# Patient Record
Sex: MALE | Race: WHITE | NOT HISPANIC OR LATINO | Employment: OTHER | URBAN - METROPOLITAN AREA
[De-identification: names, ages, dates, MRNs, and addresses within clinical notes are randomized per-mention and may not be internally consistent; named-entity substitution may affect disease eponyms.]

---

## 2018-02-25 ENCOUNTER — HOSPITAL ENCOUNTER (EMERGENCY)
Facility: HOSPITAL | Age: 43
Discharge: HOME | End: 2018-02-26
Attending: EMERGENCY MEDICINE | Admitting: EMERGENCY MEDICINE
Payer: MEDICARE

## 2018-02-25 DIAGNOSIS — F30.9 MANIA (HCC): Primary | ICD-10-CM

## 2018-02-25 RX ORDER — ZIPRASIDONE MESYLATE 20 MG/ML
10 INJECTION, POWDER, LYOPHILIZED, FOR SOLUTION INTRAMUSCULAR ONCE
Status: COMPLETED | OUTPATIENT
Start: 2018-02-25 | End: 2018-02-26

## 2018-02-26 VITALS
SYSTOLIC BLOOD PRESSURE: 199 MMHG | DIASTOLIC BLOOD PRESSURE: 96 MMHG | HEART RATE: 94 BPM | RESPIRATION RATE: 20 BRPM | BODY MASS INDEX: 34.62 KG/M2 | TEMPERATURE: 98.3 F | WEIGHT: 278.44 LBS | OXYGEN SATURATION: 98 % | HEIGHT: 75 IN

## 2018-02-26 LAB
ALBUMIN SERPL BCP-MCNC: 3.4 G/DL (ref 3.5–5)
ALP SERPL-CCNC: 110 U/L (ref 46–116)
ALT SERPL W P-5'-P-CCNC: 62 U/L (ref 12–78)
ANION GAP SERPL CALCULATED.3IONS-SCNC: 18 MMOL/L (ref 4–13)
AST SERPL W P-5'-P-CCNC: 72 U/L (ref 5–45)
ATRIAL RATE: 96 BPM
ATRIAL RATE: 98 BPM
BASOPHILS # BLD AUTO: 0.02 THOUSANDS/ΜL (ref 0–0.1)
BASOPHILS NFR BLD AUTO: 0 % (ref 0–1)
BILIRUB SERPL-MCNC: 1 MG/DL (ref 0.2–1)
BUN SERPL-MCNC: 21 MG/DL (ref 5–25)
CALCIUM SERPL-MCNC: 8.9 MG/DL (ref 8.3–10.1)
CHLORIDE SERPL-SCNC: 100 MMOL/L (ref 100–108)
CO2 SERPL-SCNC: 17 MMOL/L (ref 21–32)
CREAT SERPL-MCNC: 1.11 MG/DL (ref 0.6–1.3)
EOSINOPHIL # BLD AUTO: 0.01 THOUSAND/ΜL (ref 0–0.61)
EOSINOPHIL NFR BLD AUTO: 0 % (ref 0–6)
ERYTHROCYTE [DISTWIDTH] IN BLOOD BY AUTOMATED COUNT: 13.5 % (ref 11.6–15.1)
GFR SERPL CREATININE-BSD FRML MDRD: 82 ML/MIN/1.73SQ M
GLUCOSE SERPL-MCNC: 235 MG/DL (ref 65–140)
HCT VFR BLD AUTO: 43.2 % (ref 36.5–49.3)
HGB BLD-MCNC: 14.8 G/DL (ref 12–17)
LITHIUM SERPL-SCNC: <0.2 MMOL/L (ref 0.5–1)
LYMPHOCYTES # BLD AUTO: 1.77 THOUSANDS/ΜL (ref 0.6–4.47)
LYMPHOCYTES NFR BLD AUTO: 18 % (ref 14–44)
MCH RBC QN AUTO: 26.4 PG (ref 26.8–34.3)
MCHC RBC AUTO-ENTMCNC: 34.3 G/DL (ref 31.4–37.4)
MCV RBC AUTO: 77 FL (ref 82–98)
MONOCYTES # BLD AUTO: 0.77 THOUSAND/ΜL (ref 0.17–1.22)
MONOCYTES NFR BLD AUTO: 8 % (ref 4–12)
NEUTROPHILS # BLD AUTO: 7.24 THOUSANDS/ΜL (ref 1.85–7.62)
NEUTS SEG NFR BLD AUTO: 74 % (ref 43–75)
NRBC BLD AUTO-RTO: 0 /100 WBCS
P AXIS: 46 DEGREES
P AXIS: 54 DEGREES
PLATELET # BLD AUTO: 265 THOUSANDS/UL (ref 149–390)
PMV BLD AUTO: 9.6 FL (ref 8.9–12.7)
POTASSIUM SERPL-SCNC: 3.8 MMOL/L (ref 3.5–5.3)
PR INTERVAL: 144 MS
PR INTERVAL: 146 MS
PROT SERPL-MCNC: 7.3 G/DL (ref 6.4–8.2)
QRS AXIS: 46 DEGREES
QRS AXIS: 53 DEGREES
QRSD INTERVAL: 94 MS
QRSD INTERVAL: 96 MS
QT INTERVAL: 366 MS
QT INTERVAL: 376 MS
QTC INTERVAL: 467 MS
QTC INTERVAL: 475 MS
RBC # BLD AUTO: 5.61 MILLION/UL (ref 3.88–5.62)
SODIUM SERPL-SCNC: 135 MMOL/L (ref 136–145)
T WAVE AXIS: 50 DEGREES
T WAVE AXIS: 53 DEGREES
VALPROATE SERPL-MCNC: <3 UG/ML (ref 50–100)
VENTRICULAR RATE: 96 BPM
VENTRICULAR RATE: 98 BPM
WBC # BLD AUTO: 9.85 THOUSAND/UL (ref 4.31–10.16)

## 2018-02-26 PROCEDURE — 85025 COMPLETE CBC W/AUTO DIFF WBC: CPT | Performed by: EMERGENCY MEDICINE

## 2018-02-26 PROCEDURE — 93010 ELECTROCARDIOGRAM REPORT: CPT | Performed by: INTERNAL MEDICINE

## 2018-02-26 PROCEDURE — 96361 HYDRATE IV INFUSION ADD-ON: CPT

## 2018-02-26 PROCEDURE — 99284 EMERGENCY DEPT VISIT MOD MDM: CPT

## 2018-02-26 PROCEDURE — 80178 ASSAY OF LITHIUM: CPT | Performed by: EMERGENCY MEDICINE

## 2018-02-26 PROCEDURE — 96360 HYDRATION IV INFUSION INIT: CPT

## 2018-02-26 PROCEDURE — 96372 THER/PROPH/DIAG INJ SC/IM: CPT

## 2018-02-26 PROCEDURE — 80053 COMPREHEN METABOLIC PANEL: CPT | Performed by: EMERGENCY MEDICINE

## 2018-02-26 PROCEDURE — 36415 COLL VENOUS BLD VENIPUNCTURE: CPT | Performed by: EMERGENCY MEDICINE

## 2018-02-26 PROCEDURE — 80164 ASSAY DIPROPYLACETIC ACD TOT: CPT | Performed by: EMERGENCY MEDICINE

## 2018-02-26 PROCEDURE — 93005 ELECTROCARDIOGRAM TRACING: CPT

## 2018-02-26 RX ORDER — ZIPRASIDONE MESYLATE 20 MG/ML
10 INJECTION, POWDER, LYOPHILIZED, FOR SOLUTION INTRAMUSCULAR ONCE
Status: DISCONTINUED | OUTPATIENT
Start: 2018-02-26 | End: 2018-02-26 | Stop reason: HOSPADM

## 2018-02-26 RX ADMIN — ZIPRASIDONE MESYLATE 10 MG: 20 INJECTION, POWDER, LYOPHILIZED, FOR SOLUTION INTRAMUSCULAR at 01:11

## 2018-02-26 RX ADMIN — WATER: 1 INJECTION INTRAMUSCULAR; INTRAVENOUS; SUBCUTANEOUS at 05:28

## 2018-02-26 RX ADMIN — SODIUM CHLORIDE 1000 ML: 0.9 INJECTION, SOLUTION INTRAVENOUS at 01:10

## 2018-02-26 NOTE — ED NOTES
Pt appears to be resting  No signs of distress  Will continue to monitor       April C migue Springer  02/26/18 5081
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
## 2018-02-26 NOTE — ED NOTES
Pt requesting to talk to Mrs  Sultana Philadelphia  Called [phone]; unable to get ahold of Mrs  Sultana [place]  Will try and contact her at a later time        Lo Nino RN  02/26/18 1964

## 2018-02-26 NOTE — ED NOTES
Call placed to pt's g/f,  Munira Collier, and a message was left requesting a call elie  Spoke with the pt who stated that he does not have any money and would need a cab voucher home      Juwan Minor LMSW  02/26/18  6984
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
## 2018-02-26 NOTE — ED NOTES
302, CRF and Rights emailed to Marv Hernandez at [facility]      Douglasstanislav Stevie, [facility]  02/26/18  2213

## 2018-02-26 NOTE — ED NOTES
Pt belongings:  Ryannejuan Ros x2  Metal bracelet  Underwear (pt is wearing)  Silver-toned ring (pt is wearing)  Shoes  Lighter  2 quarters  Watch on cord on pants  Pair of pants  [employer]  Sweater  2 shirts  skoal container with 2 tobacco pouches  Jacket  Empty water bottle  2 knit knee braces (pt is wearing)  Black bag-contents below  -hat  -cell phone  -planner  -wallet   --ID  --social security card  --debit card  --reduced transit fare card  --folded one dollar bill  --laundromat card  -Anali Schwana  -cigarettes  -  -empty prescription bottles-in pt's name  ---metformin  ---glimepiride  ---lisinopril  ---sulfamethoxazole  -Yellow bag with health coverage paperwork and [place] dept of human services paperwork (all papers are wet)  -Detachable lira  -3 shirts  -Pair of pants  -190 application       April C migue Springer  02/26/18 0005         April C migue Galloway  02/26/18 7274

## 2018-02-26 NOTE — ED NOTES
Call received from pt's landlord, Lana Carrillo, who was informed the pt was being d/c and was returning to her home  She indicated that he was unable to return and this writer stated that she would need to go through the appropriate channels to have him evicted  Lana Carrillo indicated that she understood       Florencio Chang LMSW  02/26/18  5113

## 2018-02-26 NOTE — ED PROVIDER NOTES
Care patient assumed from Dr Alton Dalal  For full details of the H&P, please see the note written by Dr Jose Elias Palacios  Briefly, this is a 28-year-old male who presents here today on a 36 by his landlord for evaluation of zoran  He is reportedly not suicidal or homicidal     He does state he has been taking lithium and Depakote, however the last time these were filled were when he was discharged from the hospital in September or October, and he has not followed up with a psychiatrist since then  We will check levels of these, to help with long-term, and outpatient medication management  The patient is psychotic and occasionally has very tangential speech, however at times is answering questions appropriately, and is able to follow a logical train of thought  He denies any homicidal or suicidal ideations, and does not want to be admitted to the hospital   We are able to speak with his long-time girlfriend, who states that she feels that he needs help, but denies any of the events stated in the 302 by the patient's landlord  She does not feel that he meets 302 criteria for any reason at this time  We will therefore overturned this, and allow him to be discharged home, with resources for outpatient follow-up  Diagnoses that have been ruled out:   None   Diagnoses that are still under consideration:   None   Final diagnoses:   Zoran [facility])     Time reflects when diagnosis was documented in both MDM as applicable and the Disposition within this note     Time User Action Codes Description Comment    2/26/2018 12:11 PM Candida Kline Add [F30 9] [facility])       ED Disposition     ED Disposition Condition Comment    Discharge  Josh Juarez discharge to home/self care      Condition at discharge: Good        Follow-up Information     Follow up With Specialties Details Why Contact Info    Crisis resources  Schedule an appointment as soon as possible for a visit to follow up with a psychiatrist Amandeep Mckeon MD  02/26/18 9812

## 2018-02-26 NOTE — ED NOTES
Per hospital supervisor no 1:1 is needed after Linnette Ochoa stated pt denies all SI/HI        Farzana Vazquez RN  02/26/18 8371

## 2018-02-26 NOTE — ED NOTES
Pt given paper scrubs upon arrival and changed into them  Pt is wearing silver-toned ring and underwear that he came in wearing  The rest of pt belongings were logged  Pt asked for a urine sample but states that he is unable to use the restroom at this time      BAT  000     April AROLDO Wilder  02/25/18 8932

## 2018-02-26 NOTE — DISCHARGE INSTRUCTIONS
Take your medications as prescribed  You need to follow up with a psychiatrist for long-term medication management  Return if you develop thoughts of wanting to hurt yourself or anybody else, or feel like you need to be admitted to the hospital     Bipolar Disorder   WHAT YOU NEED TO KNOW:   Bipolar disorder is a long-term chemical imbalance that causes rapid changes in mood and behavior  High moods are called zoran  Low moods are called depression  Sometimes you will feel manic and sometimes you will feel depressed  You can have alternating episodes of zoran and depression  This is called a mixed bipolar state  DISCHARGE INSTRUCTIONS:   Contact your healthcare provider or psychiatrist if:   · You are having trouble managing your bipolar disorder  · You cannot sleep, or are sleeping all the time  · You cannot eat, or are eating more than usual     · You feel dizzy or your stomach is upset  · You cannot make it to your next meeting with your healthcare provider  · You have questions or concerns about your condition or care  Medicines:   · Medicines  may be given to help keep your mood stable, or to help you sleep  Changes in medicine are often needed as your bipolar disorder changes  · Take your medicine as directed  Contact your healthcare provider if you think your medicine is not helping or if you have side effects  Tell him or her if you are allergic to any medicine  Keep a list of the medicines, vitamins, and herbs you take  Include the amounts, and when and why you take them  Bring the list or the pill bottles to follow-up visits  Carry your medicine list with you in case of an emergency  Follow up with your healthcare provider or psychiatrist as directed: You may need to return for blood tests to monitor the levels of bipolar medicine in your blood  Manage bipolar disorder:  Watch for triggers of bipolar disorder symptoms, such as stress   Learn new ways to relax, such as deep breathing, to manage your stress  Tell someone if you feel a manic or depressive period might be coming on  Ask a friend or family member to help watch you for bipolar symptoms  Work to develop skills that will help you manage bipolar disorder  You may need to make lifestyle changes  Ask your healthcare provider or psychiatrist for resources  For support and more information:   · [address] [facility], [address] [facility], [address] [facility], [address]  [place]isha [place] [address]   Phone: [phone]  Phone: [phone]  Web Address: [facility]  · Depression and [address] ([employer]BS[employer])  [address] [place] [address], [facility] , [address]  Phone: [phone]  Web Address: [employer] no  org   © 2017 [address] [facility] Information is for End User's use only and may not be sold, redistributed or otherwise used for commercial purposes  All illustrations and images included in CareNotes® are the copyrighted property of A D A M , Inc  or Jared Henderson  The above information is an  only  It is not intended as medical advice for individual conditions or treatments  Talk to your doctor, nurse or pharmacist before following any medical regimen to see if it is safe and effective for you

## 2018-02-26 NOTE — ED NOTES
Attempted to call pt's wife 3 times for transport, but only getting voicemail        April Collins RN  02/26/18 7170

## 2018-02-26 NOTE — ED NOTES
Pt has been talking about Debra Cary since he arrived  Pt is also talking about family but his stories are not making sense  He seems particularly interested in numbers (dates, dollar amounts, road signs etc)  The focus of his conversations are on Isabel Maxwell not understanding him/appreciating him  Pt is sitting on bed  Will continue to monitor       April C de Kip Krabbe  02/26/18 7290

## 2018-02-26 NOTE — ED NOTES
Call placed to pt's Naty stevenson @ [phone], who petitioned the 36  A message was left requesting a call back   An additional call was placed to [phone] and informed them via v/m that the pt is going to be d/c     Aicha Cervantes LMSW  02/26/18  5943

## 2018-02-26 NOTE — ED NOTES
Pt is talking fast and focused on talking about "Evetta Dense" who he claims is his wife  Pt is currently singing while laying flat on the bed  No signs of distress  Will continue to monitor       April C migue Springer  02/26/18 0003

## 2018-02-26 NOTE — ED NOTES
Pt is still talking about Karyn Marcano as he lies on the bed  Pt appears to be calmer and is talking at a less hurried pace  Will continue to monitor         April C de Presley Denver  02/26/18 5491

## 2018-02-26 NOTE — ED NOTES
Charge nurse informed me that one-to-one observation is no longer needed  Pt appears to be resting  No signs of distress         April C migue Verde  02/26/18 2529

## 2018-02-26 NOTE — ED NOTES
Pt asked me if there is such a thing as "assisted suicide"  He asked if he could come in to the hospital and say that he "was done" if the hospital would help him with that  I told him that is not something we do at the hospital, and we would take care of him during his stay       April AROLDO Bolanos  02/26/18 0678

## 2018-06-09 LAB
ALBUMIN SERPL BCP-MCNC: 4.4 G/DL (ref 3.5–5.7)
ALP SERPL-CCNC: 62 IU/L (ref 40–150)
ALT SERPL W P-5'-P-CCNC: 11 IU/L (ref 0–50)
ANION GAP SERPL CALCULATED.3IONS-SCNC: 16.1 MM/L
AST SERPL W P-5'-P-CCNC: 10 U/L (ref 8–27)
BASOPHILS # BLD AUTO: 0 X3/UL (ref 0–0.3)
BASOPHILS # BLD AUTO: 0.5 % (ref 0–2)
BILIRUB SERPL-MCNC: 0.7 MG/DL (ref 0.3–1)
BUN SERPL-MCNC: 20 MG/DL (ref 7–25)
CALCIUM SERPL-MCNC: 9.4 MG/DL (ref 8.6–10.5)
CHLORIDE SERPL-SCNC: 99 MM/L (ref 98–107)
CO2 SERPL-SCNC: 24 MM/L (ref 21–31)
CREAT SERPL-MCNC: 1.22 MG/DL (ref 0.7–1.3)
DEPRECATED RDW RBC AUTO: 15 % (ref 11.5–14.5)
EGFR (HISTORICAL): > 60 GFR
EGFR AFRICAN AMERICAN (HISTORICAL): > 60 GFR
EOSINOPHIL # BLD AUTO: 0 X3/UL (ref 0–0.5)
EOSINOPHIL NFR BLD AUTO: 0.6 % (ref 0–5)
ETHANOL (HISTORICAL): < 10 MG/DL
GLUCOSE (HISTORICAL): 151 MG/DL (ref 65–99)
HCT VFR BLD AUTO: 45.6 % (ref 42–52)
HGB BLD-MCNC: 15.4 G/DL (ref 14–18)
LYMPHOCYTES # BLD AUTO: 2.5 X3/UL (ref 1.2–4.2)
LYMPHOCYTES NFR BLD AUTO: 29.1 % (ref 20.5–51.1)
MCH RBC QN AUTO: 26.2 PG (ref 26–34)
MCHC RBC AUTO-ENTMCNC: 33.7 G/DL (ref 31–36)
MCV RBC AUTO: 77.7 FL (ref 81–99)
MEDICAL ALCOHOL (HISTORICAL): 0 %
MONOCYTES # BLD AUTO: 0.5 X3/UL (ref 0–1)
MONOCYTES NFR BLD AUTO: 5.6 % (ref 1.7–12)
NEUTROPHILS # BLD AUTO: 5.6 X3/UL (ref 1.4–6.5)
NEUTS SEG NFR BLD AUTO: 64.2 % (ref 42.2–75.2)
OSMOLALITY, SERUM (HISTORICAL): 276 MOSM (ref 262–291)
PLATELET # BLD AUTO: 240 X3/UL (ref 130–400)
PMV BLD AUTO: 8.1 FL (ref 8.6–11.7)
POTASSIUM SERPL-SCNC: 4.1 MM/L (ref 3.5–5.5)
RBC # BLD AUTO: 5.86 X6/UL (ref 4.3–5.9)
SODIUM SERPL-SCNC: 135 MM/L (ref 134–143)
TOTAL PROTEIN (HISTORICAL): 7.7 G/DL (ref 6.4–8.9)
TSH SERPL DL<=0.05 MIU/L-ACNC: 0.47 UIU/M (ref 0.45–5.33)
WBC # BLD AUTO: 8.7 X3/UL (ref 4.8–10.8)

## 2018-06-09 PROCEDURE — 36415 COLL VENOUS BLD VENIPUNCTURE: CPT

## 2018-06-09 PROCEDURE — 9990 URINALYSIS AUTO W/O MICRO (71400121)

## 2018-06-09 PROCEDURE — 9990 CBC AUTO & AUTO DIFF (71200000)

## 2018-06-09 PROCEDURE — 81003 URINALYSIS AUTO W/O SCOPE: CPT

## 2018-06-09 PROCEDURE — 80307 DRUG TEST PRSMV CHEM ANLYZR: CPT

## 2018-06-09 PROCEDURE — 9990 VENIPUNCTURE (14170013)

## 2018-06-09 PROCEDURE — 9990 ASSAY OF ETHANOL (71120794)

## 2018-06-09 PROCEDURE — 84443 ASSAY THYROID STIM HORMONE: CPT

## 2018-06-09 PROCEDURE — 9990 EM LEVEL V (9020181)

## 2018-06-09 PROCEDURE — 9990 COMPREHEN METABOLIC PANEL (71120349)

## 2018-06-09 PROCEDURE — 9990 TSH (71100234)

## 2018-06-09 PROCEDURE — 80053 COMPREHEN METABOLIC PANEL: CPT

## 2018-06-09 PROCEDURE — 9990 VENIPUNCTURE-ER (9010166)

## 2018-06-09 PROCEDURE — 9990 DRUG SCREEN MULTIPLE CLASS (71121123)

## 2018-06-09 PROCEDURE — 85025 COMPLETE CBC W/AUTO DIFF WBC: CPT

## 2018-06-10 ENCOUNTER — HOSPITAL ENCOUNTER (INPATIENT)
Dept: OTHER | Facility: HOSPITAL | Age: 43
LOS: 22 days | Discharge: HOME | DRG: 885 | End: 2018-07-02
Attending: PSYCHIATRY & NEUROLOGY | Admitting: PSYCHIATRY & NEUROLOGY
Payer: MEDICARE

## 2018-06-10 LAB
AMPHETAMINES (HISTORICAL): NEGATIVE
BARBITURATES (HISTORICAL): NEGATIVE
BENZODIAZEPINES (HISTORICAL): NEGATIVE
BILIRUB UR QL STRIP: NEGATIVE
CANNABINOIDS (HISTORICAL): POSITIVE
CLARITY UR: CLEAR
COCAINE (HISTORICAL): NEGATIVE
COLOR UR: YELLOW
GLUCOSE UR STRIP-MCNC: NEGATIVE MG/DL
HGB UR QL STRIP.AUTO: NEGATIVE
KETONES UR STRIP-MCNC: ABNORMAL MG/DL
LEUKOCYTE ESTERASE UR QL STRIP: NEGATIVE
METHADONE (HISTORICAL): NEGATIVE
NITRITE UR QL STRIP: NEGATIVE
OPIATES (HISTORICAL): NEGATIVE
OXYCODONE (HISTORICAL): NEGATIVE
PH UR STRIP.AUTO: 6 [PH] (ref 4.5–8)
PHENCYCLIDINE URINE (HISTORICAL): NEGATIVE
PLEASE NOTE (HISTORICAL): YES
PROPOXYPHENE (HISTORICAL): NEGATIVE
PROT UR STRIP-MCNC: NEGATIVE MG/DL
SP GR UR STRIP.AUTO: 1.02 (ref 1–1.03)
UROBILINOGEN UR QL STRIP.AUTO: 1 EU/DL (ref 0.2–8)

## 2018-06-10 PROCEDURE — 9990 OLANZAPINE 5 MG (27112317)

## 2018-06-10 PROCEDURE — 9990 GABAPENTIN 100 MG (27113125)

## 2018-06-10 PROCEDURE — 87070 CULTURE OTHR SPECIMN AEROBIC: CPT

## 2018-06-10 PROCEDURE — 9990 TRAZODONE 50 MG (27101443)

## 2018-06-10 PROCEDURE — 9990 ADULT BEHAVOR UNIT (100305)

## 2018-06-10 PROCEDURE — 9990 LORAZEPAM TAB 1 MG (27102144)

## 2018-06-10 PROCEDURE — 9990 NICOTINE 21 MG/24H (27022359)

## 2018-06-10 PROCEDURE — 9990 HALOPERIDOL TAB 5 MG (27102102)

## 2018-06-10 PROCEDURE — 9990 ADULT BEHAVIOR UNIT GEN SUPPLY (7900053)

## 2018-06-10 PROCEDURE — 9990 BACTERIAL CULTURE OTHER SOURCE (71700058)

## 2018-06-10 PROCEDURE — 9990

## 2018-06-11 PROCEDURE — 9990 ADULT BEHAVIOR UNIT GEN SUPPLY (7900053)

## 2018-06-11 PROCEDURE — 9990 GABAPENTIN 100 MG (27113125)

## 2018-06-11 PROCEDURE — 9990 METFORMIN 500 MG (27112234)

## 2018-06-11 PROCEDURE — 9990 NICOTINE 21 MG/24H (27022359)

## 2018-06-11 PROCEDURE — 9990 ADULT BEHAVOR UNIT (100305)

## 2018-06-11 PROCEDURE — 9990

## 2018-06-11 PROCEDURE — 9990 OLANZAPINE 5 MG (27112317)

## 2018-06-11 PROCEDURE — 9990 LISINOPRIL 5 MG (27104173)

## 2018-06-12 PROCEDURE — 9990

## 2018-06-12 PROCEDURE — 9990 ADULT BEHAVIOR UNIT GEN SUPPLY (7900053)

## 2018-06-12 PROCEDURE — 9990 NICOTINE 21 MG/24H (27022359)

## 2018-06-12 PROCEDURE — 9990 LISINOPRIL 5 MG (27104173)

## 2018-06-12 PROCEDURE — 9990 METFORMIN 500 MG (27112234)

## 2018-06-12 PROCEDURE — 9990 ADULT BEHAVOR UNIT (100305)

## 2018-06-12 PROCEDURE — 9990 GABAPENTIN 100 MG (27113125)

## 2018-06-12 PROCEDURE — 9990 LORAZEPAM TAB 1 MG (27102144)

## 2018-06-12 PROCEDURE — 9990 DIVALPROEX 500 MG (27112812)

## 2018-06-12 PROCEDURE — 73650 X-RAY EXAM OF HEEL: CPT

## 2018-06-12 PROCEDURE — 9990 TRAZODONE 50 MG (27101443)

## 2018-06-13 DIAGNOSIS — F31.9 BIPOLAR 1 DISORDER (HCC): Primary | ICD-10-CM

## 2018-06-13 DIAGNOSIS — I10 ESSENTIAL HYPERTENSION: ICD-10-CM

## 2018-06-13 DIAGNOSIS — N64.52 DISCHARGE FROM BOTH NIPPLES: ICD-10-CM

## 2018-06-13 DIAGNOSIS — F31.4 SEVERE BIPOLAR I DISORDER, MOST RECENT EPISODE DEPRESSED WITHOUT PSYCHOTIC FEATURES (HCC): ICD-10-CM

## 2018-06-13 DIAGNOSIS — M17.0 OSTEOARTHRITIS OF BOTH KNEES, UNSPECIFIED OSTEOARTHRITIS TYPE: ICD-10-CM

## 2018-06-13 DIAGNOSIS — E10.8 TYPE 1 DIABETES MELLITUS WITH COMPLICATION (HCC): ICD-10-CM

## 2018-06-13 DIAGNOSIS — S90.819A FOOT ABRASION: ICD-10-CM

## 2018-06-13 PROCEDURE — 9990 BENZTROPINE 1 MG (27101039)

## 2018-06-13 PROCEDURE — 9990 DIVALPROEX 500 MG (27112812)

## 2018-06-13 PROCEDURE — 9990

## 2018-06-13 PROCEDURE — 9990 TRAZODONE 50 MG (27101443)

## 2018-06-13 PROCEDURE — 9990 METFORMIN 500 MG (27112234)

## 2018-06-13 PROCEDURE — 9990 CHLORPROMAZINE HCL 5MG ORAL (27238377)

## 2018-06-13 PROCEDURE — 9990 LORAZEPAM TAB 1 MG (27102144)

## 2018-06-13 PROCEDURE — 9990 ADULT BEHAVOR UNIT (100305)

## 2018-06-13 PROCEDURE — 9990 ADULT BEHAVIOR UNIT GEN SUPPLY (7900053)

## 2018-06-13 PROCEDURE — 9990 LISINOPRIL 5 MG (27104173)

## 2018-06-13 PROCEDURE — 9990 NICOTINE 21 MG/24H (27022359)

## 2018-06-14 PROCEDURE — 9990

## 2018-06-14 PROCEDURE — 9990 METFORMIN 500 MG (27112234)

## 2018-06-14 PROCEDURE — 9990 ADULT BEHAVOR UNIT (100305)

## 2018-06-14 PROCEDURE — 9990 LORAZEPAM TAB 1 MG (27102144)

## 2018-06-14 PROCEDURE — 9990 LISINOPRIL 5 MG (27104173)

## 2018-06-14 PROCEDURE — 93005 ELECTROCARDIOGRAM TRACING: CPT

## 2018-06-14 PROCEDURE — 9990 BENZTROPINE 1 MG (27101039)

## 2018-06-14 PROCEDURE — 9990 TRAZODONE 50 MG (27101443)

## 2018-06-14 PROCEDURE — 9990 CHLORPROMAZINE HCL 5MG ORAL (27238377)

## 2018-06-14 PROCEDURE — 9990 DIVALPROEX 500 MG (27112812)

## 2018-06-14 PROCEDURE — 9990 ADULT BEHAVIOR UNIT GEN SUPPLY (7900053)

## 2018-06-14 PROCEDURE — 9990 NICOTINE 21 MG/24H (27022359)

## 2018-06-15 LAB
ALBUMIN SERPL BCP-MCNC: 3.6 G/DL (ref 3.5–5.7)
ALP SERPL-CCNC: 62 IU/L (ref 40–150)
ALT SERPL W P-5'-P-CCNC: 9 IU/L (ref 0–50)
ANION GAP SERPL CALCULATED.3IONS-SCNC: 11.8 MM/L
AST SERPL W P-5'-P-CCNC: 9 U/L (ref 8–27)
BILIRUB SERPL-MCNC: 0.4 MG/DL (ref 0.3–1)
BILIRUBIN DIRECT (HISTORICAL): 0.1 MG/DL (ref 0–0.2)
BUN SERPL-MCNC: 20 MG/DL (ref 7–25)
CALCIUM SERPL-MCNC: 8.9 MG/DL (ref 8.6–10.5)
CHLORIDE SERPL-SCNC: 103 MM/L (ref 98–107)
CO2 SERPL-SCNC: 25 MM/L (ref 21–31)
CREAT SERPL-MCNC: 0.96 MG/DL (ref 0.7–1.3)
EGFR (HISTORICAL): > 60 GFR
EGFR AFRICAN AMERICAN (HISTORICAL): > 60 GFR
GLUCOSE (HISTORICAL): 217 MG/DL (ref 65–99)
OSMOLALITY, SERUM (HISTORICAL): 279 MOSM (ref 262–291)
POTASSIUM SERPL-SCNC: 4.8 MM/L (ref 3.5–5.5)
SODIUM SERPL-SCNC: 135 MM/L (ref 134–143)
TOTAL PROTEIN (HISTORICAL): 6.3 G/DL (ref 6.4–8.9)
VALPROIC ACID TOTAL (HISTORICAL): 50.3 MCQ/M (ref 50–125)

## 2018-06-15 PROCEDURE — 9990

## 2018-06-15 PROCEDURE — 9990 VENIPUNCTURE (14170013)

## 2018-06-15 PROCEDURE — 9990 LORAZEPAM TAB 1 MG (27102144)

## 2018-06-15 PROCEDURE — 9990 LISINOPRIL 5 MG (27104173)

## 2018-06-15 PROCEDURE — 9990 METFORMIN 500 MG (27112234)

## 2018-06-15 PROCEDURE — 9990 CHLORPROMAZINE HCL 5MG ORAL (27238377)

## 2018-06-15 PROCEDURE — 80164 ASSAY DIPROPYLACETIC ACD TOT: CPT

## 2018-06-15 PROCEDURE — 9990 NICOTINE 21 MG/24H (27022359)

## 2018-06-15 PROCEDURE — 93005 ELECTROCARDIOGRAM TRACING: CPT

## 2018-06-15 PROCEDURE — 9990 BENZTROPINE 1 MG (27101039)

## 2018-06-15 PROCEDURE — 82248 BILIRUBIN DIRECT: CPT

## 2018-06-15 PROCEDURE — 80053 COMPREHEN METABOLIC PANEL: CPT

## 2018-06-15 PROCEDURE — 36415 COLL VENOUS BLD VENIPUNCTURE: CPT

## 2018-06-15 PROCEDURE — 9990 TRAZODONE 50 MG (27101443)

## 2018-06-15 PROCEDURE — 9990 DIVALPROEX 500 MG (27112812)

## 2018-06-15 PROCEDURE — 9990 ADULT BEHAVOR UNIT (100305)

## 2018-06-15 PROCEDURE — 9990 COMPREHEN METABOLIC PANEL (71120349)

## 2018-06-15 PROCEDURE — 9990 ADULT BEHAVIOR UNIT GEN SUPPLY (7900053)

## 2018-06-16 PROCEDURE — 9990 LISINOPRIL 5 MG (27104173)

## 2018-06-16 PROCEDURE — 9990 BENZTROPINE 1 MG (27101039)

## 2018-06-16 PROCEDURE — 9990 ACETAMINOPHEN 325 MG (27108570)

## 2018-06-16 PROCEDURE — 9990 NICOTINE 21 MG/24H (27022359)

## 2018-06-16 PROCEDURE — 9990 LORAZEPAM TAB 1 MG (27102144)

## 2018-06-16 PROCEDURE — 9990 ADULT BEHAVOR UNIT (100305)

## 2018-06-16 PROCEDURE — 9990 CHLORPROMAZINE HCL 5MG ORAL (27238377)

## 2018-06-16 PROCEDURE — 9990 METFORMIN 500 MG (27112234)

## 2018-06-16 PROCEDURE — 9990

## 2018-06-16 PROCEDURE — 9990 TRAZODONE 50 MG (27101443)

## 2018-06-16 PROCEDURE — 9990 ADULT BEHAVIOR UNIT GEN SUPPLY (7900053)

## 2018-06-16 PROCEDURE — 9990 DIVALPROEX 500 MG (27112812)

## 2018-06-17 PROCEDURE — 9990 HALOPERIDOL TAB 5 MG (27102102)

## 2018-06-17 PROCEDURE — 9990 METFORMIN 500 MG (27112234)

## 2018-06-17 PROCEDURE — 9990

## 2018-06-17 PROCEDURE — 9990 CHLORPROMAZINE HCL 5MG ORAL (27238377)

## 2018-06-17 PROCEDURE — 9990 TRAZODONE 50 MG (27101443)

## 2018-06-17 PROCEDURE — 9990 ADULT BEHAVOR UNIT (100305)

## 2018-06-17 PROCEDURE — 9990 ADULT BEHAVIOR UNIT GEN SUPPLY (7900053)

## 2018-06-17 PROCEDURE — 9990 BENZTROPINE 1 MG (27101039)

## 2018-06-17 PROCEDURE — 9990 LORAZEPAM TAB 1 MG (27102144)

## 2018-06-17 PROCEDURE — 9990 LISINOPRIL 5 MG (27104173)

## 2018-06-17 PROCEDURE — 9990 DIVALPROEX 500 MG (27112812)

## 2018-06-17 PROCEDURE — 9990 NICOTINE 21 MG/24H (27022359)

## 2018-06-18 PROCEDURE — 9990

## 2018-06-18 PROCEDURE — 9990 ADULT BEHAVIOR UNIT GEN SUPPLY (7900053)

## 2018-06-18 PROCEDURE — 9990 LISINOPRIL 5 MG (27104173)

## 2018-06-18 PROCEDURE — 9990 DIVALPROEX 500 MG (27112812)

## 2018-06-18 PROCEDURE — 9990 METOPROLOL 25 MG (27114149)

## 2018-06-18 PROCEDURE — 9990 ADULT BEHAVOR UNIT (100305)

## 2018-06-18 PROCEDURE — 9990 GABAPENTIN 300 MG (27113133)

## 2018-06-18 PROCEDURE — 9990 BENZTROPINE 1 MG (27101039)

## 2018-06-18 PROCEDURE — 9990 TRAZODONE 50 MG (27101443)

## 2018-06-18 PROCEDURE — 9990 METFORMIN 500 MG (27112234)

## 2018-06-18 PROCEDURE — 9990 NICOTINE 21 MG/24H (27022359)

## 2018-06-18 PROCEDURE — 9990 CHLORPROMAZINE HCL 5MG ORAL (27238377)

## 2018-06-18 PROCEDURE — 9990 LORAZEPAM TAB 1 MG (27102144)

## 2018-06-18 PROCEDURE — 9990 GABAPENTIN 100 MG (27113125)

## 2018-06-19 PROCEDURE — 9990

## 2018-06-19 PROCEDURE — 9990 BENZTROPINE 1 MG (27101039)

## 2018-06-19 PROCEDURE — 9990 CHLORPROMAZINE HCL 5MG ORAL (27238377)

## 2018-06-19 PROCEDURE — 9990 METFORMIN 500 MG (27112234)

## 2018-06-19 PROCEDURE — 9990 DIVALPROEX 500 MG (27112812)

## 2018-06-19 PROCEDURE — 9990 LISINOPRIL 5 MG (27104173)

## 2018-06-19 PROCEDURE — 9990 TRAZODONE 50 MG (27101443)

## 2018-06-19 PROCEDURE — 9990 NICOTINE 21 MG/24H (27022359)

## 2018-06-19 PROCEDURE — 9990 ADULT BEHAVIOR UNIT GEN SUPPLY (7900053)

## 2018-06-19 PROCEDURE — 9990 LORAZEPAM TAB 1 MG (27102144)

## 2018-06-19 PROCEDURE — 9990 METOPROLOL 25 MG (27114149)

## 2018-06-19 PROCEDURE — 9990 ADULT BEHAVOR UNIT (100305)

## 2018-06-20 LAB
ALBUMIN SERPL BCP-MCNC: 3.7 G/DL (ref 3.5–5.7)
ALP SERPL-CCNC: 72 IU/L (ref 40–150)
ALT SERPL W P-5'-P-CCNC: 8 IU/L (ref 0–50)
ANION GAP SERPL CALCULATED.3IONS-SCNC: 15.5 MM/L
AST SERPL W P-5'-P-CCNC: 9 U/L (ref 8–27)
BILIRUB SERPL-MCNC: 0.4 MG/DL (ref 0.3–1)
BILIRUBIN DIRECT (HISTORICAL): 0 MG/DL (ref 0–0.2)
BUN SERPL-MCNC: 22 MG/DL (ref 7–25)
CALCIUM SERPL-MCNC: 8.9 MG/DL (ref 8.6–10.5)
CHLORIDE SERPL-SCNC: 102 MM/L (ref 98–107)
CO2 SERPL-SCNC: 24 MM/L (ref 21–31)
CREAT SERPL-MCNC: 1.15 MG/DL (ref 0.7–1.3)
EGFR (HISTORICAL): > 60 GFR
EGFR AFRICAN AMERICAN (HISTORICAL): > 60 GFR
GLUCOSE (HISTORICAL): 216 MG/DL (ref 65–99)
OSMOLALITY, SERUM (HISTORICAL): 284 MOSM (ref 262–291)
POTASSIUM SERPL-SCNC: 4.5 MM/L (ref 3.5–5.5)
SODIUM SERPL-SCNC: 137 MM/L (ref 134–143)
TOTAL PROTEIN (HISTORICAL): 6 G/DL (ref 6.4–8.9)
VALPROIC ACID TOTAL (HISTORICAL): 54.5 MCQ/M (ref 50–125)

## 2018-06-20 PROCEDURE — 9990 VENIPUNCTURE (14170013)

## 2018-06-20 PROCEDURE — 9990 LISINOPRIL 5 MG (27104173)

## 2018-06-20 PROCEDURE — 9990

## 2018-06-20 PROCEDURE — 9990 BENZTROPINE 1 MG (27101039)

## 2018-06-20 PROCEDURE — 9990 METFORMIN 500 MG (27112234)

## 2018-06-20 PROCEDURE — 9990 METOPROLOL 25 MG (27114149)

## 2018-06-20 PROCEDURE — 80164 ASSAY DIPROPYLACETIC ACD TOT: CPT

## 2018-06-20 PROCEDURE — 9990 TRAZODONE 50 MG (27101443)

## 2018-06-20 PROCEDURE — 9990 HALOPERIDOL TAB 5 MG (27102102)

## 2018-06-20 PROCEDURE — 9990 ADULT BEHAVOR UNIT (100305)

## 2018-06-20 PROCEDURE — 9990 CHLORPROMAZINE HCL 5MG ORAL (27238377)

## 2018-06-20 PROCEDURE — 80053 COMPREHEN METABOLIC PANEL: CPT

## 2018-06-20 PROCEDURE — 36415 COLL VENOUS BLD VENIPUNCTURE: CPT

## 2018-06-20 PROCEDURE — 9990 ADULT BEHAVIOR UNIT GEN SUPPLY (7900053)

## 2018-06-20 PROCEDURE — 82248 BILIRUBIN DIRECT: CPT

## 2018-06-20 PROCEDURE — 9990 NICOTINE 21 MG/24H (27022359)

## 2018-06-20 PROCEDURE — 9990 LORAZEPAM TAB 1 MG (27102144)

## 2018-06-20 PROCEDURE — 9990 ACETAMINOPHEN 325 MG (27108570)

## 2018-06-20 PROCEDURE — 9990 DIVALPROEX 500 MG (27112812)

## 2018-06-20 PROCEDURE — 9990 COMPREHEN METABOLIC PANEL (71120349)

## 2018-06-21 PROCEDURE — 9990 LORAZEPAM TAB 1 MG (27102144)

## 2018-06-21 PROCEDURE — 9990 NICOTINE 21 MG/24H (27022359)

## 2018-06-21 PROCEDURE — 9990 ACETAMINOPHEN 325 MG (27108570)

## 2018-06-21 PROCEDURE — 9990 CHLORPROMAZINE HCL 5MG ORAL (27238377)

## 2018-06-21 PROCEDURE — 9990 LISINOPRIL 5 MG (27104173)

## 2018-06-21 PROCEDURE — 9990

## 2018-06-21 PROCEDURE — 9990 ADULT BEHAVOR UNIT (100305)

## 2018-06-21 PROCEDURE — 9990 ADULT BEHAVIOR UNIT GEN SUPPLY (7900053)

## 2018-06-21 PROCEDURE — 9990 DIVALPROEX 500 MG (27112812)

## 2018-06-21 PROCEDURE — 9990 TRAZODONE 50 MG (27101443)

## 2018-06-21 PROCEDURE — 9990 METOPROLOL 25 MG (27114149)

## 2018-06-21 PROCEDURE — 9990 BENZTROPINE 1 MG (27101039)

## 2018-06-21 PROCEDURE — 9990 METFORMIN 500 MG (27112234)

## 2018-06-22 PROCEDURE — 9990

## 2018-06-22 PROCEDURE — 9990 CHLORPROMAZINE HCL 5MG ORAL (27238377)

## 2018-06-22 PROCEDURE — 9990 ADULT BEHAVIOR UNIT GEN SUPPLY (7900053)

## 2018-06-22 PROCEDURE — 9990 METFORMIN 500 MG (27112234)

## 2018-06-22 PROCEDURE — 9990 HALOPERIDOL TAB 5 MG (27102102)

## 2018-06-22 PROCEDURE — 9990 METOPROLOL 25 MG (27114149)

## 2018-06-22 PROCEDURE — 9990 HUMALOG 3ML MDV (27240001)

## 2018-06-22 PROCEDURE — 9990 DIVALPROEX 500 MG (27112812)

## 2018-06-22 PROCEDURE — 9990 LISINOPRIL 5 MG (27104173)

## 2018-06-22 PROCEDURE — 9990 NICOTINE 21 MG/24H (27022359)

## 2018-06-22 PROCEDURE — 80164 ASSAY DIPROPYLACETIC ACD TOT: CPT

## 2018-06-22 PROCEDURE — 9990 TRAZODONE 50 MG (27101443)

## 2018-06-22 PROCEDURE — 9990 ADULT BEHAVOR UNIT (100305)

## 2018-06-22 PROCEDURE — 9990 BENZTROPINE 1 MG (27101039)

## 2018-06-22 PROCEDURE — 9990 LORAZEPAM TAB 1 MG (27102144)

## 2018-06-22 PROCEDURE — 9990 ACETAMINOPHEN 325 MG (27108570)

## 2018-06-22 RX ORDER — LISINOPRIL 5 MG/1
5 TABLET ORAL DAILY
Status: DISCONTINUED | OUTPATIENT
Start: 2018-06-23 | End: 2018-07-02 | Stop reason: HOSPADM

## 2018-06-22 RX ORDER — BENZTROPINE MESYLATE 1 MG/1
1 TABLET ORAL DAILY
Status: DISCONTINUED | OUTPATIENT
Start: 2018-06-23 | End: 2018-07-02 | Stop reason: HOSPADM

## 2018-06-22 RX ORDER — DEXTROSE MONOHYDRATE 25 G/50ML
25 INJECTION, SOLUTION INTRAVENOUS DAILY PRN
Status: DISCONTINUED | OUTPATIENT
Start: 2018-06-23 | End: 2018-07-02 | Stop reason: HOSPADM

## 2018-06-22 RX ORDER — NICOTINE 21 MG/24HR
21 PATCH, TRANSDERMAL 24 HOURS TRANSDERMAL DAILY
Status: DISCONTINUED | OUTPATIENT
Start: 2018-06-23 | End: 2018-06-30

## 2018-06-22 RX ORDER — BENZTROPINE MESYLATE 1 MG/1
1 TABLET ORAL EVERY 4 HOURS PRN
Status: DISCONTINUED | OUTPATIENT
Start: 2018-06-23 | End: 2018-07-02 | Stop reason: HOSPADM

## 2018-06-22 RX ORDER — HALOPERIDOL 5 MG/ML
5 INJECTION INTRAMUSCULAR EVERY 4 HOURS PRN
Status: DISCONTINUED | OUTPATIENT
Start: 2018-06-23 | End: 2018-07-02 | Stop reason: HOSPADM

## 2018-06-22 RX ORDER — BENZTROPINE MESYLATE 1 MG/ML
1 INJECTION INTRAMUSCULAR; INTRAVENOUS EVERY 4 HOURS PRN
Status: DISCONTINUED | OUTPATIENT
Start: 2018-06-23 | End: 2018-07-02 | Stop reason: HOSPADM

## 2018-06-22 RX ORDER — TRAZODONE HYDROCHLORIDE 50 MG/1
50 TABLET ORAL
Status: DISCONTINUED | OUTPATIENT
Start: 2018-06-23 | End: 2018-07-02 | Stop reason: HOSPADM

## 2018-06-22 RX ORDER — DIVALPROEX SODIUM 250 MG/1
250 TABLET, DELAYED RELEASE ORAL DAILY
Status: DISCONTINUED | OUTPATIENT
Start: 2018-06-23 | End: 2018-06-27

## 2018-06-22 RX ORDER — HYDROXYZINE PAMOATE 25 MG/1
25 CAPSULE ORAL EVERY 6 HOURS PRN
Status: DISCONTINUED | OUTPATIENT
Start: 2018-06-23 | End: 2018-06-23

## 2018-06-22 RX ORDER — DIVALPROEX SODIUM 500 MG/1
500 TABLET, DELAYED RELEASE ORAL 2 TIMES DAILY
Status: DISCONTINUED | OUTPATIENT
Start: 2018-06-23 | End: 2018-06-27

## 2018-06-22 RX ORDER — LORAZEPAM 1 MG/1
1 TABLET ORAL EVERY 4 HOURS PRN
Status: DISCONTINUED | OUTPATIENT
Start: 2018-06-23 | End: 2018-07-02 | Stop reason: HOSPADM

## 2018-06-22 RX ORDER — HALOPERIDOL 5 MG
5 TABLET ORAL EVERY 4 HOURS PRN
Status: DISCONTINUED | OUTPATIENT
Start: 2018-06-23 | End: 2018-07-02 | Stop reason: HOSPADM

## 2018-06-22 RX ORDER — LISINOPRIL 5 MG/1
5 TABLET ORAL DAILY
Status: DISCONTINUED | OUTPATIENT
Start: 2018-06-23 | End: 2018-06-22

## 2018-06-22 RX ORDER — ACETAMINOPHEN 325 MG/1
650 TABLET ORAL EVERY 4 HOURS PRN
Status: DISCONTINUED | OUTPATIENT
Start: 2018-06-23 | End: 2018-07-02 | Stop reason: HOSPADM

## 2018-06-22 RX ORDER — TRAMADOL HYDROCHLORIDE 50 MG/1
50 TABLET ORAL EVERY 8 HOURS PRN
Status: DISCONTINUED | OUTPATIENT
Start: 2018-06-23 | End: 2018-07-02 | Stop reason: HOSPADM

## 2018-06-22 RX ORDER — GABAPENTIN 400 MG/1
400 CAPSULE ORAL 4 TIMES DAILY
Status: DISCONTINUED | OUTPATIENT
Start: 2018-06-23 | End: 2018-07-02 | Stop reason: HOSPADM

## 2018-06-22 RX ORDER — MAGNESIUM HYDROXIDE/ALUMINUM HYDROXICE/SIMETHICONE 120; 1200; 1200 MG/30ML; MG/30ML; MG/30ML
30 SUSPENSION ORAL EVERY 4 HOURS PRN
Status: DISCONTINUED | OUTPATIENT
Start: 2018-06-23 | End: 2018-07-02 | Stop reason: HOSPADM

## 2018-06-22 RX ORDER — LORAZEPAM 2 MG/ML
1 INJECTION INTRAMUSCULAR EVERY 4 HOURS PRN
Status: DISCONTINUED | OUTPATIENT
Start: 2018-06-23 | End: 2018-07-02 | Stop reason: HOSPADM

## 2018-06-22 RX ORDER — CHLORPROMAZINE HYDROCHLORIDE 25 MG/1
50 TABLET, FILM COATED ORAL 3 TIMES DAILY
Status: DISCONTINUED | OUTPATIENT
Start: 2018-06-23 | End: 2018-06-27

## 2018-06-22 RX ORDER — DEXTROSE MONOHYDRATE 50 MG/ML
20 INJECTION, SOLUTION INTRAVENOUS AS NEEDED
Status: DISCONTINUED | OUTPATIENT
Start: 2018-06-23 | End: 2018-07-02 | Stop reason: HOSPADM

## 2018-06-23 LAB
GLUCOSE SERPL-MCNC: 185 MG/DL (ref 65–140)
GLUCOSE SERPL-MCNC: 258 MG/DL (ref 65–140)
GLUCOSE SERPL-MCNC: 291 MG/DL (ref 65–140)

## 2018-06-23 PROCEDURE — 82948 REAGENT STRIP/BLOOD GLUCOSE: CPT

## 2018-06-23 RX ORDER — GABAPENTIN 400 MG/1
800 CAPSULE ORAL 3 TIMES DAILY
Status: ON HOLD | COMMUNITY
Start: 2018-05-17 | End: 2018-07-02

## 2018-06-23 RX ORDER — DIVALPROEX SODIUM 500 MG/1
500 TABLET, DELAYED RELEASE ORAL 2 TIMES DAILY
Status: ON HOLD | COMMUNITY
End: 2018-07-02

## 2018-06-23 RX ORDER — HYDROXYZINE HYDROCHLORIDE 25 MG/1
25 TABLET, FILM COATED ORAL EVERY 6 HOURS PRN
Status: DISCONTINUED | OUTPATIENT
Start: 2018-06-23 | End: 2018-07-02 | Stop reason: HOSPADM

## 2018-06-23 RX ORDER — LANOLIN ALCOHOL/MO/W.PET/CERES
3 CREAM (GRAM) TOPICAL DAILY PRN
COMMUNITY
Start: 2018-05-17 | End: 2018-07-11 | Stop reason: ALTCHOICE

## 2018-06-23 RX ADMIN — LORAZEPAM 1 MG: 1 TABLET ORAL at 12:53

## 2018-06-23 RX ADMIN — NICOTINE 21 MG: 21 PATCH, EXTENDED RELEASE TRANSDERMAL at 09:46

## 2018-06-23 RX ADMIN — INSULIN LISPRO 10 UNITS: 100 INJECTION, SOLUTION INTRAVENOUS; SUBCUTANEOUS at 21:25

## 2018-06-23 RX ADMIN — GABAPENTIN 400 MG: 400 CAPSULE ORAL at 17:29

## 2018-06-23 RX ADMIN — ACETAMINOPHEN 650 MG: 325 TABLET ORAL at 21:22

## 2018-06-23 RX ADMIN — LORAZEPAM 1 MG: 1 TABLET ORAL at 21:22

## 2018-06-23 RX ADMIN — TRAMADOL HYDROCHLORIDE 50 MG: 50 TABLET, COATED ORAL at 17:31

## 2018-06-23 RX ADMIN — DIVALPROEX SODIUM 500 MG: 500 TABLET, DELAYED RELEASE ORAL at 09:43

## 2018-06-23 RX ADMIN — CHLORPROMAZINE HYDROCHLORIDE 50 MG: 25 TABLET, SUGAR COATED ORAL at 09:43

## 2018-06-23 RX ADMIN — GABAPENTIN 400 MG: 400 CAPSULE ORAL at 21:23

## 2018-06-23 RX ADMIN — INSULIN LISPRO 6 UNITS: 100 INJECTION, SOLUTION INTRAVENOUS; SUBCUTANEOUS at 09:44

## 2018-06-23 RX ADMIN — TRAZODONE HYDROCHLORIDE 50 MG: 50 TABLET ORAL at 21:24

## 2018-06-23 RX ADMIN — CHLORPROMAZINE HYDROCHLORIDE 50 MG: 25 TABLET, SUGAR COATED ORAL at 17:28

## 2018-06-23 RX ADMIN — HALOPERIDOL 5 MG: 5 TABLET ORAL at 21:23

## 2018-06-23 RX ADMIN — METFORMIN HYDROCHLORIDE 500 MG: 500 TABLET ORAL at 17:29

## 2018-06-23 RX ADMIN — DIVALPROEX SODIUM 500 MG: 500 TABLET, DELAYED RELEASE ORAL at 17:29

## 2018-06-23 RX ADMIN — INSULIN LISPRO 10 UNITS: 100 INJECTION, SOLUTION INTRAVENOUS; SUBCUTANEOUS at 17:29

## 2018-06-23 RX ADMIN — CHLORPROMAZINE HYDROCHLORIDE 50 MG: 25 TABLET, SUGAR COATED ORAL at 21:23

## 2018-06-23 RX ADMIN — GABAPENTIN 400 MG: 400 CAPSULE ORAL at 09:44

## 2018-06-23 RX ADMIN — LISINOPRIL 5 MG: 5 TABLET ORAL at 09:46

## 2018-06-23 RX ADMIN — GABAPENTIN 400 MG: 400 CAPSULE ORAL at 12:52

## 2018-06-23 RX ADMIN — BENZTROPINE MESYLATE 1 MG: 1 TABLET ORAL at 09:43

## 2018-06-23 RX ADMIN — METOPROLOL TARTRATE 25 MG: 25 TABLET ORAL at 09:46

## 2018-06-23 RX ADMIN — METFORMIN HYDROCHLORIDE 500 MG: 500 TABLET ORAL at 09:46

## 2018-06-23 RX ADMIN — DIVALPROEX SODIUM 250 MG: 250 TABLET, DELAYED RELEASE ORAL at 12:53

## 2018-06-23 NOTE — NURSING NOTE
Pt  has been visible for groups with minimal participation  Selective interactions with peers  Has been pleasant and cooperative  Compliant with treatment  Left lateral foot wound redressed with betadine and non adherent dressing as ordered   Denies SI, HI, A/T/V

## 2018-06-24 PROBLEM — F17.200 TOBACCO USE DISORDER: Status: ACTIVE | Noted: 2018-06-24

## 2018-06-24 PROBLEM — F12.10 CANNABIS ABUSE: Status: ACTIVE | Noted: 2018-06-24

## 2018-06-24 PROBLEM — F31.4 SEVERE BIPOLAR I DISORDER, MOST RECENT EPISODE DEPRESSED WITHOUT PSYCHOTIC FEATURES (HCC): Status: ACTIVE | Noted: 2018-06-24

## 2018-06-24 LAB
GLUCOSE SERPL-MCNC: 178 MG/DL (ref 65–140)
GLUCOSE SERPL-MCNC: 219 MG/DL (ref 65–140)
GLUCOSE SERPL-MCNC: 256 MG/DL (ref 65–140)
GLUCOSE SERPL-MCNC: 278 MG/DL (ref 65–140)

## 2018-06-24 PROCEDURE — 82948 REAGENT STRIP/BLOOD GLUCOSE: CPT

## 2018-06-24 RX ADMIN — TRAMADOL HYDROCHLORIDE 50 MG: 50 TABLET, COATED ORAL at 15:17

## 2018-06-24 RX ADMIN — GABAPENTIN 400 MG: 400 CAPSULE ORAL at 17:39

## 2018-06-24 RX ADMIN — LORAZEPAM 1 MG: 1 TABLET ORAL at 14:44

## 2018-06-24 RX ADMIN — GABAPENTIN 400 MG: 400 CAPSULE ORAL at 09:28

## 2018-06-24 RX ADMIN — DIVALPROEX SODIUM 500 MG: 500 TABLET, DELAYED RELEASE ORAL at 09:27

## 2018-06-24 RX ADMIN — GABAPENTIN 400 MG: 400 CAPSULE ORAL at 21:25

## 2018-06-24 RX ADMIN — LISINOPRIL 5 MG: 5 TABLET ORAL at 09:28

## 2018-06-24 RX ADMIN — CHLORPROMAZINE HYDROCHLORIDE 50 MG: 25 TABLET, SUGAR COATED ORAL at 09:27

## 2018-06-24 RX ADMIN — METFORMIN HYDROCHLORIDE 500 MG: 500 TABLET ORAL at 09:28

## 2018-06-24 RX ADMIN — INSULIN LISPRO 8 UNITS: 100 INJECTION, SOLUTION INTRAVENOUS; SUBCUTANEOUS at 21:31

## 2018-06-24 RX ADMIN — BENZTROPINE MESYLATE 1 MG: 1 TABLET ORAL at 09:28

## 2018-06-24 RX ADMIN — METOPROLOL TARTRATE 25 MG: 25 TABLET ORAL at 09:27

## 2018-06-24 RX ADMIN — CHLORPROMAZINE HYDROCHLORIDE 50 MG: 25 TABLET, SUGAR COATED ORAL at 21:25

## 2018-06-24 RX ADMIN — ACETAMINOPHEN 650 MG: 325 TABLET ORAL at 21:25

## 2018-06-24 RX ADMIN — INSULIN LISPRO 6 UNITS: 100 INJECTION, SOLUTION INTRAVENOUS; SUBCUTANEOUS at 09:25

## 2018-06-24 RX ADMIN — GABAPENTIN 400 MG: 400 CAPSULE ORAL at 14:33

## 2018-06-24 RX ADMIN — TRAZODONE HYDROCHLORIDE 50 MG: 50 TABLET ORAL at 21:24

## 2018-06-24 RX ADMIN — HALOPERIDOL 5 MG: 5 TABLET ORAL at 17:48

## 2018-06-24 RX ADMIN — LORAZEPAM 1 MG: 1 TABLET ORAL at 10:49

## 2018-06-24 RX ADMIN — LORAZEPAM 1 MG: 1 TABLET ORAL at 21:25

## 2018-06-24 RX ADMIN — DIVALPROEX SODIUM 250 MG: 250 TABLET, DELAYED RELEASE ORAL at 14:33

## 2018-06-24 RX ADMIN — METFORMIN HYDROCHLORIDE 500 MG: 500 TABLET ORAL at 17:40

## 2018-06-24 RX ADMIN — CHLORPROMAZINE HYDROCHLORIDE 50 MG: 25 TABLET, SUGAR COATED ORAL at 17:39

## 2018-06-24 RX ADMIN — DIVALPROEX SODIUM 500 MG: 500 TABLET, DELAYED RELEASE ORAL at 17:39

## 2018-06-24 RX ADMIN — INSULIN LISPRO 10 UNITS: 100 INJECTION, SOLUTION INTRAVENOUS; SUBCUTANEOUS at 17:40

## 2018-06-24 NOTE — PROGRESS NOTES
Rosa Dutton 40 y o  Sex:male; WAL#[id number]        History of Present Illness  This is a 15 minute progress note on Rosa Dutton, of which five minutes was spent in coordination of care, which consisted of meeting with the nursing staff to discuss the patient's progress, response to treatment, documentation, medication orders,and behaviors over the past 24 hours  Rosa Dutton was seen on the unit today  Chart reviewed  Nursing reports that the patient eats and sleeps well  The patient told me he feels much better today  He has been observed socializing appropriately with staff and peers  He denies any adverse side effects from using his medication           Mental Status Evaluation  Adult male  Ambulatory with a cane  Casually dressed  Fair hygiene  Guarded  Improved eye contact  No psychomotor agitation or retardation  Speech: normal rate, rhythm, tone and volume  Mood has improved  He is less anxious  Thought process: linear  Thought content: he denies suicidal or homicidal thoughts or plans  He denied hallucinations  He did not appear to be responding to internal stimuli  He has not been aggressive to self or others in the past 24 hours  Poor attention and  concentration  Poor short term and long term memory  He is aware that he gets depressed and anxious due to his circumstances  Compliant with his treatment plan      Diagnosis  Bipolar I disorder, most recent episode depressed, severe, without psychosis  Cannabis abuse  Tobacco use disorder      Assessment/ Plan    Rosa Dutton has responded to treatment with medication and the therapeutic reveles milieu  Continue current medication and treatment  Discharge planning and disposition is ongoing

## 2018-06-24 NOTE — NURSING NOTE
Luanne Hernandez had an irritable edge during assessment  Staff splitting behaviors noted during issue with wanting his jacket with a lira (which is currently against unit policy)  Isolated after episode to room refusing to talk to staff  No overt violence noted  Has been resting in room and on Q 15 minute safety checks  Pain in left foot stomp is controlled by prescribed medications  No further acting out, suicidal ideations, and/or homicidal behaviors  No change in medical condition or current complaints  Remains a fall risk  Fluids maintained at bedside to promote hydration

## 2018-06-24 NOTE — PROGRESS NOTES
Scot Butler 40 y o  Sex:male; RDW#[id number]           History of Present Illness  This is a 15 minute progress note on Scot Butler, of which five minutes was spent in coordination of care, which consisted of meeting with the nursing staff to discuss the patient's progress, response to treatment, documentation, medication orders,and behaviors over the past 24 hours  Scot Butler was seen on the unit today  Chart reviewed  Nursing reports that the patient eats and sleeps well  The patient told me he feels much better today  He denies any adverse side effects from using his medication           Mental Status Evaluation  Adult male  Ambulatory with a cane  Casually dressed  Fair hygiene  Guarded  Fair eye contact  No psychomotor agitation or retardation  Speech: he spoke few words  Mood has improved  He is less anxious  Thought process: goal directed  Thought content: he denies suicidal or homicidal thoughts or plans  He denied hallucinations  He did not appear to be responding to internal stimuli  He has not been aggressive to self or others in the past 24 hours  Poor attention and  concentration  Poor short term and long term memory  He is aware that he gets depressed and anxious due to his circumstances  Compliant with his treatment plan      Diagnosis  Bipolar I disorder, most recent episode depressed, severe, without psychosis  Cannabis abuse  Tobacco use disorder      Assessment/ Plan    Jose Anderson responded to treatment with medication and the therapeutic reveles milieu  Continue current medication and treatment  Discharge planning and disposition is ongoing

## 2018-06-24 NOTE — PLAN OF CARE
Anxiety     Anxiety is at manageable level Progressing        Depression     Treatment Goal: Demonstrate behavioral control of depressive symptoms, verbalize feelings of improved mood/affect, and adopt new coping skills prior to discharge Progressing     Verbalize thoughts and feelings Progressing     Refrain from harming self Progressing     Refrain from [address] from self-neglect Progressing     Attend and participate in unit activities, including therapeutic, recreational, and educational groups Progressing     Complete daily ADLs, including personal hygiene independently, as able Progressing        Ineffective Coping     Cooperates with admission process Progressing     Identifies ineffective coping skills Progressing     Identifies healthy coping skills Progressing     Demonstrates healthy coping skills Progressing     Participates in unit activities Progressing     Patient/Family participate in treatment and DC plans Progressing     Patient/Family verbalizes awareness of resources Progressing     Understands least restrictive measures Progressing     Free from restraint events Progressing        Risk for Self Injury/Neglect     Treatment Goal: Remain safe during length of stay, learn and adopt new coping skills, and be free of self-injurious ideation, impulses and acts at the time of discharge Progressing     Verbalize thoughts and feelings Progressing     Refrain from harming self Progressing     Attend and participate in unit activities, including therapeutic, recreational, and educational groups Progressing     Recognize maladaptive responses and adopt new coping mechanisms Progressing     Complete daily ADLs, including personal hygiene independently, as able Progressing        Risk for Violence/Aggression Toward Others     Treatment Goal: Refrain from acts of violence/aggression during length of stay, and demonstrate improved impulse control at the time of discharge Progressing     Verbalize thoughts and feelings Progressing     Refrain from harming others Progressing     Refrain from destructive acts on the environment or property Progressing     Control angry outbursts Progressing     Attend and participate in unit activities, including therapeutic, recreational, and educational groups Progressing     Identify appropriate positive anger management techniques Progressing

## 2018-06-25 LAB
ALBUMIN SERPL BCP-MCNC: 3.4 G/DL (ref 3.5–5.7)
ALP SERPL-CCNC: 68 U/L (ref 40–150)
ALT SERPL W P-5'-P-CCNC: 10 U/L (ref 7–52)
ANION GAP SERPL CALCULATED.3IONS-SCNC: 7 MMOL/L (ref 4–13)
AST SERPL W P-5'-P-CCNC: 10 U/L (ref 13–39)
BILIRUB DIRECT SERPL-MCNC: 0 MG/DL (ref 0–0.2)
BILIRUB SERPL-MCNC: 0.2 MG/DL (ref 0.2–1)
BUN SERPL-MCNC: 20 MG/DL (ref 7–25)
CALCIUM SERPL-MCNC: 8.9 MG/DL (ref 8.6–10.5)
CHLORIDE SERPL-SCNC: 101 MMOL/L (ref 98–107)
CO2 SERPL-SCNC: 29 MMOL/L (ref 21–31)
CREAT SERPL-MCNC: 1.12 MG/DL (ref 0.7–1.3)
GFR SERPL CREATININE-BSD FRML MDRD: 81 ML/MIN/1.73SQ M
GLUCOSE SERPL-MCNC: 217 MG/DL (ref 65–140)
GLUCOSE SERPL-MCNC: 238 MG/DL (ref 65–140)
GLUCOSE SERPL-MCNC: 265 MG/DL (ref 65–140)
GLUCOSE SERPL-MCNC: 328 MG/DL (ref 65–99)
GLUCOSE SERPL-MCNC: 331 MG/DL (ref 65–140)
POTASSIUM SERPL-SCNC: 4.3 MMOL/L (ref 3.5–5.5)
PROT SERPL-MCNC: 6.4 G/DL (ref 6.4–8.9)
SODIUM SERPL-SCNC: 137 MMOL/L (ref 134–143)
VALPROATE SERPL-MCNC: 55.1 UG/ML (ref 50–125)

## 2018-06-25 PROCEDURE — 80164 ASSAY DIPROPYLACETIC ACD TOT: CPT | Performed by: PSYCHIATRY & NEUROLOGY

## 2018-06-25 PROCEDURE — 82948 REAGENT STRIP/BLOOD GLUCOSE: CPT

## 2018-06-25 PROCEDURE — 82248 BILIRUBIN DIRECT: CPT | Performed by: PSYCHIATRY & NEUROLOGY

## 2018-06-25 PROCEDURE — 80053 COMPREHEN METABOLIC PANEL: CPT | Performed by: PSYCHIATRY & NEUROLOGY

## 2018-06-25 RX ADMIN — DIVALPROEX SODIUM 500 MG: 500 TABLET, DELAYED RELEASE ORAL at 17:27

## 2018-06-25 RX ADMIN — TRAZODONE HYDROCHLORIDE 50 MG: 50 TABLET ORAL at 21:26

## 2018-06-25 RX ADMIN — CHLORPROMAZINE HYDROCHLORIDE 50 MG: 25 TABLET, SUGAR COATED ORAL at 17:27

## 2018-06-25 RX ADMIN — METFORMIN HYDROCHLORIDE 500 MG: 500 TABLET ORAL at 09:45

## 2018-06-25 RX ADMIN — GABAPENTIN 400 MG: 400 CAPSULE ORAL at 17:27

## 2018-06-25 RX ADMIN — GABAPENTIN 400 MG: 400 CAPSULE ORAL at 09:46

## 2018-06-25 RX ADMIN — BENZTROPINE MESYLATE 1 MG: 1 TABLET ORAL at 09:45

## 2018-06-25 RX ADMIN — HALOPERIDOL 5 MG: 5 TABLET ORAL at 12:33

## 2018-06-25 RX ADMIN — GABAPENTIN 400 MG: 400 CAPSULE ORAL at 12:08

## 2018-06-25 RX ADMIN — ACETAMINOPHEN 650 MG: 325 TABLET ORAL at 17:39

## 2018-06-25 RX ADMIN — METFORMIN HYDROCHLORIDE 500 MG: 500 TABLET ORAL at 17:27

## 2018-06-25 RX ADMIN — INSULIN LISPRO 8 UNITS: 100 INJECTION, SOLUTION INTRAVENOUS; SUBCUTANEOUS at 08:21

## 2018-06-25 RX ADMIN — CHLORPROMAZINE HYDROCHLORIDE 50 MG: 25 TABLET, SUGAR COATED ORAL at 21:25

## 2018-06-25 RX ADMIN — TRAMADOL HYDROCHLORIDE 50 MG: 50 TABLET, COATED ORAL at 12:08

## 2018-06-25 RX ADMIN — METOPROLOL TARTRATE 25 MG: 25 TABLET ORAL at 09:46

## 2018-06-25 RX ADMIN — DIVALPROEX SODIUM 250 MG: 250 TABLET, DELAYED RELEASE ORAL at 12:07

## 2018-06-25 RX ADMIN — INSULIN LISPRO 8 UNITS: 100 INJECTION, SOLUTION INTRAVENOUS; SUBCUTANEOUS at 21:26

## 2018-06-25 RX ADMIN — INSULIN LISPRO 10 UNITS: 100 INJECTION, SOLUTION INTRAVENOUS; SUBCUTANEOUS at 17:37

## 2018-06-25 RX ADMIN — LISINOPRIL 5 MG: 5 TABLET ORAL at 09:45

## 2018-06-25 RX ADMIN — LORAZEPAM 1 MG: 1 TABLET ORAL at 09:46

## 2018-06-25 RX ADMIN — DIVALPROEX SODIUM 500 MG: 500 TABLET, DELAYED RELEASE ORAL at 09:45

## 2018-06-25 RX ADMIN — INSULIN LISPRO 12 UNITS: 100 INJECTION, SOLUTION INTRAVENOUS; SUBCUTANEOUS at 12:09

## 2018-06-25 RX ADMIN — LORAZEPAM 1 MG: 1 TABLET ORAL at 13:55

## 2018-06-25 RX ADMIN — CHLORPROMAZINE HYDROCHLORIDE 50 MG: 25 TABLET, SUGAR COATED ORAL at 09:45

## 2018-06-25 RX ADMIN — GABAPENTIN 400 MG: 400 CAPSULE ORAL at 21:25

## 2018-06-25 RX ADMIN — LORAZEPAM 1 MG: 1 TABLET ORAL at 21:26

## 2018-06-25 NOTE — NURSING NOTE
Pt is flat and presents as depressed and anxious  Pt stated that he was angry because of "his situation "  Pt stated that he needs to get a hold of housing, but he is embarrassed because he does not want them knowing that he is in a behavioral health unit  Pt is visible in the community  Pt using cane to ambulate  No issues with falling, pt's gait is steady  Pt has diabetic ulcer to L foot  Scant amount of purulent drainage noted on old dressing  Ulcer appears to be healing  Area cleaned and new dressing applied as per Dr's orders  Pt tolerated well  Pt denies any SI/HI at this time  No c/o hallucinations  Pt receiving PRN Tramadol for L foot pain  Pt compliant with meds and meals  Pt cooperative with staff  No outbursts displayed at this time  Will continue Q 15 min checks to maintain pt safety

## 2018-06-25 NOTE — PLAN OF CARE
Anxiety     Anxiety is at manageable level Progressing        Depression     Treatment Goal: Demonstrate behavioral control of depressive symptoms, verbalize feelings of improved mood/affect, and adopt new coping skills prior to discharge Progressing     Verbalize thoughts and feelings Progressing     Refrain from harming self Progressing     Refrain from [address] from self-neglect Progressing     Attend and participate in unit activities, including therapeutic, recreational, and educational groups Progressing     Complete daily ADLs, including personal hygiene independently, as able Progressing        Ineffective Coping     Cooperates with admission process Progressing     Identifies ineffective coping skills Progressing     Identifies healthy coping skills Progressing     Demonstrates healthy coping skills Progressing     Participates in unit activities Progressing     Patient/Family participate in treatment and DC plans Progressing     Patient/Family verbalizes awareness of resources Progressing     Understands least restrictive measures Progressing     Free from restraint events Progressing        Ineffective Coping     Participates in unit activities Progressing        Risk for Self Injury/Neglect     Treatment Goal: Remain safe during length of stay, learn and adopt new coping skills, and be free of self-injurious ideation, impulses and acts at the time of discharge Progressing     Verbalize thoughts and feelings Progressing     Refrain from harming self Progressing     Attend and participate in unit activities, including therapeutic, recreational, and educational groups Progressing     Recognize maladaptive responses and adopt new coping mechanisms Progressing     Complete daily ADLs, including personal hygiene independently, as able Progressing        Risk for Violence/Aggression Toward Others     Treatment Goal: Refrain from acts of violence/aggression during length of stay, and demonstrate improved impulse control at the time of discharge Progressing     Verbalize thoughts and feelings Progressing     Refrain from harming others Progressing     Refrain from destructive acts on the environment or property Progressing     Control angry outbursts Progressing     Attend and participate in unit activities, including therapeutic, recreational, and educational groups Progressing     Identify appropriate positive anger management techniques Progressing

## 2018-06-25 NOTE — SOCIAL WORK
SW attempted to meet with patient for clinical update  Patient was observed in the hallway; mood slightly indifferent/ affect flattened  Patient was aloof, detached and disinterested in interacting or providing any information  Dismissive and did not provide any clinical information  Patient has not been observed responding to internal stimuli  SI/intent/plan not evidenced  Will attempt to meet with patient at a later time

## 2018-06-25 NOTE — PROGRESS NOTES
Blayne Bradley y o  Sex:male; MELIDA#[id number]           History of Present Illness  This is a 25 minute progress note on Bhavya Powers, of which fifteen minutes was spent in coordination of care, which consisted of meeting with the multidisciplinary treatment team to discuss the patient's progress, response to treatment, documentation, medication orders,and behaviors over the past 24 hours  Perla Orozco seen on the unit today  Chart reviewed  Nursing reports that the patient eats and sleeps well  The patient has been taking medication as directed  He denies any adverse side effects from using his medication           Mental Status Evaluation  Adult male  Ambulatory with a cane  Casually dressed  Fair hygiene  Improved eye contact  No psychomotor agitation or retardation  Speech: he spoke few words  Mood has improved  He is less anxious  Thought process: goal directed  Thought content: he denies suicidal or homicidal thoughts or plans  He denied hallucinations  He did not appear to be responding to internal stimuli  Poor attention and  concentration  Poor short term and long term memory  Improved understanding of what makes him feel better or worse  Compliant with his treatment plan      Diagnosis  Bipolar I disorder, most recent episode depressed, severe, without psychosis  Cannabis abuse  Tobacco use disorder      Assessment/ Plan    Perla Orozco been more calm and cooperative on the unit Continue current medication and treatment   Discharge planning and disposition is ongoing

## 2018-06-25 NOTE — NURSING NOTE
Improved mood noted  No overt agitation noted  No overt symptoms of hypo/hyperglycemia  Maintained on Q 15 minute safety checks  No acting out, suicidal ideations, and/or homicidal behaviors  No changes in medical condition or current complaints noted  Remains a fall risk  Fluids maintained at bedside to promote hydration

## 2018-06-26 LAB
GLUCOSE SERPL-MCNC: 186 MG/DL (ref 65–140)
GLUCOSE SERPL-MCNC: 206 MG/DL (ref 65–140)
GLUCOSE SERPL-MCNC: 206 MG/DL (ref 65–140)
GLUCOSE SERPL-MCNC: 274 MG/DL (ref 65–140)

## 2018-06-26 PROCEDURE — 82948 REAGENT STRIP/BLOOD GLUCOSE: CPT

## 2018-06-26 RX ADMIN — METFORMIN HYDROCHLORIDE 500 MG: 500 TABLET ORAL at 09:41

## 2018-06-26 RX ADMIN — INSULIN LISPRO 8 UNITS: 100 INJECTION, SOLUTION INTRAVENOUS; SUBCUTANEOUS at 17:16

## 2018-06-26 RX ADMIN — TRAZODONE HYDROCHLORIDE 50 MG: 50 TABLET ORAL at 22:04

## 2018-06-26 RX ADMIN — INSULIN LISPRO 10 UNITS: 100 INJECTION, SOLUTION INTRAVENOUS; SUBCUTANEOUS at 21:17

## 2018-06-26 RX ADMIN — CHLORPROMAZINE HYDROCHLORIDE 50 MG: 25 TABLET, SUGAR COATED ORAL at 17:15

## 2018-06-26 RX ADMIN — GABAPENTIN 400 MG: 400 CAPSULE ORAL at 12:00

## 2018-06-26 RX ADMIN — CHLORPROMAZINE HYDROCHLORIDE 50 MG: 25 TABLET, SUGAR COATED ORAL at 21:15

## 2018-06-26 RX ADMIN — GABAPENTIN 400 MG: 400 CAPSULE ORAL at 09:40

## 2018-06-26 RX ADMIN — DIVALPROEX SODIUM 500 MG: 500 TABLET, DELAYED RELEASE ORAL at 09:44

## 2018-06-26 RX ADMIN — LISINOPRIL 5 MG: 5 TABLET ORAL at 09:40

## 2018-06-26 RX ADMIN — METFORMIN HYDROCHLORIDE 500 MG: 500 TABLET ORAL at 18:00

## 2018-06-26 RX ADMIN — TRAMADOL HYDROCHLORIDE 50 MG: 50 TABLET, COATED ORAL at 21:15

## 2018-06-26 RX ADMIN — GABAPENTIN 400 MG: 400 CAPSULE ORAL at 21:16

## 2018-06-26 RX ADMIN — CHLORPROMAZINE HYDROCHLORIDE 50 MG: 25 TABLET, SUGAR COATED ORAL at 09:42

## 2018-06-26 RX ADMIN — BENZTROPINE MESYLATE 1 MG: 1 TABLET ORAL at 09:42

## 2018-06-26 RX ADMIN — METOPROLOL TARTRATE 25 MG: 25 TABLET ORAL at 09:41

## 2018-06-26 RX ADMIN — DIVALPROEX SODIUM 250 MG: 250 TABLET, DELAYED RELEASE ORAL at 13:00

## 2018-06-26 RX ADMIN — DIVALPROEX SODIUM 500 MG: 500 TABLET, DELAYED RELEASE ORAL at 18:00

## 2018-06-26 RX ADMIN — LORAZEPAM 1 MG: 1 TABLET ORAL at 21:15

## 2018-06-26 RX ADMIN — LORAZEPAM 1 MG: 1 TABLET ORAL at 12:44

## 2018-06-26 RX ADMIN — INSULIN LISPRO 6 UNITS: 100 INJECTION, SOLUTION INTRAVENOUS; SUBCUTANEOUS at 09:39

## 2018-06-26 RX ADMIN — GABAPENTIN 400 MG: 400 CAPSULE ORAL at 18:00

## 2018-06-26 RX ADMIN — INSULIN LISPRO 8 UNITS: 100 INJECTION, SOLUTION INTRAVENOUS; SUBCUTANEOUS at 11:30

## 2018-06-26 NOTE — NURSING NOTE
Withdrawn to room and self  Little participation in the community  Did attend and consume HS snack  Presented for assessment flat, depressed and poor eye contact  Evasive with some assessment questions  Denies concerns overall  Reports finding housing is his only problem  Compliant with HS medication  Requested/received PRN trazodone for sleep and ativan for anxiety  Denies SI  Agreeable to making needs known   SP1 and 15 minute checks will be maintained for safety

## 2018-06-26 NOTE — PLAN OF CARE
Anxiety     Anxiety is at manageable level Progressing        Depression     Treatment Goal: Demonstrate behavioral control of depressive symptoms, verbalize feelings of improved mood/affect, and adopt new coping skills prior to discharge Progressing     Refrain from isolation Progressing     Refrain from self-neglect Progressing        Ineffective Coping     Cooperates with admission process Progressing     Identifies ineffective coping skills Progressing     Identifies healthy coping skills Progressing     Demonstrates healthy coping skills Progressing     Participates in unit activities Progressing     Patient/Family participate in treatment and DC plans Progressing     Patient/Family verbalizes awareness of resources Progressing     Understands least restrictive measures Progressing     Free from restraint events Progressing        METABOLIC, FLUID AND ELECTROLYTES - ADULT     Glucose maintained within target range Progressing        Nutrition/Hydration-ADULT     Nutrient/Hydration intake appropriate for improving, restoring or maintaining nutritional needs Progressing        Risk for Self Injury/Neglect     Treatment Goal: Remain safe during length of stay, learn and adopt new coping skills, and be free of self-injurious ideation, impulses and acts at the time of discharge Progressing     Verbalize thoughts and feelings Progressing     Refrain from harming self Progressing     Recognize maladaptive responses and adopt new coping mechanisms Progressing     Complete daily ADLs, including personal hygiene independently, as able Progressing        Risk for Violence/Aggression Toward Others     Treatment Goal: Refrain from acts of violence/aggression during length of stay, and demonstrate improved impulse control at the time of discharge Progressing     Refrain from harming others Progressing     Refrain from destructive acts on the environment or property Progressing     Control angry outbursts Progressing     Identify appropriate positive anger management techniques Progressing

## 2018-06-26 NOTE — NURSING NOTE
Pt remains flat and presents as depressed and anxious  Pt has irritable edge  Pt spoke with unit  PK about using his cell phone to help find housing  This nurse informed PK that the pt does not put forth any effort to make calls  Pt was informed yesterday that he needs to make staff aware when he wants to make these calls and we will try to arrange that  Pt has not approached staff about this  PK informed pt that staff was made aware of this and will sit with him when they are done performing their scheduled duties on the unit  Pt verbally understands  Dressing changed completed per Dr's orders  Pt ambulating on unit using cane  Pt's gait remains steady and no falls so far this shift  Pt compliant with meds and meals  Pt tends to hygiene and ADL's  Pt able to make needs known  Denies SI/HI at this time  Denies hallucinations  Will continue Q 15 min checks to maintain pt safety

## 2018-06-26 NOTE — CASE MANAGEMENT
A provider approached the  and asked about if the patient had signed consent for us to contact his girlfriend/wife to help pt establish residence following discharge  I reported I would speak to him and have him sign consent now  When patient was approached he stated that he already contacted her and will be having landlords call her  He stated he was just wondering when he would have the opportunity to make calls  After speaking with nursing staff it was decided that the patient is to notify the technical partners when they are obtaining his vitals that he wishes to make some calls  At that point the technical partners will then let him know when they will return to him to make calls

## 2018-06-26 NOTE — PLAN OF CARE
Problem: Nutrition/Hydration-ADULT  Goal: Nutrient/Hydration intake appropriate for improving, restoring or maintaining nutritional needs  Monitor and assess patient's nutrition/hydration status for malnutrition (ex- brittle hair, bruises, dry skin, pale skin and conjunctiva, muscle wasting, smooth red tongue, and disorientation)  Collaborate with interdisciplinary team and initiate plan and interventions as ordered  Monitor patient's weight and dietary intake as ordered or per policy  Utilize nutrition screening tool and intervene per policy  Determine patient's food preferences and provide high-protein, high-caloric foods as appropriate  INTERVENTIONS:  - Monitor oral intake, urinary output, labs, and treatment plans  - Assess nutrition and hydration status and recommend course of action  - Evaluate amount of meals eaten  - Assist patient with eating if necessary   - Allow adequate time for meals  - Recommend/ encourage appropriate diets, oral nutritional supplements, and vitamin/mineral supplements  - Order, calculate, and assess calorie counts as needed  - Recommend, monitor, and adjust tube feedings and TPN/PPN based on assessed needs  - Assess need for intravenous fluids  - Provide specific nutrition/hydration education as appropriate  - Include patient/family/caregiver in decisions related to nutrition  Outcome: Progressing  He is on a regular double portions with nicotine patch and metformin ordered  He has a diabetic ulcer on his left foot  RDN to check his po intake with his team  His weight was 278 on 6-16 then up to 284 on 6-23 with a gain of 6 # noted  His BG was 186 on 6-26 which is elevated RDN will evaluate his labs when available  RDN visited on rounds this am and he was eating his breakfast in the dining room   RDN to reassess his nutrition needs at mild risk and follow his intake on rounds PRN

## 2018-06-26 NOTE — PLAN OF CARE
PT continues to decline all Art Therapy groups and 1:1 AT services  PT continues to be isolative to pt room  AT attempts to encourage participation  PT displays labile mood and affect   Will continue to follow

## 2018-06-26 NOTE — PROGRESS NOTES
Progress Note - Behavioral Health   Rizwanlluvia Robertson 40 y o  male MRN: [id number]  Unit/Bed#: [facility] Encounter: [id number]    Assessment/Plan   Principal Problem:    Severe bipolar I disorder, most recent episode depressed without psychotic features ([address] )  Active Problems:    Cannabis abuse    Tobacco use disorder      Behavior over the last 24 hours:  Patient was argumentative and uncooperative during interview  Nursing staff report he has been dismissive and indifferent over the past 24 hours  He has been eating well and sleeping without difficulty  He is angry that he has not been discharged yet and states that no one ever tells him what is happening   will speak with him this afternoon in order to obtain permission to speak with girlfriend so that his return calls can go to her instead of to the psych unit  Once this occurs, the provider will contact girlfriend  Sleep: normal  Appetite: normal  Medication side effects: No  ROS: no complaints    Mental Status Evaluation:  Appearance:  casually dressed   Behavior:  restless and fidgety   Speech:  loud   Mood:  angry, anxious, irritable and labile   Affect:  constricted   Thought Process:  normal   Thought Content:  normal   Perceptual Disturbances: None   Risk Potential: denies CAMILA   Sensorium:  person, place, time/date and situation   Cognition:  grossly intact   Consciousness:  alert and awake    Attention: Intact   Insight:  limited   Judgment: limited   Gait/Station: normal gait/station   Motor Activity: no abnormal movements     Progress Toward Goals: Continue to improve    Recommended Treatment: Continue with group therapy, milieu therapy and occupational therapy  Risks, benefits and possible side effects of Medications:   Risks, benefits, and possible side effects of medications explained to patient and patient verbalizes understanding  Medications: all current active meds have been reviewed  Labs:  I have personally reviewed all pertinent laboratory/tests results  Counseling / Coordination of Care  Medications and treatment plan reviewed with physician and care team, and per physician, treatment plan is as follows:  Continue medications and treatment plan as prescribed  Continue to monitor patient and adjust medications as needed  Continue to work with / regarding assistance with phone calls so pt  Can be placed

## 2018-06-27 LAB
GLUCOSE SERPL-MCNC: 161 MG/DL (ref 65–140)
GLUCOSE SERPL-MCNC: 208 MG/DL (ref 65–140)
GLUCOSE SERPL-MCNC: 208 MG/DL (ref 65–140)
GLUCOSE SERPL-MCNC: 306 MG/DL (ref 65–140)

## 2018-06-27 PROCEDURE — 82948 REAGENT STRIP/BLOOD GLUCOSE: CPT

## 2018-06-27 PROCEDURE — 99232 SBSQ HOSP IP/OBS MODERATE 35: CPT | Performed by: PHYSICIAN ASSISTANT

## 2018-06-27 RX ORDER — DIVALPROEX SODIUM 500 MG/1
500 TABLET, EXTENDED RELEASE ORAL
Status: DISCONTINUED | OUTPATIENT
Start: 2018-06-27 | End: 2018-07-02 | Stop reason: HOSPADM

## 2018-06-27 RX ORDER — DIVALPROEX SODIUM 500 MG/1
500 TABLET, EXTENDED RELEASE ORAL
Status: DISCONTINUED | OUTPATIENT
Start: 2018-06-27 | End: 2018-06-30

## 2018-06-27 RX ORDER — CHLORPROMAZINE HYDROCHLORIDE 25 MG/1
75 TABLET, FILM COATED ORAL 3 TIMES DAILY
Status: DISCONTINUED | OUTPATIENT
Start: 2018-06-27 | End: 2018-07-01

## 2018-06-27 RX ADMIN — METFORMIN HYDROCHLORIDE 500 MG: 500 TABLET ORAL at 17:59

## 2018-06-27 RX ADMIN — LORAZEPAM 1 MG: 1 TABLET ORAL at 09:03

## 2018-06-27 RX ADMIN — GABAPENTIN 400 MG: 400 CAPSULE ORAL at 17:58

## 2018-06-27 RX ADMIN — METOPROLOL TARTRATE 25 MG: 25 TABLET ORAL at 09:03

## 2018-06-27 RX ADMIN — CHLORPROMAZINE HYDROCHLORIDE 75 MG: 25 TABLET, SUGAR COATED ORAL at 21:32

## 2018-06-27 RX ADMIN — TRAMADOL HYDROCHLORIDE 50 MG: 50 TABLET, COATED ORAL at 21:34

## 2018-06-27 RX ADMIN — DIVALPROEX SODIUM 500 MG: 500 TABLET, EXTENDED RELEASE ORAL at 13:17

## 2018-06-27 RX ADMIN — BENZTROPINE MESYLATE 1 MG: 1 TABLET ORAL at 09:06

## 2018-06-27 RX ADMIN — HYDROXYZINE HYDROCHLORIDE 25 MG: 25 TABLET ORAL at 01:00

## 2018-06-27 RX ADMIN — TRAMADOL HYDROCHLORIDE 50 MG: 50 TABLET, COATED ORAL at 09:02

## 2018-06-27 RX ADMIN — GABAPENTIN 400 MG: 400 CAPSULE ORAL at 09:02

## 2018-06-27 RX ADMIN — CHLORPROMAZINE HYDROCHLORIDE 75 MG: 25 TABLET, SUGAR COATED ORAL at 17:58

## 2018-06-27 RX ADMIN — GABAPENTIN 400 MG: 400 CAPSULE ORAL at 21:33

## 2018-06-27 RX ADMIN — TRAZODONE HYDROCHLORIDE 50 MG: 50 TABLET ORAL at 21:36

## 2018-06-27 RX ADMIN — DIVALPROEX SODIUM 500 MG: 500 TABLET, EXTENDED RELEASE ORAL at 21:33

## 2018-06-27 RX ADMIN — INSULIN LISPRO 12 UNITS: 100 INJECTION, SOLUTION INTRAVENOUS; SUBCUTANEOUS at 11:42

## 2018-06-27 RX ADMIN — INSULIN LISPRO 8 UNITS: 100 INJECTION, SOLUTION INTRAVENOUS; SUBCUTANEOUS at 09:06

## 2018-06-27 RX ADMIN — HALOPERIDOL 5 MG: 5 TABLET ORAL at 09:02

## 2018-06-27 RX ADMIN — LISINOPRIL 5 MG: 5 TABLET ORAL at 09:02

## 2018-06-27 RX ADMIN — HYDROXYZINE HYDROCHLORIDE 25 MG: 25 TABLET ORAL at 13:20

## 2018-06-27 RX ADMIN — HYDROXYZINE HYDROCHLORIDE 25 MG: 25 TABLET ORAL at 21:35

## 2018-06-27 RX ADMIN — GABAPENTIN 400 MG: 400 CAPSULE ORAL at 11:45

## 2018-06-27 RX ADMIN — INSULIN LISPRO 4 UNITS: 100 INJECTION, SOLUTION INTRAVENOUS; SUBCUTANEOUS at 17:59

## 2018-06-27 RX ADMIN — DIVALPROEX SODIUM 500 MG: 500 TABLET, DELAYED RELEASE ORAL at 09:03

## 2018-06-27 RX ADMIN — INSULIN LISPRO 8 UNITS: 100 INJECTION, SOLUTION INTRAVENOUS; SUBCUTANEOUS at 21:38

## 2018-06-27 RX ADMIN — CHLORPROMAZINE HYDROCHLORIDE 50 MG: 25 TABLET, SUGAR COATED ORAL at 09:03

## 2018-06-27 RX ADMIN — METFORMIN HYDROCHLORIDE 500 MG: 500 TABLET ORAL at 09:02

## 2018-06-27 NOTE — NURSING NOTE
Pt flat alert, ate breakfast with peers, visible on unit with cane  Pt continues to complain of increased pain in left ankle related to ulcer , tolerated dressing change well as documented  Pt denies si hi and hallucinations but contunes to maintain to be " on the verge of aggitation" although behaviors are controlled  Will maintain on safety  Precautions and 15 minute checks  No needs identified

## 2018-06-27 NOTE — PROGRESS NOTES
Progress Note - Behavioral Health   Anselmo Hernandez 40 y o  male MRN: [id number]  Unit/Bed#: [facility] [place] [facility] Encounter: [id number]    Assessment/Plan   Principal Problem:    Severe bipolar I disorder, most recent episode depressed without psychotic features ([address] )  Active Problems:    Cannabis abuse    Tobacco use disorder      Behavior over the last 24 hours:  Unchanged  He was appropriately conversational this morning with no agitation displayed during the interview  He reports still feeling extremely sad, hopeless, feels like a failure  Struggles to cope with stressors,of homelessness and limited finances  Staff reports he displays little initiative in participating in therapeutic groups on the unit  He is tolerating current medications, but feels that they are not as effective as they had been when he initially started taking them several months ago  When medications were restarted upon admission, he initially felt his mood was starting to lift, but no longer feels that effect  Sleep: normal  Appetite: normal  Medication side effects: No  ROS: no complaints    Mental Status Evaluation:  Appearance:  disheveled and overweight   Behavior:  psychomotor retardation   Speech:  soft   Mood:  sad   Affect:  flat and labile   Thought Process:  goal directed   Thought Content:  normal   Perceptual Disturbances: None   Risk Potential: Potential for Aggression No   Sensorium:  person, place, time/date and situation   Cognition:  grossly intact   Consciousness:  alert and awake    Attention: attention span and concentration were age appropriate   Insight:  limited   Judgment: limited   Gait/Station: slow   Motor Activity: no abnormal movements     Progress Toward Goals: slow    Recommended Treatment: Continue with group therapy, milieu therapy and occupational therapy        Risks, benefits and possible side effects of Medications:   Risks, benefits, and possible side effects of medications explained to patient and patient verbalizes understanding  Medications: planned medication changes: Increase Depakote to 500mg TID  Adjust chlorpromazine to 75mg TID       Labs:     Counseling / Coordination of Care  Total floor / unit time spent today 25 minutes  Greater than 50% of total time was spent with the patient and / or family counseling and / or coordination of care   A description of the counseling / coordination of care: 15

## 2018-06-27 NOTE — PLAN OF CARE
PT continues to attend 50% of art therapy groups with some encouragement  PT mostly observes peers during art making and does engage in group discussion where he does require redirection at times for appropriate social behaviors

## 2018-06-27 NOTE — SOCIAL WORK
SW met with patient for clinical update  The patient was observed in the dining room engaged in an OT activity  Mood remains indifferent, somewhat aloof with congruent affect  Denied psychotic sx or lethality  Stated he had rec'd Haldol and Vistaril and was feeling better, though "close to aggression"  However, remains calm and behaviorally controlled   " I was Schizophrenic once and now am over it " "Do you get it " Remains on periphery of community/group much of time  No changes in plans/goals  Cooperative with meds  Did state he has made a few phone calls re: living arrangements post d/c  Encouraged to continue focus on appropriate behavior and pursuing appropriate goal planning

## 2018-06-27 NOTE — NURSING NOTE
Pt began shift awake and resting in room, waiting for wife to drop off additional belongings  Following such, pt made request for PRN atarax for sleep  Pt retired to room immediately following  No periods of waking have been observed  No acute concerns have been noted  SP1 and 15 minute checks have been maintained incident free   Will continue to monitor

## 2018-06-27 NOTE — DISCHARGE INSTR - OTHER ORDERS
KIRT RAMÍREZ  [place] Crisis Hotline: 846.833.3459  *National Suicide Prevention Lifeline:  5-251.987.1364  *Peer Support Talk Line (Seven days a week, 1:00 PM  9:00 PM) Call: 206.674.3320 or Text: 956.529.1108  *Alcohol Anonymous: 855.875.3237  *[place] [employer] Commission: 799.841.5389  [address] [facility]  on [address] [place] [address] ([facility]) [employer]: 704.258.2151/Website: www jessica org  *Substance Abuse and [address]) [employer], which is a confidential, free, 24-hour-a-day, 365-day-a-year, information service for individuals and family members facing mental health and/or substance use disorders  This service provides referrals to local treatment facilities, support groups, and community-based organizations  Callers can also order free publications and other information  Call 9-663.125.4843/Website: www [facility] gov  *[facility] 2-1-1: This is a toll free, confidential, 24-hour-a-day service which connects you to a community  in your area who can help you find services and resources that are available to you locally and provide critical services that can improve and save lives  Call: 211  /Website: https://[employer]/      A referral has been made to [address], [address] [place] [address] Phone: [phone] Fax: [phone]) for medication management and individual therapy  You have a scheduled appointment on Thursday, July 12, 2018 at 3 PM with Dr Albino Santamaria  It is very important you attend this appointment  If you cannot make this appointment, please reschedule  You do owe [facility] $50 at this time

## 2018-06-27 NOTE — NURSING NOTE
Pt has been visible in the community and among peers but withdrawn to self  Seeks out interaction with staff only in request of medication or food  Is depressed  Flat  Reports feeling internally agitated for no apparent reason  Feels a medication adjustment may be necessary  Pt was encouraged to notify the doctor of such but pt takes little responsibility for own treatment  Continues to deny SI  Utilizes PRN ativan and trazodone  Ultram for pain  Pt is able to make needs known and agrees to report changes in condition/needs to staff  Nursing will continue to monitor and assess for changes and safety with SP1 and 15 minute checks

## 2018-06-28 PROBLEM — M17.0 OSTEOARTHRITIS OF BOTH KNEES: Status: ACTIVE | Noted: 2018-06-28

## 2018-06-28 LAB
GLUCOSE SERPL-MCNC: 181 MG/DL (ref 65–140)
GLUCOSE SERPL-MCNC: 185 MG/DL (ref 65–140)
GLUCOSE SERPL-MCNC: 195 MG/DL (ref 65–140)
GLUCOSE SERPL-MCNC: 210 MG/DL (ref 65–140)

## 2018-06-28 PROCEDURE — 82948 REAGENT STRIP/BLOOD GLUCOSE: CPT

## 2018-06-28 PROCEDURE — 99223 1ST HOSP IP/OBS HIGH 75: CPT | Performed by: INTERNAL MEDICINE

## 2018-06-28 RX ADMIN — LORAZEPAM 1 MG: 1 TABLET ORAL at 14:16

## 2018-06-28 RX ADMIN — DIVALPROEX SODIUM 500 MG: 500 TABLET, EXTENDED RELEASE ORAL at 13:11

## 2018-06-28 RX ADMIN — DICLOFENAC 2 G: 10 GEL TOPICAL at 18:13

## 2018-06-28 RX ADMIN — TRAZODONE HYDROCHLORIDE 50 MG: 50 TABLET ORAL at 21:14

## 2018-06-28 RX ADMIN — LISINOPRIL 5 MG: 5 TABLET ORAL at 09:12

## 2018-06-28 RX ADMIN — METFORMIN HYDROCHLORIDE 500 MG: 500 TABLET ORAL at 09:12

## 2018-06-28 RX ADMIN — GABAPENTIN 400 MG: 400 CAPSULE ORAL at 11:35

## 2018-06-28 RX ADMIN — HALOPERIDOL 5 MG: 5 TABLET ORAL at 11:34

## 2018-06-28 RX ADMIN — CHLORPROMAZINE HYDROCHLORIDE 75 MG: 25 TABLET, SUGAR COATED ORAL at 21:13

## 2018-06-28 RX ADMIN — HYDROXYZINE HYDROCHLORIDE 25 MG: 25 TABLET ORAL at 12:30

## 2018-06-28 RX ADMIN — GABAPENTIN 400 MG: 400 CAPSULE ORAL at 18:13

## 2018-06-28 RX ADMIN — INSULIN LISPRO 6 UNITS: 100 INJECTION, SOLUTION INTRAVENOUS; SUBCUTANEOUS at 18:10

## 2018-06-28 RX ADMIN — TRAMADOL HYDROCHLORIDE 50 MG: 50 TABLET, COATED ORAL at 21:14

## 2018-06-28 RX ADMIN — DIVALPROEX SODIUM 500 MG: 500 TABLET, EXTENDED RELEASE ORAL at 21:13

## 2018-06-28 RX ADMIN — INSULIN LISPRO 6 UNITS: 100 INJECTION, SOLUTION INTRAVENOUS; SUBCUTANEOUS at 21:14

## 2018-06-28 RX ADMIN — METFORMIN HYDROCHLORIDE 500 MG: 500 TABLET ORAL at 18:13

## 2018-06-28 RX ADMIN — INSULIN LISPRO 8 UNITS: 100 INJECTION, SOLUTION INTRAVENOUS; SUBCUTANEOUS at 12:30

## 2018-06-28 RX ADMIN — BENZTROPINE MESYLATE 1 MG: 1 TABLET ORAL at 09:11

## 2018-06-28 RX ADMIN — INSULIN LISPRO 6 UNITS: 100 INJECTION, SOLUTION INTRAVENOUS; SUBCUTANEOUS at 09:11

## 2018-06-28 RX ADMIN — METOPROLOL TARTRATE 25 MG: 25 TABLET ORAL at 09:12

## 2018-06-28 RX ADMIN — CHLORPROMAZINE HYDROCHLORIDE 75 MG: 25 TABLET, SUGAR COATED ORAL at 15:03

## 2018-06-28 RX ADMIN — LORAZEPAM 1 MG: 1 TABLET ORAL at 21:13

## 2018-06-28 RX ADMIN — DIVALPROEX SODIUM 500 MG: 500 TABLET, EXTENDED RELEASE ORAL at 09:12

## 2018-06-28 RX ADMIN — ACETAMINOPHEN 650 MG: 325 TABLET ORAL at 12:29

## 2018-06-28 RX ADMIN — GABAPENTIN 400 MG: 400 CAPSULE ORAL at 09:12

## 2018-06-28 RX ADMIN — HYDROXYZINE HYDROCHLORIDE 25 MG: 25 TABLET ORAL at 03:08

## 2018-06-28 RX ADMIN — CHLORPROMAZINE HYDROCHLORIDE 75 MG: 25 TABLET, SUGAR COATED ORAL at 09:11

## 2018-06-28 RX ADMIN — GABAPENTIN 400 MG: 400 CAPSULE ORAL at 21:13

## 2018-06-28 NOTE — PROGRESS NOTES
PT has attended art therapy groups  Pt was pleasant and social to both staff and peers  Pt engages in both art and group discussion when prompted  Will continue to follow

## 2018-06-28 NOTE — CONSULTS
Consult- Andry Montanez 1975, 40 y o  male MRN: [id number]    Unit/Bed#: [facility]01 Encounter: [id number]    Primary Care Provider: No primary care provider on file  Date and time admitted to hospital: 6/10/2018  5:03 AM      Inpatient consult to Internal Medicine  Consult performed by: Tiffanie Hernandez ordered by: Belle Byrd        Osteoarthritis of both knees   Assessment & Plan    -pt has chronic pain of bilateral knees and uses a cane for balance  -cont pain control as needed  -patient will need outpatient follow up with PCP and Orthopedics  -no recent trauma          Diabetic Foot wounds- patient has been followed by Podiatry, continue management per Podiatry    Diabetes Mellitus type 2 - continue insulin sliding scale and metformin      Recommendations for Discharge:  · Per primary team    Counseling / Coordination of Care Time: 30 minutes  Greater than 50% of total time spent on patient counseling and coordination of care  Collaboration of Care: Were Recommendations Directly Discussed with Primary Treatment Team? - Yes     History of Present Illness:    Andry Montanez is a 40 y o  male who is originally admitted to the Psych service due to depression  We are consulted for knee pain  Pt states he has chronic pain of bilateral knees from an accident years ago  No recent trauma  Uses cane for balance  Pain is worse in the morning and mid day  Review of Systems:    Review of Systems   Musculoskeletal: Positive for arthralgias and gait problem         -bilateral knee pain   Psychiatric/Behavioral: Positive for sleep disturbance  All other systems reviewed and are negative        Past Medical and Surgical History:     Past Medical History:   Diagnosis Date    B[facility] disorder ([facility] [address] )     Diabetes mellitus ([facility] )     Hypertension        Past Surgical History:   Procedure Laterality Date    APPENDECTOMY      COLON SURGERY      TONSILLECTOMY         Meds/Allergies:    all medications and allergies reviewed    Allergies: Allergies   Allergen Reactions    Penicillins        Social History:     Marital Status: Single    Substance Use History:   History   Alcohol Use No     History   Smoking Status    Current Every Day Smoker    Types: Cigarettes   Smokeless Tobacco    Current User    Types: Chew     History   Drug use: Unknown       Family History:    No family history on file  Physical Exam:     Vitals:   Blood Pressure: 105/60 (06/28/18 1518)  Pulse: 80 (06/28/18 1518)  Temperature: (!) 96 1 °F (35 6 °C) (06/28/18 1518)  Temp Source: Tympanic (06/28/18 1518)  Respirations: 16 (06/28/18 1518)  Height: 6' 3" (190 5 cm) (06/23/18 0700)  Weight - Scale: 129 kg (284 lb) (06/23/18 0700)  SpO2: 97 % (06/28/18 1518)    Physical Exam   Constitutional: He is oriented to person, place, and time  He appears well-developed and well-nourished  HENT:   Head: Normocephalic and atraumatic  Eyes: Conjunctivae and EOM are normal    Neck: Normal range of motion  Neck supple  Cardiovascular: Normal rate and regular rhythm  Pulmonary/Chest: Effort normal and breath sounds normal    Abdominal: Soft  Bowel sounds are normal    Musculoskeletal: Normal range of motion  He exhibits no edema or tenderness  Neurological: He is alert and oriented to person, place, and time  Skin: Skin is warm and dry  Psychiatric: He has a normal mood and affect  Additional Data:     Lab Results: I have personally reviewed pertinent reports              Results from last 7 days  Lab Units 06/25/18  0946   SODIUM mmol/L 137   POTASSIUM mmol/L 4 3   CHLORIDE mmol/L 101   CO2 mmol/L 29   BUN mg/dL 20   CREATININE mg/dL 1 12   CALCIUM mg/dL 8 9   TOTAL PROTEIN g/dL 6 4   BILIRUBIN TOTAL mg/dL 0 20   ALK PHOS U/L 68   ALT U/L 10   AST U/L 10*   GLUCOSE RANDOM mg/dL 328*             No results found for: HGBA1C    Results from last 7 days  Lab Units 06/28/18  1140 06/28/18  0706 06/27/18 2020 06/27/18  1614 06/27/18  1126 06/27/18  0731 06/26/18 2024 06/26/18  1629 06/26/18  1129 06/26/18  0751 06/25/18 1957 06/25/18  1609   POC GLUCOSE mg/dl 210* 181* 208* 161* 306* 208* 274* 206* 206* 186* 238* 265*       Imaging: I have personally reviewed pertinent reports  No orders to display       EKG, Pathology, and Other Studies Reviewed on Admission:   · EKG: none    ** Please Note: This note has been constructed using a voice recognition system   **

## 2018-06-28 NOTE — NURSING NOTE
Pt has been visible and active in the community  Attentive in groups and present at snack  Pt is minimally social but has been appropriate with staff and peers  Calm  Pt continues to ambulate with a cane-no concerns  Seeks out nursing for assessment and medication  Is clean and kempt  This writer reviewed medication changes with pt and pt was attentive and agreeable to such for his reports of internal agitation  Pt denies SI/HI/chopra  Does report anxiety related to d/c planning and uncertainty related to such  Is compliant with scheduled medication  Received PRN ultram for a reported 8/10 L foot pain, PRN atarax for anxiety and PRN trazodone for sleep, all by request  Pt denies acute concerns  Pt is able to make needs known and agrees to report changes in condition/needs to staff  Nursing will continue to monitor and assess for changes and safety with SP1 and 15 minute checks

## 2018-06-28 NOTE — NURSING NOTE
Pt slept for a majority of the night  Pt did wake to request PRN atarax at approx 0315  Returned promptly to sleep after  No acute concerns have been noted  SP1 and 15 minute checks have been maintained incident free   Will continue to monitor

## 2018-06-28 NOTE — ASSESSMENT & PLAN NOTE
-pt has chronic pain of bilateral knees and uses a cane for balance  -cont pain control as needed  -patient will need outpatient follow up with PCP and Orthopedics  -no recent trauma

## 2018-06-29 LAB
GLUCOSE SERPL-MCNC: 180 MG/DL (ref 65–140)
GLUCOSE SERPL-MCNC: 204 MG/DL (ref 65–140)
GLUCOSE SERPL-MCNC: 206 MG/DL (ref 65–140)
GLUCOSE SERPL-MCNC: 253 MG/DL (ref 65–140)

## 2018-06-29 PROCEDURE — 82948 REAGENT STRIP/BLOOD GLUCOSE: CPT

## 2018-06-29 PROCEDURE — 99231 SBSQ HOSP IP/OBS SF/LOW 25: CPT | Performed by: PHYSICIAN ASSISTANT

## 2018-06-29 PROCEDURE — 99232 SBSQ HOSP IP/OBS MODERATE 35: CPT | Performed by: PHYSICIAN ASSISTANT

## 2018-06-29 RX ADMIN — DIVALPROEX SODIUM 500 MG: 500 TABLET, EXTENDED RELEASE ORAL at 09:43

## 2018-06-29 RX ADMIN — INSULIN LISPRO 10 UNITS: 100 INJECTION, SOLUTION INTRAVENOUS; SUBCUTANEOUS at 20:57

## 2018-06-29 RX ADMIN — INSULIN LISPRO 8 UNITS: 100 INJECTION, SOLUTION INTRAVENOUS; SUBCUTANEOUS at 17:30

## 2018-06-29 RX ADMIN — INSULIN LISPRO 8 UNITS: 100 INJECTION, SOLUTION INTRAVENOUS; SUBCUTANEOUS at 12:09

## 2018-06-29 RX ADMIN — INSULIN LISPRO 6 UNITS: 100 INJECTION, SOLUTION INTRAVENOUS; SUBCUTANEOUS at 09:42

## 2018-06-29 RX ADMIN — DICLOFENAC 2 G: 10 GEL TOPICAL at 12:59

## 2018-06-29 RX ADMIN — CHLORPROMAZINE HYDROCHLORIDE 75 MG: 25 TABLET, SUGAR COATED ORAL at 17:29

## 2018-06-29 RX ADMIN — TRAMADOL HYDROCHLORIDE 50 MG: 50 TABLET, COATED ORAL at 21:15

## 2018-06-29 RX ADMIN — TRAZODONE HYDROCHLORIDE 50 MG: 50 TABLET ORAL at 20:56

## 2018-06-29 RX ADMIN — HALOPERIDOL 5 MG: 5 TABLET ORAL at 17:35

## 2018-06-29 RX ADMIN — GABAPENTIN 400 MG: 400 CAPSULE ORAL at 17:30

## 2018-06-29 RX ADMIN — LORAZEPAM 1 MG: 1 TABLET ORAL at 12:08

## 2018-06-29 RX ADMIN — METOPROLOL TARTRATE 25 MG: 25 TABLET ORAL at 09:44

## 2018-06-29 RX ADMIN — DIVALPROEX SODIUM 500 MG: 500 TABLET, EXTENDED RELEASE ORAL at 13:01

## 2018-06-29 RX ADMIN — DICLOFENAC 2 G: 10 GEL TOPICAL at 09:45

## 2018-06-29 RX ADMIN — METFORMIN HYDROCHLORIDE 500 MG: 500 TABLET ORAL at 17:30

## 2018-06-29 RX ADMIN — CHLORPROMAZINE HYDROCHLORIDE 75 MG: 25 TABLET, SUGAR COATED ORAL at 09:43

## 2018-06-29 RX ADMIN — GABAPENTIN 400 MG: 400 CAPSULE ORAL at 20:55

## 2018-06-29 RX ADMIN — CHLORPROMAZINE HYDROCHLORIDE 75 MG: 25 TABLET, SUGAR COATED ORAL at 20:54

## 2018-06-29 RX ADMIN — BENZTROPINE MESYLATE 1 MG: 1 TABLET ORAL at 09:44

## 2018-06-29 RX ADMIN — LISINOPRIL 5 MG: 5 TABLET ORAL at 09:44

## 2018-06-29 RX ADMIN — METFORMIN HYDROCHLORIDE 500 MG: 500 TABLET ORAL at 09:44

## 2018-06-29 RX ADMIN — DIVALPROEX SODIUM 500 MG: 500 TABLET, EXTENDED RELEASE ORAL at 20:56

## 2018-06-29 RX ADMIN — LORAZEPAM 1 MG: 1 TABLET ORAL at 20:56

## 2018-06-29 RX ADMIN — GABAPENTIN 400 MG: 400 CAPSULE ORAL at 12:09

## 2018-06-29 RX ADMIN — GABAPENTIN 400 MG: 400 CAPSULE ORAL at 09:44

## 2018-06-29 NOTE — PROGRESS NOTES
Sheng [age] y o  Sex:male; ZXI#[id number]           History of Present Illness  This is a 25 minute progress note on Bryan Mcclendon, of which fifteen minutes was spent in coordination of care, which consisted of meeting with the multidisciplinary treatment team to discuss the patient's progress, response to treatment, documentation, medication orders,and behaviors over the past 24 hours  Lucila Hutson seen on the unit today  Chart reviewed  Nursing reports that the patient eats and sleeps well  The patient told me he feels 'okay' today  He has been taking medication as directed and he denies any adverse side effects from using his medication           Mental Status Evaluation  Adult male  Ambulatory with a cane  Casually dressed  Fair hygiene  Guarded  Fair eye contact  No psychomotor agitation or retardation  Speech: he spoke few words  Mood: euthymic  He is less irritable  Thought process:  Linear  Thought content: he denies suicidal or homicidal thoughts or plans  He denied hallucinations  He did not appear to be responding to internal stimuli  He has not been aggressive to self or others in the past 24 hours  Poor attention and  concentration  Poor short term and long term memory  He is aware that he gets depressed and anxious due to his problems  Compliant with his treatment plan      Diagnosis  Bipolar I disorder, most recent episode depressed, severe, without psychosis  Cannabis abuse  Tobacco use disorder      Assessment/ Plan    Lucila Hutson been visibly less irritable on the unit  His mood has improved  He has set appropriate goals for his future including planning for his housing  Continue current medication and treatment  Discharge planning and disposition is ongoing

## 2018-06-29 NOTE — PLAN OF CARE
Problem: DISCHARGE PLANNING - CARE MANAGEMENT  Goal: Discharge to post-acute care or home with appropriate resources  INTERVENTIONS:  - Conduct assessment to determine patient/family and health care team treatment goals, and need for post-acute services based on payer coverage, community resources, and patient preferences, and barriers to discharge  - Address psychosocial, clinical, and financial barriers to discharge as identified in assessment in conjunction with the patient/family and health care team  - Arrange appropriate level of post-acute services according to patient's   needs and preference and payer coverage in collaboration with the physician and health care team  - Communicate with and update the patient/family, physician, and health care team regarding progress on the discharge plan  - Arrange appropriate transportation to post-acute venues   Outcome: Progressing  Pt interested in attending [facility] after discharge  Pt aware of [address] aftercare needs  Pt working with supports to identify other discharge options

## 2018-06-29 NOTE — PROGRESS NOTES
Patient bright alert, awake and visible on unit, but selectively social on unit  Pt has good appetite  poor hygiene, and states he plans to shower tomorrow  Pt is medication seeking with staff splitting for prns and has been educated about receiving prn Halidol on a daily basis  Pt appears calm and remains cooperative and compliant with medications  Will maintain on safety precautions and 15 minute checks  No needs identified

## 2018-06-29 NOTE — NURSING NOTE
No signs and symptoms of hypo/hyperglycemia  Pain is currently managed  Patient observed sleeping during most Q15 minute safety checks (second portion of shift)  No suicidal ideations, acting out or homicidal behaviors  No change in medical condition or complaints voiced  Remains a fall risk  Fluids maintained at bedside to promote hydration

## 2018-06-29 NOTE — PROGRESS NOTES
Progress Note - Behavioral Health   Hilario Ching 40 y o  male MRN: [id number]  Unit/Bed#: [facility] Encounter: [id number]    Assessment/Plan   Principal Problem:    Severe bipolar I disorder, most recent episode depressed without psychotic features ([address] )  Active Problems:    Cannabis abuse    Tobacco use disorder    Osteoarthritis of both knees      Behavior over the last 24 hours:  Improved  Staff reports that he has been cooperative and medication compliant  He is still irritable at times, but much less so than earlier in his admission  He notes improvement in severity of mood swings  He [facility] they can decide on more permanent [facility] solution  He is less hopeless  He feels that recent medication changes have had a positive effect  Sleep: normal  Appetite: normal  Medication side effects: No  ROS: no complaints    Mental Status Evaluation:  Appearance:  age appropriate and disheveled   Behavior:  cooperative   Speech:  clear, soft   Mood:  less labile   Affect:  blunted   Thought Process:  goal directed   Thought Content:  normal   Perceptual Disturbances: None   Risk Potential: No current SI/HI   Sensorium:  person, place, time/date and situation   Cognition:  grossly intact   Consciousness:  alert and awake    Attention: attention span and concentration were age appropriate   Insight:  limited   Judgment: limited   Gait/Station: normal gait/station   Motor Activity: no abnormal movements     Progress Toward Goals: Ongoing    Recommended Treatment: Continue with group therapy, milieu therapy and occupational therapy  Risks, benefits and possible side effects of Medications:   Risks, benefits, and possible side effects of medications explained to patient and patient verbalizes understanding  Medications: all current active meds have been reviewed and continue current psychiatric medications      Labs: Valproic acid level ordered for tomorrow morning  Counseling / Coordination of Care  Total floor / unit time spent today 15 minutes  Greater than 50% of total time was spent with the patient and / or family counseling and / or coordination of care   A description of the counseling / coordination of care: 15

## 2018-06-29 NOTE — NURSING NOTE
Patient mood improved with no irritable episodes  Positive for group, med's and meals  Able to make needs known  Trazodone given PO at HS for sleep  Ativan 1 mg given at HS PO for anxiety  Maintained on Q 15 minute safety checks  No acting out, suicidal ideations, and/or homicidal behaviors  No changes in medical condition or current complaints noted  Remains a fall risk  Fluids maintained at bedside to promote hydration

## 2018-06-29 NOTE — PLAN OF CARE
Problem: Ineffective Coping  Goal: Cooperates with admission process  Interventions:   - Complete admission process   Outcome: Completed Date Met: 06/29/18    Goal: Identifies ineffective coping skills  Outcome: Progressing    Goal: Identifies healthy coping skills  Outcome: Progressing    Goal: Demonstrates healthy coping skills  Outcome: Not Progressing    Goal: Participates in unit activities  Interventions:  - Provide therapeutic environment   - Provide required programming   - Redirect inappropriate behaviors    Outcome: Progressing    Goal: Patient/Family participate in treatment and DC plans  Interventions:  - Provide therapeutic environment   Outcome: Not Progressing    Goal: Patient/Family verbalizes awareness of resources  Outcome: Not Progressing    Goal: Understands least restrictive measures  Interventions:  - Utilize least restrictive behavior   Outcome: Progressing    Goal: Free from restraint events  - Utilize least restrictive measures   - Provide behavioral interventions   - Redirect inappropriate behaviors    Outcome: Progressing      Problem: Risk for Self Injury/Neglect  Goal: Treatment Goal: Remain safe during length of stay, learn and adopt new coping skills, and be free of self-injurious ideation, impulses and acts at the time of discharge  Outcome: Progressing    Goal: Verbalize thoughts and feelings  Interventions:  - Assess and re-assess patient's lethality and potential for self-injury  - Engage patient in 1:1 interactions, daily, for a minimum of 15 minutes  - Encourage patient to express feelings, fears, frustrations, hopes  - Establish rapport/trust with patient    Outcome: Not Progressing    Goal: Refrain from harming self  Interventions:  - Monitor patient closely, per order  - Develop a trusting relationship  - Supervise medication ingestion, monitor effects and side effects    Outcome: Progressing    Goal: Recognize maladaptive responses and adopt new coping mechanisms  Outcome: Progressing    Goal: Complete daily ADLs, including personal hygiene independently, as able  Interventions:  - Observe, teach, and assist patient with ADLS  - Monitor and promote a balance of rest/activity, with adequate nutrition and elimination   Outcome: Progressing      Problem: Depression  Goal: Treatment Goal: Demonstrate behavioral control of depressive symptoms, verbalize feelings of improved mood/affect, and adopt new coping skills prior to discharge  Outcome: Progressing    Goal: Refrain from isolation  Interventions:  - Develop a trusting relationship   - Encourage socialization    Outcome: Progressing    Goal: Refrain from self-neglect  Outcome: Not Progressing      Problem: Anxiety  Goal: Anxiety is at manageable level  Interventions:  - Assess and monitor patient's anxiety level  - Monitor for signs and symptoms of anxiety both physical and emotional (heart palpitations, chest pain, shortness of breath, headaches, nausea, feeling jumpy, restlessness, irritable, apprehensive)  - Collaborate with interdisciplinary team and initiate plan and interventions as ordered    - [place] patient to unit/surroundings  - Explain treatment plan  - Encourage participation in care  - Encourage verbalization of concerns/fears  - Identify coping mechanisms  - Assist in developing anxiety-reducing skills  - Administer/offer alternative therapies  - Limit or eliminate stimulants   Outcome: Not Progressing      Problem: Risk for Violence/Aggression Toward Others  Goal: Treatment Goal: Refrain from acts of violence/aggression during length of stay, and demonstrate improved impulse control at the time of discharge  Outcome: Progressing    Goal: Refrain from harming others  Outcome: Progressing    Goal: Refrain from destructive acts on the environment or property  Outcome: Progressing    Goal: Control angry outbursts  Interventions:  - Monitor patient closely, per order  - Ensure early verbal de-escalation  - Monitor prn medication needs  - Set reasonable/therapeutic limits, outline behavioral expectations, and consequences   - Provide a non-threatening milieu, utilizing the least restrictive interventions    Outcome: Progressing    Goal: Identify appropriate positive anger management techniques  Interventions:  - Offer anger management and coping skills groups   - Staff will provide positive feedback for appropriate anger control   Outcome: Not Progressing      Problem: Nutrition/Hydration-ADULT  Goal: Nutrient/Hydration intake appropriate for improving, restoring or maintaining nutritional needs  Monitor and assess patient's nutrition/hydration status for malnutrition (ex- brittle hair, bruises, dry skin, pale skin and conjunctiva, muscle wasting, smooth red tongue, and disorientation)  Collaborate with interdisciplinary team and initiate plan and interventions as ordered  Monitor patient's weight and dietary intake as ordered or per policy  Utilize nutrition screening tool and intervene per policy  Determine patient's food preferences and provide high-protein, high-caloric foods as appropriate       INTERVENTIONS:  - Monitor oral intake, urinary output, labs, and treatment plans  - Assess nutrition and hydration status and recommend course of action  - Evaluate amount of meals eaten  - Assist patient with eating if necessary   - Allow adequate time for meals  - Recommend/ encourage appropriate diets, oral nutritional supplements, and vitamin/mineral supplements  - Order, calculate, and assess calorie counts as needed  - Recommend, monitor, and adjust tube feedings and TPN/PPN based on assessed needs  - Assess need for intravenous fluids  - Provide specific nutrition/hydration education as appropriate  - Include patient/family/caregiver in decisions related to nutrition   Outcome: Progressing      Problem: METABOLIC, FLUID AND ELECTROLYTES - ADULT  Goal: Glucose maintained within target range  INTERVENTIONS:  - Monitor Blood Glucose as ordered  - Assess for signs and symptoms of hyperglycemia and hypoglycemia  - Administer ordered medications to maintain glucose within target range  - Assess nutritional intake and initiate nutrition service referral as needed   Outcome: Not Progressing      Problem: DISCHARGE PLANNING - CARE MANAGEMENT  Goal: Discharge to post-acute care or home with appropriate resources  INTERVENTIONS:  - Conduct assessment to determine patient/family and health care team treatment goals, and need for post-acute services based on payer coverage, community resources, and patient preferences, and barriers to discharge  - Address psychosocial, clinical, and financial barriers to discharge as identified in assessment in conjunction with the patient/family and health care team  - Arrange appropriate level of post-acute services according to patient's   needs and preference and payer coverage in collaboration with the physician and health care team  - Communicate with and update the patient/family, physician, and health care team regarding progress on the discharge plan  - Arrange appropriate transportation to post-acute venues   Outcome: Progressing      Problem: Potential for Falls  Goal: Patient will remain free of falls  INTERVENTIONS:  - Assess patient frequently for physical needs  -  Identify cognitive and physical deficits and behaviors that affect risk of falls    -  [place] fall precautions as indicated by assessment   - Educate patient/family on patient safety including physical limitations  - Instruct patient to call for assistance with activity based on assessment  - Modify environment to reduce risk of injury  - Consider OT/PT consult to assist with strengthening/mobility   Outcome: Progressing

## 2018-06-30 LAB
GLUCOSE SERPL-MCNC: 157 MG/DL (ref 65–140)
GLUCOSE SERPL-MCNC: 190 MG/DL (ref 65–140)
GLUCOSE SERPL-MCNC: 239 MG/DL (ref 65–140)
GLUCOSE SERPL-MCNC: 267 MG/DL (ref 65–140)
VALPROATE SERPL-MCNC: 59.6 UG/ML (ref 50–125)

## 2018-06-30 PROCEDURE — 82948 REAGENT STRIP/BLOOD GLUCOSE: CPT

## 2018-06-30 PROCEDURE — 80164 ASSAY DIPROPYLACETIC ACD TOT: CPT | Performed by: NURSE PRACTITIONER

## 2018-06-30 PROCEDURE — 99231 SBSQ HOSP IP/OBS SF/LOW 25: CPT | Performed by: NURSE PRACTITIONER

## 2018-06-30 RX ORDER — DIVALPROEX SODIUM 500 MG/1
500 TABLET, EXTENDED RELEASE ORAL
Status: DISCONTINUED | OUTPATIENT
Start: 2018-06-30 | End: 2018-07-02 | Stop reason: HOSPADM

## 2018-06-30 RX ADMIN — INSULIN LISPRO 10 UNITS: 100 INJECTION, SOLUTION INTRAVENOUS; SUBCUTANEOUS at 12:09

## 2018-06-30 RX ADMIN — INSULIN LISPRO 4 UNITS: 100 INJECTION, SOLUTION INTRAVENOUS; SUBCUTANEOUS at 09:09

## 2018-06-30 RX ADMIN — HYDROXYZINE HYDROCHLORIDE 25 MG: 25 TABLET ORAL at 21:21

## 2018-06-30 RX ADMIN — CHLORPROMAZINE HYDROCHLORIDE 75 MG: 25 TABLET, SUGAR COATED ORAL at 21:20

## 2018-06-30 RX ADMIN — METOPROLOL TARTRATE 25 MG: 25 TABLET ORAL at 09:08

## 2018-06-30 RX ADMIN — GABAPENTIN 400 MG: 400 CAPSULE ORAL at 17:35

## 2018-06-30 RX ADMIN — METFORMIN HYDROCHLORIDE 500 MG: 500 TABLET ORAL at 09:08

## 2018-06-30 RX ADMIN — METFORMIN HYDROCHLORIDE 500 MG: 500 TABLET ORAL at 17:37

## 2018-06-30 RX ADMIN — DIVALPROEX SODIUM 500 MG: 500 TABLET, EXTENDED RELEASE ORAL at 17:35

## 2018-06-30 RX ADMIN — INSULIN LISPRO 6 UNITS: 100 INJECTION, SOLUTION INTRAVENOUS; SUBCUTANEOUS at 17:36

## 2018-06-30 RX ADMIN — HALOPERIDOL 5 MG: 5 TABLET ORAL at 13:39

## 2018-06-30 RX ADMIN — CHLORPROMAZINE HYDROCHLORIDE 75 MG: 25 TABLET, SUGAR COATED ORAL at 09:08

## 2018-06-30 RX ADMIN — GABAPENTIN 400 MG: 400 CAPSULE ORAL at 09:08

## 2018-06-30 RX ADMIN — LISINOPRIL 5 MG: 5 TABLET ORAL at 09:08

## 2018-06-30 RX ADMIN — DICLOFENAC 2 G: 10 GEL TOPICAL at 17:35

## 2018-06-30 RX ADMIN — DICLOFENAC 2 G: 10 GEL TOPICAL at 09:09

## 2018-06-30 RX ADMIN — GABAPENTIN 400 MG: 400 CAPSULE ORAL at 21:20

## 2018-06-30 RX ADMIN — TRAZODONE HYDROCHLORIDE 50 MG: 50 TABLET ORAL at 21:21

## 2018-06-30 RX ADMIN — CHLORPROMAZINE HYDROCHLORIDE 75 MG: 25 TABLET, SUGAR COATED ORAL at 17:35

## 2018-06-30 RX ADMIN — INSULIN LISPRO 8 UNITS: 100 INJECTION, SOLUTION INTRAVENOUS; SUBCUTANEOUS at 21:23

## 2018-06-30 RX ADMIN — DIVALPROEX SODIUM 500 MG: 500 TABLET, EXTENDED RELEASE ORAL at 09:08

## 2018-06-30 RX ADMIN — GABAPENTIN 400 MG: 400 CAPSULE ORAL at 12:09

## 2018-06-30 RX ADMIN — DIVALPROEX SODIUM 500 MG: 500 TABLET, EXTENDED RELEASE ORAL at 21:21

## 2018-06-30 RX ADMIN — TRAMADOL HYDROCHLORIDE 50 MG: 50 TABLET, COATED ORAL at 21:21

## 2018-06-30 RX ADMIN — BENZTROPINE MESYLATE 1 MG: 1 TABLET ORAL at 09:08

## 2018-06-30 NOTE — PROGRESS NOTES
Patient bright alert, ate breakfast with peers  Patient back to bad after eating  Patient flat and irritable but calm and  cooperative for blood draw and diabetic ulcer dressing change as ordered  After this nurse rejected pt request for crackers and PB,  Pt was laying in bed and yelled out to nursing station down the hallway "Earl Bibles I need my haldol" very loudly  When asked why aggitated pt responded calmly and softly "because Im breathing and Im pretty much always like this and Im going to ask the doctor to give me the haldol scheduled rather than as needed  Pt receptive to this nurse request to refrain from yelling down the hallway  Will continue to monitor

## 2018-06-30 NOTE — PLAN OF CARE
Anxiety     Anxiety is at manageable level Not Progressing        Depression     Refrain from isolation Not Progressing        DISCHARGE PLANNING - CARE MANAGEMENT     Discharge to post-acute care or home with appropriate resources Not Progressing        Ineffective Coping     Identifies healthy coping skills Not Progressing     Demonstrates healthy coping skills Not Progressing     Patient/Family participate in treatment and DC plans Not Progressing     Patient/Family verbalizes awareness of resources Not Progressing     Understands least restrictive measures Not Progressing        METABOLIC, FLUID AND ELECTROLYTES - ADULT     Glucose maintained within target range Not Progressing        Nutrition/Hydration-ADULT     Nutrient/Hydration intake appropriate for improving, restoring or maintaining nutritional needs Not Progressing        Risk for Self Injury/Neglect     Treatment Goal: Remain safe during length of stay, learn and adopt new coping skills, and be free of self-injurious ideation, impulses and acts at the time of discharge Not Progressing     Recognize maladaptive responses and adopt new coping mechanisms Not Progressing        Risk for Violence/Aggression Toward Others     Treatment Goal: Refrain from acts of violence/aggression during length of stay, and demonstrate improved impulse control at the time of discharge Not Progressing     Control angry outbursts Not Progressing     Identify appropriate positive anger management techniques Not Progressing          Depression     Treatment Goal: Demonstrate behavioral control of depressive symptoms, verbalize feelings of improved mood/affect, and adopt new coping skills prior to discharge Progressing     Refrain from self-neglect Progressing        Ineffective Coping     Identifies ineffective coping skills Progressing     Participates in unit activities Progressing     Free from restraint events Progressing        Potential for Falls     Patient will remain free of falls Progressing        Risk for Self Injury/Neglect     Verbalize thoughts and feelings Progressing     Refrain from harming self Progressing     Complete daily ADLs, including personal hygiene independently, as able Progressing        Risk for Violence/Aggression Toward Others     Refrain from harming others Progressing     Refrain from destructive acts on the environment or property Progressing          Depression     Treatment Goal: Demonstrate behavioral control of depressive symptoms, verbalize feelings of improved mood/affect, and adopt new coping skills prior to discharge Progressing     Refrain from self-neglect Progressing        Ineffective Coping     Identifies ineffective coping skills Progressing     Participates in unit activities Progressing     Free from restraint events Progressing        Potential for [address] [place] Patient will remain free of falls Progressing        Risk for Self Injury/Neglect     Verbalize thoughts and feelings Progressing     Refrain from harming self Progressing     Complete daily ADLs, including personal hygiene independently, as able Progressing        Risk for Violence/Aggression Toward Others     Refrain from harming others Progressing     Refrain from destructive acts on the environment or property Progressing          Anxiety     Anxiety is at manageable level Not Progressing        Depression     Refrain from isolation Not [address] Discharge to post-acute care or home with appropriate resources Not Progressing        Ineffective Coping     Identifies healthy coping skills Not Progressing     Demonstrates healthy coping skills Not Progressing     Patient/Family participate in treatment and DC plans Not Progressing     Patient/Family verbalizes awareness of resources Not Progressing     Understands least restrictive measures Not Progressing        METABOLIC, FLUID AND ELECTROLYTES - ADULT     Glucose maintained within target range Not Progressing        Nutrition/Hydration-ADULT     Nutrient/Hydration intake appropriate for improving, restoring or maintaining nutritional needs Not Progressing        Risk for Self Injury/Neglect     Treatment Goal: Remain safe during length of stay, learn and adopt new coping skills, and be free of self-injurious ideation, impulses and acts at the time of discharge Not Progressing     Recognize maladaptive responses and adopt new coping mechanisms Not Progressing        Risk for Violence/Aggression Toward Others     Treatment Goal: Refrain from acts of violence/aggression during length of stay, and demonstrate improved impulse control at the time of discharge Not Progressing     Control angry outbursts Not Progressing     Identify appropriate positive anger management techniques Not Progressing          Anxiety     Anxiety is at manageable level Not Progressing        Depression     Refrain from isolation Not Progressing        DISCHARGE PLANNING - CARE MANAGEMENT     Discharge to post-acute care or home with appropriate resources Not Progressing        Ineffective Coping     Identifies healthy coping skills Not Progressing     Demonstrates healthy coping skills Not Progressing     Patient/Family participate in treatment and DC plans Not Progressing     Patient/Family verbalizes awareness of resources Not Progressing     Understands least restrictive measures Not Progressing        METABOLIC, FLUID AND ELECTROLYTES - ADULT     Glucose maintained within target range Not Progressing        Nutrition/Hydration-ADULT     Nutrient/Hydration intake appropriate for improving, restoring or maintaining nutritional needs Not Progressing        Risk for Self Injury/Neglect     Treatment Goal: Remain safe during length of stay, learn and adopt new coping skills, and be free of self-injurious ideation, impulses and acts at the time of discharge Not Progressing     Recognize maladaptive responses and adopt new coping mechanisms Not Progressing        Risk for Violence/Aggression Toward Others     Treatment Goal: Refrain from acts of violence/aggression during length of stay, and demonstrate improved impulse control at the time of discharge Not Progressing     Control angry outbursts Not Progressing     Identify appropriate positive anger management techniques Not Progressing          Depression     Treatment Goal: Demonstrate behavioral control of depressive symptoms, verbalize feelings of improved mood/affect, and adopt new coping skills prior to discharge Progressing     Refrain from self-neglect Progressing        Ineffective Coping     Identifies ineffective coping skills Progressing     Participates in unit activities Progressing     Free from restraint events Progressing        Potential for Falls     Patient will remain free of falls Progressing        Risk for Self Injury/Neglect     Verbalize thoughts and feelings Progressing     Refrain from harming self Progressing     Complete daily ADLs, including personal hygiene independently, as able Progressing        Risk for Violence/Aggression Toward Others     Refrain from harming others Progressing     Refrain from destructive acts on the environment or property Progressing          Anxiety     Anxiety is at manageable level Not Progressing        Depression     Refrain from isolation Not [address]     Discharge to post-acute care or home with appropriate resources Not Progressing        Ineffective Coping     Identifies healthy coping skills Not Progressing     Demonstrates healthy coping skills Not Progressing     Patient/Family participate in treatment and DC plans Not Progressing     Patient/Family verbalizes awareness of resources Not Progressing     Understands least restrictive measures Not Progressing METABOLIC, FLUID AND ELECTROLYTES - ADULT     Glucose maintained within target range Not Progressing        Nutrition/Hydration-ADULT     Nutrient/Hydration intake appropriate for improving, restoring or maintaining nutritional needs Not Progressing        Risk for Self Injury/Neglect     Treatment Goal: Remain safe during length of stay, learn and adopt new coping skills, and be free of self-injurious ideation, impulses and acts at the time of discharge Not Progressing     Recognize maladaptive responses and adopt new coping mechanisms Not Progressing        Risk for Violence/Aggression Toward Others     Treatment Goal: Refrain from acts of violence/aggression during length of stay, and demonstrate improved impulse control at the time of discharge Not Progressing     Control angry outbursts Not Progressing     Identify appropriate positive anger management techniques Not Progressing          Depression     Treatment Goal: Demonstrate behavioral control of depressive symptoms, verbalize feelings of improved mood/affect, and adopt new coping skills prior to discharge Progressing     Refrain from self-neglect Progressing        Ineffective Coping     Identifies ineffective coping skills Progressing     Participates in unit activities Progressing     Free from restraint events Progressing        Potential for Falls     Patient will remain free of falls Progressing        Risk for Self Injury/Neglect     Verbalize thoughts and feelings Progressing     Refrain from harming self Progressing     Complete daily ADLs, including personal hygiene independently, as able Progressing        Risk for Violence/Aggression Toward Others     Refrain from harming others Progressing     Refrain from destructive acts on the environment or property Progressing

## 2018-06-30 NOTE — PROGRESS NOTES
Pt has been visible on the unit and attending all programming  Is among peers but keeps mainly to himself  Pt seeks out nursing with needs  Concerns are mainly pain related  Denies SI/HI/chopra  Shares no new information on d/c plans  Even given recent thorazine increase, pt continues to report "severe agitation" about nothing specific  Pt states he plans to request scheduled ativan and scheduled haldol from the providers tomorrow  Pt remains medication compliant  Did request and receive PRN ultram for L foot pain, ativan for anxiety and trazodone for sleep  Pt is able to make needs known and agrees to report changes in condition/needs to staff  Nursing will continue to monitor and assess for changes and safety with SP1 and 15 minute checks

## 2018-06-30 NOTE — CONSULTS
Consultation - General Surgery   Valente Ramirez 40 y o  male MRN: [id number]  Unit/Bed#: [facility]01 Encounter: [id number]    Assessment/Plan     Assessment:  1 ) Diabetic Ulceration, left heel  2 ) S/P TMA Bilaterally  3 ) Phantom pain both feet/ Diabetic Neuropathy  Plan:  1 ) Continue current order, Betadine wet to dry daily  May need debridement at some point  2 ) Good candidate for diabetic shoes on an outpatient basis  History of Present Illness     HPI:  Valente Ramirez is a 40 y o  male who is well known to us over the years with a multitude of diabetic foot issues  He was residing in [place]  He is now residing in [facility]  He has had this ulceration since being in [place]  A podiatrist there was treating it  No osteomyelitis was evident  He is having no fever chills or sweats  Inpatient consult to Podiatry  Consult performed by: Anrdea Tapia ordered by: Ish Hampton          Review of Systems   Skin: Positive for wound  Psychiatric/Behavioral: Positive for behavioral problems  Historical Information   Past Medical History:   Diagnosis Date    Bipolar 1 disorder ([facility] )     Diabetes mellitus ([facility] )     Hypertension      Past Surgical History:   Procedure Laterality Date    APPENDECTOMY      COLON SURGERY      TONSILLECTOMY       Social History   History   Alcohol Use No     History   Drug use: Unknown     History   Smoking Status    Current Every Day Smoker    Types: Cigarettes   Smokeless Tobacco    Current User    Types: Chew     Family History: No family history on file      Meds/Allergies   current meds:   Current Facility-Administered Medications   Medication Dose Route Frequency    acetaminophen (TYLENOL) tablet 650 mg  650 mg Oral Q4H PRN    aluminum-magnesium hydroxide-simethicone (MYLANTA) 200-200-20 mg/5 mL oral suspension 30 mL  30 mL Oral Q4H PRN    benztropine (COGENTIN) injection 1 mg  1 mg Intramuscular Q4H PRN    benztropine (COGENTIN) tablet 1 mg  1 mg Oral Daily    benztropine (COGENTIN) tablet 1 mg  1 mg Oral Q4H PRN    chlorproMAZINE (THORAZINE) tablet 75 mg  75 mg Oral TID    dextrose 5 % infusion  20 mL/hr Intravenous PRN    dextrose 50 % IV solution 25 g  25 g Intravenous Daily PRN    diclofenac sodium (VOLTAREN) 1 % topical gel 2 g  2 g Topical BID    divalproex sodium (DEPAKOTE ER) 24 hr tablet 500 mg  500 mg Oral HS    divalproex sodium (DEPAKOTE ER) 24 hr tablet 500 mg  500 mg Oral BID (AM & Afternoon)    gabapentin (NEURONTIN) capsule 400 mg  400 mg Oral 4x Daily    glucagon (GLUCAGEN) injection 1 mg  1 mg Subcutaneous PRN    haloperidol (HALDOL) tablet 5 mg  5 mg Oral Q4H PRN    haloperidol lactate (HALDOL) injection 5 mg  5 mg Intramuscular Q4H PRN    hydrOXYzine HCL (ATARAX) tablet 25 mg  25 mg Oral Q6H PRN    insulin lispro (HumaLOG) 100 units/mL subcutaneous injection 4-12 Units  4-12 Units Subcutaneous 4x Daily (AC & HS)    lisinopril (ZESTRIL) tablet 5 mg  5 mg Oral Daily    LORazepam (ATIVAN) 2 mg/mL injection 1 mg  1 mg Intramuscular Q4H PRN    LORazepam (ATIVAN) tablet 1 mg  1 mg Oral Q4H PRN    magnesium hydroxide (MILK OF MAGNESIA) 400 mg/5 mL oral suspension 30 mL  30 mL Oral Daily PRN    metFORMIN (GLUCOPHAGE) tablet 500 mg  500 mg Oral BID    metoprolol tartrate (LOPRESSOR) tablet 25 mg  25 mg Oral Daily    traMADol (ULTRAM) tablet 50 mg  50 mg Oral Q8H PRN    traZODone (DESYREL) tablet 50 mg  50 mg Oral HS PRN     Allergies   Allergen Reactions    Penicillins        Objective   First Vitals:   Blood Pressure: 130/94 (06/23/18 0653)  Pulse: 95 (06/23/18 0653)  Temperature: 97 8 °F (36 6 °C) (06/23/18 0653)  Temp Source: Tympanic (06/23/18 0653)  Respirations: 16 (06/23/18 0653)  Height: 6' 3" (190 5 cm) (06/23/18 0700)  Weight - Scale: 129 kg (284 lb) (06/23/18 0700)  SpO2: 97 % (06/23/18 0653)    Current Vitals:   Blood Pressure: 144/90 (06/30/18 1521)  Pulse: 84 (06/30/18 1521)  Temperature: 97 9 °F (36 6 °C) (06/30/18 1521)  Temp Source: Tympanic (06/30/18 1521)  Respirations: 16 (06/30/18 1521)  Height: 6' 3" (190 5 cm) (06/23/18 0700)  Weight - Scale: 133 kg (293 lb) (06/30/18 0718)  SpO2: 98 % (06/30/18 1521)    No intake or output data in the 24 hours ending 06/30/18 1607    Invasive Devices          No matching active lines, drains, or airways          Physical Exam   Skin:        TMA sites look good with both incision sites fully healed  Lab Results: CBC: No results found for: WBC, HGB, HCT, MCV, PLT, ADJUSTEDWBC, MCH, MCHC, RDW, MPV, NRBC, CMP: No results found for: NA, K, CL, CO2, ANIONGAP, BUN, CREATININE, GLUCOSE, CALCIUM, AST, ALT, ALKPHOS, PROT, ALBUMIN, BILITOT, EGFR, Coagulation: No results found for: PT, INR, APTT, Urinalysis: No results found for: COLORU, CLARITYU, SPECGRAV, PHUR, LEUKOCYTESUR, NITRITE, PROTEINUA, GLUCOSEU, KETONESU, BILIRUBINUR, BLOODU, Amylase: No results found for: AMYLASE, Lipase: No results found for: LIPASE  Imaging: I have personally reviewed pertinent reports  EKG, Pathology, and Other Studies: I have personally reviewed pertinent reports  Counseling / Coordination of Care  Total floor / unit time spent today 30 minutes  Greater than 50% of total time was spent with the patient and / or family counseling and / or coordination of care

## 2018-06-30 NOTE — PROGRESS NOTES
Progress Note - Behavioral Health     Jeison Alas 40 y o  male MRN: [id number]   Unit/Bed#: [facility] Encounter: [id number]    Behavior over the last 24 hours: Augustine Abbasi became inappropriate and verbally abusive toward staff this morning when RN was attempting to draw blood  RN was eventually able to obtain a blood sample for a serum Depakote level which was 59 6  He received PRN haldol  which he states he requested because he was having "racing thoughts"  During the interview, he was calm, pleasant, and cooperative with no sign of agitation or irritability  He feels that his Depakote is working to stabilize his mood but his mind still races all the time  He rates his mood as "depressed" because he wishes his wife could visit him today  He is eating 100% of his meals and is slept all night without difficulty  ROS: no complaints    Mental Status Evaluation:    Appearance:  age appropriate, casually dressed   Behavior:  Calm, pleasant, cooperative   Speech:  normal rate, normal volume, normal pitch   Mood:  labile   Affect:  mood-congruent   Thought Process:  organized, logical, coherent   Associations: intact associations   Thought Content:  normal   Perceptual Disturbances: none   Risk Potential: Suicidal ideation - None  Homicidal ideation - None  Potential for aggression - No   Sensorium:  oriented to person, place, time/date and situation   Memory:  recent and remote memory grossly intact   Consciousness:  alert and awake   Attention: attention span and concentration are age appropriate   Insight:  poor   Judgment: poor   Gait/Station: normal gait/station   Motor Activity: no abnormal movements     Vital signs in last 24 hours:    Temp:  [97 3 °F (36 3 °C)] 97 3 °F (36 3 °C)  HR:  [90-95] 95  Resp:  [16] 16  BP: (131-132)/(66-83) 132/83    Laboratory results:  I have personally reviewed all pertinent laboratory/tests results      Progress Toward Goals: progressing gradually    Assessment/Plan   Principal Problem: Severe bipolar I disorder, most recent episode depressed without psychotic features ([facility] )  Active Problems:    Cannabis abuse    Tobacco use disorder    Osteoarthritis of both knees    Recommended Treatment:   1  Continue current medications and treatment plan as prescribes  2  Continue to monitor patient for mood swings and agitation and increase Depakote if needed  3  Continue to encourage patient to participate in groups and remain interactive with staff and peers as tolerated in order to receive maximum benefit from the therapeutic milieu          Current Facility-Administered Medications:  acetaminophen 650 mg Oral Q4H PRN Provider Cutover   aluminum-magnesium hydroxide-simethicone 30 mL Oral Q4H PRN Provider Cutover   benztropine 1 mg Intramuscular Q4H PRN Provider Cutover   benztropine 1 mg Oral Daily Provider Cutover   benztropine 1 mg Oral Q4H PRN Provider Cutover   chlorproMAZINE 75 mg Oral TID Ailyn Bill PA-C   dextrose 20 mL/hr Intravenous PRN Provider Cutover   dextrose 25 g Intravenous Daily PRN Provider Cutover   diclofenac sodium 2 g Topical BID Joanna Garner MD   divalproex sodium 500 mg Oral HS Brianna Mandujano PA-C   divalproex sodium 500 mg Oral BID (AM & Afternoon) TAYLOR Lux   gabapentin 400 mg Oral 4x Daily Provider Cutover   glucagon 1 mg Subcutaneous PRN Provider Cutover   haloperidol 5 mg Oral Q4H PRN Provider Cutover   haloperidol lactate 5 mg Intramuscular Q4H PRN Provider Cutover   hydrOXYzine HCL 25 mg Oral Q6H PRN Lurdes Bhardwaj MD   insulin lispro 4-12 Units Subcutaneous 4x Daily (AC & HS) Provider Cutover   lisinopril 5 mg Oral Daily Provider Cutover   LORazepam 1 mg Intramuscular Q4H PRN Provider Cutover   LORazepam 1 mg Oral Q4H PRN Provider Cutover   magnesium hydroxide 30 mL Oral Daily PRN Provider Cutover   metFORMIN 500 mg Oral BID Provider Cutover   metoprolol tartrate 25 mg Oral Daily Provider Cutover   traMADol 50 mg Oral Q8H PRN Provider [facility] traZODone 50 mg Oral HS PRN Provider Cutover       Risks / Benefits of Treatment:    Risks, benefits, and possible side effects of medications explained to patient and patient verbalizes understanding and agreement for treatment  Counseling / Coordination of Care:      Patient's progress reviewed with nursing staff  Medications, treatment progress and treatment plan reviewed with patient

## 2018-06-30 NOTE — PROGRESS NOTES
On approach by RN for blood draw this am, pt was inappropriate and verbally abusive towards staff  Pt began yelling with insertion of the needle, telling the staff to get out  Will endorse to day shift nursing for re-attempt

## 2018-06-30 NOTE — PROGRESS NOTES
Patient seen by Dr Raghav Guevara for c/o pain in rt foot TMA by patient   assessed patient and will continue with current plan for left ankle ulcer betadine wet to dry, no new orders for c/o pain  Provider discussed possible phantom pain and may  Need debridement in future for laft ankle  Pt agreeable, tolerated well  Redressed ulcer as ordered  Will continue to monitor

## 2018-06-30 NOTE — PROGRESS NOTES
Pt is and has been sleeping soundly through the night  No significant periods of waking have been observed  No acute concerns have been noted  SP1 and 15 minute checks have been maintained incident free   Will continue to monitor

## 2018-07-01 LAB
GLUCOSE SERPL-MCNC: 160 MG/DL (ref 65–140)
GLUCOSE SERPL-MCNC: 181 MG/DL (ref 65–140)
GLUCOSE SERPL-MCNC: 263 MG/DL (ref 65–140)
GLUCOSE SERPL-MCNC: 270 MG/DL (ref 65–140)

## 2018-07-01 PROCEDURE — 82948 REAGENT STRIP/BLOOD GLUCOSE: CPT

## 2018-07-01 RX ORDER — CHLORPROMAZINE HYDROCHLORIDE 100 MG/1
100 TABLET, FILM COATED ORAL 3 TIMES DAILY
Status: DISCONTINUED | OUTPATIENT
Start: 2018-07-01 | End: 2018-07-02 | Stop reason: HOSPADM

## 2018-07-01 RX ADMIN — GABAPENTIN 400 MG: 400 CAPSULE ORAL at 20:55

## 2018-07-01 RX ADMIN — CHLORPROMAZINE HYDROCHLORIDE 75 MG: 25 TABLET, SUGAR COATED ORAL at 08:59

## 2018-07-01 RX ADMIN — DICLOFENAC 2 G: 10 GEL TOPICAL at 17:30

## 2018-07-01 RX ADMIN — BENZTROPINE MESYLATE 1 MG: 1 TABLET ORAL at 09:01

## 2018-07-01 RX ADMIN — DIVALPROEX SODIUM 500 MG: 500 TABLET, EXTENDED RELEASE ORAL at 17:29

## 2018-07-01 RX ADMIN — METFORMIN HYDROCHLORIDE 500 MG: 500 TABLET ORAL at 09:01

## 2018-07-01 RX ADMIN — LORAZEPAM 1 MG: 1 TABLET ORAL at 16:26

## 2018-07-01 RX ADMIN — INSULIN LISPRO 10 UNITS: 100 INJECTION, SOLUTION INTRAVENOUS; SUBCUTANEOUS at 20:57

## 2018-07-01 RX ADMIN — LISINOPRIL 5 MG: 5 TABLET ORAL at 09:04

## 2018-07-01 RX ADMIN — GABAPENTIN 400 MG: 400 CAPSULE ORAL at 08:59

## 2018-07-01 RX ADMIN — METOPROLOL TARTRATE 25 MG: 25 TABLET ORAL at 09:04

## 2018-07-01 RX ADMIN — INSULIN LISPRO 4 UNITS: 100 INJECTION, SOLUTION INTRAVENOUS; SUBCUTANEOUS at 17:30

## 2018-07-01 RX ADMIN — CHLORPROMAZINE HYDROCHLORIDE 100 MG: 100 TABLET, SUGAR COATED ORAL at 17:28

## 2018-07-01 RX ADMIN — GABAPENTIN 400 MG: 400 CAPSULE ORAL at 12:25

## 2018-07-01 RX ADMIN — DICLOFENAC 2 G: 10 GEL TOPICAL at 09:08

## 2018-07-01 RX ADMIN — METFORMIN HYDROCHLORIDE 500 MG: 500 TABLET ORAL at 17:29

## 2018-07-01 RX ADMIN — INSULIN LISPRO 6 UNITS: 100 INJECTION, SOLUTION INTRAVENOUS; SUBCUTANEOUS at 09:05

## 2018-07-01 RX ADMIN — DIVALPROEX SODIUM 500 MG: 500 TABLET, EXTENDED RELEASE ORAL at 20:54

## 2018-07-01 RX ADMIN — DIVALPROEX SODIUM 500 MG: 500 TABLET, EXTENDED RELEASE ORAL at 08:59

## 2018-07-01 RX ADMIN — TRAZODONE HYDROCHLORIDE 50 MG: 50 TABLET ORAL at 20:54

## 2018-07-01 RX ADMIN — INSULIN LISPRO 10 UNITS: 100 INJECTION, SOLUTION INTRAVENOUS; SUBCUTANEOUS at 12:23

## 2018-07-01 RX ADMIN — TRAMADOL HYDROCHLORIDE 50 MG: 50 TABLET, COATED ORAL at 17:29

## 2018-07-01 RX ADMIN — ACETAMINOPHEN 650 MG: 325 TABLET ORAL at 11:41

## 2018-07-01 RX ADMIN — GABAPENTIN 400 MG: 400 CAPSULE ORAL at 17:30

## 2018-07-01 RX ADMIN — CHLORPROMAZINE HYDROCHLORIDE 100 MG: 100 TABLET, SUGAR COATED ORAL at 20:54

## 2018-07-01 NOTE — PROGRESS NOTES
Pt is and has been sleeping soundly through the night  No periods of waking have been observed  No acute concerns have been noted  15 minute checks have been maintained incident free   Will continue to monitor

## 2018-07-01 NOTE — PROGRESS NOTES
Patient bright alert, selectively social with peers  Pt polite and pleasant to this nurse, compliant with medications  Pt flat, depressed and does states he has negative outlook on discharge related to poor support and not wanting to be alone  Denies si hi and hallucinations at this time> did request tylenol for severe headache and stated marked effectiveness to this nurse  Will continue to monitor

## 2018-07-01 NOTE — PROGRESS NOTES
Pt has been visible in the community and attending programming  Among peers but withdrawn to self  Ambulating with use of cane-no concern  Pt has been behaviorally controlled  Seeks out nursing with needs  Presents flat and depressed and offering little interaction  Does respond to prompting but minimally so  Pt Denies SI/HI/chopra  Has remained med compliant  Requested/received PRN ultram for 7/10 L ankle pain, PRN atarax for anxiety and PRN trazodone for sleep  HS accu check 239-8 units administered following receipt of snack  Pt is able to make needs known and agrees to report changes in condition/needs to staff  Nursing will continue to monitor and assess for changes and safety with 15 minute checks  On reassessment of pain following receipt of PRN ultram for L ankle pain, pt was noted to be sleeping comfortably in bed  No visible signs of pain or discomfort noted   Will continue to monitor

## 2018-07-01 NOTE — PROGRESS NOTES
Progress Note - Behavioral Health     Deyvi Gibson 40 y o  male MRN: [id number]   Unit/Bed#: U 222-01 Encounter: [id number]    Behavior over the last 24 hours:  Ralph Campa was cooperative and calm during the interview  He describes his current mood as "cocky and sassy"  He states he is depressed because his life is "so boring"  He requests his Haldol be changed to a standing order instead of a PRN in order to improve mood control  He still experiences intermittent anger  Per nursing staff, he has been eating all of his meals and slept through the night  He is anxious about what will happen when he is discharged and wishes to speak with  tomorrow  He has been attending programming but maintains minimal interaction with staff and peers  ROS: Pt  Reports phantom foot pain bilaterally  He has PRN Tylenol ordered  Mental Status Evaluation:    Appearance:  casually dressed   Behavior:  cooperative, calm   Speech:  normal rate, normal volume, normal pitch   Mood:  labile, irritable   Affect:  flat   Thought Process:  organized, logical, coherent   Associations: intact associations   Thought Content:  normal   Perceptual Disturbances: no auditory hallucinations, no visual hallucinations   Risk Potential: Suicidal ideation - None  Homicidal ideation - None  Potential for aggression - Yes, due to agitation   Sensorium:  oriented to person, place, time/date and situation   Memory:  recent and remote memory grossly intact   Consciousness:  alert and awake   Attention: attention span and concentration are age appropriate   Insight:  limited   Judgment: limited   Gait/Station: normal gait/station; walks with cane   Motor Activity: no abnormal movements     Vital signs in last 24 hours:    Temp:  [97 4 °F (36 3 °C)-97 9 °F (36 6 °C)] 97 4 °F (36 3 °C)  HR:  [84-95] 92  Resp:  [16] 16  BP: (107-146)/(58-90) 146/82    Laboratory results:  I have personally reviewed all pertinent laboratory/tests results      Progress Toward Goals: progressing gradually    Assessment/Plan   Principal Problem:    Severe bipolar I disorder, most recent episode depressed without psychotic features ([facility] [address] )  Active Problems:    Cannabis abuse    Tobacco use disorder    Osteoarthritis of both knees    Recommended Treatment:   1  Will Increase Thorazine from 75mg BID to 100mg to achieve better mood control augmentation  2  Will continue all other medications and treatment plan as prescribed  3  Will continue to monitor patient and adjust medications and treatment plan as needed  4  Will encourage patient to attend groups and remain interactive with staff and peers as tolerated in order to achieve the maximum benefit of the therapeutic milieu  5  Discharge disposition and planning ongoing      Current Facility-Administered Medications:  acetaminophen 650 mg Oral Q4H PRN Provider Cutover   aluminum-magnesium hydroxide-simethicone 30 mL Oral Q4H PRN Provider Cutover   benztropine 1 mg Intramuscular Q4H PRN Provider Cutover   benztropine 1 mg Oral Daily Provider Cutover   benztropine 1 mg Oral Q4H PRN Provider Cutover   chlorproMAZINE 100 mg Oral TID ElverTAYLOR Bustos   dextrose 20 mL/hr Intravenous PRN Provider Cutover   dextrose 25 g Intravenous Daily PRN Provider Cutover   diclofenac sodium 2 g Topical BID Melissa Pichardo MD   divalproex sodium 500 mg Oral HS Brianna Mandujano PA-C   divalproex sodium 500 mg Oral BID (AM & Afternoon) TAYLOR Lux   gabapentin 400 mg Oral 4x Daily Provider Cutover   glucagon 1 mg Subcutaneous PRN Provider Cutover   haloperidol 5 mg Oral Q4H PRN Provider Cutover   haloperidol lactate 5 mg Intramuscular Q4H PRN Provider Cutover   hydrOXYzine HCL 25 mg Oral Q6H PRN Winnie Laguna MD   insulin lispro 4-12 Units Subcutaneous 4x Daily (AC & HS) Provider Cutover   lisinopril 5 mg Oral Daily Provider Cutover   LORazepam 1 mg Intramuscular Q4H PRN Provider Cutover   LORazepam 1 mg Oral Q4H PRN Provider Health [facility] magnesium hydroxide 30 mL Oral Daily PRN Provider Cutover   metFORMIN 500 mg Oral BID Provider Cutover   metoprolol tartrate 25 mg Oral Daily Provider Cutover   traMADol 50 mg Oral Q8H PRN Provider Cutover   traZODone 50 mg Oral HS PRN Provider Cutover       Risks / Benefits of Treatment:    Risks, benefits, and possible side effects of medications explained to patient and patient verbalizes understanding and agreement for treatment  Counseling / Coordination of Care:      Patient's progress reviewed with nursing staff  Medications, treatment progress and treatment plan reviewed with patient

## 2018-07-01 NOTE — PROGRESS NOTES
Patient requested ultram 50mg prn Po at 17:29 for left ankle pain of 5/10  Related to ulcer  Will continue to monitor and assess

## 2018-07-02 VITALS
DIASTOLIC BLOOD PRESSURE: 76 MMHG | OXYGEN SATURATION: 96 % | RESPIRATION RATE: 18 BRPM | HEIGHT: 75 IN | SYSTOLIC BLOOD PRESSURE: 137 MMHG | HEART RATE: 103 BPM | TEMPERATURE: 96.7 F | WEIGHT: 293 LBS | BODY MASS INDEX: 36.43 KG/M2

## 2018-07-02 LAB
GLUCOSE SERPL-MCNC: 170 MG/DL (ref 65–140)
GLUCOSE SERPL-MCNC: 175 MG/DL (ref 65–140)
GLUCOSE SERPL-MCNC: 365 MG/DL (ref 65–140)

## 2018-07-02 PROCEDURE — 99239 HOSP IP/OBS DSCHRG MGMT >30: CPT | Performed by: NURSE PRACTITIONER

## 2018-07-02 PROCEDURE — 82948 REAGENT STRIP/BLOOD GLUCOSE: CPT

## 2018-07-02 RX ORDER — DIVALPROEX SODIUM 500 MG/1
500 TABLET, DELAYED RELEASE ORAL 3 TIMES DAILY
Qty: 90 TABLET | Refills: 0 | Status: SHIPPED | OUTPATIENT
Start: 2018-07-02 | End: 2018-08-03 | Stop reason: HOSPADM

## 2018-07-02 RX ORDER — GABAPENTIN 400 MG/1
400 CAPSULE ORAL 4 TIMES DAILY
Qty: 120 CAPSULE | Refills: 0 | Status: SHIPPED | OUTPATIENT
Start: 2018-07-02 | End: 2018-08-03 | Stop reason: HOSPADM

## 2018-07-02 RX ORDER — BENZTROPINE MESYLATE 1 MG/1
1 TABLET ORAL DAILY
Qty: 30 TABLET | Refills: 0 | Status: SHIPPED | OUTPATIENT
Start: 2018-07-03 | End: 2018-08-03 | Stop reason: HOSPADM

## 2018-07-02 RX ORDER — LISINOPRIL 5 MG/1
5 TABLET ORAL DAILY
Qty: 30 TABLET | Refills: 0 | Status: SHIPPED | OUTPATIENT
Start: 2018-07-03 | End: 2018-12-27 | Stop reason: HOSPADM

## 2018-07-02 RX ORDER — CHLORPROMAZINE HYDROCHLORIDE 100 MG/1
100 TABLET, FILM COATED ORAL 3 TIMES DAILY
Qty: 90 TABLET | Refills: 0 | Status: SHIPPED | OUTPATIENT
Start: 2018-07-02 | End: 2018-08-03 | Stop reason: HOSPADM

## 2018-07-02 RX ADMIN — INSULIN LISPRO 4 UNITS: 100 INJECTION, SOLUTION INTRAVENOUS; SUBCUTANEOUS at 17:15

## 2018-07-02 RX ADMIN — GABAPENTIN 400 MG: 400 CAPSULE ORAL at 12:11

## 2018-07-02 RX ADMIN — INSULIN LISPRO 6 UNITS: 100 INJECTION, SOLUTION INTRAVENOUS; SUBCUTANEOUS at 08:36

## 2018-07-02 RX ADMIN — CHLORPROMAZINE HYDROCHLORIDE 100 MG: 100 TABLET, SUGAR COATED ORAL at 08:36

## 2018-07-02 RX ADMIN — GABAPENTIN 400 MG: 400 CAPSULE ORAL at 17:16

## 2018-07-02 RX ADMIN — CHLORPROMAZINE HYDROCHLORIDE 100 MG: 100 TABLET, SUGAR COATED ORAL at 17:17

## 2018-07-02 RX ADMIN — DIVALPROEX SODIUM 500 MG: 500 TABLET, EXTENDED RELEASE ORAL at 08:36

## 2018-07-02 RX ADMIN — METFORMIN HYDROCHLORIDE 500 MG: 500 TABLET ORAL at 08:36

## 2018-07-02 RX ADMIN — DICLOFENAC 2 G: 10 GEL TOPICAL at 08:39

## 2018-07-02 RX ADMIN — BENZTROPINE MESYLATE 1 MG: 1 TABLET ORAL at 08:35

## 2018-07-02 RX ADMIN — LISINOPRIL 5 MG: 5 TABLET ORAL at 08:36

## 2018-07-02 RX ADMIN — METOPROLOL TARTRATE 25 MG: 25 TABLET ORAL at 08:36

## 2018-07-02 RX ADMIN — LORAZEPAM 1 MG: 1 TABLET ORAL at 12:31

## 2018-07-02 RX ADMIN — INSULIN LISPRO 12 UNITS: 100 INJECTION, SOLUTION INTRAVENOUS; SUBCUTANEOUS at 12:11

## 2018-07-02 RX ADMIN — METFORMIN HYDROCHLORIDE 500 MG: 500 TABLET ORAL at 17:17

## 2018-07-02 RX ADMIN — DIVALPROEX SODIUM 500 MG: 500 TABLET, EXTENDED RELEASE ORAL at 17:16

## 2018-07-02 RX ADMIN — GABAPENTIN 400 MG: 400 CAPSULE ORAL at 08:35

## 2018-07-02 NOTE — PROGRESS NOTES
The patient was seen prior to discharge and reviewed with treatment team  I agree with findings and recommendations of advanced practitioner  Denies psychotic symptoms or suicidal/The patient homicidal ideation  Anxiety has significantly improved   is stable for discharge

## 2018-07-02 NOTE — PROGRESS NOTES
Pt c/o 5/10 anxiety  PRN Ativan given @ 1231  Pt stated that he is nervous about D/C tomorrow  Pt states "I just wanna get out of here "  Pt compliant with meds and meals  Pt ambulating unit using his sifuentes  Gait remains steady  Dressing change done on pt's L foot  Scant serosanguinous drainage noted on old dressing  No odor noted coming from wound  Cleaned and dressed as per orders  Cooperative with staff  No outbursts noted at this time  Denies SI/HI  Will continue Q 15 min checks to maintain pt safety

## 2018-07-02 NOTE — PLAN OF CARE
Anxiety is at manageable level Not Progressing    Pt continues to report anxiety/agitation d/t unknown cause  Has been utilizing PRN medication for such  Nursing encourages communication and group participation   Discharge to post-acute care or home with appropriate resources Not Progressing    Pt is able to identify goal for d/c environment but is not actively participating in attempt to secure such  Nursing encourages independence and active participation in setting up d/c plans  Identifies healthy coping skills Not Progressing      Demonstrates healthy coping skills Not Progressing    Pt displays poor insight into means of effective coping  Is dismissive with nursing attempts to direct focus on identifying new positive coping mechanisms  Glucose maintained within target range Not Progressing      Nutrient/Hydration intake appropriate for improving, restoring or maintaining nutritional needs Not Progressing    Pt glucose POCT have been elevated d/t consumption of regular, double portion diet  Pt is dismissive of nursing attempts to educate on diabetic diet/snacking  Treatment Goal: Refrain from acts of violence/aggression during length of stay, and demonstrate improved impulse control at the time of discharge Not Progressing       Control angry outbursts Not Progressing      Identify appropriate positive anger management techniques Not Progressing    Pt continues to display poor impulse control and periods of anger/agitation towards staff  Requires redirection  Pt is later apologetic   Nursing encourages communication to avoid angry outbursts

## 2018-07-02 NOTE — PLAN OF CARE
Anxiety     Anxiety is at manageable level Completed        Depression     Treatment Goal: Demonstrate behavioral control of depressive symptoms, verbalize feelings of improved mood/affect, and adopt new coping skills prior to discharge Completed     Refrain from isolation Completed     Refrain from self-neglect Completed        [address] Discharge to post-acute care or home with appropriate resources Completed        Ineffective Coping     Identifies ineffective coping skills Completed     Identifies healthy coping skills Completed     Demonstrates healthy coping skills Completed     Participates in unit activities Completed     Patient/Family participate in treatment and DC plans Completed     Patient/Family verbalizes awareness of resources Completed     Understands least restrictive measures Completed     Free from restraint events Completed        METABOLIC, FLUID AND ELECTROLYTES - ADULT     Glucose maintained within target range Completed        Nutrition/Hydration-ADULT     Nutrient/Hydration intake appropriate for improving, restoring or maintaining nutritional needs Completed        Risk for Self Injury/Neglect     Treatment Goal: Remain safe during length of stay, learn and adopt new coping skills, and be free of self-injurious ideation, impulses and acts at the time of discharge Completed     Verbalize thoughts and feelings Completed     Refrain from harming self Completed     Recognize maladaptive responses and adopt new coping mechanisms Completed     Complete daily ADLs, including personal hygiene independently, as able Completed        Risk for Violence/Aggression Toward Others     Treatment Goal: Refrain from acts of violence/aggression during length of stay, and demonstrate improved impulse control at the time of discharge Completed     Refrain from harming others Completed     Refrain from destructive acts on the environment or property Completed     Control angry outbursts Completed     Identify appropriate positive anger management techniques Completed

## 2018-07-02 NOTE — NURSING NOTE
Went over discharge instructions with the pt  Pt verbally understands instructions  Pt's belongings gone through with an [facility]  room air

## 2018-07-02 NOTE — PROGRESS NOTES
With the use of PRN trazodone, Pt is and has been sleeping soundly through the night  No periods of waking have been observed  No acute concerns have been noted  15 minute checks have been maintained incident free   Will continue to monitor

## 2018-07-02 NOTE — PROGRESS NOTES
SW met with patient  SW discussed with patient the importance of medication management, utilizing coping skills, and attending group therapy  Pt denies current SI/HI/AH/VH  Pt reports his discharge disposition is  [facility]

## 2018-07-02 NOTE — DISCHARGE SUMMARY
Discharge Summary - [address] [facility] 40 y o  male MRN: [id number]  Unit/Bed#: Edwyna Raheel 222-01 Encounter: [id number]     Admission Date:   Admission Orders     Ordered        06/22/18 1140  Admit Patient to [facility], Rehab, Skilled Nursing)  Once                   Discharge Date:  7-2-2018    Attending Psychiatrist: Joel Damon    Reason for Admission/HPI:   Patient is a 78-year-old male has had multiple psychiatric admissions here at done this particular [place]  The patient came in because he was feeling depressed, hopeless helpless and worthless, with no energy no interest and no motivation  He was also experiencing significant mood fluctuations, paranoia and suspiciousness  Was necessary for him to come in the hospital for treatment because he was fearing for his safety due to how he was feeling so depressed  Patient is a 40 y o  male presents with Signs of suicidal potential   Patient was admitted to psychiatric unit on a voluntarily 201 commitment basis  Primary complaints include: anxiety, avoidance of crowds, depression worse, difficulty sleeping, feeling depressed and feeling suicidal   Onset of symptoms was gradual starting a few days ago with gradually worsening course since that time  Psychosocial Stressors: family, financial, occupational and social     Hospital Course:   Hospital course has been uneventful  Patient was started on a variety of medications to help reduce his mood instability and lability  The best combination medication that he responded to was at increase of the gabapentin and the addition of Thorazine  The combination has been successful in improving his mood,  eliminating his psychosis and his suicidal thoughts  On day of discharge, the patient is reporting that he is no longer feeling depressed, hopeless helpless or feeling worthless  He is less irritable and is able to manage his the irritability much better    He will be discharged today to home in his camper  All of his medications were sent to his pharmacy for 30 day supply  Our medical team will follow up with his medical prescriptions  Mental Status at time of Discharge:     Appearance:  casually dressed   Behavior:  normal   Speech:  soft   Mood:  euthymic   Affect:  constricted   Thought Process:  flight of ideas   Thought Content:  obsessions   Perceptual Disturbances: None   Risk Potential: None   Sensorium:  person, place, time/date and situation   Cognition:  grossly intact   Consciousness:  alert and awake    Attention: attention span appeared shorter than expected for age   Insight:  limited   Judgment: limited   Gait/Station: slow   Motor Activity: no abnormal movements       Discharge Diagnosis:   Bipolar 1 Depressed, moderate with Psychosis    Resolved Problems  Date Reviewed: 7/2/2018    None              Discharge Medications:  See after visit summary for reconciled discharge medications provided to patient and family  Discharge instructions/Information to patient and family:   See after visit summary for information provided to patient and family  Provisions for Follow-Up Care:  See after visit summary for information related to follow-up care and any pertinent home health orders  Discharge Statement   I spent 30 minutes discharging the patient  This time was spent on the day of discharge  I had direct contact with the patient on the day of discharge  Additional documentation is required if more than 30 minutes were spent on discharge

## 2018-07-02 NOTE — PROGRESS NOTES
Progress Note - Behavioral Health   Storm Garcia 40 y o  male MRN: [id number]  Unit/Bed#: [facility] Pat 222-01 Encounter: [id number]    Assessment/Plan   Principal Problem:    Severe bipolar I disorder, most recent episode depressed without psychotic features ([address] )  Active Problems:    Cannabis abuse    Tobacco use disorder    Osteoarthritis of both knees   Patient was seen  15 min were spent in coordination of care and treatment for this patient  Coordination of care consisted of reviewing risk and benefits of treatment,  reviewing of documentation and medications  And behaviors over the last 24 hours  Per the nursing staff, the patient is eating 100% of his meals any slept well through the night  On examination today, the patient reports that he is feeling fairly well  He will be getting paid tomorrow from his some disability and he plans on going [place] a camp [place] with his trailer, and reside there  He is agreeing to follow up with outpatient care and treatment and all appointments will be scheduled for him  Medications are all working well for him and he is reporting no side effects  He is denying any suicidal homicidal ideation  Is not psychotic        Behavior over the last 24 hours:  improved  Sleep: normal  Appetite: normal  Medication side effects: No  ROS: no complaints    Mental Status Evaluation:  Appearance:  casually dressed   Behavior:  normal   Speech:  soft   Mood:  euthymic   Affect:  normal   Thought Process:  normal   Thought Content:  normal   Perceptual Disturbances: None   Risk Potential: None   Sensorium:  person, place, time/date, situation and day of week   Cognition:  grossly intact   Consciousness:  alert and awake    Attention: attention span and concentration were age appropriate   Insight:  fair   Judgment: fair   Gait/Station: normal gait/station   Motor Activity: no abnormal movements     Progress Toward Goals: Improved mood    Recommended Treatment: Continue with group therapy, milieu therapy and occupational therapy  Risks, benefits and possible side effects of Medications:   Risks, benefits, and possible side effects of medications explained to patient and patient verbalizes understanding  Medications: all current active meds have been reviewed  Labs: Reviewed    Counseling / Coordination of Care  Total floor / unit time spent today 25 minutes  Greater than 50% of total time was spent with the patient and / or family counseling and / or coordination of care   A description of the counseling / coordination of care: 25

## 2018-07-02 NOTE — PLAN OF CARE
Problem: Ineffective Coping  Goal: Identifies ineffective coping skills  Outcome: Not Progressing  PT continues to attend 30% of the art therapy groups offered, and at other times is mostly isolative to pt room  When PT does elect to engage in AT group, he usually seems to work on projects of choice   PT does require much prompting and redirection for appropriate social behaviors, will continue to follow

## 2018-07-02 NOTE — DISCHARGE INSTRUCTIONS
Psychotic Disorder   WHAT YOU NEED TO KNOW:   A psychotic disorder is a medical condition that causes hallucinations and delusions  Hallucinations are seeing, hearing, tasting, or feeling things that are not real  Delusions are beliefs that something is real, true, or right when it is not  These false beliefs do not go away even if there is proof that they are not true  You may believe someone is spying on you, after you, or controlling your mind  You may also believe there is something wrong with how your body works  Schizophrenia and schizoaffective disorder are examples of psychotic disorders  DISCHARGE INSTRUCTIONS:   Call 911 if:   · You feel like you could harm yourself or someone else  Contact your healthcare provider if:   · Your symptoms do not improve  · You cannot make it to your next appointment  · You have new symptoms  · You have questions or concerns about your condition or care  Medicines  may be given to decrease your symptoms  You may need 1 or more medicines  You may need to take your medicine for several weeks before you begin to feel better  Tell your healthcare provider about any side effects or problems you have with your medicines  The type or amount of medicine may need to be changed  Get support: It may be difficult to cope with your illness  You may feel lonely, anxious, or depressed  It may help to join a support group  A support group lets you talk with others who have a mental illness  For information and more support visit:  · [place] [employer] [facility]  [address] [facility]  , [address] [place] [address]  Phone: [phone]  Phone: [phone]  Web Address: http://Vortal/  [employer]  Self-care:   · Do not drink alcohol or use illegal drugs  Alcohol and illegal drugs can make your symptoms worse  Ask your healthcare provider for information if you currently drink alcohol or use illegal drugs and need help to quit  · Do not smoke    Nicotine and other chemicals in cigarettes and cigars can cause lung damage  They can also decrease how well your medicine works  Ask your healthcare provider for information if you currently smoke and need help to quit  E-cigarettes or smokeless tobacco still contain nicotine  Talk to your healthcare provider before you use these products  · Exercise regularly  Exercise can help improve your mood and decrease symptoms  Ask about the best exercise plan for you  · Manage your stress  Stress can make your condition worse  Ask your healthcare provider for more information about practicing mindfulness and deep breathing exercises to help decrease your stress  You may learn other ways to manage stress during therapy  Follow up with your healthcare provider as directed:  Write down your questions so you remember to ask them during your visits  © 2017 [address] Information is for End User's use only and may not be sold, redistributed or otherwise used for commercial purposes  All illustrations and images included in CareNotes® are the copyrighted property of [employer]  or [employer]  The above information is an  only  It is not intended as medical advice for individual conditions or treatments  Talk to your doctor, nurse or pharmacist before following any medical regimen to see if it is safe and effective for you  Diabetic Foot Ulcers   WHAT YOU NEED TO KNOW:   A diabetic foot ulcer is an open sore that can develop anywhere on your foot or toes  The ulcers usually develop on the bottom of the foot  You may first notice drainage on your sock  Drainage is fluid that may be yellow, brown, or red  The fluid may also contain pus or blood  DISCHARGE INSTRUCTIONS:   Call 911 if:   · You have a fever with chills  · You begin vomiting  · You feel faint or become confused  Contact your healthcare provider if:   · You get another diabetic foot ulcer      · Your foot becomes red, warm, and swollen  · Your foot ulcer has a bad smell or is draining pus  · You feel pain in a foot that used to have little or no feeling  · You see black or dead tissue in or around your ulcer  · Your ulcer becomes bigger, deeper, or does not heal      · You have questions or concerns about your condition or care  Medicines: You may  need any of the following:  · Antibiotics  help treat or prevent a bacterial infection  · Take your medicine as directed  Contact your healthcare provider if you think your medicine is not helping or if you have side effects  Tell him or her if you are allergic to any medicine  Keep a list of the medicines, vitamins, and herbs you take  Include the amounts, and when and why you take them  Bring the list or the pill bottles to follow-up visits  Carry your medicine list with you in case of an emergency  Care for your wound as directed:  A bandages will be put on your ulcer  Your healthcare provider will give you instructions on changing your bandage  You may need to clean the wound and change the bandage daily  The bandage may contain medicines to help your ulcer heal  You may be asked to put medicine on your foot ulcer before putting on the bandage  The medicine may also prevent growth of tissue that is not healthy  You may need to cover your wound with a plastic bag while you bathe  Ask your healthcare provider for instructions on bathing until your foot heals  Prevent diabetic foot ulcers:  Good foot care may help prevent ulcers, or keep them from getting worse  Ask someone to help you if you are not able to check your feet by yourself  You or another person may need to do any of the following:  · Keep your blood sugar levels under control  Continue the plan for your diabetes that you and your healthcare provider have discussed  Healthy food choices and taking your medicines as directed may help control blood sugars   Contact your healthcare provider if your blood sugar levels are higher than directed  · Wash your feet each day with soap and warm water  Do not use hot water, because this can injure your foot  Dry your feet gently with a towel after you wash them  Dry between and under your toes  Put lotion or moisturizer on your feet  Do not  put lotion or moisturizer between your toes  · Check your feet each day  Look at your whole foot, including the bottom, and between and under your toes  Check for wounds, corns, and calluses  Feel your feet by running your hands along the tops, bottoms, sides, and between your toes  Use a nonbreakable mirror to check your feet if you have trouble seeing the bottoms  Do not try to remove corns or calluses yourself  File or cut your toenails straight across  · Protect your feet  Do not walk barefoot or wear your shoes without socks  Check your shoes for rocks or other objects that can hurt your feet  Wear cotton socks to help keep your feet dry  Wear socks without toe seams, or wear them with the seams inside out  Change your socks each day  Do not wear socks that are dirty or damp  · Wear shoes that fit well  Wear shoes that do not rub against any area of your feet  Your shoes should be ½ to ¾ inch (1 to 2 centimeters) longer than your feet  Your shoes should also have extra space around the widest part of your feet  Walking or athletic shoes with laces or straps that adjust are best  Ask your healthcare provider for help to choose shoes that fit you best  Ask him if you need to wear an insert, orthotic, or bandage on your feet  · Do not smoke  Nicotine can cause damage to your blood vessels and increases your risk for foot ulcers  Do not use e-cigarettes or smokeless tobacco in place of cigarettes or to help you quit  They still contain nicotine  Ask your healthcare provider for information if you currently smoke and need help quitting  · Do not drink alcohol    Alcohol increases your blood sugar levels and make your diabetes more difficult to manage  Ask your healthcare provider for information if you need help to quit drinking alcohol  · Maintain a healthy weight  Ask your healthcare provider how much you should weigh  A healthy weight can help you control your diabetes  Ask him to help you create a weight loss plan if you are overweight  Even a 10 to 15 pound weight loss can help you manage your blood sugar level  Follow up with your healthcare provider or foot specialist as directed: You may need to return often to have your wound checked  Your wound may be measured to see if it is getting smaller  Bring any offloading devices or footwear to your follow-up visits so your healthcare provider or specialist can check them  Write down your questions so you remember to ask them during your visits  © 2017 [facility] Information is for End User's use only and may not be sold, redistributed or otherwise used for commercial purposes  All illustrations and images included in CareNotes® are the copyrighted property of [employer]  or [employer] Henderson  The above information is an  only  It is not intended as medical advice for individual conditions or treatments  Talk to your doctor, nurse or pharmacist before following any medical regimen to see if it is safe and effective for you  Type 1 Diabetes in Adults   WHAT YOU NEED TO KNOW:   Type 1 diabetes is a disease that affects how your body makes insulin and uses glucose (sugar)  Normally, when the blood sugar level increases, the pancreas makes more insulin  Insulin helps move sugar out of the blood so it can be used for energy  Type 1 diabetes develops because the immune system destroys cells in the pancreas that make insulin  The pancreas cannot make enough insulin, so the blood sugar level continues to rise  A family history of type 1 diabetes may increase your risk for diabetes    DISCHARGE INSTRUCTIONS:   Call 911 if:   · You have chest pain or shortness of breath  Seek care immediately if:   · You have a low blood sugar level and it does not improve with treatment  · Your blood sugar level is above 240 mg/dL and does not come down after you take a dose of insulin  · You have ketones  · You have a fever  · You have nausea or are vomiting and cannot keep any food or liquid down  · You have blurred or double vision  · Your breath has a fruity, sweet smell, or your breathing is shallow  · You have symptoms of a low blood sugar level, such as trouble thinking, sweating, or a pounding heartbeat  Contact your healthcare provider if:   · Your blood sugar levels are higher than your target goals  · You often have low blood sugar levels  · Your skin is red, dry, warm, or swollen  · You have a wound that does not heal      · You have trouble coping with your illness, or you feel anxious or depressed  · You have questions or concerns about your condition or care  Medicines:   · You will need insulin each day  Insulin can be injected or given through an insulin pump  Ask your healthcare provider which method is best for you  You or a family member will be taught how to give insulin injections if this is the best method for you  Your family member can give you the injections if you are not able  Take your insulin as directed  Too much insulin may cause your blood sugar level to go too low  You will be taught how to adjust each insulin dose you take with meals  Always check your blood sugar level before the meal  The dose will be based on your blood sugar level, carbohydrates in the meal, and activity after the meal      · Glucose tablets  may be given to increase your blood sugar if it has dropped too low from treatment  · Take your medicine as directed  Contact your healthcare provider if you think your medicine is not helping or if you have side effects   Tell him or her if you are allergic to any medicine  Keep a list of the medicines, vitamins, and herbs you take  Include the amounts, and when and why you take them  Bring the list or the pill bottles to follow-up visits  Carry your medicine list with you in case of an emergency  Check your blood sugar level as directed: You will be taught how to use a glucose monitor  You will need to check your blood sugar level at least 3 times each day  Ask your healthcare provider when and how often to check during the day  If you check your blood sugar level before a meal , it should be between 80 and 130 mg/dL  If you check your blood sugar level 1 to 2 hours after a meal , it should be less than 180 mg/dL  Ask your healthcare provider if these are good goals for you  You may need to check for ketones in your urine or blood if your level is higher than directed  Write down your results, and show them to your healthcare provider  Your provider may make changes to your medicine, food, or exercise schedules  If your blood sugar level is too low: Your blood sugar level is too low if it goes below 70 mg/dL  If the level is too low, eat or drink 15 grams of fast-acting carbohydrate  These are found naturally in fruits  Fast-acting carbohydrates will raise your blood sugar level quickly  Examples of 15 grams of fast-acting carbohydrate are 4 ounces (½ cup) of fruit juice or 4 ounces of regular soda  Other examples are 2 tablespoons of raisins or 3 to 4 glucose tablets  Check your blood sugar level 15 minutes later  If the level is still low (less than 100 mg/dL), eat another 15 grams of carbohydrate  When the level returns to 100 mg/dL, eat a snack or meal that contains carbohydrates  This will help prevent another drop in blood sugar  Always carefully follow your healthcare provider's instructions on how to treat low blood sugar levels          Wear medical alert identification:  Wear medical alert jewelry or carry a card that says you have diabetes  Ask your healthcare provider where to get these items  Check your feet each day for sores:  Wear shoes and socks that fit correctly  Ask your healthcare provider for more information about foot care  Maintain a healthy weight:  Ask your healthcare provider how much you should weigh  A healthy weight can help you control your diabetes  Ask your provider to help you create a weight loss plan if you are overweight  Together you can set manageable weight loss goals  Follow your meal plan:  A dietitian will help you make a meal plan to keep your blood sugar level steady  Do not skip meals  Your blood sugar level may drop too low if you have taken diabetes medicine and do not eat  · Keep track of carbohydrates (sugar and starchy foods)  Your blood sugar level can get too high if you eat too many carbohydrates  Your dietitian will help you plan meals and snacks that have the right amount of carbohydrates  · Eat low-fat foods , such as skinless chicken and low-fat milk  · Eat less sodium (salt)  Limit high-sodium foods, such as soy sauce, potato chips, and soup  Do not add salt to food you cook  Limit your use of table salt  · Eat high-fiber foods , such as vegetables, whole-grain breads, and beans  · Limit alcohol  Alcohol affects your blood sugar level and can make it harder to manage your diabetes  Limit alcohol to 1 drink a day if you are a woman  Limit alcohol to 2 drinks a day if you are a man  A drink of alcohol is 12 ounces of beer, 5 ounces of wine, or 1½ ounces of liquor  Exercise as directed:  Exercise can help keep your blood sugar level steady, decrease your risk of heart disease, and help you lose weight  Stretch before and after you exercise  Exercise for at least 150 minutes every week  Spread this amount of exercise over at least 3 days a week  Do not skip exercise more than 2 days in a row  Include muscle strengthening activities 2 to 3 days each week  Older adults should include balance training 2 to 3 times each week  Activities that help increase balance include yoga and farhad chi  Work with your healthcare provider to create an exercise plan  · Check your blood sugar level before and after exercise  Healthcare providers may tell you to change the amount of insulin you take or food you eat  If your blood sugar level is high, check your blood or urine for ketones before you exercise  Do not exercise if your blood sugar level is high and you have ketones  · If your blood sugar level is less than 100 mg/dL, have a carbohydrate snack before you exercise  Examples are 4 to 6 crackers, ½ banana, 8 ounces (1 cup) of milk, or 4 ounces (½ cup) of juice  Drink water or liquids that do not contain sugar before, during, and after exercise  Ask your dietitian or healthcare provider which liquids you should drink when you exercise  · Do not sit for longer than 30 minutes  If you cannot walk around, at least stand up  This will help you stay active and keep your blood circulating  Do not smoke:  Nicotine and other chemicals in cigarettes and cigars can cause lung damage and make it more difficult to manage your diabetes  Ask your healthcare provider for information if you currently smoke and need help to quit  Do not use e-cigarettes or smokeless tobacco in place of cigarettes or to help you quit  They still contain nicotine  Check your blood pressure as directed:  Ask your healthcare provider what your blood pressure should be  Most adults with diabetes and high blood pressure should have a systolic blood pressure (first number) less than 140  Your diastolic blood pressure (second number) should be less than 90  Ask about vaccines: You have a higher risk for serious illness if you get the flu, pneumonia, or hepatitis  Ask your healthcare provider if you should get a flu, pneumonia, or hepatitis B vaccine, and when to get the vaccine     Follow up with your healthcare provider as directed: You may need to return to have your A1c checked every 3 months  Your healthcare provider will tell you the A1c level that is right for you  Most people should have an A1c less than 7%  You will need to return at least 1 time each year to have your feet checked  You will need an eye exam 1 time each year to check for retinopathy  You will also need urine tests every year to check for kidney problems  Write down your questions so you remember to ask them during your visits  © 2017 [facility] Information is for End User's use only and may not be sold, redistributed or otherwise used for commercial purposes  All illustrations and images included in CareNotes® are the copyrighted property of [employer] M [employer]  or [employer] Henderson  The above information is an  only  It is not intended as medical advice for individual conditions or treatments  Talk to your doctor, nurse or pharmacist before following any medical regimen to see if it is safe and effective for you

## 2018-07-02 NOTE — PLAN OF CARE
Problem: Nutrition/Hydration-ADULT  Goal: Nutrient/Hydration intake appropriate for improving, restoring or maintaining nutritional needs  Monitor and assess patient's nutrition/hydration status for malnutrition (ex- brittle hair, bruises, dry skin, pale skin and conjunctiva, muscle wasting, smooth red tongue, and disorientation)  Collaborate with interdisciplinary team and initiate plan and interventions as ordered  Monitor patient's weight and dietary intake as ordered or per policy  Utilize nutrition screening tool and intervene per policy  Determine patient's food preferences and provide high-protein, high-caloric foods as appropriate  INTERVENTIONS:  - Monitor oral intake, urinary output, labs, and treatment plans  - Assess nutrition and hydration status and recommend course of action  - Evaluate amount of meals eaten  - Assist patient with eating if necessary   - Allow adequate time for meals  - Recommend/ encourage appropriate diets, oral nutritional supplements, and vitamin/mineral supplements  - Order, calculate, and assess calorie counts as needed  - Recommend, monitor, and adjust tube feedings and TPN/PPN based on assessed needs  - Assess need for intravenous fluids  - Provide specific nutrition/hydration education as appropriate  - Include patient/family/caregiver in decisions related to nutrition   Outcome: Progressing  He is on a regular meal plan with his intake listed at 100 %  He is on metformin and lantus  His BG was 175 on 7-2 which was elevated  RDN will evaluate his labs when available  He was eating his breakfast in the dining room and he offered no c/o  His weight was 284 on 6-23 then up to 293 on 6-30 with a 9 # gain noted, BMI 36  He has a diabetic ulcer on his left foot   RDN to reassess his nutrition needs with his team PRN and follow at low risk

## 2018-07-02 NOTE — PROGRESS NOTES
Visible in the community for programming  Quickly seeks out nursing for HS medication  Pt remains flat and depressed  Poor eye contact  Superficial  Evasive/Dismissive with many assessment prompts  Irritable at times  Denies SI/HI/chopra  Received increased dose of thorazine 100mg  Also received PRN trazodone as requested for sleep  Pt is able to make needs known and agrees to report changes in condition/needs to staff  Nursing will continue to monitor and assess for changes and safety with 15 minute checks

## 2018-07-10 ENCOUNTER — HOSPITAL ENCOUNTER (INPATIENT)
Facility: HOSPITAL | Age: 43
LOS: 23 days | Discharge: HOME | DRG: 885 | End: 2018-08-03
Attending: INTERNAL MEDICINE | Admitting: PSYCHIATRY & NEUROLOGY
Payer: MEDICARE

## 2018-07-10 DIAGNOSIS — L97.501 DIABETIC ULCER OF FOOT, LIMITED TO BREAKDOWN OF SKIN (HCC): ICD-10-CM

## 2018-07-10 DIAGNOSIS — L97.301: ICD-10-CM

## 2018-07-10 DIAGNOSIS — I10 ESSENTIAL HYPERTENSION: ICD-10-CM

## 2018-07-10 DIAGNOSIS — R42 EPISODE OF DIZZINESS: ICD-10-CM

## 2018-07-10 DIAGNOSIS — F41.9 ANXIETY: ICD-10-CM

## 2018-07-10 DIAGNOSIS — L03.90 CELLULITIS: Primary | ICD-10-CM

## 2018-07-10 DIAGNOSIS — F31.4 SEVERE BIPOLAR I DISORDER, MOST RECENT EPISODE DEPRESSED WITHOUT PSYCHOTIC FEATURES (HCC): ICD-10-CM

## 2018-07-10 DIAGNOSIS — Z51.89 VISIT FOR WOUND CARE: ICD-10-CM

## 2018-07-10 DIAGNOSIS — F31.10 BIPOLAR AFFECTIVE DISORDER, CURRENT EPISODE MANIC (HCC): ICD-10-CM

## 2018-07-10 DIAGNOSIS — E11.621 DIABETIC ULCER OF FOOT, LIMITED TO BREAKDOWN OF SKIN (HCC): ICD-10-CM

## 2018-07-10 DIAGNOSIS — G47.00 INSOMNIA: ICD-10-CM

## 2018-07-10 DIAGNOSIS — E10.622: ICD-10-CM

## 2018-07-10 DIAGNOSIS — Z00.00 PHYSICAL EXAM: ICD-10-CM

## 2018-07-10 DIAGNOSIS — E11.65 TYPE 2 DIABETES MELLITUS WITH HYPERGLYCEMIA, WITHOUT LONG-TERM CURRENT USE OF INSULIN (HCC): ICD-10-CM

## 2018-07-10 DIAGNOSIS — Z00.8 MEDICAL CLEARANCE FOR INCARCERATION: ICD-10-CM

## 2018-07-10 DIAGNOSIS — Z00.00 ROUTINE PHYSICAL EXAMINATION: ICD-10-CM

## 2018-07-10 DIAGNOSIS — E11.9 DIABETES (HCC): ICD-10-CM

## 2018-07-10 LAB
ALBUMIN SERPL BCP-MCNC: 4.1 G/DL (ref 3.5–5.7)
ALP SERPL-CCNC: 59 U/L (ref 40–150)
ALT SERPL W P-5'-P-CCNC: 10 U/L (ref 7–52)
ANION GAP SERPL CALCULATED.3IONS-SCNC: 10 MMOL/L (ref 4–13)
APTT PPP: 32 SECONDS (ref 24–36)
AST SERPL W P-5'-P-CCNC: 10 U/L (ref 13–39)
BASOPHILS # BLD AUTO: 0 THOUSANDS/ΜL (ref 0–0.1)
BASOPHILS NFR BLD AUTO: 1 % (ref 0–2)
BILIRUB SERPL-MCNC: 0.6 MG/DL (ref 0.2–1)
BILIRUB UR QL STRIP: NEGATIVE
BUN SERPL-MCNC: 19 MG/DL (ref 7–25)
CALCIUM SERPL-MCNC: 9.5 MG/DL (ref 8.6–10.5)
CHLORIDE SERPL-SCNC: 99 MMOL/L (ref 98–107)
CLARITY UR: CLEAR
CO2 SERPL-SCNC: 25 MMOL/L (ref 21–31)
COLOR UR: YELLOW
CREAT SERPL-MCNC: 1.01 MG/DL (ref 0.7–1.3)
EOSINOPHIL # BLD AUTO: 0 THOUSAND/ΜL (ref 0–0.61)
EOSINOPHIL NFR BLD AUTO: 1 % (ref 0–5)
ERYTHROCYTE [DISTWIDTH] IN BLOOD BY AUTOMATED COUNT: 15.1 % (ref 11.5–14.5)
GFR SERPL CREATININE-BSD FRML MDRD: 92 ML/MIN/1.73SQ M
GLUCOSE SERPL-MCNC: 195 MG/DL (ref 65–99)
GLUCOSE UR STRIP-MCNC: ABNORMAL MG/DL
HCT VFR BLD AUTO: 40.7 % (ref 36.5–49.3)
HGB BLD-MCNC: 14 G/DL (ref 14–18)
HGB UR QL STRIP.AUTO: NEGATIVE
INR PPP: 1.08 (ref 0.9–1.5)
KETONES UR STRIP-MCNC: ABNORMAL MG/DL
LACTATE SERPL-SCNC: 1 MMOL/L (ref 0.5–2)
LEUKOCYTE ESTERASE UR QL STRIP: NEGATIVE
LYMPHOCYTES # BLD AUTO: 2.3 THOUSANDS/ΜL (ref 0.6–4.47)
LYMPHOCYTES NFR BLD AUTO: 33 % (ref 21–51)
MCH RBC QN AUTO: 26.4 PG (ref 26–34)
MCHC RBC AUTO-ENTMCNC: 34.3 G/DL (ref 31–37)
MCV RBC AUTO: 77 FL (ref 81–99)
MONOCYTES # BLD AUTO: 0.6 THOUSAND/ΜL (ref 0.17–1.22)
MONOCYTES NFR BLD AUTO: 9 % (ref 2–12)
NEUTROPHILS # BLD AUTO: 3.9 THOUSANDS/ΜL (ref 1.4–6.5)
NEUTS SEG NFR BLD AUTO: 57 % (ref 42–75)
NITRITE UR QL STRIP: NEGATIVE
NRBC BLD AUTO-RTO: 0 /100 WBCS
PH UR STRIP.AUTO: 6 [PH] (ref 5–8)
PLATELET # BLD AUTO: 262 THOUSANDS/UL (ref 149–390)
PMV BLD AUTO: 7.9 FL (ref 8.6–11.7)
POTASSIUM SERPL-SCNC: 4.1 MMOL/L (ref 3.5–5.5)
PROT SERPL-MCNC: 7.5 G/DL (ref 6.4–8.9)
PROT UR STRIP-MCNC: NEGATIVE MG/DL
PROTHROMBIN TIME: 12.5 SECONDS (ref 10.1–12.9)
RBC # BLD AUTO: 5.3 MILLION/UL (ref 4.3–5.9)
SODIUM SERPL-SCNC: 134 MMOL/L (ref 134–143)
SP GR UR STRIP.AUTO: 1.01 (ref 1–1.03)
UROBILINOGEN UR QL STRIP.AUTO: 0.2 E.U./DL
WBC # BLD AUTO: 6.9 THOUSAND/UL (ref 4.8–10.8)

## 2018-07-10 PROCEDURE — 96375 TX/PRO/DX INJ NEW DRUG ADDON: CPT

## 2018-07-10 PROCEDURE — 85025 COMPLETE CBC W/AUTO DIFF WBC: CPT | Performed by: INTERNAL MEDICINE

## 2018-07-10 PROCEDURE — 96361 HYDRATE IV INFUSION ADD-ON: CPT

## 2018-07-10 PROCEDURE — 85730 THROMBOPLASTIN TIME PARTIAL: CPT | Performed by: INTERNAL MEDICINE

## 2018-07-10 PROCEDURE — 36415 COLL VENOUS BLD VENIPUNCTURE: CPT | Performed by: INTERNAL MEDICINE

## 2018-07-10 PROCEDURE — 83605 ASSAY OF LACTIC ACID: CPT | Performed by: INTERNAL MEDICINE

## 2018-07-10 PROCEDURE — 80053 COMPREHEN METABOLIC PANEL: CPT | Performed by: INTERNAL MEDICINE

## 2018-07-10 PROCEDURE — 87040 BLOOD CULTURE FOR BACTERIA: CPT | Performed by: INTERNAL MEDICINE

## 2018-07-10 PROCEDURE — 85610 PROTHROMBIN TIME: CPT | Performed by: INTERNAL MEDICINE

## 2018-07-10 PROCEDURE — 81003 URINALYSIS AUTO W/O SCOPE: CPT | Performed by: INTERNAL MEDICINE

## 2018-07-10 RX ORDER — MORPHINE SULFATE 2 MG/ML
2 INJECTION, SOLUTION INTRAMUSCULAR; INTRAVENOUS ONCE
Status: COMPLETED | OUTPATIENT
Start: 2018-07-10 | End: 2018-07-10

## 2018-07-10 RX ADMIN — Medication 2 MG: at 22:57

## 2018-07-10 RX ADMIN — SODIUM CHLORIDE 1000 ML: 0.9 INJECTION, SOLUTION INTRAVENOUS at 21:55

## 2018-07-10 NOTE — LETTER
July 12, 2018     Hector Mcculloughho 57    Patient: Andres Alvarez   YOB: 1975   Date of Visit: 7/10/2018       Dear Dr Lagos Else:    Thank you for referring Andres Alvarez to me for evaluation  Below are the relevant portions of my H&P  If you have questions, please do not hesitate to call me  I look forward to following Phuong Roberto along with you  Sincerely,        No name on file          CC: Danni Osborne MD

## 2018-07-11 PROBLEM — L97.419 DIABETIC ULCER OF RIGHT MIDFOOT (HCC): Status: ACTIVE | Noted: 2018-07-11

## 2018-07-11 PROBLEM — I10 ESSENTIAL HYPERTENSION: Status: ACTIVE | Noted: 2018-07-11

## 2018-07-11 PROBLEM — E11.621 DIABETIC ULCER OF RIGHT MIDFOOT (HCC): Status: ACTIVE | Noted: 2018-07-11

## 2018-07-11 LAB
AMPHETAMINES SERPL QL SCN: NEGATIVE
BARBITURATES UR QL: NEGATIVE
BENZODIAZ UR QL: NEGATIVE
COCAINE UR QL: NEGATIVE
ETHANOL SERPL-MCNC: <10 MG/DL
GLUCOSE SERPL-MCNC: 176 MG/DL (ref 65–140)
GLUCOSE SERPL-MCNC: 198 MG/DL (ref 65–140)
GLUCOSE SERPL-MCNC: 244 MG/DL (ref 65–140)
GLUCOSE SERPL-MCNC: 267 MG/DL (ref 65–140)
GLUCOSE SERPL-MCNC: 284 MG/DL (ref 65–140)
METHADONE UR QL: NEGATIVE
OPIATES UR QL SCN: POSITIVE
PCP UR QL: NEGATIVE
THC UR QL: NEGATIVE
TSH SERPL DL<=0.05 MIU/L-ACNC: 0.37 UIU/ML (ref 0.45–5.33)

## 2018-07-11 PROCEDURE — 82948 REAGENT STRIP/BLOOD GLUCOSE: CPT

## 2018-07-11 PROCEDURE — 96376 TX/PRO/DX INJ SAME DRUG ADON: CPT

## 2018-07-11 PROCEDURE — 96365 THER/PROPH/DIAG IV INF INIT: CPT

## 2018-07-11 PROCEDURE — 80320 DRUG SCREEN QUANTALCOHOLS: CPT

## 2018-07-11 PROCEDURE — 84443 ASSAY THYROID STIM HORMONE: CPT

## 2018-07-11 PROCEDURE — 99285 EMERGENCY DEPT VISIT HI MDM: CPT

## 2018-07-11 PROCEDURE — 87040 BLOOD CULTURE FOR BACTERIA: CPT | Performed by: INTERNAL MEDICINE

## 2018-07-11 PROCEDURE — 80307 DRUG TEST PRSMV CHEM ANLYZR: CPT

## 2018-07-11 PROCEDURE — 99253 IP/OBS CNSLTJ NEW/EST LOW 45: CPT | Performed by: INTERNAL MEDICINE

## 2018-07-11 PROCEDURE — 96375 TX/PRO/DX INJ NEW DRUG ADDON: CPT

## 2018-07-11 PROCEDURE — 99222 1ST HOSP IP/OBS MODERATE 55: CPT | Performed by: PSYCHIATRY & NEUROLOGY

## 2018-07-11 PROCEDURE — 36415 COLL VENOUS BLD VENIPUNCTURE: CPT | Performed by: INTERNAL MEDICINE

## 2018-07-11 RX ORDER — CHLORPROMAZINE HYDROCHLORIDE 25 MG/1
50 TABLET, FILM COATED ORAL 3 TIMES DAILY
Status: DISCONTINUED | OUTPATIENT
Start: 2018-07-11 | End: 2018-07-13

## 2018-07-11 RX ORDER — TRAMADOL HYDROCHLORIDE 50 MG/1
50 TABLET ORAL EVERY 6 HOURS PRN
Qty: 15 TABLET | Refills: 0 | Status: SHIPPED | OUTPATIENT
Start: 2018-07-11 | End: 2018-07-26

## 2018-07-11 RX ORDER — ACETAMINOPHEN 325 MG/1
975 TABLET ORAL ONCE
Status: COMPLETED | OUTPATIENT
Start: 2018-07-11 | End: 2018-07-11

## 2018-07-11 RX ORDER — CLINDAMYCIN HYDROCHLORIDE 150 MG/1
300 CAPSULE ORAL EVERY 8 HOURS SCHEDULED
Qty: 28 CAPSULE | Refills: 0 | Status: SHIPPED | OUTPATIENT
Start: 2018-07-11 | End: 2018-07-18

## 2018-07-11 RX ORDER — LISINOPRIL 10 MG/1
5 TABLET ORAL ONCE
Status: COMPLETED | OUTPATIENT
Start: 2018-07-11 | End: 2018-07-11

## 2018-07-11 RX ORDER — GABAPENTIN 300 MG/1
300 CAPSULE ORAL 3 TIMES DAILY
Status: DISCONTINUED | OUTPATIENT
Start: 2018-07-11 | End: 2018-07-13

## 2018-07-11 RX ORDER — LEVOFLOXACIN 5 MG/ML
500 INJECTION, SOLUTION INTRAVENOUS ONCE
Status: COMPLETED | OUTPATIENT
Start: 2018-07-11 | End: 2018-07-11

## 2018-07-11 RX ORDER — MORPHINE SULFATE 10 MG/ML
4 INJECTION, SOLUTION INTRAMUSCULAR; INTRAVENOUS ONCE
Status: COMPLETED | OUTPATIENT
Start: 2018-07-11 | End: 2018-07-11

## 2018-07-11 RX ORDER — DIVALPROEX SODIUM 500 MG/1
1000 TABLET, EXTENDED RELEASE ORAL
Status: DISCONTINUED | OUTPATIENT
Start: 2018-07-11 | End: 2018-07-14

## 2018-07-11 RX ORDER — LISINOPRIL 5 MG/1
5 TABLET ORAL DAILY
Status: DISCONTINUED | OUTPATIENT
Start: 2018-07-12 | End: 2018-07-27

## 2018-07-11 RX ADMIN — GABAPENTIN 300 MG: 300 CAPSULE ORAL at 21:09

## 2018-07-11 RX ADMIN — LEVOFLOXACIN 500 MG: 5 INJECTION, SOLUTION INTRAVENOUS at 00:39

## 2018-07-11 RX ADMIN — INSULIN LISPRO 2 UNITS: 100 INJECTION, SOLUTION INTRAVENOUS; SUBCUTANEOUS at 17:32

## 2018-07-11 RX ADMIN — ACETAMINOPHEN 975 MG: 325 TABLET ORAL at 12:33

## 2018-07-11 RX ADMIN — LISINOPRIL 5 MG: 10 TABLET ORAL at 07:05

## 2018-07-11 RX ADMIN — DIVALPROEX SODIUM 1000 MG: 500 TABLET, EXTENDED RELEASE ORAL at 21:09

## 2018-07-11 RX ADMIN — INSULIN HUMAN 6 UNITS: 100 INJECTION, SOLUTION PARENTERAL at 09:23

## 2018-07-11 RX ADMIN — GABAPENTIN 300 MG: 300 CAPSULE ORAL at 17:32

## 2018-07-11 RX ADMIN — METOPROLOL TARTRATE 25 MG: 25 TABLET ORAL at 07:06

## 2018-07-11 RX ADMIN — METFORMIN HYDROCHLORIDE 1000 MG: 500 TABLET, FILM COATED ORAL at 07:05

## 2018-07-11 RX ADMIN — CHLORPROMAZINE HYDROCHLORIDE 50 MG: 25 TABLET, SUGAR COATED ORAL at 21:09

## 2018-07-11 RX ADMIN — CHLORPROMAZINE HYDROCHLORIDE 50 MG: 25 TABLET, SUGAR COATED ORAL at 17:31

## 2018-07-11 RX ADMIN — MORPHINE SULFATE 4 MG: 10 INJECTION, SOLUTION INTRAMUSCULAR; INTRAVENOUS at 00:37

## 2018-07-11 NOTE — CMS CERTIFICATION NOTE
Based upon physical, mental and social evaluations, I certify that inpatient psychiatric services continue to be medically necessary for this patient for a duration of 7 midnights for the treatment of  Severe bipolar I disorder, most recent episode depressed without psychotic features [facility])   Available alternative community resources still do not meet the patient's mental health care needs  I further attest that an established written individualized plan of care has been updated and is outlined in the patient's medical records

## 2018-07-11 NOTE — ED PROVIDER NOTES
History  Chief Complaint   Patient presents with    Pain     lower extremity pain and ulcers  needs medical clearance     This is a 69-year-old white male noncompliant diabetic with some serous psychological problems she is trying to get into an outpatient psychiatric facility  Due to his underlying psychiatric disturbance he has not been taking his medications  He has a bilateral trans met amputation which has been healing, he now has an ulcer on the bottom of his right foot as well as a blister on the top of his right foot at the site of the amputation  Is here to be evaluated medically cleared so he can get [facility], a psychiatric outpatient facility is had chills but no documented fever here he feels that he has a cellulitis as well            Prior to Admission Medications   Prescriptions Last Dose Informant Patient Reported? Taking?    Insulin Pen Needle 31G X 8 MM MISC   Yes No   Sig: As directed   ONE TOUCH LANCETS MISC   Yes No   Sig: As directed   benztropine (COGENTIN) 1 mg tablet   No No   Sig: Take 1 tablet (1 mg total) by mouth daily for 30 days   chlorproMAZINE (THORAZINE) 100 mg tablet   No No   Sig: Take 1 tablet (100 mg total) by mouth 3 (three) times a day for 30 days   diclofenac sodium (VOLTAREN) 1 %   No No   Sig: Apply 2 g topically 2 (two) times a day   divalproex sodium (DEPAKOTE) 500 mg EC tablet   No No   Sig: Take 1 tablet (500 mg total) by mouth 3 (three) times a day for 30 days   gabapentin (NEURONTIN) 400 mg capsule   No No   Sig: Take 1 capsule (400 mg total) by mouth 4 (four) times a day for 30 days   glucose blood test strip   Yes No   Sig: As directed   insulin aspart (NovoLOG) 100 Units/mL injection pen   Yes No   Sig: As directed on discharge instructions   insulin glargine (LANTUS SOLOSTAR) 100 units/mL injection pen   Yes No   Sig: Inject 17 Units under the skin   lisinopril (ZESTRIL) 5 mg tablet   No No   Sig: Take 1 tablet (5 mg total) by mouth daily   melatonin 3 mg   Yes No   Sig: Take 3 mg by mouth daily as needed   metFORMIN (GLUCOPHAGE) 1000 MG tablet   Yes No   Sig: Take 1,000 mg by mouth daily   metoprolol tartrate (LOPRESSOR) 25 mg tablet   No No   Sig: Take 1 tablet (25 mg total) by mouth daily      Facility-Administered Medications: None       Past Medical History:   Diagnosis Date    Bipolar 1 disorder ([facility] )     Diabetes mellitus ([place] [address] )     Hypertension        Past Surgical History:   Procedure Laterality Date    APPENDECTOMY      COLON SURGERY      TONSILLECTOMY         History reviewed  No pertinent family history  I have reviewed and agree with the history as documented  Social History   Substance Use Topics    Smoking status: Current Every Day Smoker     Types: Cigarettes    Smokeless tobacco: Current User     Types: Chew    Alcohol use No        Review of Systems   Constitutional: Negative for fever  HENT: Negative for rhinorrhea and sore throat  Eyes: Negative for visual disturbance  Respiratory: Negative for cough and shortness of breath  Cardiovascular: Negative for chest pain and leg swelling  Gastrointestinal: Negative for abdominal pain, diarrhea, nausea and vomiting  Genitourinary: Negative for dysuria  Musculoskeletal: Positive for arthralgias and myalgias  Negative for back pain  Skin: Negative for rash  Neurological: Positive for numbness  Negative for dizziness and headaches  Psychiatric/Behavioral: Negative for confusion  The patient is nervous/anxious  All other systems reviewed and are negative  Physical Exam  Physical Exam   Constitutional: He is oriented to person, place, and time  He appears well-developed and well-nourished  HENT:   Nose: Nose normal    Mouth/Throat: Oropharynx is clear and moist  No oropharyngeal exudate  Eyes: Conjunctivae and EOM are normal  Pupils are equal, round, and reactive to light  No scleral icterus  Neck: Normal range of motion  Neck supple  No JVD present   No tracheal deviation present  Cardiovascular: Normal rate, regular rhythm and normal heart sounds  No murmur heard  Pulmonary/Chest: Effort normal and breath sounds normal  No respiratory distress  He has no wheezes  He has no rales  Abdominal: Soft  Bowel sounds are normal  There is no tenderness  There is no guarding  Is obese but no organomegaly bowel sounds are present   Musculoskeletal: Normal range of motion  He exhibits no edema or tenderness  Neurological: He is alert and oriented to person, place, and time  No cranial nerve deficit or sensory deficit  He exhibits normal muscle tone  5/5 motor, nl sens   Skin: Skin is warm and dry  His right foot is erythematous around the suture line  He has an open ulcer measuring roughly 1 5 cm on the plantar aspect  Blister on the dorsal aspect measuring about 1 cm  The foot is warm to touch   Psychiatric: He has a normal mood and affect  His behavior is normal    Nursing note and vitals reviewed        Vital Signs  ED Triage Vitals [07/10/18 1931]   Temperature Pulse Respirations Blood Pressure SpO2   98 6 °F (37 °C) (!) 119 16 168/82 97 %      Temp Source Heart Rate Source Patient Position - Orthostatic VS BP Location FiO2 (%)   Temporal Monitor Lying Left arm --      Pain Score       6           Vitals:    07/10/18 2100 07/10/18 2115 07/10/18 2328 07/11/18 0541   BP:   141/88 166/96   Pulse: (!) 107 102 99 (!) 113   Patient Position - Orthostatic VS:           Visual Acuity      ED Medications  Medications   metoprolol tartrate (LOPRESSOR) tablet 25 mg (not administered)   lisinopril (ZESTRIL) tablet 5 mg (not administered)   metFORMIN (GLUCOPHAGE) tablet 1,000 mg (not administered)   sodium chloride 0 9 % bolus 1,000 mL (0 mL Intravenous Stopped 7/10/18 2330)   morphine injection 2 mg (2 mg Intravenous Given 7/10/18 2257)   morphine (PF) 10 mg/mL injection 4 mg (4 mg Intravenous Given 7/11/18 0037)   levofloxacin (LEVAQUIN) IVPB (premix) 500 mg (0 mg Intravenous Stopped 7/11/18 0139)       Diagnostic Studies  Results Reviewed     Procedure Component Value Units Date/Time    UA w Reflex to Microscopic w Reflex to Culture [[id number]]  (Abnormal) Collected:  07/10/18 2239    Lab Status:  Final result Specimen:  Urine from Urine, Clean Catch Updated:  07/10/18 2252     Color, UA Yellow     Clarity, UA Clear     Specific Gravity, UA 1 015     pH, UA 6 0     Leukocytes, UA Negative     Nitrite, UA Negative     Protein, UA Negative mg/dl      Glucose, UA 3+ (A) mg/dl      Ketones, UA Trace (A) mg/dl      Urobilinogen, UA 0 2 E U /dl      Bilirubin, UA Negative     Blood, UA Negative    Lactic acid x2 [[id number]]  (Normal) Collected:  07/10/18 2209    Lab Status:  Final result Specimen:  Blood from Hand, Left Updated:  07/10/18 2242     LACTIC ACID 1 0 mmol/L     Narrative:         Result may be elevated if tourniquet was used during collection  Comprehensive metabolic panel [[id number]]  (Abnormal) Collected:  07/10/18 2210    Lab Status:  Final result Specimen:  Blood from Hand, Left Updated:  07/10/18 2242     Sodium 134 mmol/L      Potassium 4 1 mmol/L      Chloride 99 mmol/L      CO2 25 mmol/L      Anion Gap 10 mmol/L      BUN 19 mg/dL      Creatinine 1 01 mg/dL      Glucose 195 (H) mg/dL      Calcium 9 5 mg/dL      AST 10 (L) U/L      ALT 10 U/L      Alkaline Phosphatase 59 U/L      Total Protein 7 5 g/dL      Albumin 4 1 g/dL      Total Bilirubin 0 60 mg/dL      eGFR 92 ml/min/1 73sq m     Narrative:         National Kidney Disease Education Program recommendations are as follows:  GFR calculation is accurate only with a steady state creatinine  Chronic Kidney disease less than 60 ml/min/1 73 sq  meters  Kidney failure less than 15 ml/min/1 73 sq  meters      Aquilino Began [[id number]]  (Normal) Collected:  07/10/18 2210    Lab Status:  Final result Specimen:  Blood from Hand, Left Updated:  07/10/18 2236     Protime 12 5 seconds      INR 1 08    APTT [[id number]] (Normal) Collected:  07/10/18 2210    Lab Status:  Final result Specimen:  Blood from Hand, Left Updated:  07/10/18 2236     PTT 32 seconds     CBC and differential [[id number]]  (Abnormal) Collected:  07/10/18 2209    Lab Status:  Final result Specimen:  Blood from Arm, Left Updated:  07/10/18 2224     WBC 6 90 Thousand/uL      RBC 5 30 Million/uL      Hemoglobin 14 0 g/dL      Hematocrit 40 7 %      MCV 77 (L) fL      MCH 26 4 pg      MCHC 34 3 g/dL      RDW 15 1 (H) %      MPV 7 9 (L) fL      Platelets 083 Thousands/uL      nRBC 0 /100 WBCs      Neutrophils Relative 57 %      Lymphocytes Relative 33 %      Monocytes Relative 9 %      Eosinophils Relative 1 %      Basophils Relative 1 %      Neutrophils Absolute 3 90 Thousands/µL      Lymphocytes Absolute 2 30 Thousands/µL      Monocytes Absolute 0 60 Thousand/µL      Eosinophils Absolute 0 00 Thousand/µL      Basophils Absolute 0 00 Thousands/µL     Blood culture #2 [[id number]] Collected:  07/10/18 2210    Lab Status: In process Specimen:  Blood from Hand, Left Updated:  07/10/18 2215    Blood culture #1 [[id number]]     Lab Status:  No result Specimen:  Blood                  No orders to display              Procedures  Procedures       Phone Contacts  ED Phone Contact    ED Course  ED Course as of Jul 11 0654 Wed Jul 11, 2018   0035 Calcium: 9 5   0213 Will need to get crisis mobile to get him to [facility] and will need out patient wound management and oral antibiotics  0221 Patient rested all night    Will d/c after point of care sugar  Await crisis assistance                                MDM  CritCare Time    Disposition  Final diagnoses:   Cellulitis   Bipolar affective disorder, current episode manic ([facility] )   Diabetes ([facility] )     Time reflects when diagnosis was documented in both MDM as applicable and the Disposition within this note     Time User Action Codes Description Comment    7/11/2018  6:32 AM Onalee Single   Add [L03 90] Cellulitis 7/11/2018  6:32 AM Anjum Kohler Add [F31 9] Bipolar affective disorder, current episode manic ([address] )     7/11/2018  6:32 AM Anjum Kohler  Add [E11 9] Diabetes [facility])       ED Disposition     ED Disposition Condition Comment    Discharge  Scot Butler discharge to home/self care  Condition at discharge: Stable        Follow-up Information     Follow up With Specialties Details Why Contact Info    Jacinto Lucas DPM Podiatry Schedule an appointment as soon as possible for a visit local wound care doctor [address] [place] [address]  [phone]            Patient's Medications   Discharge Prescriptions    CLINDAMYCIN (CLEOCIN) 150 MG CAPSULE    Take 2 capsules (300 mg total) by mouth every 8 (eight) hours for 7 days       Start Date: 7/11/2018 End Date: 7/18/2018       Order Dose: 300 mg       Quantity: 28 capsule    Refills: 0    TRAMADOL (ULTRAM) 50 MG TABLET    Take 1 tablet (50 mg total) by mouth every 6 (six) hours as needed for moderate pain for up to 15 days       Start Date: 7/11/2018 End Date: 7/26/2018       Order Dose: 50 mg       Quantity: 15 tablet    Refills: 0     No discharge procedures on file      ED Provider  Electronically Signed by           Coty Doyle DO  07/10/18 1735       Coty Doyle DO  07/11/18 5180

## 2018-07-11 NOTE — PROGRESS NOTES
Patient seen by Medical provider and consult for podiatry placed  Will place orders for dressings for B/L diabetic ulcers  Will continue to monitor

## 2018-07-11 NOTE — ED CARE HANDOFF
Emergency Department Sign Out Note        Sign out and transfer of care from Dr Pooja Nunez  See Separate Emergency Department note  Patient was seen and examined by me  I took over the ED care of Elizabeth Amos  Case was discussed with me by Dr Pooja Nunez  After discussing with the Nubia Barr representative, it has been agreed that the patient will be admitted as inpatient at the [facility] unit  Workup Completed:  Blood work    ED Course / Workup Pending (followup): Disposition                             Procedures  MDM  CritCare Time      Disposition  Final diagnoses:   Cellulitis   Bipolar affective disorder, current episode manic ([address] )   Diabetes ([facility] )     Time reflects when diagnosis was documented in both MDM as applicable and the Disposition within this note     Time User Action Codes Description Comment    7/11/2018  6:32 AM Onalee Single  Add [L03 90] Cellulitis     7/11/2018  6:32 AM Onalee Single  Add [F31 9] Bipolar affective disorder, current episode manic ([address] )     7/11/2018  6:32 AM Onalee Single  Add [E11 9] Diabetes [facility])       ED Disposition     ED Disposition Condition Comment    Discharge  Elizabeth Amos discharge to home/self care      Condition at discharge: Stable        Follow-up Information     Follow up With Specialties Details Why Contact Info    Erick River DPM Podiatry Schedule an appointment as soon as possible for a visit local wound care doctor [address] [place] [address]  [phone]          Patient's Medications   Discharge Prescriptions    CLINDAMYCIN (CLEOCIN) 150 MG CAPSULE    Take 2 capsules (300 mg total) by mouth every 8 (eight) hours for 7 days       Start Date: 7/11/2018 End Date: 7/18/2018       Order Dose: 300 mg       Quantity: 28 capsule    Refills: 0    TRAMADOL (ULTRAM) 50 MG TABLET    Take 1 tablet (50 mg total) by mouth every 6 (six) hours as needed for moderate pain for up to 15 days       Start Date: 7/11/2018 End Date: 7/26/2018       Order Dose: 50 mg Quantity: 15 tablet    Refills: 0     No discharge procedures on file         ED Provider  Electronically Signed by     Kolby Coats MD  07/11/18 0157

## 2018-07-11 NOTE — ED NOTES
39 y/o male presented to ED on 7/10/18 with intent for medical clearance for admission to [address] [place], [place] [address]   Crisis was informed that there was no need for involvement except to notify [facility] when he was medically cleared  CW contacted [facility] this a m  when Dr Jeffery Both indicated that he was medically appropriate and CW had a lengthy conversation with staff there Juni Mcdaniel) about the need for a full formal referral   [facility] explained same to patient, who was irritated by this, believing he was all set to go to [facility]  He insisted on speaking with staff there and was provided with the telephone  After nearly 30 minutes, he did consent to a [facility] referral, and as this packet was being completed, he revealed active SI and a recent attempt  CW discussed how this might be an obstacle to Crisis Residential Admission and patient ultimiately expressed his desire for inpatient psychiatric treatment, voicing overt SI   Crisis evaluation revealed SI, current and active with recent behaviors  Patient reported that on Saturday (7/7/18) he attempted to throw himself off a steel pier  Also reported that he is trying to kill himself slowly by not taking his medical medications  States, "I hate my life  I just want to die"  Patient has been engaging in impulsive behaviors  He was discharged from [facility] and took a bus to [facility] where he spent all of his money and did not follow through with medical issues or medications  Reports he is taking psychotropic medications, but not his medical medications  Denies HI or violence / aggression  Was irritable  Denies depression when questioned, but does present with hopelessness and helplessness and negative outlook  Reports anxiety and mood intability  Denies AH/VH and delusions  Reports poor sleep and increased appetite  Requests inpatient admission to [facility]  Agreeable to sign a 201

## 2018-07-11 NOTE — PLAN OF CARE
ANXIETY     Will report anxiety at manageable levels Not Progressing     By discharge: Patient will verbalize 2 strategies to deal with anxiety Not Progressing        DEPRESSION     Will be euthymic at discharge Not Progressing        SELF CARE DEFICIT     Return ADL status to a safe level of function Not Progressing          SELF HARM/SUICIDALITY     Will have no self-injury during hospital stay Progressing

## 2018-07-11 NOTE — ED NOTES
CW contacted Darling Vallejo at [facility] [employer] [phone] to discuss the Pt's situation  Darling Vallejo stated Pt had called earlier requesting admission to their facility  Pt explained he has not managed his diabetes over the last year  Darling Vallejo told Pt he needed to f/u with a medical professional (either a PCP or ED) to make sure he was medically cleared for admission  Darling Vallejo stated after Pt is cleared he can be discharged and they will f/u with him in the morning  Darling Vallejo requested Pt keep any meds and discharge paperwork he may receive while in the ED so they can review the information when they come to assess Pt  CW relayed this information to ED staff

## 2018-07-11 NOTE — ED NOTES
Patient is accepted at [facility]  Patient is accepted by Dr Sheryle Minor  Patient may go to the floor at 130pm        Nurse report is to be called to [phone] prior to patient transfer

## 2018-07-11 NOTE — ED NOTES
CW placed called to [phone] [facility] to completed COB  Representative took initial information and asked that info be faxed at the end of the patients stay then the call was transferred to a survey  CW was unable to obtain fax number or the rep first name before the survey started

## 2018-07-11 NOTE — H&P
H&P Behavioral Health   Scot Butler 40 y o  male MRN: [id number]  Unit/Bed#: [facility]IO[facility] Encounter: [id number]      Chief Complaint:  I am just hopeless"    History of Present Illness       Scot Butler is a 40 y o  male recently admitted to this unit and discharged with diagnosis of bipolar disorder depressed presented to the emergency room with requesting medical clearance to get to the [facility] residence  At the time of his evaluation he reported depression with suicidal ideation as well as stating that his medical noncompliance was his suicide attempt and also documented that he tried to the throw himself off a steel peer on 7/7/2018  Of note at his last discharge he admitted taking a bus to [facility] where he spent his money and did not take his medical medications at that time he was discharged on Depakote, Thorazine, gabapentin  He is admitted for inpatient management stabilization and on interview upon arrival to the unit he states   On interview he states that he was taking all of his medications but reports all depressive symptoms states he does very little when not in the hospital and answered "I do not know" to many questions  Psychiatric Review Of Systems:  sleep: yes  appetite changes: yes  weight changes: "i dont know"  energy/anergy: yes  interest/pleasure/anhedonia: yes  somatic symptoms: yes  anxiety/panic: yes  zoran: no  guilty/hopeless: yes  self injurious behavior/risky behavior: yes    Historical Information   Past Psychiatric History: In Patient multiple times, recently at this hospital in June of 2018  Also inpatient at [facility], [address] [place] [address], and has history of poor outpatient follow-up and compliance due to poor social supports  Prior history of being treated at [facility]  History of suicide attempt  Currently in treatment with no one    Past Psychiatric medication trial:  Lithium, Depakote, Ativan, gabapentin, Zoloft, Zyprexa  Substance Abuse History:  Cannabis use  UDS positive for opiates  History of opiate and alcohol use none currently per records   I have assessed this patient for substance use within the past 12 months  Smoking history:  Cigarette smoker  Family Psychiatric History:   denies    Social History  Living arrangement, social support: was trying to get into [facility], unstable housing  Occupational History: unemployed  Functioning Relationships: poor support system    Other Pertinent History: Trauma    Traumatic History:   Abuse: denies  Other Traumatic Events: Would not disclose    Past Medical History:   Diagnosis Date    Bipolar 1 disorder ([facility] )     Diabetes mellitus ([facility] )     Hypertension     Sleep difficulties     Substance abuse     Suicide attempt [facility])        Medical Review Of Systems:  Review of Systems - Negative except for HPI    Meds/Allergies   all current active meds have been reviewed  Allergies   Allergen Reactions    Penicillins        Objective   Vital signs in last 24 hours:  Temp:  [98 6 °F (37 °C)-99 9 °F (37 7 °C)] 99 9 °F (37 7 °C)  HR:  [] 113  Resp:  [16] 16  BP: (141-168)/(82-96) 149/84    No intake or output data in the 24 hours ending 07/11/18 1343    Mental Status Evaluation:  Appearance:  disheveled and With bandaged feet   Behavior:  Guarded, poor eye contact   Speech:  soft and Scant   Mood:  depressed   Affect:  mood-congruent   Language: Fluent   Thought Process:  concrete   Associations: concrete associations   Thought Content:  normal   Perceptual Disturbances: None   Risk Potential: Suicidal Ideations without plan   Sensorium:  person, place and situation   Memory:  recent and remote memory grossly intact   Cognition:  grossly intact   Consciousness:  alert and awake    Attention: attention span appeared shorter than expected for age   Intellect: not examined   Fund of Knowledge: awareness of current events: Intact   Insight:  limited   Judgment: limited   Muscle Strength and Tone: Normal bulk and tone   Gait/Station: Limited due to metatarsal amputation bilaterally   Motor Activity: no abnormal movements     Lab Results:    Lab Results   Component Value Date    WBC 6 90 07/10/2018    HGB 14 0 07/10/2018    HCT 40 7 07/10/2018    MCV 77 (L) 07/10/2018     07/10/2018         Code Status: )No Order    Assessment/Plan     Assessment:  Argenis Beavers is a 40 y o  male   Diagnosis:  Bipolar disorder type 1 depressed  Plan:   1  Medical consult for management of diabetes, will check EKG since restarting medications that could prolong QTC  2  Resume Thorazine 50 mg t i d  and Depakote 1000 mg at night, unclear compliance and will restart at lower doses than when he was discharged on  3  Group and milieu therapy  Risks, benefits and possible side effects of Medications:   Risks, benefits, and possible side effects of medications explained to patient and patient verbalizes understanding             Chaim Cook MD

## 2018-07-11 NOTE — CONSULTS
Consult- Chyna Gao 1975, 40 y o  male MRN: [id number]    Unit/Bed#: [facility] [address] Encounter: [id number]    Primary Care Provider: No primary care provider on file  Date and time admitted to hospital: 7/10/2018  7:22 PM      Inpatient consult to Internal Medicine  Consult performed by: Sacha Ramires ordered by: Quincy Major          Diabetic ulcer of right midfoot ([facility] [address] )   Assessment & Plan    -continue local wound care  -podiatry consult        Essential hypertension   Assessment & Plan    -BP stable  -continue lisinopril/metoprolol        DM (diabetes mellitus), type 2 ([address] )   Assessment & Plan    -FS ac/hs  -diabetic diet  -cont insulin sliding scale with home regimen            [id number] for Discharge:  · Follow up with PCP on discharge    Counseling / Coordination of Care Time: 45 minutes  Greater than 50% of total time spent on patient counseling and coordination of care  Collaboration of Care: Were Recommendations Directly Discussed with Primary Treatment Team? - Yes     History of Present Illness:    Chyna Gao is a 40 y o  male who is originally admitted to the Psychiatry service due to Depression  We are consulted for diabetic foot ulcer  Pt with hx of bilateral TMA  Now with new ulcer on right foot near TMA site  Pt has chronic ulcer on left foot lateral aspect  Pt with hx of non compliance  No fever/chills  No n/v  Pt was recently in the Pysch unit and seen by podiatry for foot ulcer care on left foot  Review of Systems:    Review of Systems   Constitutional: Positive for fatigue  Negative for chills, diaphoresis and fever  Respiratory: Negative for cough, chest tightness and shortness of breath  Cardiovascular: Negative for chest pain and leg swelling  Gastrointestinal: Negative for abdominal pain, diarrhea and nausea  Musculoskeletal:        -bilateral foot pain/parasthesias from neuropathy   All other systems reviewed and are negative        Past Medical and Surgical History:     Past Medical History:   Diagnosis Date    Anxiety     Bipolar 1 disorder ([place] [address] )     Chronic pain disorder     Depression     Diabetes mellitus ([place] [address] )     Hypertension     Psychiatric illness     PTSD (post-traumatic stress disorder)     Sleep difficulties     Substance abuse     Suicide attempt ([place] [address] )        Past Surgical History:   Procedure Laterality Date    APPENDECTOMY      COLON SURGERY      TONSILLECTOMY         Meds/Allergies:    all medications and allergies reviewed    Allergies:    Allergies   Allergen Reactions    Penicillins        Social History:     Marital Status: Single    Substance Use History:   History   Alcohol Use No     History   Smoking Status    Current Every Day Smoker    Packs/day: 1 50    Years: 22 00    Types: Cigarettes   Smokeless Tobacco    Current User    Types: Chew     History   Drug Use    Types: Marijuana     Comment: monthly       Family History:    Family History   Problem Relation Age of Onset    No Known Problems Mother     No Known Problems Father     No Known Problems Sister     No Known Problems Brother     No Known Problems Maternal Aunt     No Known Problems Paternal Aunt     No Known Problems Maternal Uncle     No Known Problems Paternal Uncle     No Known Problems Maternal Grandfather     No Known Problems Maternal Grandmother     No Known Problems Paternal Grandfather     No Known Problems Paternal Grandmother     No Known Problems Cousin     ADD / ADHD Neg Hx     Alcohol abuse Neg Hx     Anxiety disorder Neg Hx     Bipolar disorder Neg Hx     Completed Suicide  Neg Hx     Dementia Neg Hx     Depression Neg Hx     Drug abuse Neg Hx     OCD Neg Hx     Psychiatric Illness Neg Hx     Psychosis Neg Hx     Schizoaffective Disorder  Neg Hx     Schizophrenia Neg Hx     Self-Injury Neg Hx     Suicide Attempts Neg Hx        Physical Exam:     Vitals:   Blood Pressure: 142/81 (07/11/18 1500)  Pulse: (!) 110 (07/11/18 1500)  Temperature: 98 3 °F (36 8 °C) (07/11/18 1500)  Temp Source: Tympanic (07/11/18 1500)  Respirations: 16 (07/11/18 1500)  Height: 6' 3" (190 5 cm) (07/11/18 1500)  Weight - Scale: 113 kg (250 lb) (07/11/18 1500)  SpO2: 97 % (07/11/18 0800)    Physical Exam   Constitutional: He is oriented to person, place, and time  He appears well-developed and well-nourished  HENT:   Head: Normocephalic and atraumatic  Eyes: Conjunctivae and EOM are normal    Neck: Normal range of motion  Neck supple  Cardiovascular: Normal rate and regular rhythm  Pulmonary/Chest: Effort normal and breath sounds normal    Abdominal: Soft  He exhibits no distension  There is no tenderness  Musculoskeletal: He exhibits no edema or tenderness  Neurological: He is oriented to person, place, and time  Skin: Skin is warm and dry    -ulcer on right foot base near TMA site, also with ulcer on lateral aspect of left foot   Psychiatric:   -flat affect       Additional Data:     Lab Results: I have personally reviewed pertinent reports  Results from last 7 days  Lab Units 07/10/18  2209   WBC Thousand/uL 6 90   HEMOGLOBIN g/dL 14 0   HEMATOCRIT % 40 7   PLATELETS Thousands/uL 262   NEUTROS PCT % 57   LYMPHS PCT % 33   MONOS PCT % 9   EOS PCT % 1       Results from last 7 days  Lab Units 07/10/18  2210   SODIUM mmol/L 134   POTASSIUM mmol/L 4 1   CHLORIDE mmol/L 99   CO2 mmol/L 25   BUN mg/dL 19   CREATININE mg/dL 1 01   CALCIUM mg/dL 9 5   TOTAL PROTEIN g/dL 7 5   BILIRUBIN TOTAL mg/dL 0 60   ALK PHOS U/L 59   ALT U/L 10   AST U/L 10*   GLUCOSE RANDOM mg/dL 195*       Results from last 7 days  Lab Units 07/10/18  2210   INR  1 08         No results found for: HGBA1C    Results from last 7 days  Lab Units 07/11/18  1029 07/11/18  0932 07/11/18  0704   POC GLUCOSE mg/dl 198* 284* 267*       Imaging: I have personally reviewed pertinent reports        No orders to display       EKG, Pathology, and Other Studies Reviewed on Admission:   · EKG: not reviewed     ** Please Note: This note has been constructed using a voice recognition system   **

## 2018-07-11 NOTE — PROGRESS NOTES
Patient arrived to unit via wheelchair from ED, awake alert oriented X 4  Patient presents flat, irritable, poor eye contact, malodorous  Patient requests to shower later but was cooperative with skin assessment and requests to have bandages from B/L feet diabetic ulcers removed and assessed "later"  Patient states he took a bus to [facility] after recent discharge here, got a hotel room for 1 night and was in a Shelter for the rest of the time and remains homeless  Pt states  "I'm killing myself slowly on purpose by not taking my diabetes medications  Pt does not have a plan to harm self now and will come to staff if feeling unsafe  Will continue to monitor on safety precautions and 15 minute checks  No needs identified

## 2018-07-12 LAB
GLUCOSE SERPL-MCNC: 150 MG/DL (ref 65–140)
GLUCOSE SERPL-MCNC: 175 MG/DL (ref 65–140)

## 2018-07-12 PROCEDURE — 82948 REAGENT STRIP/BLOOD GLUCOSE: CPT

## 2018-07-12 PROCEDURE — 99232 SBSQ HOSP IP/OBS MODERATE 35: CPT | Performed by: NURSE PRACTITIONER

## 2018-07-12 RX ADMIN — METOPROLOL TARTRATE 25 MG: 25 TABLET ORAL at 08:58

## 2018-07-12 RX ADMIN — GABAPENTIN 300 MG: 300 CAPSULE ORAL at 21:13

## 2018-07-12 RX ADMIN — CHLORPROMAZINE HYDROCHLORIDE 50 MG: 25 TABLET, SUGAR COATED ORAL at 21:13

## 2018-07-12 RX ADMIN — GABAPENTIN 300 MG: 300 CAPSULE ORAL at 17:20

## 2018-07-12 RX ADMIN — CHLORPROMAZINE HYDROCHLORIDE 50 MG: 25 TABLET, SUGAR COATED ORAL at 17:20

## 2018-07-12 RX ADMIN — LISINOPRIL 5 MG: 5 TABLET ORAL at 08:58

## 2018-07-12 RX ADMIN — DIVALPROEX SODIUM 1000 MG: 500 TABLET, EXTENDED RELEASE ORAL at 21:13

## 2018-07-12 RX ADMIN — CHLORPROMAZINE HYDROCHLORIDE 50 MG: 25 TABLET, SUGAR COATED ORAL at 08:58

## 2018-07-12 RX ADMIN — GABAPENTIN 300 MG: 300 CAPSULE ORAL at 08:58

## 2018-07-12 RX ADMIN — DICLOFENAC 2 G: 10 GEL TOPICAL at 09:02

## 2018-07-12 NOTE — PROGRESS NOTES
Spoke with [facility] from Dr Ospina Tres office on 7/12/18 @ [phone]  Informed [facility] that consult was put in for the pt in regards to his wound care  Phone number for Dr Bhavesh Joshua office :[phone]          Dr Gwendel Pelton called unit @ 1199  This nurse spoke with Dr April La and he stated that Dr Edelmira Emerson is more appropriate to see the pt  Will inform ordering provider about this

## 2018-07-12 NOTE — PLAN OF CARE
Ineffective Coping     Participates in unit activities Not Progressing        Risk for Self Injury/Neglect     Verbalize thoughts and feelings Not Progressing     Attend and participate in unit activities, including therapeutic, recreational, and educational groups Not Progressing     Recognize maladaptive responses and adopt new coping mechanisms Not Progressing     Complete daily ADLs, including personal hygiene independently, as able Not Progressing        SELF CARE DEFICIT     Return ADL status to a safe level of function Not Progressing          ANXIETY     Will report anxiety at manageable levels Progressing     By discharge: Patient will verbalize 2 strategies to deal with anxiety Progressing        DEPRESSION     Will be euthymic at discharge [address] Discharge to home or other facility with appropriate resources Progressing        METABOLIC, FLUID AND ELECTROLYTES - ADULT     Glucose maintained within target range Progressing        Prexisting or High Potential for Compromised Skin Integrity     Skin integrity is maintained or improved Progressing        Risk for Self Injury/Neglect     Treatment Goal: Remain safe during length of stay, learn and adopt new coping skills, and be free of self-injurious ideation, impulses and acts at the time of discharge Progressing     Refrain from harming self Progressing        SELF HARM/SUICIDALITY     Will have no self-injury during hospital stay Progressing

## 2018-07-12 NOTE — PLAN OF CARE
Problem: SELF HARM/SUICIDALITY  Goal: Will have no self-injury during hospital stay  INTERVENTIONS:  - Q 15 MINUTES: Routine safety checks  - Q WAKING SHIFT & PRN: Assess risk to determine if routine checks are adequate to maintain patient safety  - Encourage patient to participate actively in care by formulating a plan to combat response to suicidal ideation, identify supports and resources   Outcome: Progressing      Problem: DEPRESSION  Goal: Will be euthymic at discharge  INTERVENTIONS:  - Administer medication as ordered  - Provide emotional support via 1:1 interaction with staff  - Encourage involvement in milieu/groups/activities  - Monitor for social isolation   Outcome: Progressing      Problem: ANXIETY  Goal: Will report anxiety at manageable levels  INTERVENTIONS:  - Administer medication as ordered  - Teach and encourage coping skills  - Provide emotional support  - Assess patient/family for anxiety and ability to cope   Outcome: Progressing    Goal: By discharge: Patient will verbalize 2 strategies to deal with anxiety  Interventions:  - Identify any obvious source/trigger to anxiety  - Staff will assist patient in applying identified coping technique/skills  - Encourage attendance of scheduled groups and activities   Outcome: Progressing      Problem: SELF CARE DEFICIT  Goal: Return ADL status to a safe level of function  INTERVENTIONS:  - Administer medication as ordered  - Assess ADL deficits and provide assistive devices as needed  - Obtain PT/OT consults as needed  - Assist and instruct patient to increase activity and self care as tolerated   Outcome: Progressing      Problem: Prexisting or High Potential for Compromised Skin Integrity  Goal: Skin integrity is maintained or improved  INTERVENTIONS:  - Identify patients at risk for skin breakdown  - Assess and monitor skin integrity  - Assess and monitor nutrition and hydration status  - Monitor labs (i e  albumin)  - Assess for incontinence   - Turn and reposition patient  - Assist with mobility/ambulation  - Relieve pressure over bony prominences  - Avoid friction and shearing  - Provide appropriate hygiene as needed including keeping skin clean and dry  - Evaluate need for skin moisturizer/barrier cream  - Collaborate with interdisciplinary team (i e  Nutrition, Rehabilitation, etc )   - Patient/family teaching   Outcome: Progressing

## 2018-07-12 NOTE — PROGRESS NOTES
No signs of hypo/hyperglycemia  Patient observed sleeping during most Q15 minute safety checks (second portion of shift)  No suicidal ideations, acting out or homicidal behaviors  No change in medical condition or complaints voiced  Fluids maintained at bedside to promote hydration

## 2018-07-12 NOTE — CONSULTS
Consultation - Scot Butler 40 y o  male MRN: [id number]    Unit/Bed#: -01 Encounter: [id number]      Assessment/Plan     Assessment:  Insulin dependant diabetes type 2 uncontrolled  B/l foot wounds chronic  Hypertension  Hyperlipidemia  Depression      Plan:  Hospitalist was consulted for diabetes and chronic medical management  Continue medications, wound care and recommendations as prescribed  Follow up with PCP and wound care upon discharge  All further care per psychiatry  History of Present Illness     HPI: Scot Butler is a 40y o  year old male who presents to the ED due to depression  We are asked to evaluate the patient for medical clearance  Per ER report: This is a 56-year-old white male noncompliant diabetic with some serous psychological problems she is trying to get into an outpatient psychiatric facility  Due to his underlying psychiatric disturbance he has not been taking his medications  He has a bilateral trans met amputation which has been healing, he now has an ulcer on the bottom of his right foot as well as a blister on the top of his right foot at the site of the amputation  Is here to be evaluated medically cleared so he can get new perspectives, a psychiatric outpatient facility is had chills but no documented fever here he feels that he has a cellulitis as well  He offers no acute complaints at this time  Inpatient consult for Medical Clearance for [facility] patient  Consult performed by: Meryl Poon ordered by: Delmis Chairez          Review of Systems   Constitutional: Negative for chills, diaphoresis, fatigue and fever  HENT: Negative for congestion, ear pain, rhinorrhea, sneezing and sore throat  Respiratory: Negative for cough, shortness of breath, wheezing and stridor  Cardiovascular: Negative for chest pain, palpitations and leg swelling     Gastrointestinal: Negative for abdominal distention, abdominal pain, blood in stool, constipation, diarrhea, nausea and vomiting  Genitourinary: Negative for difficulty urinating, dysuria, frequency, hematuria and urgency  Musculoskeletal: Negative for gait problem, myalgias and neck pain  Skin: Positive for wound (b/l feet)  Negative for rash  Neurological: Negative for dizziness, syncope, weakness, light-headedness and headaches  All other systems reviewed and are negative  Historical Information   Past Medical History:   Diagnosis Date    Anxiety     Bipolar 1 disorder ([place] [address] )     Chronic pain disorder     Depression     Diabetes mellitus ([place] [address] )     Hypertension     Psychiatric illness     PTSD (post-traumatic stress disorder)     Sleep difficulties     Substance abuse     Suicide attempt ([place] [address] )      Past Surgical History:   Procedure Laterality Date    APPENDECTOMY      COLON SURGERY      TONSILLECTOMY       Social History   History   Alcohol Use No     History   Drug Use    Types: Marijuana     Comment: monthly     History   Smoking Status    Current Every Day Smoker    Packs/day: 1 50    Years: 22 00    Types: Cigarettes   Smokeless Tobacco    Current User    Types: Chew     Family History: non-contributory    Meds/Allergies   all current active meds have been reviewed  Allergies   Allergen Reactions    Penicillins        Objective   Vitals: Blood pressure 142/76, pulse (!) 112, temperature 98 1 °F (36 7 °C), temperature source Tympanic, resp  rate 16, height 6' 3" (1 905 m), weight 113 kg (250 lb), SpO2 99 %  No intake or output data in the 24 hours ending 07/12/18 1117  Invasive Devices          No matching active lines, drains, or airways          Physical Exam   Constitutional: He is oriented to person, place, and time  He appears well-developed and well-nourished  No distress  HENT:   Head: Normocephalic and atraumatic  Mouth/Throat: Oropharynx is clear and moist    Eyes: Conjunctivae and EOM are normal  Pupils are equal, round, and reactive to light   Left eye exhibits no discharge  Neck: Normal range of motion  Neck supple  No JVD present  Cardiovascular: Normal rate, regular rhythm, normal heart sounds and intact distal pulses  No murmur heard  Pulmonary/Chest: Effort normal and breath sounds normal  No respiratory distress  He has no wheezes  He has no rales  Abdominal: Soft  Bowel sounds are normal  He exhibits no distension and no mass  There is no tenderness  Obese   Musculoskeletal: Normal range of motion  He exhibits no edema, tenderness or deformity  Neurological: He is alert and oriented to person, place, and time  Skin: Skin is warm and dry  No rash noted  He is not diaphoretic  No erythema  B/l chronic foot wounds with dressings in place, clean dry and intact, no bleeding or drainage through  B/l metatarsal amputations c/d/i  Psychiatric: He has a normal mood and affect  His behavior is normal  Judgment and thought content normal    Nursing note and vitals reviewed  Lab Results: I have personally reviewed pertinent reports  Code Status: No Order  Advance Directive and Living Will:      Power of :    POLST:      Counseling / Coordination of Care  Total floor / unit time spent today 35 minutes  Greater than 50% of total time was spent with the patient and / or family counseling and / or coordination of care   A description of the counseling / coordination of care:

## 2018-07-12 NOTE — DISCHARGE INSTR - APPOINTMENTS
The treatment team recommends ongoing medication management  A referral has been made on your behalf to [facility], [address] [place] [address] Avenue Phone: [phone] Fax: [phone]) for ongoing medication management  You will receive a call within 48 hours to schedule your intake appointment; however, if you do not receive a call in 48 hours please follow up with them to schedule your appointment  It is essential that you get an appointment as quickly as possible in order to stay compliant with your medications  Your aftercare summary will be faxed to this provider  You indicated that you do not have a PCP and declined to have your  set you up with one

## 2018-07-12 NOTE — SOCIAL WORK
SW met with patient to review and sign treatment plan and complete psychosocial assessment  Pt came to [facility] voluntary admission with diagnosis; Severe Bipolar I disorder  Patient having suicidal ideations; pt reports;"I am killing myself slowly by not taking care of my diabetes"  Pt presents with depressed mood, irritable and withdrawn to his room  Patient denies HI/AH/VH/Delusions or paranoia  Pt denies current alcohol or drug abuse; smokes marijuana occasionally  Pt smokes 1 5PPD cigarettes  Denies current trauma or domestic abuse  Pt reports poor coping skills, hopelessness, homeless, no transportation and disabled  Pt with bilateral feet with all ten toes amputated  Psychiatric services at [facility]  Pt reports being homeless  No current support system  Pt reports no family support  Pt unable to provide his coping skills

## 2018-07-12 NOTE — PROGRESS NOTES
Pt is flat and has remained in his room for most of the AM shift  Pt refusing labs, meals, and blood sugar check for the AM shift  Discussed with pt the importance of allowing staff to check his sugars and his nutrition, pt ignores and shows no interest in his care  Pt states he is killing himself by being non-compliant with his diabetic regimen  Pt did take scheduled AM meds  Insulin was not administered since pt refused his blood sugar check and to eat breakfast   Pt is malodorous, disheveled, and being non-compliant with ADL's  Pt has poor attitude and shows little interest in his treatment plan  When pt was asked what he is feeling he states "I don't know what I'm feeling "  Will continue Q 15 min checks to maintain pt safety

## 2018-07-12 NOTE — PROGRESS NOTES
This nurse spoke to Dr Suzi Noyola on 7/12/18 @ 21 [phone] in regards to pt's current sliding scale order  Per Dr Suzi Noyola, no changes are being made to pt's sliding scale d/t pt's recent non-compliance  Dr Suzi Noyola stated that once pt becomes compliant and blood sugars remain unstable, then he will do any adjusting if necessary  Will pass along to other staff

## 2018-07-12 NOTE — SOCIAL WORK
manuela met with patient in pt room to obtain JENNIFER for [facility] (psych provider); pt signed JENNIFER     sw placed phone call to [facility];  Pt scheduled for follow up today with Dr Edgar Leach - sw cancelled due to inpt status;   Follow up scheduled for 7/31 at 1300pm;  Pt does have $50 balance for 2 missed appointments - manuela will put information on discharge instructions

## 2018-07-12 NOTE — PROGRESS NOTES
Patient withdrawn to room resting  Agreed to take medication after encouragement  Did not admit to felling suicidal, however Dong Flaherty refused to answer assessment question  Reposition self in bed  Poorly verbal  Pain controlled  Maintained on Q 15 minute safety checks  No acting out or homicidal behaviors  No changes in medical condition or current complaints noted  Remains a fall risk  Fluids maintained at bedside to promote hydration

## 2018-07-12 NOTE — PROGRESS NOTES
Progress Note - Behavioral Health   Becky Mariano 40 y o  male MRN: [id number]  [facility] Encounter: [id number]    Assessment/Plan   Principal Problem:    Severe bipolar I disorder, most recent episode depressed without psychotic features ([facility] [address] )  Active Problems:    Diabetic ulcer of right midfoot ([facility] [address] )    DM (diabetes mellitus), type 2 ([facility] [address] )    Essential hypertension    This is a 25 min psychiatric progress note on clots  15 min were spent in coordination care of treatment of this patient  Coordination care consisted me with the multi-disciplinary treatment team discussed patient's progress, documentation, medication orders and behaviors over last 24 hr   Per the nursing staff and the patient is eating 100% of his meals he did sleep fairly well through the night  Patient was seen today with to with the nurses from the staff present  I was informed that this patient is refusing blood work which he needs for his insulin-dependent diabetes  He also has been refusing some treatments and not involving himself in his care  In speaking with patient, we did encourage him to take his medications as ordered as well as the treatments that are ordered and I will be ordering a wound care evaluation for the patient today  We will continue to monitor his progress with his refusal are non refusal of medications and treatment    Behavior over the last 24 hours:  unchanged  Sleep: normal  Appetite: normal  Medication side effects: No  ROS: no complaints    Mental Status Evaluation:  Appearance:  casually dressed   Behavior:  guarded   Speech:  soft   Mood:  anxious and depressed   Affect:  constricted   Thought Process:  flight of ideas   Thought Content:  obsessions   Perceptual Disturbances: None   Risk Potential: none   Sensorium:  person, place, time/date and situation   Cognition:  grossly intact   Consciousness:  alert and awake    Attention: attention span and concentration were age appropriate   Insight:  poor Judgment: poor   Gait/Station: unsteady gait   Motor Activity: no abnormal movements     Progress Toward Goals: Unchanged    Recommended Treatment: Continue with group therapy, milieu therapy and occupational therapy  Risks, benefits and possible side effects of Medications:   Risks, benefits, and possible side effects of medications explained to patient and patient verbalizes understanding  Medications: continue current psychiatric medications  Labs: Patient refuses    Counseling / Coordination of Care  Total floor / unit time spent today 25 minutes  Greater than 50% of total time was spent with the patient and / or family counseling and / or coordination of care   A description of the counseling / coordination of care: 25

## 2018-07-12 NOTE — PLAN OF CARE
Problem: DISCHARGE PLANNING  Goal: Discharge to home or other facility with appropriate resources  INTERVENTIONS:  - Identify barriers to discharge w/patient and caregiver  - Arrange for needed discharge resources and transportation as appropriate  - Coordinate discharge planning if the patient needs post-hospital services based on physician/advanced practitioner order or complex needs related to functional status, cognitive ability, or social support system  Outcome: Progressing

## 2018-07-12 NOTE — DISCHARGE INSTR - OTHER ORDERS
Triggers you have identified during your hospital that led to suicidal ideation include homelessness and a lack of social supports  You were unable to identify any cooping skills during your hospital stay  Some common coping skills include: reading, walking, listening to music, exercising, and talking to supports  If you are unable to deal with your distressed mood alone please contact your psychiatric provider at [facility]  If that is not effective and you continue to have suicidal ideation, please call [facility] at [phone], dial 911, or go to the nearest emergency center

## 2018-07-13 LAB
ATRIAL RATE: 107 BPM
GLUCOSE SERPL-MCNC: 178 MG/DL (ref 65–140)
GLUCOSE SERPL-MCNC: 205 MG/DL (ref 65–140)
GLUCOSE SERPL-MCNC: 309 MG/DL (ref 65–140)
GLUCOSE SERPL-MCNC: 316 MG/DL (ref 65–140)
P AXIS: 51 DEGREES
PR INTERVAL: 154 MS
QRS AXIS: 27 DEGREES
QRSD INTERVAL: 80 MS
QT INTERVAL: 322 MS
QTC INTERVAL: 429 MS
T WAVE AXIS: 44 DEGREES
VENTRICULAR RATE: 107 BPM

## 2018-07-13 PROCEDURE — 99232 SBSQ HOSP IP/OBS MODERATE 35: CPT | Performed by: NURSE PRACTITIONER

## 2018-07-13 PROCEDURE — 82948 REAGENT STRIP/BLOOD GLUCOSE: CPT

## 2018-07-13 RX ORDER — GABAPENTIN 400 MG/1
400 CAPSULE ORAL 3 TIMES DAILY
Status: DISCONTINUED | OUTPATIENT
Start: 2018-07-13 | End: 2018-08-03 | Stop reason: HOSPADM

## 2018-07-13 RX ORDER — CHLORPROMAZINE HYDROCHLORIDE 100 MG/1
100 TABLET, FILM COATED ORAL 3 TIMES DAILY
Status: DISCONTINUED | OUTPATIENT
Start: 2018-07-13 | End: 2018-07-27

## 2018-07-13 RX ADMIN — GABAPENTIN 400 MG: 400 CAPSULE ORAL at 12:26

## 2018-07-13 RX ADMIN — CHLORPROMAZINE HYDROCHLORIDE 100 MG: 100 TABLET, SUGAR COATED ORAL at 17:35

## 2018-07-13 RX ADMIN — DIVALPROEX SODIUM 1000 MG: 500 TABLET, EXTENDED RELEASE ORAL at 21:39

## 2018-07-13 RX ADMIN — INSULIN LISPRO 1 UNITS: 100 INJECTION, SOLUTION INTRAVENOUS; SUBCUTANEOUS at 08:17

## 2018-07-13 RX ADMIN — CHLORPROMAZINE HYDROCHLORIDE 100 MG: 100 TABLET, SUGAR COATED ORAL at 12:26

## 2018-07-13 RX ADMIN — INSULIN LISPRO 1 UNITS: 100 INJECTION, SOLUTION INTRAVENOUS; SUBCUTANEOUS at 17:34

## 2018-07-13 RX ADMIN — LISINOPRIL 5 MG: 5 TABLET ORAL at 08:17

## 2018-07-13 RX ADMIN — METOPROLOL TARTRATE 25 MG: 25 TABLET ORAL at 08:17

## 2018-07-13 RX ADMIN — GABAPENTIN 400 MG: 400 CAPSULE ORAL at 21:39

## 2018-07-13 RX ADMIN — CHLORPROMAZINE HYDROCHLORIDE 100 MG: 100 TABLET, SUGAR COATED ORAL at 21:39

## 2018-07-13 RX ADMIN — GABAPENTIN 300 MG: 300 CAPSULE ORAL at 08:16

## 2018-07-13 RX ADMIN — CHLORPROMAZINE HYDROCHLORIDE 50 MG: 25 TABLET, SUGAR COATED ORAL at 08:16

## 2018-07-13 RX ADMIN — GABAPENTIN 400 MG: 400 CAPSULE ORAL at 17:35

## 2018-07-13 RX ADMIN — INSULIN LISPRO 3 UNITS: 100 INJECTION, SOLUTION INTRAVENOUS; SUBCUTANEOUS at 12:27

## 2018-07-13 NOTE — CONSULTS
Consultation - Kelly Cooper 40 y o  male MRN: [id number]    Unit/Bed#: [facility] 218-01 Encounter: [id number]      Assessment/Plan     Assessment:  B/L foot wounds, no SOI, regular dressings needed  Plan:  Dressing change ordered  History of Present Illness   History, ROS and PFSH reviewed  Torey Velásquez HPI: Kelly Cooper is a 40y o  year old male who presents with B/L foot wounds, unchanged from prior consults  Inpatient consult to Podiatry  Consult performed by: Tete Salazar ordered by: Philip Ramesh          Review of Systems    Historical Information   Past Medical History:   Diagnosis Date    Anxiety     Bipolar 1 disorder ([facility] )     Chronic pain disorder     Depression     Diabetes mellitus ([facility] )     Hypertension     Psychiatric illness     PTSD (post-traumatic stress disorder)     Sleep difficulties     Substance abuse     Suicide attempt [facility])      Past Surgical History:   Procedure Laterality Date    APPENDECTOMY      COLON SURGERY      TONSILLECTOMY       Social History   History   Alcohol Use No     History   Drug Use    Types: Marijuana     Comment: monthly     History   Smoking Status    Current Every Day Smoker    Packs/day: 1 50    Years: 22 00    Types: Cigarettes   Smokeless Tobacco    Current User    Types: Chew     Family History: non-contributory    Meds/Allergies   all current active meds have been reviewed  Allergies   Allergen Reactions    Penicillins        Objective   Vitals: Blood pressure 110/58, pulse 95, temperature 98 5 °F (36 9 °C), temperature source Tympanic, resp  rate 16, height 6' 3" (1 905 m), weight 113 kg (250 lb), SpO2 96 %  No intake or output data in the 24 hours ending 07/13/18 0712  Invasive Devices          No matching active lines, drains, or airways          Physical Exam   Skin:            Lab Results: I have personally reviewed pertinent reports  Imaging Studies: I have personally reviewed pertinent reports      EKG, Pathology, and Other Studies: I have personally reviewed pertinent reports  VTE Prophylaxis: Reason for no pharmacologic prophylaxis Patient is ambulatory  Code Status: No Order  Advance Directive and Living Will:      Power of :    POLST:      Counseling / Coordination of Care  Total floor / unit time spent today 5 minutes  Greater than 50% of total time was spent with the patient and / or family counseling and / or coordination of care  A description of the counseling / coordination of care: Dressing changes

## 2018-07-13 NOTE — PROGRESS NOTES
Pt was seen on the unit today and was supplied with materials to dress his diabetic ulcers on his bilateral feet  Pt did clean his open areas and did dress it  Pt denies current si/hi but appears to be flat and depressed

## 2018-07-13 NOTE — PROGRESS NOTES
Progress Note - Behavioral Health   Tnoy Watson 40 y o  male MRN: [id number]  Unit/Bed#: [facility] 218-01 Encounter: [id number]    Assessment/Plan   Principal Problem:    Severe bipolar I disorder, most recent episode depressed without psychotic features ([facility] )  Active Problems:    Diabetic ulcer of right midfoot ([facility] )    DM (diabetes mellitus), type 2 ([facility] )    Essential hypertension   This is a 25 min psychiatric progress note on patient  15 min were spent in coordination and care of treatment of the patient  Coordination of care consisted me with the multi-disciplinary treatment team discussed patient's progress, documentation, medication, orders and behaviors over last 24 hr   Per the nursing staff, the patient is eating 100% of his meal he did sleep well through the night  On examination today, the patient is asking if we could increase his meds back to where they were prior to his most recent discharge  He felt that the Thorazine at 300 mg a day was more beneficial his complaints of mood instability than it is now  Also, he is asking for an increase of the Neurontin which we did do today  I also met with him with our  to discuss the discharge plan  He does appreciate the fact that he does have some limitations due to his physical presentation  He has developed 2 new diabetic wound ulcers on his feet and he has difficulty caring for himself, caring for his wounds and at still presents is homeless  He is willing to review and sign the paperwork that is necessary for us to make referral for him to a skilled nursing facility  I will also put a consult into her medical team to have them assistance with completion of the paperwork for his physical could and medical concerns that warrant an admission to a skilled nursing facility  Psychiatrically, we will continue to monitor the most recent increase of his medications  Mood remains depressed sad hopeless helpless and feeling worthless    Discharge plan dispositions ongoing  Behavior over the last 24 hours:  unchanged  Sleep: normal  Appetite: normal  Medication side effects: No  ROS: New diabetic foot ulcers    Mental Status Evaluation:  Appearance:  casually dressed and disheveled   Behavior:  normal   Speech:  soft   Mood:  constricted   Affect:  constricted   Thought Process:  flight of ideas   Thought Content:  obsessions   Perceptual Disturbances: none   Risk Potential: failure to thrive   Sensorium:  person, place, time/date and situation   Cognition:  grossly intact   Consciousness:  alert and awake    Attention: attention span and concentration were age appropriate   Insight:  poor   Judgment: limited   Gait/Station: unsteady gait   Motor Activity: no abnormal movements     Progress Toward Goals: Limited    Recommended Treatment: Continue with group therapy, milieu therapy and occupational therapy  Risks, benefits and possible side effects of Medications:   Risks, benefits, and possible side effects of medications explained to patient and patient verbalizes understanding  Medications: planned medication changes: Increase Thorazine and Neurontin  Labs: Reviewed    Counseling / Coordination of Care  Total floor / unit time spent today 25 minutes  Greater than 50% of total time was spent with the patient and / or family counseling and / or coordination of care   A description of the counseling / coordination of care: 25

## 2018-07-13 NOTE — PROGRESS NOTES
Pt has been withdrawn to room this shift  Declined all programming/snack when directly prompted  Stated, "I'm not interested"  On approach, pt presents flat, depressed, fleeting eye contact  Is disheveled  Malodorous  Otila Canary is in place at bedside  Bilateral foot wound dressings in place and clean/dry/intact  Pt reports he returned to the area from [facility] d/t "poor conditions" at the shelter  States he came back to the hospital for feelings of hopelessness and SI with a plan to "kill myself slowly by not complying with my diabetic needs"  Pt denies active SI at this time and contracts for safety on the unit  Denies HI, hallucinations  Pt was guarded during the assessment but appropriate with prompting  Made no report of pain  Briefly questioned medication doses, which he states have been lowered  States he was compliant following last d/c  Pt was encouraged to discuss this with the provider in the am-pt agreeable-was compliant with medications as ordered  Pt is able to make needs known and agrees to report changes in condition/needs to staff  Nursing will continue to monitor and assess for changes and safety with 15 minute checks

## 2018-07-13 NOTE — SOCIAL WORK
1030am sw met with patient in practitioner office to discuss discharge planning; Pt offered STR placement for wound and PT or discharge to streets; Pt agreeable to STR referral     140pm - manuela completed LOD paperwork; obtained signatures from patient;  manuela placed paperwork at main nursing station for medical physician signature;   Once signature obtained, manuela will fax to [facility] for assessment

## 2018-07-14 LAB
ATRIAL RATE: 84 BPM
GLUCOSE SERPL-MCNC: 247 MG/DL (ref 65–140)
GLUCOSE SERPL-MCNC: 262 MG/DL (ref 65–140)
GLUCOSE SERPL-MCNC: 285 MG/DL (ref 65–140)
P AXIS: 30 DEGREES
PR INTERVAL: 156 MS
QRS AXIS: 14 DEGREES
QRSD INTERVAL: 86 MS
QT INTERVAL: 384 MS
QTC INTERVAL: 453 MS
T WAVE AXIS: 43 DEGREES
VALPROATE SERPL-MCNC: 40.8 UG/ML (ref 50–125)
VENTRICULAR RATE: 84 BPM

## 2018-07-14 PROCEDURE — 82948 REAGENT STRIP/BLOOD GLUCOSE: CPT

## 2018-07-14 PROCEDURE — 80164 ASSAY DIPROPYLACETIC ACD TOT: CPT | Performed by: NURSE PRACTITIONER

## 2018-07-14 PROCEDURE — 93005 ELECTROCARDIOGRAM TRACING: CPT

## 2018-07-14 PROCEDURE — 99231 SBSQ HOSP IP/OBS SF/LOW 25: CPT | Performed by: PHYSICIAN ASSISTANT

## 2018-07-14 RX ORDER — DIVALPROEX SODIUM 500 MG/1
500 TABLET, DELAYED RELEASE ORAL 3 TIMES DAILY
Status: DISCONTINUED | OUTPATIENT
Start: 2018-07-14 | End: 2018-08-03 | Stop reason: HOSPADM

## 2018-07-14 RX ADMIN — GABAPENTIN 400 MG: 400 CAPSULE ORAL at 16:57

## 2018-07-14 RX ADMIN — INSULIN LISPRO 2 UNITS: 100 INJECTION, SOLUTION INTRAVENOUS; SUBCUTANEOUS at 12:35

## 2018-07-14 RX ADMIN — DIVALPROEX SODIUM 500 MG: 500 TABLET, DELAYED RELEASE ORAL at 17:02

## 2018-07-14 RX ADMIN — DICLOFENAC 2 G: 10 GEL TOPICAL at 08:30

## 2018-07-14 RX ADMIN — CHLORPROMAZINE HYDROCHLORIDE 100 MG: 100 TABLET, SUGAR COATED ORAL at 21:48

## 2018-07-14 RX ADMIN — LISINOPRIL 5 MG: 5 TABLET ORAL at 08:30

## 2018-07-14 RX ADMIN — CHLORPROMAZINE HYDROCHLORIDE 100 MG: 100 TABLET, SUGAR COATED ORAL at 08:30

## 2018-07-14 RX ADMIN — INSULIN LISPRO 2 UNITS: 100 INJECTION, SOLUTION INTRAVENOUS; SUBCUTANEOUS at 16:55

## 2018-07-14 RX ADMIN — METOPROLOL TARTRATE 25 MG: 25 TABLET ORAL at 08:30

## 2018-07-14 RX ADMIN — INSULIN LISPRO 2 UNITS: 100 INJECTION, SOLUTION INTRAVENOUS; SUBCUTANEOUS at 08:31

## 2018-07-14 RX ADMIN — MUPIROCIN 1 APPLICATION: 20 OINTMENT TOPICAL at 08:31

## 2018-07-14 RX ADMIN — MUPIROCIN 1 APPLICATION: 20 OINTMENT TOPICAL at 16:59

## 2018-07-14 RX ADMIN — GABAPENTIN 400 MG: 400 CAPSULE ORAL at 21:48

## 2018-07-14 RX ADMIN — GABAPENTIN 400 MG: 400 CAPSULE ORAL at 08:30

## 2018-07-14 RX ADMIN — CHLORPROMAZINE HYDROCHLORIDE 100 MG: 100 TABLET, SUGAR COATED ORAL at 16:58

## 2018-07-14 RX ADMIN — DIVALPROEX SODIUM 500 MG: 500 TABLET, DELAYED RELEASE ORAL at 21:48

## 2018-07-14 NOTE — PLAN OF CARE
Refrain from harming self Completed      Pt denied active SI this shift and has been leonid for safety on the unit  Participates in unit activities Not Progressing      Return ADL status to a safe level of function Not Progressing      Complete daily ADLs, including personal hygiene independently, as able Not Progressing      Recognize maladaptive responses and adopt new coping mechanisms Not Progressing      Attend and participate in unit activities, including therapeutic, recreational, and educational groups Not Progressing      Verbalize thoughts and feelings Not Progressing      Participates in unit activities Not Progressing      Will be euthymic at discharge Not Progressing      By discharge: Patient will verbalize 2 strategies to deal with anxiety Not Progressing      Pt is not making progress with the above listed problems  Pt has not been active in his own care  Has been withdrawn to his room and declining attendance to unit programming  Often ignores encouragement and prompting by staff   Nursing will continue to offer encouragement and prompting for pt to take a more active role in his own care

## 2018-07-14 NOTE — PROGRESS NOTES
Patient awake, alert, ate breakfast with peers, back to bed after eating  Received 2 units of insulin for breakfast dose and 2 units for lunch dose  Pt remains flat, irritable, malodorous, poor hygiene with medication compliant   Pt remains in bed all day between medications and meals, refusal to shower and perform any hygiene despite nursing education  Pt answer not really" when asked if currently having thoughts of harming self, also denies hi and hallucinations  Will maintain on safety precautions and 15 minute checks  NO needs identified

## 2018-07-14 NOTE — PROGRESS NOTES
Pt has been withdrawn to room and sleeping in bed this shift  Declined attendance to all programming and snack, even when directly prompted by staff, pt ignored prompting  On approach for evening assessment, even with staff at bedside, pt remained laying in bed with eyes closed  Appearance unclean and disheveled  Malodorous  Declines tending to ADLs when prompted  Cane at bedside  When asked, pt denied any changes in condition since last evening  He denies any active SI/HI/chopra and continues to contract for safety  Pt denies needs at this time  Was compliant with scheduled HS medication with exception of bactroban-pt refused application/dressing change  Pt is able to make needs known and agrees to report changes in condition/needs to staff  Nursing will continue to monitor and assess for changes and safety with 15 minute checks

## 2018-07-14 NOTE — PLAN OF CARE
ANXIETY     By discharge: Patient will verbalize 2 strategies to deal with anxiety Not Progressing        DEPRESSION     Will be euthymic at discharge Not [address] Discharge to home or other facility with appropriate resources Not Progressing        Ineffective Coping     Participates in unit activities Not Progressing        METABOLIC, FLUID AND ELECTROLYTES - ADULT     Glucose maintained within target range Not Progressing        Prexisting or High Potential for Compromised Skin Integrity     Skin integrity is maintained or improved Not Progressing        Risk for Self Injury/Neglect     Treatment Goal: Remain safe during length of stay, learn and adopt new coping skills, and be free of self-injurious ideation, impulses and acts at the time of discharge Not Progressing     Verbalize thoughts and feelings Not Progressing     Attend and participate in unit activities, including therapeutic, recreational, and educational groups Not Progressing     Recognize maladaptive responses and adopt new coping mechanisms Not Progressing     Complete daily ADLs, including personal hygiene independently, as able Not Progressing        SELF CARE DEFICIT     Return ADL status to a safe level of function Not Progressing          ANXIETY     Will report anxiety at manageable levels Progressing        SELF HARM/SUICIDALITY     Will have no self-injury during hospital stay Progressing

## 2018-07-14 NOTE — PROGRESS NOTES
Progress Note - Behavioral Health   Scot Carrie 40 y o  male MRN: [id number]  Unit/Bed#: [facility] 218-01 Encounter: [id number]    Assessment/Plan   Principal Problem:    Severe bipolar I disorder, most recent episode depressed without psychotic features ([facility] )  Active Problems:    Diabetic ulcer of right midfoot ([facility] )    DM (diabetes mellitus), type 2 ([facility] )    Essential hypertension      Behavior over the last 24 hours:    "I could be better " He remains despondent over having nowhere to live  He is agreeable to SNF referral for wound care upon discharge form this unit  He has remained calm and controlled since last seen, with no reports of agitation or hostility towards staff  He is medication and meal complaint  He refused snack and group last night, choosing to isolate to his room  He reports that his mood seemed better when he was on Depakote TID as opposed to hs only  He was very quiet and subdued, but polite, throughout our conversation this morning  Sleep: normal  Appetite: normal  Medication side effects: No  ROS: new diabetic foot ulcers    Mental Status Evaluation:  Appearance:  casually dressed, disheveled and overweight   Behavior:  cooperative, polite, subdued   Speech:  soft   Mood:  depressed   Affect:  flat   Thought Process:  logical   Thought Content:  ruminations over medical issues, homelessness   Perceptual Disturbances: None   Risk Potential: intermittent SI   Sensorium:  person, place, time/date and situation   Cognition:  grossly intact   Consciousness:  alert and awake    Attention: attention span and concentration were age appropriate   Insight:  poor   Judgment: poor   Gait/Station: unchanged   Motor Activity: no abnormal movements     Progress Toward Goals: Ongoing    Recommended Treatment: Continue with group therapy, milieu therapy and occupational therapy        Risks, benefits and possible side effects of Medications:   Risks, benefits, and possible side effects of medications explained to patient and patient verbalizes understanding  Medications:   all current active meds have been reviewed, current meds:   Current Facility-Administered Medications   Medication Dose Route Frequency    chlorproMAZINE (THORAZINE) tablet 100 mg  100 mg Oral TID    diclofenac sodium (VOLTAREN) 1 % topical gel 2 g  2 g Topical BID (AM & Afternoon)    divalproex sodium (DEPAKOTE ER) 24 hr tablet 1,000 mg  1,000 mg Oral HS    gabapentin (NEURONTIN) capsule 400 mg  400 mg Oral TID    insulin lispro (HumaLOG) 100 units/mL subcutaneous injection 1-5 Units  1-5 Units Subcutaneous TID AC    lisinopril (ZESTRIL) tablet 5 mg  5 mg Oral Daily    metoprolol tartrate (LOPRESSOR) tablet 25 mg  25 mg Oral Daily    mupirocin (BACTROBAN) 2 % ointment 1 application  1 application Topical TID    and planned medication changes: Will resume previous schedule of Depakote 500mg TID, as it was prescribed on discharge recently  Labs: Reviewed    Counseling / Coordination of Care  Total floor / unit time spent today 15 minutes  Greater than 50% of total time was spent with the patient and / or family counseling and / or coordination of care   A description of the counseling / coordination of care: 15

## 2018-07-15 LAB
GLUCOSE SERPL-MCNC: 257 MG/DL (ref 65–140)
GLUCOSE SERPL-MCNC: 261 MG/DL (ref 65–140)
GLUCOSE SERPL-MCNC: 272 MG/DL (ref 65–140)

## 2018-07-15 PROCEDURE — 82948 REAGENT STRIP/BLOOD GLUCOSE: CPT

## 2018-07-15 RX ADMIN — CHLORPROMAZINE HYDROCHLORIDE 100 MG: 100 TABLET, SUGAR COATED ORAL at 20:47

## 2018-07-15 RX ADMIN — DICLOFENAC 2 G: 10 GEL TOPICAL at 16:41

## 2018-07-15 RX ADMIN — INSULIN LISPRO 6 UNITS: 100 INJECTION, SOLUTION INTRAVENOUS; SUBCUTANEOUS at 12:51

## 2018-07-15 RX ADMIN — INSULIN LISPRO 6 UNITS: 100 INJECTION, SOLUTION INTRAVENOUS; SUBCUTANEOUS at 16:48

## 2018-07-15 RX ADMIN — METOPROLOL TARTRATE 25 MG: 25 TABLET ORAL at 08:54

## 2018-07-15 RX ADMIN — DIVALPROEX SODIUM 500 MG: 500 TABLET, DELAYED RELEASE ORAL at 20:48

## 2018-07-15 RX ADMIN — LISINOPRIL 5 MG: 5 TABLET ORAL at 08:54

## 2018-07-15 RX ADMIN — DIVALPROEX SODIUM 500 MG: 500 TABLET, DELAYED RELEASE ORAL at 16:40

## 2018-07-15 RX ADMIN — INSULIN LISPRO 2 UNITS: 100 INJECTION, SOLUTION INTRAVENOUS; SUBCUTANEOUS at 08:52

## 2018-07-15 RX ADMIN — CHLORPROMAZINE HYDROCHLORIDE 100 MG: 100 TABLET, SUGAR COATED ORAL at 16:40

## 2018-07-15 RX ADMIN — GABAPENTIN 400 MG: 400 CAPSULE ORAL at 16:40

## 2018-07-15 RX ADMIN — GABAPENTIN 400 MG: 400 CAPSULE ORAL at 20:48

## 2018-07-15 RX ADMIN — MUPIROCIN 1 APPLICATION: 20 OINTMENT TOPICAL at 08:54

## 2018-07-15 RX ADMIN — DICLOFENAC 2 G: 10 GEL TOPICAL at 08:53

## 2018-07-15 RX ADMIN — DIVALPROEX SODIUM 500 MG: 500 TABLET, DELAYED RELEASE ORAL at 08:54

## 2018-07-15 RX ADMIN — CHLORPROMAZINE HYDROCHLORIDE 100 MG: 100 TABLET, SUGAR COATED ORAL at 08:54

## 2018-07-15 RX ADMIN — GABAPENTIN 400 MG: 400 CAPSULE ORAL at 08:54

## 2018-07-15 NOTE — PROGRESS NOTES
Pt has been withdrawn to room and sleeping in bed all shift  Declined attendance to evening programming/snack even with direct prompting from staff  States, "I am not a people person, I don't want to be around them"  Appears clean but disheveled  Remains laying in bed with eye closed on approach by staff  Cane and personal shoes in place at bedside  Pt appears flat, depressed  Makes little interaction with staff  Denies acute changes in condition  Denies active SI and contracts for safety on the unit  Pt is unable to identify positive coping skills or share plans for d/c  Was compliant with scheduled HS medication  Pt is able to make needs known and agrees to report changes in condition/needs to staff  Nursing will continue to monitor and assess for changes and safety with 15 minute checks

## 2018-07-15 NOTE — CONSULTS
See consult performed by Dr Kwan Angela on 7/11/18 at [phone]  Patient w/ diabetic foot ulcer, cont local care per podiatry note dated 7/13/18  If there are any additional concerns, please call

## 2018-07-15 NOTE — PROGRESS NOTES
Progress Note - Behavioral Health   Andry Montanez 40 y o  male MRN: [id number]  Unit/Bed#: [facility] 218-01 Encounter: [id number]    Assessment/Plan   Principal Problem:    Severe bipolar I disorder, most recent episode depressed without psychotic features ([facility] )  Active Problems:    Diabetic ulcer of right midfoot ([facility] )    DM (diabetes mellitus), type 2 ([facility] )    Essential hypertension      Behavior over the last 24 hours:    He has remained very quiet, withdrawn  He was polite throughout the interview, though minimally conversational  He remains very hopeless, despondent  He now states that he does not want to go to a skilled nursing facility for wound care and stabilization  "I feel it would be a step in the wrong direction  Sherre Soulier Sherre Soulier I would have less independence " However, he has no plan on what he does want his disposition to be  Sleep: normal  Appetite: normal  Medication side effects: No  ROS: no complaints    Mental Status Evaluation:  Appearance:  casually dressed, disheveled and overweight   Behavior:  Cooperative, withdrawn   Speech:  soft   Mood:  depressed   Affect:  flat   Thought Process:  logical   Thought Content:  Ruminations   Perceptual Disturbances: None   Risk Potential: No SI so far today   Sensorium:  person, place, time/date and situation   Cognition:  grossly intact   Consciousness:  alert and awake    Attention: attention span and concentration were age appropriate   Insight:  Poor   Judgment: Poor   Gait/Station: slow   Motor Activity: no abnormal movements     Progress Toward Goals: Ongoing    Recommended Treatment: Continue with group therapy, milieu therapy and occupational therapy  Risks, benefits and possible side effects of Medications:   Risks, benefits, and possible side effects of medications explained to patient and patient verbalizes understanding        Medications:   all current active meds have been reviewed, continue current psychiatric medications and current meds:   Current Facility-Administered Medications   Medication Dose Route Frequency    chlorproMAZINE (THORAZINE) tablet 100 mg  100 mg Oral TID    diclofenac sodium (VOLTAREN) 1 % topical gel 2 g  2 g Topical BID (AM & Afternoon)    divalproex sodium (DEPAKOTE) EC tablet 500 mg  500 mg Oral TID    gabapentin (NEURONTIN) capsule 400 mg  400 mg Oral TID    insulin lispro (HumaLOG) 100 units/mL subcutaneous injection 1-5 Units  1-5 Units Subcutaneous TID AC    lisinopril (ZESTRIL) tablet 5 mg  5 mg Oral Daily    metoprolol tartrate (LOPRESSOR) tablet 25 mg  25 mg Oral Daily    mupirocin (BACTROBAN) 2 % ointment 1 application  1 application Topical TID     Labs: Reviewed  Consult to internal medicine service will be requested due to consistently elevated glucose compared to recent past     Counseling / Coordination of Care  Total floor / unit time spent today 15 minutes  Greater than 50% of total time was spent with the patient and / or family counseling and / or coordination of care   A description of the counseling / coordination of care: 15

## 2018-07-15 NOTE — PROGRESS NOTES
Patient awake, alert , ate breakfast with peers, back to bed after eating  Patient remains flat, withdrawn, to self and room, denies si  Hi and hallucinations  Patient c/o pain 5/10 in B/L  knees and B/L feet  Rest and reposition offered, patient unchanged  Pt showered independently and dressed both diabetic ulcers as ordered  Ulcers remain malodorous, ointment applied as ordered  Will maintain on safety precautions and 15 minute checks  No needs identified

## 2018-07-15 NOTE — PLAN OF CARE
ANXIETY     By discharge: Patient will verbalize 2 strategies to deal with anxiety Not Progressing        DEPRESSION     Will be euthymic at discharge Not [address] Discharge to home or other facility with appropriate resources Not Progressing        Ineffective Coping     Participates in unit activities Not Progressing        Risk for Self Injury/Neglect     Complete daily ADLs, including personal hygiene independently, as able Not Progressing          ANXIETY     Will report anxiety at manageable levels Progressing        METABOLIC, FLUID AND ELECTROLYTES - ADULT     Glucose maintained within target range Progressing        Prexisting or High Potential for Compromised Skin Integrity     Skin integrity is maintained or improved Progressing        Risk for Self Injury/Neglect     Treatment Goal: Remain safe during length of stay, learn and adopt new coping skills, and be free of self-injurious ideation, impulses and acts at the time of discharge Progressing     Verbalize thoughts and feelings Progressing     Attend and participate in unit activities, including therapeutic, recreational, and educational groups Progressing     Recognize maladaptive responses and adopt new coping mechanisms Progressing        SELF CARE DEFICIT     Return ADL status to a safe level of function Progressing        SELF HARM/SUICIDALITY     Will have no self-injury during hospital stay Progressing

## 2018-07-16 LAB
ATRIAL RATE: 79 BPM
ATRIAL RATE: 79 BPM
BACTERIA BLD CULT: NORMAL
BACTERIA BLD CULT: NORMAL
GLUCOSE SERPL-MCNC: 234 MG/DL (ref 65–140)
GLUCOSE SERPL-MCNC: 255 MG/DL (ref 65–140)
GLUCOSE SERPL-MCNC: 305 MG/DL (ref 65–140)
P AXIS: 22 DEGREES
P AXIS: 36 DEGREES
PR INTERVAL: 160 MS
PR INTERVAL: 164 MS
QRS AXIS: 16 DEGREES
QRS AXIS: 18 DEGREES
QRSD INTERVAL: 86 MS
QRSD INTERVAL: 86 MS
QT INTERVAL: 382 MS
QT INTERVAL: 388 MS
QTC INTERVAL: 438 MS
QTC INTERVAL: 444 MS
T WAVE AXIS: 44 DEGREES
T WAVE AXIS: 45 DEGREES
VENTRICULAR RATE: 79 BPM
VENTRICULAR RATE: 79 BPM

## 2018-07-16 PROCEDURE — 82948 REAGENT STRIP/BLOOD GLUCOSE: CPT

## 2018-07-16 PROCEDURE — 99232 SBSQ HOSP IP/OBS MODERATE 35: CPT | Performed by: NURSE PRACTITIONER

## 2018-07-16 RX ORDER — BUSPIRONE HYDROCHLORIDE 5 MG/1
10 TABLET ORAL 2 TIMES DAILY
Status: DISCONTINUED | OUTPATIENT
Start: 2018-07-16 | End: 2018-08-03 | Stop reason: HOSPADM

## 2018-07-16 RX ORDER — TRAZODONE HYDROCHLORIDE 50 MG/1
100 TABLET ORAL
Status: DISCONTINUED | OUTPATIENT
Start: 2018-07-16 | End: 2018-07-21

## 2018-07-16 RX ADMIN — DICLOFENAC 2 G: 10 GEL TOPICAL at 08:42

## 2018-07-16 RX ADMIN — GABAPENTIN 400 MG: 400 CAPSULE ORAL at 21:03

## 2018-07-16 RX ADMIN — CHLORPROMAZINE HYDROCHLORIDE 100 MG: 100 TABLET, SUGAR COATED ORAL at 21:03

## 2018-07-16 RX ADMIN — BUSPIRONE HYDROCHLORIDE 10 MG: 5 TABLET ORAL at 17:30

## 2018-07-16 RX ADMIN — CHLORPROMAZINE HYDROCHLORIDE 100 MG: 100 TABLET, SUGAR COATED ORAL at 08:40

## 2018-07-16 RX ADMIN — LISINOPRIL 5 MG: 5 TABLET ORAL at 08:40

## 2018-07-16 RX ADMIN — DIVALPROEX SODIUM 500 MG: 500 TABLET, DELAYED RELEASE ORAL at 16:15

## 2018-07-16 RX ADMIN — GABAPENTIN 400 MG: 400 CAPSULE ORAL at 16:15

## 2018-07-16 RX ADMIN — INSULIN LISPRO 8 UNITS: 100 INJECTION, SOLUTION INTRAVENOUS; SUBCUTANEOUS at 16:32

## 2018-07-16 RX ADMIN — INSULIN LISPRO 4 UNITS: 100 INJECTION, SOLUTION INTRAVENOUS; SUBCUTANEOUS at 12:36

## 2018-07-16 RX ADMIN — GABAPENTIN 400 MG: 400 CAPSULE ORAL at 08:40

## 2018-07-16 RX ADMIN — DIVALPROEX SODIUM 500 MG: 500 TABLET, DELAYED RELEASE ORAL at 08:40

## 2018-07-16 RX ADMIN — INSULIN LISPRO 6 UNITS: 100 INJECTION, SOLUTION INTRAVENOUS; SUBCUTANEOUS at 08:42

## 2018-07-16 RX ADMIN — METOPROLOL TARTRATE 25 MG: 25 TABLET ORAL at 08:40

## 2018-07-16 RX ADMIN — TRAZODONE HYDROCHLORIDE 100 MG: 50 TABLET ORAL at 21:03

## 2018-07-16 RX ADMIN — DIVALPROEX SODIUM 500 MG: 500 TABLET, DELAYED RELEASE ORAL at 21:03

## 2018-07-16 RX ADMIN — MUPIROCIN 1 APPLICATION: 20 OINTMENT TOPICAL at 08:43

## 2018-07-16 RX ADMIN — CHLORPROMAZINE HYDROCHLORIDE 100 MG: 100 TABLET, SUGAR COATED ORAL at 16:15

## 2018-07-16 NOTE — SOCIAL WORK
Pt met with sw in [facility];   Pt states that he doesn't want to go to the nursing home;  sw explained that sw and pt would then need to meet to discuss discharge plan as the choices are SNF or street discharge;  Pt then states "Fine, I'll go to the nursing home"; pt then turned and walked away from sw

## 2018-07-16 NOTE — PROGRESS NOTES
Pt is flat and WD to self  Pt is disheveled and odorous  No c/o pain  No S/S of distress at this time  Pt verbalizes that he wanted to go to rehab versus being homeless  Pt is blocked/guarded  Doesn't say much to nursing staff when he is asked how he is feeling  Pt has not been as irritable towards staff  Pt has remained compliant with meds including his insulin  Pt out for meals, but then returns to his room  Pt has not attended group so far this shift  Denies any SI/HI at this time  Will continue Q 15 min checks to maintain pt safety

## 2018-07-16 NOTE — PROGRESS NOTES
Progress Note - Behavioral Health     Davis Crowm 40 y o  male MRN: [id number]   Unit/Bed#: [facility] 218-01 Encounter: [id number]    Behavior over the last 24 hours:  Dionna Sotelo was calm, pleasant, and cooperative during the interview  He was seen in his room  Per nursing staff, pt  Continues to be withdrawn and isolative to room  He comes out for meals and remains compliant with medications  He is not interested in attending groups  He interacts appropriately with staff  He states he is feeling anxious  Otherwise, he offers no current complaints  He rates his mood as "depressed"  He has been performing his own dressing changes to his residual left leg  ROS: no complaints    Mental Status Evaluation:    Appearance:  disheveled   Behavior:  normal, pleasant, cooperative   Speech:  normal rate and volume   Mood:  depressed   Affect:  flat   Thought Process:  organized, logical, coherent   Associations: intact associations   Thought Content:  normal   Perceptual Disturbances: none   Risk Potential: Suicidal ideation - None  Homicidal ideation - None  Potential for aggression - No   Sensorium:  oriented to person, place, time/date and situation   Memory:  recent and remote memory grossly intact   Consciousness:  alert and awake   Attention: attention span and concentration are age appropriate   Insight:  poor   Judgment: poor   Gait/Station: normal gait/station   Motor Activity: no abnormal movements     Vital signs in last 24 hours:    Temp:  [95 8 °F (35 4 °C)-98 °F (36 7 °C)] 98 °F (36 7 °C)  HR:  [75-81] 81  Resp:  [16-18] 18  BP: (125-152)/(74-93) 152/93    Laboratory results:  I have personally reviewed all pertinent laboratory/tests results      Progress Toward Goals: progressing    Assessment/Plan   Principal Problem:    Severe bipolar I disorder, most recent episode depressed without psychotic features ([facility] )  Active Problems:    Diabetic ulcer of right midfoot (HCC)    DM (diabetes mellitus), type 2 ([facility] )    Essential hypertension    Recommended Treatment:   1  Will add Buspar 10mg BID for anxiety  2  Will continue all other medications/treatment plan as prescribed  3  Will continue to encourage patient to participate in the therapeutic milieu of the unit by attending groups  4  Discharge planning and disposition are ongoing  Plan is to obtain placement in a SNF for rehabilitation  All current active medications have been reviewed  Current Facility-Administered Medications:  chlorproMAZINE 100 mg Oral TID TAYLOR Galeana   diclofenac sodium 2 g Topical BID (AM & Afternoon) Leidy Acharya PA-C   divalproex sodium 500 mg Oral TID Leidy Acharya PA-C   gabapentin 400 mg Oral TID TAYLOR Galeana   insulin lispro 2-12 Units Subcutaneous TID GENA Cárdenas PA-C   lisinopril 5 mg Oral Daily Leidy Acharya PA-C   metoprolol tartrate 25 mg Oral Daily Leidy Acharya PA-C   mupirocin 1 application Topical TID Michell Riggs DPM       Risks / Benefits of Treatment:    Risks, benefits, and possible side effects of medications explained to patient and patient verbalizes understanding and agreement for treatment  Counseling / Coordination of Care:      Patient's progress discussed with staff in treatment team meeting  Medications, treatment progress and treatment plan reviewed with patient

## 2018-07-16 NOTE — PLAN OF CARE
SELF HARM/SUICIDALITY     Will have no self-injury during hospital stay Completed          Ineffective Coping     Participates in unit activities Not Progressing        Risk for Self Injury/Neglect     Verbalize thoughts and feelings Not Progressing     Complete daily ADLs, including personal hygiene independently, as able Not Progressing          ANXIETY     Will report anxiety at manageable levels Progressing     By discharge: Patient will verbalize 2 strategies to deal with anxiety Progressing        DEPRESSION     Will be euthymic at discharge [address] Discharge to home or other facility with appropriate resources Progressing        METABOLIC, FLUID AND ELECTROLYTES - ADULT     Glucose maintained within target range Progressing        Nutrition/Hydration-ADULT     Nutrient/Hydration intake appropriate for improving, restoring or maintaining nutritional needs Progressing        Prexisting or High Potential for Compromised Skin Integrity     Skin integrity is maintained or improved Progressing        Risk for Self Injury/Neglect     Treatment Goal: Remain safe during length of stay, learn and adopt new coping skills, and be free of self-injurious ideation, impulses and acts at the time of discharge Progressing     Recognize maladaptive responses and adopt new coping mechanisms Progressing        SELF CARE DEFICIT     Return ADL status to a safe level of function Progressing

## 2018-07-16 NOTE — PROGRESS NOTES
Patient withdrawn to room and resting in bed during evening shift  No interactions with peers  Denies s/i    Minimally verbal

## 2018-07-16 NOTE — SOCIAL WORK
sw completed LOD paperwork and faxed to [address] [place] [address]; awaiting assessment date

## 2018-07-17 LAB
GLUCOSE SERPL-MCNC: 226 MG/DL (ref 65–140)
GLUCOSE SERPL-MCNC: 257 MG/DL (ref 65–140)
GLUCOSE SERPL-MCNC: 264 MG/DL (ref 65–140)

## 2018-07-17 PROCEDURE — 99231 SBSQ HOSP IP/OBS SF/LOW 25: CPT | Performed by: NURSE PRACTITIONER

## 2018-07-17 PROCEDURE — 82948 REAGENT STRIP/BLOOD GLUCOSE: CPT

## 2018-07-17 RX ORDER — DULOXETIN HYDROCHLORIDE 30 MG/1
30 CAPSULE, DELAYED RELEASE ORAL DAILY
Status: DISCONTINUED | OUTPATIENT
Start: 2018-07-17 | End: 2018-07-20

## 2018-07-17 RX ADMIN — TRAZODONE HYDROCHLORIDE 100 MG: 50 TABLET ORAL at 21:38

## 2018-07-17 RX ADMIN — DIVALPROEX SODIUM 500 MG: 500 TABLET, DELAYED RELEASE ORAL at 09:26

## 2018-07-17 RX ADMIN — METOPROLOL TARTRATE 25 MG: 25 TABLET ORAL at 09:25

## 2018-07-17 RX ADMIN — MUPIROCIN 1 APPLICATION: 20 OINTMENT TOPICAL at 09:27

## 2018-07-17 RX ADMIN — DULOXETINE HYDROCHLORIDE 30 MG: 30 CAPSULE, DELAYED RELEASE ORAL at 13:08

## 2018-07-17 RX ADMIN — GABAPENTIN 400 MG: 400 CAPSULE ORAL at 21:38

## 2018-07-17 RX ADMIN — BUSPIRONE HYDROCHLORIDE 10 MG: 5 TABLET ORAL at 19:05

## 2018-07-17 RX ADMIN — CHLORPROMAZINE HYDROCHLORIDE 100 MG: 100 TABLET, SUGAR COATED ORAL at 19:05

## 2018-07-17 RX ADMIN — GABAPENTIN 400 MG: 400 CAPSULE ORAL at 09:24

## 2018-07-17 RX ADMIN — DIVALPROEX SODIUM 500 MG: 500 TABLET, DELAYED RELEASE ORAL at 21:38

## 2018-07-17 RX ADMIN — DIVALPROEX SODIUM 500 MG: 500 TABLET, DELAYED RELEASE ORAL at 19:04

## 2018-07-17 RX ADMIN — LISINOPRIL 5 MG: 5 TABLET ORAL at 09:26

## 2018-07-17 RX ADMIN — MUPIROCIN 1 APPLICATION: 20 OINTMENT TOPICAL at 19:07

## 2018-07-17 RX ADMIN — BUSPIRONE HYDROCHLORIDE 10 MG: 5 TABLET ORAL at 09:24

## 2018-07-17 RX ADMIN — GABAPENTIN 400 MG: 400 CAPSULE ORAL at 19:05

## 2018-07-17 RX ADMIN — CHLORPROMAZINE HYDROCHLORIDE 100 MG: 100 TABLET, SUGAR COATED ORAL at 09:26

## 2018-07-17 RX ADMIN — CHLORPROMAZINE HYDROCHLORIDE 100 MG: 100 TABLET, SUGAR COATED ORAL at 21:38

## 2018-07-17 RX ADMIN — INSULIN LISPRO 6 UNITS: 100 INJECTION, SOLUTION INTRAVENOUS; SUBCUTANEOUS at 13:08

## 2018-07-17 RX ADMIN — INSULIN LISPRO 6 UNITS: 100 INJECTION, SOLUTION INTRAVENOUS; SUBCUTANEOUS at 19:03

## 2018-07-17 RX ADMIN — INSULIN LISPRO 4 UNITS: 100 INJECTION, SOLUTION INTRAVENOUS; SUBCUTANEOUS at 09:24

## 2018-07-17 NOTE — PROGRESS NOTES
Progress Note - Behavioral Health   Cristiano Blake 40 y o  male MRN: [id number]  Unit/Bed#: [facility] 218-01 Encounter: [id number]    Assessment/Plan   Principal Problem:    Severe bipolar I disorder, most recent episode depressed without psychotic features ([facility] )  Active Problems:    Diabetic ulcer of right midfoot ([facility] )    DM (diabetes mellitus), type 2 ([facility] )    Essential hypertension    This is a 25 minutes psychiatric progress note on patient  15 minutes were spent in coordination and care of treatment of this patient  Coordination of care consisted of meeting with the multi-disciplinary treatment team discussed patient's progress, documentation, medication, orders and behaviors over last 24 hours  Per the nursing staff on the patient's eating 100% of his meals supple through the night  Patient was reporting he is very depressed  He is on no antidepressant at this time  Prior to this, he said almost every antidepressant was not helpful to him  I reviewed them again with them and were going to opt to get back on Cymbalta 30 mg a day would be helpful for his pain issues secondary to his diabetes  Continues to require inpatient care and treatment  We are also awaiting input from the county regarding possible placement at a skilled nursing facility secondary to his new ulcerative wound secondary to his diabetes    Behavior over the last 24 hours:  unchanged  Sleep: normal  Appetite: normal  Medication side effects: No  ROS: no complaints    Mental Status Evaluation:  Appearance:  casually dressed   Behavior:  guarded   Speech:  soft   Mood:  constricted   Affect:  constricted   Thought Process:  concrete   Thought Content:  obsessions   Perceptual Disturbances: None   Risk Potential: Potential for Aggression No   Sensorium:  person, place and time/date   Cognition:  impaired due to ilnness   Consciousness:  alert and awake    Attention: attention span appeared shorter than expected for age   Insight:  poor   Judgment: poor   Gait/Station: unsteady gait   Motor Activity: no abnormal movements     Progress Toward Goals: Unchanged    Recommended Treatment: Continue with group therapy, milieu therapy and occupational therapy  Risks, benefits and possible side effects of Medications:   Risks, benefits, and possible side effects of medications explained to patient and patient verbalizes understanding  Medications: planned medication changes: Add Cymbalta  Labs: Reviewed    Counseling / Coordination of Care  Total floor / unit time spent today 30 minutes  Greater than 50% of total time was spent with the patient and / or family counseling and / or coordination of care   A description of the counseling / coordination of care: 30

## 2018-07-17 NOTE — PROGRESS NOTES
Pt has been withdrawn to room this shift with exception of HS snack  Is otherwise resting in bed  Pt sought out nursing for HS medication following a phone call  Pt presents disheveled  Flat affect, poor eye contact, depressed mood  Cane at bedside  Interacts minimally with staff but does respond appropriately to assessment questions  Is polite  No irritability  Pt reports he had a visit today which went well but otherwise his condition is unchanged  Pt reports moderate depression and "hypomania"  When asked to describe self assessment pt replied, "I am just extremely depressed"  Pt denies any active SI  Contracts for safety on the unit  Pt is hopeless/helpless  Unable to visual a positive future  Unable to identify positive coping skills  Nursing offered encouragement to increase involvement in the community and start attending groups  Be more active, walk the halls  Pt evasive with such  Only insight into d/c planning pt can offer is "I'm going to a nursing home"  Pt was compliant with HS medication, with exception of bactroban  Received PRN trazodone 100mg as requested for sleep  Pt is able to make needs known and agrees to report changes in condition/needs to staff  Nursing will continue to monitor and assess for changes and safety with 15 minute checks

## 2018-07-17 NOTE — PROGRESS NOTES
With the use of newly prescribed trazodone 100mg PRN, Pt slept soundly through the night  No periods of waking have been observed  No acute concerns have been noted  15 minute checks have been maintained incident free   Will continue to monitor

## 2018-07-17 NOTE — PLAN OF CARE
DEPRESSION     Will be euthymic at discharge Not Progressing        Ineffective Coping     Participates in unit activities Not Progressing          ANXIETY     Will report anxiety at manageable levels Progressing     By discharge: Patient will verbalize 2 strategies to deal with anxiety Progressing        DISCHARGE PLANNING     Discharge to home or other facility with appropriate resources Progressing        METABOLIC, FLUID AND ELECTROLYTES - ADULT     Glucose maintained within target range Progressing        Nutrition/Hydration-ADULT     Nutrient/Hydration intake appropriate for improving, restoring or maintaining nutritional needs Progressing        Prexisting or High Potential for Compromised Skin Integrity     Skin integrity is maintained or improved Progressing        Risk for Self Injury/Neglect     Treatment Goal: Remain safe during length of stay, learn and adopt new coping skills, and be free of self-injurious ideation, impulses and acts at the time of discharge Progressing     Verbalize thoughts and feelings Progressing     Recognize maladaptive responses and adopt new coping mechanisms Progressing     Complete daily ADLs, including personal hygiene independently, as able Progressing        SELF CARE DEFICIT     Return ADL status to a safe level of function Progressing

## 2018-07-17 NOTE — PROGRESS NOTES
Pt is to perform self care on foot wounds  Instructed to come to nursing desk for supplies  Pt verbalized understanding  No drainage noted at this time

## 2018-07-17 NOTE — SOCIAL WORK
manuela placed phone call to [address] [place] [address];  LOD received - not assigned to  yet

## 2018-07-17 NOTE — PROGRESS NOTES
Pt has been isolated to room other than meals  Presents as profoundly depressed with poor eye contact  Pt states he is feeling hopeless  Denies current SI  Has no acute concerns or questions at this time

## 2018-07-18 LAB
GLUCOSE SERPL-MCNC: 219 MG/DL (ref 65–140)
GLUCOSE SERPL-MCNC: 239 MG/DL (ref 65–140)

## 2018-07-18 PROCEDURE — 99232 SBSQ HOSP IP/OBS MODERATE 35: CPT | Performed by: PHYSICIAN ASSISTANT

## 2018-07-18 PROCEDURE — 82948 REAGENT STRIP/BLOOD GLUCOSE: CPT

## 2018-07-18 RX ADMIN — LISINOPRIL 5 MG: 5 TABLET ORAL at 08:27

## 2018-07-18 RX ADMIN — TRAZODONE HYDROCHLORIDE 100 MG: 50 TABLET ORAL at 21:32

## 2018-07-18 RX ADMIN — CHLORPROMAZINE HYDROCHLORIDE 100 MG: 100 TABLET, SUGAR COATED ORAL at 18:02

## 2018-07-18 RX ADMIN — METOPROLOL TARTRATE 25 MG: 25 TABLET ORAL at 08:27

## 2018-07-18 RX ADMIN — DICLOFENAC 2 G: 10 GEL TOPICAL at 18:09

## 2018-07-18 RX ADMIN — INSULIN LISPRO 8 UNITS: 100 INJECTION, SOLUTION INTRAVENOUS; SUBCUTANEOUS at 12:24

## 2018-07-18 RX ADMIN — DIVALPROEX SODIUM 500 MG: 500 TABLET, DELAYED RELEASE ORAL at 21:32

## 2018-07-18 RX ADMIN — MUPIROCIN 1 APPLICATION: 20 OINTMENT TOPICAL at 12:30

## 2018-07-18 RX ADMIN — GABAPENTIN 400 MG: 400 CAPSULE ORAL at 08:27

## 2018-07-18 RX ADMIN — GABAPENTIN 400 MG: 400 CAPSULE ORAL at 21:32

## 2018-07-18 RX ADMIN — INSULIN LISPRO 4 UNITS: 100 INJECTION, SOLUTION INTRAVENOUS; SUBCUTANEOUS at 18:02

## 2018-07-18 RX ADMIN — CHLORPROMAZINE HYDROCHLORIDE 100 MG: 100 TABLET, SUGAR COATED ORAL at 21:32

## 2018-07-18 RX ADMIN — GABAPENTIN 400 MG: 400 CAPSULE ORAL at 18:02

## 2018-07-18 RX ADMIN — DIVALPROEX SODIUM 500 MG: 500 TABLET, DELAYED RELEASE ORAL at 08:27

## 2018-07-18 RX ADMIN — INSULIN LISPRO 4 UNITS: 100 INJECTION, SOLUTION INTRAVENOUS; SUBCUTANEOUS at 08:26

## 2018-07-18 RX ADMIN — DULOXETINE HYDROCHLORIDE 30 MG: 30 CAPSULE, DELAYED RELEASE ORAL at 08:27

## 2018-07-18 RX ADMIN — CHLORPROMAZINE HYDROCHLORIDE 100 MG: 100 TABLET, SUGAR COATED ORAL at 08:27

## 2018-07-18 RX ADMIN — DIVALPROEX SODIUM 500 MG: 500 TABLET, DELAYED RELEASE ORAL at 18:02

## 2018-07-18 RX ADMIN — BUSPIRONE HYDROCHLORIDE 10 MG: 5 TABLET ORAL at 08:27

## 2018-07-18 RX ADMIN — BUSPIRONE HYDROCHLORIDE 10 MG: 5 TABLET ORAL at 18:02

## 2018-07-18 RX ADMIN — MUPIROCIN 1 APPLICATION: 20 OINTMENT TOPICAL at 18:06

## 2018-07-18 NOTE — SOCIAL WORK
SW met with patient for clinical update  The patient was observed in his room, lying in his bed  Mood depressed; affect flattened  Voice tone quiet  The patient was polite, receptive; stated that he was very depressed and has little time out of his room participating  Denied A/V hallucinations; denied SI/intent or plan  Feels poorly motivated; helpless/hopeless  Little interest in activities  Has been cooperative and compliant with meds  Reported having started a new antidepressant yesterday  Encouraged to attempt increased interaction to increase motivation and assist in developing more effective coping skills

## 2018-07-18 NOTE — PROGRESS NOTES
Pt has been pleasant and cooperative during the day, pt denies current si/hi at this time, pt has been medication and meal compliant  Pt denies any current issues at this time

## 2018-07-18 NOTE — PROGRESS NOTES
Complainant with medications and meals  Maintained on Q 15 minute safety checks  No acting out, suicidal ideations, and/or homicidal behaviors  No changes in medical condition or complaints voiced  Fluids maintained at bedside to promote hydration

## 2018-07-18 NOTE — PROGRESS NOTES
No overtly aggressive behaviors  Patient observed sleeping during most Q15 minute safety checks (second portion of shift)  No suicidal ideations, acting out or homicidal behaviors  No change in medical condition or complaints voiced  Fluids maintained at bedside to promote hydration

## 2018-07-18 NOTE — PROGRESS NOTES
Progress Note - Behavioral Health   Davis Conrad 40 y o  male MRN: [id number]  Unit/Bed#: [facility] 218-01 Encounter: [id number]    Assessment/Plan   Principal Problem:    Severe bipolar I disorder, most recent episode depressed without psychotic features ([facility] )  Active Problems:    Diabetic ulcer of right midfoot ([facility] )    DM (diabetes mellitus), type 2 ([facility] )    Essential hypertension      Behavior over the last 24 hours:    "This is the most depressed I've ever been "  He remains cooperative and polite with staff with no reports of agitation or verbal aggression  He is not attending most groups  He is medication and meal compliant  He reports broken sleep, "tossing and turning" throughout the night  He is tolerating Cymbalta trial so far, receiving his first dose yesterday  County assessment for LOD expected to occur today  Sleep: insomnia  Appetite: normal  Medication side effects: No  ROS: no complaints    Mental Status Evaluation:  Appearance:  age appropriate, disheveled and overweight   Behavior:  cooperative but withdrawn   Speech:  soft   Mood:  depressed   Affect:  flat   Thought Process:  poverty of thoughts   Thought Content:  ruminations related to psychosocial stressors   Perceptual Disturbances: None   Risk Potential: able to contract for safety on the unit   Sensorium:  person, place, time/date and situation   Cognition:  grossly intact   Consciousness:  alert and awake    Attention: attention span and concentration were age appropriate   Insight:  poor   Judgment: poor   Gait/Station: unchanged   Motor Activity: no abnormal movements     Progress Toward Goals: Ongoing    Recommended Treatment: Continue with group therapy, milieu therapy and occupational therapy  Risks, benefits and possible side effects of Medications:   Risks, benefits, and possible side effects of medications explained to patient and patient verbalizes understanding        Medications:   all current active meds have been reviewed, continue current psychiatric medications and current meds:   Current Facility-Administered Medications   Medication Dose Route Frequency    busPIRone (BUSPAR) tablet 10 mg  10 mg Oral BID    chlorproMAZINE (THORAZINE) tablet 100 mg  100 mg Oral TID    diclofenac sodium (VOLTAREN) 1 % topical gel 2 g  2 g Topical BID (AM & Afternoon)    divalproex sodium (DEPAKOTE) EC tablet 500 mg  500 mg Oral TID    DULoxetine (CYMBALTA) delayed release capsule 30 mg  30 mg Oral Daily    gabapentin (NEURONTIN) capsule 400 mg  400 mg Oral TID    insulin lispro (HumaLOG) 100 units/mL subcutaneous injection 2-12 Units  2-12 Units Subcutaneous TID AC    lisinopril (ZESTRIL) tablet 5 mg  5 mg Oral Daily    metoprolol tartrate (LOPRESSOR) tablet 25 mg  25 mg Oral Daily    mupirocin (BACTROBAN) 2 % ointment 1 application  1 application Topical TID    traZODone (DESYREL) tablet 100 mg  100 mg Oral HS PRN     We will monitor response to Cymbalta  Labs: Reviewed    Counseling / Coordination of Care  Total floor / unit time spent today 25 minutes  Greater than 50% of total time was spent with the patient and / or family counseling and / or coordination of care   A description of the counseling / coordination of care: 25

## 2018-07-19 LAB
GLUCOSE SERPL-MCNC: 218 MG/DL (ref 65–140)
GLUCOSE SERPL-MCNC: 227 MG/DL (ref 65–140)
GLUCOSE SERPL-MCNC: 333 MG/DL (ref 65–140)

## 2018-07-19 PROCEDURE — 82948 REAGENT STRIP/BLOOD GLUCOSE: CPT

## 2018-07-19 PROCEDURE — 99231 SBSQ HOSP IP/OBS SF/LOW 25: CPT | Performed by: PSYCHIATRY & NEUROLOGY

## 2018-07-19 RX ORDER — LANOLIN ALCOHOL/MO/W.PET/CERES
6 CREAM (GRAM) TOPICAL
Status: DISCONTINUED | OUTPATIENT
Start: 2018-07-19 | End: 2018-08-03 | Stop reason: HOSPADM

## 2018-07-19 RX ADMIN — TRAZODONE HYDROCHLORIDE 100 MG: 50 TABLET ORAL at 20:53

## 2018-07-19 RX ADMIN — INSULIN LISPRO 4 UNITS: 100 INJECTION, SOLUTION INTRAVENOUS; SUBCUTANEOUS at 08:56

## 2018-07-19 RX ADMIN — MUPIROCIN 1 APPLICATION: 20 OINTMENT TOPICAL at 09:00

## 2018-07-19 RX ADMIN — BUSPIRONE HYDROCHLORIDE 10 MG: 5 TABLET ORAL at 08:56

## 2018-07-19 RX ADMIN — GABAPENTIN 400 MG: 400 CAPSULE ORAL at 20:55

## 2018-07-19 RX ADMIN — BUSPIRONE HYDROCHLORIDE 10 MG: 5 TABLET ORAL at 17:31

## 2018-07-19 RX ADMIN — INSULIN LISPRO 4 UNITS: 100 INJECTION, SOLUTION INTRAVENOUS; SUBCUTANEOUS at 17:35

## 2018-07-19 RX ADMIN — DULOXETINE HYDROCHLORIDE 30 MG: 30 CAPSULE, DELAYED RELEASE ORAL at 08:56

## 2018-07-19 RX ADMIN — CHLORPROMAZINE HYDROCHLORIDE 100 MG: 100 TABLET, SUGAR COATED ORAL at 08:56

## 2018-07-19 RX ADMIN — DIVALPROEX SODIUM 500 MG: 500 TABLET, DELAYED RELEASE ORAL at 08:56

## 2018-07-19 RX ADMIN — DIVALPROEX SODIUM 500 MG: 500 TABLET, DELAYED RELEASE ORAL at 17:32

## 2018-07-19 RX ADMIN — METOPROLOL TARTRATE 25 MG: 25 TABLET ORAL at 08:56

## 2018-07-19 RX ADMIN — MELATONIN 6 MG: at 20:54

## 2018-07-19 RX ADMIN — DIVALPROEX SODIUM 500 MG: 500 TABLET, DELAYED RELEASE ORAL at 20:54

## 2018-07-19 RX ADMIN — CHLORPROMAZINE HYDROCHLORIDE 100 MG: 100 TABLET, SUGAR COATED ORAL at 17:31

## 2018-07-19 RX ADMIN — GABAPENTIN 400 MG: 400 CAPSULE ORAL at 08:56

## 2018-07-19 RX ADMIN — LISINOPRIL 5 MG: 5 TABLET ORAL at 08:56

## 2018-07-19 RX ADMIN — GABAPENTIN 400 MG: 400 CAPSULE ORAL at 17:32

## 2018-07-19 RX ADMIN — CHLORPROMAZINE HYDROCHLORIDE 100 MG: 100 TABLET, SUGAR COATED ORAL at 20:55

## 2018-07-19 RX ADMIN — INSULIN LISPRO 8 UNITS: 100 INJECTION, SOLUTION INTRAVENOUS; SUBCUTANEOUS at 11:50

## 2018-07-19 NOTE — PROGRESS NOTES
With the use of PRN trazodone 100mg, pt is and has been sleeping soundly through night  No periods of waking have been observed  No acute concerns have been noted  15 minute checks have been maintained incident free   Will continue to monitor

## 2018-07-19 NOTE — SOCIAL WORK
SW met with patient and reviewed and signed weekly  treatment plan  Pt has no questions or concerns at present time

## 2018-07-19 NOTE — PROGRESS NOTES
Withdrawn to room this shift  No participation in the community  No attendance to evening snack  Pt declines all unit activities even with direct prompting  Pt sleeps in bed through the evening  Remains laying in bed even with staff at bedside  Pt is flat and depressed on approach  Poor eye contact  Disheveled appearance  Pt offers no information during assessment and is evasive with prompting  Answers very briefly to questions in soft, mumbled tone  When asked about SI states, "not right now"  Does contract for safety  With encouragement from nursing to participate more actively in the community pt states, "I'll try"  Denies HI/chopra  Requested/received PRN trazodone with HS medication  Pt is able to make needs known and agrees to report changes in condition/needs to staff  Nursing will continue to monitor and assess for changes and safety with 15 minute checks

## 2018-07-19 NOTE — PROGRESS NOTES
Pt has been withdrawn to his room, pt has not been seen in groups during the day  Pt has been medication and meal compliant  Pt denies any current issues at this time  Will continue to monitor

## 2018-07-19 NOTE — SOCIAL WORK
manuela faxed referrals on pt behalf to the following SNFs    [address]     Awaiting responses

## 2018-07-19 NOTE — PROGRESS NOTES
Progress Note - Behavioral Health   Argenis Beavers 40 y o  male MRN: [id number]  Unit/Bed#: [facility] 218-01 Encounter: [id number]    Assessment/Plan   Principal Problem:    Severe bipolar I disorder, most recent episode depressed without psychotic features ([facility] )  Active Problems:    Diabetic ulcer of right midfoot ([facility] )    DM (diabetes mellitus), type 2 ([facility] )    Essential hypertension      Behavior over the last 24 hours:    Patient seen in his room  He is resting after not sleeping well again last night  "That's why I'm always in bed " He was very polite throughout our conversation  He still does not feel any improvement in his depression symptoms  Reported that he does not feel suicidal "here " He is tolerating Cymbalta trial  States sleep medication is not working and reports that melatonin has worked for him in the past   Sleep: insomnia  Appetite: normal  Medication side effects: No  ROS: no complaints    Mental Status Evaluation:  Appearance:  age appropriate, disheveled and overweight   Behavior:  cooperative, isolative   Speech:  soft   Mood:  depressed   Affect:  flat   Thought Process:  logical   Thought Content:  obsessions   Perceptual Disturbances: None   Risk Potential: able to contract for safety here, though reports would be suicidal outside of the hospital   Sensorium:  person, place, time/date and situation   Cognition:  grossly intact   Consciousness:  alert and awake    Attention: attention span and concentration were age appropriate   Insight:  limited   Judgment: limited   Gait/Station: unchanged   Motor Activity: no abnormal movements     Progress Toward Goals: Ongoing    Recommended Treatment: Continue with group therapy, milieu therapy and occupational therapy  Risks, benefits and possible side effects of Medications:   Risks, benefits, and possible side effects of medications explained to patient and patient verbalizes understanding        Medications:   all current active meds have been reviewed, continue current psychiatric medications, current meds:   Current Facility-Administered Medications   Medication Dose Route Frequency    busPIRone (BUSPAR) tablet 10 mg  10 mg Oral BID    chlorproMAZINE (THORAZINE) tablet 100 mg  100 mg Oral TID    diclofenac sodium (VOLTAREN) 1 % topical gel 2 g  2 g Topical BID (AM & Afternoon)    divalproex sodium (DEPAKOTE) EC tablet 500 mg  500 mg Oral TID    DULoxetine (CYMBALTA) delayed release capsule 30 mg  30 mg Oral Daily    gabapentin (NEURONTIN) capsule 400 mg  400 mg Oral TID    insulin lispro (HumaLOG) 100 units/mL subcutaneous injection 2-12 Units  2-12 Units Subcutaneous TID AC    lisinopril (ZESTRIL) tablet 5 mg  5 mg Oral Daily    metoprolol tartrate (LOPRESSOR) tablet 25 mg  25 mg Oral Daily    mupirocin (BACTROBAN) 2 % ointment 1 application  1 application Topical TID    traZODone (DESYREL) tablet 100 mg  100 mg Oral HS PRN    and planned medication changes: Will review hs medications and consider change of trazodone to melatonin  Labs: Reviewed    Counseling / Coordination of Care  Total floor / unit time spent today 25 minutes  Greater than 50% of total time was spent with the patient and / or family counseling and / or coordination of care   A description of the counseling / coordination of care: 25

## 2018-07-20 LAB
GLUCOSE SERPL-MCNC: 231 MG/DL (ref 65–140)
GLUCOSE SERPL-MCNC: 248 MG/DL (ref 65–140)
GLUCOSE SERPL-MCNC: 314 MG/DL (ref 65–140)

## 2018-07-20 PROCEDURE — 82948 REAGENT STRIP/BLOOD GLUCOSE: CPT

## 2018-07-20 PROCEDURE — 99232 SBSQ HOSP IP/OBS MODERATE 35: CPT | Performed by: PSYCHIATRY & NEUROLOGY

## 2018-07-20 RX ORDER — DULOXETIN HYDROCHLORIDE 60 MG/1
60 CAPSULE, DELAYED RELEASE ORAL DAILY
Status: DISCONTINUED | OUTPATIENT
Start: 2018-07-21 | End: 2018-08-03 | Stop reason: HOSPADM

## 2018-07-20 RX ADMIN — METOPROLOL TARTRATE 25 MG: 25 TABLET ORAL at 08:39

## 2018-07-20 RX ADMIN — INSULIN LISPRO 4 UNITS: 100 INJECTION, SOLUTION INTRAVENOUS; SUBCUTANEOUS at 08:39

## 2018-07-20 RX ADMIN — CHLORPROMAZINE HYDROCHLORIDE 100 MG: 100 TABLET, SUGAR COATED ORAL at 17:33

## 2018-07-20 RX ADMIN — CHLORPROMAZINE HYDROCHLORIDE 100 MG: 100 TABLET, SUGAR COATED ORAL at 08:40

## 2018-07-20 RX ADMIN — CHLORPROMAZINE HYDROCHLORIDE 100 MG: 100 TABLET, SUGAR COATED ORAL at 20:31

## 2018-07-20 RX ADMIN — MUPIROCIN 1 APPLICATION: 20 OINTMENT TOPICAL at 17:34

## 2018-07-20 RX ADMIN — INSULIN LISPRO 4 UNITS: 100 INJECTION, SOLUTION INTRAVENOUS; SUBCUTANEOUS at 17:32

## 2018-07-20 RX ADMIN — INSULIN LISPRO 8 UNITS: 100 INJECTION, SOLUTION INTRAVENOUS; SUBCUTANEOUS at 12:25

## 2018-07-20 RX ADMIN — DIVALPROEX SODIUM 500 MG: 500 TABLET, DELAYED RELEASE ORAL at 17:34

## 2018-07-20 RX ADMIN — LISINOPRIL 5 MG: 5 TABLET ORAL at 08:40

## 2018-07-20 RX ADMIN — GABAPENTIN 400 MG: 400 CAPSULE ORAL at 20:31

## 2018-07-20 RX ADMIN — GABAPENTIN 400 MG: 400 CAPSULE ORAL at 17:33

## 2018-07-20 RX ADMIN — GABAPENTIN 400 MG: 400 CAPSULE ORAL at 08:40

## 2018-07-20 RX ADMIN — BUSPIRONE HYDROCHLORIDE 10 MG: 5 TABLET ORAL at 17:33

## 2018-07-20 RX ADMIN — BUSPIRONE HYDROCHLORIDE 10 MG: 5 TABLET ORAL at 08:40

## 2018-07-20 RX ADMIN — DIVALPROEX SODIUM 500 MG: 500 TABLET, DELAYED RELEASE ORAL at 20:31

## 2018-07-20 RX ADMIN — DIVALPROEX SODIUM 500 MG: 500 TABLET, DELAYED RELEASE ORAL at 08:40

## 2018-07-20 RX ADMIN — DULOXETINE HYDROCHLORIDE 30 MG: 30 CAPSULE, DELAYED RELEASE ORAL at 08:40

## 2018-07-20 RX ADMIN — MELATONIN 6 MG: at 20:35

## 2018-07-20 NOTE — PLAN OF CARE
Problem: Ineffective Coping  Goal: Participates in unit activities  Interventions:  - Provide therapeutic environment   - Provide required programming   - Redirect inappropriate behaviors    Outcome: Not Progressing  PT continues to decline invites to attend art therapy groups and is observed isolating in pt room  When prompted to attend group, PT displays a labile mood and affect, with an irritable edge  PT has minimal social interactions with peers

## 2018-07-20 NOTE — NURSING NOTE
No signs of hypo/hyperglyemia  Pleasant with care  More social with care  Trazodone 100 mg given at HS for complaints of insomnia  Maintained on Q 15 minute safety checks  No acting out, suicidal ideations, and/or homicidal behaviors  No changes in medical condition or complaints voiced  Fluids maintained at bedside to promote hydration

## 2018-07-20 NOTE — SOCIAL WORK
Pt came to sw office to advise that he is agreeable to shelter placement but is unable to go to [facility] because hes been there in the last 30 days;  sw asked if patient contacted any of the other shelters on the list - pt states "No, I have until Monday, right?"  Sw encouraged patient to contact shelters over weekend to determine bed availability; Pt asked sw for phone number for [facility] [phone] - sw provided to patient;   No further questions or concerns at this time

## 2018-07-20 NOTE — PLAN OF CARE
ANXIETY     Will report anxiety at manageable levels Not Progressing     By discharge: Patient will verbalize 2 strategies to deal with anxiety Not Progressing        DEPRESSION     Will be euthymic at discharge Not [address] Discharge to home or other facility with appropriate resources Not Progressing        Ineffective Coping     Participates in unit activities Not Progressing        METABOLIC, FLUID AND ELECTROLYTES - ADULT     Glucose maintained within target range Not Progressing        Nutrition/Hydration-ADULT     Nutrient/Hydration intake appropriate for improving, restoring or maintaining nutritional needs Not Progressing        Risk for Self Injury/Neglect     Recognize maladaptive responses and adopt new coping mechanisms Not Progressing          Prexisting or High Potential for Compromised Skin Integrity     Skin integrity is maintained or improved Progressing        Risk for Self Injury/Neglect     Treatment Goal: Remain safe during length of stay, learn and adopt new coping skills, and be free of self-injurious ideation, impulses and acts at the time of discharge Progressing     Verbalize thoughts and feelings Progressing     Complete daily ADLs, including personal hygiene independently, as able Progressing        SELF CARE DEFICIT     Return ADL status to a safe level of function Progressing

## 2018-07-20 NOTE — SOCIAL WORK
sw received phone call from [address] [place], [place] [address]brian C[place] at [facility] stating patient is not nursing home eligible     sw met with patient to discuss discharge options;  sw provided shelter list

## 2018-07-20 NOTE — PLAN OF CARE
Problem: Nutrition/Hydration-ADULT  Goal: Nutrient/Hydration intake appropriate for improving, restoring or maintaining nutritional needs  Monitor and assess patient's nutrition/hydration status for malnutrition (ex- brittle hair, bruises, dry skin, pale skin and conjunctiva, muscle wasting, smooth red tongue, and disorientation)  Collaborate with interdisciplinary team and initiate plan and interventions as ordered  Monitor patient's weight and dietary intake as ordered or per policy  Utilize nutrition screening tool and intervene per policy  Determine patient's food preferences and provide high-protein, high-caloric foods as appropriate  INTERVENTIONS:  - Monitor oral intake, urinary output, labs, and treatment plans  - Assess nutrition and hydration status and recommend course of action  - Evaluate amount of meals eaten  - Assist patient with eating if necessary   - Allow adequate time for meals  - Recommend/ encourage appropriate diets, oral nutritional supplements, and vitamin/mineral supplements  - Order, calculate, and assess calorie counts as needed  - Recommend, monitor, and adjust tube feedings and TPN/PPN based on assessed needs  - Assess need for intravenous fluids  - Provide specific nutrition/hydration education as appropriate  - Include patient/family/caregiver in decisions related to nutrition   Outcome: Progressing  He is on a level 3 meal plan with his intake ranging from 0 to 100 %  He has glucerna ordered at breakfast which he is taking  His BG was 248 which is elevated  His weight was 250 on 7-10 and 7-11  He is on humalog  RDN to evaluate his BG when available  He was eating his breakfast in the dining room and he offered no c/o  They are looking for SNF placement  He has bilateral foot ulcers noted   RDN to reassess his nutrition needs with his team at moderate risk and follow PRN

## 2018-07-20 NOTE — PROGRESS NOTES
Progress Note - Behavioral Health   Chyna Gao 40 y o  male MRN: [id number]  Unit/Bed#: [facility] 218-01 Encounter: [id number]    Assessment/Plan   Principal Problem:    Severe bipolar I disorder, most recent episode depressed without psychotic features ([facility] )  Active Problems:    Diabetic ulcer of right midfoot ([facility] )    DM (diabetes mellitus), type 2 ([facility] )    Essential hypertension      Behavior over the last 24 hours:    Social work received a call from the [facility] this morning that Mariana Thomas is not eligible for skilled nursing care  I informed him of same this morning  He does not have any ideas as far as where he will go once he leaves the hospital  He remains profoundly depressed and hopeless  He states he did go to one group last night  He is still having trouble staying asleep through the night, and notes only perhaps slight improvement with addition of melatonin  He is struggling with anxiety related to his homelessness  Sleep: insomnia  Appetite: normal  Medication side effects: No  ROS: no complaints    Mental Status Evaluation:  Appearance:  age appropriate, casually dressed, disheveled and overweight   Behavior:  Cooperative, isolative   Speech:  soft   Mood:  depressed   Affect:  flat   Thought Process:  goal directed   Thought Content:  ruminations about social stressors/homelessness   Perceptual Disturbances: None   Risk Potential: No reported SI/HI   Sensorium:  person, place, time/date and situation   Cognition:  grossly intact   Consciousness:  alert and awake    Attention: attention span and concentration were age appropriate   Insight:  impaired   Judgment: impaired   Gait/Station: unchanged   Motor Activity: no abnormal movements     Progress Toward Goals: Slow    Recommended Treatment: Continue with group therapy, milieu therapy and occupational therapy        Risks, benefits and possible side effects of Medications:   Risks, benefits, and possible side effects of medications explained to patient and patient verbalizes understanding  Medications:   all current active meds have been reviewed, current meds:   Current Facility-Administered Medications   Medication Dose Route Frequency    busPIRone (BUSPAR) tablet 10 mg  10 mg Oral BID    chlorproMAZINE (THORAZINE) tablet 100 mg  100 mg Oral TID    diclofenac sodium (VOLTAREN) 1 % topical gel 2 g  2 g Topical BID (AM & Afternoon)    divalproex sodium (DEPAKOTE) EC tablet 500 mg  500 mg Oral TID    DULoxetine (CYMBALTA) delayed release capsule 30 mg  30 mg Oral Daily    gabapentin (NEURONTIN) capsule 400 mg  400 mg Oral TID    insulin lispro (HumaLOG) 100 units/mL subcutaneous injection 2-12 Units  2-12 Units Subcutaneous TID AC    lisinopril (ZESTRIL) tablet 5 mg  5 mg Oral Daily    melatonin tablet 6 mg  6 mg Oral HS    metoprolol tartrate (LOPRESSOR) tablet 25 mg  25 mg Oral Daily    mupirocin (BACTROBAN) 2 % ointment 1 application  1 application Topical TID    traZODone (DESYREL) tablet 100 mg  100 mg Oral HS PRN      Increase Cymbalta to 60mg daily  Labs: Reviewed    Counseling / Coordination of Care  Total floor / unit time spent today 25 minutes  Greater than 50% of total time was spent with the patient and / or family counseling and / or coordination of care   A description of the counseling / coordination of care: 25

## 2018-07-20 NOTE — PROGRESS NOTES
Patient alert, awake ate breakfast with peers, back to bed after eating  Pt quiet, calm,  withdrawn to self but answers questions appropriately when prompted  Pt remains flat, poor eye contact but compliant with medications care, and no behaviors noted  Pt requested supplies to dress wound and performed self diabetic ulcer care as ordered independently  Pt denies si hi and hallucinations and c/o increased depression  Will maintain on safety precautions and 15 minute checks  No needs identified

## 2018-07-21 LAB
GLUCOSE SERPL-MCNC: 239 MG/DL (ref 65–140)
GLUCOSE SERPL-MCNC: 260 MG/DL (ref 65–140)
GLUCOSE SERPL-MCNC: 345 MG/DL (ref 65–140)

## 2018-07-21 PROCEDURE — 99232 SBSQ HOSP IP/OBS MODERATE 35: CPT | Performed by: PSYCHIATRY & NEUROLOGY

## 2018-07-21 PROCEDURE — 82948 REAGENT STRIP/BLOOD GLUCOSE: CPT

## 2018-07-21 RX ORDER — TRAZODONE HYDROCHLORIDE 50 MG/1
100 TABLET ORAL
Status: DISCONTINUED | OUTPATIENT
Start: 2018-07-21 | End: 2018-07-21

## 2018-07-21 RX ORDER — TRAZODONE HYDROCHLORIDE 150 MG/1
150 TABLET ORAL
Status: DISCONTINUED | OUTPATIENT
Start: 2018-07-21 | End: 2018-07-27

## 2018-07-21 RX ADMIN — CHLORPROMAZINE HYDROCHLORIDE 100 MG: 100 TABLET, SUGAR COATED ORAL at 20:27

## 2018-07-21 RX ADMIN — METOPROLOL TARTRATE 25 MG: 25 TABLET ORAL at 08:39

## 2018-07-21 RX ADMIN — MELATONIN 6 MG: at 20:26

## 2018-07-21 RX ADMIN — DIVALPROEX SODIUM 500 MG: 500 TABLET, DELAYED RELEASE ORAL at 17:43

## 2018-07-21 RX ADMIN — BUSPIRONE HYDROCHLORIDE 10 MG: 5 TABLET ORAL at 08:40

## 2018-07-21 RX ADMIN — INSULIN LISPRO 4 UNITS: 100 INJECTION, SOLUTION INTRAVENOUS; SUBCUTANEOUS at 08:38

## 2018-07-21 RX ADMIN — INSULIN LISPRO 8 UNITS: 100 INJECTION, SOLUTION INTRAVENOUS; SUBCUTANEOUS at 12:42

## 2018-07-21 RX ADMIN — DIVALPROEX SODIUM 500 MG: 500 TABLET, DELAYED RELEASE ORAL at 20:27

## 2018-07-21 RX ADMIN — GABAPENTIN 400 MG: 400 CAPSULE ORAL at 17:44

## 2018-07-21 RX ADMIN — GABAPENTIN 400 MG: 400 CAPSULE ORAL at 08:40

## 2018-07-21 RX ADMIN — DULOXETINE HYDROCHLORIDE 60 MG: 60 CAPSULE, DELAYED RELEASE ORAL at 08:40

## 2018-07-21 RX ADMIN — TRAZODONE HYDROCHLORIDE 150 MG: 150 TABLET ORAL at 20:26

## 2018-07-21 RX ADMIN — INSULIN LISPRO 6 UNITS: 100 INJECTION, SOLUTION INTRAVENOUS; SUBCUTANEOUS at 17:44

## 2018-07-21 RX ADMIN — GABAPENTIN 400 MG: 400 CAPSULE ORAL at 20:27

## 2018-07-21 RX ADMIN — CHLORPROMAZINE HYDROCHLORIDE 100 MG: 100 TABLET, SUGAR COATED ORAL at 17:43

## 2018-07-21 RX ADMIN — BUSPIRONE HYDROCHLORIDE 10 MG: 5 TABLET ORAL at 17:43

## 2018-07-21 RX ADMIN — LISINOPRIL 5 MG: 5 TABLET ORAL at 08:39

## 2018-07-21 RX ADMIN — CHLORPROMAZINE HYDROCHLORIDE 100 MG: 100 TABLET, SUGAR COATED ORAL at 08:40

## 2018-07-21 RX ADMIN — DIVALPROEX SODIUM 500 MG: 500 TABLET, DELAYED RELEASE ORAL at 08:40

## 2018-07-21 NOTE — PROGRESS NOTES
Patient has been observed sleeping for the majority of the night  Non-labored breathing and no signs of distress noted  Q15 minute checks maintained for pt safety

## 2018-07-21 NOTE — PROGRESS NOTES
Progress Note - Behavioral Health   Rosa Perez 40 y o  male MRN: [id number]  Unit/Bed#: [facility] 218-01 Encounter: [id number]    The patient was seen for continuing care and reviewed with treatment team     Mental Status Evaluation:  Appearance:  Marginal/poor hygiene   Behavior:  psychomotor retardation   Mood:  depressed   Affect: Flat   Speech: Increased latency of response   Thought Process:  Goal directed and coherent   Thought Content:  Does not verbalize delusional material   Perceptual Disturbances: Denies hallucinations and does not appear to be responding to internal stimuli   Risk Potential: No suicidal or homicidal ideation   Attention/Concentration attention span and concentration were age appropriate   Orientation:   Oriented x 3   Gait/Station: normal gait/station   Motor Activity: No abnormal movement noted     Progress Toward Goals: Continues profoundly depressed and hopeless, he had an increase in his Cymbalta this morning  Continues to have issues with sleep, despite melatonin 6 mg and using PRN trazodone 100 mg nightly  Will increase his trazodone to 150 mg  Also, check valproic acid level in AM   He is isolating to room, out for meals only  He admits to feeling as low and helpless as he has ever felt, but no suicidal thoughts  Assessment/Plan    Principal Problem:    Severe bipolar I disorder, most recent episode depressed without psychotic features ([facility] [address] )  Active Problems:    Diabetic ulcer of right midfoot (HCC)    DM (diabetes mellitus), type 2 ([facility] [address] )    Essential hypertension      Recommended Treatment: Continue with pharmacotherapy, group therapy, milieu therapy and occupational therapy    The patient will be maintained on the following medications:    Current Facility-Administered Medications:  busPIRone 10 mg Oral BID TAYLOR Lux   chlorproMAZINE 100 mg Oral TID TAYLOR Galeana   diclofenac sodium 2 g Topical BID (AM & Afternoon) Dearl JOSIE Solorzano   divalprocece sodium 500 mg Oral TID Yair Craft PA-C   DULoxetine 60 mg Oral Daily Yair Craft PA-C   gabapentin 400 mg Oral TID TAYLOR Galeana   insulin lispro 2-12 Units Subcutaneous TID  Maddy Cárdenas PA-C   lisinopril 5 mg Oral Daily Yair Craft PA-C   melatonin 6 mg Oral HS Brianna Mandujano PA-C   metoprolol tartrate 25 mg Oral Daily Yair Craft PA-C   mupirocin 1 application Topical TID Mando Labella, DPM   traZODone 150 mg Oral HS [place] TAYLOR Rg       Risks, benefits and possible side effects of Medications:   Risks, benefits, and possible side effects of medications explained to patient and patient verbalizes understanding

## 2018-07-21 NOTE — PROGRESS NOTES
/Patient has been withdrawn to his room and in bed for most of the shift  Did come out for HS med pass and assessment  Denies any current s/i but rates his depression as 9/10  Says he feels as though he has "bottomed out" and reports no feelings of improvement since being admitted  Rates anxiety as 7/10  Alejandro Somers   Appears flat and depressed

## 2018-07-21 NOTE — PROGRESS NOTES
Pt was seen in the morning today, pt appears to be depressed and defeated, pt did report, " I feel that I have lost all of my luck " pt has been both medication and meal compliant  Pt appears to be more expressive with staff  Pt denies current si/hi at this time  Pt continue to be able to dress wounds without any difficulty

## 2018-07-22 LAB
GLUCOSE SERPL-MCNC: 229 MG/DL (ref 65–140)
GLUCOSE SERPL-MCNC: 234 MG/DL (ref 65–140)
GLUCOSE SERPL-MCNC: 383 MG/DL (ref 65–140)
VALPROATE SERPL-MCNC: 76.2 UG/ML (ref 50–125)

## 2018-07-22 PROCEDURE — 80164 ASSAY DIPROPYLACETIC ACD TOT: CPT | Performed by: NURSE PRACTITIONER

## 2018-07-22 PROCEDURE — 99231 SBSQ HOSP IP/OBS SF/LOW 25: CPT | Performed by: PSYCHIATRY & NEUROLOGY

## 2018-07-22 PROCEDURE — 82948 REAGENT STRIP/BLOOD GLUCOSE: CPT

## 2018-07-22 RX ADMIN — INSULIN LISPRO 4 UNITS: 100 INJECTION, SOLUTION INTRAVENOUS; SUBCUTANEOUS at 08:40

## 2018-07-22 RX ADMIN — GABAPENTIN 400 MG: 400 CAPSULE ORAL at 21:06

## 2018-07-22 RX ADMIN — DICLOFENAC 2 G: 10 GEL TOPICAL at 17:41

## 2018-07-22 RX ADMIN — DIVALPROEX SODIUM 500 MG: 500 TABLET, DELAYED RELEASE ORAL at 17:32

## 2018-07-22 RX ADMIN — DIVALPROEX SODIUM 500 MG: 500 TABLET, DELAYED RELEASE ORAL at 21:05

## 2018-07-22 RX ADMIN — CHLORPROMAZINE HYDROCHLORIDE 100 MG: 100 TABLET, SUGAR COATED ORAL at 17:32

## 2018-07-22 RX ADMIN — DULOXETINE HYDROCHLORIDE 60 MG: 60 CAPSULE, DELAYED RELEASE ORAL at 08:36

## 2018-07-22 RX ADMIN — LISINOPRIL 5 MG: 5 TABLET ORAL at 08:36

## 2018-07-22 RX ADMIN — CHLORPROMAZINE HYDROCHLORIDE 100 MG: 100 TABLET, SUGAR COATED ORAL at 08:36

## 2018-07-22 RX ADMIN — METOPROLOL TARTRATE 25 MG: 25 TABLET ORAL at 08:36

## 2018-07-22 RX ADMIN — GABAPENTIN 400 MG: 400 CAPSULE ORAL at 08:36

## 2018-07-22 RX ADMIN — CHLORPROMAZINE HYDROCHLORIDE 100 MG: 100 TABLET, SUGAR COATED ORAL at 21:06

## 2018-07-22 RX ADMIN — MUPIROCIN 1 APPLICATION: 20 OINTMENT TOPICAL at 08:38

## 2018-07-22 RX ADMIN — BUSPIRONE HYDROCHLORIDE 10 MG: 5 TABLET ORAL at 17:32

## 2018-07-22 RX ADMIN — INSULIN LISPRO 10 UNITS: 100 INJECTION, SOLUTION INTRAVENOUS; SUBCUTANEOUS at 12:25

## 2018-07-22 RX ADMIN — TRAZODONE HYDROCHLORIDE 150 MG: 150 TABLET ORAL at 21:06

## 2018-07-22 RX ADMIN — INSULIN LISPRO 4 UNITS: 100 INJECTION, SOLUTION INTRAVENOUS; SUBCUTANEOUS at 17:32

## 2018-07-22 RX ADMIN — DIVALPROEX SODIUM 500 MG: 500 TABLET, DELAYED RELEASE ORAL at 08:37

## 2018-07-22 RX ADMIN — MELATONIN 6 MG: at 21:06

## 2018-07-22 RX ADMIN — GABAPENTIN 400 MG: 400 CAPSULE ORAL at 17:31

## 2018-07-22 RX ADMIN — BUSPIRONE HYDROCHLORIDE 10 MG: 5 TABLET ORAL at 08:36

## 2018-07-22 NOTE — PROGRESS NOTES
Pt has been calm and cooperative with staff  Pt has been making appropriate eye contact and is more expressive with his needs  Pt remains medication and meal compliant  Pt has been withdrawn to his room ,but appears to be just resting in bed

## 2018-07-22 NOTE — PROGRESS NOTES
Progress Note - Behavioral Health   Andry Montanez 40 y o  male MRN: [id number]  Unit/Bed#: [facility] 218-01 Encounter: [id number]    The patient was seen for continuing care and reviewed with treatment team     Mental Status Evaluation:  Appearance:  Marginal/poor hygiene   Behavior:  psychomotor retardation   Mood:  depressed   Affect: Flat   Speech: Sparse and Soft   Thought Process:  Goal directed and coherent   Thought Content:  Does not verbalize delusional material   Perceptual Disturbances: Denies hallucinations and does not appear to be responding to internal stimuli   Risk Potential: No suicidal or homicidal ideation   Attention/Concentration attention span appeared shorter than expected for age   Orientation:   Oriented x 3   Gait/Station: Unsteady   Motor Activity: No abnormal movement noted     Progress Toward Goals: States he did sleep better last night with combination of trazodone/melatonin  Still very depressed/despondent, mostly regarding his disposition, denied by SNF and homeless  Out of his room more, because he is uncomfortable with his roommate, but no interaction with peers and minimal with staff  Assessment/Plan    Principal Problem:    Severe bipolar I disorder, most recent episode depressed without psychotic features ([facility] [address] )  Active Problems:    Diabetic ulcer of right midfoot (HCC)    DM (diabetes mellitus), type 2 ([facility] [address] )    Essential hypertension      Recommended Treatment: Continue with pharmacotherapy, group therapy, milieu therapy and occupational therapy    The patient will be maintained on the following medications:    Current Facility-Administered Medications:  busPIRone 10 mg Oral BID TAYLOR Lux   chlorproMAZINE 100 mg Oral TID TAYLOR Galeana   diclofenac sodium 2 g Topical BID (AM & Afternoon) Vanesa Guard, PA-C   divalproex sodium 500 mg Oral TID Vanesa Guard, PA-C   DULoxetine 60 mg Oral Daily Vanesa Guard, PA-C   gabapentin 400 mg Oral TID Diogo Corado TAYLOR Chow   insulin lispro 2-12 Units Subcutaneous TID AC Maddy Cárdenas PA-C   lisinopril 5 mg Oral Daily Th[employer]ent JOSIE Escobar   melatonin 6 mg Oral HS Brianna Mandujano PA-C   metoprolol tartrate 25 mg Oral Daily Th[employer]ent JOSIE Escobar   mupirocin 1 application Topical TID Bertha Gould DPM   traZODone 150 mg Oral HS TAYLOR Titus       Risks, benefits and possible side effects of Medications:   Risks, benefits, and possible side effects of medications explained to patient and patient verbalizes understanding

## 2018-07-22 NOTE — PROGRESS NOTES
Patient has been visible in the community but he admits this is only bc he does not want to be in his room with his new roommate  Denies s/i but reports depression is still 9-10/10  He attributes this to feelings of hopelessness related to his housing issues  Patient appears flat and depressed  Minimal interactions with peers

## 2018-07-23 LAB
GLUCOSE SERPL-MCNC: 208 MG/DL (ref 65–140)
GLUCOSE SERPL-MCNC: 225 MG/DL (ref 65–140)
GLUCOSE SERPL-MCNC: 309 MG/DL (ref 65–140)
GLUCOSE SERPL-MCNC: 313 MG/DL (ref 65–140)
GLUCOSE SERPL-MCNC: 317 MG/DL (ref 65–140)
WBC # BLD AUTO: 6 THOUSAND/UL (ref 4.8–10.8)

## 2018-07-23 PROCEDURE — 99231 SBSQ HOSP IP/OBS SF/LOW 25: CPT | Performed by: PSYCHIATRY & NEUROLOGY

## 2018-07-23 PROCEDURE — 85048 AUTOMATED LEUKOCYTE COUNT: CPT | Performed by: NURSE PRACTITIONER

## 2018-07-23 PROCEDURE — 82948 REAGENT STRIP/BLOOD GLUCOSE: CPT

## 2018-07-23 RX ADMIN — TRAZODONE HYDROCHLORIDE 150 MG: 150 TABLET ORAL at 20:53

## 2018-07-23 RX ADMIN — GABAPENTIN 400 MG: 400 CAPSULE ORAL at 20:53

## 2018-07-23 RX ADMIN — CHLORPROMAZINE HYDROCHLORIDE 100 MG: 100 TABLET, SUGAR COATED ORAL at 17:40

## 2018-07-23 RX ADMIN — CHLORPROMAZINE HYDROCHLORIDE 100 MG: 100 TABLET, SUGAR COATED ORAL at 20:53

## 2018-07-23 RX ADMIN — LISINOPRIL 5 MG: 5 TABLET ORAL at 09:07

## 2018-07-23 RX ADMIN — GABAPENTIN 400 MG: 400 CAPSULE ORAL at 09:07

## 2018-07-23 RX ADMIN — MUPIROCIN 1 APPLICATION: 20 OINTMENT TOPICAL at 17:43

## 2018-07-23 RX ADMIN — METOPROLOL TARTRATE 25 MG: 25 TABLET ORAL at 09:07

## 2018-07-23 RX ADMIN — INSULIN LISPRO 4 UNITS: 100 INJECTION, SOLUTION INTRAVENOUS; SUBCUTANEOUS at 17:40

## 2018-07-23 RX ADMIN — DULOXETINE HYDROCHLORIDE 60 MG: 60 CAPSULE, DELAYED RELEASE ORAL at 09:07

## 2018-07-23 RX ADMIN — MUPIROCIN 1 APPLICATION: 20 OINTMENT TOPICAL at 09:08

## 2018-07-23 RX ADMIN — CHLORPROMAZINE HYDROCHLORIDE 100 MG: 100 TABLET, SUGAR COATED ORAL at 09:07

## 2018-07-23 RX ADMIN — MELATONIN 6 MG: at 20:53

## 2018-07-23 RX ADMIN — INSULIN LISPRO 8 UNITS: 100 INJECTION, SOLUTION INTRAVENOUS; SUBCUTANEOUS at 12:24

## 2018-07-23 RX ADMIN — INSULIN LISPRO 4 UNITS: 100 INJECTION, SOLUTION INTRAVENOUS; SUBCUTANEOUS at 09:11

## 2018-07-23 RX ADMIN — BUSPIRONE HYDROCHLORIDE 10 MG: 5 TABLET ORAL at 09:07

## 2018-07-23 RX ADMIN — GABAPENTIN 400 MG: 400 CAPSULE ORAL at 17:40

## 2018-07-23 RX ADMIN — DIVALPROEX SODIUM 500 MG: 500 TABLET, DELAYED RELEASE ORAL at 09:07

## 2018-07-23 RX ADMIN — DIVALPROEX SODIUM 500 MG: 500 TABLET, DELAYED RELEASE ORAL at 20:52

## 2018-07-23 RX ADMIN — DIVALPROEX SODIUM 500 MG: 500 TABLET, DELAYED RELEASE ORAL at 17:40

## 2018-07-23 RX ADMIN — BUSPIRONE HYDROCHLORIDE 10 MG: 5 TABLET ORAL at 17:40

## 2018-07-23 NOTE — PROGRESS NOTES
Patient spent the majority of the evening withdrawn to his room and in bed  Denies any current s/i but reports that he is still feeling "extremely depressed "  Says he doesn't know where to turn  Feeling overwhelmed at getting his life in order after discharge  He states he is not opposed to going to a homeless shelter he just feels he does not know how to proceed with things from there  Asked patient if he believes he is ready for d/c  He stated he feels as though he could use a few more days in the hospital   Pointed out to patient that the issues he is reporting are causing his increased depression will still be there in a few days  Encouraged him to take things one step at a time and set goals for each day  Patient seems receptive to feedback

## 2018-07-23 NOTE — PLAN OF CARE
Problem: Ineffective Coping  Goal: Participates in unit activities  Interventions:  - Provide therapeutic environment   - Provide required programming   - Redirect inappropriate behaviors    Outcome: Not Progressing  PT continues to decline most invites to attend offered art therapy groups and is mostly isolative to pt room  AT continues to encourage group attendance  PT displays a labile mood and affect, poor eye contact and an irritable edge when prompted to attend unit programming  PT is mostly quiet and withdrawn amongst peers

## 2018-07-23 NOTE — PROGRESS NOTES
Patient bright alert, ate breakfast with peers, back to bed after eating  Patient remains flat, poor eye contact, malodorous feet related to diabetic ulcers  Patient to dress ulcers independently0 later this afternoon  Pt denies si hi and hallucinations but states to this nurse, "I'm not ready to leave, I feel like Im stuck in a hamster wheel but not ready to go" patient remains withdrawn to room but answers all questions appropriately and is compliant with medications  Will maintain on safety precautions and 15 minute checks  No needs identified

## 2018-07-23 NOTE — PROGRESS NOTES
Progress Note - Behavioral Health     Jeison Alas 40 y o  male MRN: [id number]   Unit/Bed#: [facility] 218-01 Encounter: [id number]    Behavior over the last 24 hours:  Feroz Dorsey was cooperative and calm during interview  He states that he feels safe here but continues to feel hopeless and desperate  He relates he is not ready to leave the hospital and feels he would be unsafe in the community and would "run into traffic or hang myself"  He reiterates: "I know something bad will happen "  He states he cannot cope with his problems and does not know where to turn  Per nursing staff, Augustine Abbasi remains isolative and interacts very little with staff and peers  He is compliant with medications and meals and is sleeping through the night  His hygiene is poor and self-care is limited  He was able to contract for safety  He rates his current mood as "very depressed"  He denies any mood lability, AVH, or anxiety  ROS: Reports pain at ulceration sites on bilateral lower extremities  Moderate amount of serosanguinous drainage noted on dressings  Wounds are malodorous      Mental Status Evaluation:    Appearance:  disheveled   Behavior:  cooperative, calm   Speech:  normal rate and volume   Mood:  depressed   Affect:  flat   Thought Process:  organized, logical, coherent   Associations: intact associations   Thought Content:  normal   Perceptual Disturbances: none   Risk Potential: Suicidal ideation - Yes, passive death wish  Homicidal ideation - None  Potential for aggression - No   Sensorium:  oriented to person, place, time/date and situation   Memory:  recent and remote memory grossly intact   Consciousness:  alert and awake   Attention: decreased concentration and decreased attention span   Insight:  poor   Judgment: poor   Gait/Station: normal gait/station   Motor Activity: no abnormal movements     Vital signs in last 24 hours:    Temp:  [96 2 °F (35 7 °C)-97 5 °F (36 4 °C)] 97 5 °F (36 4 °C)  HR:  [77-79] 79  Resp:  [16] 16  BP: (118-152)/(59-92) 152/92    Laboratory results:  I have personally reviewed all pertinent laboratory/tests results  [facility] level 76 2 7/22  Progress Toward Goals: depression is not improving    Assessment/Plan   Principal Problem:    Severe bipolar I disorder, most recent episode depressed without psychotic features ([facility] )  Active Problems:    Diabetic ulcer of right midfoot (HCC)    DM (diabetes mellitus), type 2 ([facility] )    Essential hypertension    Recommended Treatment:   1  Will obtain WBC count and place follow-up consult with podiatrist regarding LE wounds  2  Will continue current psychotropic medications and treatment plan as prescribed and adjust as necessary  3  Will continue to encourage patient to attend groups and increase interaction with staff and peers as tolerated  All current active medications have been reviewed  Current Facility-Administered Medications:  busPIRone 10 mg Oral BID TAYLOR Lux   chlorproMAZINE 100 mg Oral TID TAYLOR Galeana   diclofenac sodium 2 g Topical BID (AM & Afternoon) Mallory Ramos PA-C   divalproex sodium 500 mg Oral TID Mallory Ramos PA-C   DULoxetine 60 mg Oral Daily Mallory Ramos PA-C   gabapentin 400 mg Oral TID TAYLOR Galeana   insulin lispro 2-12 Units Subcutaneous TID GENA Cárdenas PA-C   lisinopril 5 mg Oral Daily Mallory Ramos PA-C   melatonin 6 mg Oral HS Brianna Mandujano PA-C   metoprolol tartrate 25 mg Oral Daily Mallory Ramos PA-C   mupirocin 1 application Topical TID Lillian Pak DPM   traZODone 150 mg Oral HS Lacretia TAYLOR Luna       Risks / Benefits of Treatment:    Risks, benefits, and possible side effects of medications explained to patient and patient verbalizes understanding and agreement for treatment  Counseling / Coordination of Care:      Patient's progress discussed with staff in treatment team meeting    Medications, treatment progress and treatment plan reviewed with patient

## 2018-07-23 NOTE — PLAN OF CARE
ANXIETY     Will report anxiety at manageable levels Not Progressing     By discharge: Patient will verbalize 2 strategies to deal with anxiety Not Progressing        DEPRESSION     Will be euthymic at discharge Not [address] Discharge to home or other facility with appropriate resources Not Progressing        Ineffective Coping     Participates in unit activities Not Progressing        METABOLIC, FLUID AND ELECTROLYTES - ADULT     Glucose maintained within target range Not Progressing        Prexisting or High Potential for Compromised Skin Integrity     Skin integrity is maintained or improved Not Progressing        Risk for Self Injury/Neglect     Treatment Goal: Remain safe during length of stay, learn and adopt new coping skills, and be free of self-injurious ideation, impulses and acts at the time of discharge Not Progressing     Recognize maladaptive responses and adopt new coping mechanisms Not Progressing          Nutrition/Hydration-ADULT     Nutrient/Hydration intake appropriate for improving, restoring or maintaining nutritional needs Progressing        Risk for Self Injury/Neglect     Verbalize thoughts and feelings Progressing     Complete daily ADLs, including personal hygiene independently, as able Progressing        SELF CARE DEFICIT     Return ADL status to a safe level of function Progressing

## 2018-07-24 LAB
GLUCOSE SERPL-MCNC: 212 MG/DL (ref 65–140)
GLUCOSE SERPL-MCNC: 221 MG/DL (ref 65–140)
GLUCOSE SERPL-MCNC: 310 MG/DL (ref 65–140)

## 2018-07-24 PROCEDURE — 82948 REAGENT STRIP/BLOOD GLUCOSE: CPT

## 2018-07-24 PROCEDURE — 99232 SBSQ HOSP IP/OBS MODERATE 35: CPT | Performed by: PHYSICIAN ASSISTANT

## 2018-07-24 RX ADMIN — GABAPENTIN 400 MG: 400 CAPSULE ORAL at 17:13

## 2018-07-24 RX ADMIN — GABAPENTIN 400 MG: 400 CAPSULE ORAL at 08:58

## 2018-07-24 RX ADMIN — CHLORPROMAZINE HYDROCHLORIDE 100 MG: 100 TABLET, SUGAR COATED ORAL at 21:21

## 2018-07-24 RX ADMIN — GABAPENTIN 400 MG: 400 CAPSULE ORAL at 21:22

## 2018-07-24 RX ADMIN — DIVALPROEX SODIUM 500 MG: 500 TABLET, DELAYED RELEASE ORAL at 17:14

## 2018-07-24 RX ADMIN — INSULIN LISPRO 4 UNITS: 100 INJECTION, SOLUTION INTRAVENOUS; SUBCUTANEOUS at 08:59

## 2018-07-24 RX ADMIN — DIVALPROEX SODIUM 500 MG: 500 TABLET, DELAYED RELEASE ORAL at 21:21

## 2018-07-24 RX ADMIN — BUSPIRONE HYDROCHLORIDE 10 MG: 5 TABLET ORAL at 08:58

## 2018-07-24 RX ADMIN — BUSPIRONE HYDROCHLORIDE 10 MG: 5 TABLET ORAL at 17:14

## 2018-07-24 RX ADMIN — CHLORPROMAZINE HYDROCHLORIDE 100 MG: 100 TABLET, SUGAR COATED ORAL at 17:13

## 2018-07-24 RX ADMIN — CHLORPROMAZINE HYDROCHLORIDE 100 MG: 100 TABLET, SUGAR COATED ORAL at 08:58

## 2018-07-24 RX ADMIN — INSULIN LISPRO 8 UNITS: 100 INJECTION, SOLUTION INTRAVENOUS; SUBCUTANEOUS at 13:04

## 2018-07-24 RX ADMIN — MUPIROCIN 1 APPLICATION: 20 OINTMENT TOPICAL at 09:00

## 2018-07-24 RX ADMIN — MELATONIN 6 MG: at 21:22

## 2018-07-24 RX ADMIN — MUPIROCIN 1 APPLICATION: 20 OINTMENT TOPICAL at 17:14

## 2018-07-24 RX ADMIN — INSULIN LISPRO 4 UNITS: 100 INJECTION, SOLUTION INTRAVENOUS; SUBCUTANEOUS at 17:14

## 2018-07-24 RX ADMIN — TRAZODONE HYDROCHLORIDE 150 MG: 150 TABLET ORAL at 21:22

## 2018-07-24 RX ADMIN — DIVALPROEX SODIUM 500 MG: 500 TABLET, DELAYED RELEASE ORAL at 08:58

## 2018-07-24 RX ADMIN — LISINOPRIL 5 MG: 5 TABLET ORAL at 08:58

## 2018-07-24 RX ADMIN — DULOXETINE HYDROCHLORIDE 60 MG: 60 CAPSULE, DELAYED RELEASE ORAL at 08:58

## 2018-07-24 RX ADMIN — METOPROLOL TARTRATE 25 MG: 25 TABLET ORAL at 08:58

## 2018-07-24 NOTE — PROGRESS NOTES
Patient has been completely withdrawn to his room this shift  Lying in bed  Cooperative with evening assessment  Denies s/i but says he does not feel as though he would be safe if he were to be d/c  Stated if he were to be d/c he would jump off a building or run into traffic  Here on the unit he contracts for safety  Asked patient what would make him feel ready ford/c    States he wants to have his "monthly money" before he is d/c

## 2018-07-24 NOTE — CMS CERTIFICATION NOTE
Recertification: Based upon physical, mental and social evaluations, I certify that inpatient psychiatric services continue to be medically necessary for this patient for a duration of 30 midnights for the treatment of  Severe bipolar I disorder, most recent episode depressed without psychotic features [facility])   Available alternative community resources still do not meet the patient's mental health care needs  I further attest that an established written individualized plan of care has been updated and is outlined in the patient's medical records

## 2018-07-24 NOTE — PROGRESS NOTES
Progress Note - Behavioral Health   Rizwan Robertson 40 y o  male MRN: [id number]  Unit/Bed#: [facility] 218-01 Encounter: [id number]    Assessment/Plan   Principal Problem:    Severe bipolar I disorder, most recent episode depressed without psychotic features ([facility] )  Active Problems:    Diabetic ulcer of right [facility]foot ([facility] )    DM (diabetes mellitus), type 2 ([facility] )    Essential hypertension      Behavior over the last 24 hours:    Patient continues to indicate that he is not feeling ready to return to the community  He remains cooperative and polite with staff and is participating in some groups  He was cooperative during the interview this morning  He states that his "equilibrium is off" and that he is experiencing intermittent "waves of dizziness " He reported "going down to his knees" yesterday due to dizziness; staff reported that as well and reported that he told them he thought his blood sugar was low; it was in the 300s  He remains very depressed and hopeless  Sleep: normal  Appetite: normal  Medication side effects: No  ROS: dizziness    Mental Status Evaluation:  Appearance:  age appropriate, disheveled and overweight   Behavior:  cooperative, withdrawn   Speech:  soft   Mood:  depressed   Affect:  flat   Thought Process:  goal directed   Thought Content:  somatic   Perceptual Disturbances: None   Risk Potential: not able to contract for safety outside the hospital   Sensorium:  person, place, time/date and situation   Cognition:  grossly intact   Consciousness:  alert and awake    Attention: attention span and concentration were age appropriate   Insight:  poor   Judgment: poor   Gait/Station: unsteady at times   Motor Activity: no abnormal movements     Progress Toward Goals: Ongoing    Recommended Treatment: Continue with group therapy, milieu therapy and occupational therapy        Risks, benefits and possible side effects of Medications:   Risks, benefits, and possible side effects of medications explained to patient and patient verbalizes understanding  Medications:   all current active meds have been reviewed, continue current psychiatric medications and current meds:   Current Facility-Administered Medications   Medication Dose Route Frequency    busPIRone (BUSPAR) tablet 10 mg  10 mg Oral BID    chlorproMAZINE (THORAZINE) tablet 100 mg  100 mg Oral TID    diclofenac sodium (VOLTAREN) 1 % topical gel 2 g  2 g Topical BID (AM & Afternoon)    divalproex sodium (DEPAKOTE) EC tablet 500 mg  500 mg Oral TID    DULoxetine (CYMBALTA) delayed release capsule 60 mg  60 mg Oral Daily    gabapentin (NEURONTIN) capsule 400 mg  400 mg Oral TID    insulin lispro (HumaLOG) 100 units/mL subcutaneous injection 2-12 Units  2-12 Units Subcutaneous TID AC    lisinopril (ZESTRIL) tablet 5 mg  5 mg Oral Daily    melatonin tablet 6 mg  6 mg Oral HS    metoprolol tartrate (LOPRESSOR) tablet 25 mg  25 mg Oral Daily    mupirocin (BACTROBAN) 2 % ointment 1 application  1 application Topical TID    traZODone (DESYREL) tablet 150 mg  150 mg Oral HS     Labs: Reviewed    Counseling / Coordination of Care  Total floor / unit time spent today 25 minutes  Greater than 50% of total time was spent with the patient and / or family counseling and / or coordination of care   A description of the counseling / coordination of care: 25

## 2018-07-24 NOTE — PROGRESS NOTES
Patient seen by podiatry at bedside, b/l diabetic foot ulcer dressings removed  Ulcers assessed by podiatry, tstates improvement and decreased in size with no signs of infection, no new orders  Will continue to monitor

## 2018-07-24 NOTE — CONSULTS
S:  Patient seen today for F/U on prior wounds  Some drainage noted R foot  No complaints, patient changes his dressing himself  O:  Dressing clean/dry/intact  Minimal drainage on R interior of dressing, no gross malodor  No signs of infection, reduced size sub met 1 stump distal    Good granular base  Lateral L heel is dry callused with no open ulceration  No SOI  A/P:   Resolving Diabetic wounds, continue prior dressing orders, patient appears to be doing well himself

## 2018-07-25 LAB
GLUCOSE SERPL-MCNC: 215 MG/DL (ref 65–140)
GLUCOSE SERPL-MCNC: 258 MG/DL (ref 65–140)
GLUCOSE SERPL-MCNC: 265 MG/DL (ref 65–140)

## 2018-07-25 PROCEDURE — 99232 SBSQ HOSP IP/OBS MODERATE 35: CPT | Performed by: PSYCHIATRY & NEUROLOGY

## 2018-07-25 PROCEDURE — 82948 REAGENT STRIP/BLOOD GLUCOSE: CPT

## 2018-07-25 RX ADMIN — LISINOPRIL 5 MG: 5 TABLET ORAL at 08:57

## 2018-07-25 RX ADMIN — METOPROLOL TARTRATE 25 MG: 25 TABLET ORAL at 08:59

## 2018-07-25 RX ADMIN — BUSPIRONE HYDROCHLORIDE 10 MG: 5 TABLET ORAL at 08:58

## 2018-07-25 RX ADMIN — CHLORPROMAZINE HYDROCHLORIDE 100 MG: 100 TABLET, SUGAR COATED ORAL at 17:07

## 2018-07-25 RX ADMIN — DIVALPROEX SODIUM 500 MG: 500 TABLET, DELAYED RELEASE ORAL at 21:13

## 2018-07-25 RX ADMIN — GABAPENTIN 400 MG: 400 CAPSULE ORAL at 21:13

## 2018-07-25 RX ADMIN — MUPIROCIN 1 APPLICATION: 20 OINTMENT TOPICAL at 08:59

## 2018-07-25 RX ADMIN — DIVALPROEX SODIUM 500 MG: 500 TABLET, DELAYED RELEASE ORAL at 17:08

## 2018-07-25 RX ADMIN — INSULIN LISPRO 4 UNITS: 100 INJECTION, SOLUTION INTRAVENOUS; SUBCUTANEOUS at 07:59

## 2018-07-25 RX ADMIN — MELATONIN 6 MG: at 21:12

## 2018-07-25 RX ADMIN — GABAPENTIN 400 MG: 400 CAPSULE ORAL at 17:08

## 2018-07-25 RX ADMIN — GABAPENTIN 400 MG: 400 CAPSULE ORAL at 08:58

## 2018-07-25 RX ADMIN — DICLOFENAC 2 G: 10 GEL TOPICAL at 08:59

## 2018-07-25 RX ADMIN — DULOXETINE HYDROCHLORIDE 60 MG: 60 CAPSULE, DELAYED RELEASE ORAL at 08:59

## 2018-07-25 RX ADMIN — INSULIN LISPRO 6 UNITS: 100 INJECTION, SOLUTION INTRAVENOUS; SUBCUTANEOUS at 17:07

## 2018-07-25 RX ADMIN — TRAZODONE HYDROCHLORIDE 150 MG: 150 TABLET ORAL at 21:12

## 2018-07-25 RX ADMIN — BUSPIRONE HYDROCHLORIDE 10 MG: 5 TABLET ORAL at 17:07

## 2018-07-25 RX ADMIN — INSULIN LISPRO 6 UNITS: 100 INJECTION, SOLUTION INTRAVENOUS; SUBCUTANEOUS at 12:22

## 2018-07-25 RX ADMIN — DIVALPROEX SODIUM 500 MG: 500 TABLET, DELAYED RELEASE ORAL at 08:58

## 2018-07-25 RX ADMIN — CHLORPROMAZINE HYDROCHLORIDE 100 MG: 100 TABLET, SUGAR COATED ORAL at 08:58

## 2018-07-25 RX ADMIN — CHLORPROMAZINE HYDROCHLORIDE 100 MG: 100 TABLET, SUGAR COATED ORAL at 21:13

## 2018-07-25 NOTE — PLAN OF CARE
By discharge: Patient will verbalize 2 strategies to deal with anxiety Not Progressing      Will be euthymic at discharge Not Progressing      Glucose maintained within target range Not Progressing      Skin integrity is maintained or improved Not Progressing      Treatment Goal: Remain safe during length of stay, learn and adopt new coping skills, and be free of self-injurious ideation, impulses and acts at the time of discharge Not Progressing      Verbalize thoughts and feelings Not Progressing      Recognize maladaptive responses and adopt new coping mechanisms Not Progressing      Will report anxiety at manageable levels Progressing      Discharge to home or other facility with appropriate resources Progressing      Participates in unit activities Progressing      Nutrient/Hydration intake appropriate for improving, restoring or maintaining nutritional needs Progressing      Complete daily ADLs, including personal hygiene independently, as able Progressing      Return ADL status to a safe level of function Progressing

## 2018-07-25 NOTE — PROGRESS NOTES
Pt has been withdrawn to room this shift  Declined attendance to evening programming even with direct prompting-states, "I don't feel like it"  Pt remains laying in bed with staff at the bedside  Is soft spoken  Depressed  Low energy  Pt reports very little has changed with his condition  States he is still very depressed and still very anxious  Denies active SI but stated, "I will try something if I am discharged"  Pt denies having plans for such but informs those feelings are d/t being homeless  Pt has poor insight into situation and outlook  Is unable to identify any possible/positive plans for d/c or future  Remains hopeless/helpless  Thoughts are clear  Pt is polite and cooperative  Pt denies dizziness at this time and informs the last time was at dinner  This writer offered moderate encouragement for pt to increase participation in the community and in programming, after pt was unable to identify any plans for improving mood or any effective coping mechanisms  This writer encouraged consistent group participation every shift and increased mobility around the unit-pt cane and personal shoes remain at bedside  Pt denies acute concerns/questions  Pt is able to make needs known and agrees to report changes in condition/needs to staff  Nursing will continue to monitor and assess for changes and safety with 15 minute checks

## 2018-07-25 NOTE — PROGRESS NOTES
Progress Note - Behavioral Health   Scotzeenat Butler 40 y o  male MRN: [id number]  Unit/Bed#: [facility] 218-01 Encounter: [id number]    Assessment/Plan   Principal Problem:    Severe bipolar I disorder, most recent episode depressed without psychotic features ([facility] )  Active Problems:    Diabetic ulcer of right midfoot ([facility] )    DM (diabetes mellitus), type 2 ([facility] )    Essential hypertension      Behavior over the last 24 hours:    Patient remains profoundly depressed  He is participating in only a few groups  Poor energy and motivation  Has indicated to staff that he will become suicidal if he goes to a shelter  He was cooperative during our conversation  He continues to report episodes of dizziness  Upon further discussion, symptoms are described as sudden onset of tinnitus and room-spinning dizziness lasting 5-10 minutes  He reports that podiatry saw him yesterday  No improvement in mood reported  He slept "a little bit" last night, with staff charting he slept well all night  Sleep: normal  Appetite: normal  Medication side effects: No  ROS: episodes of dizziness and tinnitus    Mental Status Evaluation:  Appearance:  age appropriate, disheveled and overweight   Behavior:  cooperative, isolative   Speech:  soft   Mood:  depressed   Affect:  flat   Thought Process:  goal directed   Thought Content:  normal   Perceptual Disturbances: None   Risk Potential: continues to indicate SI outside the hospital   Sensorium:  person, place, time/date and situation   Cognition:  grossly intact   Consciousness:  alert and awake    Attention: attention span and concentration were age appropriate   Insight:  poor   Judgment: poor   Gait/Station: unchanged   Motor Activity: no abnormal movements     Progress Toward Goals: Ongoing    Recommended Treatment: Continue with group therapy, milieu therapy and occupational therapy        Risks, benefits and possible side effects of Medications:   Risks, benefits, and possible side effects of medications explained to patient and patient verbalizes understanding  Medications:   all current active meds have been reviewed, continue current psychiatric medications and current meds:   Current Facility-Administered Medications   Medication Dose Route Frequency    busPIRone (BUSPAR) tablet 10 mg  10 mg Oral BID    chlorproMAZINE (THORAZINE) tablet 100 mg  100 mg Oral TID    diclofenac sodium (VOLTAREN) 1 % topical gel 2 g  2 g Topical BID (AM & Afternoon)    divalproex sodium (DEPAKOTE) EC tablet 500 mg  500 mg Oral TID    DULoxetine (CYMBALTA) delayed release capsule 60 mg  60 mg Oral Daily    gabapentin (NEURONTIN) capsule 400 mg  400 mg Oral TID    insulin lispro (HumaLOG) 100 units/mL subcutaneous injection 2-12 Units  2-12 Units Subcutaneous TID AC    lisinopril (ZESTRIL) tablet 5 mg  5 mg Oral Daily    melatonin tablet 6 mg  6 mg Oral HS    metoprolol tartrate (LOPRESSOR) tablet 25 mg  25 mg Oral Daily    mupirocin (BACTROBAN) 2 % ointment 1 application  1 application Topical TID    traZODone (DESYREL) tablet 150 mg  150 mg Oral HS     Labs: Reviewed    Consult hospitalist service re: dizziness  Counseling / Coordination of Care  Total floor / unit time spent today 25 minutes  Greater than 50% of total time was spent with the patient and / or family counseling and / or coordination of care   A description of the counseling / coordination of care: 25

## 2018-07-25 NOTE — PROGRESS NOTES
Pt present in milieu for meals only  In bed otherwise  Pt mood and affect flat and very soft spoken  Pt responds appropriately and is cooperative with meals and medications  Pt redressed wounds independently  Pt encouraged to get out of bed and attend groups but gives a noncommittal response to this suggestion  Pt rates depression at 7/10 at this time  Will maintain q 15 minute safety checks  No needs identified

## 2018-07-26 PROBLEM — R42 EPISODE OF DIZZINESS: Chronic | Status: ACTIVE | Noted: 2018-07-26

## 2018-07-26 LAB
GLUCOSE SERPL-MCNC: 191 MG/DL (ref 65–140)
GLUCOSE SERPL-MCNC: 227 MG/DL (ref 65–140)
GLUCOSE SERPL-MCNC: 324 MG/DL (ref 65–140)

## 2018-07-26 PROCEDURE — 82948 REAGENT STRIP/BLOOD GLUCOSE: CPT

## 2018-07-26 PROCEDURE — 99232 SBSQ HOSP IP/OBS MODERATE 35: CPT | Performed by: PSYCHIATRY & NEUROLOGY

## 2018-07-26 PROCEDURE — 99253 IP/OBS CNSLTJ NEW/EST LOW 45: CPT | Performed by: NURSE PRACTITIONER

## 2018-07-26 RX ADMIN — DIVALPROEX SODIUM 500 MG: 500 TABLET, DELAYED RELEASE ORAL at 09:01

## 2018-07-26 RX ADMIN — MELATONIN 6 MG: at 21:36

## 2018-07-26 RX ADMIN — BUSPIRONE HYDROCHLORIDE 10 MG: 5 TABLET ORAL at 09:00

## 2018-07-26 RX ADMIN — CHLORPROMAZINE HYDROCHLORIDE 100 MG: 100 TABLET, SUGAR COATED ORAL at 21:36

## 2018-07-26 RX ADMIN — CHLORPROMAZINE HYDROCHLORIDE 100 MG: 100 TABLET, SUGAR COATED ORAL at 17:03

## 2018-07-26 RX ADMIN — BUSPIRONE HYDROCHLORIDE 10 MG: 5 TABLET ORAL at 17:37

## 2018-07-26 RX ADMIN — INSULIN LISPRO 2 UNITS: 100 INJECTION, SOLUTION INTRAVENOUS; SUBCUTANEOUS at 09:01

## 2018-07-26 RX ADMIN — INSULIN LISPRO 4 UNITS: 100 INJECTION, SOLUTION INTRAVENOUS; SUBCUTANEOUS at 17:00

## 2018-07-26 RX ADMIN — MUPIROCIN 1 APPLICATION: 20 OINTMENT TOPICAL at 13:09

## 2018-07-26 RX ADMIN — DULOXETINE HYDROCHLORIDE 60 MG: 60 CAPSULE, DELAYED RELEASE ORAL at 09:01

## 2018-07-26 RX ADMIN — TRAZODONE HYDROCHLORIDE 150 MG: 150 TABLET ORAL at 21:36

## 2018-07-26 RX ADMIN — METOPROLOL TARTRATE 25 MG: 25 TABLET ORAL at 09:01

## 2018-07-26 RX ADMIN — GABAPENTIN 400 MG: 400 CAPSULE ORAL at 09:01

## 2018-07-26 RX ADMIN — DIVALPROEX SODIUM 500 MG: 500 TABLET, DELAYED RELEASE ORAL at 17:02

## 2018-07-26 RX ADMIN — CHLORPROMAZINE HYDROCHLORIDE 100 MG: 100 TABLET, SUGAR COATED ORAL at 09:01

## 2018-07-26 RX ADMIN — INSULIN LISPRO 8 UNITS: 100 INJECTION, SOLUTION INTRAVENOUS; SUBCUTANEOUS at 12:15

## 2018-07-26 RX ADMIN — GABAPENTIN 400 MG: 400 CAPSULE ORAL at 21:36

## 2018-07-26 RX ADMIN — LISINOPRIL 5 MG: 5 TABLET ORAL at 09:01

## 2018-07-26 RX ADMIN — DIVALPROEX SODIUM 500 MG: 500 TABLET, DELAYED RELEASE ORAL at 21:36

## 2018-07-26 RX ADMIN — GABAPENTIN 400 MG: 400 CAPSULE ORAL at 17:03

## 2018-07-26 NOTE — PROGRESS NOTES
Progress Note - Behavioral Health     Anselmo Hernandez 40 y o  male MRN: [id number]   Unit/Bed#: [facility] 218-01 Encounter: [id number]    Behavior over the last 24 hours:  Jilda Nageotte was calm, pleasant, and cooperative during interview  Per nursing staff, he is compliant with medications and meals but is isolates to room when not eating  He was seen in group today and stated he is "trying to get out of my room more"  He is interacting appropriately with staff and peers  He rates his current mood as "down" but denies any CAMILA at this time  He is still worried about what's going to happen when he is discharged but feels safe while on the unit  He slept through the night without difficulty  ROS: no complaints    Mental Status Evaluation:    Appearance:  casually dressed   Behavior:  normal, pleasant, cooperative   Speech:  normal rate and volume   Mood:  depressed   Affect:  flat   Thought Process:  organized, logical, coherent   Associations: intact associations   Thought Content:  normal   Perceptual Disturbances: none   Risk Potential: Suicidal ideation - None  Homicidal ideation - None  Potential for aggression - No   Sensorium:  oriented to person, place, time/date and situation   Memory:  recent and remote memory grossly intact   Consciousness:  alert and awake   Attention: attention span and concentration appear shorter than expected for age   Insight:  fair   Judgment: fair   Gait/Station: normal gait/station   Motor Activity: no abnormal movements     Vital signs in last 24 hours:    Temp:  [96 8 °F (36 °C)] 96 8 °F (36 °C)  HR:  [73-79] 79  Resp:  [16] 16  BP: (112-119)/(62-70) 112/70    Laboratory results:  I have personally reviewed all pertinent laboratory/tests results      Progress Toward Goals: progressing    Assessment/Plan   Principal Problem:    Severe bipolar I disorder, most recent episode depressed without psychotic features ([facility] )  Active Problems:    Diabetic ulcer of right midfoot (HCC)    DM (diabetes mellitus), type 2 ([facility] )    Essential hypertension    Recommended Treatment:   1  Will continue current medications and treatment plan as written and adjust as needed  2  Will continue to encourage patient to attend groups and remain interactive with staff and peers  3  Discharge disposition and planning are ongoing  All current active medications have been reviewed  Encourage group therapy, milieu therapy and occupational therapy  Behavioral Health checks every 5 minutes      Current Facility-Administered Medications:  busPIRone 10 mg Oral BID TAYLOR Lux   chlorproMAZINE 100 mg Oral TID TAYLOR Galeana   diclofenac sodium 2 g Topical BID (AM & Afternoon) Vanesa Guard, JOSIE   divalproex sodium 500 mg Oral TID Vanesa Guard, JOSIE   DULoxetine 60 mg Oral Daily Vanesa Guard, JOSIE   gabapentin 400 mg Oral TID TAYLOR Galeana   insulin lispro 2-12 Units Subcutaneous TID AC Maddy Cárdenas PA-C   lisinopril 5 mg Oral Daily Vanesa Guard, JOSIE   melatonin 6 mg Oral HS Brianna Mandujano PA-C   metoprolol tartrate 25 mg Oral Daily Vanesa Guard, PA-AROLDO   mupirocin 1 application Topical TID Abhi Jacob, DPM   traZODone 150 mg Oral HS TAYLOR Gonzalez       Risks / Benefits of Treatment:    Risks, benefits, and possible side effects of medications explained to patient and patient verbalizes understanding and agreement for treatment  Counseling / Coordination of Care:      Patient's progress discussed with staff in treatment team meeting  Medications, treatment progress and treatment plan reviewed with patient

## 2018-07-26 NOTE — CONSULTS
Consult- Davis Conrad 1975, 40 y o  male MRN: [id number]    Unit/Bed#: [facility] 218-01 Encounter: [id number]    Primary Care Provider: No primary care provider on file  Date and time admitted to hospital: 7/10/2018  7:22 PM      Inpatient consult to Internal Medicine  Consult performed by: Padmini Stanford  Consult ordered by: Donita Crocker          Episode of dizziness   Assessment & Plan    · Patient complains of intermittent dizziness, and feelings that his ears are blocked  · Will order orthostatic blood pressures Q shift for the next 2 days  · Monitor the patient for safety precautions  Recommendations for Discharge:  · Follow-up with PCP    Counseling / Coordination of Care Time: 20 minutes  Greater than 50% of total time spent on patient counseling and coordination of care  Collaboration of Care: Were Recommendations Directly Discussed with Primary Treatment Team? - No     History of Present Illness:    Davis Conrad is a 40 y o  male who is originally admitted to the behavior health unit service due to feelings of hopelessness  We are consulted for intermittent dizziness  Patient tells me that he has had intermittent dizziness while in the hospital   He initially had thought that it was his blood sugars becoming low however his blood sugars have remained between the 200 in 300 range  At his time of dizziness episodes, his blood sugars are checked and are again between 200-300 range  The patient explains to me that he feels dizzy most times when he has sudden episodes of his ears becoming blocked  He tells me it feels like on driving up the [facility] or down the mountain, and how your years get blocked any need to yawn in order to make them unglog      He did tell me that on 07/24/2018 he did feel like he was going to fall so he sat down on the floor he did not pass out did not lose consciousness and did not hit his head    At this point the patient is in of agreeable to performing orthostatic blood pressures in order to verify if his has significant blood pressure changes with movement  Review of Systems:    Review of Systems   Constitutional: Negative  HENT: Negative for ear discharge, ear pain, hearing loss and tinnitus  Eyes: Negative  Respiratory: Negative  Cardiovascular: Negative  Gastrointestinal: Negative  Endocrine: Negative  Genitourinary: Negative  Musculoskeletal: Negative  Skin: Negative  Allergic/Immunologic: Negative  Neurological: Negative  Hematological: Negative  Psychiatric/Behavioral: Negative  Past Medical and Surgical History:     Past Medical History:   Diagnosis Date    Anxiety     Bipolar 1 disorder ([place] [address] )     Chronic pain disorder     Depression     Diabetes mellitus ([place] [address] )     Hypertension     Psychiatric illness     PTSD (post-traumatic stress disorder)     Sleep difficulties     Substance abuse     Suicide attempt ([place] [address] )        Past Surgical History:   Procedure Laterality Date    APPENDECTOMY      COLON SURGERY      TONSILLECTOMY         Meds/Allergies:    all medications and allergies reviewed    Allergies:    Allergies   Allergen Reactions    Penicillins        Social History:     Marital Status: Single    Substance Use History:   History   Alcohol Use No     History   Smoking Status    Current Every Day Smoker    Packs/day: 1 50    Years: 22 00    Types: Cigarettes   Smokeless Tobacco    Current User    Types: Chew     History   Drug Use    Types: Marijuana     Comment: monthly       Family History:    non-contributory    Physical Exam:     Vitals:   Blood Pressure: 112/70 (07/26/18 0722)  Pulse: 79 (07/26/18 0722)  Temperature: (!) 96 8 °F (36 °C) (07/26/18 0722)  Temp Source: Tympanic (07/26/18 0722)  Respirations: 16 (07/26/18 0722)  Height: 6' 3" (190 5 cm) (07/16/18 1322)  Weight - Scale: 113 kg (250 lb) (07/11/18 1500)  SpO2: 96 % (07/26/18 0722)    Physical Exam Constitutional: He appears well-developed and well-nourished  HENT:   Right Ear: There is tenderness  Left Ear: There is tenderness  Eyes: Pupils are equal, round, and reactive to light  Neck: Normal range of motion  Cardiovascular: Normal rate and regular rhythm  Pulmonary/Chest: Breath sounds normal    Abdominal: Soft  Bowel sounds are normal    Musculoskeletal: Normal range of motion  Skin: Skin is warm and dry  Psychiatric: He has a normal mood and affect  Additional Data:     Lab Results: I have personally reviewed pertinent reports  Results from last 7 days  Lab Units 07/23/18  1445   WBC Thousand/uL 6 00           Invalid input(s): LABALBU          No results found for: HGBA1C    Results from last 7 days  Lab Units 07/26/18  1155 07/26/18  0751 07/25/18  1634 07/25/18  1145 07/25/18  0719 07/24/18  1639 07/24/18  1119 07/24/18  0746 07/23/18  1621 07/23/18  1349 07/23/18  1132 07/23/18  0705   POC GLUCOSE mg/dl 324* 191* 258* 265* 215* 221* 310* 212* 225* 317* 309* 208*       Imaging: I have personally reviewed pertinent reports  No orders to display         ** Please Note: This note has been constructed using a voice recognition system   **

## 2018-07-26 NOTE — ASSESSMENT & PLAN NOTE
· Patient complains of intermittent dizziness, and feelings that his ears are blocked  · Will order orthostatic blood pressures Q shift for the next 2 days  · Monitor the patient for safety precautions

## 2018-07-26 NOTE — PROGRESS NOTES
Pt has been calm and cooperative during the day  Pt denies current si/hi att his time  Pt remains medication compliant  Pt denies current bouts of dizziness at this time  Pt has been withdrawn to his room and has been laying in bed

## 2018-07-26 NOTE — PROGRESS NOTES
No signs of hypo/hyperglycemia  Patient observed sleeping during most Q15 minute safety checks (second portion of shift)  No suicidal ideations, acting out or homicidal behaviors  Bilateral wounds remains dressed  No change in medical condition or complaints voiced  Fluids maintained at bedside to promote hydration

## 2018-07-26 NOTE — PLAN OF CARE
Problem: Nutrition/Hydration-ADULT  Goal: Nutrient/Hydration intake appropriate for improving, restoring or maintaining nutritional needs  Monitor and assess patient's nutrition/hydration status for malnutrition (ex- brittle hair, bruises, dry skin, pale skin and conjunctiva, muscle wasting, smooth red tongue, and disorientation)  Collaborate with interdisciplinary team and initiate plan and interventions as ordered  Monitor patient's weight and dietary intake as ordered or per policy  Utilize nutrition screening tool and intervene per policy  Determine patient's food preferences and provide high-protein, high-caloric foods as appropriate  INTERVENTIONS:  - Monitor oral intake, urinary output, labs, and treatment plans  - Assess nutrition and hydration status and recommend course of action  - Evaluate amount of meals eaten  - Assist patient with eating if necessary   - Allow adequate time for meals  - Recommend/ encourage appropriate diets, oral nutritional supplements, and vitamin/mineral supplements  - Order, calculate, and assess calorie counts as needed  - Recommend, monitor, and adjust tube feedings and TPN/PPN based on assessed needs  - Assess need for intravenous fluids  - Provide specific nutrition/hydration education as appropriate  - Include patient/family/caregiver in decisions related to nutrition   Outcome: Progressing  He is on a level 3 meal plan with glucerna at breakfast  His intake is listed at 100 %  He has diabetic foot ulcers with podiatrist following him  His weight was 250 on 7-11 he refused on 7-21  He is on humalog and thorazine  He had his breakfast in the dining room this am no c/o offered   RDN to reassess his nutrition needs at moderate risk and follow with his team PRN

## 2018-07-26 NOTE — PROGRESS NOTES
In room most of shift  Pleasant and cooperative with medications and meals  Maintained on Q 15 minute safety checks  No acting out, suicidal ideations, and/or homicidal behaviors  No changes in medical condition or complaints voiced  Fluids maintained at bedside to promote hydration

## 2018-07-27 LAB
GLUCOSE SERPL-MCNC: 203 MG/DL (ref 65–140)
GLUCOSE SERPL-MCNC: 246 MG/DL (ref 65–140)
GLUCOSE SERPL-MCNC: 266 MG/DL (ref 65–140)

## 2018-07-27 PROCEDURE — 99231 SBSQ HOSP IP/OBS SF/LOW 25: CPT | Performed by: PSYCHIATRY & NEUROLOGY

## 2018-07-27 PROCEDURE — 82948 REAGENT STRIP/BLOOD GLUCOSE: CPT

## 2018-07-27 RX ORDER — TRAZODONE HYDROCHLORIDE 50 MG/1
100 TABLET ORAL
Status: DISCONTINUED | OUTPATIENT
Start: 2018-07-27 | End: 2018-08-03 | Stop reason: HOSPADM

## 2018-07-27 RX ORDER — CHLORPROMAZINE HYDROCHLORIDE 25 MG/1
50 TABLET, FILM COATED ORAL 3 TIMES DAILY
Status: DISCONTINUED | OUTPATIENT
Start: 2018-07-27 | End: 2018-07-29

## 2018-07-27 RX ADMIN — INSULIN LISPRO 6 UNITS: 100 INJECTION, SOLUTION INTRAVENOUS; SUBCUTANEOUS at 12:30

## 2018-07-27 RX ADMIN — INSULIN LISPRO 6 UNITS: 100 INJECTION, SOLUTION INTRAVENOUS; SUBCUTANEOUS at 09:06

## 2018-07-27 RX ADMIN — CHLORPROMAZINE HYDROCHLORIDE 100 MG: 100 TABLET, SUGAR COATED ORAL at 09:05

## 2018-07-27 RX ADMIN — GABAPENTIN 400 MG: 400 CAPSULE ORAL at 17:02

## 2018-07-27 RX ADMIN — GABAPENTIN 400 MG: 400 CAPSULE ORAL at 21:34

## 2018-07-27 RX ADMIN — INSULIN LISPRO 4 UNITS: 100 INJECTION, SOLUTION INTRAVENOUS; SUBCUTANEOUS at 16:45

## 2018-07-27 RX ADMIN — DIVALPROEX SODIUM 500 MG: 500 TABLET, DELAYED RELEASE ORAL at 09:06

## 2018-07-27 RX ADMIN — BUSPIRONE HYDROCHLORIDE 10 MG: 5 TABLET ORAL at 18:05

## 2018-07-27 RX ADMIN — TRAZODONE HYDROCHLORIDE 100 MG: 50 TABLET ORAL at 21:34

## 2018-07-27 RX ADMIN — GABAPENTIN 400 MG: 400 CAPSULE ORAL at 09:05

## 2018-07-27 RX ADMIN — CHLORPROMAZINE HYDROCHLORIDE 50 MG: 25 TABLET, SUGAR COATED ORAL at 21:34

## 2018-07-27 RX ADMIN — BUSPIRONE HYDROCHLORIDE 10 MG: 5 TABLET ORAL at 09:05

## 2018-07-27 RX ADMIN — DIVALPROEX SODIUM 500 MG: 500 TABLET, DELAYED RELEASE ORAL at 21:35

## 2018-07-27 RX ADMIN — MELATONIN 6 MG: at 21:34

## 2018-07-27 RX ADMIN — DULOXETINE HYDROCHLORIDE 60 MG: 60 CAPSULE, DELAYED RELEASE ORAL at 09:06

## 2018-07-27 NOTE — SOCIAL WORK
SW met with patient with patient to review the Treatment Plan  The patient was observed lying in the bed, resting quietly  Mood remains depressed; affect congruent  Reports not feeling much improved  Denied intent of self harm 'here'; however, stated he conttnues to have SI with plan after d/c to walk into traffic  Denied A/V hallucinations  Minimally responsive; voice tone low; poverty of thought  Motivation /interest remain impaired  Treatment Plan reviewed; patient signed Treatment Plan

## 2018-07-27 NOTE — PROGRESS NOTES
Progress Note - Behavioral Health     Jeison Alas 40 y o  male MRN: [id number]   Unit/Bed#: [facility] 218-01 Encounter: [id number]    Behavior over the last 24 hours:  Augustine Abbasi was pleasant, cooperative and calm during interview  Per nursing staff, he has been compliant with medications and meals  He has begun to participate in groups  He is still depressed with flat affect and states he feels despondent  He denies any suicidal ideation presently but states he doesn't know what he will do when he is discharged  ROS: no complaints    Mental Status Evaluation:    Appearance:  casually dressed, dressed appropriately   Behavior:  normal, pleasant, cooperative   Speech:  normal rate and volume   Mood:  depressed   Affect:  flat   Thought Process:  organized, logical, coherent, goal directed   Associations: intact associations   Thought Content:  normal   Perceptual Disturbances: none   Risk Potential: Suicidal ideation - None  Homicidal ideation - None  Potential for aggression - No   Sensorium:  oriented to person, place, time/date and situation   Memory:  recent and remote memory grossly intact   Consciousness:  alert and awake   Attention: attention span and concentration are age appropriate   Insight:  poor   Judgment: poor   Gait/Station: normal gait/station   Motor Activity: no abnormal movements     Vital signs in last 24 hours:    Temp:  [96 1 °F (35 6 °C)-96 8 °F (36 °C)] 96 1 °F (35 6 °C)  HR:  [80-84] 84  Resp:  [16-18] 18  BP: ()/(43-94) 99/58    Laboratory results:  I have personally reviewed all pertinent laboratory/tests results  Progress Toward Goals: progressing    Assessment/Plan   Principal Problem:    Severe bipolar I disorder, most recent episode depressed without psychotic features [facility]  Active Problems:    Episode of dizziness    Recommended Treatment:   1   Will continue current medications and treatment plan as determined by psychiatric care team   2  Will continue to monitor patient and adjust medications as needed  3  Discharge disposition and planning are ongoing  All current active medications have been reviewed  Current Facility-Administered Medications:  busPIRone 10 mg Oral BID TAYLOR Lux   chlorproMAZINE 100 mg Oral TID TAYLOR Galeana   diclofenac sodium 2 g Topical BID (AM & Afternoon) Loida Morales PA-C   divalproex sodium 500 mg Oral TID Loida Morales, JOSIE   DULoxetine 60 mg Oral Daily Leonide Andrew, JOSIE   gabapentin 400 mg Oral TID TAYLOR Galeana   insulin lispro 2-12 Units Subcutaneous TID AC Maddy Cárdenas PA-C   lisinopril 5 mg Oral Daily Geralddene Andrew, JOSIE   melatonin 6 mg Oral HS Brianna Mandujano PA-C   metoprolol tartrate 25 mg Oral Daily Geralddene Press, JOSIE   mupirocin 1 application Topical TID Keely Thomas DPM   traZODone 150 mg Oral HS TAYLOR Parisi       Risks / Benefits of Treatment:    Risks, benefits, and possible side effects of medications explained to patient and patient verbalizes understanding and agreement for treatment  Counseling / Coordination of Care:      Patient's progress discussed with staff in treatment team meeting  Medications, treatment progress and treatment plan reviewed with patient

## 2018-07-27 NOTE — PLAN OF CARE
Problem: DISCHARGE PLANNING  Goal: Discharge to home or other facility with appropriate resources  INTERVENTIONS:  - Identify barriers to discharge w/patient and caregiver  - Arrange for needed discharge resources and transportation as appropriate  - Coordinate discharge planning if the patient needs post-hospital services based on physician/advanced practitioner order or complex needs related to functional status, cognitive ability, or social support system   Outcome: Progressing  At this point in time pt not offering solutions for aftercare plan  Pt declining to go to shelter  SW will [facility] to [facility]

## 2018-07-27 NOTE — PROGRESS NOTES
Pt has been withdrawn to his room this shift and resting in bed  No attendance to evening programming or snack, even with direct prompting  No socialization in the community observed  Pt was encouraged to seek out this writer for evening medication  Only for medication was pt in the community  Briefly used the telephone  Pt presents for assessment clean and kempt  Eye contact fleeting  Mood and affect depressed  Guarded during assessment but does respond to prompting  Pt continues to report anxiety and depression that have been unchanged since admission-this writer again encouraged increased involvement in the community-pt nodded in respond  Pt denies active SI but continues to report expected SI if discharged d/t homelessness  Pt contracts for safety on the unit  Continues to show little motivation or involvement in own care  Denies any current or recent episodes of dizziness  Pt is able to make needs known and agrees to report changes in condition/needs to staff  Nursing will continue to monitor and assess for changes and safety with 15 minute checks

## 2018-07-28 LAB
ANION GAP SERPL CALCULATED.3IONS-SCNC: 6 MMOL/L (ref 4–13)
BUN SERPL-MCNC: 18 MG/DL (ref 7–25)
CALCIUM SERPL-MCNC: 9.3 MG/DL (ref 8.6–10.5)
CHLORIDE SERPL-SCNC: 98 MMOL/L (ref 98–107)
CO2 SERPL-SCNC: 30 MMOL/L (ref 21–31)
CREAT SERPL-MCNC: 1.23 MG/DL (ref 0.7–1.3)
GFR SERPL CREATININE-BSD FRML MDRD: 72 ML/MIN/1.73SQ M
GLUCOSE SERPL-MCNC: 202 MG/DL (ref 65–140)
GLUCOSE SERPL-MCNC: 239 MG/DL (ref 65–140)
GLUCOSE SERPL-MCNC: 254 MG/DL (ref 65–140)
GLUCOSE SERPL-MCNC: 334 MG/DL (ref 65–99)
MAGNESIUM SERPL-MCNC: 1.7 MG/DL (ref 1.9–2.7)
PHOSPHATE SERPL-MCNC: 3.8 MG/DL (ref 3–5.5)
POTASSIUM SERPL-SCNC: 4.6 MMOL/L (ref 3.5–5.5)
SODIUM SERPL-SCNC: 134 MMOL/L (ref 134–143)

## 2018-07-28 PROCEDURE — 82948 REAGENT STRIP/BLOOD GLUCOSE: CPT

## 2018-07-28 PROCEDURE — 83735 ASSAY OF MAGNESIUM: CPT | Performed by: PHYSICIAN ASSISTANT

## 2018-07-28 PROCEDURE — 80048 BASIC METABOLIC PNL TOTAL CA: CPT | Performed by: PHYSICIAN ASSISTANT

## 2018-07-28 PROCEDURE — 99231 SBSQ HOSP IP/OBS SF/LOW 25: CPT | Performed by: PSYCHIATRY & NEUROLOGY

## 2018-07-28 PROCEDURE — 84100 ASSAY OF PHOSPHORUS: CPT | Performed by: PHYSICIAN ASSISTANT

## 2018-07-28 RX ADMIN — BUSPIRONE HYDROCHLORIDE 10 MG: 5 TABLET ORAL at 08:24

## 2018-07-28 RX ADMIN — CHLORPROMAZINE HYDROCHLORIDE 50 MG: 25 TABLET, SUGAR COATED ORAL at 21:08

## 2018-07-28 RX ADMIN — GABAPENTIN 400 MG: 400 CAPSULE ORAL at 21:07

## 2018-07-28 RX ADMIN — INSULIN LISPRO 4 UNITS: 100 INJECTION, SOLUTION INTRAVENOUS; SUBCUTANEOUS at 17:41

## 2018-07-28 RX ADMIN — INSULIN LISPRO 4 UNITS: 100 INJECTION, SOLUTION INTRAVENOUS; SUBCUTANEOUS at 08:24

## 2018-07-28 RX ADMIN — MELATONIN 6 MG: at 21:07

## 2018-07-28 RX ADMIN — GABAPENTIN 400 MG: 400 CAPSULE ORAL at 17:00

## 2018-07-28 RX ADMIN — DIVALPROEX SODIUM 500 MG: 500 TABLET, DELAYED RELEASE ORAL at 21:08

## 2018-07-28 RX ADMIN — DIVALPROEX SODIUM 500 MG: 500 TABLET, DELAYED RELEASE ORAL at 08:27

## 2018-07-28 RX ADMIN — MAGNESIUM OXIDE TAB 400 MG (241.3 MG ELEMENTAL MG) 400 MG: 400 (241.3 MG) TAB at 21:07

## 2018-07-28 RX ADMIN — DULOXETINE HYDROCHLORIDE 60 MG: 60 CAPSULE, DELAYED RELEASE ORAL at 08:26

## 2018-07-28 RX ADMIN — CHLORPROMAZINE HYDROCHLORIDE 50 MG: 25 TABLET, SUGAR COATED ORAL at 08:24

## 2018-07-28 RX ADMIN — METOPROLOL TARTRATE 12.5 MG: 25 TABLET ORAL at 08:25

## 2018-07-28 RX ADMIN — DIVALPROEX SODIUM 500 MG: 500 TABLET, DELAYED RELEASE ORAL at 17:00

## 2018-07-28 RX ADMIN — INSULIN LISPRO 6 UNITS: 100 INJECTION, SOLUTION INTRAVENOUS; SUBCUTANEOUS at 12:13

## 2018-07-28 RX ADMIN — BUSPIRONE HYDROCHLORIDE 10 MG: 5 TABLET ORAL at 17:44

## 2018-07-28 RX ADMIN — MUPIROCIN 1 APPLICATION: 20 OINTMENT TOPICAL at 12:13

## 2018-07-28 RX ADMIN — TRAZODONE HYDROCHLORIDE 100 MG: 50 TABLET ORAL at 21:07

## 2018-07-28 RX ADMIN — CHLORPROMAZINE HYDROCHLORIDE 50 MG: 25 TABLET, SUGAR COATED ORAL at 17:00

## 2018-07-28 RX ADMIN — GABAPENTIN 400 MG: 400 CAPSULE ORAL at 08:25

## 2018-07-28 NOTE — PROGRESS NOTES
Progress Note - Behavioral Health   Andres Alvarez 40 y o  male MRN: [id number]  Unit/Bed#: [facility] 218-01 Encounter: [id number]    Assessment/Plan   Principal Problem:    Severe bipolar I disorder, most recent episode depressed without psychotic features [facility])  Active Problems:    Episode of dizziness   This is a 15 minutes psychiatric progress note Andres Alvarez  10 minutes were spent in coordination and care of treatment of this patient  Coordination of care consisted me with the multi-disciplinary treatment team discussed patient's progress, documentation, medication orders and behaviors over the last 24 hours  Per the nursing staff, the patient is eating 100% of his meals supple through the night  On examination today, the patient's orthostasis is basically control  Once we would decrease the patient's Thorazine and trazodone, orthostasis is not present  Mood remains unchanged continue current meds and treatment  Behavior over the last 24 hours:  unchanged  Sleep: normal  Appetite: normal  Medication side effects: No  ROS: no complaints    Mental Status Evaluation:  Appearance:  casually dressed   Behavior:  cooperative   Speech:  soft   Mood:  Euthymic   Affect:  constricted   Thought Process:  circumstantial   Thought Content:  obsessions   Perceptual Disturbances: None   Risk Potential: Suicidal Ideations without plan   Sensorium:  person, place, time/date and situation   Cognition:  grossly intact   Consciousness:  alert and awake    Attention: attention span and concentration were age appropriate   Insight:  poor   Judgment: poor   Gait/Station: normal gait/station   Motor Activity: no abnormal movements     Progress Toward Goals: Minimal    Recommended Treatment: Continue with group therapy, milieu therapy and occupational therapy        Risks, benefits and possible side effects of Medications:   Risks, benefits, and possible side effects of medications explained to patient and patient verbalizes understanding  Medications: continue current psychiatric medications  Labs: Reviewed    Counseling / Coordination of Care  Total floor / unit time spent today 15 minutes  Greater than 50% of total time was spent with the patient and / or family counseling and / or coordination of care   A description of the counseling / coordination of care: 15

## 2018-07-28 NOTE — PROGRESS NOTES
Morning blood draw attempted by [facility]  Draw unsuccessful   Will endorse need to reattempt to day shift nursing

## 2018-07-28 NOTE — PROGRESS NOTES
Pt  Observed sleeping comfortably throughout the night with no apparent distress  Q 15 minute checks in place  Will continue to monitor

## 2018-07-28 NOTE — PROGRESS NOTES
Pt has been visible in the community this shift and ambulating with the use of a wheelchair  Pt has attended all programming including snack  Maintains space from peers  Minimally social  Pt appears clean but disheveled  Flat affect  Depressed mood  Eye contact fleeting  Pt denies any current dizziness  Orthostatic BP obtained and on call medical AI contacted and notified of results-AI informed pt is on a "med wash out" and will not receive any new BP meds today  Echo will be obtained on Monday and bloodwork in am  Pt was educated on making slow, careful changes in position and utilizing assistive devices as needed as well as process of echo-pt attentive  Pt denies any active SI and contracts for safety on the unit  Continues to report suicidal intent should he be discharged  Denies any significant changes in condition  While pt responds appropriately to questions, he does not initiate conversation  Reports depression  Pt is medication compliant, aware of decreased thorazine dose  Pt is able to make needs known and agrees to report changes in condition/needs to staff  Nursing will continue to monitor and assess for changes and safety with 15 minute checks

## 2018-07-28 NOTE — PLAN OF CARE
ANXIETY     Will report anxiety at manageable levels Not Progressing     By discharge: Patient will verbalize 2 strategies to deal with anxiety Not Progressing        DEPRESSION     Will be euthymic at discharge Not [address] Discharge to home or other facility with appropriate resources Not Progressing        Ineffective Coping     Participates in unit activities Not Progressing        METABOLIC, FLUID AND ELECTROLYTES - ADULT     Glucose maintained within target range Not Progressing        Nutrition/Hydration-ADULT     Nutrient/Hydration intake appropriate for improving, restoring or maintaining nutritional needs Not Progressing        Risk for Self Injury/Neglect     Treatment Goal: Remain safe during length of stay, learn and adopt new coping skills, and be free of self-injurious ideation, impulses and acts at the time of discharge Not Progressing     Verbalize thoughts and feelings Not Progressing     Recognize maladaptive responses and adopt new coping mechanisms Not Progressing          Prexisting or High Potential for Compromised Skin Integrity     Skin integrity is maintained or improved Progressing        Risk for Self Injury/Neglect     Complete daily ADLs, including personal hygiene independently, as able Progressing        SELF CARE DEFICIT     Return ADL status to a safe level of function Progressing

## 2018-07-28 NOTE — PROGRESS NOTES
Patient awake, alert, ate breakfast with peers, back to bed after eating and remains withdrawn to self  Pt automatically using wheelchair when ambulating from bed to unit  This nurse recommended for patient to attempt to walk if not dizzy, pt agreeable  Pt did then walk from room to nursing station to request afternoon dose of scheduled insulin  He stated " I'm fine:" when asked how he felt while walking"  Pt denies si hi and hallucinations and remains flat and depressed  Pt compliant with all medications and care continues independently dress ulcers as ordered and maintain hygiene  Will continue safety precautions and 15 minute checks  No needs identified

## 2018-07-29 LAB
GLUCOSE SERPL-MCNC: 188 MG/DL (ref 65–140)
GLUCOSE SERPL-MCNC: 228 MG/DL (ref 65–140)
GLUCOSE SERPL-MCNC: 282 MG/DL (ref 65–140)

## 2018-07-29 PROCEDURE — 99231 SBSQ HOSP IP/OBS SF/LOW 25: CPT | Performed by: PSYCHIATRY & NEUROLOGY

## 2018-07-29 PROCEDURE — 82948 REAGENT STRIP/BLOOD GLUCOSE: CPT

## 2018-07-29 RX ORDER — ACETAMINOPHEN 325 MG/1
650 TABLET ORAL EVERY 6 HOURS PRN
Status: DISCONTINUED | OUTPATIENT
Start: 2018-07-29 | End: 2018-08-03 | Stop reason: HOSPADM

## 2018-07-29 RX ORDER — MIDODRINE HYDROCHLORIDE 2.5 MG/1
2.5 TABLET ORAL
Status: DISCONTINUED | OUTPATIENT
Start: 2018-07-29 | End: 2018-08-03 | Stop reason: HOSPADM

## 2018-07-29 RX ORDER — CHLORPROMAZINE HYDROCHLORIDE 25 MG/1
50 TABLET, FILM COATED ORAL 2 TIMES DAILY
Status: DISCONTINUED | OUTPATIENT
Start: 2018-07-29 | End: 2018-08-03 | Stop reason: HOSPADM

## 2018-07-29 RX ADMIN — CHLORPROMAZINE HYDROCHLORIDE 50 MG: 25 TABLET, SUGAR COATED ORAL at 21:10

## 2018-07-29 RX ADMIN — BUSPIRONE HYDROCHLORIDE 10 MG: 5 TABLET ORAL at 08:23

## 2018-07-29 RX ADMIN — INSULIN LISPRO 4 UNITS: 100 INJECTION, SOLUTION INTRAVENOUS; SUBCUTANEOUS at 04:00

## 2018-07-29 RX ADMIN — BUSPIRONE HYDROCHLORIDE 10 MG: 5 TABLET ORAL at 17:41

## 2018-07-29 RX ADMIN — DIVALPROEX SODIUM 500 MG: 500 TABLET, DELAYED RELEASE ORAL at 21:11

## 2018-07-29 RX ADMIN — MELATONIN 6 MG: at 23:04

## 2018-07-29 RX ADMIN — INSULIN LISPRO 6 UNITS: 100 INJECTION, SOLUTION INTRAVENOUS; SUBCUTANEOUS at 12:04

## 2018-07-29 RX ADMIN — MAGNESIUM OXIDE TAB 400 MG (241.3 MG ELEMENTAL MG) 400 MG: 400 (241.3 MG) TAB at 08:23

## 2018-07-29 RX ADMIN — GABAPENTIN 400 MG: 400 CAPSULE ORAL at 08:23

## 2018-07-29 RX ADMIN — ACETAMINOPHEN 650 MG: 325 TABLET ORAL at 21:33

## 2018-07-29 RX ADMIN — GABAPENTIN 400 MG: 400 CAPSULE ORAL at 04:30

## 2018-07-29 RX ADMIN — GABAPENTIN 400 MG: 400 CAPSULE ORAL at 21:10

## 2018-07-29 RX ADMIN — INSULIN LISPRO 2 UNITS: 100 INJECTION, SOLUTION INTRAVENOUS; SUBCUTANEOUS at 08:25

## 2018-07-29 RX ADMIN — METOPROLOL TARTRATE 12.5 MG: 25 TABLET ORAL at 08:22

## 2018-07-29 RX ADMIN — DULOXETINE HYDROCHLORIDE 60 MG: 60 CAPSULE, DELAYED RELEASE ORAL at 08:22

## 2018-07-29 RX ADMIN — MUPIROCIN 1 APPLICATION: 20 OINTMENT TOPICAL at 21:12

## 2018-07-29 RX ADMIN — DIVALPROEX SODIUM 500 MG: 500 TABLET, DELAYED RELEASE ORAL at 04:30

## 2018-07-29 RX ADMIN — MUPIROCIN 1 APPLICATION: 20 OINTMENT TOPICAL at 08:22

## 2018-07-29 RX ADMIN — CHLORPROMAZINE HYDROCHLORIDE 50 MG: 25 TABLET, SUGAR COATED ORAL at 08:22

## 2018-07-29 RX ADMIN — DIVALPROEX SODIUM 500 MG: 500 TABLET, DELAYED RELEASE ORAL at 08:23

## 2018-07-29 RX ADMIN — MIDODRINE HYDROCHLORIDE 2.5 MG: 2.5 TABLET ORAL at 23:05

## 2018-07-29 RX ADMIN — MUPIROCIN 1 APPLICATION: 20 OINTMENT TOPICAL at 16:45

## 2018-07-29 RX ADMIN — TRAZODONE HYDROCHLORIDE 100 MG: 50 TABLET ORAL at 23:05

## 2018-07-29 NOTE — PROGRESS NOTES
Progress Note - Behavioral Health   Alexander Steward 40 y o  male MRN: [id number]  Unit/Bed#: [facility] 218-01 Encounter: [id number]    Assessment/Plan   Principal Problem:    Severe bipolar I disorder, most recent episode depressed without psychotic features [facility])  Active Problems:    Episode of dizziness   This is a 15 minutes psychiatric progress note on patient  10 minutes were spent in coordination and care of treatment of this patient  Coordination care consisted me with the nursing staff discussed patient's progress, documentation, medication, orders and behaviors over last 24 hours  Per the nursing staff on the patient is eating 100% of his meal supple through the night  Of concern today is the patient is still experiencing some mild orthostasis  I am going to be decreasing the Thorazine to a total dose of 100 mg per day  This will be divided between morning and night  We will continue to monitor his orthostasis  Behavior over the last 24 hours:  unchanged  Sleep: normal  Appetite: normal  Medication side effects: Yes orthostasis  ROS: no complaints    Mental Status Evaluation:  Appearance:  casually dressed   Behavior:  guarded   Speech:  soft   Mood:  constricted   Affect:  constricted   Thought Process:  concrete   Thought Content:  normal   Perceptual Disturbances: None   Risk Potential: denies   Sensorium:  person, place and time/date   Cognition:  grossly intact   Consciousness:  alert and awake    Attention: attention span and concentration were age appropriate   Insight:  poor   Judgment: poor   Gait/Station: normal gait/station   Motor Activity: no abnormal movements     Progress Toward Goals: Unchanged    Recommended Treatment: Continue with group therapy, milieu therapy and occupational therapy  Risks, benefits and possible side effects of Medications:   Risks, benefits, and possible side effects of medications explained to patient and patient verbalizes understanding        Medications: planned medication changes: reduce thorazine secondary to orthostasis  Labs: Reviewed    Counseling / Coordination of Care  Total floor / unit time spent today 15 minutes  Greater than 50% of total time was spent with the patient and / or family counseling and / or coordination of care   A description of the counseling / coordination of care: 15

## 2018-07-29 NOTE — PROGRESS NOTES
Pt has been minimally visible on the unit this shift  Was present in the community for evening snack  Continues to ambulate with the use of a wheelchair  Pt sought out nursing for HS medication  Presents clean and kempt  Offers good eye contact  Affect remains flat and depressed but pt was pleasant and appropriate during interaction  Pt presents with improved insight this shift  States going to [facility] was an irresponsible decision and he never should have gone  Shares more realistic plans for d/c if he leaves after getting paid  States he would not be suicidal with improved options and is hopeful to stay out of the hospital  Pt denies active SI  Contracts for safety on the unit  Denies any current or recent dizziness  Was compliant with HS medication  Pt is able to make needs known and agrees to report changes in condition/needs to staff  Nursing will continue to monitor and assess for changes and safety with 15 minute checks

## 2018-07-29 NOTE — PROGRESS NOTES
Patient bright alert, ate breakfast with peers, back to bed after eating and receiving scheduled medications  Patient remains flat depressed, withdrawn to self and poor eye contact  Pt c/o chronic pain in b/l feet and knees, self repositioned for comfort, pr declines to use scheduled  voltaren gel as ordered for pain  Pr denies si hi and hallucinations but maintains he may not be safe off the unit  Will maintain on safety precautions and 15 minute checks  No needs indentified

## 2018-07-29 NOTE — CONSULTS
Please see the consultation performed by [facility] TAYLOR SEAMAN on 07/26/2018    This patient is being followed by the hospitalist team

## 2018-07-30 ENCOUNTER — APPOINTMENT (INPATIENT)
Dept: NON INVASIVE DIAGNOSTICS | Facility: HOSPITAL | Age: 43
DRG: 885 | End: 2018-07-30
Payer: MEDICARE

## 2018-07-30 LAB
GLUCOSE SERPL-MCNC: 181 MG/DL (ref 65–140)
GLUCOSE SERPL-MCNC: 226 MG/DL (ref 65–140)
GLUCOSE SERPL-MCNC: 333 MG/DL (ref 65–140)

## 2018-07-30 PROCEDURE — 99231 SBSQ HOSP IP/OBS SF/LOW 25: CPT | Performed by: PSYCHIATRY & NEUROLOGY

## 2018-07-30 PROCEDURE — 93306 TTE W/DOPPLER COMPLETE: CPT

## 2018-07-30 PROCEDURE — 97165 OT EVAL LOW COMPLEX 30 MIN: CPT

## 2018-07-30 PROCEDURE — 93306 TTE W/DOPPLER COMPLETE: CPT | Performed by: INTERNAL MEDICINE

## 2018-07-30 PROCEDURE — G8988 SELF CARE GOAL STATUS: HCPCS

## 2018-07-30 PROCEDURE — G8987 SELF CARE CURRENT STATUS: HCPCS

## 2018-07-30 PROCEDURE — 82948 REAGENT STRIP/BLOOD GLUCOSE: CPT

## 2018-07-30 PROCEDURE — G8989 SELF CARE D/C STATUS: HCPCS

## 2018-07-30 RX ADMIN — ACETAMINOPHEN 650 MG: 325 TABLET ORAL at 13:26

## 2018-07-30 RX ADMIN — CHLORPROMAZINE HYDROCHLORIDE 50 MG: 25 TABLET, SUGAR COATED ORAL at 20:22

## 2018-07-30 RX ADMIN — MIDODRINE HYDROCHLORIDE 2.5 MG: 2.5 TABLET ORAL at 08:05

## 2018-07-30 RX ADMIN — GABAPENTIN 400 MG: 400 CAPSULE ORAL at 16:37

## 2018-07-30 RX ADMIN — INSULIN LISPRO 4 UNITS: 100 INJECTION, SOLUTION INTRAVENOUS; SUBCUTANEOUS at 16:37

## 2018-07-30 RX ADMIN — DICLOFENAC 2 G: 10 GEL TOPICAL at 08:06

## 2018-07-30 RX ADMIN — DIVALPROEX SODIUM 500 MG: 500 TABLET, DELAYED RELEASE ORAL at 08:05

## 2018-07-30 RX ADMIN — MIDODRINE HYDROCHLORIDE 2.5 MG: 2.5 TABLET ORAL at 16:37

## 2018-07-30 RX ADMIN — CHLORPROMAZINE HYDROCHLORIDE 50 MG: 25 TABLET, SUGAR COATED ORAL at 08:05

## 2018-07-30 RX ADMIN — BUSPIRONE HYDROCHLORIDE 10 MG: 5 TABLET ORAL at 17:19

## 2018-07-30 RX ADMIN — GABAPENTIN 400 MG: 400 CAPSULE ORAL at 08:05

## 2018-07-30 RX ADMIN — INSULIN LISPRO 2 UNITS: 100 INJECTION, SOLUTION INTRAVENOUS; SUBCUTANEOUS at 08:06

## 2018-07-30 RX ADMIN — MIDODRINE HYDROCHLORIDE 2.5 MG: 2.5 TABLET ORAL at 11:37

## 2018-07-30 RX ADMIN — DIVALPROEX SODIUM 500 MG: 500 TABLET, DELAYED RELEASE ORAL at 20:23

## 2018-07-30 RX ADMIN — GABAPENTIN 400 MG: 400 CAPSULE ORAL at 20:22

## 2018-07-30 RX ADMIN — INSULIN LISPRO 8 UNITS: 100 INJECTION, SOLUTION INTRAVENOUS; SUBCUTANEOUS at 11:36

## 2018-07-30 RX ADMIN — TRAZODONE HYDROCHLORIDE 100 MG: 50 TABLET ORAL at 20:23

## 2018-07-30 RX ADMIN — DIVALPROEX SODIUM 500 MG: 500 TABLET, DELAYED RELEASE ORAL at 16:37

## 2018-07-30 RX ADMIN — MUPIROCIN 1 APPLICATION: 20 OINTMENT TOPICAL at 08:06

## 2018-07-30 RX ADMIN — DULOXETINE HYDROCHLORIDE 60 MG: 60 CAPSULE, DELAYED RELEASE ORAL at 08:05

## 2018-07-30 RX ADMIN — BUSPIRONE HYDROCHLORIDE 10 MG: 5 TABLET ORAL at 08:06

## 2018-07-30 RX ADMIN — MELATONIN 6 MG: at 20:28

## 2018-07-30 NOTE — PLAN OF CARE
ANXIETY     Will report anxiety at manageable levels Progressing     By discharge: Patient will verbalize 2 strategies to deal with anxiety Progressing        DEPRESSION     Will be euthymic at discharge [address] Discharge to home or other facility with appropriate resources Progressing        Ineffective Coping     Participates in unit activities Progressing        METABOLIC, FLUID AND ELECTROLYTES - ADULT     Glucose maintained within target range Progressing        Nutrition/Hydration-ADULT     Nutrient/Hydration intake appropriate for improving, restoring or maintaining nutritional needs Progressing        Prexisting or High Potential for Compromised Skin Integrity     Skin integrity is maintained or improved Progressing        Risk for Self Injury/Neglect     Treatment Goal: Remain safe during length of stay, learn and adopt new coping skills, and be free of self-injurious ideation, impulses and acts at the time of discharge Progressing     Verbalize thoughts and feelings Progressing     Recognize maladaptive responses and adopt new coping mechanisms Progressing     Complete daily ADLs, including personal hygiene independently, as able Progressing        SELF CARE DEFICIT     Return ADL status to a safe level of function Progressing

## 2018-07-30 NOTE — PROGRESS NOTES
Progress Note - Behavioral Health     Jonny Tavares 40 y o  male MRN: [id number]   Unit/Bed#: [facility] 218-01 Encounter: [id number]    Behavior over the last 24 hours: Mary Dc was pleasant and cooperative during interview  He appeares brighter overall and is planning his goals for once he is discharged  Per nursing staff he is compliant with medications and meals and has been interacting appropriately with staff and peers  He is attending groups occasionally  He is performing his own dressing changes to his lower extremity wounds  Sleep: normal  Appetite: normal  Medication side effects: No   ROS: no complaints    Mental Status Evaluation:    Appearance:  casually dressed, dressed appropriately   Behavior:  normal, pleasant, cooperative   Speech:  normal rate and volume   Mood:  normal   Affect:  brighter   Thought Process:  organized, logical, goal directed   Associations: intact associations   Thought Content:  normal   Perceptual Disturbances: none   Risk Potential: Suicidal ideation - None  Homicidal ideation - None  Potential for aggression - No   Sensorium:  oriented to person, place, time/date and situation   Memory:  recent and remote memory grossly intact   Consciousness:  alert and awake   Attention: attention span and concentration are age appropriate   Insight:  improved   Judgment: improved   Gait/Station: normal gait/station   Motor Activity: no abnormal movements     Vital signs in last 24 hours:    Temp:  [98 2 °F (36 8 °C)-98 6 °F (37 °C)] 98 6 °F (37 °C)  HR:  [61-90] 90  Resp:  [16] 16  BP: ()/(52-91) 98/53    Laboratory results:  I have personally reviewed all pertinent laboratory/tests results  Progress Toward Goals: continues to improve    Assessment/Plan   Principal Problem:    Severe bipolar I disorder, most recent episode depressed without psychotic features ([facility])  Active Problems:    Episode of dizziness    Recommended Treatment:   1   Will continue current medications and treatment plan as decided by psychiatric care team   2  Will continue to monitor patient and adjust medications as needed  3  Discharge planning and disposition are ongoing  All current active medications have been reviewed  Current Facility-Administered Medications:  acetaminophen 650 mg Oral Q6H PRN Reina Aguilar PA-C   busPIRone 10 mg Oral BID TAYLOR Lux   chlorproMAZINE 50 mg Oral BID TAYLOR Galeana   diclofenac sodium 2 g Topical BID (AM & Afternoon) Twilla Hard, JOSIE   divalproex sodium 500 mg Oral TID Twilla Hard, JOSIE   DULoxetine 60 mg Oral Daily Twilla Hard, JOSIE   gabapentin 400 mg Oral TID TAYLOR Galeana   insulin lispro 2-12 Units Subcutaneous TID AC Maddy Cárdenas PA-C   melatonin 6 mg Oral HS Brianna Mandujano PA-C   metoprolol tartrate 12 5 mg Oral Daily TAYLOR Chambers   midodrine 2 5 mg Oral TID AC Reina Aguilar PA-C   mupirocin 1 application Topical TID Rosemary Lynn DPM   traZODone 100 mg Oral HS Vikas Alatorre MD       Risks / Benefits of Treatment:    Risks, benefits, and possible side effects of medications explained to patient and patient verbalizes understanding and agreement for treatment  Counseling / Coordination of Care:    Patient's progress discussed with staff in treatment team meeting  Medications, treatment progress and treatment plan reviewed with patient

## 2018-07-30 NOTE — PROGRESS NOTES
Pt has been WD to room for most of day  Visible for meds and meals  Foot wound care completed by pt  Metoprolol held this AM ,for low BP  Pt had echocardiogram this AM at 0900, back on unit at 0934  Pt reportedly tolerated well  Monitoring continues

## 2018-07-30 NOTE — PROGRESS NOTES
Patient observed watching tv with peers, bright and alert, ambulating with wheelchair, compliant with all medications  Pt c/o headache  requesting tylenol, medical PA ordered tylenol prn for headache and midodrine for continuous orthostatic blood pressures  Pt agreeable  Will continue to monitor

## 2018-07-30 NOTE — PLAN OF CARE
Problem: OCCUPATIONAL THERAPY ADULT  Goal: Performs self-care activities at highest level of function for planned discharge setting  See evaluation for individualized goals  Treatment Interventions:  (eval only)          See flowsheet documentation for full assessment, interventions and recommendations     Limitation:  (none r/t OT)  Prognosis: Good   Recommend completing LB self care while seated if dizzy    Recommendation:  (d/c from active OT intervention)  OT Discharge Recommendation:  (NO OT Recommendations, d/c when cleared by [facility]) Amanda Ruth OT

## 2018-07-30 NOTE — OCCUPATIONAL THERAPY NOTE
Occupational Therapy Evaluation      Rosa Dutton       Chief Complaint:  I am just hopeless"        History of Present Illness           Rosa Dutton is a 40 y o  male recently admitted to this unit and discharged with diagnosis of bipolar disorder depressed presented to the emergency room with requesting medical clearance to get to the [facility] residence  At the time of his evaluation he reported depression with suicidal ideation as well as stating that his medical noncompliance was his suicide attempt and also documented that he tried to the throw himself off a steel peer on 7/7/2018  Of note at his last discharge he admitted taking a bus to [facility] where he spent his money and did not take his medical medications at that time he was discharged on Depakote, Thorazine, gabapentin    He is admitted for inpatient management stabilization and on interview upon arrival to the unit he states   On interview he states that he was taking all of his medications but reports all depressive symptoms states he does very little when not in the hospital and answered "I do not know" to many questions (per admitting MD)      7/30/2018    Patient Active Problem List   Diagnosis    Severe bipolar I disorder, most recent episode depressed without psychotic features ([address] )    Cannabis abuse    Tobacco use disorder    Osteoarthritis of both knees    Diabetic ulcer of right midfoot ([address] )    DM (diabetes mellitus), type 2 ([address] )    Essential hypertension    Episode of dizziness       Past Medical History:   Diagnosis Date    Anxiety     Bipolar 1 disorder ([address] )     Chronic pain disorder     Depression     Diabetes mellitus ([address] )     Hypertension     Psychiatric illness     PTSD (post-traumatic stress disorder)     Sleep difficulties     Substance abuse     Suicide attempt ([facility] [address] )        Past Surgical History:   Procedure Laterality Date    APPENDECTOMY      COLON SURGERY      TONSILLECTOMY 07/30/18 1350   Note Type   Note type Eval only   Restrictions/Precautions   Weight Bearing Precautions Per Order No   Pain Assessment   Pain Assessment No/denies pain   Pain Score No Pain   Home Living   Type of Home Apartment   Home Layout One level   Bathroom Toilet Standard   Home Equipment Cane   Prior Function   Level of [place] Independent with ADLs and functional mobility   Lives With Alone   Receives Help From Friend(s)   ADL Assistance Independent   IADLs Independent   Falls in the last 6 months 0   Comments (friend provides transportation/assists with errands)   Psychosocial   Psychosocial (WDL) X   Patient Behaviors/Mood Cooperative;Depressed   Needs Expressed Denies   Ability to Express Feelings Able to express   Ability to Express Needs Able to express   Ability to Express Thoughts Able to express   Ability to Understand Others Understands   Subjective   Subjective I take myself to the bathroom,    ADL   Eating Assistance 7  Independent   Grooming Assistance 7  Independent   UB Bathing Assistance 7  Independent   LB [address] 6  Modified Independent   LB Bathing Deficit (seated r/t dizziness at times)   [address] 7  Independent   LB Dressing Assistance 6  Modified independent   LB Dressing Deficit (seated r/t dizziness at times)   Toileting Assistance  6  Modified independent   Toileting Deficit (cane)   Additional Comments (in w/c on unit r/t cellulitis)   Bed Mobility   Rolling R 7  Independent   Rolling L 7  Independent   Supine to Sit 7  Independent   Sit to Supine 7  Independent   Transfers   Sit to Stand 7  Independent   Additional items (w/c to stand & return, I, locked brakes w/o cueing)   Stand to Sit 7  Independent   Functional Mobility   Functional Mobility 6  Modified independent   Additional Comments (cane/w/c)   Balance   Static Sitting Normal   Dynamic Sitting Normal   Static Standing Good   Activity Tolerance   Activity Tolerance (WFL's)   RUE Assessment   RUE Assessment WFL   LUE Assessment   LUE Assessment WFL   Hand Function   Gross Motor Coordination Functional   Fine Motor Coordination Functional   Cognition   Overall Cognitive Status WFL   Arousal/Participation Alert; Cooperative   Attention Within functional limits   Orientation Level Oriented X4   Memory Within functional limits   Following Commands Follows all commands and directions without difficulty   Assessment   Limitation (none r/t OT)   Prognosis Good   Assessment Pt is a 40 y o  male seen for OT evaluation s/p admit to [address] on 7/10/2018 w/ Severe bipolar I disorder, most recent episode depressed without psychotic features ([facility] [address] )  Comorbidities affecting pt's functional performance at time of assessment include: DM, HTN and OA B/L knees, Diabetic ulcer Rt mid foot, Dizziness, Severe Bopolar D/O, depressed  Personal factors affecting pt at time of IE include:limited home support  Prior to admission, pt was I with self care ADL's, IADL's, with the exception of driving  Pt currently I with self care, functional transfers and toileting, using cane  In W/C for mobility on unit r/t LE cellulitis  OT Intervention does not appear to be indicated at this time        Goals   Patient Goals go back home, feel better   Plan   Treatment Interventions (eval only)   Goal Expiration Date 07/30/18   Treatment Day 0   OT Frequency Eval only   Recommendation   Recommendation (d/c from active OT intervention)   OT Discharge Recommendation (NO OT Recommendations, d/c when cleared by Psyche)   Barthel Index   Feeding 10   Bathing 5   Grooming Score 5   Dressing Score 10  (seated for safety if dizzy)   Bladder Score 10   Bowels Score 10   Toilet Use Score 10  (cane)   Transfers (Bed/Chair) Score 15   Mobility (Level Surface) Score 5  (limited r/t LE cellulitis, able to walk short distances with)   Stairs Score 5  (DNT/on BHU)   Barthel Index Score 85   Ancelmo Barkley OT

## 2018-07-31 LAB
GLUCOSE SERPL-MCNC: 184 MG/DL (ref 65–140)
GLUCOSE SERPL-MCNC: 207 MG/DL (ref 65–140)
GLUCOSE SERPL-MCNC: 263 MG/DL (ref 65–140)

## 2018-07-31 PROCEDURE — 99231 SBSQ HOSP IP/OBS SF/LOW 25: CPT | Performed by: PSYCHIATRY & NEUROLOGY

## 2018-07-31 PROCEDURE — 82948 REAGENT STRIP/BLOOD GLUCOSE: CPT

## 2018-07-31 RX ADMIN — INSULIN LISPRO 6 UNITS: 100 INJECTION, SOLUTION INTRAVENOUS; SUBCUTANEOUS at 12:42

## 2018-07-31 RX ADMIN — GABAPENTIN 400 MG: 400 CAPSULE ORAL at 18:13

## 2018-07-31 RX ADMIN — MIDODRINE HYDROCHLORIDE 2.5 MG: 2.5 TABLET ORAL at 12:42

## 2018-07-31 RX ADMIN — GABAPENTIN 400 MG: 400 CAPSULE ORAL at 21:23

## 2018-07-31 RX ADMIN — TRAZODONE HYDROCHLORIDE 100 MG: 50 TABLET ORAL at 21:22

## 2018-07-31 RX ADMIN — CHLORPROMAZINE HYDROCHLORIDE 50 MG: 25 TABLET, SUGAR COATED ORAL at 21:23

## 2018-07-31 RX ADMIN — INSULIN LISPRO 2 UNITS: 100 INJECTION, SOLUTION INTRAVENOUS; SUBCUTANEOUS at 08:40

## 2018-07-31 RX ADMIN — DICLOFENAC 2 G: 10 GEL TOPICAL at 08:39

## 2018-07-31 RX ADMIN — MIDODRINE HYDROCHLORIDE 2.5 MG: 2.5 TABLET ORAL at 18:14

## 2018-07-31 RX ADMIN — DIVALPROEX SODIUM 500 MG: 500 TABLET, DELAYED RELEASE ORAL at 18:14

## 2018-07-31 RX ADMIN — INSULIN LISPRO 4 UNITS: 100 INJECTION, SOLUTION INTRAVENOUS; SUBCUTANEOUS at 18:15

## 2018-07-31 RX ADMIN — MELATONIN 6 MG: at 21:24

## 2018-07-31 RX ADMIN — DIVALPROEX SODIUM 500 MG: 500 TABLET, DELAYED RELEASE ORAL at 08:42

## 2018-07-31 RX ADMIN — DULOXETINE HYDROCHLORIDE 60 MG: 60 CAPSULE, DELAYED RELEASE ORAL at 08:41

## 2018-07-31 RX ADMIN — BUSPIRONE HYDROCHLORIDE 10 MG: 5 TABLET ORAL at 08:41

## 2018-07-31 RX ADMIN — MUPIROCIN 1 APPLICATION: 20 OINTMENT TOPICAL at 08:39

## 2018-07-31 RX ADMIN — BUSPIRONE HYDROCHLORIDE 10 MG: 5 TABLET ORAL at 18:13

## 2018-07-31 RX ADMIN — GABAPENTIN 400 MG: 400 CAPSULE ORAL at 08:41

## 2018-07-31 RX ADMIN — MIDODRINE HYDROCHLORIDE 2.5 MG: 2.5 TABLET ORAL at 08:41

## 2018-07-31 RX ADMIN — CHLORPROMAZINE HYDROCHLORIDE 50 MG: 25 TABLET, SUGAR COATED ORAL at 08:41

## 2018-07-31 RX ADMIN — DIVALPROEX SODIUM 500 MG: 500 TABLET, DELAYED RELEASE ORAL at 21:23

## 2018-07-31 NOTE — SOCIAL WORK
SW met with patient for clinical update  The patient was observed sitting in the dining room at a table with peers  Mood remains calm, less depressed; appropriate affect; states he has been waiting for supervised phone time in order to review availability of apartments in the [facility] in preparation for his upcoming d/c on Friday  States he feels ready for d/c and would be comfortable living in that area, as he has contacts there and is familiar with the surroundings  He is did mention concern re: receiving  the scripts he needs to continue the present medication regime  He will speak with the providers during patient contact tomorrow re: specific concerns  He was reassured that his d/c needs would be appropriately addressed

## 2018-07-31 NOTE — PLAN OF CARE
ANXIETY     By discharge: Patient will verbalize 2 strategies to deal with anxiety Not Progressing        DEPRESSION     Will be euthymic at discharge Not [address] Discharge to home or other facility with appropriate resources Not Progressing        METABOLIC, FLUID AND ELECTROLYTES - ADULT     Glucose maintained within target range Not Progressing        Risk for Self Injury/Neglect     Recognize maladaptive responses and adopt new coping mechanisms Not Progressing          ANXIETY     Will report anxiety at manageable levels Progressing        Ineffective Coping     Participates in unit activities Progressing        Nutrition/Hydration-ADULT     Nutrient/Hydration intake appropriate for improving, restoring or maintaining nutritional needs Progressing        Prexisting or High Potential for Compromised Skin Integrity     Skin integrity is maintained or improved Progressing        Risk for Self Injury/Neglect     Treatment Goal: Remain safe during length of stay, learn and adopt new coping skills, and be free of self-injurious ideation, impulses and acts at the time of discharge Progressing     Verbalize thoughts and feelings Progressing     Complete daily ADLs, including personal hygiene independently, as able Progressing        SELF CARE DEFICIT     Return ADL status to a safe level of function Progressing

## 2018-07-31 NOTE — PLAN OF CARE
Problem: DISCHARGE PLANNING  Goal: Discharge to home or other facility with appropriate resources  INTERVENTIONS:  - Identify barriers to discharge w/patient and caregiver  - Arrange for needed discharge resources and transportation as appropriate  - Coordinate discharge planning if the patient needs post-hospital services based on physician/advanced practitioner order or complex needs related to functional status, cognitive ability, or social support system   Outcome: Progressing  Patient working on housing as he declined SW options  Psych appt rescheduled

## 2018-07-31 NOTE — SOCIAL WORK
Spoke to [facility] and ensured appointment for today is cancelled  Next medication management appointment scheduled for Thursday August 23, 2018 at 3:15PM with Fady Oseguera

## 2018-07-31 NOTE — PROGRESS NOTES
Progress Note - Behavioral Health     Andres Alvarez 40 y o  male MRN: [id number]   Unit/Bed#: U 218-01 Encounter: [id number]    Behavior over the last 24 hours:  Phuong Roberto was calm, pleasant and cooperative during interview  Per nursing staff he is taking his medications as prescribed  He is present in the community and is interacting appropriately with staff and peers  He rates his current mood as good  He is eating 100% of his meals and sleeping without difficulty  He is looking forward to being discharged and is looking for a place to stay  He offers no current complaints  ROS: no complaints    Mental Status Evaluation:    Appearance:  age appropriate, casually dressed   Behavior:  normal, pleasant, cooperative   Speech:  normal rate and volume   Mood:  improved   Affect:  normal range and intensity   Thought Process:  organized, logical, coherent   Associations: intact associations   Thought Content:  normal   Perceptual Disturbances: no auditory hallucinations, no visual hallucinations   Risk Potential: Suicidal ideation - None  Homicidal ideation - None  Potential for aggression - No   Sensorium:  oriented to person, place, time/date and situation   Memory:   Recent and remote memory grossly intact   Consciousness:  alert and awake   Attention: attention span and concentration are age appropriate   Insight:  fair   Judgment: fair   Gait/Station: normal gait/station   Motor Activity: no abnormal movements     Vital signs in last 24 hours:    Temp:  [97 9 °F (36 6 °C)-98 2 °F (36 8 °C)] 97 9 °F (36 6 °C)  HR:  [] 107  Resp:  [16] 16  BP: ()/(56-83) 107/63    Laboratory results:  I have personally reviewed all pertinent laboratory/tests results  Progress Toward Goals: continues to improve    Assessment/Plan   Principal Problem:    Severe bipolar I disorder, most recent episode depressed without psychotic features (HCC)  Active Problems:    Episode of dizziness    Recommended Treatment:   1   Will continue current medications and treatment plan as determined by Psychiatric Care Team   2   Will continue to monitor patient and adjust medications as needed  3   Discharge disposition and planning are ongoing  All current active medications have been reviewed  Current Facility-Administered Medications:  acetaminophen 650 mg Oral Q6H PRN Raquel Roca PA-C   busPIRone 10 mg Oral BID TAYLOR Lux   chlorproMAZINE 50 mg Oral BID TAYLOR Galeana   diclofenac sodium 2 g Topical BID (AM & Afternoon) Jeff oSto PA-C   divalproex sodium 500 mg Oral TID Jeff Soto PA-C   DULoxetine 60 mg Oral Daily Jeff Soto PA-C   gabapentin 400 mg Oral TID TAYLOR Galeana   insulin lispro 2-12 Units Subcutaneous TID AC Maddy Cárdenas PA-C   melatonin 6 mg Oral HS Brianna Mandujano PA-C   metoprolol tartrate 12 5 mg Oral Daily TAYLOR Chambers   midodrine 2 5 mg Oral TID AC Raquel Roca PA-C   mupirocin 1 application Topical TID Sergio Dalton DPM   traZODone 100 mg Oral HS Cathie Berg MD       Risks / Benefits of Treatment:    Risks, benefits, and possible side effects of medications explained to patient and patient verbalizes understanding and agreement for treatment  Counseling / Coordination of Care:      Patient's progress discussed with staff in treatment team meeting  Medications, treatment progress and treatment plan reviewed with patient

## 2018-07-31 NOTE — PROGRESS NOTES
No signs or hypo/hyperglycemia  Patient observed sleeping during most Q15 minute safety checks (second portion of shift)  No suicidal ideations, acting out or homicidal behaviors  No change in medical condition or complaints voiced  Fluids maintained at bedside to promote hydration

## 2018-07-31 NOTE — PROGRESS NOTES
Patient out in the community  Affect is flat  Complaint of "gas" relieved by ginger-candice and crackers  Maintained on Q 15 minute safety checks  No acting out, suicidal ideations, and/or homicidal behaviors  Bilateral foot wounds dressed by patient prior to 1900  No changes in medical condition or complaints voiced  Fluids maintained at bedside to promote hydration

## 2018-07-31 NOTE — PROGRESS NOTES
Patient alert, awake, ate with peers, watching tv and talking to peers  Pt remains flat and depressed but calm and pleasant  Pt has been using can with ambulation this shift and denies dizziness  Pt also denies si  Hi and hallucinations, but maintains he is not ready to be discharged yet  He continues to maintain good hygiene and independently cleans ulcers and changes dressings as ordered  Will maintain on safety precautions and 15 minute checks  No needs identified

## 2018-08-01 LAB
GLUCOSE SERPL-MCNC: 196 MG/DL (ref 65–140)
GLUCOSE SERPL-MCNC: 260 MG/DL (ref 65–140)

## 2018-08-01 PROCEDURE — 99232 SBSQ HOSP IP/OBS MODERATE 35: CPT | Performed by: PSYCHIATRY & NEUROLOGY

## 2018-08-01 PROCEDURE — 82948 REAGENT STRIP/BLOOD GLUCOSE: CPT

## 2018-08-01 RX ADMIN — MIDODRINE HYDROCHLORIDE 2.5 MG: 2.5 TABLET ORAL at 12:14

## 2018-08-01 RX ADMIN — TRAZODONE HYDROCHLORIDE 100 MG: 50 TABLET ORAL at 21:39

## 2018-08-01 RX ADMIN — CHLORPROMAZINE HYDROCHLORIDE 50 MG: 25 TABLET, SUGAR COATED ORAL at 09:04

## 2018-08-01 RX ADMIN — BUSPIRONE HYDROCHLORIDE 10 MG: 5 TABLET ORAL at 16:57

## 2018-08-01 RX ADMIN — DICLOFENAC 2 G: 10 GEL TOPICAL at 09:09

## 2018-08-01 RX ADMIN — GABAPENTIN 400 MG: 400 CAPSULE ORAL at 21:39

## 2018-08-01 RX ADMIN — INSULIN LISPRO 2 UNITS: 100 INJECTION, SOLUTION INTRAVENOUS; SUBCUTANEOUS at 07:55

## 2018-08-01 RX ADMIN — CHLORPROMAZINE HYDROCHLORIDE 50 MG: 25 TABLET, SUGAR COATED ORAL at 21:39

## 2018-08-01 RX ADMIN — DIVALPROEX SODIUM 500 MG: 500 TABLET, DELAYED RELEASE ORAL at 09:04

## 2018-08-01 RX ADMIN — INSULIN LISPRO 4 UNITS: 100 INJECTION, SOLUTION INTRAVENOUS; SUBCUTANEOUS at 12:15

## 2018-08-01 RX ADMIN — DIVALPROEX SODIUM 500 MG: 500 TABLET, DELAYED RELEASE ORAL at 21:39

## 2018-08-01 RX ADMIN — METFORMIN HYDROCHLORIDE 500 MG: 500 TABLET ORAL at 16:57

## 2018-08-01 RX ADMIN — GABAPENTIN 400 MG: 400 CAPSULE ORAL at 09:04

## 2018-08-01 RX ADMIN — BUSPIRONE HYDROCHLORIDE 10 MG: 5 TABLET ORAL at 09:04

## 2018-08-01 RX ADMIN — MELATONIN 6 MG: at 21:39

## 2018-08-01 RX ADMIN — GABAPENTIN 400 MG: 400 CAPSULE ORAL at 16:58

## 2018-08-01 RX ADMIN — DIVALPROEX SODIUM 500 MG: 500 TABLET, DELAYED RELEASE ORAL at 16:58

## 2018-08-01 RX ADMIN — MIDODRINE HYDROCHLORIDE 2.5 MG: 2.5 TABLET ORAL at 16:58

## 2018-08-01 RX ADMIN — MIDODRINE HYDROCHLORIDE 2.5 MG: 2.5 TABLET ORAL at 07:55

## 2018-08-01 RX ADMIN — INSULIN LISPRO 6 UNITS: 100 INJECTION, SOLUTION INTRAVENOUS; SUBCUTANEOUS at 16:58

## 2018-08-01 RX ADMIN — DULOXETINE HYDROCHLORIDE 60 MG: 60 CAPSULE, DELAYED RELEASE ORAL at 09:04

## 2018-08-01 NOTE — PROGRESS NOTES
Pleasant and cooperative  No irritable behaviors noted  Friendly towards staff  Socialization minimal   Maintained on Q 15 minute safety checks  No acting out, suicidal ideations, and/or homicidal behaviors  No changes in medical condition or complaints voiced  Fluids maintained at bedside to promote hydration

## 2018-08-01 NOTE — PROGRESS NOTES
Progress Note - Behavioral Health   Rosa Dutton 40 y o  male MRN: [id number]  Unit/Bed#: U 218-01 Encounter: [id number]    Assessment/Plan   Principal Problem:    Severe bipolar I disorder, most recent episode depressed without psychotic features ([address] )  Active Problems:    Episode of dizziness      Behavior over the last 24 hours:    He is feeling more hopeful today  He was able to find a place in [place] and spoke with the landlord there  Knowing he likely has a place to live in the near future has been very comforting  He remains depressed  He also noted concern with his foot ulcers and is concerned that they are slightly worse; he notes having a good rapport with the podiatrist who saw him on the unit recently (Dr Candelaria Cuba) and would like to be seen again before discharge  His sugars remain not well controlled  He had been on metformin during previous admissions but is not receiving currently  No recent dizzy spells reported  Sleep: normal  Appetite: normal  Medication side effects: No  ROS: no complaints    Mental Status Evaluation:  Appearance:  age appropriate, casually dressed and overweight   Behavior:  cooperative, more conversational   Speech:  clear, less quiet   Mood:  depressed   Affect:  normal and slightly brighter   Thought Process:  normal   Thought Content:  ruminations r/t ongoing medical and psychosocial stressors   Perceptual Disturbances: None   Risk Potential: No SI/HI reported today   Sensorium:  person, place, time/date and situation   Cognition:  grossly intact   Consciousness:  alert and awake    Attention: attention span and concentration were age appropriate   Insight:  limited   Judgment: limited   Gait/Station: unchanged   Motor Activity: no abnormal movements     Progress Toward Goals: Ongoing    Recommended Treatment: Continue with group therapy, milieu therapy and occupational therapy        Risks, benefits and possible side effects of Medications:   Risks, benefits, and possible side effects of medications explained to patient and patient verbalizes understanding  Medications:   all current active meds have been reviewed, continue current psychiatric medications, current meds:   Current Facility-Administered Medications   Medication Dose Route Frequency    acetaminophen (TYLENOL) tablet 650 mg  650 mg Oral Q6H PRN    busPIRone (BUSPAR) tablet 10 mg  10 mg Oral BID    chlorproMAZINE (THORAZINE) tablet 50 mg  50 mg Oral BID    diclofenac sodium (VOLTAREN) 1 % topical gel 2 g  2 g Topical BID (AM & Afternoon)    divalproex sodium (DEPAKOTE) EC tablet 500 mg  500 mg Oral TID    DULoxetine (CYMBALTA) delayed release capsule 60 mg  60 mg Oral Daily    gabapentin (NEURONTIN) capsule 400 mg  400 mg Oral TID    insulin lispro (HumaLOG) 100 units/mL subcutaneous injection 2-12 Units  2-12 Units Subcutaneous TID AC    melatonin tablet 6 mg  6 mg Oral HS    metFORMIN (GLUCOPHAGE) tablet 500 mg  500 mg Oral BID With Meals    metoprolol tartrate (LOPRESSOR) tablet 12 5 mg  12 5 mg Oral Daily    midodrine (PROAMATINE) tablet 2 5 mg  2 5 mg Oral TID AC    mupirocin (BACTROBAN) 2 % ointment 1 application  1 application Topical TID    traZODone (DESYREL) tablet 100 mg  100 mg Oral HS     Will restart metformin 500mg BID  Consult Dr Epi Hinkle (podiatry) for follow-up visit prior to discharge home  Labs: Reviewed  Counseling / Coordination of Care  Total floor / unit time spent today 25 minutes  Greater than 50% of total time was spent with the patient and / or family counseling and / or coordination of care   A description of the counseling / coordination of care: 25

## 2018-08-01 NOTE — PROGRESS NOTES
Pt has been calm and cooperative with staff  Pt has been bright and has made his way down to groups/ pt maintains great eye contact, pt denies any current si/hi at this time  Will continue to monitor patient

## 2018-08-02 LAB — GLUCOSE SERPL-MCNC: 124 MG/DL (ref 65–140)

## 2018-08-02 PROCEDURE — 99231 SBSQ HOSP IP/OBS SF/LOW 25: CPT | Performed by: PSYCHIATRY & NEUROLOGY

## 2018-08-02 PROCEDURE — 82948 REAGENT STRIP/BLOOD GLUCOSE: CPT

## 2018-08-02 RX ADMIN — CHLORPROMAZINE HYDROCHLORIDE 50 MG: 25 TABLET, SUGAR COATED ORAL at 21:09

## 2018-08-02 RX ADMIN — DULOXETINE HYDROCHLORIDE 60 MG: 60 CAPSULE, DELAYED RELEASE ORAL at 08:50

## 2018-08-02 RX ADMIN — MIDODRINE HYDROCHLORIDE 2.5 MG: 2.5 TABLET ORAL at 17:28

## 2018-08-02 RX ADMIN — INSULIN LISPRO 6 UNITS: 100 INJECTION, SOLUTION INTRAVENOUS; SUBCUTANEOUS at 12:12

## 2018-08-02 RX ADMIN — METOPROLOL TARTRATE 12.5 MG: 25 TABLET ORAL at 08:48

## 2018-08-02 RX ADMIN — CHLORPROMAZINE HYDROCHLORIDE 50 MG: 25 TABLET, SUGAR COATED ORAL at 08:51

## 2018-08-02 RX ADMIN — GABAPENTIN 400 MG: 400 CAPSULE ORAL at 21:09

## 2018-08-02 RX ADMIN — MELATONIN 6 MG: at 21:09

## 2018-08-02 RX ADMIN — DIVALPROEX SODIUM 500 MG: 500 TABLET, DELAYED RELEASE ORAL at 21:09

## 2018-08-02 RX ADMIN — MIDODRINE HYDROCHLORIDE 2.5 MG: 2.5 TABLET ORAL at 12:13

## 2018-08-02 RX ADMIN — BUSPIRONE HYDROCHLORIDE 10 MG: 5 TABLET ORAL at 08:51

## 2018-08-02 RX ADMIN — METFORMIN HYDROCHLORIDE 500 MG: 500 TABLET ORAL at 17:29

## 2018-08-02 RX ADMIN — DIVALPROEX SODIUM 500 MG: 500 TABLET, DELAYED RELEASE ORAL at 17:29

## 2018-08-02 RX ADMIN — MUPIROCIN 1 APPLICATION: 20 OINTMENT TOPICAL at 17:27

## 2018-08-02 RX ADMIN — GABAPENTIN 400 MG: 400 CAPSULE ORAL at 17:28

## 2018-08-02 RX ADMIN — TRAZODONE HYDROCHLORIDE 100 MG: 50 TABLET ORAL at 21:09

## 2018-08-02 RX ADMIN — METFORMIN HYDROCHLORIDE 500 MG: 500 TABLET ORAL at 08:51

## 2018-08-02 RX ADMIN — MIDODRINE HYDROCHLORIDE 2.5 MG: 2.5 TABLET ORAL at 08:50

## 2018-08-02 RX ADMIN — INSULIN LISPRO 2 UNITS: 100 INJECTION, SOLUTION INTRAVENOUS; SUBCUTANEOUS at 08:46

## 2018-08-02 RX ADMIN — BUSPIRONE HYDROCHLORIDE 10 MG: 5 TABLET ORAL at 17:28

## 2018-08-02 RX ADMIN — DIVALPROEX SODIUM 500 MG: 500 TABLET, DELAYED RELEASE ORAL at 08:51

## 2018-08-02 RX ADMIN — GABAPENTIN 400 MG: 400 CAPSULE ORAL at 08:51

## 2018-08-02 NOTE — PROGRESS NOTES
Pt has been visible during the day today, pt has made an attempt to go to the groups  Pt denies current si/hi at this time, pt reports "feeling good " pt is hopeful about getting discharged tomorrow  Pt denies any current complaints at this time

## 2018-08-02 NOTE — ESCALATED TEAM TX
Tx team meeting held  Pt much brighter today and excited that he is getting his SSDI check today  Pt ready for discharge tomorrow and has plans to go to [place] with his gf to meet a landlord for an apartment  Team is comfortable with pt discharging tomorrow    CM will set up transportation for discharge; pt will leave early in the AM   SW will schedule pt with psychiatrist

## 2018-08-02 NOTE — SOCIAL WORK
SW spoke to Jasmin Mcintyre at [facility] and explained she needs to set up services for pt  Jasmin Mcintyre took intake information but would not schedule intake appointment as she needs discharge med list that specifically says discharge meds prior to scheduling appointment  SW explained that the list isn't sent until after pt discharges and that she is suppose to have appointment prior to discharge summary being printed  Niranjan Guzman informed SW that she will have to call pt with appointment as she can't scheduled until discharge med list is received

## 2018-08-02 NOTE — PROGRESS NOTES
Involvement in the community- Pt has been attending groups and sitting among peers  Participation is minimal   Appearance- Clean  Disheveled  Eye contact- Fleeting during interaction  Affect- Flat, depressed  Occasionally brightens during conversation  Mood- Improved  Pleasant  Controlled  Interactive  [place] SI/HI/Perez- Denies all, also denies SI if discharged to apartment as planned  Thought processes- Focused  Clear  Insight- Improved  Shares thoughts on d/c he didn't before  Involvement in own care- Improved  Discharge plans- Pt discusses plans to live in his own apartment in [place]  Pt is hopeful and anticipating d/c planned for Friday   Medication- Compliant with scheduled medication  Questions/Concerns- Denies  Nursing Education/Encouragement- Encouragement to continued involvement in own care, participation in groups and making plans for a successful discharge  Pt is able to make needs known and agrees to report changes in condition/needs to staff  Nursing will continue to monitor and assess for changes and safety with 15 minute checks

## 2018-08-02 NOTE — SOCIAL WORK
SW met with patient for an update and to review the Treatment Plan  The patient was pleasant and responsive to approach  Stated he felt much better, rating his depression a 3 out of 10 as compared to 10 out of 10 on admission  Rated anxiety a 7 out of 10, though stated that much of that is a "good anxiety", being excited about his upcoming d/c  The patient feels ready for d/c  Patient has been active and responsible in searching for housing and will check a potential apt  In the [place] area with his wife post d/c tomorrow  The patient reports intention on complying with prescribed medications and f/u with a recommended provider  The treatment plan was reviewed and signed by the patient

## 2018-08-02 NOTE — PROGRESS NOTES
Progress Note - Behavioral Health     Sho Robertson 40 y o  male MRN: [id number]   Unit/Bed#: [facility] 218-01 Encounter: [id number]    Behavior over the last 24 hours:  Brianne Gaytan was calm and cooperative during interview  Per nursing staff he has been taking his medications as prescribed, and has been compliant with meals  He is interacting appropriately with staff and peers and attends groups  He denies any side effects to his medications and offers no complaints  He is looking for to being discharged as he has found a place to stay  ROS: no complaints    Mental Status Evaluation:    Appearance:  casually dressed, dressed appropriately   Behavior:  normal, pleasant, cooperative   Speech:  normal rate and volume   Mood:  euthymic   Affect:  normal range and intensity   Thought Process:  organized   Associations: intact associations   Thought Content:  normal   Perceptual Disturbances: none   Risk Potential: Suicidal ideation - None  Homicidal ideation - None  Potential for aggression - No   Sensorium:  oriented to person, place, time/date and situation   Memory:  recent and remote memory grossly intact   Consciousness:  alert and awake   Attention: attention span and concentration are age appropriate   Insight:  fair   Judgment: fair   Gait/Station: normal gait/station   Motor Activity: no abnormal movements     Vital signs in last 24 hours:    Temp:  [97 1 °F (36 2 °C)-98 2 °F (36 8 °C)] 97 1 °F (36 2 °C)  HR:  [67-71] 67  Resp:  [16-18] 16  BP: (123-165)/(74-95) 125/74    Laboratory results:  I have personally reviewed all pertinent laboratory/tests results  Progress Toward Goals: continues to improve    Assessment/Plan   Principal Problem:    Severe bipolar I disorder, most recent episode depressed without psychotic features (HCC)  Active Problems:    Episode of dizziness    Recommended Treatment:   1  Will continue current medications and treatment plan    2   Will continue to monitor patient and adjust medications as needed  3   Will discharge patient tomorrow if no change in patient's status  All current active medications have been reviewed  Current Facility-Administered Medications:  acetaminophen 650 mg Oral Q6H PRN Chaim Delgado PA-C   busPIRone 10 mg Oral BID TAYLOR Lux   chlorproMAZINE 50 mg Oral BID TAYLOR Galeana   diclofenac sodium 2 g Topical BID (AM & Afternoon) Dhaval Jimenez PA-C   divalproex sodium 500 mg Oral TID Dhaval Jimenez PA-C   DULoxetine 60 mg Oral Daily Dhaval Jimenez PA-C   gabapentin 400 mg Oral TID TAYLOR Galeana   insulin lispro 2-12 Units Subcutaneous TID AC Maddy Cárdenas PA-C   melatonin 6 mg Oral HS Brianna Mandujano PA-C   metFORMIN 500 mg Oral BID With Meals Dhaval Jimenez PA-C   metoprolol tartrate 12 5 mg Oral Daily TAYLOR Chambers   midodrine 2 5 mg Oral TID AC Chaim Delgado PA-C   mupirocin 1 application Topical TID Adele Orozco DPM   traZODone 100 mg Oral HS Odin Townsend MD       Risks / Benefits of Treatment:    Risks, benefits, and possible side effects of medications explained to patient and patient verbalizes understanding and agreement for treatment  Counseling / Coordination of Care:      Patient's progress discussed with staff in treatment team meeting  Medications, treatment progress and treatment plan reviewed with patient

## 2018-08-02 NOTE — PROGRESS NOTES
Pt approached this nurse and reported that his benefits were suspended, pt however did report that he was still ready to be discharged tomorrow, but would need to be dropped off at the [facility] [phone] office

## 2018-08-02 NOTE — PLAN OF CARE
Problem: Ineffective Coping  Goal: Participates in unit activities  Interventions:  - Provide therapeutic environment   - Provide required programming   - Redirect inappropriate behaviors    Outcome: Progressing  Pt has been coming out to groups and participating in group directives and discussions

## 2018-08-03 ENCOUNTER — TELEPHONE (OUTPATIENT)
Dept: CASE MANAGEMENT | Facility: HOSPITAL | Age: 43
End: 2018-08-03

## 2018-08-03 VITALS
HEIGHT: 75 IN | RESPIRATION RATE: 16 BRPM | TEMPERATURE: 98.6 F | DIASTOLIC BLOOD PRESSURE: 86 MMHG | BODY MASS INDEX: 31.08 KG/M2 | WEIGHT: 250 LBS | OXYGEN SATURATION: 98 % | HEART RATE: 84 BPM | SYSTOLIC BLOOD PRESSURE: 140 MMHG

## 2018-08-03 LAB
GLUCOSE SERPL-MCNC: 195 MG/DL (ref 65–140)
VALPROATE SERPL-MCNC: 67.9 UG/ML (ref 50–125)

## 2018-08-03 PROCEDURE — 80164 ASSAY DIPROPYLACETIC ACD TOT: CPT | Performed by: NURSE PRACTITIONER

## 2018-08-03 PROCEDURE — 82948 REAGENT STRIP/BLOOD GLUCOSE: CPT

## 2018-08-03 PROCEDURE — 99238 HOSP IP/OBS DSCHRG MGMT 30/<: CPT | Performed by: PSYCHIATRY & NEUROLOGY

## 2018-08-03 RX ORDER — BUSPIRONE HYDROCHLORIDE 10 MG/1
10 TABLET ORAL 2 TIMES DAILY
Qty: 60 TABLET | Refills: 0 | Status: ON HOLD | OUTPATIENT
Start: 2018-08-03 | End: 2018-12-27

## 2018-08-03 RX ORDER — DULOXETIN HYDROCHLORIDE 60 MG/1
60 CAPSULE, DELAYED RELEASE ORAL DAILY
Qty: 30 CAPSULE | Refills: 0 | Status: ON HOLD | OUTPATIENT
Start: 2018-08-04 | End: 2018-12-27

## 2018-08-03 RX ORDER — TRAZODONE HYDROCHLORIDE 100 MG/1
100 TABLET ORAL
Qty: 30 TABLET | Refills: 0 | Status: SHIPPED | OUTPATIENT
Start: 2018-08-03 | End: 2018-12-27 | Stop reason: HOSPADM

## 2018-08-03 RX ORDER — DIVALPROEX SODIUM 500 MG/1
500 TABLET, DELAYED RELEASE ORAL 3 TIMES DAILY
Qty: 90 TABLET | Refills: 0 | Status: SHIPPED | OUTPATIENT
Start: 2018-08-03 | End: 2018-12-27 | Stop reason: HOSPADM

## 2018-08-03 RX ORDER — LANOLIN ALCOHOL/MO/W.PET/CERES
6 CREAM (GRAM) TOPICAL
Qty: 60 TABLET | Refills: 0 | Status: SHIPPED | OUTPATIENT
Start: 2018-08-03 | End: 2018-09-02

## 2018-08-03 RX ORDER — GABAPENTIN 400 MG/1
400 CAPSULE ORAL 3 TIMES DAILY
Qty: 90 CAPSULE | Refills: 0 | Status: ON HOLD | OUTPATIENT
Start: 2018-08-03 | End: 2018-12-27

## 2018-08-03 RX ORDER — CHLORPROMAZINE HYDROCHLORIDE 50 MG/1
50 TABLET, FILM COATED ORAL 2 TIMES DAILY
Qty: 60 TABLET | Refills: 0 | Status: ON HOLD | OUTPATIENT
Start: 2018-08-03 | End: 2018-12-27

## 2018-08-03 RX ADMIN — INSULIN LISPRO 2 UNITS: 100 INJECTION, SOLUTION INTRAVENOUS; SUBCUTANEOUS at 08:17

## 2018-08-03 RX ADMIN — METOPROLOL TARTRATE 12.5 MG: 25 TABLET ORAL at 08:14

## 2018-08-03 RX ADMIN — CHLORPROMAZINE HYDROCHLORIDE 50 MG: 25 TABLET, SUGAR COATED ORAL at 08:14

## 2018-08-03 RX ADMIN — BUSPIRONE HYDROCHLORIDE 10 MG: 5 TABLET ORAL at 08:16

## 2018-08-03 RX ADMIN — MUPIROCIN 1 APPLICATION: 20 OINTMENT TOPICAL at 08:17

## 2018-08-03 RX ADMIN — DIVALPROEX SODIUM 500 MG: 500 TABLET, DELAYED RELEASE ORAL at 08:14

## 2018-08-03 RX ADMIN — DULOXETINE HYDROCHLORIDE 60 MG: 60 CAPSULE, DELAYED RELEASE ORAL at 08:16

## 2018-08-03 RX ADMIN — MIDODRINE HYDROCHLORIDE 2.5 MG: 2.5 TABLET ORAL at 08:16

## 2018-08-03 RX ADMIN — GABAPENTIN 400 MG: 400 CAPSULE ORAL at 08:16

## 2018-08-03 RX ADMIN — DICLOFENAC 2 G: 10 GEL TOPICAL at 08:17

## 2018-08-03 RX ADMIN — METFORMIN HYDROCHLORIDE 500 MG: 500 TABLET ORAL at 08:16

## 2018-08-03 NOTE — NURSING NOTE
Patient medications and discharge instructions discussed, patient has no concerns  All belongings given, walked off unit to taxi in front lobby

## 2018-08-03 NOTE — PLAN OF CARE
Problem: DISCHARGE PLANNING  Goal: Discharge to home or other facility with appropriate resources  INTERVENTIONS:  - Identify barriers to discharge w/patient and caregiver  - Arrange for needed discharge resources and transportation as appropriate  - Coordinate discharge planning if the patient needs post-hospital services based on physician/advanced practitioner order or complex needs related to functional status, cognitive ability, or social support system   Outcome: Adequate for Discharge

## 2018-08-03 NOTE — DISCHARGE SUMMARY
Discharge Summary - [facility] 40 y o  male MRN: [id number]  Unit/Bed#: -01 Encounter: [id number]     Admission Date: 7/10/2018         Discharge Date:   08/03/2018    Attending Psychiatrist:  Carley Poon MD    Reason for Admission/HPI:   Gopi Rawls is a 28-year-old male patient well known to this facility  He has a longstanding psychiatric history and has been admitted to this unit several times  He was recently discharged from this unit but returned to the hospital requesting medical clearance to get [facility]  He relates that when he was discharged from the hospital, he went to [facility]  In [facility] he had to go to a homeless shelter  This did not work out and he ended up taking a bus back to [place]  During his time in [facility], he was walking quite a bit and developed nonhealing ulcerations on his bilateral lower extremities  At the time of intake evaluation, he reported severe depression and hopelessness with suicidal ideation  He also reports trying to throw himself off a steel peer while in [facility]  Of note, at his last discharge he discontinued both his diabetic and psychiatric medications  Kinza Mcclure has a history of bipolar depression  Past Medical History:   Diagnosis Date    Anxiety     [facility]     Chronic pain disorder     Depression     Diabetes mellitus ([facility] )     Hypertension     Psychiatric illness     PTSD (post-traumatic stress disorder)     Sleep difficulties     Substance abuse     Suicide attempt ([facility] )      Past Surgical History:   Procedure Laterality Date    APPENDECTOMY      COLON SURGERY      TONSILLECTOMY         Medications: All current active medications have been reviewed  Allergies:      Allergies   Allergen Reactions    Penicillins        Objective     Vital signs in last 24 hours:    Temp:  [97 9 °F (36 6 °C)-98 6 °F (37 °C)] 98 6 °F (37 °C)  HR:  [84-99] 84  Resp:  [16] 16  BP: (125-140)/(81-86) 140/86    No intake or output data in the 24 hours ending 08/03/18 [address]:     Dionna Sotelo was admitted to the inpatient psychiatric unit and started on Behavioral Health checks every 15 minutes and encouraged to attend individual therapy, group therapy, milieu therapy and occupational therapy  During the hospitalization he was Behavioral Health checks every 15 minutes and attending individual therapy, group therapy, milieu therapy and occupational therapy  Stephanie Orona Psychiatric medications were adjusted over the hospital stay  Prior to beginning of treatment medications risks and benefits and possible side effects i were reviewed with Dionna Sotelo  He verbalized understanding and agreement for treatment  Upon admission he was seen for medical clearance for inpatient treatment  While on the unit he was seen by medical service for medical follow up for Also radiations on bilateral residual limbs of lower extremities       Mirza's symptoms gradually improved over the hospital course  Initially after admission he was withdrawn and isolative  He endorsed feelings of helplessness hopelessness and was struggling with suicidal ideation  He felt overwhelmed about being homeless and about his health problems    With resumption of medications and therapeutic milieu his symptoms gradually resolved  At the end of treatment Dionna Sotelo was doing much better  His mood was doing well at the time of discharge  he denied suicidal ideation, intent or plan at the time of discharge and denied homicidal ideation, intent or plan at the time of discharge  Since he was doing well at the end of the hospitalization, treatment team felt that he could be safely discharged to outpatient care  The outpatient follow up with a therapist and a psychiatrist [address] [place] [address]  was arranged by the unit  upon discharge      Mental Status at Time of Discharge:     Appearance:  casually dressed, dressed appropriately   Behavior:  pleasant, cooperative, calm   Speech:  normal rate and volume   Mood:  normal   Affect:  normal range and intensity   Thought Process:  organized, logical, coherent   Associations: intact associations   Thought Content:  normal   Perceptual Disturbances: none   Risk Potential: Suicidal ideation - None  Homicidal ideation - None  Potential for aggression - No   Sensorium:  oriented to person, place, time/date and situation   Memory:  recent and remote memory grossly intact   Consciousness:  alert and awake   Attention: attention span and concentration are age appropriate   Insight:   Fair; improved   Judgment: Fair; improved   Gait/Station: normal gait/station   Motor Activity: no abnormal movements       Admission Diagnosis:    Principal Problem:    Severe bipolar I disorder, most recent episode depressed without psychotic features ([facility] )  Active Problems:    Episode of dizziness      Discharge Diagnosis:     Principal Problem:    Severe bipolar I disorder, most recent episode depressed without psychotic features ([facility] 75 )  Active Problems:    Episode of dizziness  Resolved Problems:    * No resolved hospital problems  *      Lab Results: I have personally reviewed all pertinent laboratory/tests results  Discharge Medications:    See after visit summary for all reconciled discharge medications provided to patient and family  Discharge instructions/Information to patient and family:     See after visit summary for information provided to patient and family  Provisions for Follow-Up Care:    See after visit summary for information related to follow-up care and any pertinent home health orders  Discharge Statement:    I spent 25 minutes discharging the patient  This time was spent on the day of discharge  I had direct contact with the patient on the day of discharge  Additional documentation is required if more than 30 minutes were spent on discharge:     I reviewed with Daniel Haider importance of compliance with medications and outpatient treatment after discharge

## 2018-08-03 NOTE — SOCIAL WORK
[facility] faxed most recent psych eval and summary of care including discharge medications to [facility] intake as requested

## 2018-08-03 NOTE — PROGRESS NOTES
Patient awake alert, ate breakfast with peers, attending groups with participation , social with peers  Seen by provider for possible d/c today, depakote ordered and drawn with cooperation  Levels came back WNL and pt to be discharged to taxi today  Pt denies si hi and hallucinations and is looking forward to discharge  Will maintain on safety precautions and 15 minute checks  No needs identified

## 2018-08-03 NOTE — PROGRESS NOTES
Pt has been visible in the community and among peers  Attending programming  Ambulating with the use of a cane and without concern  PT sought out this writer for HS medication  Presents clean and kempt  Fleeting eye contact  Flat affect  Pt brightens at times during assessment  Openly discusses d/c plans and minor set back in SSI payment that was discovered today  Pt shows much improved insight and willingness to troubleshoot through problems that arise  Pt still shares that he is hopeful for a successful discharge  Denies any current SI/HI or feelings that such will begin on d/c  Pt is concerned over delay in getting his apartment and staying in a shelter until being paid but states the landlord of the apartment he was going to rent is aware of his situation and willing to hold  Pt was compliant with medication  Denies acute concerns  Pt is able to make needs known and agrees to report changes in condition/needs to staff  Nursing will continue to monitor and assess for changes and safety with 15 minute checks

## 2018-08-03 NOTE — PLAN OF CARE
ANXIETY     Will report anxiety at manageable levels Completed     By discharge: Patient will verbalize 2 strategies to deal with anxiety Completed        DEPRESSION     Will be euthymic at discharge Completed        Ineffective Coping     Participates in unit activities Completed        METABOLIC, FLUID AND ELECTROLYTES - ADULT     Glucose maintained within target range Completed        Nutrition/Hydration-ADULT     Nutrient/Hydration intake appropriate for improving, restoring or maintaining nutritional needs Completed        Prexisting or High Potential for Compromised Skin Integrity     Skin integrity is maintained or improved Completed        Risk for Self Injury/Neglect     Treatment Goal: Remain safe during length of stay, learn and adopt new coping skills, and be free of self-injurious ideation, impulses and acts at the time of discharge Completed     Verbalize thoughts and feelings Completed     Recognize maladaptive responses and adopt new coping mechanisms Completed     Complete daily ADLs, including personal hygiene independently, as able Completed        SELF CARE DEFICIT     Return ADL status to a safe level of function Completed

## 2018-10-23 NOTE — CONSULTS
See consult performed by Dr Miriam Potts on 7/18/18 Island Pedicle Flap With Canthal Suspension Text: The defect edges were debeveled with a #15 scalpel blade.  Given the location of the defect, shape of the defect and the proximity to free margins an island pedicle advancement flap was deemed most appropriate.  Using a sterile surgical marker, an appropriate advancement flap was drawn incorporating the defect, outlining the appropriate donor tissue and placing the expected incisions within the relaxed skin tension lines where possible. The area thus outlined was incised deep to adipose tissue with a #15 scalpel blade.  The skin margins were undermined to an appropriate distance in all directions around the primary defect and laterally outward around the island pedicle utilizing iris scissors.  There was minimal undermining beneath the pedicle flap. A suspension suture was placed in the canthal tendon to prevent tension and prevent ectropion.

## 2018-12-16 ENCOUNTER — HOSPITAL ENCOUNTER (EMERGENCY)
Facility: HOSPITAL | Age: 43
End: 2018-12-17
Attending: EMERGENCY MEDICINE | Admitting: EMERGENCY MEDICINE
Payer: MEDICARE

## 2018-12-16 DIAGNOSIS — M17.0 OSTEOARTHRITIS OF BOTH KNEES: ICD-10-CM

## 2018-12-16 DIAGNOSIS — R73.9 HYPERGLYCEMIA: ICD-10-CM

## 2018-12-16 DIAGNOSIS — F31.4 SEVERE BIPOLAR I DISORDER, MOST RECENT EPISODE DEPRESSED WITHOUT PSYCHOTIC FEATURES (HCC): ICD-10-CM

## 2018-12-16 DIAGNOSIS — F32.A DEPRESSION: Primary | ICD-10-CM

## 2018-12-16 DIAGNOSIS — E11.621: ICD-10-CM

## 2018-12-16 DIAGNOSIS — E11.9 DM (DIABETES MELLITUS), TYPE 2 (HCC): ICD-10-CM

## 2018-12-16 DIAGNOSIS — L97.419: ICD-10-CM

## 2018-12-16 DIAGNOSIS — F17.200 TOBACCO USE DISORDER: ICD-10-CM

## 2018-12-16 DIAGNOSIS — I10 HYPERTENSION: ICD-10-CM

## 2018-12-16 LAB
AMPHETAMINES SERPL QL SCN: NEGATIVE
ANION GAP SERPL CALCULATED.3IONS-SCNC: 11 MMOL/L (ref 4–13)
ATRIAL RATE: 80 BPM
BACTERIA UR QL AUTO: ABNORMAL /HPF
BARBITURATES UR QL: NEGATIVE
BASOPHILS # BLD AUTO: 0.06 THOUSANDS/ΜL (ref 0–0.1)
BASOPHILS NFR BLD AUTO: 1 % (ref 0–1)
BENZODIAZ UR QL: NEGATIVE
BILIRUB UR QL STRIP: NEGATIVE
BUN SERPL-MCNC: 16 MG/DL (ref 5–25)
CALCIUM SERPL-MCNC: 9.7 MG/DL (ref 8.3–10.1)
CHLORIDE SERPL-SCNC: 93 MMOL/L (ref 100–108)
CLARITY UR: CLEAR
CO2 SERPL-SCNC: 27 MMOL/L (ref 21–32)
COCAINE UR QL: NEGATIVE
COLOR UR: YELLOW
CREAT SERPL-MCNC: 1.08 MG/DL (ref 0.6–1.3)
EOSINOPHIL # BLD AUTO: 0.03 THOUSAND/ΜL (ref 0–0.61)
EOSINOPHIL NFR BLD AUTO: 0 % (ref 0–6)
ERYTHROCYTE [DISTWIDTH] IN BLOOD BY AUTOMATED COUNT: 13.6 % (ref 11.6–15.1)
ETHANOL EXG-MCNC: 0 MG/DL
GFR SERPL CREATININE-BSD FRML MDRD: 84 ML/MIN/1.73SQ M
GLUCOSE SERPL-MCNC: 247 MG/DL (ref 65–140)
GLUCOSE SERPL-MCNC: 314 MG/DL (ref 65–140)
GLUCOSE SERPL-MCNC: 371 MG/DL (ref 65–140)
GLUCOSE UR STRIP-MCNC: ABNORMAL MG/DL
HCT VFR BLD AUTO: 52.4 % (ref 36.5–49.3)
HGB BLD-MCNC: 17.8 G/DL (ref 12–17)
HGB UR QL STRIP.AUTO: ABNORMAL
IMM GRANULOCYTES # BLD AUTO: 0.07 THOUSAND/UL (ref 0–0.2)
IMM GRANULOCYTES NFR BLD AUTO: 1 % (ref 0–2)
KETONES UR STRIP-MCNC: ABNORMAL MG/DL
LEUKOCYTE ESTERASE UR QL STRIP: ABNORMAL
LYMPHOCYTES # BLD AUTO: 3.53 THOUSANDS/ΜL (ref 0.6–4.47)
LYMPHOCYTES NFR BLD AUTO: 34 % (ref 14–44)
MCH RBC QN AUTO: 26.7 PG (ref 26.8–34.3)
MCHC RBC AUTO-ENTMCNC: 34 G/DL (ref 31.4–37.4)
MCV RBC AUTO: 79 FL (ref 82–98)
METHADONE UR QL: NEGATIVE
MONOCYTES # BLD AUTO: 0.6 THOUSAND/ΜL (ref 0.17–1.22)
MONOCYTES NFR BLD AUTO: 6 % (ref 4–12)
NEUTROPHILS # BLD AUTO: 6.05 THOUSANDS/ΜL (ref 1.85–7.62)
NEUTS SEG NFR BLD AUTO: 58 % (ref 43–75)
NITRITE UR QL STRIP: NEGATIVE
NON-SQ EPI CELLS URNS QL MICRO: ABNORMAL /HPF
NRBC BLD AUTO-RTO: 0 /100 WBCS
OPIATES UR QL SCN: NEGATIVE
P AXIS: 50 DEGREES
PCP UR QL: NEGATIVE
PH UR STRIP.AUTO: 5.5 [PH] (ref 4.5–8)
PLATELET # BLD AUTO: 320 THOUSANDS/UL (ref 149–390)
PMV BLD AUTO: 9.5 FL (ref 8.9–12.7)
POTASSIUM SERPL-SCNC: 4.4 MMOL/L (ref 3.5–5.3)
PR INTERVAL: 152 MS
PROT UR STRIP-MCNC: ABNORMAL MG/DL
QRS AXIS: 20 DEGREES
QRSD INTERVAL: 84 MS
QT INTERVAL: 370 MS
QTC INTERVAL: 426 MS
RBC # BLD AUTO: 6.67 MILLION/UL (ref 3.88–5.62)
RBC #/AREA URNS AUTO: ABNORMAL /HPF
SODIUM SERPL-SCNC: 131 MMOL/L (ref 136–145)
SP GR UR STRIP.AUTO: 1.01 (ref 1–1.03)
T WAVE AXIS: 64 DEGREES
THC UR QL: NEGATIVE
TROPONIN I SERPL-MCNC: <0.02 NG/ML
TSH SERPL DL<=0.05 MIU/L-ACNC: 0.68 UIU/ML (ref 0.36–3.74)
UROBILINOGEN UR QL STRIP.AUTO: 0.2 E.U./DL
VENTRICULAR RATE: 80 BPM
WBC # BLD AUTO: 10.34 THOUSAND/UL (ref 4.31–10.16)
WBC #/AREA URNS AUTO: ABNORMAL /HPF

## 2018-12-16 PROCEDURE — 81001 URINALYSIS AUTO W/SCOPE: CPT | Performed by: EMERGENCY MEDICINE

## 2018-12-16 PROCEDURE — 80048 BASIC METABOLIC PNL TOTAL CA: CPT | Performed by: EMERGENCY MEDICINE

## 2018-12-16 PROCEDURE — 84443 ASSAY THYROID STIM HORMONE: CPT | Performed by: EMERGENCY MEDICINE

## 2018-12-16 PROCEDURE — 85025 COMPLETE CBC W/AUTO DIFF WBC: CPT | Performed by: EMERGENCY MEDICINE

## 2018-12-16 PROCEDURE — 82075 ASSAY OF BREATH ETHANOL: CPT | Performed by: EMERGENCY MEDICINE

## 2018-12-16 PROCEDURE — 99285 EMERGENCY DEPT VISIT HI MDM: CPT

## 2018-12-16 PROCEDURE — 36415 COLL VENOUS BLD VENIPUNCTURE: CPT | Performed by: EMERGENCY MEDICINE

## 2018-12-16 PROCEDURE — 80307 DRUG TEST PRSMV CHEM ANLYZR: CPT | Performed by: EMERGENCY MEDICINE

## 2018-12-16 PROCEDURE — 96372 THER/PROPH/DIAG INJ SC/IM: CPT

## 2018-12-16 PROCEDURE — 82948 REAGENT STRIP/BLOOD GLUCOSE: CPT

## 2018-12-16 PROCEDURE — 93005 ELECTROCARDIOGRAM TRACING: CPT

## 2018-12-16 PROCEDURE — 93010 ELECTROCARDIOGRAM REPORT: CPT | Performed by: INTERNAL MEDICINE

## 2018-12-16 PROCEDURE — 84484 ASSAY OF TROPONIN QUANT: CPT | Performed by: EMERGENCY MEDICINE

## 2018-12-16 RX ORDER — LISINOPRIL 5 MG/1
5 TABLET ORAL ONCE
Status: COMPLETED | OUTPATIENT
Start: 2018-12-16 | End: 2018-12-16

## 2018-12-16 RX ORDER — HYDROXYZINE HYDROCHLORIDE 25 MG/1
25 TABLET, FILM COATED ORAL EVERY 6 HOURS PRN
Status: DISCONTINUED | OUTPATIENT
Start: 2018-12-16 | End: 2018-12-17 | Stop reason: HOSPADM

## 2018-12-16 RX ORDER — METOPROLOL TARTRATE 5 MG/5ML
5 INJECTION INTRAVENOUS ONCE
Status: DISCONTINUED | OUTPATIENT
Start: 2018-12-16 | End: 2018-12-16

## 2018-12-16 RX ORDER — TRAZODONE HYDROCHLORIDE 50 MG/1
100 TABLET ORAL
Status: DISCONTINUED | OUTPATIENT
Start: 2018-12-16 | End: 2018-12-17 | Stop reason: HOSPADM

## 2018-12-16 RX ADMIN — METFORMIN HYDROCHLORIDE 500 MG: 500 TABLET ORAL at 19:20

## 2018-12-16 RX ADMIN — METOPROLOL TARTRATE 25 MG: 25 TABLET, FILM COATED ORAL at 20:26

## 2018-12-16 RX ADMIN — INSULIN HUMAN 10 UNITS: 100 INJECTION, SOLUTION PARENTERAL at 20:27

## 2018-12-16 RX ADMIN — TRAZODONE HYDROCHLORIDE 100 MG: 50 TABLET ORAL at 23:50

## 2018-12-16 RX ADMIN — LISINOPRIL 5 MG: 5 TABLET ORAL at 20:33

## 2018-12-16 RX ADMIN — HYDROXYZINE HYDROCHLORIDE 25 MG: 25 TABLET ORAL at 23:50

## 2018-12-16 NOTE — ED PROVIDER NOTES
History  Chief Complaint   Patient presents with    Psychiatric Evaluation     Pt presents with request for in patient admission to help him get back on track with medications and to get phychiatric help  Pt reports non compliance with meds for psych, HTN and diabetes  History provided by:  Patient  Psychiatric Evaluation   Presenting symptoms: depression    Presenting symptoms: no aggressive behavior, no bizarre behavior, no disorganized speech, no disorganized thought process, no hallucinations, no homicidal ideas, no suicidal thoughts, no suicidal threats and no suicide attempt    Degree of incapacity (severity): Moderate  Onset quality:  Gradual  Duration:  3 months  Timing:  Constant  Progression:  Worsening  Chronicity:  Recurrent  Context: alcohol use (social), drug abuse (marijuana on occasion) and noncompliance    Context: not recent medication change (but he stops following up or seeing physicians so he runs out of and stops taking his meds)    Treatment compliance:  Some of the time  Time since last psychoactive medication taken:  2 months  Relieved by:  Nothing  Worsened by:  Lack of sleep (has insomnia and sleeps 3-4 hours/day)  Ineffective treatments:  None tried  Associated symptoms: anhedonia, fatigue, feelings of worthlessness, insomnia and poor judgment    Associated symptoms: no abdominal pain    Risk factors: hx of mental illness (h/o bipolar and depression and PTSD), hx of suicide attempts and recent psychiatric admission (last was admitted in July)        Prior to Admission Medications   Prescriptions Last Dose Informant Patient Reported? Taking?    DULoxetine (CYMBALTA) 60 mg delayed release capsule   No No   Sig: Take 1 capsule (60 mg total) by mouth daily for 30 days   Insulin Pen Needle 31G X 8 MM MISC   Yes No   Sig: As directed   ONE TOUCH LANCETS MISC   Yes No   Sig: As directed   busPIRone (BUSPAR) 10 mg tablet   No No   Sig: Take 1 tablet (10 mg total) by mouth 2 (two) times a day for 30 days   chlorproMAZINE (THORAZINE) 50 mg tablet   No No   Sig: Take 1 tablet (50 mg total) by mouth 2 (two) times a day for 30 days   divalproex sodium (DEPAKOTE) 500 mg EC tablet   No No   Sig: Take 1 tablet (500 mg total) by mouth 3 (three) times a day for 30 days   gabapentin (NEURONTIN) 400 mg capsule   No No   Sig: Take 1 capsule (400 mg total) by mouth 3 (three) times a day for 30 days   glucose blood test strip   Yes No   Sig: As directed   insulin aspart (NovoLOG) 100 Units/mL injection pen   Yes No   Sig: As directed on discharge instructions   insulin glargine (LANTUS SOLOSTAR) 100 units/mL injection pen   Yes No   Sig: Inject 17 Units under the skin   lisinopril (ZESTRIL) 5 mg tablet   No No   Sig: Take 1 tablet (5 mg total) by mouth daily   metFORMIN (GLUCOPHAGE) 500 mg tablet   Yes No   Sig: Take 1,000 mg by mouth   metoprolol tartrate (LOPRESSOR) 25 mg tablet   No No   Sig: Take 1 tablet (25 mg total) by mouth daily   traZODone (DESYREL) 100 mg tablet   No No   Sig: Take 1 tablet (100 mg total) by mouth daily at bedtime for 30 days      Facility-Administered Medications: None       Past Medical History:   Diagnosis Date    Anxiety     Bipolar 1 disorder (HCC)     Chronic pain disorder     Depression     Diabetes mellitus ([place] [address] )     Hypertension     Psychiatric illness     PTSD (post-traumatic stress disorder)     Sleep difficulties     Substance abuse ([place] [address] )     Suicide attempt ([place] [address] )        Past Surgical History:   Procedure Laterality Date    APPENDECTOMY      COLON SURGERY      TONSILLECTOMY         Family History   Problem Relation Age of Onset    No Known Problems Mother     No Known Problems Father     No Known Problems Sister     No Known Problems Brother     No Known Problems Maternal Aunt     No Known Problems Paternal Aunt     No Known Problems Maternal Uncle     No Known Problems Paternal Uncle     No Known Problems Maternal Grandfather     No Known Problems Maternal Grandmother     No Known Problems Paternal Grandfather     No Known Problems Paternal Grandmother     No Known Problems Cousin     ADD / ADHD Neg Hx     Alcohol abuse Neg Hx     Anxiety disorder Neg Hx     Bipolar disorder Neg Hx     Completed Suicide  Neg Hx     Dementia Neg Hx     Depression Neg Hx     Drug abuse Neg Hx     OCD Neg Hx     Psychiatric Illness Neg Hx     Psychosis Neg Hx     Schizoaffective Disorder  Neg Hx     Schizophrenia Neg Hx     Self-Injury Neg Hx     Suicide Attempts Neg Hx      I have reviewed and agree with the history as documented  Social History   Substance Use Topics    Smoking status: Current Every Day Smoker     Packs/day: 1 50     Years: 22 00     Types: Cigarettes    Smokeless tobacco: Current User     Types: Chew    Alcohol use No        Review of Systems   Constitutional: Positive for fatigue  Gastrointestinal: Negative for abdominal pain  Psychiatric/Behavioral: Negative for hallucinations, homicidal ideas and suicidal ideas  The patient has insomnia  All other systems reviewed and are negative  Physical Exam  Physical Exam   Constitutional: He is oriented to person, place, and time  He appears well-developed and well-nourished  obese   HENT:   Head: Normocephalic and atraumatic  Eyes: Pupils are equal, round, and reactive to light  EOM are normal    Neck: Neck supple  Cardiovascular: Normal rate and regular rhythm  Pulmonary/Chest: Effort normal and breath sounds normal    Abdominal: Soft  Bowel sounds are normal    Musculoskeletal: He exhibits deformity (bilateral toe amputations of feet, chronic, base of right foot with small 1 cm round ulceration without appearance of acute infection, no redness/drainage)  He exhibits no edema  Neurological: He is alert and oriented to person, place, and time  No cranial nerve deficit  He exhibits normal muscle tone  Skin: Skin is warm  No rash noted     Psychiatric: He is slowed and withdrawn  He expresses inappropriate judgment  He exhibits a depressed mood  He expresses suicidal ideation  He expresses no suicidal plans and no homicidal plans  Vitals reviewed  Vital Signs  ED Triage Vitals [12/16/18 1810]   Temperature Pulse Respirations Blood Pressure SpO2   98 3 °F (36 8 °C) 82 18 (!) 181/106 98 %      Temp Source Heart Rate Source Patient Position - Orthostatic VS BP Location FiO2 (%)   Oral Monitor Lying Right arm --      Pain Score       --           Vitals:    12/16/18 1810   BP: (!) 181/106   Pulse: 82   Patient Position - Orthostatic VS: Lying       Visual Acuity      ED Medications  Medications   sodium chloride 0 9 % bolus 1,000 mL (not administered)   insulin regular (HumuLIN R,NovoLIN R) injection 6 Units (not administered)   metoprolol (LOPRESSOR) injection 5 mg (not administered)   metFORMIN (GLUCOPHAGE) tablet 500 mg (not administered)       Diagnostic Studies  Results Reviewed     Procedure Component Value Units Date/Time    UA w Reflex to Microscopic [[id number]]  (Abnormal) Collected:  12/16/18 1900    Lab Status:  Final result Specimen:  Urine from Urine, Clean Catch Updated:  12/16/18 1915     Color, UA Yellow     Clarity, UA Clear     Specific Gravity, UA 1 015     pH, UA 5 5     Leukocytes, UA Elevated glucose may cause decreased leukocyte values  See urine microscopic for [facility] result/ (A)     Nitrite, UA Negative     Protein, UA 30 (1+) (A) mg/dl      Glucose, UA >=1000 (1%) (A) mg/dl      Ketones, UA 15 (1+) (A) mg/dl      Urobilinogen, UA 0 2 E U /dl      Bilirubin, UA Negative     Blood, UA Small (A)    Urine Microscopic [[id number]] Collected:  12/16/18 1900    Lab Status:   In process Specimen:  Urine from Urine, Clean Catch Updated:  12/16/18 1915    CBC and differential [[id number]]  (Abnormal) Collected:  12/16/18 1859    Lab Status:  Final result Specimen:  Blood from Arm, Right Updated:  12/16/18 1914     WBC 10 34 (H) Thousand/uL      RBC 6 67 (H) Million/uL      Hemoglobin 17 8 (H) g/dL      Hematocrit 52 4 (H) %      MCV 79 (L) fL      MCH 26 7 (L) pg      MCHC 34 0 g/dL      RDW 13 6 %      MPV 9 5 fL      Platelets 016 Thousands/uL      nRBC 0 /100 WBCs      Neutrophils Relative 58 %      Immat GRANS % 1 %      Lymphocytes Relative 34 %      Monocytes Relative 6 %      Eosinophils Relative 0 %      Basophils Relative 1 %      Neutrophils Absolute 6 05 Thousands/µL      Immature Grans Absolute 0 07 Thousand/uL      Lymphocytes Absolute 3 53 Thousands/µL      Monocytes Absolute 0 60 Thousand/µL      Eosinophils Absolute 0 03 Thousand/µL      Basophils Absolute 0 06 Thousands/µL     Basic metabolic panel [[id number]] Collected:  12/16/18 1859    Lab Status: In process Specimen:  Blood from Arm, Right Updated:  12/16/18 1905    Troponin I [[id number]] Collected:  12/16/18 1859    Lab Status: In process Specimen:  Blood from Arm, Right Updated:  12/16/18 1905    TSH [[id number]] Collected:  12/16/18 1859    Lab Status: In process Specimen:  Blood from Arm, Right Updated:  12/16/18 1905    Rapid drug screen, urine [[id number]] Collected:  12/16/18 1900    Lab Status:   In process Specimen:  Urine from Urine, Clean Catch Updated:  12/16/18 1904    POCT alcohol breath test [[id number]]     Lab Status:  No result     Fingerstick Glucose (POCT) [[id number]]  (Abnormal) Collected:  12/16/18 1811    Lab Status:  Final result Updated:  12/16/18 1812     POC Glucose 371 (H) mg/dl                  No orders to display              Procedures  Procedures       Phone Contacts  ED Phone Contact    ED Course                               MDM  Number of Diagnoses or Management Options  Depression: new and requires workup  Hyperglycemia: established and worsening  Hypertension: established and worsening     Amount and/or Complexity of Data Reviewed  Clinical lab tests: ordered    Risk of Complications, Morbidity, and/or Mortality  Presenting problems: high    Patient Progress  Patient progress: (Pt signed out pending labs for medical evaluation of his chronic medical problems b/c he has been non-compliant with his meds  Will try and evaluate and clear from chronic medical issues for crisis eval for psych )    CritCare Time    Disposition  Final diagnoses:   Depression   Hyperglycemia   Hypertension     Time reflects when diagnosis was documented in both MDM as applicable and the Disposition within this note     Time User Action Codes Description Comment    12/16/2018  7:16 PM Luis Sous Add [F32 9] Depression     12/16/2018  7:16 PM Sandhya, [address] [R73 9] Hyperglycemia     12/16/2018  7:16 PM Sandhya, [address] [facility] Hypertension       ED Disposition     None      Follow-up Information    None         Patient's Medications   Discharge Prescriptions    No medications on file     No discharge procedures on file      ED Provider  Electronically Signed by           Gwendolyn Bond MD  12/16/18 6806

## 2018-12-16 NOTE — LETTER
[address] [facility] [address]  [address] [facility] [address]  Dept: [phone]                                                       [facility] TRANSFER CONSENT    NAME Roxy Rosales                            1975                              MRN [id number]    I have been informed of my rights regarding examination, treatment, and transfer   by Dr Harvey att  providers found    Benefits: Other benefits (Include comment)_______________________ (Inpatient Psych Tx)    Risks: Potential deterioration of medical condition    Consent for Transfer:  I acknowledge that my medical condition has been evaluated and explained to me by the emergency department physician or other qualified medical person and/or my attending physician, who has recommended that I be transferred to the service of  Accepting Physician: TAYLOR Linares at [address] [place] [address] Name, [address] [place] [address] Heart Unit   The above potential benefits of such transfer, the potential risks associated with such transfer, and the probable risks of not being transferred have been explained to me, and I fully understand them  The doctor has explained that, in my case, the benefits of transfer outweigh the risks  I agree to be transferred  I authorize the performance of emergency medical procedures and treatments upon me in both transit and upon arrival at the receiving facility  Additionally, I authorize the release of any and all medical records to the receiving facility and request they be transported with me, if possible  I understand that the safest mode of transportation during a medical emergency is an ambulance and that the Hospital advocates the use of this mode of transport  Risks of traveling to the receiving facility by car, including absence of medical control, life sustaining equipment, such as oxygen, and medical personnel has been explained to me and I fully understand them      ([place] BELOW)  [ X ]  I consent to the stated transfer and to be transported by ambulance/helicopter  [  ]  I consent to the stated transfer, but refuse transportation by ambulance and accept full responsibility for my transportation by car  I understand the risks of non-ambulance transfers and I exonerate the Hospital and its staff from any deterioration in my condition that results from this refusal     X___________________________________________    DATE  18  TIME________  Signature of patient or legally responsible individual signing on patient behalf           RELATIONSHIP TO PATIENT_________________________                          Provider Certification    NAME Skip Corado                        RENA 1975                              MRN [id number]    A medical screening exam was performed on the above named patient  Based on the examination:    Condition Necessitating Transfer The primary encounter diagnosis was Depression  Diagnoses of Hyperglycemia, Hypertension, Severe bipolar I disorder, most recent episode depressed without psychotic features ([address] ), Tobacco use disorder, Osteoarthritis of both knees, Diabetic ulcer of right midfoot ([address] ), and DM (diabetes mellitus), type 2 ([address] ) were also pertinent to this visit      Patient Condition: The patient has been stabilized such that within reasonable medical probability, no material deterioration of the patient condition or the condition of the unborn child(zhou) is likely to result from the transfer    Reason for Transfer: Level of Care needed not available at this facility    Transfer Requirements: [address]   · Space available and qualified personnel available for treatment as acknowledged by DANIELA Carballo  · Agreed to accept transfer and to provide appropriate medical treatment as acknowledged by       TAYLOR Salinas  · Appropriate medical records of the examination and treatment of the patient are provided at the time of transfer   STAFF INITIAL WHEN COMPLETED ___JG____  · Transfer will be performed by qualified personnel from [facility] [phone]  and appropriate transfer equipment as required, including the use of necessary and appropriate life support measures  Provider Certification: I have examined the patient and explained the following risks and benefits of being transferred/refusing transfer to the patient/family:  The patient is stable for psychiatric transfer because they are medically stable, and is protected from harming him/herself or others during transport      Based on these reasonable risks and benefits to the patient and/or the unborn child(zhou), and based upon the information available at the time of the patients examination, I certify that the medical benefits reasonably to be expected from the provision of appropriate medical treatments at another medical facility outweigh the increasing risks, if any, to the individuals medical condition, and in the case of labor to the unborn child, from effecting the transfer      X____________________________________________ DATE 12/17/18        TIME_______      ORIGINAL - SEND TO MEDICAL RECORDS   COPY - SEND WITH PATIENT DURING TRANSFER

## 2018-12-17 ENCOUNTER — HOSPITAL ENCOUNTER (INPATIENT)
Facility: HOSPITAL | Age: 43
LOS: 10 days | Discharge: HOME | DRG: 876 | End: 2018-12-27
Attending: PSYCHIATRY & NEUROLOGY | Admitting: PSYCHIATRY & NEUROLOGY
Payer: MEDICARE

## 2018-12-17 VITALS
TEMPERATURE: 98.3 F | OXYGEN SATURATION: 98 % | HEART RATE: 80 BPM | SYSTOLIC BLOOD PRESSURE: 126 MMHG | RESPIRATION RATE: 18 BRPM | DIASTOLIC BLOOD PRESSURE: 68 MMHG

## 2018-12-17 DIAGNOSIS — F31.4 SEVERE BIPOLAR I DISORDER, MOST RECENT EPISODE DEPRESSED WITHOUT PSYCHOTIC FEATURES (HCC): ICD-10-CM

## 2018-12-17 DIAGNOSIS — R73.9 HYPERGLYCEMIA: ICD-10-CM

## 2018-12-17 DIAGNOSIS — I10 HYPERTENSION: ICD-10-CM

## 2018-12-17 DIAGNOSIS — F17.200 TOBACCO USE DISORDER: ICD-10-CM

## 2018-12-17 DIAGNOSIS — E11.621: ICD-10-CM

## 2018-12-17 DIAGNOSIS — M17.0 OSTEOARTHRITIS OF BOTH KNEES: ICD-10-CM

## 2018-12-17 DIAGNOSIS — E11.9 DM (DIABETES MELLITUS), TYPE 2 (HCC): ICD-10-CM

## 2018-12-17 DIAGNOSIS — I10 ESSENTIAL HYPERTENSION: Primary | ICD-10-CM

## 2018-12-17 DIAGNOSIS — L97.419: ICD-10-CM

## 2018-12-17 DIAGNOSIS — F41.9 ANXIETY: ICD-10-CM

## 2018-12-17 LAB
GLUCOSE SERPL-MCNC: 283 MG/DL (ref 65–140)
GLUCOSE SERPL-MCNC: 293 MG/DL (ref 65–140)
GLUCOSE SERPL-MCNC: 326 MG/DL (ref 70–99)

## 2018-12-17 PROCEDURE — 99222 1ST HOSP IP/OBS MODERATE 55: CPT | Performed by: PHYSICIAN ASSISTANT

## 2018-12-17 PROCEDURE — 82948 REAGENT STRIP/BLOOD GLUCOSE: CPT

## 2018-12-17 RX ORDER — BENZTROPINE MESYLATE 1 MG/1
2 TABLET ORAL ONCE
Status: COMPLETED | OUTPATIENT
Start: 2018-12-17 | End: 2018-12-17

## 2018-12-17 RX ORDER — HALOPERIDOL 5 MG
5 TABLET ORAL EVERY 6 HOURS PRN
Status: DISCONTINUED | OUTPATIENT
Start: 2018-12-17 | End: 2018-12-27 | Stop reason: HOSPADM

## 2018-12-17 RX ORDER — BENZTROPINE MESYLATE 1 MG/1
1 TABLET ORAL EVERY 6 HOURS PRN
Status: CANCELLED | OUTPATIENT
Start: 2018-12-17

## 2018-12-17 RX ORDER — ACETAMINOPHEN 325 MG/1
975 TABLET ORAL EVERY 6 HOURS PRN
Status: DISCONTINUED | OUTPATIENT
Start: 2018-12-17 | End: 2018-12-27 | Stop reason: HOSPADM

## 2018-12-17 RX ORDER — INSULIN GLARGINE 100 [IU]/ML
17 INJECTION, SOLUTION SUBCUTANEOUS
Status: DISCONTINUED | OUTPATIENT
Start: 2018-12-17 | End: 2018-12-18

## 2018-12-17 RX ORDER — OLANZAPINE 10 MG/1
10 TABLET, ORALLY DISINTEGRATING ORAL ONCE
Status: COMPLETED | OUTPATIENT
Start: 2018-12-17 | End: 2018-12-17

## 2018-12-17 RX ORDER — HALOPERIDOL 5 MG/ML
5 INJECTION INTRAMUSCULAR EVERY 6 HOURS PRN
Status: CANCELLED | OUTPATIENT
Start: 2018-12-17

## 2018-12-17 RX ORDER — TRAZODONE HYDROCHLORIDE 50 MG/1
50 TABLET ORAL
Status: DISCONTINUED | OUTPATIENT
Start: 2018-12-17 | End: 2018-12-24

## 2018-12-17 RX ORDER — OLANZAPINE 10 MG/1
10 TABLET, ORALLY DISINTEGRATING ORAL
Status: DISCONTINUED | OUTPATIENT
Start: 2018-12-17 | End: 2018-12-17 | Stop reason: HOSPADM

## 2018-12-17 RX ORDER — DULOXETIN HYDROCHLORIDE 20 MG/1
20 CAPSULE, DELAYED RELEASE ORAL DAILY
Status: DISCONTINUED | OUTPATIENT
Start: 2018-12-17 | End: 2018-12-17

## 2018-12-17 RX ORDER — DULOXETIN HYDROCHLORIDE 20 MG/1
20 CAPSULE, DELAYED RELEASE ORAL ONCE
Status: COMPLETED | OUTPATIENT
Start: 2018-12-17 | End: 2018-12-17

## 2018-12-17 RX ORDER — HYDROXYZINE HYDROCHLORIDE 25 MG/1
25 TABLET, FILM COATED ORAL EVERY 6 HOURS PRN
Status: DISCONTINUED | OUTPATIENT
Start: 2018-12-17 | End: 2018-12-27 | Stop reason: HOSPADM

## 2018-12-17 RX ORDER — ZIPRASIDONE MESYLATE 20 MG/ML
20 INJECTION, POWDER, LYOPHILIZED, FOR SOLUTION INTRAMUSCULAR EVERY 4 HOURS PRN
Status: CANCELLED | OUTPATIENT
Start: 2018-12-17

## 2018-12-17 RX ORDER — ACETAMINOPHEN 325 MG/1
975 TABLET ORAL EVERY 6 HOURS PRN
Status: CANCELLED | OUTPATIENT
Start: 2018-12-17

## 2018-12-17 RX ORDER — HALOPERIDOL 5 MG/ML
5 INJECTION INTRAMUSCULAR EVERY 6 HOURS PRN
Status: DISCONTINUED | OUTPATIENT
Start: 2018-12-17 | End: 2018-12-27 | Stop reason: HOSPADM

## 2018-12-17 RX ORDER — RISPERIDONE 1 MG/1
1 TABLET, FILM COATED ORAL
Status: DISCONTINUED | OUTPATIENT
Start: 2018-12-17 | End: 2018-12-27 | Stop reason: HOSPADM

## 2018-12-17 RX ORDER — BENZTROPINE MESYLATE 1 MG/1
1 TABLET ORAL EVERY 6 HOURS PRN
Status: DISCONTINUED | OUTPATIENT
Start: 2018-12-17 | End: 2018-12-27 | Stop reason: HOSPADM

## 2018-12-17 RX ORDER — HALOPERIDOL 5 MG
5 TABLET ORAL EVERY 6 HOURS PRN
Status: CANCELLED | OUTPATIENT
Start: 2018-12-17

## 2018-12-17 RX ORDER — LISINOPRIL 5 MG/1
5 TABLET ORAL DAILY
Status: DISCONTINUED | OUTPATIENT
Start: 2018-12-18 | End: 2018-12-27 | Stop reason: HOSPADM

## 2018-12-17 RX ORDER — BENZTROPINE MESYLATE 1 MG/ML
1 INJECTION INTRAMUSCULAR; INTRAVENOUS EVERY 6 HOURS PRN
Status: CANCELLED | OUTPATIENT
Start: 2018-12-17

## 2018-12-17 RX ORDER — BENZTROPINE MESYLATE 1 MG/ML
1 INJECTION INTRAMUSCULAR; INTRAVENOUS EVERY 6 HOURS PRN
Status: DISCONTINUED | OUTPATIENT
Start: 2018-12-17 | End: 2018-12-27 | Stop reason: HOSPADM

## 2018-12-17 RX ORDER — IBUPROFEN 600 MG/1
600 TABLET ORAL EVERY 8 HOURS PRN
Status: CANCELLED | OUTPATIENT
Start: 2018-12-17

## 2018-12-17 RX ORDER — LORAZEPAM 1 MG/1
1 TABLET ORAL EVERY 6 HOURS PRN
Status: CANCELLED | OUTPATIENT
Start: 2018-12-17

## 2018-12-17 RX ORDER — HYDROXYZINE HYDROCHLORIDE 25 MG/1
25 TABLET, FILM COATED ORAL EVERY 6 HOURS PRN
Status: CANCELLED | OUTPATIENT
Start: 2018-12-17

## 2018-12-17 RX ORDER — MAGNESIUM HYDROXIDE/ALUMINUM HYDROXICE/SIMETHICONE 120; 1200; 1200 MG/30ML; MG/30ML; MG/30ML
30 SUSPENSION ORAL EVERY 4 HOURS PRN
Status: DISCONTINUED | OUTPATIENT
Start: 2018-12-17 | End: 2018-12-27 | Stop reason: HOSPADM

## 2018-12-17 RX ORDER — MAGNESIUM HYDROXIDE/ALUMINUM HYDROXICE/SIMETHICONE 120; 1200; 1200 MG/30ML; MG/30ML; MG/30ML
30 SUSPENSION ORAL EVERY 4 HOURS PRN
Status: CANCELLED | OUTPATIENT
Start: 2018-12-17

## 2018-12-17 RX ORDER — NICOTINE 21 MG/24HR
1 PATCH, TRANSDERMAL 24 HOURS TRANSDERMAL DAILY
Status: CANCELLED | OUTPATIENT
Start: 2018-12-17

## 2018-12-17 RX ORDER — NICOTINE 21 MG/24HR
1 PATCH, TRANSDERMAL 24 HOURS TRANSDERMAL DAILY
Status: DISCONTINUED | OUTPATIENT
Start: 2018-12-18 | End: 2018-12-27 | Stop reason: HOSPADM

## 2018-12-17 RX ORDER — LORAZEPAM 2 MG/ML
1 INJECTION INTRAMUSCULAR EVERY 6 HOURS PRN
Status: DISCONTINUED | OUTPATIENT
Start: 2018-12-17 | End: 2018-12-27 | Stop reason: HOSPADM

## 2018-12-17 RX ORDER — LORAZEPAM 2 MG/ML
1 INJECTION INTRAMUSCULAR EVERY 6 HOURS PRN
Status: CANCELLED | OUTPATIENT
Start: 2018-12-17

## 2018-12-17 RX ORDER — TRAZODONE HYDROCHLORIDE 50 MG/1
50 TABLET ORAL
Status: CANCELLED | OUTPATIENT
Start: 2018-12-17

## 2018-12-17 RX ORDER — LORAZEPAM 1 MG/1
1 TABLET ORAL EVERY 6 HOURS PRN
Status: DISCONTINUED | OUTPATIENT
Start: 2018-12-17 | End: 2018-12-27 | Stop reason: HOSPADM

## 2018-12-17 RX ORDER — ZIPRASIDONE MESYLATE 20 MG/ML
20 INJECTION, POWDER, LYOPHILIZED, FOR SOLUTION INTRAMUSCULAR EVERY 4 HOURS PRN
Status: DISCONTINUED | OUTPATIENT
Start: 2018-12-17 | End: 2018-12-27 | Stop reason: HOSPADM

## 2018-12-17 RX ORDER — RISPERIDONE 1 MG/1
1 TABLET, ORALLY DISINTEGRATING ORAL
Status: CANCELLED | OUTPATIENT
Start: 2018-12-17

## 2018-12-17 RX ORDER — ACETAMINOPHEN 325 MG/1
650 TABLET ORAL EVERY 6 HOURS PRN
Status: CANCELLED | OUTPATIENT
Start: 2018-12-17

## 2018-12-17 RX ORDER — IBUPROFEN 600 MG/1
600 TABLET ORAL EVERY 8 HOURS PRN
Status: DISCONTINUED | OUTPATIENT
Start: 2018-12-17 | End: 2018-12-27 | Stop reason: HOSPADM

## 2018-12-17 RX ORDER — ACETAMINOPHEN 325 MG/1
650 TABLET ORAL EVERY 6 HOURS PRN
Status: DISCONTINUED | OUTPATIENT
Start: 2018-12-17 | End: 2018-12-27 | Stop reason: HOSPADM

## 2018-12-17 RX ADMIN — DULOXETINE 20 MG: 20 CAPSULE, DELAYED RELEASE ORAL at 16:29

## 2018-12-17 RX ADMIN — METOPROLOL TARTRATE 25 MG: 25 TABLET, FILM COATED ORAL at 22:05

## 2018-12-17 RX ADMIN — INSULIN GLARGINE 17 UNITS: 100 INJECTION, SOLUTION SUBCUTANEOUS at 22:05

## 2018-12-17 RX ADMIN — OLANZAPINE 10 MG: 10 TABLET, ORALLY DISINTEGRATING ORAL at 16:29

## 2018-12-17 RX ADMIN — BENZTROPINE MESYLATE 2 MG: 1 TABLET ORAL at 16:29

## 2018-12-17 RX ADMIN — TRAZODONE HYDROCHLORIDE 50 MG: 50 TABLET ORAL at 22:18

## 2018-12-17 RX ADMIN — INSULIN LISPRO 5 UNITS: 100 INJECTION, SOLUTION INTRAVENOUS; SUBCUTANEOUS at 22:06

## 2018-12-17 NOTE — ED NOTES
Crisis worker met with [facility] to provide transfer packet for behavioral health inpatient at [address] [facility]  [facility] confirmed receipt of all necessary paperwork for patient transport       Martha Xavier   12/17/2018 1049

## 2018-12-17 NOTE — ED NOTES
Pt food tray delivered  Pt is sitting up in bed, eating  Pt requested that his door remain closed and locked and the lights remain off        Ania Salts  12/17/18 0077

## 2018-12-17 NOTE — ED NOTES
Pt requested meds and medication was brought  Pt requested his door be closed in fear of his neighbor  It was explained that if the door closes, it locks  He understood and said it was fine        Jasmin Fam  12/16/18 2264

## 2018-12-17 NOTE — ED NOTES
Pt had 2 turkey sandwiches and a diet ginger ale  Pt appears to be sitting in his chair watching TV  No obvious distress noted  Will continue to to monitor        Tio Jones  12/16/18 8858

## 2018-12-17 NOTE — ED NOTES
Call placed to [facility], spoke with Khadijah Cantu who reports possible bed availability; chart faxed for review       DANIELA Florez  12/17/18   4825

## 2018-12-17 NOTE — ED NOTES
Took over observation from [facility]  Pt appears to be asleep in bed  The lights are off and the door remains closed and locked  No signs of distress noted at this time  Will continue to monitor        Calixto De León  12/17/18 5573

## 2018-12-17 NOTE — ED NOTES
Call placed to [facility], stated they are still reviewing patient's chart       DANIELA Truong  12/17/18   6418

## 2018-12-17 NOTE — ED NOTES
Pt appears to be asleep in bed  No signs of distress noted at this time  Will continue to monitor        Lana Issa  12/17/18 0721

## 2018-12-17 NOTE — ED NOTES
Received a call from Diogo Stevenson at [facility], stating patient is too medically unstable at this time and they cannot accept       Tahmina Age, LSW  12/17/18   1665

## 2018-12-17 NOTE — ED NOTES
Call placed to [facility], spoke with Jory Donaldson, who reports possible bed availability; chart faxed for review       DANIELA Abel  12/16/18   2852

## 2018-12-17 NOTE — ED NOTES
Patient is accepted at [facility] [address]  Patient is accepted by TAYLOR Villeda per Darryl Badillo in Crisis  [facility]  [facility]portation is scheduled for 7pm        Nurse report is to be called to [phone] prior to patient transfer      Shine Gonzalez LMSW  12/17/18  7640

## 2018-12-17 NOTE — ED NOTES
Pt  Is resting on his bed  No signs of distress  Door is closed  Will continue to monitor       Francis Campos  12/17/18 0408

## 2018-12-17 NOTE — ED NOTES
Pt appears to be sleeping  No signs of distress noted at this time  Will continue to monitor        Sofi Orestes  12/17/18 5064

## 2018-12-17 NOTE — ED NOTES
Pt allowed for vitals to be taken  Pt asked again about his medications  Pt was told that the doctor did not feel comfortable giving him his medications, as he is non-compliant at home  Pt began cursing, yelling "What the Fuck" and flipped off the staff  Pt was offered to speak with the provider, but stated "I don't think that will be good for the doctor"  Pt told by the Crisis Worker when he would be leaving and where he would be going        Shelbi Jules  12/17/18 7259

## 2018-12-17 NOTE — ED NOTES
Call placed back to [facility] who confirmed they still have the referral and will be reviewing that now      DANIELA Garcia  12/17/18   4368

## 2018-12-17 NOTE — ED NOTES
[facility]  12/17/18  1673

## 2018-12-17 NOTE — ED NOTES
Dr Salvador Wallace aware of glucose  No new orders at this time        Shamir Jarquin RN  12/17/18 4921

## 2018-12-17 NOTE — ED NOTES
Call placed to [facility], who stated they would not be able to accept patient due to medical needs       DANIELA Campbell  12/17/18   4106

## 2018-12-17 NOTE — ED NOTES
Call received from [facility], patient's wife, and she was provided with information regarding patient's current status  [facility] asked to be updated once placement was secured and she was informed that she would as long as patient gave us permission       DANIELA Michel  12/16/18   0092

## 2018-12-17 NOTE — ED NOTES
Patient is resting on his bed  Door is closed    Will continue to monitor     Suha Spears  12/17/18 7778

## 2018-12-17 NOTE — ED NOTES
Pt appears to be asleep in bed  No signs of distress noted at this time  Will continue to monitor        Yolanda Elio  12/17/18 8975

## 2018-12-17 NOTE — ED NOTES
Pt appears to be asleep in bed  No signs of distress noted at this time  Will continue to monitor        Abdulaziz Elinor  12/17/18 9351

## 2018-12-17 NOTE — ED NOTES
This writer walked the mobile phone into the patient's room and dialed his significant other's phone # for him, and as I was exiting the room, the patient yelled "door"  This writer asked him to repeat, in which he yelled "close the damn door"  This writer stated that he could close the door himself if he wishes to, and asked that he speak to staff appropriately  Patient again yelled "close the damn fucking door"  This writer walked away, and the patient threw the phone into the wall  Security called       Sudhakar Johnson [facility]  12/17/18  7732

## 2018-12-17 NOTE — ED NOTES
Pt is a 44 y o  male who was brought to the ED with increased depression, increased anxiety and helplessness  Patient expressed that he is tired of being non-compliant however feels that he does not have the tools to fix his way of thinking  Patient denies any suicidal ideations, however recognizes that by not taking care of his medical needs he is killing himself  Patient reports decreased motivation, racing thoughts and lack of enjoyment in everything  Patient reports that he has not been sleeping well and will only get about 2-3 hours of a sleep a night  He also relates that his appetite has decreased and he only eats about once a day  Patient worries that he is more impulsive and not caring about anything important  Patient states that he has no drive, motivation or structure to get his life back on track  Patient has been living with a friend since losing his apartment in March, and feels financial stress and as though he is not where he wants to be at  Patient reports having difficulty accepting any of his diagnoses  He has attempted to attend outpatient treatment multiple times in the past, however feels that he never makes it past an intake appointment  Patient identifies that he has difficulty opening up and complying and feels as though he isn't making progress which prevents him from continuing with his treatment  He also expressed having minimal supports which makes it difficult for him to find motivation when he is lacking  Patient denies current suicidal ideations, however has a history of such  Patient relates that he has attempted suicide several times by overdose in the past; last time being in 2014  Patient denies homicidal ideations and auditory/visual hallucinations  Patient does relate that he experiences some hallucination-like things- but attributes this to his history of psychedelic use  Patient states that he wants to move forward and needs extra support and assistance to do so   He feels that he needs help in finding motivation to comply with appointments and medications  Patient wants to sign in for inpatient treatment as it has been beneficial for him in the past      Chief Complaint   Patient presents with   ta Psychiatric Evaluation     Pt presents with request for in patient admission to help him get back on track with medications and to get phychiatric help  Pt reports non compliance with meds for psych, HTN and diabetes       Intake Assessment completed, Safety risk Assessment completed    [facility]  12/16/18   0897

## 2018-12-17 NOTE — ED NOTES
Call placed to [phone], spoke with Jessee Quinonez who reports bed availability; chart faxed for review       DANIELA Osman  12/17/18   7605

## 2018-12-17 NOTE — ED NOTES
Pt is resting in his room at this time  No signs of distress  Will continue to monitor        Jasmin Fam  12/17/18 010

## 2018-12-17 NOTE — ED NOTES
Call placed to [facility] who stated they are currently doing shift change and asked that we call back in 30 minutes       DANIELA August  12/17/18   2042

## 2018-12-17 NOTE — ED NOTES
Pt appears to be asleep in bed at this time  No signs of distress noted  Will continue to monitor        [facility]  12/17/18 1640

## 2018-12-17 NOTE — ED NOTES
Patient awoke for vitals check  Expressed no issues or needs  Patient continues to rest on his bed   Will continue to monitor     Korin Curly  12/17/18 3447

## 2018-12-17 NOTE — ED NOTES
Wound care right foot where great toe would be: cleaned with soap and water flushed with sterile saline  applied clorhexadine Placed occlusive non stick dressing and tegederm to [id number] [facility], [place]  12/16/18 2022       Brandy Campoverde RN  12/16/18 2045

## 2018-12-17 NOTE — ED NOTES
Pt appears to be asleep  No signs of distress noted at this time  Will continue to monitor        Ad Migel  12/17/18 9551

## 2018-12-17 NOTE — ED PROVIDER NOTES
Care patient assumed from Dr Sam Zhang 94  For full details, please see her note  Briefly, the patient is awake in for medical evaluation prior to clearance for crisis evaluation  EKG was reviewed by myself  It shows normal sinus rhythm with a heart rate of 80  There is normal axis, normal QRS, normal ST/T-wave, no acute abnormalities  His lab work shows hyperglycemia but no signs of DKA  His mild hyponatremia is due to pseudohyponatremia, and corrects to 136, which is normal   He is medically cleared for crisis evaluation and inpatient psychiatric treatment  Labs Reviewed   CBC AND DIFFERENTIAL - Abnormal        Result Value Ref Range Status    WBC 10 34 (*) 4 31 - 10 16 Thousand/uL Final    RBC 6 67 (*) 3 88 - 5 62 Million/uL Final    Hemoglobin 17 8 (*) 12 0 - 17 0 g/dL Final    Hematocrit 52 4 (*) 36 5 - 49 3 % Final    MCV 79 (*) 82 - 98 fL Final    MCH 26 7 (*) 26 8 - 34 3 pg Final    MCHC 34 0  31 4 - 37 4 g/dL Final    RDW 13 6  11 6 - 15 1 % Final    MPV 9 5  8 9 - 12 7 fL Final    Platelets 771  434 - 390 Thousands/uL Final    nRBC 0  /100 WBCs Final    Neutrophils Relative 58  43 - 75 % Final    Immat GRANS % 1  0 - 2 % Final    Lymphocytes Relative 34  14 - 44 % Final    Monocytes Relative 6  4 - 12 % Final    Eosinophils Relative 0  0 - 6 % Final    Basophils Relative 1  0 - 1 % Final    Neutrophils Absolute 6 05  1 85 - 7 62 Thousands/µL Final    Immature Grans Absolute 0 07  0 00 - 0 20 Thousand/uL Final    Lymphocytes Absolute 3 53  0 60 - 4 47 Thousands/µL Final    Monocytes Absolute 0 60  0 17 - 1 22 Thousand/µL Final    Eosinophils Absolute 0 03  0 00 - 0 61 Thousand/µL Final    Basophils Absolute 0 06  0 00 - 0 10 Thousands/µL Final   BASIC METABOLIC PANEL - Abnormal     Sodium 131 (*) 136 - 145 mmol/L Final    Potassium 4 4  3 5 - 5 3 mmol/L Final    Comment: Slightly Hemolyzed;  Results May be Affected    Chloride 93 (*) 100 - 108 mmol/L Final    CO2 27  21 - 32 mmol/L Final    ANION GAP 11 4 - 13 mmol/L Final    BUN 16  5 - 25 mg/dL Final    Creatinine 1 08  0 60 - 1 30 mg/dL Final    Comment: Standardized to IDMS reference method    Glucose 314 (*) 65 - 140 mg/dL Final    Comment:   If the patient is fasting, the ADA then defines impaired fasting glucose as > 100 mg/dL and diabetes as > or equal to 123 mg/dL  Specimen collection should occur prior to Sulfasalazine administration due to the potential for falsely depressed results  Specimen collection should occur prior to Sulfapyridine administration due to the potential for falsely elevated results  Calcium 9 7  8 3 - 10 1 mg/dL Final    eGFR 84  ml/min/1 73sq m Final    Narrative:     National Kidney Disease Education Program recommendations are as follows:  GFR calculation is accurate only with a steady state creatinine  Chronic Kidney disease less than 60 ml/min/1 73 sq  meters  Kidney failure less than 15 ml/min/1 73 sq  meters  URINALYSIS WITH REFLEX TO MICROSCOPIC - Abnormal     Color, UA Yellow   Final    Clarity, UA Clear   Final    Specific [place], UA 1 015  1 003 - 1 030 Final    pH, UA 5 5  4 5 - 8 0 Final    Leukocytes, UA   (*) Negative Final    Value: Elevated glucose may cause decreased leukocyte values   See urine microscopic for [facility] result/    Nitrite, UA Negative  Negative Final    Protein, UA 30 (1+) (*) Negative mg/dl Final    Glucose, UA >=1000 (1%) (*) Negative mg/dl Final    Ketones, UA 15 (1+) (*) Negative mg/dl Final    Urobilinogen, UA 0 2  0 2, 1 0 E U /dl E U /dl Final    Bilirubin, UA Negative  Negative Final    Blood, UA Small (*) Negative Final   URINE MICROSCOPIC - Abnormal     RBC, UA 0-1 (*) None Seen, 0-5 /hpf Final    WBC, UA None Seen  None Seen, 0-5, 5-55, 5-65 /hpf Final    Epithelial Cells Occasional  None Seen, Occasional /hpf Final    Bacteria, UA None Seen  None Seen, Occasional /hpf Final   POCT GLUCOSE - Abnormal     POC Glucose 371 (*) 65 - 140 mg/dl Final   POCT GLUCOSE - Abnormal     POC Glucose 247 (*) 65 - 140 mg/dl Final    Comment: CRITCAL VALUE NOTED   RAPID DRUG SCREEN, URINE - Normal    Amph/Meth UR Negative  Negative Final    Barbiturate Ur Negative  Negative Final    Benzodiazepine Urine Negative  Negative Final    Cocaine Urine Negative  Negative Final    Methadone Urine Negative  Negative Final    Opiate Urine Negative  Negative Final    PCP Ur Negative  Negative Final    THC Urine Negative  Negative Final    Narrative:     FOR MEDICAL PURPOSES ONLY  IF CONFIRMATION NEEDED PLEASE CONTACT THE LAB WITHIN 5 DAYS  Drug Screen Cutoff Levels:  AMPHETAMINE/METHAMPHETAMINES  1000 ng/mL  BARBITURATES     200 ng/mL  BENZODIAZEPINES     200 ng/mL  COCAINE      300 ng/mL  METHADONE      300 ng/mL  OPIATES      300 ng/mL  PHENCYCLIDINE     25 ng/mL  THC       50 ng/mL   TROPONIN I - Normal    Troponin I <0 02  <=0 04 ng/mL Final    Comment: 3Autovalidation override  [employer] Chemistry analyzer 99% cutoff is > 0 04 ng/mL in network labs     o cTnI 99% cutoff is useful only when applied to patients in the clinical setting of myocardial ischemia   o cTnI 99% cutoff should be interpreted in the context of clinical history, ECG findings and possibly cardiac imaging to establish correct diagnosis  o cTnI 99% cutoff may be suggestive but clearly not indicative of a coronary event without the clinical setting of myocardial ischemia  TSH, 3RD GENERATION - Normal    TSH 3RD GENERATON 0 678  0 358 - 3 740 uIU/mL Final    Comment: Using supplements with high doses of biotin 20 to more than 300 times greater than the adequate daily intake for adults of 30 mcg/day as established by the [employer], can cause falsely depress results  Narrative:     Patients undergoing fluorescein dye angiography may retain small amounts of fluorescein in the body for 48-72 hours post procedure  Samples containing fluorescein can produce falsely depressed TSH values   If the patient had this procedure,a specimen should be resubmitted post fluorescein clearance     POCT ALCOHOL BREATH TEST - Normal    EXTBreath Alcohol 0 00   Final          Nidia Ramesh MD  12/17/18 9520

## 2018-12-17 NOTE — ED NOTES
Pt report received from [facility]  No sings of distress at this time  Pt is resting in his room at this time  Will continue to monitor        Jasmin Fam  12/16/18 8674

## 2018-12-17 NOTE — ED NOTES
Pt is resting in his room at this time  No signs of distress  Woke upon taking of vitals  Will continue to monitor        Jasmin Fam  12/17/18 5800

## 2018-12-17 NOTE — ED NOTES
Patient requested that his wife, Alix Foster, be contacted and updated with his plan for treatment  Call placed to Alix Foster, however she did not answer  A message was left requesting a call back       DANIELA Reyna  12/16/18   6026

## 2018-12-17 NOTE — ED NOTES
Pt is resting in his room at this time  No signs of distress  Will continue to monitor        Jasmin Fam  12/17/18 0004

## 2018-12-17 NOTE — ED NOTES
Pt woke up to allow for vitals and blood glucose to be taken  Pt ordered breakfast and requested his medications  Charge Nurse made aware        Patricia Lucas  12/17/18 4860

## 2018-12-17 NOTE — ED NOTES
Call placed to [phone], spoke with Charlie Hernandez who reports patient was denied due to medical concerns       DANIELA Florez  12/17/18   1202

## 2018-12-18 ENCOUNTER — APPOINTMENT (INPATIENT)
Dept: RADIOLOGY | Facility: HOSPITAL | Age: 43
DRG: 876 | End: 2018-12-18
Payer: MEDICARE

## 2018-12-18 LAB
ALBUMIN SERPL BCP-MCNC: 4 G/DL (ref 3–5.2)
ALP SERPL-CCNC: 84 U/L (ref 43–122)
ALT SERPL W P-5'-P-CCNC: 28 U/L (ref 9–52)
ANION GAP SERPL CALCULATED.3IONS-SCNC: 6 MMOL/L (ref 5–14)
AST SERPL W P-5'-P-CCNC: 14 U/L (ref 17–59)
BASOPHILS # BLD AUTO: 0 THOUSANDS/ΜL (ref 0–0.1)
BASOPHILS NFR BLD AUTO: 0 % (ref 0–1)
BILIRUB SERPL-MCNC: 0.6 MG/DL
BUN SERPL-MCNC: 22 MG/DL (ref 5–25)
CALCIUM SERPL-MCNC: 9.3 MG/DL (ref 8.4–10.2)
CHLORIDE SERPL-SCNC: 101 MMOL/L (ref 97–108)
CHOLEST SERPL-MCNC: 271 MG/DL
CO2 SERPL-SCNC: 29 MMOL/L (ref 22–30)
CREAT SERPL-MCNC: 0.99 MG/DL (ref 0.7–1.5)
EOSINOPHIL # BLD AUTO: 0 THOUSAND/ΜL (ref 0–0.4)
EOSINOPHIL NFR BLD AUTO: 0 % (ref 0–6)
ERYTHROCYTE [DISTWIDTH] IN BLOOD BY AUTOMATED COUNT: 14.1 %
EST. AVERAGE GLUCOSE BLD GHB EST-MCNC: 240 MG/DL
GFR SERPL CREATININE-BSD FRML MDRD: 94 ML/MIN/1.73SQ M
GLUCOSE P FAST SERPL-MCNC: 252 MG/DL (ref 70–99)
GLUCOSE SERPL-MCNC: 173 MG/DL (ref 70–99)
GLUCOSE SERPL-MCNC: 181 MG/DL (ref 70–99)
GLUCOSE SERPL-MCNC: 242 MG/DL (ref 70–99)
GLUCOSE SERPL-MCNC: 252 MG/DL (ref 70–99)
GLUCOSE SERPL-MCNC: 310 MG/DL (ref 70–99)
HBA1C MFR BLD: 10 % (ref 4.2–6.3)
HCT VFR BLD AUTO: 52.3 % (ref 41–53)
HDLC SERPL-MCNC: 30 MG/DL (ref 40–59)
HGB BLD-MCNC: 17.1 G/DL (ref 13.5–17.5)
LDLC SERPL CALC-MCNC: 172 MG/DL
LYMPHOCYTES # BLD AUTO: 2.4 THOUSANDS/ΜL (ref 0.5–4)
LYMPHOCYTES NFR BLD AUTO: 40 % (ref 25–45)
MCH RBC QN AUTO: 26.6 PG (ref 26–34)
MCHC RBC AUTO-ENTMCNC: 32.7 G/DL (ref 31–36)
MCV RBC AUTO: 81 FL (ref 80–100)
MONOCYTES # BLD AUTO: 0.4 THOUSAND/ΜL (ref 0.2–0.9)
MONOCYTES NFR BLD AUTO: 7 % (ref 1–10)
NEUTROPHILS # BLD AUTO: 3.2 THOUSANDS/ΜL (ref 1.8–7.8)
NEUTS SEG NFR BLD AUTO: 53 % (ref 45–65)
NONHDLC SERPL-MCNC: 241 MG/DL
PLATELET # BLD AUTO: 231 THOUSANDS/UL (ref 150–450)
PMV BLD AUTO: 8.2 FL (ref 8.9–12.7)
POTASSIUM SERPL-SCNC: 4.7 MMOL/L (ref 3.6–5)
PROT SERPL-MCNC: 7.3 G/DL (ref 5.9–8.4)
RBC # BLD AUTO: 6.43 MILLION/UL (ref 4.5–5.9)
RPR SER QL: NORMAL
SODIUM SERPL-SCNC: 136 MMOL/L (ref 137–147)
TRIGL SERPL-MCNC: 344 MG/DL
VALPROATE SERPL-MCNC: 11.3 UG/ML (ref 50–120)
WBC # BLD AUTO: 6 THOUSAND/UL (ref 4.5–11)

## 2018-12-18 PROCEDURE — 99232 SBSQ HOSP IP/OBS MODERATE 35: CPT | Performed by: NURSE PRACTITIONER

## 2018-12-18 PROCEDURE — 0KDV0ZZ EXTRACTION OF RIGHT FOOT MUSCLE, OPEN APPROACH: ICD-10-PCS | Performed by: STUDENT IN AN ORGANIZED HEALTH CARE EDUCATION/TRAINING PROGRAM

## 2018-12-18 PROCEDURE — 86592 SYPHILIS TEST NON-TREP QUAL: CPT | Performed by: PHYSICIAN ASSISTANT

## 2018-12-18 PROCEDURE — 85025 COMPLETE CBC W/AUTO DIFF WBC: CPT | Performed by: PHYSICIAN ASSISTANT

## 2018-12-18 PROCEDURE — 80053 COMPREHEN METABOLIC PANEL: CPT | Performed by: PHYSICIAN ASSISTANT

## 2018-12-18 PROCEDURE — 99223 1ST HOSP IP/OBS HIGH 75: CPT | Performed by: STUDENT IN AN ORGANIZED HEALTH CARE EDUCATION/TRAINING PROGRAM

## 2018-12-18 PROCEDURE — 82948 REAGENT STRIP/BLOOD GLUCOSE: CPT

## 2018-12-18 PROCEDURE — 80061 LIPID PANEL: CPT | Performed by: PHYSICIAN ASSISTANT

## 2018-12-18 PROCEDURE — 80164 ASSAY DIPROPYLACETIC ACD TOT: CPT | Performed by: PHYSICIAN ASSISTANT

## 2018-12-18 PROCEDURE — 83036 HEMOGLOBIN GLYCOSYLATED A1C: CPT | Performed by: PHYSICIAN ASSISTANT

## 2018-12-18 RX ORDER — DULOXETIN HYDROCHLORIDE 60 MG/1
60 CAPSULE, DELAYED RELEASE ORAL DAILY
Status: DISCONTINUED | OUTPATIENT
Start: 2018-12-18 | End: 2018-12-21

## 2018-12-18 RX ORDER — DIVALPROEX SODIUM 500 MG/1
1000 TABLET, EXTENDED RELEASE ORAL
Status: DISCONTINUED | OUTPATIENT
Start: 2018-12-18 | End: 2018-12-21

## 2018-12-18 RX ORDER — BUSPIRONE HYDROCHLORIDE 10 MG/1
10 TABLET ORAL 2 TIMES DAILY
Status: DISCONTINUED | OUTPATIENT
Start: 2018-12-18 | End: 2018-12-27 | Stop reason: HOSPADM

## 2018-12-18 RX ORDER — CHLORPROMAZINE HYDROCHLORIDE 25 MG/1
50 TABLET, FILM COATED ORAL 2 TIMES DAILY
Status: DISCONTINUED | OUTPATIENT
Start: 2018-12-18 | End: 2018-12-27 | Stop reason: HOSPADM

## 2018-12-18 RX ORDER — INSULIN GLARGINE 100 [IU]/ML
30 INJECTION, SOLUTION SUBCUTANEOUS
Status: DISCONTINUED | OUTPATIENT
Start: 2018-12-18 | End: 2018-12-27 | Stop reason: HOSPADM

## 2018-12-18 RX ADMIN — INSULIN GLARGINE 30 UNITS: 100 INJECTION, SOLUTION SUBCUTANEOUS at 21:20

## 2018-12-18 RX ADMIN — INSULIN LISPRO 1 UNITS: 100 INJECTION, SOLUTION INTRAVENOUS; SUBCUTANEOUS at 21:22

## 2018-12-18 RX ADMIN — INSULIN LISPRO 1 UNITS: 100 INJECTION, SOLUTION INTRAVENOUS; SUBCUTANEOUS at 17:04

## 2018-12-18 RX ADMIN — DULOXETINE HYDROCHLORIDE 60 MG: 60 CAPSULE, DELAYED RELEASE ORAL at 14:26

## 2018-12-18 RX ADMIN — INSULIN LISPRO 5 UNITS: 100 INJECTION, SOLUTION INTRAVENOUS; SUBCUTANEOUS at 12:15

## 2018-12-18 RX ADMIN — LISINOPRIL 5 MG: 5 TABLET ORAL at 09:27

## 2018-12-18 RX ADMIN — METOPROLOL TARTRATE 25 MG: 25 TABLET, FILM COATED ORAL at 21:13

## 2018-12-18 RX ADMIN — BUSPIRONE HYDROCHLORIDE 10 MG: 10 TABLET ORAL at 17:08

## 2018-12-18 RX ADMIN — LORAZEPAM 1 MG: 1 TABLET ORAL at 16:54

## 2018-12-18 RX ADMIN — METFORMIN HYDROCHLORIDE 850 MG: 850 TABLET ORAL at 17:05

## 2018-12-18 RX ADMIN — METOPROLOL TARTRATE 25 MG: 25 TABLET, FILM COATED ORAL at 09:26

## 2018-12-18 RX ADMIN — METFORMIN HYDROCHLORIDE 500 MG: 500 TABLET ORAL at 09:26

## 2018-12-18 RX ADMIN — CHLORPROMAZINE HYDROCHLORIDE 50 MG: 25 TABLET, SUGAR COATED ORAL at 17:08

## 2018-12-18 RX ADMIN — INSULIN LISPRO 4 UNITS: 100 INJECTION, SOLUTION INTRAVENOUS; SUBCUTANEOUS at 12:14

## 2018-12-18 RX ADMIN — INSULIN LISPRO 4 UNITS: 100 INJECTION, SOLUTION INTRAVENOUS; SUBCUTANEOUS at 17:05

## 2018-12-18 RX ADMIN — TRAZODONE HYDROCHLORIDE 50 MG: 50 TABLET ORAL at 21:20

## 2018-12-18 RX ADMIN — DIVALPROEX SODIUM 1000 MG: 500 TABLET, FILM COATED, EXTENDED RELEASE ORAL at 21:14

## 2018-12-18 RX ADMIN — INSULIN LISPRO 3 UNITS: 100 INJECTION, SOLUTION INTRAVENOUS; SUBCUTANEOUS at 09:24

## 2018-12-18 NOTE — CASE MANAGEMENT
called [facility]  and left message with Catrina Bauer (intake department) regarding referral for outpatient services  Phone [phone]

## 2018-12-18 NOTE — ASSESSMENT & PLAN NOTE
No results found for: HGBA1C    Recent Labs      12/17/18   1035  12/17/18   1159  12/17/18   2156  12/18/18   0800   POCGLU  283*  293*  326*  242*       Blood Sugar Average: Last 72 hrs:  · Uncontrolled 2/2 non-compliance with hx of bilateral TMA's and osteomyelitis  · A1C is pending  · On lantus, metformin and sliding scale  Will increase metformin from 500 BID to 850 BID  Currently on 17u of lantus at HS, with fasting blood sugar of 242  Will increase lantus to 30u at HS, and also add 4u with meals, in addition to sliding scale  · Diabetic diet  · Continue to monitor

## 2018-12-18 NOTE — ASSESSMENT & PLAN NOTE
· Patient transferred from [place] for inpatient psychiatric treatment  Medication non-compliance  Management per psych

## 2018-12-18 NOTE — PROGRESS NOTES
Pt is 201 - voluntary admission - admitted for increasing anxiety/depression and neglecting basic needs  Pt has been non-compliant on medications approximately two months including diabetic medications  Pt denies SI but admits to slowly killing himself by neglecting his health  He denies all other symptoms  Pt has had numerous inpatient psychiatric admissions in [facility], last admissions there were in May and then June  He was unable to give full psychiatric history and indicated that it was extensive  He has attempted numerous times to overdose about four years ago  He has no psychiatrist or PCP  He has hx of HTN and DM type 2  He has no toes on his right foot and currently has a diabetic ulcer on his right forefoot, dressing was changed last night  He states he has no support system  He lives in a private residence with 4 friends  He graduated high school and is currently disabled  He was transferred from [facility] and was agitated, verbally aggressive while there  Pt was irritable during admission, did not display any signs of aggression

## 2018-12-18 NOTE — CONSULTS
Consult Routine Foot Care- Podiatry   Evelyn Espino 44 y o  male MRN: [id number]  Unit/Bed#: [facility] 340-01 Encounter: [id number]    Assessment/Plan     Assessment:  1  Diabetic foot ulcer of R midfoot, level of muscle  2  DM c DN     Plan:  - pt eval and managed today  - selective debridement was performed today on R foot wound, with post debridement measurement of 1 4cmx1 3cmx0 2cm, no anesthetic required as pt is neuropathic, devitalized and fibrotic tissue removed, bleeding controlled with pressure, pt tolerated well  - wound is noninfected, does not require antibiotics at this time  - will order surgical shoe for proper offloading of R foot wound  - Santyl will be ordered  To be applied daily by nursing  - will continue to follow pt while pt is in house     History of Present Illness     HPI:  Evelyn Espino is a 44 y o  male who presents with R foot wound  Pt has history of TMA to b/l foot  Pt states he has had R foot wound since July of this year  Pt has not been seeing anyone for this  No pain today  Dressing intact  Pt with no offloading  States wound has minimal drainage  Denies redness  Pt noncompliant with diabetic control  No other complaints  Inpatient consult to Podiatry  Consult performed by: Jun Murrieta  Consult ordered by: Rafael Teixeira        Review of Systems   Constitutional: Negative  HENT: Negative  Eyes: Negative  Respiratory: Negative  Cardiovascular: Negative  Gastrointestinal: Negative  Musculoskeletal: b/l TMA  Skin: plantar R foot wound  Neurological: Negative          Historical Information   Past Medical History:   Diagnosis Date    Anxiety     Bipolar 1 disorder ([facility] )     Chronic pain disorder     Depression     Diabetes mellitus ([facility] )     Hypertension     Psychiatric illness     PTSD (post-traumatic stress disorder)     Sleep difficulties     Substance abuse ([facility] )     Suicide attempt [facility])      Past Surgical History:   Procedure Laterality Date    APPENDECTOMY      COLON SURGERY      TONSILLECTOMY       Social History   History   Alcohol Use No     History   Drug Use    Types: Marijuana     Comment: monthly     History   Smoking Status    Current Every Day Smoker    Packs/day: 1 50    Years: 22 00    Types: Cigarettes   Smokeless Tobacco    Current User    Types: Chew     Family History:   Family History   Problem Relation Age of Onset    No Known Problems Mother     No Known Problems Father     No Known Problems Sister     No Known Problems Brother     No Known Problems Maternal Aunt     No Known Problems Paternal Aunt     No Known Problems Maternal Uncle     No Known Problems Paternal Uncle     No Known Problems Maternal Grandfather     No Known Problems Maternal Grandmother     No Known Problems Paternal Grandfather     No Known Problems Paternal Grandmother     No Known Problems Cousin     ADD / ADHD Neg Hx     Alcohol abuse Neg Hx     Anxiety disorder Neg Hx     Bipolar disorder Neg Hx     Completed Suicide  Neg Hx     Dementia Neg Hx     Depression Neg Hx     Drug abuse Neg Hx     OCD Neg Hx     Psychiatric Illness Neg Hx     Psychosis Neg Hx     Schizoaffective Disorder  Neg Hx     Schizophrenia Neg Hx     Self-Injury Neg Hx     Suicide Attempts Neg Hx        Meds/Allergies   Prescriptions Prior to Admission   Medication    busPIRone (BUSPAR) 10 mg tablet    chlorproMAZINE (THORAZINE) 50 mg tablet    divalproex sodium (DEPAKOTE) 500 mg EC tablet    DULoxetine (CYMBALTA) 60 mg delayed release capsule    gabapentin (NEURONTIN) 400 mg capsule    glucose blood test strip    insulin aspart (NovoLOG) 100 Units/mL injection pen    insulin glargine (LANTUS SOLOSTAR) 100 units/mL injection pen    Insulin Pen Needle 31G X 8 MM MISC    lisinopril (ZESTRIL) 5 mg tablet    metFORMIN (GLUCOPHAGE) 500 mg tablet    metoprolol tartrate (LOPRESSOR) 25 mg tablet    ONE TOUCH LANCETS MISC    traZODone (DESYREL) 100 mg tablet     Allergies   Allergen Reactions    Penicillins Rash       Objective   First Vitals:   Blood Pressure: 154/90 (12/17/18 2035)  Pulse: (!) 106 (12/17/18 2035)  Temperature: (!) 97 1 °F (36 2 °C) (12/17/18 2035)  Temp Source: Temporal (12/17/18 2035)  Respirations: 16 (12/17/18 2035)  Height: 6' 3" (190 5 cm) (12/17/18 2035)  Weight - Scale: 123 kg (271 lb 9 6 oz) (12/17/18 2035)    Current Vitals:   Blood Pressure: 143/86 (12/18/18 1123)  Pulse: 69 (12/18/18 1123)  Temperature: (!) 96 9 °F (36 1 °C) (12/18/18 0721)  Temp Source: Temporal (12/18/18 0721)  Respirations: 16 (12/18/18 1123)  Height: 6' 3" (190 5 cm) (12/17/18 2035)  Weight - Scale: 123 kg (271 lb 9 6 oz) (12/17/18 2035)    /86 (BP Location: Left arm)   Pulse 69   Temp (!) 96 9 °F (36 1 °C) (Temporal)   Resp 16   Ht 6' 3" (1 905 m)   Wt 123 kg (271 lb 9 6 oz)   BMI 33 95 kg/m²     General Appearance:    Alert, cooperative, no distress   Head:    Normocephalic, without obvious abnormality, atraumatic   Eyes:    PERRL, conjunctiva/corneas clear, EOM's intact            Nose:   Moist mucous membranes, no drainage or sinus tenderness   Throat:   No tenderness, no exudates   Neck:   Supple, symmetrical, trachea midline, no JVD   Back:     Symmetric, no CVA tenderness   Lungs:     Clear to auscultation bilaterally, respirations unlabored   Chest wall:    No tenderness or deformity   Heart:    Regular rate and rhythm, S1 and S2 normal, no murmur, rub   or gallop   Abdomen:     Soft, non-tender, bowel sounds active all four quadrants,     no masses, no organomegaly     Extremities:   MMT is 4/5 to all compartments of the LE, +2/4 edema B/L, Digital ROM is intact,    Pulses:   R DP is +1/4, R PT is +1/4, L DP is +1/4, L PT is +1/4, CFT< 3sec to all digits      Skin:  b/l TMA    Plantar R foot wound, predebridement measurement of 1 4cmx1 3cmx0 2cm, no surrounding erythema, no drainage, fibrogranular wound bed, no undermining, no tunneling, no probe to bone, no pain  Neurologic:   CNII-XII intact  Normal strength, sensation and reflexes       Throughout  Gross sensation is intact  Protective sensation is diminished          Lab Results:   Admission on 12/17/2018   Component Date Value    POC Glucose 12/17/2018 326*    WBC 12/18/2018 6 00     RBC 12/18/2018 6 43*    Hemoglobin 12/18/2018 17 1     Hematocrit 12/18/2018 52 3     MCV 12/18/2018 81     MCH 12/18/2018 26 6     MCHC 12/18/2018 32 7     RDW 12/18/2018 14 1     MPV 12/18/2018 8 2*    Platelets 67/88/6739 231     Neutrophils Relative 12/18/2018 53     Lymphocytes Relative 12/18/2018 40     Monocytes Relative 12/18/2018 7     Eosinophils Relative 12/18/2018 0     Basophils Relative 12/18/2018 0     Neutrophils Absolute 12/18/2018 3 20     Lymphocytes Absolute 12/18/2018 2 40     Monocytes Absolute 12/18/2018 0 40     Eosinophils Absolute 12/18/2018 0 00     Basophils Absolute 12/18/2018 0 00     Sodium 12/18/2018 136*    Potassium 12/18/2018 4 7     Chloride 12/18/2018 101     CO2 12/18/2018 29     ANION GAP 12/18/2018 6     BUN 12/18/2018 22     Creatinine 12/18/2018 0 99     Glucose 12/18/2018 252*    Glucose, Fasting 12/18/2018 252*    Calcium 12/18/2018 9 3     AST 12/18/2018 14*    ALT 12/18/2018 28     Alkaline Phosphatase 12/18/2018 84     Total Protein 12/18/2018 7 3     Albumin 12/18/2018 4 0     Total Bilirubin 12/18/2018 0 60     eGFR 12/18/2018 94     Cholesterol 12/18/2018 271*    Triglycerides 12/18/2018 344*    HDL, Direct 12/18/2018 30*    LDL Calculated 12/18/2018 172*    Non-HDL-Chol (CHOL-HDL) 12/18/2018 241     RPR 12/18/2018 Non-Reactive     Hemoglobin A1C 12/18/2018 10 0*    EAG 12/18/2018 240     Valproic Acid, Total 12/18/2018 11 3*    POC Glucose 12/18/2018 242*    POC Glucose 12/18/2018 310*     Imaging: I have personally reviewed pertinent films in PACS  EKG, Pathology, and Other Studies: I have personally reviewed pertinent reports        Code Status: Level 1 - Full Code  Advance Directive and Living Will:      Power of :    POLST:

## 2018-12-18 NOTE — H&P
Psychiatric Evaluation - Behavioral Health     Identification Data:Mirza Mota 44 y o  male MRN: [id number]  Unit/Bed#: U 340-01 Encounter: [id number]    Chief Complaint:   "I need my medications"    History of present illness:  As per chart review, patient is 201 - voluntary admission - admitted for increasing anxiety/depression and neglecting basic needs  Pt has been non-compliant on medications approximately two months including diabetic medications  Pt denies SI but admits to slowly killing himself by neglecting his health  He denies all other symptoms  Pt has had numerous inpatient psychiatric admissions in [facility], last admissions there were in May and then June  Author Perico is a 44 y o  male with a history of depression and anxiety who was admitted to the inpatient psychiatric unit on a voluntary 201 commitment basis due to depression and unstable mood  Symptoms prior to admission included worsening depression  Onset of symptoms was gradual starting few weeks ago with gradually worsening course since that time  Stressors preceding admission included noncompliance with treatment  As per chart review, patient has been non-compliant on medications approximately two months including diabetic medications  Pt denies SI but admits to slowly killing himself by neglecting his health  He denies all other symptoms  Pt has had numerous inpatient psychiatric admissions in [facility], last admissions there were in May and then June  On initial evaluation after admission to the inpatient psychiatric unit Eron Ballard reports depressed mood, feeling of hopelessness prior to admission, poor sleep of 3-4 hours at night, anhedonia, good appetite, irritability, low energy level  Patient denies past symptoms of zoran or psychosis  Patient stated that he has not taken his psychotropics for about 2 months         Psychiatric Review Of Systems:  Change in sleep: yes  Appetite changes: no  Weight changes: no  Change in energy/anergy: yes  Change in interest/pleasure/anhedonia: yes  Somatic symptoms: no  Anxiety/panic: yes  Manic symptoms: no  Guilt feelings:no  Hopeless: yes  Self injurious behavior/risky behavior: no    Historical Information     Past Psychiatric History:   Patient reports past psychiatric history of depression/bipolar disorder and reports multiple past suicide attempts  Patient reports that the last suicide attempt was about 4 years ago, OD on pills  Patient stated that he OD on pills for all of his suicide attempts  Substance Abuse History:  Patient reports use of marijuana, about 4 times a week, but refused to discuss quantity of use  Patient also reports occasional alcohol use, stated that his last drink was November 2018, and again refused to discuss quantity of use  Patient denies past detox, denies past rehab hx  Family Psychiatric History:   Patient denies family history of mental illness and denies family history of suicide attempt  Social History:  Developmental:  Education: some college  Marital history:   Living arrangement, social support: friend(s), lives with 4 friends in an apartment  Occupational History: unemployed, on SS disability  Access to firearms: denies      Traumatic History:   Abuse:none is reported  Other Traumatic Events: denies    Past Medical History:   Diagnosis Date    Anxiety     Bipolar 1 disorder ([facility] )     Chronic pain disorder     Depression     Diabetes mellitus ([facility] )     Hypertension     Psychiatric illness     PTSD (post-traumatic stress disorder)     Sleep difficulties     Substance abuse ([facility] )     Suicide attempt [facility])        Medical Review Of Systems:  Pertinent items are noted in HPI      Meds/Allergies   all current active meds have been reviewed  Allergies   Allergen Reactions    Penicillins Rash     Objective      Mental Status Evaluation:  Appearance:  {Marginal/poor hygiene   Behavior:  hostile   Speech:   Language Normal rate and Normal volume  Able to name objects   Mood:  irritable   Affect: Thought process labile  Goal directed and coherent   Associations: Tightly connected   Thought Content:  Does not verbalize delusional material   Perceptual Disturbances: Denies hallucinations and does not appear to be responding to internal stimuli   Risk Potential: No suicidal or homicidal ideation   Orientation  Oriented to place and person   Memory grossly intact   Attention/Concentration attention span and concentration were age appropriate   Fund of knowledge aware of current events   Insight:  No insight and limited   Judgment: Limited   Gait/Station: Not observed   Motor Activity: Cannot determine due to patient factors         Lab Results: I have personally reviewed pertinent lab results  Imaging Studies: reviewed  EKG, Pathology, and Other Studies: reviewed      Code Status:Full code    Patient Strengths/Assets: capable of independent living, good past treatment response, patient is on a voluntary commitment    Patient Barriers/Limitations: lack of social/family support    Assessment/Plan     Principal Problem:    Severe bipolar I disorder, most recent episode depressed without psychotic features ([facility] )  Active Problems:    Tobacco use disorder    Diabetic ulcer of right midfoot ([facility] )    DM (diabetes mellitus), type 2 ([facility] )    Essential hypertension    Plan:   I restarted patient on previous medications that he was stabilized on in the past  I restarted Buspar 10 mg tab, oral, 2 times a day, Depakote 1000 mg tab, oral, bedtime for mood stabilization, Cymbalta 60 mg tab, oral, daily for depression, and Thorazine 50 mg tab, oral, 2 times a day for mood stabilization  EKG checked: normal sinus rhythm  Risks, benefits and possible side effects of Medications:   Risks, benefits, and possible side effects of medications explained to patient and patient verbalizes understanding        Inpatient Psychiatric Certification:    Estimated length of stay: 8 midnights    Based upon physical, mental and social evaluations, I certify that inpatient psychiatric services are medically necessary for this patient for a duration of 8 midnights for the treatment of Severe bipolar I disorder, most recent episode depressed without psychotic features [facility])    Available alternative community resources do not meet the patient's mental health care needs  I further attest that an established written individualized plan of care has been implemented and is outlined in the patient's medical records      Ibis Carrera MD 12/18/18

## 2018-12-18 NOTE — PLAN OF CARE
Anxiety     Anxiety is at manageable level Not Progressing        Depression     Treatment Goal: Demonstrate behavioral control of depressive symptoms, verbalize feelings of improved mood/affect, and adopt new coping skills prior to discharge Not Progressing          Risk for Self Injury/Neglect     Treatment Goal: Remain safe during length of stay, learn and adopt new coping skills, and be free of self-injurious ideation, impulses and acts at the time of discharge Progressing

## 2018-12-18 NOTE — ASSESSMENT & PLAN NOTE
· Patient with hx of bilateral TMA, 2/2 uncontrolled diabetes and hx of osteomyelitis  Initial consult note from last night states "small ulceration of right foot; no redness or drainage "  Recommended podiatry f/u but no order placed  Asked by nurse to evaluate pt this  Morning  · Wound is foul smelling with bloody drainage, as depicted below  Appears as though open area may having tunneling to bone  Patient reports he has had the ulcer since July, and has not followed up  No complaints of pain, but when asked, he states it does hurt, although not severe  Has PRN ibuprofen and tylenol for pain  Will continue this for now  Will need xray of right foot to r/o osteo, and possibly MRI  Will also place consult to podiatry and infectious disease

## 2018-12-18 NOTE — ASSESSMENT & PLAN NOTE
· Small ulceration of right foot; no redness or drainage  · Continue to monitor  · Will need podiatry follow-up

## 2018-12-18 NOTE — PROGRESS NOTES
Patient was cooperative with medications and irritable and scant in conversation this morning  His right foot ulcer was bleeding through his sock--RN reinforced the bandage until it could be evaluated by medicine  After evaluation by medicine, RN cleaned the wound with sterile saline and applied a wet to dry dressing per their instructions  Patient tolerated this well but tolerated conversation with RN poorly  Told RN he's "tired of all the questions  I can't handle them, I can't handle anything" and refused to say anything else  Declined to go down for a foot xray, saying "I've had osteomyelitis before and this is not osteomyelitis  It's just an ulcer   I'm not going "

## 2018-12-18 NOTE — PROGRESS NOTES
Progress Note - Rolf Pin 1975, 44 y o  male MRN: [id number]    Unit/Bed#: [facility] 340-01 Encounter: [id number]    Primary Care Provider: No primary care provider on file  Date and time admitted to hospital: 12/17/2018  8:33 PM        Diabetic ulcer of right midfoot ([address] )   Assessment & Plan    · Patient with hx of bilateral TMA, 2/2 uncontrolled diabetes and hx of osteomyelitis  Initial consult note from last night states "small ulceration of right foot; no redness or drainage "  Recommended podiatry f/u but no order placed  Asked by nurse to evaluate pt this  Morning  · Wound is foul smelling with bloody drainage, as depicted below  Appears as though open area may having tunneling to bone  Patient reports he has had the ulcer since July, and has not followed up  No complaints of pain, but when asked, he states it does hurt, although not severe  Has PRN ibuprofen and tylenol for pain  Will continue this for now  Will need xray of right foot to r/o osteo, and possibly MRI  Will also place consult to podiatry and infectious disease  DM (diabetes mellitus), type 2 [facility])   Assessment & Plan    No results found for: HGBA1C    Recent Labs      12/17/18   1035  12/17/18   1159  12/17/18   2156  12/18/18   0800   POCGLU  283*  293*  326*  242*       Blood Sugar Average: Last 72 hrs:  · Uncontrolled 2/2 non-compliance with hx of bilateral TMA's and osteomyelitis  · A1C is pending  · On lantus, metformin and sliding scale  Will increase metformin from 500 BID to 850 BID  Currently on 17u of lantus at HS, with fasting blood sugar of 242  Will increase lantus to 30u at HS, and also add 4u with meals, in addition to sliding scale  · Diabetic diet  · Continue to monitor  Essential hypertension   Assessment & Plan    · Will continue lisinopril and metoprolol, and continue to monitor       * Severe bipolar I disorder, most recent episode depressed without psychotic features [facility])   Assessment & Plan · Patient transferred from [place] for inpatient psychiatric treatment  Medication non-compliance  Management per psych  VTE Pharmacologic Prophylaxis:   Pharmacologic: Pt is ambulatory  Mechanical VTE Prophylaxis in Place: No    Patient Centered Rounds: I have performed bedside rounds with nursing staff today  Discussions with Specialists or Other Care Team Provider: Dr Anne Landis, nursing    Education and Discussions with Family / Patient: Plan of care and history discussed with pt  Time Spent for Care: 20 minutes  More than 50% of total time spent on counseling and coordination of care as described above  Current Length of Stay: 1 day(s)    Current Patient Status: Inpatient Psych   Certification Statement: The patient will continue to require additional inpatient hospital stay due to psychiatric illness    Discharge Plan: Per primary team when stable    Code Status: Level 1 - Full Code      Subjective:   Pt reports having right foot ulcer since July, but did not seek any treatment  Has some pain - mild to moderate  Has hx of osteo  States he had right TMA approximately 2-3 years ago  No other medical complaints  No fever, chills  No CP, SOB, N/V  Objective:     Vitals:   Temp (24hrs), Av °F (36 1 °C), Min:96 9 °F (36 1 °C), Max:97 1 °F (36 2 °C)    Temp:  [96 9 °F (36 1 °C)-97 1 °F (36 2 °C)] 96 9 °F (36 1 °C)  HR:  [] 72  Resp:  [16-18] 18  BP: (103-160)/(60-90) 160/90  SpO2:  [97 %-98 %] 98 %  Body mass index is 33 95 kg/m²  Input and Output Summary (last 24 hours):     No intake or output data in the 24 hours ending 18 1034    Physical Exam:     Physical Exam   Constitutional: He is oriented to person, place, and time  He appears well-developed and well-nourished  No distress  HENT:   Head: Normocephalic and atraumatic  Eyes: Right eye exhibits no discharge  Left eye exhibits no discharge  Neck: No JVD present     Cardiovascular: Normal rate, regular rhythm, normal heart sounds and intact distal pulses  Exam reveals no gallop and no friction rub  No murmur heard  Pulmonary/Chest: Effort normal and breath sounds normal  No stridor  No respiratory distress  He has no wheezes  He has no rales  Abdominal: Soft  Bowel sounds are normal  He exhibits no distension  There is no tenderness  There is no rebound and no guarding  Musculoskeletal: He exhibits no edema  Neurological: He is alert and oriented to person, place, and time  Skin: Skin is warm and dry  He is not diaphoretic  Right foot ulcer with bloody drainage, and foul smelling     Psychiatric:   Flat affect, irritable at times       Additional Data:     Labs:      Results from last 7 days  Lab Units 12/18/18  0621   WBC Thousand/uL 6 00   HEMOGLOBIN g/dL 17 1   HEMATOCRIT % 52 3   PLATELETS Thousands/uL 231   NEUTROS PCT % 53   LYMPHS PCT % 40   MONOS PCT % 7   EOS PCT % 0       Results from last 7 days  Lab Units 12/18/18  0621   SODIUM mmol/L 136*   POTASSIUM mmol/L 4 7   CHLORIDE mmol/L 101   CO2 mmol/L 29   BUN mg/dL 22   CREATININE mg/dL 0 99   ANION GAP mmol/L 6   CALCIUM mg/dL 9 3   ALBUMIN g/dL 4 0   TOTAL BILIRUBIN mg/dL 0 60   ALK PHOS U/L 84   ALT U/L 28   AST U/L 14*   GLUCOSE RANDOM mg/dL 252*           Results from last 7 days  Lab Units 12/18/18  0800 12/17/18  2156 12/17/18  1159 12/17/18  1035 12/16/18  2227 12/16/18  1811   POC GLUCOSE mg/dl 242* 326* 293* 283* 247* 371*       Results from last 7 days  Lab Units 12/18/18  0621   HEMOGLOBIN A1C % 10 0*               * I Have Reviewed All Lab Data Listed Above  * Additional Pertinent Lab Tests Reviewed:  Most recent labs reviewed    Imaging:    Imaging Reports Reviewed Today Include: none  Imaging Personally Reviewed by Myself Includes:  none    Recent Cultures (last 7 days):           Last 24 Hours Medication List:     Current Facility-Administered Medications:  acetaminophen 650 mg Oral Q6H PRN Tariq Mathis PA-C   acetaminophen 975 mg Oral Q6H PRN Emogene May Gyarmaty, PA-C   aluminum-magnesium hydroxide-simethicone 30 mL Oral Q4H PRN Joshua Pound, PA-C   benztropine 1 mg Intramuscular Q6H PRN [place] Pound, PA-C   benztropine 1 mg Oral Q6H PRN [place], PA-C   haloperidol 5 mg Oral Q6H PRN Emogene May Gyarmaty, PA-C   haloperidol lactate 5 mg Intramuscular Q6H PRN Emogene May Gyarmaty, PA-C   hydrOXYzine HCL 25 mg Oral Q6H PRN [place], PA-C   ibuprofen 600 mg Oral Q8H PRN Emogene May Gyarmaty, PA-C   insulin glargine 30 Units Subcutaneous HS Rylie Ciminieri, TAYLOR   insulin lispro 1-6 Units Subcutaneous TID GENA Agrawalarmatess, PA-C   insulin lispro 1-6 Units Subcutaneous HS Emogene May Gyarmaty, PA-C   insulin lispro 4 Units Subcutaneous TID With Meals TAYLOR Morales   lisinopril 5 mg Oral Daily Emogene May Gyarmaty, PA-C   LORazepam 1 mg Intramuscular Q6H PRN Emogene May Gyarmaty, PA-C   LORazepam 1 mg Oral Q6H PRN Emogene May Gyarmaty, PA-C   magnesium hydroxide 30 mL Oral Daily PRN [place], PA-C   metFORMIN 850 mg Oral BID With Meals TAYLOR Longoria   metoprolol tartrate 25 mg Oral Q12H [address] Joshua [place], PA-C   nicotine 1 patch Transdermal Daily Emogene May Rebecca, PA-C   risperiDONE 1 mg Oral Q3H PRN [place], PA-C   traZODone 50 mg Oral HS PRN [place], PA-C   ziprasidone 20 mg Intramuscular Q4H PRN [place], PA-C        Today, Patient Was Seen By: TAYLOR Morales

## 2018-12-18 NOTE — ASSESSMENT & PLAN NOTE
No results found for: HGBA1C    Recent Labs      12/16/18   1811  12/16/18   2227  12/17/18   1035  12/17/18   1159   POCGLU  371*  247*  283*  293*       Blood Sugar Average: Last 72 hrs:  · Suspect extremely uncontrolled as demonstrated by non-compliance and hx of amputations and stable foot ulcer  · Check hemoglobin A1C  · Will re-start home regimen (metformin and lantus), add SSI  · Diabetic diet

## 2018-12-18 NOTE — ASSESSMENT & PLAN NOTE
· Patient transferred from [facility] for inpatient psychiatric treatment, stating that he has not been compliant with medications "and wants it all sorted out, immediately"  · Vital signs stable, mild tachycardia on encounter - appears agitated  · Alcohol breath test and UDS negative  · Re-check CBC in AM   · BMP WNL - pseudohyponatremia secondary to hyperglycemia, corrects to 135    Will obtain CMP  · TSH WNL  · EKG NSR, normal QTc  · Plan per psychiatry

## 2018-12-18 NOTE — TREATMENT PLAN
TREATMENT PLAN REVIEW - [facility] Patricia Platt 44 y o  1975 male MRN: [id number]    [address] [address] Room / Bed: [place] [address]/[facility] Encounter: [id number]          Admit Date/Time:  12/17/2018  8:33 PM    Treatment Team: Attending Provider: Byron Mae MD; Physician Assistant: Tariq Mathis PA-C; Consulting Physician: Shweta Riley; Patient Care Technician: Nancy Toro; Registered Nurse: Roseann Riley RN; Patient Care Assistant: Cristal Holguin; : Chacha Hayes RN; Patient Care Technician: Sylvia Ybarra; Patient Care Technician: Chuy Villalba;  Consulting Physician: Mary Jo Hyman DPM; Consulting Physician: Seth Chong MD    Diagnosis: Principal Problem:    Severe bipolar I disorder, most recent episode depressed without psychotic features ([facility] )  Active Problems:    Tobacco use disorder    Diabetic ulcer of right midfoot ([facility] )    DM (diabetes mellitus), type 2 ([facility] )    Essential hypertension      Patient Strengths/Assets: average or above intelligence, good past treatment response, patient is on a voluntary commitment, reasoning ability    Patient Barriers/Limitations: lack of social/family support, lack of stable employment    Short Term Goals: decrease in depressive symptoms, improvement in ability to express basic needs, improvement in self care, sleep improvement, mood stabilization    Long Term Goals: resolution of depressive symptoms, free of suicidal thoughts, acceptance of need for psychiatric medications, acceptance of need for psychiatric treatment, acceptance of need for psychiatric follow up after discharge    Progress Towards Goals: starting psychiatric medications as prescribed    Recommended Treatment: medication management, patient medication education, group therapy, milieu therapy, continued Behavioral Health psychiatric evaluation/assessment process    Treatment Frequency: daily medication monitoring, group and milieu therapy daily, monitoring through interdisciplinary rounds, monitoring through weekly patient care conferences    Expected Discharge Date:  12/26/2018    Discharge Plan: referral for outpatient medication management with a psychiatrist, referral for outpatient psychotherapy    Treatment Plan Created/Updated By: Manav Sánchez MD

## 2018-12-18 NOTE — CASE MANAGEMENT
Patient admitted 12/17/2018 on a 201 from [facility]   met with patient and obtained a limited history, he was scant,irritable, and sarcastic with responses  He reports he was admitted to the hospital "for depression and I need med management"  Patient reports numerous past inpatient hospitalizations and refused to provide any detail of these admissions stating "I was all over,it's too extensive to list"  He did state the last inpatient admission was July of 2018 at [facility]  Aleja Solitario states he does not currently have a [place] [address] or medical outpatient provider  He stated this past May he was referred to [facility] in [place] and went to 2 appointments and then chose not to go anymore  Aleja Solitario reports he lives with a friend in [place] [facility] and said he would like to look for other housing arrangements but will return to friends home until he can find alternate housing  Aleja Solitario refuses to sign any JENNIFER for family or friends stating he does not have any supports  Aleja Solitario denies legal issues  He states he drinks alcohol "on occasion" and smokes marijuana   asked how often he uses marijuana and his response was " I don't know, how often do you breathe?" AUDIT 1/40  UDS was negative  Aleja Solitario reports his pharmacy is the [facility] in [address] [facility]  Patient states he receives $978 00/month in SSD   JENNIFER was signed for [facility] [facility] in [address] [facility] said he had gone there in the past and was agreeable to referral for outpatient  Phone [phone]  Aleja Solitario states he does not have a PCP   reviewed the treatment plan, patient was agreeable and did sign  Per documentation, Aleja Solitario reported increased depression and anxiety and has been neglecting his basic needs  Has been  non compliant with medications which included his diabetic medication and reported he was slowly killing himself by neglecting his health

## 2018-12-18 NOTE — CONSULTS
Consult- Annel Jones 1975, 44 y o  male MRN: [id number]    Unit/Bed#: U 340-01 Encounter: [id number]    Primary Care Provider: No primary care provider on file  Date and time admitted to hospital: 12/17/2018  8:33 PM      Inpatient consult for Medical Clearance for [address] patient  Consult performed by: Rios Ortega ordered by: Lolita Titus        Severe bipolar I disorder, most recent episode depressed without psychotic features [facility])   Assessment & Plan    · Patient transferred from [facility] for inpatient psychiatric treatment, stating that he has not been compliant with medications "and wants it all sorted out, immediately"  · Vital signs stable, mild tachycardia on encounter - appears agitated  · Alcohol breath test and UDS negative  · Re-check CBC in AM   · BMP WNL - pseudohyponatremia secondary to hyperglycemia, corrects to 135    Will obtain CMP  · TSH WNL  · EKG NSR, normal QTc  · Plan per psychiatry     DM (diabetes mellitus), type 2 [facility])   Assessment & Plan    No results found for: HGBA1C    Recent Labs      12/16/18   1811  12/16/18   2227  12/17/18   1035  12/17/18   1159   POCGLU  371*  247*  283*  293*       Blood Sugar Average: Last 72 hrs:  · Suspect extremely uncontrolled as demonstrated by non-compliance and hx of amputations and stable foot ulcer  · Check hemoglobin A1C  · Will re-start home regimen (metformin and lantus), add SSI  · Diabetic diet     Essential hypertension   Assessment & Plan    · Currently stable  · Continue lisinopril, metoprolol     Diabetic ulcer of right midfoot (HCC)   Assessment & Plan    · Small ulceration of right foot; no redness or drainage  · Continue to monitor  · Will need podiatry follow-up     Tobacco use disorder   Assessment & Plan    · Counseled cessation  · Will order nicotine patch         VTE Prophylaxis: Reason for no pharmacologic prophylaxis ambulate, psychiatric unit  / reason for no mechanical VTE prophylaxis ambulate, psychiatric unit     Recommendations for Discharge:  · Will need close outpatient follow up with PCP for HTN and type II DM  Counseling / Coordination of Care Time: 30 minutes  Greater than 50% of total time spent on patient counseling and coordination of care  Collaboration of Care: Were Recommendations Directly Discussed with Primary Treatment Team? - No     History of Present Illness:    Laurel Soriano is a 44 y o  male who is originally admitted to the psychiatry service due to bipolar disorder  We are consulted for medical management  Past medical history significant for hypertension, uncontrolled type 2 diabetes on insulin, and bipolar disorder  Patient is aggravated on encounter, does not contribute much to HPI  States that he needs his medications figured out immediately  Patient however was able to state that he has no physical complaints including chest pain/palpitations, shortness of breath, nausea/vomiting, abdominal pain  Patient will need close monitoring of blood glucose levels  Otherwise patient is medically stable for behavioral health treatment  Review of Systems:    Review of Systems   Constitutional: Negative for chills, fever and unexpected weight change  HENT: Negative for congestion, sore throat and trouble swallowing  Eyes: Negative for photophobia, pain and visual disturbance  Respiratory: Negative for cough, shortness of breath and wheezing  Cardiovascular: Negative for chest pain, palpitations and leg swelling  Gastrointestinal: Negative for abdominal pain, diarrhea, nausea and vomiting  Endocrine: Negative for polyuria  Genitourinary: Negative for dysuria, flank pain, hematuria and urgency  Musculoskeletal: Negative for back pain, myalgias, neck pain and neck stiffness  Skin: Negative for pallor and rash  Neurological: Negative for dizziness, syncope, speech difficulty, weakness, light-headedness, numbness and headaches     Hematological: Does not bruise/bleed easily  Psychiatric/Behavioral: Positive for agitation and behavioral problems  Negative for suicidal ideas  Past Medical and Surgical History:     Past Medical History:   Diagnosis Date    Anxiety     Bipolar 1 disorder ([place] [address] )     Chronic pain disorder     Depression     Diabetes mellitus ([place] [address] )     Hypertension     Psychiatric illness     PTSD (post-traumatic stress disorder)     Sleep difficulties     Substance abuse ([place] [address] )     Suicide attempt ([place] [address] )        Past Surgical History:   Procedure Laterality Date    APPENDECTOMY      COLON SURGERY      TONSILLECTOMY         Meds/Allergies:    all medications and allergies reviewed    Allergies:    Allergies   Allergen Reactions    Penicillins Rash       Social History:     Marital Status: Single    Substance Use History:   History   Alcohol Use No     History   Smoking Status    Current Every Day Smoker    Packs/day: 1 50    Years: 22 00    Types: Cigarettes   Smokeless Tobacco    Current User    Types: Chew     History   Drug Use    Types: Marijuana     Comment: monthly       Family History:    Family History   Problem Relation Age of Onset    No Known Problems Mother     No Known Problems Father     No Known Problems Sister     No Known Problems Brother     No Known Problems Maternal Aunt     No Known Problems Paternal Aunt     No Known Problems Maternal Uncle     No Known Problems Paternal Uncle     No Known Problems Maternal Grandfather     No Known Problems Maternal Grandmother     No Known Problems Paternal Grandfather     No Known Problems Paternal Grandmother     No Known Problems Cousin     ADD / ADHD Neg Hx     Alcohol abuse Neg Hx     Anxiety disorder Neg Hx     Bipolar disorder Neg Hx     Completed Suicide  Neg Hx     Dementia Neg Hx     Depression Neg Hx     Drug abuse Neg Hx     OCD Neg Hx     Psychiatric Illness Neg Hx     Psychosis Neg Hx     Schizoaffective Disorder  Neg Hx     Schizophrenia Neg Hx     Self-Injury Neg Hx     Suicide Attempts Neg Hx        Physical Exam:     Vitals:   Blood Pressure: 154/90 (12/17/18 2035)  Pulse: (!) 106 (12/17/18 2035)  Temperature: (!) 97 1 °F (36 2 °C) (12/17/18 2035)  Temp Source: Temporal (12/17/18 2035)  Respirations: 16 (12/17/18 2035)  Height: 6' 3" (190 5 cm) (12/17/18 2035)  Weight - Scale: 123 kg (271 lb 9 6 oz) (12/17/18 2035)    Physical Exam   Constitutional: He is oriented to person, place, and time  Vital signs are normal  He appears well-developed  HENT:   Head: Normocephalic  Eyes: Pupils are equal, round, and reactive to light  EOM are normal  No scleral icterus  Neck: Normal range of motion  Cardiovascular: Normal rate, regular rhythm and normal heart sounds  No murmur heard  Pulmonary/Chest: Effort normal and breath sounds normal  No respiratory distress  Abdominal: Soft  Bowel sounds are normal  There is no tenderness  Musculoskeletal: He exhibits no edema  Small (about 1 cm) ulcer on right foot; no redness or drainage, pt states "has been there forever"   Neurological: He is alert and oriented to person, place, and time  Skin: Skin is warm and dry  Psychiatric: His affect is labile  He is agitated and withdrawn  He expresses impulsivity  Additional Data:     Lab Results: I have personally reviewed pertinent reports          Results from last 7 days  Lab Units 12/16/18  1859   WBC Thousand/uL 10 34*   HEMOGLOBIN g/dL 17 8*   HEMATOCRIT % 52 4*   PLATELETS Thousands/uL 320   NEUTROS PCT % 58   LYMPHS PCT % 34   MONOS PCT % 6   EOS PCT % 0       Results from last 7 days  Lab Units 12/16/18  1859   SODIUM mmol/L 131*   POTASSIUM mmol/L 4 4   CHLORIDE mmol/L 93*   CO2 mmol/L 27   BUN mg/dL 16   CREATININE mg/dL 1 08   ANION GAP mmol/L 11   CALCIUM mg/dL 9 7   GLUCOSE RANDOM mg/dL 314*           Results from last 7 days  Lab Units 12/16/18  1859   TROPONIN I ng/mL <0 02     No results found for: HGBA1C    Results from last 7 days  Lab Units 12/17/18  1159 12/17/18  1035 12/16/18  2227 12/16/18  1811   POC GLUCOSE mg/dl 293* 283* 247* 371*           Imaging: I have personally reviewed pertinent reports  No orders to display       EKG, Pathology, and Other Studies Reviewed on Admission:   · EKG: NSR    ** Please Note: This note has been constructed using a voice recognition system   **

## 2018-12-19 LAB
GLUCOSE SERPL-MCNC: 160 MG/DL (ref 70–99)
GLUCOSE SERPL-MCNC: 177 MG/DL (ref 70–99)
GLUCOSE SERPL-MCNC: 183 MG/DL (ref 70–99)
GLUCOSE SERPL-MCNC: 200 MG/DL (ref 70–99)

## 2018-12-19 PROCEDURE — 99232 SBSQ HOSP IP/OBS MODERATE 35: CPT | Performed by: STUDENT IN AN ORGANIZED HEALTH CARE EDUCATION/TRAINING PROGRAM

## 2018-12-19 PROCEDURE — 82948 REAGENT STRIP/BLOOD GLUCOSE: CPT

## 2018-12-19 PROCEDURE — 99223 1ST HOSP IP/OBS HIGH 75: CPT | Performed by: INTERNAL MEDICINE

## 2018-12-19 RX ADMIN — METFORMIN HYDROCHLORIDE 850 MG: 850 TABLET ORAL at 17:23

## 2018-12-19 RX ADMIN — INSULIN LISPRO 4 UNITS: 100 INJECTION, SOLUTION INTRAVENOUS; SUBCUTANEOUS at 08:56

## 2018-12-19 RX ADMIN — INSULIN GLARGINE 30 UNITS: 100 INJECTION, SOLUTION SUBCUTANEOUS at 21:27

## 2018-12-19 RX ADMIN — DIVALPROEX SODIUM 1000 MG: 500 TABLET, FILM COATED, EXTENDED RELEASE ORAL at 21:25

## 2018-12-19 RX ADMIN — CHLORPROMAZINE HYDROCHLORIDE 50 MG: 25 TABLET, SUGAR COATED ORAL at 17:21

## 2018-12-19 RX ADMIN — METOPROLOL TARTRATE 25 MG: 25 TABLET, FILM COATED ORAL at 08:52

## 2018-12-19 RX ADMIN — LISINOPRIL 5 MG: 5 TABLET ORAL at 08:52

## 2018-12-19 RX ADMIN — COLLAGENASE SANTYL: 250 OINTMENT TOPICAL at 16:50

## 2018-12-19 RX ADMIN — CHLORPROMAZINE HYDROCHLORIDE 50 MG: 25 TABLET, SUGAR COATED ORAL at 08:52

## 2018-12-19 RX ADMIN — DULOXETINE HYDROCHLORIDE 60 MG: 60 CAPSULE, DELAYED RELEASE ORAL at 08:52

## 2018-12-19 RX ADMIN — INSULIN LISPRO 1 UNITS: 100 INJECTION, SOLUTION INTRAVENOUS; SUBCUTANEOUS at 17:24

## 2018-12-19 RX ADMIN — METFORMIN HYDROCHLORIDE 850 MG: 850 TABLET ORAL at 08:57

## 2018-12-19 RX ADMIN — TRAZODONE HYDROCHLORIDE 50 MG: 50 TABLET ORAL at 21:25

## 2018-12-19 RX ADMIN — INSULIN LISPRO 1 UNITS: 100 INJECTION, SOLUTION INTRAVENOUS; SUBCUTANEOUS at 21:29

## 2018-12-19 RX ADMIN — METOPROLOL TARTRATE 25 MG: 25 TABLET, FILM COATED ORAL at 21:25

## 2018-12-19 RX ADMIN — INSULIN LISPRO 4 UNITS: 100 INJECTION, SOLUTION INTRAVENOUS; SUBCUTANEOUS at 12:45

## 2018-12-19 RX ADMIN — LORAZEPAM 1 MG: 1 TABLET ORAL at 15:32

## 2018-12-19 RX ADMIN — INSULIN LISPRO 1 UNITS: 100 INJECTION, SOLUTION INTRAVENOUS; SUBCUTANEOUS at 12:01

## 2018-12-19 RX ADMIN — BUSPIRONE HYDROCHLORIDE 10 MG: 10 TABLET ORAL at 08:52

## 2018-12-19 RX ADMIN — INSULIN LISPRO 1 UNITS: 100 INJECTION, SOLUTION INTRAVENOUS; SUBCUTANEOUS at 08:55

## 2018-12-19 RX ADMIN — INSULIN LISPRO 4 UNITS: 100 INJECTION, SOLUTION INTRAVENOUS; SUBCUTANEOUS at 17:24

## 2018-12-19 RX ADMIN — BUSPIRONE HYDROCHLORIDE 10 MG: 10 TABLET ORAL at 17:21

## 2018-12-19 NOTE — PROGRESS NOTES
Pt is cooperative with medication  He remains reclusive to his room, scant in conversation  He is irritable and gives brief responses  He c/o anxiety, denies all other symptoms  His affect is blunted, appearance dissheveled  Pt ate his dinner, appeared brighter during interactions at that time  Aftewards he went back to his room and is not interacting with anyone at this time

## 2018-12-19 NOTE — CASE MANAGEMENT
Spoke with Luis Alberto from [facility]  who requested certain documentation be faxed to her - their fax is not working at this time as multiple attempts were made  Luis Alberto states she will make an intake appt for this pt as soon as she receives information  Pt signed JENNIFER for this provider

## 2018-12-19 NOTE — PLAN OF CARE
Depression     Treatment Goal: Demonstrate behavioral control of depressive symptoms, verbalize feelings of improved mood/affect, and adopt new coping skills prior to discharge Not Progressing        Ineffective Coping     Participates in unit activities Not Progressing          Anxiety     Anxiety is at manageable level Progressing        Ineffective Coping     Cooperates with admission process Progressing        Risk for Self Injury/Neglect     Treatment Goal: Remain safe during length of stay, learn and adopt new coping skills, and be free of self-injurious ideation, impulses and acts at the time of discharge Progressing

## 2018-12-19 NOTE — PROGRESS NOTES
Progress Note - Behavioral Health   Barrett Waller 44 y o  male MRN: [id number]  Unit/Bed#: [facility] 340-01 Encounter: [id number]    The patient was seen for continuing care and reviewed with treatment team  Patient is isolative, withdrawn, out of room for needs  Patient remains irritable, reports feeling less depressed and denies suicidal or homicidal ideation at this time  Patient reports past periods of symptoms of zoran; with irritable and elated mood, increased energy level and increased goal directed activity  Patient is complaint with medications and denies side effects of medications  Mental Status Evaluation:  Appearance:  Adequate hygiene and grooming   Behavior:  cooperative   Mood:  depressed; Affect is constricted   Thought Content:  Does not verbalize delusional material   Perceptual Disturbances: Denies hallucinations and does not appear to be responding to internal stimuli   Risk Potential: No suicidal or homicidal ideation   Orientation:   Oriented to place and person     Vitals:    12/19/18 0828   BP: 149/97   Pulse: 78   Resp: 16   Temp: 97 6 °F (36 4 °C)       Assessment/Plan    Principal Problem:    Severe bipolar I disorder, most recent episode depressed without psychotic features ([facility] )  Active Problems:    Tobacco use disorder    Diabetic ulcer of right midfoot (HCC)    DM (diabetes mellitus), type 2 ([facility] )    Essential hypertension      Recommended Treatment: Will restart Gabapentin 400mg tab, oral, 3 times a day for neuropathic pain  Continue with pharmacotherapy, group therapy, milieu therapy and occupational therapy    The patient will be maintained on the following medications:    Current Facility-Administered Medications:  acetaminophen 650 mg Oral Q6H PRN Krystal Gross PA-C   acetaminophen 975 mg Oral Q6H PRN Chelo Marr PA-C   aluminum-magnesium hydroxide-simethicone 30 mL Oral Q4H PRN Krystal Gross PA-C   benztropine 1 mg Intramuscular Q6H PRN Krystal Gross PA-C benztropine 1 mg Oral Q6H PRN Damián Andujar, PA-C   busPIRone 10 mg Oral BID Carolyne Tariq MD   chlorproMAZINE 50 mg Oral BID Carolyne Tariq MD   collagenase  Topical Daily Tyrell Carballo, DPM   divalproex sodium 1,000 mg Oral HS Carolyne Tariq MD   DULoxetine 60 mg Oral Daily Carolyne Tariq MD   haloperidol 5 mg Oral Q6H PRN Damián Andujar, PA-C   haloperidol lactate 5 mg Intramuscular Q6H PRN Patricia Owusu Gyangela, PA-C   hydrOXYzine HCL 25 mg Oral Q6H PRN Damián Andujar, PA-C   ibuprofen 600 mg Oral Q8H PRN Patricia Estradach Justa, PA-C   insulin glargine 30 Units Subcutaneous HS TAYLOR Longoria   insulin lispro 1-6 Units Subcutaneous TID GENA Marr, PA-C   insulin lispro 1-6 Units Subcutaneous HS Patricia Marr, PA-C   insulin lispro 4 Units Subcutaneous TID With Meals TAYLOR Arenas   lisinopril 5 mg Oral Daily Patricia Estradach Justa, PA-C   LORazepam 1 mg Intramuscular Q6H PRN Patricia Estradach Justa, PA-C   LORazepam 1 mg Oral Q6H PRN Patricia Estradach Tanjaarmatess, PA-C   magnesium hydroxide 30 mL Oral Daily PRN Damián Andujar, PA-C   metFORMIN 850 mg Oral BID With Meals TAYLOR Longoria   metoprolol tartrate 25 mg Oral Q12H [address] Damián Andujar, PA-C   nicotine 1 patch Transdermal Daily Patricai Fernandez, PA-C   risperiDONE 1 mg Oral Q3H PRN Damián Andujar, PA-C   traZODone 50 mg Oral HS PRN Damián Andujar, PA-C   ziprasidone 20 mg Intramuscular Q4H PRN Damián Andujar, PADYAN

## 2018-12-19 NOTE — CONSULTS
Consultation - Infectious Disease   Love Paredes 44 y o  male MRN: [id number]  Unit/Bed#: [facility] 340-01 Encounter: [id number]      IMPRESSION & RECOMMENDATIONS:   1  Diabetic foot ulcer:  I suspect that this is a chronic ulcer given the patient reports that is been present since July with ongoing drainage  Images reviewed along with evaluation by Podiatry  Patient is otherwise hemodynamically stable and afebrile   -would agree with holding antibiotics for this issue  -would continue local wound care as per Podiatry  -would repeat images/cultures and labs if there is a clinical change    2  Poorly controlled diabetes:  Hemoglobin A1c noted to be 10, discussed in detail with the patient the need for push for glucose control as he is at increased risk for development of osteo at that ulcer site  He has also had prior amputation due to osteomyelitis   -glucose control as per primary service  -continue monitoring his wound as above local care as per Podiatry  -if there is a clinical change would repeat imaging/cultures and labs at that time  3  Previous history of osteomyelitis and amputation:  Patient's other amputation sites appear stable  Would continue to monitor the site clinically as well given the patient is at increased risk for infection  4  Bipolar disorder with current psychiatric admission:  Ongoing care as per Psychiatry  Above plan discussed with Medicine service  ID consult service will sign off this time  HISTORY OF PRESENT ILLNESS:  Reason for Consult:  Diabetic ulcer right midfoot    HPI: Love Paredes is a 44y o  year old male with past medical history significant for hypertension, uncontrolled diabetes and bipolar disorder  Patient is here on psychiatric admission due to his bipolar disorder  He was evaluated by Medicine on 12/17 for ongoing care of his diabetes  At that time he denied any systemic complaints    He was noted with right foot ulceration but without any redness or drainage and so was plan for monitoring off antibiotics  Wound on subsequent evaluation was concerning for tunneling and bloody drainage along with foul smell  Patient was ordered for imaging as well as evaluation by Podiatry  Patient had a previous history of osteomyelitis in this foot with TMA about 2-3 years ago  Patient was subsequently seen by Podiatry and had local wound debridement  Wound itself did not appear infected on their exam and did not probe to the bone  He was recommended for local wound care and offloading  Patient otherwise remains afebrile  Mild leukocytosis present on admission which subsequently resolved on its own  No images in no cultures  Patient's other vitals are stable  We are consulted at this time to evaluate this patient has diabetic foot ulcer and question of antibiotic need  On evaluation, patient appears comfortable  Denies any nausea, vomiting, chest pain, shortness of breath, ongoing pain in the foot  He reports that the ulcer has been present since July and has not really had significant follow-up for the lesion itself  He denies being on any chronic antibiotic therapy for the lesion  He reports only penicillin allergy from when he was a child but otherwise tolerates majority of antibiotics without issue  He denies having any other prosthetic material in his body and has had previous history of osteo which required TMA on both of his feet  REVIEW OF SYSTEMS:  A complete 12 point system-based review of systems is negative other than that noted in the HPI      PAST MEDICAL HISTORY:  Past Medical History:   Diagnosis Date    Anxiety     Bipolar 1 disorder ([facility] )     Chronic pain disorder     Depression     [facility]     Hypertension     Psychiatric illness     PTSD (post-traumatic stress disorder)     Sleep difficulties     Substance abuse ([facility] )     Suicide attempt [facility])      Past Surgical History:   Procedure Laterality Date    APPENDECTOMY      COLON SURGERY      TONSILLECTOMY         FAMILY HISTORY:  Non-contributory    SOCIAL HISTORY:  Social History   History   Alcohol Use No     History   Drug Use    Types: Marijuana     Comment: monthly     History   Smoking Status    Current Every Day Smoker    Packs/day: 1 50    Years: 22 00    Types: Cigarettes   Smokeless Tobacco    Current User    Types: Chew       ALLERGIES:  Allergies   Allergen Reactions    Penicillins Rash       MEDICATIONS:  All current active medications have been reviewed  PHYSICAL EXAM:  Temp:  [97 6 °F (36 4 °C)] 97 6 °F (36 4 °C)  HR:  [69-80] 78  Resp:  [16] 16  BP: (134-149)/(68-97) 149/97  Temp (24hrs), Av 6 °F (36 4 °C), Min:97 6 °F (36 4 °C), Max:97 6 °F (36 4 °C)  Current: Temperature: 97 6 °F (36 4 °C)  No intake or output data in the 24 hours ending 18 0838    General Appearance:  Appearing well, nontoxic, and in no distress   Head:  Normocephalic, without obvious abnormality, atraumatic   Eyes:  Conjunctiva pink and sclera anicteric, both eyes   Nose: Nares normal, mucosa normal, no drainage   Throat: Oropharynx moist without lesions   Neck: Supple, symmetrical, no adenopathy, no tenderness/mass/nodules   Back:   Symmetric, no curvature, ROM normal, no CVA tenderness   Lungs:   Clear to auscultation bilaterally, respirations unlabored   Chest Wall:  No tenderness or deformity   Heart:  RRR; no murmur, rub or gallop   Abdomen:   Soft, non-tender, non-distended, positive bowel sounds    Extremities: No cyanosis, clubbing or edema   Skin: No other rashes or draining wounds on exposed skin  Patient's right foot wound is wrapped and has some bloody drainage coming through the bandages  He has no spreading erythema up the leg or warmth  Lymph nodes: Cervical, supraclavicular nodes normal   Neurologic: Alert and oriented times 3, patient able to move all of his extremities against gravity and set up without any assistance         LABS, IMAGING, & OTHER STUDIES:  Lab Results:  I have personally reviewed pertinent labs  Results from last 7 days  Lab Units 12/18/18  0621 12/16/18  1859   WBC Thousand/uL 6 00 10 34*   HEMOGLOBIN g/dL 17 1 17 8*   PLATELETS Thousands/uL 231 320       Results from last 7 days  Lab Units 12/18/18  0621   POTASSIUM mmol/L 4 7   CHLORIDE mmol/L 101   CO2 mmol/L 29   BUN mg/dL 22   CREATININE mg/dL 0 99   EGFR ml/min/1 73sq m 94   CALCIUM mg/dL 9 3   AST U/L 14*   ALT U/L 28   ALK PHOS U/L 84         Imaging Studies:   I have personally reviewed pertinent imaging study reports and images in PACS  Other Studies:   I have personally reviewed pertinent reports

## 2018-12-19 NOTE — PROGRESS NOTES
Clinical Pharmacy Note: Valproic Acid    Deneen Koenig is a 44 y o  male who presents with increasing anxiety/depression and neglecting basic needs    Assessment/Plan:    VPA indication: bipolar disorder maintenance     Mariaelena Guajardo is currently taking 1000 mg extended release tablet once daily  Valproic Acid concentration is UNKNOWN  It is recommended to take steady state trough after 72 hours on a consistent dose  Pharmacist has reviewed liver function tests, pancreatic enzymes, and pregnancy test (if drawn) or made recommendations for such tests YES    Pregnancy test Does not apply to this patient  Pharmacy Recommendations:   Assess steady state level, will be steady state 12/21 with AM labs  Goal dose is typically 20 mg/kg which would be about 2500 mg/day  Monitoring:    Valproic Acid:    0  Lab Value Date/Time   VALPROICTOT 11 3 (L) 12/18/2018 0621   VALPROICTOT 54 5 06/20/2018 2115       Liver Function:    0  Lab Value Date/Time   ALB 4 0 12/18/2018 0621   ALB 4 1 07/10/2018 2210   ALB 3 4 (L) 06/25/2018 0946   ALB 3 7 06/20/2018 2115   ALB 3 6 06/15/2018 1025   ALB 4 4 06/09/2018 2000       0  Lab Value Date/Time   TBILI 0 60 12/18/2018 0621   TBILI 0 60 07/10/2018 2210   TBILI 0 20 06/25/2018 0946       0  Lab Value Date/Time   AST 14 (L) 12/18/2018 0621   AST 10 (L) 07/10/2018 2210   AST 10 (L) 06/25/2018 0946   AST 9 06/20/2018 2115   AST 9 06/15/2018 1025   AST 10 06/09/2018 2000       0  Lab Value Date/Time   ALT 28 12/18/2018 0621   ALT 10 07/10/2018 2210   ALT 10 06/25/2018 0946   ALT 8 06/20/2018 2115   ALT 9 06/15/2018 1025   ALT 11 06/09/2018 2000       0  Lab Value Date/Time   INR 1 08 07/10/2018 2210       Platelets:    0  Lab Value Date/Time    12/18/2018 0621    06/09/2018 2000       Therapeutic valproic acid levels are  ug/mL, but target range varies by indication   Levels above 150 ug/mL indicate toxicity, although toxicity may be associated with levels within therapeutic ranges based on symptoms  If switching from delayed release to extended release formulation, increase dose by 8 to 20% and dose daily to maintain therapeutic serum levels  Monitor pancreatic enzymes if patient presents with abdominal pain consistent with pancreatitis which is a black box warning  Also, monitor liver function for black box warning of hepatotoxicity, check ammonia level if suspected  Suspect toxicity in stabilized patients if new-onset sedation, nausea, vomiting, diarrhea, and/or tremor  Reassess valproic acid level if liver function or mental status changes  May cause dose-related thrombocytopenia, inhibition of platelet aggregation, and bleeding  In some cases, platelet counts may be normalized with continued treatment; however, reduce dose or discontinue drug if patient develops evidence of hemorrhage, bruising, or a disorder of hemostasis/coagulation  Females of child bearing age should be on birth control due to very high teratogenic potential      Pharmacy will continue to follow patient with team  Thank you      Electronically signed by: Marie Hernandez, Pharmacist

## 2018-12-19 NOTE — PROGRESS NOTES
Patient has been seclusive to room, irritable, and scant with conversation  Patient denies SI/HI and A/V hallucinations  Patient has been medication compliant

## 2018-12-20 LAB
GLUCOSE SERPL-MCNC: 187 MG/DL (ref 70–99)
GLUCOSE SERPL-MCNC: 201 MG/DL (ref 70–99)
GLUCOSE SERPL-MCNC: 207 MG/DL (ref 70–99)

## 2018-12-20 PROCEDURE — 99232 SBSQ HOSP IP/OBS MODERATE 35: CPT | Performed by: STUDENT IN AN ORGANIZED HEALTH CARE EDUCATION/TRAINING PROGRAM

## 2018-12-20 PROCEDURE — 82948 REAGENT STRIP/BLOOD GLUCOSE: CPT

## 2018-12-20 RX ADMIN — INSULIN LISPRO 2 UNITS: 100 INJECTION, SOLUTION INTRAVENOUS; SUBCUTANEOUS at 13:09

## 2018-12-20 RX ADMIN — COLLAGENASE SANTYL: 250 OINTMENT TOPICAL at 14:04

## 2018-12-20 RX ADMIN — METFORMIN HYDROCHLORIDE 850 MG: 850 TABLET ORAL at 08:30

## 2018-12-20 RX ADMIN — INSULIN GLARGINE 30 UNITS: 100 INJECTION, SOLUTION SUBCUTANEOUS at 21:30

## 2018-12-20 RX ADMIN — INSULIN LISPRO 4 UNITS: 100 INJECTION, SOLUTION INTRAVENOUS; SUBCUTANEOUS at 17:30

## 2018-12-20 RX ADMIN — LISINOPRIL 5 MG: 5 TABLET ORAL at 08:30

## 2018-12-20 RX ADMIN — METOPROLOL TARTRATE 25 MG: 25 TABLET, FILM COATED ORAL at 08:30

## 2018-12-20 RX ADMIN — TRAZODONE HYDROCHLORIDE 50 MG: 50 TABLET ORAL at 21:30

## 2018-12-20 RX ADMIN — INSULIN LISPRO 2 UNITS: 100 INJECTION, SOLUTION INTRAVENOUS; SUBCUTANEOUS at 17:30

## 2018-12-20 RX ADMIN — CHLORPROMAZINE HYDROCHLORIDE 50 MG: 25 TABLET, SUGAR COATED ORAL at 17:25

## 2018-12-20 RX ADMIN — INSULIN LISPRO 2 UNITS: 100 INJECTION, SOLUTION INTRAVENOUS; SUBCUTANEOUS at 21:30

## 2018-12-20 RX ADMIN — METOPROLOL TARTRATE 25 MG: 25 TABLET, FILM COATED ORAL at 21:30

## 2018-12-20 RX ADMIN — METFORMIN HYDROCHLORIDE 850 MG: 850 TABLET ORAL at 17:25

## 2018-12-20 RX ADMIN — DULOXETINE HYDROCHLORIDE 60 MG: 60 CAPSULE, DELAYED RELEASE ORAL at 08:30

## 2018-12-20 RX ADMIN — CHLORPROMAZINE HYDROCHLORIDE 50 MG: 25 TABLET, SUGAR COATED ORAL at 08:30

## 2018-12-20 RX ADMIN — BUSPIRONE HYDROCHLORIDE 10 MG: 10 TABLET ORAL at 08:30

## 2018-12-20 RX ADMIN — DIVALPROEX SODIUM 1000 MG: 500 TABLET, FILM COATED, EXTENDED RELEASE ORAL at 21:29

## 2018-12-20 RX ADMIN — INSULIN LISPRO 4 UNITS: 100 INJECTION, SOLUTION INTRAVENOUS; SUBCUTANEOUS at 08:57

## 2018-12-20 RX ADMIN — INSULIN LISPRO 4 UNITS: 100 INJECTION, SOLUTION INTRAVENOUS; SUBCUTANEOUS at 13:08

## 2018-12-20 RX ADMIN — BUSPIRONE HYDROCHLORIDE 10 MG: 10 TABLET ORAL at 17:25

## 2018-12-20 RX ADMIN — INSULIN LISPRO 1 UNITS: 100 INJECTION, SOLUTION INTRAVENOUS; SUBCUTANEOUS at 08:58

## 2018-12-20 NOTE — PROGRESS NOTES
Writer contacted podiatrist about surgical shoe  Order was placed, doctor told writer to expect delivery from [employer]  LM with store room, awaiting response

## 2018-12-20 NOTE — PROGRESS NOTES
Pt denies SI  He states his anxiety is "so-so " Pt appears depressed; he has a flat affect  Pt is otherwise calm and cooperative  He is scant and seclusive to his room  Will encourage pt into milieu and monitor

## 2018-12-20 NOTE — PROGRESS NOTES
Called storeroom regarding surgical shoe for patient; per storeroom, the shoe has been ordered and will be delivered when it arrives to hospital

## 2018-12-20 NOTE — PROGRESS NOTES
Pt is calm  He is cooperative with medications  Pt appears flat  He is scant in conversation, states he is "slowly feeling better" regarding anxiety and depression  He denies all other symptoms  He is reclusive to his room  Pt was pleasant and polite in interactions

## 2018-12-20 NOTE — PROGRESS NOTES
Progress Note - Behavioral Health   Laurel Soriano 44 y o  male MRN: [id number]  Unit/Bed#: [facility] 340-01 Encounter: [id number]    The patient was seen for continuing care and reviewed with treatment team  Patient remains isolative, withdrawn, lying in bed during evaluation and out of room for needs  Patient continues to endorse depressed mood, with sad affect  Patient reports poor sleep last night with midnight awakenings  Patient reports good appetite  Patient denies suicidal or homicidal ideation at this time and denies auditory or visual hallucination at this time  Patient denies side effects  Mental Status Evaluation:  Appearance:  Adequate hygiene and grooming   Behavior:  cooperative   Mood:  depressed; Affect: constricted   Thought Content:  Does not verbalize delusional material   Perceptual Disturbances: Denies hallucinations and does not appear to be responding to internal stimuli   Risk Potential: No suicidal or homicidal ideation   Orientation:   Oriented to place and person     Vitals:    12/20/18 1109   BP: 112/64   Pulse: 71   Resp:    Temp:        Assessment/Plan    Principal Problem:    Severe bipolar I disorder, most recent episode depressed without psychotic features ([facility] )  Active Problems:    Tobacco use disorder    Diabetic ulcer of right midfoot (HCC)    DM (diabetes mellitus), type 2 ([facility] )    Essential hypertension      Recommended Treatment:  VPA level tomorrow AM  Continue with pharmacotherapy, group therapy, milieu therapy and occupational therapy    The patient will be maintained on the following medications:    Current Facility-Administered Medications:  acetaminophen 650 mg Oral Q6H PRN Brandy Alicja, PA-AROLDO   acetaminophen 975 mg Oral Q6H PRN SUSANNAH Ding-AROLDO   aluminum-magnesium hydroxide-simethicone 30 mL Oral Q4H PRN Brandy Alicja, PA-AORLDO   benztropine 1 mg Intramuscular Q6H PRN Brandy Alicja, PA-AROLDO   benztropine 1 mg Oral Q6H PRN Brandy Alicja, PA-AROLDO   busPIRone 10 mg Oral BID Patricio Lane MD   chlorproMAZINE 50 mg Oral BID Patricio Lane MD   collagenase  Topical Daily MIRANDA GuardadoM   divalproex sodium 1,000 mg Oral HS Patricio Lane MD   DULoxetine 60 mg Oral Daily Patricio Lane MD   haloperidol 5 mg Oral Q6H PRN Sohail Frames, PA-C   haloperidol lactate 5 mg Intramuscular Q6H PRN Thomos Koby Gyarmaty, PA-C   hydrOXYzine HCL 25 mg Oral Q6H PRN Sohail Frames, PA-C   ibuprofen 600 mg Oral Q8H PRN Thomos Koby Gyarmaty, PA-C   insulin glargine 30 Units Subcutaneous HS Rylie Ciminieri, TAYLOR   insulin lispro 1-6 Units Subcutaneous TID AC Patrizia Marr, PA-C   insulin lispro 1-6 Units Subcutaneous HS [facility]   insulin lispro 4 Units Subcutaneous TID With Meals Aster Ill TAYLOR Barrientos   lisinopril 5 mg Oral Daily [facility], PA-C   LORazepam 1 mg Intramuscular Q6H PRN Thomos Koby Gyarmaty, PA-C   LORazepam 1 mg Oral Q6H PRN Thomos Koby Gyarmaty, PA-C   magnesium hydroxide 30 mL Oral Daily PRN Sohail Frames, PA-C   metFORMIN 850 mg Oral BID With Meals Rylie TAYLOR Barrientos   metoprolol tartrate 25 mg Oral Q12H [address] Sohail Frames, PA-C   nicotine 1 patch Transdermal Daily [facility], PA-C   risperiDONE 1 mg Oral Q3H PRN Sohail Frames, PA-C   traZODone 50 mg Oral HS PRN Sohail Frames, PA-C   ziprasidone 20 mg Intramuscular Q4H PRN Sohail Frames, PA-C

## 2018-12-20 NOTE — PROGRESS NOTES
Pt refused AM dressing change to right foot wound; will reassess and encourage pt to allow dressing change later today

## 2018-12-20 NOTE — PLAN OF CARE

## 2018-12-21 LAB
GLUCOSE SERPL-MCNC: 149 MG/DL (ref 70–99)
GLUCOSE SERPL-MCNC: 195 MG/DL (ref 70–99)
GLUCOSE SERPL-MCNC: 227 MG/DL (ref 70–99)
GLUCOSE SERPL-MCNC: 265 MG/DL (ref 70–99)
GLUCOSE SERPL-MCNC: 266 MG/DL (ref 70–99)
VALPROATE SERPL-MCNC: 45.9 UG/ML (ref 50–120)

## 2018-12-21 PROCEDURE — 82948 REAGENT STRIP/BLOOD GLUCOSE: CPT

## 2018-12-21 PROCEDURE — 80164 ASSAY DIPROPYLACETIC ACD TOT: CPT | Performed by: STUDENT IN AN ORGANIZED HEALTH CARE EDUCATION/TRAINING PROGRAM

## 2018-12-21 PROCEDURE — 99232 SBSQ HOSP IP/OBS MODERATE 35: CPT | Performed by: STUDENT IN AN ORGANIZED HEALTH CARE EDUCATION/TRAINING PROGRAM

## 2018-12-21 RX ORDER — DIVALPROEX SODIUM 500 MG/1
1500 TABLET, EXTENDED RELEASE ORAL
Status: DISCONTINUED | OUTPATIENT
Start: 2018-12-21 | End: 2018-12-24

## 2018-12-21 RX ADMIN — LORAZEPAM 1 MG: 1 TABLET ORAL at 20:36

## 2018-12-21 RX ADMIN — INSULIN GLARGINE 30 UNITS: 100 INJECTION, SOLUTION SUBCUTANEOUS at 21:12

## 2018-12-21 RX ADMIN — METOPROLOL TARTRATE 25 MG: 25 TABLET, FILM COATED ORAL at 17:31

## 2018-12-21 RX ADMIN — INSULIN LISPRO 4 UNITS: 100 INJECTION, SOLUTION INTRAVENOUS; SUBCUTANEOUS at 08:32

## 2018-12-21 RX ADMIN — INSULIN LISPRO 2 UNITS: 100 INJECTION, SOLUTION INTRAVENOUS; SUBCUTANEOUS at 12:46

## 2018-12-21 RX ADMIN — METFORMIN HYDROCHLORIDE 850 MG: 850 TABLET ORAL at 17:26

## 2018-12-21 RX ADMIN — DULOXETINE HYDROCHLORIDE 60 MG: 60 CAPSULE, DELAYED RELEASE ORAL at 08:34

## 2018-12-21 RX ADMIN — LISINOPRIL 5 MG: 5 TABLET ORAL at 17:25

## 2018-12-21 RX ADMIN — INSULIN LISPRO 3 UNITS: 100 INJECTION, SOLUTION INTRAVENOUS; SUBCUTANEOUS at 17:35

## 2018-12-21 RX ADMIN — METFORMIN HYDROCHLORIDE 850 MG: 850 TABLET ORAL at 08:34

## 2018-12-21 RX ADMIN — INSULIN LISPRO 4 UNITS: 100 INJECTION, SOLUTION INTRAVENOUS; SUBCUTANEOUS at 17:37

## 2018-12-21 RX ADMIN — INSULIN LISPRO 3 UNITS: 100 INJECTION, SOLUTION INTRAVENOUS; SUBCUTANEOUS at 21:13

## 2018-12-21 RX ADMIN — COLLAGENASE SANTYL: 250 OINTMENT TOPICAL at 12:50

## 2018-12-21 RX ADMIN — CHLORPROMAZINE HYDROCHLORIDE 50 MG: 25 TABLET, SUGAR COATED ORAL at 17:25

## 2018-12-21 RX ADMIN — BUSPIRONE HYDROCHLORIDE 10 MG: 10 TABLET ORAL at 08:34

## 2018-12-21 RX ADMIN — DIVALPROEX SODIUM 1500 MG: 500 TABLET, FILM COATED, EXTENDED RELEASE ORAL at 21:12

## 2018-12-21 RX ADMIN — BUSPIRONE HYDROCHLORIDE 10 MG: 10 TABLET ORAL at 17:25

## 2018-12-21 RX ADMIN — INSULIN LISPRO 4 UNITS: 100 INJECTION, SOLUTION INTRAVENOUS; SUBCUTANEOUS at 12:46

## 2018-12-21 RX ADMIN — CHLORPROMAZINE HYDROCHLORIDE 50 MG: 25 TABLET, SUGAR COATED ORAL at 08:34

## 2018-12-21 RX ADMIN — TRAZODONE HYDROCHLORIDE 50 MG: 50 TABLET ORAL at 21:14

## 2018-12-21 NOTE — PROGRESS NOTES
Pt is cooperative with medications  He feels his anxiety has improved, did not request any PRN medications today for anxiety  He feels his depression is slowly getting better  He denies all other S/S  He is reclusive to his room, scant in conversation  He has been calm and polite

## 2018-12-21 NOTE — PROGRESS NOTES
Clinical Pharmacy Note: Medication Education Group     Intervention:  Open Forum    Length of Group: 45 min     Methods/resources used: verbal discussion     Topics Discussed: Medication adherence, side effect management, nonpharmacologic strategies, medication effectiveness and indications      Time spent in group: Presented late to group      Patient Specific concerns: Soledad Mobley presented to group today dressed in street clothing and appeared well-groomed and alert and oriented to person, place and time  Soledad Mobley seemed depressed  Patient's affect was mainly withdrawn and sullen and he listened attentively in group but did not participate  Patient was respectful of others throughout and interacted appropriately with peers  Soledad Mobley did not have specific concerns       Patient understood counseling: yes     Electronically signed by: Santa Henriquez, Pharmacist

## 2018-12-21 NOTE — PLAN OF CARE
Depression     Treatment Goal: Demonstrate behavioral control of depressive symptoms, verbalize feelings of improved mood/affect, and adopt new coping skills prior to discharge Not Progressing        Ineffective Coping     Participates in unit activities Not Progressing        Risk for Self Injury/Neglect     Treatment Goal: Remain safe during length of stay, learn and adopt new coping skills, and be free of self-injurious ideation, impulses and acts at the time of discharge Not Progressing          Anxiety     Anxiety is at manageable level Progressing        DISCHARGE PLANNING     Discharge to home or other facility with appropriate resources Progressing

## 2018-12-21 NOTE — PROGRESS NOTES
Progress Note - Behavioral Health   Marylin Huertas 44 y o  male MRN: [id number]  Unit/Bed#: [facility] 340-01 Encounter: [id number]    The patient was seen for continuing care and reviewed with treatment team  Patient remains irritable, withdrawn, isolative, needing encouragement to participate in unit activities  Patient reports irritable mood, poor sleep with midnight awakenings, with constricted affect  Patient reports good appetite, denies symptoms of psychosis  Patient reports compliance with medications and denies side effects  Mental Status Evaluation:  Appearance:  Adequate hygiene and grooming and Marginal/poor hygiene   Behavior:  cooperative   Mood:  irritable   Thought Content:  Does not verbalize delusional material   Perceptual Disturbances: Denies hallucinations and does not appear to be responding to internal stimuli   Risk Potential: No suicidal or homicidal ideation   Orientation:   Oriented to place and person     Vitals:    12/21/18 1120   BP: 165/84   Pulse: 98       Assessment/Plan    Principal Problem:    Severe bipolar I disorder, most recent episode depressed without psychotic features ([facility] )  Active Problems:    Tobacco use disorder    Diabetic ulcer of right midfoot (HCC)    DM (diabetes mellitus), type 2 ([facility] )    Essential hypertension      Recommended Treatment: VPA level is 45 9 today  Up-titrate Valproic acid to 1500 mg tab-ER, oral, bedtime for mood stabilization  Also up-titrate Cymbalta to 90 mg tab, oral, daily for depression  Continue with pharmacotherapy, group therapy, milieu therapy and occupational therapy    The patient will be maintained on the following medications:    Current Facility-Administered Medications:  acetaminophen 650 mg Oral Q6H PRN Parker Nieto PA-C   acetaminophen 975 mg Oral Q6H PRN [place] JOSIE Marr   aluminum-magnesium hydroxide-simethicone 30 mL Oral Q4H PRN Parker Nieto PA-C   benztropine 1 mg Intramuscular Q6H PRN Parker Nieto PA-C benztropine 1 mg Oral Q6H PRN Jeri John, JOSIE   busPIRone 10 mg Oral BID Selwyn Jacobs MD   chlorproMAZINE 50 mg Oral BID Selwyn Arlene, MD   collagenase  Topical Daily Tylor Billings DPM   divalproex sodium 1,500 mg Oral HS Selwyn Jacobs MD   [START ON 12/22/2018] DULoxetine 90 mg Oral Daily Selwyn Jacobs MD   haloperidol 5 mg Oral Q6H PRN Jerikinga Lopez, JOSIE   haloperidol lactate 5 mg Intramuscular Q6H PRN Valente Contras Gyarmatess, JOSIE   hydrOXYzine HCL 25 mg Oral Q6H PRN Jerikinga Lopez, JOSIE   ibuprofen 600 mg Oral Q8H PRN Valente Contras Gyarmatess, JOSIE   insulin glargine 30 Units Subcutaneous HS RylieTAYLOR Dick   insulin lispro 1-6 Units Subcutaneous TID AC Patrizia Marr PA-C   insulin lispro 1-6 Units Subcutaneous HS Valente Contras Gyarmatess, JOSIE   insulin lispro 4 Units Subcutaneous TID With Meals Jony Done TAYLOR Barrientos   lisinopril 5 mg Oral Daily Valente Contras GyJOSIE miller   LORazepam 1 mg Intramuscular Q6H PRN Valente Contras GyarmaJOSIE pulido   LORazepam 1 mg Oral Q6H PRN Valente Contras Gyarmatess, JOSIE   magnesium hydroxide 30 mL Oral Daily PRN Jeri Lopez, JOSIE   metFORMIN 850 mg Oral BID With Meals Jony Done TAYLOR Barrientos   metoprolol tartrate 25 mg Oral Q12H [facility] Jeri Lopez PA-C   nicotine 1 patch Transdermal Daily Jeri Lopez PA-C   nicotine polacrilex 4 mg Oral Q2H PRN Kylah Lucero MD   risperiDONE 1 mg Oral Q3H PRN Jeri Lopez PA-C   traZODone 50 mg Oral HS PRN Jeri Lopez PA-C   ziprasidone 20 mg Intramuscular Q4H PRN Jeri Lopez, JSOIE

## 2018-12-21 NOTE — PROGRESS NOTES
Clinical Pharmacy Note: Valproic Acid    Kathia Mims is a 44 y o  male who presents with increasing anxiety/depression and neglecting basic needs     Assessment/Plan:    VPA indication: bipolar disorder maintenance     Ely Francois was taking 1000 mg extended release tablet once daily  Valproic Acid concentration is 45 9 ug/mL on 12/21 and was drawn at steady state  It is recommended to increase dose to 1500 mg daily and take steady state trough after 72 hours, currently scheduled for 12/24  Pharmacist has reviewed liver function tests, pancreatic enzymes, and pregnancy test (if drawn) or made recommendations for such tests YES    Pregnancy test Does not apply to this patient  Pharmacy Recommendations:   Assess steady state level to be drawn 12/24    Monitoring:    Valproic Acid:    0  Lab Value Date/Time   VALPROICTOT 45 9 (L) 12/21/2018 0727   VALPROICTOT 54 5 06/20/2018 2115       Liver Function:    0  Lab Value Date/Time   ALB 4 0 12/18/2018 0621   ALB 4 1 07/10/2018 2210   ALB 3 4 (L) 06/25/2018 0946   ALB 3 7 06/20/2018 2115   ALB 3 6 06/15/2018 1025   ALB 4 4 06/09/2018 2000       0  Lab Value Date/Time   TBILI 0 60 12/18/2018 0621   TBILI 0 60 07/10/2018 2210   TBILI 0 20 06/25/2018 0946       0  Lab Value Date/Time   AST 14 (L) 12/18/2018 0621   AST 10 (L) 07/10/2018 2210   AST 10 (L) 06/25/2018 0946   AST 9 06/20/2018 2115   AST 9 06/15/2018 1025   AST 10 06/09/2018 2000       0  Lab Value Date/Time   ALT 28 12/18/2018 0621   ALT 10 07/10/2018 2210   ALT 10 06/25/2018 0946   ALT 8 06/20/2018 2115   ALT 9 06/15/2018 1025   ALT 11 06/09/2018 2000       0  Lab Value Date/Time   INR 1 08 07/10/2018 2210       Platelets:    0  Lab Value Date/Time    12/18/2018 0621    06/09/2018 2000       Therapeutic valproic acid levels are  ug/mL, but target range varies by indication   Levels above 150 ug/mL indicate toxicity, although toxicity may be associated with levels within therapeutic ranges based on symptoms  If switching from delayed release to extended release formulation, increase dose by 8 to 20% and dose daily to maintain therapeutic serum levels  Monitor pancreatic enzymes if patient presents with abdominal pain consistent with pancreatitis which is a black box warning  Also, monitor liver function for black box warning of hepatotoxicity, check ammonia level if suspected  Suspect toxicity in stabilized patients if new-onset sedation, nausea, vomiting, diarrhea, and/or tremor  Reassess valproic acid level if liver function or mental status changes  May cause dose-related thrombocytopenia, inhibition of platelet aggregation, and bleeding  In some cases, platelet counts may be normalized with continued treatment; however, reduce dose or discontinue drug if patient develops evidence of hemorrhage, bruising, or a disorder of hemostasis/coagulation  Females of child bearing age should be on birth control due to very high teratogenic potential      Pharmacy will continue to follow patient with team  Thank you      Electronically signed by: Alyse Howard, Pharmacist

## 2018-12-21 NOTE — CASE MANAGEMENT
Pt neatly groomed, out in the community watching television, on the fringe of the group  Affect does brighten upon interaction  Pt states he is grateful for the outpt appt and that he will check out all the different programs and services that [facility] has to offer and that he will take care of his medical problems  He is guarded when it comes to his support system but he states that he does have people in his life that he can share with and that care about him  Pt will need a [address] ride home and 24 hours notice given to [facility] transport because of the distance of having to transport the pt to [place]

## 2018-12-21 NOTE — PROGRESS NOTES
Patient refused to get out of bed for morning vitals  Hypertension meds held because of lack of vitals  Patient refuses to get out of bed except for meals and states "I'm here for a med adjustment "  Explained to patient that part of his treatment was going to group, being out on the unit, and ADLS  Patient stated he "wasn't going to do any of those things" and was "just here for meds "  Patient is malodorous  Gave patient clean paper scrubs, underwear, soap, and deodorant  Stripped patients bed  Patient is irritable and noncompliant

## 2018-12-22 LAB
GLUCOSE SERPL-MCNC: 130 MG/DL (ref 70–99)
GLUCOSE SERPL-MCNC: 143 MG/DL (ref 70–99)
GLUCOSE SERPL-MCNC: 230 MG/DL (ref 70–99)
GLUCOSE SERPL-MCNC: 251 MG/DL (ref 70–99)

## 2018-12-22 PROCEDURE — 82948 REAGENT STRIP/BLOOD GLUCOSE: CPT

## 2018-12-22 PROCEDURE — 99231 SBSQ HOSP IP/OBS SF/LOW 25: CPT | Performed by: PSYCHIATRY & NEUROLOGY

## 2018-12-22 RX ADMIN — INSULIN LISPRO 4 UNITS: 100 INJECTION, SOLUTION INTRAVENOUS; SUBCUTANEOUS at 08:32

## 2018-12-22 RX ADMIN — METOPROLOL TARTRATE 25 MG: 25 TABLET, FILM COATED ORAL at 08:30

## 2018-12-22 RX ADMIN — INSULIN LISPRO 4 UNITS: 100 INJECTION, SOLUTION INTRAVENOUS; SUBCUTANEOUS at 12:13

## 2018-12-22 RX ADMIN — CHLORPROMAZINE HYDROCHLORIDE 50 MG: 25 TABLET, SUGAR COATED ORAL at 08:30

## 2018-12-22 RX ADMIN — INSULIN GLARGINE 30 UNITS: 100 INJECTION, SOLUTION SUBCUTANEOUS at 21:30

## 2018-12-22 RX ADMIN — TRAZODONE HYDROCHLORIDE 50 MG: 50 TABLET ORAL at 21:30

## 2018-12-22 RX ADMIN — BUSPIRONE HYDROCHLORIDE 10 MG: 10 TABLET ORAL at 17:45

## 2018-12-22 RX ADMIN — METFORMIN HYDROCHLORIDE 850 MG: 850 TABLET ORAL at 17:45

## 2018-12-22 RX ADMIN — INSULIN LISPRO 4 UNITS: 100 INJECTION, SOLUTION INTRAVENOUS; SUBCUTANEOUS at 17:47

## 2018-12-22 RX ADMIN — METOPROLOL TARTRATE 25 MG: 25 TABLET, FILM COATED ORAL at 21:29

## 2018-12-22 RX ADMIN — METFORMIN HYDROCHLORIDE 850 MG: 850 TABLET ORAL at 08:30

## 2018-12-22 RX ADMIN — LORAZEPAM 1 MG: 1 TABLET ORAL at 21:30

## 2018-12-22 RX ADMIN — INSULIN LISPRO 3 UNITS: 100 INJECTION, SOLUTION INTRAVENOUS; SUBCUTANEOUS at 21:31

## 2018-12-22 RX ADMIN — BUSPIRONE HYDROCHLORIDE 10 MG: 10 TABLET ORAL at 08:30

## 2018-12-22 RX ADMIN — CHLORPROMAZINE HYDROCHLORIDE 50 MG: 25 TABLET, SUGAR COATED ORAL at 17:44

## 2018-12-22 RX ADMIN — INSULIN LISPRO 3 UNITS: 100 INJECTION, SOLUTION INTRAVENOUS; SUBCUTANEOUS at 17:46

## 2018-12-22 RX ADMIN — DULOXETINE HYDROCHLORIDE 90 MG: 60 CAPSULE, DELAYED RELEASE ORAL at 08:30

## 2018-12-22 RX ADMIN — LISINOPRIL 5 MG: 5 TABLET ORAL at 08:30

## 2018-12-22 RX ADMIN — DIVALPROEX SODIUM 1500 MG: 500 TABLET, FILM COATED, EXTENDED RELEASE ORAL at 21:29

## 2018-12-22 NOTE — PROGRESS NOTES
Progress Note - Behavioral Health     Ever Reg 44 y o  male MRN: [id number]   Unit/Bed#: [facility] 340-01 Encounter: [id number]    Behavior over the last 24 hours: some improvement  Sona Anaya still unkempt, appears still somewhat withdrawn, no longer has irritability today  Patient was not very talkative today but does report that he is overall feeling better since being admitted to the hospital   He states he does not want to attend groups because he does not like to socialize, likes to use other methods to deal with things    He also states that he also feels a little paranoid about being in group but would not elaborate  Seclusive to the room  Does not come to groups  Sleep: hypersomnia  Appetite: normal  Medication side effects: No   ROS: no complaints    Mental Status Evaluation:    Appearance:  disheveled, marginal hygiene   Behavior:  pleasant, cooperative, calm   Speech:  normal rate, normal volume, normal pitch   Mood:  euthymic   Affect:  constricted, flat   Thought Process:  coherent, poverty of thought   Associations: intact associations   Thought Content:  no overt delusions   Perceptual Disturbances: no auditory hallucinations, no visual hallucinations   Risk Potential: Suicidal ideation - None  Homicidal ideation - None  Potential for aggression - No   Sensorium:  oriented to person, place, time/date and situation   Memory:  recent and remote memory grossly intact   Consciousness:  alert and awake   Attention: attention span and concentration are age appropriate   Insight:  impaired due to Depression   Judgment: impaired due to Depression   Gait/Station: normal gait/station and normal balance   Motor Activity: no abnormal movements     Vital signs in last 24 hours:    Temp:  [97 8 °F (36 6 °C)] 97 8 °F (36 6 °C)  HR:  [73-87] 82  Resp:  [16] 16  BP: (121-160)/(73-97) 151/93    Laboratory results:  I have personally reviewed all pertinent laboratory/tests results      Progress Toward Goals: progressing slowly    Assessment/Plan   Principal Problem:    Severe bipolar I disorder, most recent episode depressed without psychotic features ([facility] )  Active Problems:    Tobacco use disorder    Diabetic ulcer of right midfoot (HCC)    DM (diabetes mellitus), type 2 ([facility] )    Essential hypertension    Recommended Treatment:     Planned medication and treatment changes: All current active medications have been reviewed    Encourage group therapy, milieu therapy and occupational therapy  Behavioral Health checks every 7 minutes  Continue current medication regimen as seen below    Current Facility-Administered Medications:  acetaminophen 650 mg Oral Q6H PRN Clayborne Nieto, PA-C   acetaminophen 975 mg Oral Q6H PRN Merrick Salida Gyarmaty, PA-C   aluminum-magnesium hydroxide-simethicone 30 mL Oral Q4H PRN Clayborne Nieto, PA-C   benztropine 1 mg Intramuscular Q6H PRN Clayborne Nieto, PA-C   benztropine 1 mg Oral Q6H PRN Clayborne Nieto, PA-C   busPIRone 10 mg Oral BID Nellene Brittle, MD   chlorproMAZINE 50 mg Oral BID Nellene Brittle, MD   collagenase  Topical Daily [place] Redhead, DPM   divalproex sodium 1,500 mg Oral HS Nellene Brittle, MD   DULoxetine 90 mg Oral Daily Nellene Brittle, MD   haloperidol 5 mg Oral Q6H PRN Clayborne Nieto, PA-C   haloperidol lactate 5 mg Intramuscular Q6H PRN [place] Sal[place] Gyarmaty, PA-C   hydrOXYzine HCL 25 mg Oral Q6H PRN Clayborne Nieto, PA-C   ibuprofen 600 mg Oral Q8H PRN Merrick Salida Gyarmatess, PA-C   insulin glargine 30 Units Subcutaneous HS TAYLOR Longoria   insulin lispro 1-6 Units Subcutaneous TID AC Patrizia Marr, PA-C   insulin lispro 1-6 Units Subcutaneous HS [place] Gyarmaty, PA-C   insulin lispro 4 Units Subcutaneous TID With Meals TAYLOR Longoria   lisinopril 5 mg Oral Daily [place] Sal[place] Gyarmaty, PA-C   LORazepam 1 mg Intramuscular Q6H PRN Merrick Salida Gyarmaty, PA-C   LORazepam 1 mg Oral Q6H PRN [place] Salida Gyarmaty, PA-C   magnesium hydroxide 30 mL Oral Daily PRN Reinaldo Wilkerson PA-C   metFORMIN 850 mg Oral BID With Meals TAYLOR Grant   metoprolol tartrate 25 mg [facility]H [facility]   nicotine 1 patch Transdermal Daily Albert Bashir   nicotine polacrilex 4 mg Oral Q2H PRN Trinh Mac MD   risperiDONE 1 mg Oral Q3H PRN Reinaldo Wilkerson PA-C   traZODone 50 mg Oral HS PRN Teresa Marr PA-C   ziprasidone 20 mg Intramuscular Q4H PRN Reinaldo Wilkerson PA-C       Risks / Benefits of Treatment:    Risks, benefits, and possible side effects of medications explained to patient and patient verbalizes understanding and agreement for treatment  Counseling / Coordination of Care: Total floor / unit time spent today 15 minutes  Greater than 50% of total time was spent with the patient and / or family counseling and / or coordination of care  A description of counseling / coordination of care:  Patient's progress reviewed with nursing staff  Medications, treatment progress and treatment plan reviewed with patient      Julián Holguin MD 12/22/18

## 2018-12-22 NOTE — PROGRESS NOTES
Patient in bed and only comes out for meals  Patient is irritable, does not participate in unit activities, non-social  Patient continues to be malodorous even though he states he showered  Patient was given clean paper scrubs and underwear yesterday and has not utilized them, continues to wear the same clothes

## 2018-12-23 LAB
GLUCOSE SERPL-MCNC: 112 MG/DL (ref 70–99)
GLUCOSE SERPL-MCNC: 112 MG/DL (ref 70–99)
GLUCOSE SERPL-MCNC: 194 MG/DL (ref 70–99)
GLUCOSE SERPL-MCNC: 195 MG/DL (ref 70–99)

## 2018-12-23 PROCEDURE — 99231 SBSQ HOSP IP/OBS SF/LOW 25: CPT | Performed by: PSYCHIATRY & NEUROLOGY

## 2018-12-23 PROCEDURE — 82948 REAGENT STRIP/BLOOD GLUCOSE: CPT

## 2018-12-23 RX ADMIN — INSULIN LISPRO 4 UNITS: 100 INJECTION, SOLUTION INTRAVENOUS; SUBCUTANEOUS at 08:36

## 2018-12-23 RX ADMIN — BUSPIRONE HYDROCHLORIDE 10 MG: 10 TABLET ORAL at 17:05

## 2018-12-23 RX ADMIN — METOPROLOL TARTRATE 25 MG: 25 TABLET, FILM COATED ORAL at 08:37

## 2018-12-23 RX ADMIN — DULOXETINE HYDROCHLORIDE 90 MG: 60 CAPSULE, DELAYED RELEASE ORAL at 08:38

## 2018-12-23 RX ADMIN — TRAZODONE HYDROCHLORIDE 50 MG: 50 TABLET ORAL at 21:36

## 2018-12-23 RX ADMIN — CHLORPROMAZINE HYDROCHLORIDE 50 MG: 25 TABLET, SUGAR COATED ORAL at 17:05

## 2018-12-23 RX ADMIN — DIVALPROEX SODIUM 1500 MG: 500 TABLET, FILM COATED, EXTENDED RELEASE ORAL at 21:07

## 2018-12-23 RX ADMIN — INSULIN LISPRO 2 UNITS: 100 INJECTION, SOLUTION INTRAVENOUS; SUBCUTANEOUS at 17:03

## 2018-12-23 RX ADMIN — INSULIN LISPRO 2 UNITS: 100 INJECTION, SOLUTION INTRAVENOUS; SUBCUTANEOUS at 21:11

## 2018-12-23 RX ADMIN — METFORMIN HYDROCHLORIDE 850 MG: 850 TABLET ORAL at 08:37

## 2018-12-23 RX ADMIN — CHLORPROMAZINE HYDROCHLORIDE 50 MG: 25 TABLET, SUGAR COATED ORAL at 08:38

## 2018-12-23 RX ADMIN — INSULIN LISPRO 4 UNITS: 100 INJECTION, SOLUTION INTRAVENOUS; SUBCUTANEOUS at 12:19

## 2018-12-23 RX ADMIN — LISINOPRIL 5 MG: 5 TABLET ORAL at 08:38

## 2018-12-23 RX ADMIN — LORAZEPAM 1 MG: 1 TABLET ORAL at 19:13

## 2018-12-23 RX ADMIN — COLLAGENASE SANTYL: 250 OINTMENT TOPICAL at 14:30

## 2018-12-23 RX ADMIN — INSULIN LISPRO 4 UNITS: 100 INJECTION, SOLUTION INTRAVENOUS; SUBCUTANEOUS at 17:04

## 2018-12-23 RX ADMIN — BUSPIRONE HYDROCHLORIDE 10 MG: 10 TABLET ORAL at 08:37

## 2018-12-23 RX ADMIN — METFORMIN HYDROCHLORIDE 850 MG: 850 TABLET ORAL at 17:05

## 2018-12-23 RX ADMIN — METOPROLOL TARTRATE 25 MG: 25 TABLET, FILM COATED ORAL at 21:07

## 2018-12-23 RX ADMIN — INSULIN GLARGINE 30 UNITS: 100 INJECTION, SOLUTION SUBCUTANEOUS at 21:07

## 2018-12-23 NOTE — PROGRESS NOTES
Pt out of bed for most of the late evening watching tv in the AT room  Pt is malodorous, has still not showered and is still wearing the same dirty clothing  Pt is medication compliant and given PRN ativan for anxiety and trazodone at HS

## 2018-12-23 NOTE — PROGRESS NOTES
Patient seclusive to room and refuses to come out during the day  Patient was observed yesterday evening in milieu watching tv with others and appeared comfortable  Patient is medication compliant and denies all signs and symptoms  Patient continues to be disheveled, malodorous, and is wearing the same clothes for 6 days  Patient is not interested in treatment except for medication

## 2018-12-23 NOTE — PROGRESS NOTES
Progress Note - Behavioral Health     Marylin Huertas 44 y o  male MRN: [id number]   Unit[facility]Bed#: [facility]01 Encounter: [id number]    Behavior over the last 24 hours: some improvement  Ned Courtney appears withdrawn, slightly less depressed, feels better today  States that overall he is feeling better and less depressed but remains unmotivated as well as quite sedated and sleepy during the day  He discussed his current medications and proposed that if he still feels quite sedated tomorrow as well he should let his primary team now as this may be due to some side effects of his medication and may need adjustment  Also reports he was less seclusive yesterday as he went to watch a football game and engaged in some [employer] with fellow patients  Does not want to attend groups  Participates more in milieu      Sleep: hypersomnia  Appetite: normal  Medication side effects: Yes -  possible sedation   ROS: reports sedation    Mental Status Evaluation:    Appearance:  disheveled, marginal hygiene, overweight, bearded   Behavior:  pleasant, cooperative, guarded   Speech:  decreased rate, decreased volume   Mood:  dysphoric   Affect:  constricted, mood-congruent   Thought Process:  organized, logical, coherent, goal directed   Associations: intact associations   Thought Content:  no overt delusions   Perceptual Disturbances: no auditory hallucinations, no visual hallucinations   Risk Potential: Suicidal ideation - None  Homicidal ideation - None  Potential for aggression - No   Sensorium:  oriented to person, place, time/date and situation   Memory:  recent and remote memory grossly intact   Consciousness:  sedated   Attention: attention span and concentration are age appropriate   Insight:  impaired due to  depression   Judgment: impaired due to  depression   Gait/Station: normal gait/station and normal balance   Motor Activity: no abnormal movements     Vital signs in last 24 hours:    Temp:  [97 8 °F (36 6 °C)] 97 8 °F (36 6 °C)  HR:  [69-92] 92  Resp:  [16] 16  BP: (117-156)/(57-97) 117/57    Laboratory results:  I have personally reviewed all pertinent laboratory/tests results  Progress Toward Goals: improving slowly    Assessment/Plan   Principal Problem:    Severe bipolar I disorder, most recent episode depressed without psychotic features ([facility] )  Active Problems:    Tobacco use disorder    Diabetic ulcer of right midfoot (HCC)    DM (diabetes mellitus), type 2 ([facility] )    Essential hypertension    Recommended Treatment:     Planned medication and treatment changes: All current active medications have been reviewed    Encourage group therapy, milieu therapy and occupational therapy  Behavioral Health checks every 7 minutes  Continue  Current psychiatric meds as seen below    Current Facility-Administered Medications:  acetaminophen 650 mg Oral Q6H PRN Deshler Cypher, PA-C   acetaminophen 975 mg Oral Q6H PRN Deloria Bones Gyarmaty, PA-C   aluminum-magnesium hydroxide-simethicone 30 mL Oral Q4H PRN Deshler Cypher, PA-C   benztropine 1 mg Intramuscular Q6H PRN Deshler Cypher, PA-C   benztropine 1 mg Oral Q6H PRN Deshler Cypher, PA-C   busPIRone 10 mg Oral BID Grisel Hatch MD   chlorproMAZINE 50 mg Oral BID Grisel Hatch MD   collagenase  Topical Daily Danay Frames, DPM   divalproex sodium 1,500 mg Oral HS Grisel Hatch MD   DULoxetine 90 mg Oral Daily Grisel Hatch MD   haloperidol 5 mg Oral Q6H PRN Deshler Cypher, PA-C   haloperidol lactate 5 mg Intramuscular Q6H PRN Deloria Bones Gyarmaty, PA-C   hydrOXYzine HCL 25 mg Oral Q6H PRN Deshler Cypher, PA-C   ibuprofen 600 mg Oral Q8H PRN Deloria Bones Gyarmaty, PA-C   insulin glargine 30 Units Subcutaneous HS TAYLOR Longoria   insulin lispro 1-6 Units Subcutaneous TID AC SUSANNAH Solano-AROLDO   insulin lispro 1-6 Units Subcutaneous HS Deloria Bones Tanjaarmaty, PA-C   insulin lispro 4 Units Subcutaneous TID With Meals TAYLOR Longoria   lisinopril 5 mg Oral Daily Reinaldo Wilkerson PA-C   LORazepam 1 mg Intramuscular Q6H PRN Teresa Marr PA-C   LORazepam 1 mg Oral Q6H PRN Teresa Marr PA-C   magnesium hydroxide 30 mL Oral Daily PRN Reinaldo Wilkerson PA-C   metFORMIN 850 mg Oral BID With Meals TAYLOR Grant   metoprolol tartrate 25 mg Oral Q12H [address] Reinaldo Wilkerson PA-C   nicotine 1 patch Transdermal Daily Reinaldo Wilkerson [place]   nicotine polacrilex 4 mg Oral Q2H PRN Trinh Mac MD   risperiDONE 1 mg Oral Q3H PRN Reinaldo Wilkerson PA-C   traZODone 50 mg Oral HS PRN Teresa Marr PA-C   ziprasidone 20 mg Intramuscular Q4H PRN Reinaldo Wilkerson PA-C       Risks / Benefits of Treatment:    Risks, benefits, and possible side effects of medications explained to patient and patient verbalizes understanding and agreement for treatment  Counseling / Coordination of Care: Total floor / unit time spent today 15 minutes  Greater than 50% of total time was spent with the patient and / or family counseling and / or coordination of care  A description of counseling / coordination of care:  Patient's progress reviewed with nursing staff  Medications, treatment progress and treatment plan reviewed with patient      Julián Holguin MD 12/23/18

## 2018-12-24 PROBLEM — F31.4 SEVERE BIPOLAR I DISORDER, MOST RECENT EPISODE DEPRESSED WITHOUT PSYCHOTIC FEATURES (HCC): Chronic | Status: ACTIVE | Noted: 2018-06-24

## 2018-12-24 LAB
GLUCOSE SERPL-MCNC: 121 MG/DL (ref 70–99)
GLUCOSE SERPL-MCNC: 186 MG/DL (ref 70–99)
GLUCOSE SERPL-MCNC: 190 MG/DL (ref 70–99)
GLUCOSE SERPL-MCNC: 243 MG/DL (ref 70–99)
VALPROATE SERPL-MCNC: 39.6 UG/ML (ref 50–120)

## 2018-12-24 PROCEDURE — 80164 ASSAY DIPROPYLACETIC ACD TOT: CPT | Performed by: STUDENT IN AN ORGANIZED HEALTH CARE EDUCATION/TRAINING PROGRAM

## 2018-12-24 PROCEDURE — 82948 REAGENT STRIP/BLOOD GLUCOSE: CPT

## 2018-12-24 PROCEDURE — 99232 SBSQ HOSP IP/OBS MODERATE 35: CPT | Performed by: NURSE PRACTITIONER

## 2018-12-24 RX ORDER — TRAZODONE HYDROCHLORIDE 50 MG/1
50 TABLET ORAL
Status: DISCONTINUED | OUTPATIENT
Start: 2018-12-24 | End: 2018-12-27 | Stop reason: HOSPADM

## 2018-12-24 RX ORDER — DIVALPROEX SODIUM 500 MG/1
2000 TABLET, EXTENDED RELEASE ORAL
Status: DISCONTINUED | OUTPATIENT
Start: 2018-12-24 | End: 2018-12-27 | Stop reason: HOSPADM

## 2018-12-24 RX ADMIN — BUSPIRONE HYDROCHLORIDE 10 MG: 10 TABLET ORAL at 09:02

## 2018-12-24 RX ADMIN — BUSPIRONE HYDROCHLORIDE 10 MG: 10 TABLET ORAL at 17:11

## 2018-12-24 RX ADMIN — METFORMIN HYDROCHLORIDE 850 MG: 850 TABLET ORAL at 09:03

## 2018-12-24 RX ADMIN — COLLAGENASE SANTYL 1 APPLICATION: 250 OINTMENT TOPICAL at 10:53

## 2018-12-24 RX ADMIN — DULOXETINE HYDROCHLORIDE 90 MG: 60 CAPSULE, DELAYED RELEASE ORAL at 09:02

## 2018-12-24 RX ADMIN — METOPROLOL TARTRATE 25 MG: 25 TABLET, FILM COATED ORAL at 09:16

## 2018-12-24 RX ADMIN — INSULIN LISPRO 2 UNITS: 100 INJECTION, SOLUTION INTRAVENOUS; SUBCUTANEOUS at 12:23

## 2018-12-24 RX ADMIN — INSULIN LISPRO 4 UNITS: 100 INJECTION, SOLUTION INTRAVENOUS; SUBCUTANEOUS at 09:16

## 2018-12-24 RX ADMIN — INSULIN GLARGINE 30 UNITS: 100 INJECTION, SOLUTION SUBCUTANEOUS at 21:09

## 2018-12-24 RX ADMIN — INSULIN LISPRO 4 UNITS: 100 INJECTION, SOLUTION INTRAVENOUS; SUBCUTANEOUS at 12:23

## 2018-12-24 RX ADMIN — INSULIN LISPRO 1 UNITS: 100 INJECTION, SOLUTION INTRAVENOUS; SUBCUTANEOUS at 21:27

## 2018-12-24 RX ADMIN — TRAZODONE HYDROCHLORIDE 50 MG: 50 TABLET ORAL at 21:09

## 2018-12-24 RX ADMIN — CHLORPROMAZINE HYDROCHLORIDE 50 MG: 25 TABLET, SUGAR COATED ORAL at 09:16

## 2018-12-24 RX ADMIN — METFORMIN HYDROCHLORIDE 850 MG: 850 TABLET ORAL at 16:26

## 2018-12-24 RX ADMIN — CHLORPROMAZINE HYDROCHLORIDE 50 MG: 25 TABLET, SUGAR COATED ORAL at 17:11

## 2018-12-24 RX ADMIN — DIVALPROEX SODIUM 2000 MG: 500 TABLET, FILM COATED, EXTENDED RELEASE ORAL at 21:07

## 2018-12-24 RX ADMIN — INSULIN LISPRO 4 UNITS: 100 INJECTION, SOLUTION INTRAVENOUS; SUBCUTANEOUS at 16:25

## 2018-12-24 RX ADMIN — METOPROLOL TARTRATE 25 MG: 25 TABLET, FILM COATED ORAL at 21:08

## 2018-12-24 RX ADMIN — LISINOPRIL 5 MG: 5 TABLET ORAL at 09:16

## 2018-12-24 RX ADMIN — INSULIN LISPRO 3 UNITS: 100 INJECTION, SOLUTION INTRAVENOUS; SUBCUTANEOUS at 16:24

## 2018-12-24 NOTE — PROGRESS NOTES
Patient was cooperative with medications and flat and scant in conversation this morning  Denied symptoms and needs; said he's ready to go home  Ulcer on patients right foot was cleaned and Santyl and a new bandage was applied  Patient was given a surgical shoe; said he "wasn't interested" in it when he was laying in bed--RN left it for him for when he gets up

## 2018-12-24 NOTE — PROGRESS NOTES
Progress Note - Wound   Gabriella Ambrose 44 y o  male MRN: [id number]  Unit/Bed#: [facility] 340-01 Encounter: [id number]      Assessment:   1  Diabetic foot ulcer of R midfoot, level of muscle  2  DM c DN    Plan:   - pt eval and managed today  - Santyl/DSD reapplied today  Continue with daily application  - will reorder surgical shoe for pt  - wound is noninfected, does not require antibiotics at this time  - will continue to follow pt while pt is in house    Subjective:  Pt eval and managed today  No new complaints  No pain  Objective:    Vitals: Blood pressure 124/73, pulse 77, temperature (!) 97 1 °F (36 2 °C), temperature source Temporal, resp  rate 16, height 6' 3" (1 905 m), weight 123 kg (271 lb 9 6 oz)  ,Body mass index is 33 95 kg/m²  Physical Exam:     b/l TMA     Plantar R foot wound, predebridement measurement of 1 2cmx1 2cmx0 2cm, no surrounding erythema, no drainage, fibrogranular wound bed, no undermining, no tunneling, no probe to bone, no pain  Lab, Imaging and other studies: I have personally reviewed pertinent reports  Wound 07/11/18 Abrasion(s) Left foot wound  (Active)       Wound 07/11/18  (Active)       Other Ulcers Foot Left (Active)   Wound Description Clean;Beefy red;Black 12/23/2018  2:38 PM   Lacie-wound Assessment Clean;Dry 12/23/2018  2:38 PM   Shape circular 12/23/2018  2:38 PM   Size 1 cm x 1 cm 12/23/2018  2:38 PM   Wound Length (cm) 1 cm 12/19/2018  5:00 PM   Wound Width (cm) 1 cm 12/19/2018  5:00 PM   Wound Depth (cm) 0 5 12/19/2018  5:00 PM   Calculated Wound Volume (cm^3) 0 5 cm^3 12/19/2018  5:00 PM   Drainage Amount Small 12/23/2018  2:38 PM   Drainage Description Foul smelling 12/23/2018  2:38 PM   Treatment Cleansed; Other (Comment) 12/23/2018  2:38 PM   Dressings Gauze; Other (Comment) 12/23/2018  2:38 PM   Wound packed?  No 12/23/2018  2:38 PM   Dressing Changed Changed 12/23/2018  2:38 PM   Patient Tolerance Tolerated well 12/23/2018  2:38 PM   Dressing Status Clean;Dry; Intact 12/23/2018  2:38 PM       Other Ulcers 07/10/18 Foot Lower (Active)

## 2018-12-24 NOTE — PROGRESS NOTES
Progress Note - Behavioral Health     Shital Ramsey 44 y o  male MRN: [id number]   Unit/Bed#: [facility] 340-01 Encounter: [id number]    Behavior over the last 24 hours: Making slow progress     Caroline Snyder was seen today for contination of care, records reviewed, and patient was discussed in morning case review team   Patient reports his depression at a 3 out of 8, 10 being the worst and anxiety at a 4 out of 10, 10 being the worst   Patient states he has some motivation, he believes he will be able to keep his appointments and take his medications post discharge  He reports he has support from his wife and friend  Patient denies thoughts SI/HI, denies AH/VH/TH, patient reports he is sleeping well with the trazodone and eating well  Caroline Snyder is slightly less guarded and his eye contact is good       Sleep: normal  Appetite: normal  Medication side effects: No   ROS: reports R foot ulcer "doing ok" refusing surgical shoe, denies any headache, shortness of breath, chest pain, abdominal pain, constipation, diarrhea, muscle cramps, muscle twitching, nausea, tremor or vomiting    Mental Status Evaluation:    Appearance:  casually dressed, improved grooming, looks older than stated age   Behavior:  pleasant, cooperative, calm, slightly less guarded, good eye contact   Speech:  slow, soft   Mood:  less anxious, less depressed   Affect:  constricted   Thought Process:  organized, logical, goal directed   Associations: intact associations   Thought Content:  no overt delusions   Perceptual Disturbances: denies auditory or visual hallucinations when asked, does not appear responding to internal stimuli   Risk Potential: Suicidal ideation - None  Homicidal ideation - None  Potential for aggression - No   Sensorium:  oriented to person, place, time/date and situation   Memory:  recent and remote memory grossly intact   Consciousness:  alert and awake   Attention: attention span and concentration are age appropriate   Insight:  limited Judgment: limited   Gait/Station: normal gait/station and normal balance   Motor Activity: no abnormal movements     Vital signs in last 24 hours:    Temp:  [97 1 °F (36 2 °C)-97 4 °F (36 3 °C)] 97 1 °F (36 2 °C)  HR:  [71-79] 77  Resp:  [16] 16  BP: (124-143)/(68-82) 124/73    Laboratory results:    I have personally reviewed all pertinent laboratory/tests results  Most Recent Labs:   Lab Results   Component Value Date    WBC 6 00 12/18/2018    RBC 6 43 (H) 12/18/2018    HGB 17 1 12/18/2018    HCT 52 3 12/18/2018     12/18/2018    RDW 14 1 12/18/2018    NEUTROABS 3 20 12/18/2018    SODIUM 136 (L) 12/18/2018    K 4 7 12/18/2018     12/18/2018    CO2 29 12/18/2018    BUN 22 12/18/2018    CREATININE 0 99 12/18/2018    GLUC 252 (H) 12/18/2018    GLUF 252 (H) 12/18/2018    CALCIUM 9 3 12/18/2018    AST 14 (L) 12/18/2018    ALT 28 12/18/2018    ALKPHOS 84 12/18/2018    TP 7 3 12/18/2018    ALB 4 0 12/18/2018    TBILI 0 60 12/18/2018    CHOLESTEROL 271 (H) 12/18/2018    HDL 30 (L) 12/18/2018    TRIG 344 (H) 12/18/2018    LDLCALC 172 (H) 12/18/2018    NONHDLC 241 12/18/2018    VALPROICTOT 39 6 (L) 12/24/2018    LITHIUM <0 2 (L) 02/26/2018    MFZ0QOAIUHAL 0 678 12/16/2018    RPR Non-Reactive 12/18/2018    HGBA1C 10 0 (H) 12/18/2018     12/18/2018       Progress Toward Goals: making gradual improvement, patient starting to attend groups, slightly less anxious and slightly less depressed  Assessment/Plan   Principal Problem:    Severe bipolar I disorder, most recent episode depressed without psychotic features ([facility] )  Active Problems:    Tobacco use disorder    Diabetic ulcer of right midfoot (HCC)    DM (diabetes mellitus), type 2 ([facility] )    Essential hypertension    Recommended Treatment:     Planned medication and treatment changes: All current active medications have been reviewed    Encourage group therapy, milieu therapy and occupational therapy  Behavioral Health checks every 7 minutes  Increase Depakote ER from 1,500 Po Q HS to 2,000 PO Q HS as Depakote level was 39 6 today    Recheck Depakote level in 2 days   Change HS trazodone 50 mg from PRN to Q HS  Continue all other medications:    Current Facility-Administered Medications:  acetaminophen 650 mg Oral Q6H PRN Jenni CasinoJOSIE   acetaminophen 975 mg Oral Q6H PRN Carlos Lorraine GyJOSIE miller   aluminum-magnesium hydroxide-simethicone 30 mL Oral Q4H PRN Jenni Casino, JOSIE   benztropine 1 mg Intramuscular Q6H PRN Jenni Casino, JOSIE   benztropine 1 mg Oral Q6H PRN Jenni CasinoJOSIE   busPIRone 10 mg Oral BID Dk Vale MD   chlorproMAZINE 50 mg Oral BID Dk Vale MD   collagenase  Topical Daily Geovanna Brito DPM   divalproex sodium 2,000 mg Oral HS TAYLOR Callahan   DULoxetine 90 mg Oral Daily Dk Vale MD   haloperidol 5 mg Oral Q6H PRN Jenni Casino, JOSIE   haloperidol lactate 5 mg Intramuscular Q6H PRN Carlos Lorraine GyJOSIE miller   hydrOXYzine HCL 25 mg Oral Q6H PRN Jenni CasinoJOSIE   ibuprofen 600 mg Oral Q8H PRN Carlos Lorraine JOSIE Marr   insulin glargine 30 Units Subcutaneous HS RylieTAYLOR Dick   insulin lispro 1-6 Units Subcutaneous TID GENA Marr PA-C   insulin lispro 1-6 Units Subcutaneous HS Carlos Lorraine JOSIE Marr   insulin lispro 4 Units Subcutaneous TID With Meals Magdaline Muck TAYLOR Barrientos   lisinopril 5 mg Oral Daily Carlos Lorraine JOSIE Marr   LORazepam 1 mg Intramuscular Q6H PRN Carlos Lorraine GyJOSIE miller   LORazepam 1 mg Oral Q6H PRN Carlos Lorraine Gyangela, JOSIE   magnesium hydroxide 30 mL Oral Daily PRN Jenni Casino, JOSIE   metFORMIN 850 mg Oral BID With Meals TAYLOR Longoria   metoprolol tartrate 25 mg Oral Q12H [address] Jenni JOSIE Guadalupe   nicotine 1 patch Transdermal Daily Carlos Fernandez PA-C   nicotine polacrilex 4 mg Oral Q2H PRN Leonor Hanson MD   risperiDONE 1 mg Oral Q3H PRN Jenni Guadalupe PA-C   traZODone 50 mg Oral HS Esperanza SCHILLING TAYLOR Fried   ziprasidone 20 mg Intramuscular Q4H PRN Cristal Shook PA-C       Risks / Benefits of Treatment:    Risks, benefits, and possible side effects of medications explained to patient and patient verbalizes understanding and agreement for treatment  Counseling / Coordination of Care:    Patient's progress reviewed with nursing staff  Medications, treatment progress and treatment plan reviewed with patient  Supportive counseling provided to the patient

## 2018-12-24 NOTE — CASE MANAGEMENT
CM reviewed patient today during rounds with team  Patient determined not ready for d/c yet  He has outpatient appointments made and will need 24 hour notice for enyth Martin transport once he's determined ready for d/c

## 2018-12-25 LAB
GLUCOSE SERPL-MCNC: 127 MG/DL (ref 70–99)
GLUCOSE SERPL-MCNC: 192 MG/DL (ref 70–99)
GLUCOSE SERPL-MCNC: 221 MG/DL (ref 70–99)
GLUCOSE SERPL-MCNC: 223 MG/DL (ref 70–99)

## 2018-12-25 PROCEDURE — 82948 REAGENT STRIP/BLOOD GLUCOSE: CPT

## 2018-12-25 PROCEDURE — 99232 SBSQ HOSP IP/OBS MODERATE 35: CPT | Performed by: STUDENT IN AN ORGANIZED HEALTH CARE EDUCATION/TRAINING PROGRAM

## 2018-12-25 RX ADMIN — INSULIN LISPRO 2 UNITS: 100 INJECTION, SOLUTION INTRAVENOUS; SUBCUTANEOUS at 21:20

## 2018-12-25 RX ADMIN — INSULIN LISPRO 4 UNITS: 100 INJECTION, SOLUTION INTRAVENOUS; SUBCUTANEOUS at 16:54

## 2018-12-25 RX ADMIN — CHLORPROMAZINE HYDROCHLORIDE 50 MG: 25 TABLET, SUGAR COATED ORAL at 08:56

## 2018-12-25 RX ADMIN — TRAZODONE HYDROCHLORIDE 50 MG: 50 TABLET ORAL at 21:14

## 2018-12-25 RX ADMIN — BUSPIRONE HYDROCHLORIDE 10 MG: 10 TABLET ORAL at 08:57

## 2018-12-25 RX ADMIN — INSULIN LISPRO 4 UNITS: 100 INJECTION, SOLUTION INTRAVENOUS; SUBCUTANEOUS at 12:39

## 2018-12-25 RX ADMIN — METFORMIN HYDROCHLORIDE 850 MG: 850 TABLET ORAL at 16:56

## 2018-12-25 RX ADMIN — DULOXETINE HYDROCHLORIDE 90 MG: 60 CAPSULE, DELAYED RELEASE ORAL at 08:56

## 2018-12-25 RX ADMIN — METOPROLOL TARTRATE 25 MG: 25 TABLET, FILM COATED ORAL at 21:14

## 2018-12-25 RX ADMIN — INSULIN LISPRO 2 UNITS: 100 INJECTION, SOLUTION INTRAVENOUS; SUBCUTANEOUS at 12:38

## 2018-12-25 RX ADMIN — INSULIN LISPRO 4 UNITS: 100 INJECTION, SOLUTION INTRAVENOUS; SUBCUTANEOUS at 08:55

## 2018-12-25 RX ADMIN — BUSPIRONE HYDROCHLORIDE 10 MG: 10 TABLET ORAL at 17:01

## 2018-12-25 RX ADMIN — INSULIN LISPRO 2 UNITS: 100 INJECTION, SOLUTION INTRAVENOUS; SUBCUTANEOUS at 16:53

## 2018-12-25 RX ADMIN — LORAZEPAM 1 MG: 1 TABLET ORAL at 19:42

## 2018-12-25 RX ADMIN — CHLORPROMAZINE HYDROCHLORIDE 50 MG: 25 TABLET, SUGAR COATED ORAL at 17:01

## 2018-12-25 RX ADMIN — INSULIN GLARGINE 30 UNITS: 100 INJECTION, SOLUTION SUBCUTANEOUS at 21:14

## 2018-12-25 RX ADMIN — METFORMIN HYDROCHLORIDE 850 MG: 850 TABLET ORAL at 08:57

## 2018-12-25 RX ADMIN — COLLAGENASE SANTYL: 250 OINTMENT TOPICAL at 13:37

## 2018-12-25 RX ADMIN — METOPROLOL TARTRATE 25 MG: 25 TABLET, FILM COATED ORAL at 08:57

## 2018-12-25 RX ADMIN — DIVALPROEX SODIUM 2000 MG: 500 TABLET, FILM COATED, EXTENDED RELEASE ORAL at 21:14

## 2018-12-25 RX ADMIN — LISINOPRIL 5 MG: 5 TABLET ORAL at 08:57

## 2018-12-25 NOTE — PROGRESS NOTES
Patient out on unit after dinner  Patient continues to be interested in medication only, not interested in joiningg group    Patient denies SI/HI

## 2018-12-25 NOTE — PROGRESS NOTES
Patient seclusive to room  Patient continues to be interested in medication only, not interested in joiningg group    Patient denies SI/HI

## 2018-12-25 NOTE — PLAN OF CARE
Ineffective Coping     Participates in unit activities Not Progressing          Anxiety     Anxiety is at manageable level Progressing        Depression     Treatment Goal: Demonstrate behavioral control of depressive symptoms, verbalize feelings of improved mood/affect, and adopt new coping skills prior to discharge Seda Luna Discharge to home or other facility with appropriate resources Progressing        Risk for Self Injury/Neglect     Treatment Goal: Remain safe during length of stay, learn and adopt new coping skills, and be free of self-injurious ideation, impulses and acts at the time of discharge Progressing

## 2018-12-25 NOTE — CMS CERTIFICATION NOTE
Recertification: Based upon physical, mental and social evaluations, I certify that inpatient psychiatric services continue to be medically necessary for this patient for a duration of 7 midnights for the treatment of Severe bipolar I disorder, most recent episode depressed without psychotic features [facility])   Available alternative community resources still do not meet the patient's mental health care needs  I further attest that an established written individualized plan of care has been updated and is outlined in the patient's medical records

## 2018-12-26 LAB
GLUCOSE SERPL-MCNC: 114 MG/DL (ref 70–99)
GLUCOSE SERPL-MCNC: 117 MG/DL (ref 70–99)
GLUCOSE SERPL-MCNC: 182 MG/DL (ref 70–99)
VALPROATE SERPL-MCNC: 64.4 UG/ML (ref 50–120)

## 2018-12-26 PROCEDURE — 80164 ASSAY DIPROPYLACETIC ACD TOT: CPT | Performed by: NURSE PRACTITIONER

## 2018-12-26 PROCEDURE — 82948 REAGENT STRIP/BLOOD GLUCOSE: CPT

## 2018-12-26 PROCEDURE — 99232 SBSQ HOSP IP/OBS MODERATE 35: CPT | Performed by: STUDENT IN AN ORGANIZED HEALTH CARE EDUCATION/TRAINING PROGRAM

## 2018-12-26 RX ADMIN — DIVALPROEX SODIUM 2000 MG: 500 TABLET, FILM COATED, EXTENDED RELEASE ORAL at 21:12

## 2018-12-26 RX ADMIN — METFORMIN HYDROCHLORIDE 850 MG: 850 TABLET ORAL at 17:09

## 2018-12-26 RX ADMIN — INSULIN GLARGINE 30 UNITS: 100 INJECTION, SOLUTION SUBCUTANEOUS at 21:13

## 2018-12-26 RX ADMIN — LISINOPRIL 5 MG: 5 TABLET ORAL at 08:21

## 2018-12-26 RX ADMIN — INSULIN LISPRO 2 UNITS: 100 INJECTION, SOLUTION INTRAVENOUS; SUBCUTANEOUS at 21:13

## 2018-12-26 RX ADMIN — COLLAGENASE SANTYL: 250 OINTMENT TOPICAL at 13:33

## 2018-12-26 RX ADMIN — METOPROLOL TARTRATE 25 MG: 25 TABLET, FILM COATED ORAL at 21:13

## 2018-12-26 RX ADMIN — TRAZODONE HYDROCHLORIDE 50 MG: 50 TABLET ORAL at 21:12

## 2018-12-26 RX ADMIN — BUSPIRONE HYDROCHLORIDE 10 MG: 10 TABLET ORAL at 08:21

## 2018-12-26 RX ADMIN — DULOXETINE HYDROCHLORIDE 90 MG: 60 CAPSULE, DELAYED RELEASE ORAL at 08:21

## 2018-12-26 RX ADMIN — METOPROLOL TARTRATE 25 MG: 25 TABLET, FILM COATED ORAL at 08:21

## 2018-12-26 RX ADMIN — METFORMIN HYDROCHLORIDE 850 MG: 850 TABLET ORAL at 08:21

## 2018-12-26 RX ADMIN — CHLORPROMAZINE HYDROCHLORIDE 50 MG: 25 TABLET, SUGAR COATED ORAL at 17:09

## 2018-12-26 RX ADMIN — BUSPIRONE HYDROCHLORIDE 10 MG: 10 TABLET ORAL at 17:09

## 2018-12-26 RX ADMIN — INSULIN LISPRO 1 UNITS: 100 INJECTION, SOLUTION INTRAVENOUS; SUBCUTANEOUS at 17:09

## 2018-12-26 RX ADMIN — INSULIN LISPRO 4 UNITS: 100 INJECTION, SOLUTION INTRAVENOUS; SUBCUTANEOUS at 17:10

## 2018-12-26 RX ADMIN — INSULIN LISPRO 4 UNITS: 100 INJECTION, SOLUTION INTRAVENOUS; SUBCUTANEOUS at 08:41

## 2018-12-26 RX ADMIN — CHLORPROMAZINE HYDROCHLORIDE 50 MG: 25 TABLET, SUGAR COATED ORAL at 08:21

## 2018-12-26 RX ADMIN — INSULIN LISPRO 4 UNITS: 100 INJECTION, SOLUTION INTRAVENOUS; SUBCUTANEOUS at 12:29

## 2018-12-26 NOTE — PROGRESS NOTES
Pt denies all symptoms  He states he is less depressed and denies anxiety  He states he feels ready to be discharged to home soon  Pt remains malodorous and non-interactive in the milieu  He is scant and brief during all interactions with RN  Cooperative with medications  Will monitor

## 2018-12-26 NOTE — PLAN OF CARE
Problem: Risk for Self Injury/Neglect  Goal: Treatment Goal: Remain safe during length of stay, learn and adopt new coping skills, and be free of self-injurious ideation, impulses and acts at the time of discharge  Outcome: Progressing      Problem: Depression  Goal: Treatment Goal: Demonstrate behavioral control of depressive symptoms, verbalize feelings of improved mood/affect, and adopt new coping skills prior to discharge  Outcome: Not Progressing      Problem: Anxiety  Goal: Anxiety is at manageable level  Interventions:  - Assess and monitor patient's anxiety level  - Monitor for signs and symptoms of anxiety both physical and emotional (heart palpitations, chest pain, shortness of breath, headaches, nausea, feeling jumpy, restlessness, irritable, apprehensive)  - Collaborate with interdisciplinary team and initiate plan and interventions as ordered    - [place] patient to unit/surroundings  - Explain treatment plan  - Encourage participation in care  - Encourage verbalization of concerns/fears  - Identify coping mechanisms  - Assist in developing anxiety-reducing skills  - Administer/offer alternative therapies  - Limit or eliminate stimulants   Outcome: Progressing      Problem: DISCHARGE PLANNING  Goal: Discharge to home or other facility with appropriate resources  INTERVENTIONS:  - Identify barriers to discharge w/patient and caregiver  - Arrange for needed discharge resources and transportation as appropriate  - Identify discharge learning needs (meds, wound care, etc )  - Arrange for interpretive services to assist at discharge as needed  - Refer to Case Management Department for coordinating discharge planning if the patient needs post-hospital services based on physician/advanced practitioner order or complex needs related to functional status, cognitive ability, or social support system   Outcome: Progressing      Problem: Ineffective Coping  Goal: Participates in unit activities  Interventions:  - Provide therapeutic environment   - Provide required programming   - Redirect inappropriate behaviors    Outcome: Not Progressing

## 2018-12-26 NOTE — CASE MANAGEMENT
Pt is doing better  To be D/C tomorrow  AIDA called [employer], out pt [employer]  SW placed John Gut request  Per Mechelle Lights will pick pt up tomorrow @ 11:30 AM  AIDA advised ZOEY Burgess, of van  time

## 2018-12-26 NOTE — PROGRESS NOTES
Pt is calm, cooperative with medications  He is visible, not seen socializing  Pt states depression is "as good as it gets" rates 2/10, denies all other symptoms  Pt feels ready to go home, states "Oh yeah " and appears relieved  He is pleasant and polite in interactions

## 2018-12-26 NOTE — PROGRESS NOTES
Progress Note - Behavioral Health   [place] Hayley 44 y o  male MRN: [id number]  Unit/Bed#: [facility] 340-01 Encounter: [id number]    The patient was seen for continuing care and reviewed with treatment team  Patient continues to report doing better, denies symptoms of depression, denies depressed mood or anhedonia  Patient denies feeling hopeless and denies suicidal or homicidal ideation  Patient is more out of room, participating in unit activities  Patient denies auditory or visual hallucination  Patient denies side effects  Mental Status Evaluation:  Appearance:  Marginal/poor hygiene   Behavior:  cooperative   Mood:  euthymic   Thought Content:  Does not verbalize delusional material   Perceptual Disturbances: Denies hallucinations and does not appear to be responding to internal stimuli   Risk Potential: No suicidal or homicidal ideation   Orientation:   Oriented to place and person     Vitals:    12/26/18 1100   BP: 118/66   Pulse: 79   Resp:    Temp:        Assessment/Plan    Principal Problem:    Severe bipolar I disorder, most recent episode depressed without psychotic features ([facility] )  Active Problems:    Tobacco use disorder    Diabetic ulcer of right midfoot (HCC)    DM (diabetes mellitus), type 2 ([facility] )    Essential hypertension      Recommended Treatment: Continue present medications  Continue with pharmacotherapy, group therapy, milieu therapy and occupational therapy    The patient will be maintained on the following medications:    Current Facility-Administered Medications:  acetaminophen 650 mg Oral Q6H PRN Lenmagalys Wendell, PA-C   acetaminophen 975 mg Oral Q6H PRN [place] Elsabrown Marr, PA-C   aluminum-magnesium hydroxide-simethicone 30 mL Oral Q4H PRN Lenmagalys Wendell, PA-C   benztropine 1 mg Intramuscular Q6H PRN Lenord Wendell, PA-C   benztropine 1 mg Oral Q6H PRN Lenord Kenya, PA-C   busPIRone 10 mg Oral BID Levi Caldera MD   chlorproMAZINE 50 mg Oral BID Levi Caldera MD collagenase  Topical Daily Tyrell Carballo DPM   divalproex sodium 2,000 mg Oral HS Hilda Andrés, CRCORY   DULoxetine 90 mg Oral Daily Carolyne Tariq MD   haloperidol 5 mg Oral Q6H PRN Damián Andujar PA-C   haloperidol lactate 5 mg Intramuscular Q6H PRN Patricia Marr PA-C   hydrOXYzine HCL 25 mg Oral Q6H PRN Damián Andujar PA-C   ibuprofen 600 mg Oral Q8H PRN Patricia Marr PA-C   insulin glargine 30 Units Subcutaneous HS TAYLOR Longoria   insulin lispro 1-6 Units Subcutaneous TID AC Patrizia Marr PA-C   insulin lispro 1-6 Units Subcutaneous HS Patricia Estradach Justa, JOSIE   insulin lispro 4 Units Subcutaneous TID With Meals TAYLOR Arenas   lisinopril 5 mg Oral Daily Patricia Marr PA-C   LORazepam 1 mg Intramuscular Q6H PRN Patricia Estradach JOSIE Marr   LORazepam 1 mg Oral Q6H PRN Patricia Estradach JOSIE Marr   magnesium hydroxide 30 mL Oral Daily PRN Damián Andujar PA-C   metFORMIN 850 mg Oral BID With Meals TAYLOR Arenas   metoprolol tartrate 25 mg Oral Q12H [address], PA-C   nicotine 1 patch Transdermal Daily Patricia Fernandez PA-C   nicotine polacrilex 4 mg Oral Q2H PRN Audrey Potter MD   risperiDONE 1 mg Oral Q3H PRN Damián Andujar PA-C   traZODone 50 mg Oral HS TAYLOR Hoang   ziprasidone 20 mg Intramuscular Q4H PRN Damián Andujar PA-C

## 2018-12-27 VITALS
HEIGHT: 75 IN | DIASTOLIC BLOOD PRESSURE: 74 MMHG | WEIGHT: 271.6 LBS | TEMPERATURE: 97.2 F | HEART RATE: 74 BPM | RESPIRATION RATE: 16 BRPM | SYSTOLIC BLOOD PRESSURE: 120 MMHG | BODY MASS INDEX: 33.77 KG/M2

## 2018-12-27 LAB
GLUCOSE SERPL-MCNC: 118 MG/DL (ref 70–99)
GLUCOSE SERPL-MCNC: 202 MG/DL (ref 70–99)

## 2018-12-27 PROCEDURE — 99239 HOSP IP/OBS DSCHRG MGMT >30: CPT | Performed by: STUDENT IN AN ORGANIZED HEALTH CARE EDUCATION/TRAINING PROGRAM

## 2018-12-27 PROCEDURE — 82948 REAGENT STRIP/BLOOD GLUCOSE: CPT

## 2018-12-27 RX ORDER — LISINOPRIL 5 MG/1
5 TABLET ORAL DAILY
Qty: 30 TABLET | Refills: 0 | Status: SHIPPED | OUTPATIENT
Start: 2018-12-28 | End: 2019-08-14 | Stop reason: SDUPTHER

## 2018-12-27 RX ORDER — TRAZODONE HYDROCHLORIDE 50 MG/1
50 TABLET ORAL
Qty: 30 TABLET | Refills: 0 | Status: ON HOLD | OUTPATIENT
Start: 2018-12-27 | End: 2019-07-10 | Stop reason: SDUPTHER

## 2018-12-27 RX ORDER — CHLORPROMAZINE HYDROCHLORIDE 50 MG/1
50 TABLET, FILM COATED ORAL 2 TIMES DAILY
Qty: 60 TABLET | Refills: 0 | Status: SHIPPED | OUTPATIENT
Start: 2018-12-27 | End: 2019-08-02 | Stop reason: HOSPADM

## 2018-12-27 RX ORDER — BUSPIRONE HYDROCHLORIDE 10 MG/1
10 TABLET ORAL 2 TIMES DAILY
Qty: 60 TABLET | Refills: 0 | Status: ON HOLD | OUTPATIENT
Start: 2018-12-27 | End: 2019-07-10

## 2018-12-27 RX ORDER — DULOXETIN HYDROCHLORIDE 60 MG/1
60 CAPSULE, DELAYED RELEASE ORAL DAILY
Qty: 30 CAPSULE | Refills: 0 | Status: ON HOLD | OUTPATIENT
Start: 2018-12-27 | End: 2019-07-10

## 2018-12-27 RX ORDER — GABAPENTIN 400 MG/1
400 CAPSULE ORAL 3 TIMES DAILY
Qty: 90 CAPSULE | Refills: 0 | Status: SHIPPED | OUTPATIENT
Start: 2018-12-27 | End: 2019-08-02 | Stop reason: HOSPADM

## 2018-12-27 RX ORDER — DIVALPROEX SODIUM 500 MG/1
2000 TABLET, EXTENDED RELEASE ORAL
Qty: 30 TABLET | Refills: 0 | Status: SHIPPED | OUTPATIENT
Start: 2018-12-27 | End: 2019-08-14

## 2018-12-27 RX ADMIN — LISINOPRIL 5 MG: 5 TABLET ORAL at 08:35

## 2018-12-27 RX ADMIN — COLLAGENASE SANTYL: 250 OINTMENT TOPICAL at 11:33

## 2018-12-27 RX ADMIN — METOPROLOL TARTRATE 25 MG: 25 TABLET, FILM COATED ORAL at 08:35

## 2018-12-27 RX ADMIN — METFORMIN HYDROCHLORIDE 850 MG: 850 TABLET ORAL at 08:35

## 2018-12-27 RX ADMIN — DULOXETINE HYDROCHLORIDE 90 MG: 60 CAPSULE, DELAYED RELEASE ORAL at 08:35

## 2018-12-27 RX ADMIN — INSULIN LISPRO 4 UNITS: 100 INJECTION, SOLUTION INTRAVENOUS; SUBCUTANEOUS at 08:36

## 2018-12-27 RX ADMIN — BUSPIRONE HYDROCHLORIDE 10 MG: 10 TABLET ORAL at 08:35

## 2018-12-27 RX ADMIN — CHLORPROMAZINE HYDROCHLORIDE 50 MG: 25 TABLET, SUGAR COATED ORAL at 08:35

## 2018-12-27 NOTE — PROGRESS NOTES
Pt denies all symptoms  He has been calm and polite, cooperative  Pt is visible in the milieu and showered this morning  He approached RN to ask about foot being re-dressed  Pt is happy to be for d/c today

## 2018-12-27 NOTE — NURSING NOTE
Patient discharged to self  He is to be transported by Graham Wright  All discharge information, medications, dx, upcoming appts, crisis numbers reviewed and taught to client by this nurse  He verbalized understanding and plans to take prescriptions and pick them up this afternoon  Patient denies SI/HI at this time  No further questions at this time

## 2018-12-27 NOTE — PROGRESS NOTES
Pt states he has insulin at home and has not had any insulin changes since being in the hospital; doctor made aware

## 2018-12-27 NOTE — DISCHARGE SUMMARY
Discharge Summary - [facility] 44 y o  male MRN: [id number]  Unit/Bed#: [facility] 340-01 Encounter: [id number]     Admission Date: 12/17/2018         Discharge Date: No discharge date for patient encounter  Attending Psychiatrist: Shannan Leon MD    Reason for Admission/HPI:   Patient is 62 51 81 - voluntary admission - admitted for increasing anxiety/depression and neglecting basic needs   Pt has been non-compliant on medications approximately two months including diabetic medications   Pt denies SI but admits to slowly killing himself by neglecting his health  Jose Enriquez denies all other symptoms   Pt has had numerous inpatient psychiatric admissions in [place], last admissions there were in May and then June           Past Medical History:   Diagnosis Date    Anxiety     Bipolar 1 disorder ([place] [address] )     Chronic pain disorder     Depression     Diabetes mellitus ([place] [address] )     Hypertension     Psychiatric illness     PTSD (post-traumatic stress disorder)     Sleep difficulties     Substance abuse ([place] [address] )     Suicide attempt ([place] [address] )      Past Surgical History:   Procedure Laterality Date    APPENDECTOMY      COLON SURGERY      TONSILLECTOMY         Medications: All current active medications have been reviewed  Allergies: Allergies   Allergen Reactions    Penicillins Rash and Other (See Comments)     rash (Tolerating Ancef-per RN)  Last noted when patient was a child       Objective     Vital signs in last 24 hours:    Temp:  [97 2 °F (36 2 °C)-97 4 °F (36 3 °C)] 97 2 °F (36 2 °C)  HR:  [65-75] 69  Resp:  [16] 16  BP: (118-189)/(65-95) 189/95    No intake or output data in the 24 hours ending 12/27/18 Helena Luna:     Alexis Owens was admitted to the inpatient psychiatric unit and started on Behavioral Health checks every 15 minutes  During the hospitalization he was attending individual therapy, group therapy, milieu therapy and occupational therapy  [email or web address]]     Psychiatric medications were restarted during the hospital stay  To address depressive symptoms and irritability, Benigno Bunn was treated with antidepressant Cymbalta, mood stabilizer Depakote and antipsychotic medication Thorazine  Medication doses were adjusted and increased to 2000mg oral, bedtime  during the hospital course  On that dose Depakote ER level was therapeutic at 64 4 on 12/26/18  Prior to beginning of treatment medications risks and benefits and possible side effects including risk of parkinsonian symptoms, Tardive Dyskinesia and metabolic syndrome related to treatment with antipsychotic medications, risk of cardiovascular events in elderly related to treatment with antipsychotic medications and risk of suicidality and serotonin syndrome related to treatment with antidepressants were discussed with Benigno Bunn  He verbalized understanding and agreement for treatment  Upon admission he was seen by medical service for medical clearance for inpatient treatment  Mirza's symptoms gradually improved over the hospital course  Initially after admission he was still feeling depressed and irritable  With adjustment of medications and therapeutic milieu his symptoms gradually resolved  At the end of treatment Benigno Bunn was significantly improved  His mood was doing much better at the time of discharge  he denied suicidal ideation, intent or plan at the time of discharge and denied homicidal ideation, intent or plan at the time of discharge  Since he was doing well at the end of the hospitalization, treatment team felt that he could be safely discharged to outpatient care  The outpatient follow up was arranged by the unit  upon discharge      Mental Status at Time of Discharge:     Appearance:  casually dressed, dressed appropriately   Behavior:  cooperative   Speech:  normal rate and volume   Mood:  normal, euthymic   Affect:  brighter   Thought Process:  coherent, goal directed   Associations: intact associations   Thought Content: no overt delusions   Perceptual Disturbances: no auditory hallucinations, no visual hallucinations   Risk Potential: Suicidal ideation - None  Homicidal ideation - None  Potential for aggression - No   Sensorium:  oriented to person, place and time/date   Memory:  recent and remote memory grossly intact   Consciousness:  alert and awake   Attention: attention span and concentration are age appropriate   Insight:  fair   Judgment: fair   Gait/Station: normal gait/station   Motor Activity: no abnormal movements       Admission Diagnosis:    Principal Problem:    Severe bipolar I disorder, most recent episode depressed without psychotic features ([facility] )  Active Problems:    Tobacco use disorder    Diabetic ulcer of right midfoot ([facility] )    DM (diabetes mellitus), type 2 ([facility] )    Essential hypertension      Discharge Diagnosis:     Principal Problem:    Severe bipolar I disorder, most recent episode depressed without psychotic features ([facility] )  Active Problems:    Tobacco use disorder    Diabetic ulcer of right midfoot ([facility] )    DM (diabetes mellitus), type 2 ([facility] )    Essential hypertension  Resolved Problems:    * No resolved hospital problems  *      Lab Results: I have personally reviewed all pertinent laboratory/tests results  Discharge Medications:    See after visit summary for all reconciled discharge medications provided to patient and family  Discharge instructions/Information to patient and family:     See after visit summary for information provided to patient and family  Provisions for Follow-Up Care:    See after visit summary for information related to follow-up care and any pertinent home health orders  Discharge Statement:    I spent 37 minutes discharging the patient  This time was spent on the day of discharge  I had direct contact with the patient on the day of discharge       Additional documentation is required if more than 30 minutes were spent on discharge:    I reviewed with Suzette Bennett importance of compliance with medications and outpatient treatment after discharge  I discussed the medication regimen and possible side effects of the medications with Rafita Collins prior to discharge  At the time of discharge he was tolerating psychiatric medications  I discussed outpatient follow up with Rafita Collins  I reviewed with Rafita Collins crisis plan and safety plan upon discharge  Outpatient Smoking Cessation referral was offered to Rafita Collins  He refused the referral   Smoking Cessation medication was offered to Rafita Collins  He refused Smoking Cessation medication      Manav Sánchez MD 12/27/18

## 2019-05-31 NOTE — PROGRESS NOTES
Pt has been withdrawn to his room during the day  Pt denies current si/hi at this time  Pt  Does appear flat at times but smiles during discussions with this nurse  Pt was using the toilet where he then started to feel dizzy and he lowered his back to the floor  The vitals were checked and he denies any current pain at this time  Detail Level: Simple Consent: The patient's consent was obtained including but not limited to risks of crusting, scabbing, blistering, scarring, darker or lighter pigmentary change, recurrence, incomplete removal and infection. Post-Care Instructions: I reviewed with the patient in detail post-care instructions. Patient is to wear sunprotection, and avoid picking at any of the treated lesions. Pt may apply Vaseline to crusted or scabbing areas. Medical Necessity Information: It is in your best interest to select a reason for this procedure from the list below. All of these items fulfill various CMS LCD requirements except the new and changing color options. Include Z78.9 (Other Specified Conditions Influencing Health Status) As An Associated Diagnosis?: No Number Of Freeze-Thaw Cycles: 1 freeze-thaw cycle Medical Necessity Clause: This procedure was medically necessary because the lesions that were treated were:

## 2019-07-09 ENCOUNTER — HOSPITAL ENCOUNTER (INPATIENT)
Facility: HOSPITAL | Age: 44
LOS: 24 days | Discharge: HOME | DRG: 885 | End: 2019-08-02
Attending: EMERGENCY MEDICINE | Admitting: PSYCHIATRY & NEUROLOGY
Payer: MEDICARE

## 2019-07-09 DIAGNOSIS — F31.4 SEVERE BIPOLAR I DISORDER, MOST RECENT EPISODE DEPRESSED WITHOUT PSYCHOTIC FEATURES (HCC): Primary | Chronic | ICD-10-CM

## 2019-07-09 DIAGNOSIS — E08.621 DIABETIC ULCER OF FOOT ASSOCIATED WITH DIABETES MELLITUS DUE TO UNDERLYING CONDITION, LIMITED TO BREAKDOWN OF SKIN (HCC): ICD-10-CM

## 2019-07-09 DIAGNOSIS — L97.301: ICD-10-CM

## 2019-07-09 DIAGNOSIS — E11.00 TYPE 2 DIABETES MELLITUS WITH HYPEROSMOLARITY WITHOUT COMA, WITHOUT LONG-TERM CURRENT USE OF INSULIN (HCC): ICD-10-CM

## 2019-07-09 DIAGNOSIS — E10.622: ICD-10-CM

## 2019-07-09 DIAGNOSIS — R45.851 SUICIDAL IDEATIONS: ICD-10-CM

## 2019-07-09 DIAGNOSIS — L97.501 DIABETIC ULCER OF FOOT ASSOCIATED WITH DIABETES MELLITUS DUE TO UNDERLYING CONDITION, LIMITED TO BREAKDOWN OF SKIN (HCC): ICD-10-CM

## 2019-07-09 DIAGNOSIS — Z00.00 PHYSICAL EXAM: ICD-10-CM

## 2019-07-09 LAB
ALBUMIN SERPL BCP-MCNC: 4.1 G/DL (ref 3.5–5.7)
ALP SERPL-CCNC: 56 U/L (ref 40–150)
ALT SERPL W P-5'-P-CCNC: 13 U/L (ref 7–52)
AMPHETAMINES SERPL QL SCN: NEGATIVE
ANION GAP SERPL CALCULATED.3IONS-SCNC: 7 MMOL/L (ref 4–13)
APAP SERPL-MCNC: <10 UG/ML (ref 10–20)
AST SERPL W P-5'-P-CCNC: 18 U/L (ref 13–39)
ATRIAL RATE: 80 BPM
BARBITURATES UR QL: NEGATIVE
BASOPHILS # BLD AUTO: 0 THOUSANDS/ΜL (ref 0–0.1)
BASOPHILS NFR BLD AUTO: 0 % (ref 0–2)
BENZODIAZ UR QL: NEGATIVE
BILIRUB SERPL-MCNC: 0.4 MG/DL (ref 0.2–1)
BUN SERPL-MCNC: 23 MG/DL (ref 7–25)
CALCIUM SERPL-MCNC: 9.8 MG/DL (ref 8.6–10.5)
CHLORIDE SERPL-SCNC: 97 MMOL/L (ref 98–107)
CO2 SERPL-SCNC: 28 MMOL/L (ref 21–31)
COCAINE UR QL: NEGATIVE
CREAT SERPL-MCNC: 1.22 MG/DL (ref 0.7–1.3)
EOSINOPHIL # BLD AUTO: 0 THOUSAND/ΜL (ref 0–0.61)
EOSINOPHIL NFR BLD AUTO: 0 % (ref 0–5)
ERYTHROCYTE [DISTWIDTH] IN BLOOD BY AUTOMATED COUNT: 14.9 % (ref 11.5–14.5)
ETHANOL SERPL-MCNC: <10 MG/DL
GFR SERPL CREATININE-BSD FRML MDRD: 73 ML/MIN/1.73SQ M
GLUCOSE SERPL-MCNC: 242 MG/DL (ref 65–140)
GLUCOSE SERPL-MCNC: 277 MG/DL (ref 65–99)
GLUCOSE SERPL-MCNC: 292 MG/DL (ref 65–140)
HCT VFR BLD AUTO: 39.3 % (ref 42–47)
HGB BLD-MCNC: 13.3 G/DL (ref 14–18)
LYMPHOCYTES # BLD AUTO: 1.7 THOUSANDS/ΜL (ref 0.6–4.47)
LYMPHOCYTES NFR BLD AUTO: 22 % (ref 21–51)
MAGNESIUM SERPL-MCNC: 1.8 MG/DL (ref 1.9–2.7)
MCH RBC QN AUTO: 26.8 PG (ref 26–34)
MCHC RBC AUTO-ENTMCNC: 33.9 G/DL (ref 31–37)
MCV RBC AUTO: 79 FL (ref 81–99)
METHADONE UR QL: NEGATIVE
MONOCYTES # BLD AUTO: 0.6 THOUSAND/ΜL (ref 0.17–1.22)
MONOCYTES NFR BLD AUTO: 8 % (ref 2–12)
NEUTROPHILS # BLD AUTO: 5.4 THOUSANDS/ΜL (ref 1.4–6.5)
NEUTS SEG NFR BLD AUTO: 69 % (ref 42–75)
OPIATES UR QL SCN: NEGATIVE
P AXIS: 33 DEGREES
PCP UR QL: NEGATIVE
PLATELET # BLD AUTO: 250 THOUSANDS/UL (ref 149–390)
PMV BLD AUTO: 7.4 FL (ref 8.6–11.7)
POTASSIUM SERPL-SCNC: 4.8 MMOL/L (ref 3.5–5.5)
PR INTERVAL: 150 MS
PROT SERPL-MCNC: 7.2 G/DL (ref 6.4–8.9)
QRS AXIS: 5 DEGREES
QRSD INTERVAL: 76 MS
QT INTERVAL: 366 MS
QTC INTERVAL: 422 MS
RBC # BLD AUTO: 4.97 MILLION/UL (ref 4.3–5.9)
SALICYLATES SERPL-MCNC: <5 MG/DL (ref 10–30)
SODIUM SERPL-SCNC: 132 MMOL/L (ref 134–143)
T WAVE AXIS: 37 DEGREES
THC UR QL: NEGATIVE
VALPROATE SERPL-MCNC: 59 UG/ML (ref 50–125)
VENTRICULAR RATE: 80 BPM
WBC # BLD AUTO: 7.8 THOUSAND/UL (ref 4.8–10.8)

## 2019-07-09 PROCEDURE — 80053 COMPREHEN METABOLIC PANEL: CPT | Performed by: EMERGENCY MEDICINE

## 2019-07-09 PROCEDURE — 83735 ASSAY OF MAGNESIUM: CPT | Performed by: EMERGENCY MEDICINE

## 2019-07-09 PROCEDURE — 80320 DRUG SCREEN QUANTALCOHOLS: CPT | Performed by: EMERGENCY MEDICINE

## 2019-07-09 PROCEDURE — 93005 ELECTROCARDIOGRAM TRACING: CPT

## 2019-07-09 PROCEDURE — 99285 EMERGENCY DEPT VISIT HI MDM: CPT

## 2019-07-09 PROCEDURE — 93010 ELECTROCARDIOGRAM REPORT: CPT | Performed by: INTERNAL MEDICINE

## 2019-07-09 PROCEDURE — 82948 REAGENT STRIP/BLOOD GLUCOSE: CPT

## 2019-07-09 PROCEDURE — 36415 COLL VENOUS BLD VENIPUNCTURE: CPT | Performed by: EMERGENCY MEDICINE

## 2019-07-09 PROCEDURE — 80307 DRUG TEST PRSMV CHEM ANLYZR: CPT | Performed by: EMERGENCY MEDICINE

## 2019-07-09 PROCEDURE — 80164 ASSAY DIPROPYLACETIC ACD TOT: CPT | Performed by: EMERGENCY MEDICINE

## 2019-07-09 PROCEDURE — 96372 THER/PROPH/DIAG INJ SC/IM: CPT

## 2019-07-09 PROCEDURE — 85025 COMPLETE CBC W/AUTO DIFF WBC: CPT | Performed by: EMERGENCY MEDICINE

## 2019-07-09 PROCEDURE — 80329 ANALGESICS NON-OPIOID 1 OR 2: CPT | Performed by: EMERGENCY MEDICINE

## 2019-07-09 RX ORDER — ACETAMINOPHEN 325 MG/1
650 TABLET ORAL EVERY 4 HOURS PRN
Status: DISCONTINUED | OUTPATIENT
Start: 2019-07-09 | End: 2019-07-26

## 2019-07-09 RX ORDER — LORAZEPAM 2 MG/ML
2 INJECTION INTRAMUSCULAR EVERY 6 HOURS PRN
Status: DISCONTINUED | OUTPATIENT
Start: 2019-07-09 | End: 2019-08-02 | Stop reason: HOSPADM

## 2019-07-09 RX ORDER — ACETAMINOPHEN 325 MG/1
650 TABLET ORAL EVERY 6 HOURS PRN
Status: DISCONTINUED | OUTPATIENT
Start: 2019-07-09 | End: 2019-07-26

## 2019-07-09 RX ORDER — BENZTROPINE MESYLATE 1 MG/1
1 TABLET ORAL EVERY 6 HOURS PRN
Status: DISCONTINUED | OUTPATIENT
Start: 2019-07-09 | End: 2019-08-02 | Stop reason: HOSPADM

## 2019-07-09 RX ORDER — BENZTROPINE MESYLATE 1 MG/1
1 TABLET ORAL 2 TIMES DAILY
Status: ON HOLD | COMMUNITY
End: 2019-08-02 | Stop reason: SDUPTHER

## 2019-07-09 RX ORDER — MAGNESIUM HYDROXIDE/ALUMINUM HYDROXICE/SIMETHICONE 120; 1200; 1200 MG/30ML; MG/30ML; MG/30ML
30 SUSPENSION ORAL EVERY 4 HOURS PRN
Status: DISCONTINUED | OUTPATIENT
Start: 2019-07-09 | End: 2019-07-26

## 2019-07-09 RX ORDER — NICOTINE 21 MG/24HR
1 PATCH, TRANSDERMAL 24 HOURS TRANSDERMAL DAILY
Status: DISCONTINUED | OUTPATIENT
Start: 2019-07-10 | End: 2019-08-02 | Stop reason: HOSPADM

## 2019-07-09 RX ORDER — BENZTROPINE MESYLATE 1 MG/1
1 TABLET ORAL 2 TIMES DAILY
Status: DISCONTINUED | OUTPATIENT
Start: 2019-07-09 | End: 2019-08-02 | Stop reason: HOSPADM

## 2019-07-09 RX ORDER — RISPERIDONE 1 MG/1
1 TABLET, ORALLY DISINTEGRATING ORAL
Status: DISCONTINUED | OUTPATIENT
Start: 2019-07-09 | End: 2019-08-02 | Stop reason: HOSPADM

## 2019-07-09 RX ORDER — KETOROLAC TROMETHAMINE 30 MG/ML
30 INJECTION, SOLUTION INTRAMUSCULAR; INTRAVENOUS ONCE
Status: COMPLETED | OUTPATIENT
Start: 2019-07-09 | End: 2019-07-09

## 2019-07-09 RX ORDER — CHLORPROMAZINE HYDROCHLORIDE 25 MG/1
50 TABLET, FILM COATED ORAL 2 TIMES DAILY
Status: DISCONTINUED | OUTPATIENT
Start: 2019-07-09 | End: 2019-07-11

## 2019-07-09 RX ORDER — DIVALPROEX SODIUM 500 MG/1
2000 TABLET, EXTENDED RELEASE ORAL EVERY EVENING
Status: DISCONTINUED | OUTPATIENT
Start: 2019-07-09 | End: 2019-08-02 | Stop reason: HOSPADM

## 2019-07-09 RX ORDER — GLYBURIDE 1.25 MG/1
1.25 TABLET ORAL
COMMUNITY
End: 2019-08-14 | Stop reason: SDUPTHER

## 2019-07-09 RX ORDER — OLANZAPINE 10 MG/1
10 INJECTION, POWDER, LYOPHILIZED, FOR SOLUTION INTRAMUSCULAR
Status: DISCONTINUED | OUTPATIENT
Start: 2019-07-09 | End: 2019-08-02 | Stop reason: HOSPADM

## 2019-07-09 RX ORDER — HALOPERIDOL 5 MG/ML
5 INJECTION INTRAMUSCULAR EVERY 6 HOURS PRN
Status: DISCONTINUED | OUTPATIENT
Start: 2019-07-09 | End: 2019-08-02 | Stop reason: HOSPADM

## 2019-07-09 RX ORDER — HYDROXYZINE HYDROCHLORIDE 25 MG/1
25 TABLET, FILM COATED ORAL EVERY 6 HOURS PRN
Status: DISCONTINUED | OUTPATIENT
Start: 2019-07-09 | End: 2019-08-02 | Stop reason: HOSPADM

## 2019-07-09 RX ORDER — TRAZODONE HYDROCHLORIDE 50 MG/1
50 TABLET ORAL
Status: DISCONTINUED | OUTPATIENT
Start: 2019-07-09 | End: 2019-08-02 | Stop reason: HOSPADM

## 2019-07-09 RX ORDER — BENZTROPINE MESYLATE 1 MG/ML
1 INJECTION INTRAMUSCULAR; INTRAVENOUS EVERY 6 HOURS PRN
Status: DISCONTINUED | OUTPATIENT
Start: 2019-07-09 | End: 2019-08-02 | Stop reason: HOSPADM

## 2019-07-09 RX ORDER — DULOXETIN HYDROCHLORIDE 60 MG/1
60 CAPSULE, DELAYED RELEASE ORAL DAILY
Status: DISCONTINUED | OUTPATIENT
Start: 2019-07-10 | End: 2019-07-31

## 2019-07-09 RX ORDER — ACETAMINOPHEN 325 MG/1
975 TABLET ORAL EVERY 6 HOURS PRN
Status: DISCONTINUED | OUTPATIENT
Start: 2019-07-09 | End: 2019-07-26

## 2019-07-09 RX ORDER — BUSPIRONE HYDROCHLORIDE 5 MG/1
10 TABLET ORAL 2 TIMES DAILY
Status: DISCONTINUED | OUTPATIENT
Start: 2019-07-09 | End: 2019-08-02 | Stop reason: HOSPADM

## 2019-07-09 RX ORDER — HALOPERIDOL 5 MG
5 TABLET ORAL EVERY 6 HOURS PRN
Status: DISCONTINUED | OUTPATIENT
Start: 2019-07-09 | End: 2019-08-02 | Stop reason: HOSPADM

## 2019-07-09 RX ORDER — HYDROXYZINE HYDROCHLORIDE 25 MG/1
50 TABLET, FILM COATED ORAL EVERY 4 HOURS PRN
Status: DISCONTINUED | OUTPATIENT
Start: 2019-07-09 | End: 2019-08-02 | Stop reason: HOSPADM

## 2019-07-09 RX ORDER — GLYBURIDE 1.25 MG/1
1.25 TABLET ORAL
Status: DISCONTINUED | OUTPATIENT
Start: 2019-07-10 | End: 2019-08-02 | Stop reason: HOSPADM

## 2019-07-09 RX ADMIN — INSULIN HUMAN 6 UNITS: 100 INJECTION, SOLUTION PARENTERAL at 12:55

## 2019-07-09 RX ADMIN — DIVALPROEX SODIUM 2000 MG: 500 TABLET, FILM COATED, EXTENDED RELEASE ORAL at 18:57

## 2019-07-09 RX ADMIN — CHLORPROMAZINE HYDROCHLORIDE 50 MG: 25 TABLET, SUGAR COATED ORAL at 18:56

## 2019-07-09 RX ADMIN — KETOROLAC TROMETHAMINE 30 MG: 30 INJECTION, SOLUTION INTRAMUSCULAR; INTRAVENOUS at 17:13

## 2019-07-09 RX ADMIN — BENZTROPINE MESYLATE 1 MG: 1 TABLET ORAL at 18:57

## 2019-07-09 RX ADMIN — GABAPENTIN 400 MG: 300 CAPSULE ORAL at 22:44

## 2019-07-09 RX ADMIN — BUSPIRONE HYDROCHLORIDE 10 MG: 5 TABLET ORAL at 18:57

## 2019-07-09 NOTE — ED NOTES
Patient is accepted at [address] [facility]  Patient is accepted by TAYLOR Gómez per Naomi [facility] Specialist      Patient may go to the floor when bed is available  Unit Rn will call ED for nurse report when bed is available, prior to patient transfer

## 2019-07-09 NOTE — ED NOTES
Insurance Authorization for admission: No pre-cert required Medicare A & B primary  Phone call placed to [facility]  ([address])  Phone number: [phone]  Spoke to Kelton Melara asked that UR fax upon D/C    EVS (Eligibility Verification System) called - [phone]  Automated system indicates: pt is eligible for MA, Behavioral Health - Managed Care   Rec # [id number]    Insurance Authorization for Transportation:    no transport needed

## 2019-07-09 NOTE — ED NOTES
Pt presents due to SI w/ an attempt to kill himself yesterday via an O/D of his meds  Pt is A & O X 4 and was calm and ccoperative throughout the assessment  Pt was able to answer all assessment questions w/ appropriate elaboration  Pt continues to endorse SI and cannot contract for safety at a less intensive level of care than a hospital  Pt denies any HI or thoughts to hurt others  Pt denies any AH, VH, or delusions  Pt is experiencing increased depression and increased anxiety w/ panic attacks  Pt states his sleep was poor but has had Trazodone since Sat and is sleeping much better  Pt is in agreement w/ I/P psuych admission  Pt's voluntary paperwork, e g  201, and his rights were explained in detail  Pt signed his [address] and was given a copy of his rights along w/ a copy of the 72 hr withdraw from tx explanation

## 2019-07-09 NOTE — ED PROVIDER NOTES
History  Chief Complaint   Patient presents with    Psychiatric Evaluation     Feeling depressed  Took pills yesterday  wanted to do today, but called 911 for help     81-year-old male with history of depression, anxiety, diabetes, hypertension, bipolar disorder and prior suicide attempts presents for psychiatric evaluation  Patient reports taking extra pills yesterday around 4:00 p m  in an attempt arms  Patient reports suicidal ideations and plans  Patient denies taking any additional medications this a m  Syble Saad Patient reports no homicidal ideations and no audiovisual hallucinations  Patient with no other drug or alcohol use  Psychiatric Evaluation   Presenting symptoms: self-mutilation and suicidal thoughts    Associated symptoms: anxiety    Associated symptoms: no abdominal pain, no chest pain and no headaches        Prior to Admission Medications   Prescriptions Last Dose Informant Patient Reported? Taking?    DULoxetine (CYMBALTA) 60 mg delayed release capsule   No Yes   Sig: Take 1 capsule (60 mg total) by mouth daily for 30 days   Insulin Pen Needle 31G X 8 MM MISC   Yes No   Sig: As directed   ONE TOUCH LANCETS MISC   Yes No   Sig: As directed   benztropine (COGENTIN) 1 mg tablet   Yes Yes   Sig: Take 1 mg by mouth 2 (two) times a day   busPIRone (BUSPAR) 10 mg tablet   No No   Sig: Take 1 tablet (10 mg total) by mouth 2 (two) times a day for 30 days   chlorproMAZINE (THORAZINE) 50 mg tablet   No Yes   Sig: Take 1 tablet (50 mg total) by mouth 2 (two) times a day for 30 days   collagenase (SANTYL) ointment   No No   Sig: Apply topically daily   divalproex sodium (DEPAKOTE ER) 500 mg 24 hr tablet   No Yes   Sig: Take 4 tablets (2,000 mg total) by mouth daily at bedtime for 30 days   gabapentin (NEURONTIN) 400 mg capsule   No Yes   Sig: Take 1 capsule (400 mg total) by mouth 3 (three) times a day for 30 days   glucose blood test strip   Yes No   Sig: As directed   glyBURIDE (DIABETA) 1 25 mg tablet Yes Yes   Sig: Take 1 25 mg by mouth daily with breakfast   insulin aspart (NovoLOG) 100 Units/mL injection pen   Yes No   Sig: As directed on discharge instructions   lisinopril (ZESTRIL) 5 mg tablet   No No   Sig: Take 1 tablet (5 mg total) by mouth daily   Patient taking differently: Take 10 mg by mouth daily    metFORMIN (GLUCOPHAGE) 850 mg tablet   No No   Sig: Take 1 tablet (850 mg total) by mouth 2 (two) times a day with meals   Patient taking differently: Take 1,000 mg by mouth daily at bedtime    metoprolol tartrate (LOPRESSOR) 25 mg tablet   No Yes   Sig: Take 1 tablet (25 mg total) by mouth every 12 (twelve) hours   traZODone (DESYREL) 50 mg tablet   No No   Sig: Take 1 tablet (50 mg total) by mouth daily at bedtime   Patient taking differently: Take 100 mg by mouth daily at bedtime       Facility-Administered Medications: None       Past Medical History:   Diagnosis Date    Anxiety     [facility] disorder ([facility])     Chronic pain disorder     Depression     Diabetes mellitus ([place] [address] )     Hypertension     Psychiatric illness     PTSD (post-traumatic stress disorder)     Sleep difficulties     Substance abuse ([place] [address] )     Suicide attempt ([place] [address] )        Past Surgical History:   Procedure Laterality Date    APPENDECTOMY      COLON SURGERY      TONSILLECTOMY         Family History   Problem Relation Age of Onset    No Known Problems Mother     No Known Problems Father     No Known Problems Sister     No Known Problems Brother     No Known Problems Maternal Aunt     No Known Problems Paternal Aunt     No Known Problems Maternal Uncle     No Known Problems Paternal Uncle     No Known Problems Maternal Grandfather     No Known Problems Maternal Grandmother     No Known Problems Paternal Grandfather     No Known Problems Paternal Grandmother     No Known Problems Cousin     ADD / ADHD Neg Hx     Alcohol abuse Neg Hx     Anxiety disorder Neg Hx     Bipolar disorder Neg Hx     Completed Suicide Neg Hx     Dementia Neg Hx     Depression Neg Hx     Drug abuse Neg Hx     OCD Neg Hx     Psychiatric Illness Neg Hx     Psychosis Neg Hx     Schizoaffective Disorder  Neg Hx     Schizophrenia Neg Hx     Self-Injury Neg Hx     Suicide Attempts Neg Hx      I have reviewed and agree with the history as documented  Social History     Tobacco Use    Smoking status: Current Every Day Smoker     Packs/day: 1 50     Years: 22 00     Pack years: 33 00     Types: Cigarettes    Smokeless tobacco: Current User     Types: Chew   Substance Use Topics    Alcohol use: No    Drug use: Yes     Types: Marijuana     Comment: monthly        Review of Systems   Constitutional: Negative for chills and fever  HENT: Negative for rhinorrhea and sore throat  Eyes: Negative for visual disturbance  Respiratory: Negative for cough and shortness of breath  Cardiovascular: Negative for chest pain and leg swelling  Gastrointestinal: Negative for abdominal pain, diarrhea, nausea and vomiting  Genitourinary: Negative for dysuria  Musculoskeletal: Negative for back pain and myalgias  Skin: Negative for rash  Neurological: Negative for dizziness and headaches  Psychiatric/Behavioral: Positive for self-injury and suicidal ideas  Negative for confusion  The patient is nervous/anxious  All other systems reviewed and are negative  Physical Exam  Physical Exam   Constitutional: He is oriented to person, place, and time  He appears well-developed and well-nourished  HENT:   Nose: Nose normal    Mouth/Throat: Oropharynx is clear and moist  No oropharyngeal exudate  Eyes: Pupils are equal, round, and reactive to light  Conjunctivae and EOM are normal  No scleral icterus  Neck: Normal range of motion  Neck supple  No JVD present  No tracheal deviation present  Cardiovascular: Normal rate, regular rhythm and normal heart sounds  No murmur heard    Pulmonary/Chest: Effort normal and breath sounds normal  No respiratory distress  He has no wheezes  He has no rales  Abdominal: Soft  Bowel sounds are normal  There is no tenderness  There is no guarding  Musculoskeletal: Normal range of motion  He exhibits no edema or tenderness  Neurological: He is alert and oriented to person, place, and time  No cranial nerve deficit or sensory deficit  He exhibits normal muscle tone  5/5 motor, nl sens   Skin: Skin is warm and dry  Psychiatric: His behavior is normal  His mood appears anxious  He expresses impulsivity  He exhibits a depressed mood  He expresses suicidal ideation  He expresses no homicidal ideation  He expresses suicidal plans  He expresses no homicidal plans  Nursing note and vitals reviewed        Vital Signs  ED Triage Vitals   Temperature Pulse Respirations Blood Pressure SpO2   07/09/19 1055 07/09/19 1055 07/09/19 1055 07/09/19 1055 07/09/19 1055   98 3 °F (36 8 °C) 81 22 136/93 98 %      Temp src Heart Rate Source Patient Position - Orthostatic VS BP Location FiO2 (%)   -- 07/09/19 1700 -- 07/09/19 1700 --    Monitor  Left arm       Pain Score       07/09/19 1055       8           Vitals:    07/09/19 1055 07/09/19 1700   BP: 136/93 116/64   Pulse: 81 75         Visual Acuity      ED Medications  Medications   insulin regular (HumuLIN R,NovoLIN R) injection 6 Units (6 Units Subcutaneous Given 7/9/19 1255)   ketorolac (TORADOL) injection 30 mg (30 mg Intramuscular Given 7/9/19 1713)       Diagnostic Studies  Results Reviewed     Procedure Component Value Units Date/Time    Fingerstick Glucose (POCT) [[id number]]  (Abnormal) Collected:  07/09/19 1347    Lab Status:  Final result Updated:  07/09/19 1348     POC Glucose 242 mg/dl     Comprehensive metabolic panel [[id number]]  (Abnormal) Collected:  07/09/19 1114    Lab Status:  Final result Specimen:  Blood from Arm, Left Updated:  07/09/19 1149     Sodium 132 mmol/L      Potassium 4 8 mmol/L      Chloride 97 mmol/L      CO2 28 mmol/L      ANION GAP 7 mmol/L      BUN 23 mg/dL      Creatinine 1 22 mg/dL      Glucose 277 mg/dL      Calcium 9 8 mg/dL      AST 18 U/L      ALT 13 U/L      Alkaline Phosphatase 56 U/L      Total Protein 7 2 g/dL      Albumin 4 1 g/dL      Total Bilirubin 0 40 mg/dL      eGFR 73 ml/min/1 73sq m     Narrative:       [place] guidelines for Chronic Kidney Disease (CKD):     Stage 1 with normal or high GFR (GFR > 90 mL/min/1 73 square meters)    Stage 2 Mild CKD (GFR = 60-89 mL/min/1 73 square meters)    Stage 3A Moderate CKD (GFR = 45-59 mL/min/1 73 square meters)    Stage 3B Moderate CKD (GFR = 30-44 mL/min/1 73 square meters)    Stage 4 Severe CKD (GFR = 15-29 mL/min/1 73 square meters)    Stage 5 End Stage CKD (GFR <15 mL/min/1 73 square meters)  Note: GFR calculation is accurate only with a steady state creatinine    Magnesium [[id number]]  (Abnormal) Collected:  07/09/19 1114    Lab Status:  Final result Specimen:  Blood from Arm, Left Updated:  07/09/19 1149     Magnesium 1 8 mg/dL     Valproic acid level, total [[id number]]  (Normal) Collected:  07/09/19 1114    Lab Status:  Final result Specimen:  Blood from Arm, Left Updated:  07/09/19 1149     Valproic Acid, Total 59 0 ug/mL     Acetaminophen level-"If concentration is detectable, please discuss with medical  on call " [[id number]]  (Abnormal) Collected:  07/09/19 1114    Lab Status:  Final result Specimen:  Blood from Arm, Left Updated:  07/09/19 1149     Acetaminophen Level <59 ug/mL     Salicylate level [[id number]]  (Abnormal) Collected:  07/09/19 1114    Lab Status:  Final result Specimen:  Blood from Arm, Left Updated:  55/45/99 5861     Salicylate Lvl <5 mg/dL     Ethanol [[id number]]  (Normal) Collected:  07/09/19 1114    Lab Status:  Final result Specimen:  Blood from Arm, Left Updated:  07/09/19 1145     Ethanol Lvl <10 mg/dL     Rapid drug screen, urine [[id number]]  (Normal) Collected:  07/09/19 1120    Lab Status:  Final result Specimen:  Urine, Clean Catch Updated:  07/09/19 1144     Amph/Meth UR Negative     Barbiturate Ur Negative     Benzodiazepine Urine Negative     Cocaine Urine Negative     Methadone Urine Negative     Opiate Urine Negative     PCP Ur Negative     THC Urine Negative    Narrative:       FOR MEDICAL PURPOSES ONLY  IF CONFIRMATION NEEDED PLEASE CONTACT THE LAB WITHIN 5 DAYS      Drug Screen Cutoff Levels:  AMPHETAMINE/METHAMPHETAMINES  1000 ng/mL  BARBITURATES     200 ng/mL  BENZODIAZEPINES     200 ng/mL  COCAINE      300 ng/mL  METHADONE      300 ng/mL  OPIATES      300 ng/mL  PHENCYCLIDINE     25 ng/mL  THC       50 ng/mL      Fingerstick Glucose (POCT) [[id number]]  (Abnormal) Collected:  07/09/19 1124    Lab Status:  Final result Updated:  07/09/19 1125     POC Glucose 292 mg/dl     CBC and differential [[id number]]  (Abnormal) Collected:  07/09/19 1114    Lab Status:  Final result Specimen:  Blood from Arm, Left Updated:  07/09/19 1123     WBC 7 80 Thousand/uL      RBC 4 97 Million/uL      Hemoglobin 13 3 g/dL      Hematocrit 39 3 %      MCV 79 fL      MCH 26 8 pg      MCHC 33 9 g/dL      RDW 14 9 %      MPV 7 4 fL      Platelets 055 Thousands/uL      Neutrophils Relative 69 %      Lymphocytes Relative 22 %      Monocytes Relative 8 %      Eosinophils Relative 0 %      Basophils Relative 0 %      Neutrophils Absolute 5 40 Thousands/µL      Lymphocytes Absolute 1 70 Thousands/µL      Monocytes Absolute 0 60 Thousand/µL      Eosinophils Absolute 0 00 Thousand/µL      Basophils Absolute 0 00 Thousands/µL                  No orders to display              Procedures  ECG 12 Lead Documentation Only  Date/Time: 7/9/2019 11:24 AM  Performed by: Seth Segovia DO  Authorized by: Seth Segovia DO     Indications / Diagnosis:  Psych  ECG reviewed by me, the ED Provider: yes    Patient location:  ED  Previous ECG:     Previous ECG:  Compared to current    Comparison ECG info:  04/27/2019    Similarity:  No change Comparison to cardiac monitor: Yes    Interpretation:     Interpretation: normal    Rate:     ECG rate:  80 BPM    ECG rate assessment: normal    Rhythm:     Rhythm: sinus rhythm    Ectopy:     Ectopy: none    QRS:     QRS axis:  Normal    QRS intervals:  Normal  Conduction:     Conduction: normal    ST segments:     ST segments:  Normal  T waves:     T waves: normal    Q waves:     Q waves:  AVR and V1           ED Course  ED Course as of Jul 09 1728   Tue Jul 09, 2019   1053 Patient seen and examined at bedside  Labs and EKG ordered  1219 Patient is medically cleared for psychiatric evaluation and in-patient treatment  1257   Patient to be given 6 units  insulin for hyperglycemia      1718 [facility] accepted to [facility]  MDM    Disposition  Final diagnoses:   Suicidal ideations     Time reflects when diagnosis was documented in both MDM as applicable and the Disposition within this note     Time User Action Codes Description Comment    7/9/2019 12:20 PM Chandler Urbinai Add [G26 518] Suicidal ideations     7/9/2019  3:10 PM [facility] Add [Z00 00] Physical exam       ED Disposition     ED Disposition Condition Date/Time Comment    Transfer to [address] [place] [address] Jul 9, 2019  5:18 PM Diana Vee should be transferred out to [facility] and has been medically cleared  MD Documentation      Most Recent Value   Sending MD Dr Idolina Castleman, DO      Follow-up Information    None         Patient's Medications   Discharge Prescriptions    No medications on file     No discharge procedures on file      ED Provider  Electronically Signed by           Mary Beth Francois,   07/09/19 [address], DO  07/09/19 1728

## 2019-07-10 PROBLEM — IMO0002 DM (DIABETES MELLITUS), SECONDARY UNCONTROLLED: Status: ACTIVE | Noted: 2018-05-11

## 2019-07-10 LAB
CHOLEST SERPL-MCNC: 162 MG/DL (ref 0–200)
EST. AVERAGE GLUCOSE BLD GHB EST-MCNC: 194 MG/DL
GLUCOSE SERPL-MCNC: 168 MG/DL (ref 65–140)
GLUCOSE SERPL-MCNC: 233 MG/DL (ref 65–140)
GLUCOSE SERPL-MCNC: 96 MG/DL (ref 65–140)
HBA1C MFR BLD: 8.4 % (ref 4.2–6.3)
HDLC SERPL-MCNC: 32 MG/DL (ref 40–60)
LDLC SERPL CALC-MCNC: 92 MG/DL (ref 0–100)
NONHDLC SERPL-MCNC: 130 MG/DL
RPR SER QL: NORMAL
TRIGL SERPL-MCNC: 191 MG/DL (ref 44–166)
TSH SERPL DL<=0.05 MIU/L-ACNC: 0.32 UIU/ML (ref 0.45–5.33)

## 2019-07-10 PROCEDURE — 86592 SYPHILIS TEST NON-TREP QUAL: CPT | Performed by: NURSE PRACTITIONER

## 2019-07-10 PROCEDURE — 80061 LIPID PANEL: CPT | Performed by: NURSE PRACTITIONER

## 2019-07-10 PROCEDURE — 83036 HEMOGLOBIN GLYCOSYLATED A1C: CPT | Performed by: NURSE PRACTITIONER

## 2019-07-10 PROCEDURE — 99223 1ST HOSP IP/OBS HIGH 75: CPT | Performed by: PSYCHIATRY & NEUROLOGY

## 2019-07-10 PROCEDURE — 84443 ASSAY THYROID STIM HORMONE: CPT | Performed by: NURSE PRACTITIONER

## 2019-07-10 PROCEDURE — 82948 REAGENT STRIP/BLOOD GLUCOSE: CPT

## 2019-07-10 RX ORDER — GLYBURIDE 5 MG/1
TABLET ORAL
Status: ON HOLD | COMMUNITY
Start: 2017-07-05 | End: 2019-07-25

## 2019-07-10 RX ORDER — GLIMEPIRIDE 1 MG/1
1 TABLET ORAL DAILY
Status: ON HOLD | COMMUNITY
Start: 2019-07-01 | End: 2019-07-25

## 2019-07-10 RX ORDER — TRAZODONE HYDROCHLORIDE 100 MG/1
100 TABLET ORAL
COMMUNITY
Start: 2019-07-01 | End: 2019-08-02 | Stop reason: HOSPADM

## 2019-07-10 RX ORDER — INSULIN GLARGINE 100 [IU]/ML
32 INJECTION, SOLUTION SUBCUTANEOUS
Status: DISCONTINUED | OUTPATIENT
Start: 2019-07-10 | End: 2019-08-02 | Stop reason: HOSPADM

## 2019-07-10 RX ADMIN — CHLORPROMAZINE HYDROCHLORIDE 50 MG: 25 TABLET, SUGAR COATED ORAL at 08:19

## 2019-07-10 RX ADMIN — BUSPIRONE HYDROCHLORIDE 10 MG: 5 TABLET ORAL at 08:19

## 2019-07-10 RX ADMIN — GABAPENTIN 400 MG: 300 CAPSULE ORAL at 08:19

## 2019-07-10 RX ADMIN — BENZTROPINE MESYLATE 1 MG: 1 TABLET ORAL at 08:19

## 2019-07-10 RX ADMIN — INSULIN GLARGINE 32 UNITS: 100 INJECTION, SOLUTION SUBCUTANEOUS at 22:32

## 2019-07-10 RX ADMIN — DULOXETINE HYDROCHLORIDE 60 MG: 60 CAPSULE, DELAYED RELEASE ORAL at 08:19

## 2019-07-10 RX ADMIN — GABAPENTIN 400 MG: 300 CAPSULE ORAL at 22:32

## 2019-07-10 RX ADMIN — CHLORPROMAZINE HYDROCHLORIDE 50 MG: 25 TABLET, SUGAR COATED ORAL at 17:10

## 2019-07-10 RX ADMIN — DIVALPROEX SODIUM 2000 MG: 500 TABLET, FILM COATED, EXTENDED RELEASE ORAL at 17:10

## 2019-07-10 RX ADMIN — GLYBURIDE 1.25 MG: 1.25 TABLET ORAL at 08:19

## 2019-07-10 RX ADMIN — BENZTROPINE MESYLATE 1 MG: 1 TABLET ORAL at 17:10

## 2019-07-10 RX ADMIN — ACETAMINOPHEN 650 MG: 325 TABLET ORAL at 13:45

## 2019-07-10 RX ADMIN — INSULIN LISPRO 4 UNITS: 100 INJECTION, SOLUTION INTRAVENOUS; SUBCUTANEOUS at 12:33

## 2019-07-10 RX ADMIN — BUSPIRONE HYDROCHLORIDE 10 MG: 5 TABLET ORAL at 17:10

## 2019-07-10 RX ADMIN — TRAZODONE HYDROCHLORIDE 50 MG: 50 TABLET ORAL at 22:41

## 2019-07-10 RX ADMIN — GABAPENTIN 400 MG: 300 CAPSULE ORAL at 16:46

## 2019-07-10 NOTE — PROGRESS NOTES
Patient bright, alert, ate meals with peers, then withdrawn back to room and self  Patient tells this nurse he intentionally took many of his prescribed pills to overdose  States he has the problems with the same girlfriend and has had increased depression  He also states he had passed out and been hospitalized for dehydration in the past 2 weeks  He had not taken his medications in 4 days  Complinat with insulin and medications here  Left foot posterior heel pinpoint open ulcer  Right foot open ulcer plantar surface  Cleaned and wrapped, provider to place wound consult  Will maintain on safety precautions and 7 minute checks, no needs identified

## 2019-07-10 NOTE — PROGRESS NOTES
Patient arrived on the unit via wheelchair from [facility] ED  Able to shower unassisted, pleasant and cooperative  Patient states "my girlfriend belittles me, and I needed a break  I took extra medications trying to kill myself but I don't really want to die " Patient contracts for safety  Given tour of unit, q 7 minute checks will continue for patient safety  Also noted on admission exam were an open ulcer right foot planter aspect approximately the size of a quarter and a pinpoint hole left heel  Clean and dry no foul odor, will continue to monitor

## 2019-07-10 NOTE — PLAN OF CARE
PT declining all invites to attend offered AT groups and isolating in PT room     Problem: Ineffective Coping  Goal: Participates in unit activities  Description  Interventions:  - Provide therapeutic environment   - Provide required programming   - Redirect inappropriate behaviors   Outcome: Not Progressing

## 2019-07-10 NOTE — PLAN OF CARE
Problem: NEUROSENSORY - ADULT  Goal: Remains free of injury related to seizures activity  Description  INTERVENTIONS  - Maintain airway, patient safety  and administer oxygen as ordered  - Monitor patient for seizure activity, document and report duration and description of seizure to physician/advanced practitioner  - If seizure occurs,  ensure patient safety during seizure  - Reorient patient post seizure  - Seizure pads on all 4 side rails  - Instruct patient/family to notify RN of any seizure activity including if an aura is experienced  - Instruct patient/family to call for assistance with activity based on nursing assessment  - Administer anti-seizure medications as ordered  - Monitor fetal well being  Outcome: Not Progressing     Problem: SKIN/TISSUE INTEGRITY - ADULT  Goal: Incision(s), wounds(s) or drain site(s) healing without S/S of infection  Description  INTERVENTIONS  - Assess and document risk factors for skin impairment   - Assess and document dressing, incision, wound bed, drain sites and surrounding tissue  - Initiate Nutrition services consult and/or wound management as needed  Outcome: Not Progressing     Problem: SAFETY ADULT  Goal: Patient will remain free of falls  Description  INTERVENTIONS:  - Assess patient frequently for physical needs  -  Identify cognitive and physical deficits and behaviors that affect risk of falls    -  [place] fall precautions as indicated by assessment   - Educate patient/family on patient safety including physical limitations  - Instruct patient to call for assistance with activity based on assessment  - Modify environment to reduce risk of injury  - Consider OT/PT consult to assist with strengthening/mobility  Outcome: Not Progressing     Problem: Risk for Self Injury/Neglect  Goal: Verbalize thoughts and feelings  Description  Interventions:  - Assess and re-assess patient's lethality and potential for self-injury  - Engage patient in 1:1 interactions, daily, for a minimum of 15 minutes  - Encourage patient to express feelings, fears, frustrations, hopes  - Establish rapport/trust with patient   Outcome: Not Progressing  Goal: Recognize maladaptive responses and adopt new coping mechanisms  Outcome: Not Progressing  Goal: Complete daily ADLs, including personal hygiene independently, as able  Description  Interventions:  - Observe, teach, and assist patient with ADLS  - Monitor and promote a balance of rest/activity, with adequate nutrition and elimination  Outcome: Not Progressing     Problem: Depression  Goal: Treatment Goal: Demonstrate behavioral control of depressive symptoms, verbalize feelings of improved mood/affect, and adopt new coping skills prior to discharge  Outcome: Not Progressing  Goal: Refrain from self-neglect  Outcome: Not Progressing     Problem: Anxiety  Goal: Anxiety is at manageable level  Description  Interventions:  - Assess and monitor patient's anxiety level  - Monitor for signs and symptoms of anxiety both physical and emotional (heart palpitations, chest pain, shortness of breath, headaches, nausea, feeling jumpy, restlessness, irritable, apprehensive)  - Collaborate with interdisciplinary team and initiate plan and interventions as ordered    - [place] patient to unit/surroundings  - Explain treatment plan  - Encourage participation in care  - Encourage verbalization of concerns/fears  - Identify coping mechanisms  - Assist in developing anxiety-reducing skills  - Administer/offer alternative therapies  - Limit or eliminate stimulants  Outcome: Not Progressing    Care plans started on admission

## 2019-07-10 NOTE — PLAN OF CARE
Problem: NEUROSENSORY - ADULT  Goal: Remains free of injury related to seizures activity  Description  INTERVENTIONS  - Maintain airway, patient safety  and administer oxygen as ordered  - Monitor patient for seizure activity, document and report duration and description of seizure to physician/advanced practitioner  - If seizure occurs,  ensure patient safety during seizure  - Reorient patient post seizure  - Seizure pads on all 4 side rails  - Instruct patient/family to notify RN of any seizure activity including if an aura is experienced  - Instruct patient/family to call for assistance with activity based on nursing assessment  - Administer anti-seizure medications as ordered  - Monitor fetal well being  Outcome: Progressing     Problem: SKIN/TISSUE INTEGRITY - ADULT  Goal: Incision(s), wounds(s) or drain site(s) healing without S/S of infection  Description  INTERVENTIONS  - Assess and document risk factors for skin impairment   - Assess and document dressing, incision, wound bed, drain sites and surrounding tissue  - Initiate Nutrition services consult and/or wound management as needed  Outcome: Progressing     Problem: SAFETY ADULT  Goal: Patient will remain free of falls  Description  INTERVENTIONS:  - Assess patient frequently for physical needs  -  Identify cognitive and physical deficits and behaviors that affect risk of falls    -  [place] fall precautions as indicated by assessment   - Educate patient/family on patient safety including physical limitations  - Instruct patient to call for assistance with activity based on assessment  - Modify environment to reduce risk of injury  - Consider OT/PT consult to assist with strengthening/mobility  Outcome: Progressing     Problem: Risk for Self Injury/Neglect  Goal: Verbalize thoughts and feelings  Description  Interventions:  - Assess and re-assess patient's lethality and potential for self-injury  - Engage patient in 1:1 interactions, daily, for a minimum of 15 minutes  - Encourage patient to express feelings, fears, frustrations, hopes  - Establish rapport/trust with patient   Outcome: Progressing  Goal: Recognize maladaptive responses and adopt new coping mechanisms  Outcome: Progressing  Goal: Complete daily ADLs, including personal hygiene independently, as able  Description  Interventions:  - Observe, teach, and assist patient with ADLS  - Monitor and promote a balance of rest/activity, with adequate nutrition and elimination  Outcome: Progressing     Problem: Depression  Goal: Treatment Goal: Demonstrate behavioral control of depressive symptoms, verbalize feelings of improved mood/affect, and adopt new coping skills prior to discharge  Outcome: Progressing  Goal: Refrain from self-neglect  Outcome: Progressing     Problem: DISCHARGE PLANNING - CARE MANAGEMENT  Goal: Discharge to post-acute care or home with appropriate resources  Description  INTERVENTIONS:  - Conduct assessment to determine patient/family and health care team treatment goals, and need for post-acute services based on payer coverage, community resources, and patient preferences, and barriers to discharge  - Address psychosocial, clinical, and financial barriers to discharge as identified in assessment in conjunction with the patient/family and health care team  - Arrange appropriate level of post-acute services according to patient's   needs and preference and payer coverage in collaboration with the physician and health care team  - Communicate with and update the patient/family, physician, and health care team regarding progress on the discharge plan  - Arrange appropriate transportation to post-acute venues   Outcome: Progressing     Problem: Risk for Violence/Aggression Toward Others  Goal: Treatment Goal: Refrain from acts of violence/aggression during length of stay, and demonstrate improved impulse control at the time of discharge  Outcome: Progressing  Goal: Refrain from harming others  Outcome: Progressing  Goal: Refrain from destructive acts on the environment or property  Outcome: Progressing  Goal: Control angry outbursts  Description  Interventions:  - Monitor patient closely, per order  - Ensure early verbal de-escalation  - Monitor prn medication needs  - Set reasonable/therapeutic limits, outline behavioral expectations, and consequences   - Provide a non-threatening milieu, utilizing the least restrictive interventions   Outcome: Progressing  Goal: Attend and participate in unit activities, including therapeutic, recreational, and educational groups  Description  Interventions:  - Provide therapeutic and educational activities daily, encourage attendance and participation, and document same in the medical record   Outcome: Progressing  Goal: Identify appropriate positive anger management techniques  Description  Interventions:  - Offer anger management and coping skills groups   - Staff will provide positive feedback for appropriate anger control  Outcome: Progressing     Problem: Anxiety  Goal: Anxiety is at manageable level  Description  Interventions:  - Assess and monitor patient's anxiety level  - Monitor for signs and symptoms of anxiety both physical and emotional (heart palpitations, chest pain, shortness of breath, headaches, nausea, feeling jumpy, restlessness, irritable, apprehensive)  - Collaborate with interdisciplinary team and initiate plan and interventions as ordered    - [place] patient to unit/surroundings  - Explain treatment plan  - Encourage participation in care  - Encourage verbalization of concerns/fears  - Identify coping mechanisms  - Assist in developing anxiety-reducing skills  - Administer/offer alternative therapies  - Limit or eliminate stimulants  Outcome: Not Progressing     Problem: Ineffective Coping  Goal: Participates in unit activities  Description  Interventions:  - Provide therapeutic environment   - Provide required programming - Redirect inappropriate behaviors   Outcome: Not Progressing

## 2019-07-10 NOTE — CONSULTS
Consultation - Storm Garcia 44 y o  male MRN: [id number]    Unit/Bed#: [facility] 899-42 Encounter: [id number]      Assessment/Plan     Assessment:  Bipolar I disorder  Suicide attempt  Hypertension  Diabetes Mellitus type 2    Plan:  Patient to see NP and psychiatrist today  Patient is currently medically stable for continued inpatient evaluation  History of Present Illness     HPI: Storm Garcia is a 44y o  year old male who was admitted to inpatient behavioral health with suicide attempt suicidal ideation  Today patient admits to his normal chronic foot and low back pain, denies other complaints at this time  Inpatient consult for Medical Clearance for [place] patient  Consult performed by: Jefferson Garrido PA-C  Consult ordered by: TAYLOR David          Review of Systems   Constitutional: Negative for chills and fever  HENT: Negative for congestion, rhinorrhea and sore throat  Eyes: Negative for visual disturbance  Respiratory: Negative for cough and shortness of breath  Cardiovascular: Negative for chest pain  Gastrointestinal: Negative for abdominal pain, diarrhea, nausea and vomiting  Genitourinary: Negative for dysuria  Musculoskeletal: Positive for back pain  Skin: Negative for rash  Neurological: Negative for headaches         Historical Information   Past Medical History:   Diagnosis Date    Anxiety     Bipolar 1 disorder ([place] [address] )     Chronic pain disorder     Depression     Diabetes mellitus ([place] [address] )     Hypertension     Psychiatric illness     PTSD (post-traumatic stress disorder)     Sleep difficulties     Substance abuse ([place] [address] )     Suicide attempt ([place] [address] )      Past Surgical History:   Procedure Laterality Date    APPENDECTOMY      COLON SURGERY      TONSILLECTOMY       Social History   Social History     Substance and Sexual Activity   Alcohol Use No     Social History     Substance and Sexual Activity   Drug Use Yes    Types: Marijuana    Comment: monthly     Social History     Tobacco Use   Smoking Status Current Every Day Smoker    Packs/day: 1 50    Years: 22 00    Pack years: 33 00    Types: Cigarettes   Smokeless Tobacco Current User    Types: Chew     Family History: non-contributory    Meds/Allergies   all current active meds have been reviewed  Allergies   Allergen Reactions    Penicillins Rash and Other (See Comments)     rash (Tolerating Ancef-per RN)  Last noted when patient was a child       Objective   Vitals: Blood pressure 162/93, pulse 80, temperature 97 5 °F (36 4 °C), temperature source Temporal, resp  rate 16, height 6' 3" (1 905 m), weight (!) 137 kg (301 lb), SpO2 96 %  No intake or output data in the 24 hours ending 07/10/19 0926  Invasive Devices     None                 Physical Exam   Constitutional: He is oriented to person, place, and time  He appears well-developed and well-nourished  HENT:   Head: Normocephalic and atraumatic  Right Ear: External ear normal    Left Ear: External ear normal    Nose: Nose normal    Mouth/Throat: Oropharynx is clear and moist    Eyes: Conjunctivae and EOM are normal    Neck: Normal range of motion  Neck supple  Cardiovascular: Normal rate, regular rhythm and normal heart sounds  Pulmonary/Chest: Effort normal and breath sounds normal    Neurological: He is alert and oriented to person, place, and time  Skin: Skin is warm and dry  Capillary refill takes less than 2 seconds  Psychiatric: He has a normal mood and affect  His behavior is normal    Nursing note and vitals reviewed  Lab Results: I have personally reviewed pertinent reports  Imaging Studies: I have personally reviewed pertinent reports  EKG, Pathology, and Other Studies: I have personally reviewed pertinent reports      VTE Prophylaxis: None, ambulatory    Code Status: Level 1 - Full Code  Advance Directive and Living Will:      Power of :    POLST:      Counseling / Coordination of Care  Total floor / unit time spent today 30 minutes  Greater than 50% of total time was spent with the patient and / or family counseling and / or coordination of care   A description of the counseling / coordination of care: H&P

## 2019-07-10 NOTE — PROGRESS NOTES
Clinical Pharmacy Note: Antipsychotic Monitoring Requirements     Diana Vee is a 44 y o  male currently on the following medications:    Current Facility-Administered Medications:     acetaminophen (TYLENOL) tablet 650 mg, 650 mg, Oral, Q6H PRN, TAYLOR Lux    acetaminophen (TYLENOL) tablet 650 mg, 650 mg, Oral, Q4H PRN, TAYLOR Lux    acetaminophen (TYLENOL) tablet 975 mg, 975 mg, Oral, Q6H PRN, TAYLOR Lux    aluminum-magnesium hydroxide-simethicone (MYLANTA) 200-200-20 mg/5 mL oral suspension 30 mL, 30 mL, Oral, Q4H PRN, TAYLOR Lux    benztropine (COGENTIN) injection 1 mg, 1 mg, Intramuscular, Q6H PRN, TAYLOR Lux    benztropine (COGENTIN) tablet 1 mg, 1 mg, Oral, Q6H PRN, TAYLOR Lux    benztropine (COGENTIN) tablet 1 mg, 1 mg, Oral, BID, RICK LuxNP, 1 mg at 07/10/19 0819    busPIRone (BUSPAR) tablet 10 mg, 10 mg, Oral, BID, TAYLOR Lux, 10 mg at 07/10/19 6830    chlorproMAZINE (THORAZINE) tablet 50 mg, 50 mg, Oral, BID, Mandy Bautista, RICKNP, 50 mg at 07/10/19 0819    divalproex sodium (DEPAKOTE ER) 24 hr tablet 2,000 mg, 2,000 mg, Oral, QPM, RICK LuxNP, 2,000 mg at 07/09/19 1857    DULoxetine (CYMBALTA) delayed release capsule 60 mg, 60 mg, Oral, Daily, RICK LuxNP, 60 mg at 07/10/19 5031    gabapentin (NEURONTIN) capsule 400 mg, 400 mg, Oral, TID, Mandy Bautista, CRNP, 400 mg at 07/10/19 3805    glyBURIDE (DIABETA) tablet 1 25 mg, 1 25 mg, Oral, Daily With Breakfast, RICK LuxNP, 1 25 mg at 07/10/19 2545    haloperidol (HALDOL) tablet 5 mg, 5 mg, Oral, Q6H PRN, TAYLOR Lux    haloperidol lactate (HALDOL) injection 5 mg, 5 mg, Intramuscular, Q6H PRN, TAYLOR Lux    hydrOXYzine HCL (ATARAX) tablet 25 mg, 25 mg, Oral, Q6H PRN, TAYLOR Lux    hydrOXYzine HCL (ATARAX) tablet 50 mg, 50 mg, Oral, Q4H PRN, TAYLOR Lux    insulin glargine (LANTUS) subcutaneous injection 32 Units 0 32 mL, 32 Units, Subcutaneous, HS, TAYLOR Woody    insulin lispro (HumaLOG) 100 units/mL subcutaneous injection 2-12 Units, 2-12 Units, Subcutaneous, TID AC **AND** Fingerstick Glucose (POCT), , , TID AC, Mayra Jessica, TAYLOR    insulin lispro (HumaLOG) 100 units/mL subcutaneous injection 2-12 Units, 2-12 Units, Subcutaneous, HS, TAYLOR Woody    LORazepam (ATIVAN) 2 mg/mL injection 2 mg, 2 mg, Intramuscular, Q6H PRN, TAYLOR Lux    magnesium hydroxide (MILK OF MAGNESIA) 400 mg/5 mL oral suspension 30 mL, 30 mL, Oral, Daily PRN, TAYLOR Lux    nicotine (NICODERM CQ) 21 mg/24 hr TD 24 hr patch 1 patch, 1 patch, Transdermal, Daily, TAYLOR Lux    OLANZapine (ZyPREXA) IM injection 10 mg, 10 mg, Intramuscular, Q3H PRN, TAYLOR Lux    risperiDONE (RisperDAL M-TABS) dispersible tablet 1 mg, 1 mg, Oral, Q3H PRN, TAYLOR Lux    traZODone (DESYREL) tablet 50 mg, 50 mg, Oral, HS PRN, [place] PercTAYLOR Coleman      Performing metabolic monitoring on patients receiving any antipsychotics is a Centers for [facility] [employer] (CMS) requirement  Antipsychotic treatment increases the risk of developing type 2 diabetes mellitus, hypertension, and hyperlipidemia  According to the [facility] (NICE) guidelines, baseline weight, waist circumference, pulse, blood pressure, fasting blood glucose, hemoglobin A1c, and lipid profile should be collected  These guidelines coincide with Centers and Medicare & Medicaid Services (CMS) requirements including baseline Body Mass Index, blood pressure, fasting glucose or hemoglobin A1c, and fasting lipid panel  CMS states laboratory values collected 12 months from discharge date are acceptable for evaluation       CMS Checklist:     BMI: yes  BP: yes  Fasting glucose: yes       OR A1c: yes  Lipid panel within last 12 months: yes  Nicotine Replacement Therapy: yes    The following values have been collected:  Value Status Result    BMI Body mass index is 37 62 kg/m²     Blood pressure /93 (BP Location: Left arm)   Pulse 80   Temp 97 5 °F (36 4 °C) (Temporal)   Resp 16   Ht 6' 3" (1 905 m)   Wt (!) 137 kg (301 lb)   SpO2 96%   BMI 37 62 kg/m²    Fasting glucose 0   Lab Value Date/Time    GLUC 277 (H) 07/09/2019 1114        Hemoglobin A1c 0   Lab Value Date/Time    HGBA1C 8 4 (H) 07/10/2019 0612        Lipid panel 0   Lab Value Date/Time    CHOLESTEROL 162 07/10/2019 0612       0   Lab Value Date/Time    HDL 32 (L) 07/10/2019 0612       0   Lab Value Date/Time    LDLCALC 92 07/10/2019 0612        0   Lab Value Date/Time    TRIG 191 (H) 07/10/2019 0612          Recommendations:  CMS requirements have been completed    Pharmacy will continue to follow patient with team   Electronically signed by: Nguyen Bashir, Pharmacist

## 2019-07-10 NOTE — TREATMENT PLAN
Treatment Plan [facility] 44 y o  male MRN: [id number]     Rodena Koyanagi 078-70 Encounter: [id number]          Admit Date/Time:  7/9/2019 10:53 AM    Treatment Team: Attending Provider: Barb Garcia MD; Patient Care Technician: Cinthia Hermosillo; Patient Care Assistant: Irma Coello; Care Manager: Cameron Kirkland RN; Patient Care Assistant: Jeffy Montano;  Recreational Therapist: Robert Dalal; Certified Nursing Assistant: Hallie Bruce; Registered Nurse: Jerson Vargas RN    Diagnosis: Active Problems:    Severe bipolar I disorder, most recent episode depressed [facility] )    Tobacco use disorder    Essential hypertension    DM (diabetes mellitus), secondary uncontrolled ([facility] )      Mental Status Evaluation:  Appearance:  disheveled   Behavior:  guarded   Speech:  soft   Mood:  constricted   Affect:  constricted   Language: repeating phrases   Thought Process:  circumstantial   Thought Content:  obsessions   Perceptual Disturbances: None   Risk Potential: Suicidal Ideations with plan to OD   Sensorium:  person and place   Cognition:  grossly intact   Consciousness:  alert and awake    Attention: attention span appeared shorter than expected for age   Intellect: not examined   Fund of Knowledge: vocabulary: limited   Insight:  limited   Judgment: limited   Muscle Location: face and neck   Gait/Station: slow   Motor Activity: no abnormal movements     Patient Strengths: motivation for treatment/growth     Patient Barriers: homeless    Progress Toward Goals: ongoing    Recommended Treatment: individual therapy, discharge planning     Treatment Frequency: 1-2 times per week     Expected Discharge Date: 7/9/2019    Discharge Plan: return to previous living arrangement     Treatment Plan Created/Updated By: Barb Garcia MD

## 2019-07-10 NOTE — PROGRESS NOTES
Patient alert, awake in am, compliant with POC  Denies si hi and hallucinations  Feels hopeless, helpless and says " I guess I'll just have to more creative next time" in regards to his suicide attempt with overdose that led to admission  Says he was camping at [facility] and was "done with the world and the world was done with me"  Calm , polite and cooperative to this nurse  Encouraged continued expression  Will maintain on safety precautions and 7 minute checks, no needs identified

## 2019-07-10 NOTE — PROGRESS NOTES
Daily Rounds Documentation    Team Members Present:   MD Tammie Victoria, RN  Alfred Soulier, [address], [place]  Eleazar Wood, PharmD     SI due to homelessness  Alleged attempted suicide 2 days ago  Has a cane  Behavior is good so far

## 2019-07-11 LAB
GLUCOSE SERPL-MCNC: 168 MG/DL (ref 65–140)
GLUCOSE SERPL-MCNC: 171 MG/DL (ref 65–140)
GLUCOSE SERPL-MCNC: 180 MG/DL (ref 65–140)
GLUCOSE SERPL-MCNC: 213 MG/DL (ref 65–140)

## 2019-07-11 PROCEDURE — 82948 REAGENT STRIP/BLOOD GLUCOSE: CPT

## 2019-07-11 PROCEDURE — 99232 SBSQ HOSP IP/OBS MODERATE 35: CPT | Performed by: NURSE PRACTITIONER

## 2019-07-11 RX ORDER — TRAZODONE HYDROCHLORIDE 50 MG/1
50 TABLET ORAL
Status: DISCONTINUED | OUTPATIENT
Start: 2019-07-11 | End: 2019-08-02 | Stop reason: HOSPADM

## 2019-07-11 RX ORDER — CHLORPROMAZINE HYDROCHLORIDE 25 MG/1
50 TABLET, FILM COATED ORAL 3 TIMES DAILY
Status: DISCONTINUED | OUTPATIENT
Start: 2019-07-11 | End: 2019-08-02 | Stop reason: HOSPADM

## 2019-07-11 RX ADMIN — INSULIN LISPRO 2 UNITS: 100 INJECTION, SOLUTION INTRAVENOUS; SUBCUTANEOUS at 12:32

## 2019-07-11 RX ADMIN — NICOTINE 1 PATCH: 21 PATCH, EXTENDED RELEASE TRANSDERMAL at 08:16

## 2019-07-11 RX ADMIN — GABAPENTIN 400 MG: 300 CAPSULE ORAL at 08:15

## 2019-07-11 RX ADMIN — CHLORPROMAZINE HYDROCHLORIDE 50 MG: 25 TABLET, SUGAR COATED ORAL at 08:15

## 2019-07-11 RX ADMIN — BENZTROPINE MESYLATE 1 MG: 1 TABLET ORAL at 17:01

## 2019-07-11 RX ADMIN — BUSPIRONE HYDROCHLORIDE 10 MG: 5 TABLET ORAL at 08:15

## 2019-07-11 RX ADMIN — BENZTROPINE MESYLATE 1 MG: 1 TABLET ORAL at 08:14

## 2019-07-11 RX ADMIN — DULOXETINE HYDROCHLORIDE 60 MG: 60 CAPSULE, DELAYED RELEASE ORAL at 08:14

## 2019-07-11 RX ADMIN — GABAPENTIN 400 MG: 300 CAPSULE ORAL at 16:58

## 2019-07-11 RX ADMIN — BUSPIRONE HYDROCHLORIDE 10 MG: 5 TABLET ORAL at 17:00

## 2019-07-11 RX ADMIN — HYDROXYZINE HYDROCHLORIDE 50 MG: 25 TABLET ORAL at 12:58

## 2019-07-11 RX ADMIN — GLYBURIDE 1.25 MG: 1.25 TABLET ORAL at 08:15

## 2019-07-11 RX ADMIN — INSULIN LISPRO 4 UNITS: 100 INJECTION, SOLUTION INTRAVENOUS; SUBCUTANEOUS at 21:13

## 2019-07-11 RX ADMIN — INSULIN GLARGINE 32 UNITS: 100 INJECTION, SOLUTION SUBCUTANEOUS at 22:20

## 2019-07-11 RX ADMIN — GABAPENTIN 400 MG: 300 CAPSULE ORAL at 21:12

## 2019-07-11 RX ADMIN — DIVALPROEX SODIUM 2000 MG: 500 TABLET, FILM COATED, EXTENDED RELEASE ORAL at 17:00

## 2019-07-11 RX ADMIN — TRAZODONE HYDROCHLORIDE 50 MG: 50 TABLET ORAL at 21:13

## 2019-07-11 RX ADMIN — INSULIN LISPRO 2 UNITS: 100 INJECTION, SOLUTION INTRAVENOUS; SUBCUTANEOUS at 16:57

## 2019-07-11 RX ADMIN — CHLORPROMAZINE HYDROCHLORIDE 50 MG: 25 TABLET, SUGAR COATED ORAL at 21:13

## 2019-07-11 RX ADMIN — CHLORPROMAZINE HYDROCHLORIDE 50 MG: 25 TABLET, SUGAR COATED ORAL at 16:58

## 2019-07-11 RX ADMIN — INSULIN LISPRO 2 UNITS: 100 INJECTION, SOLUTION INTRAVENOUS; SUBCUTANEOUS at 08:15

## 2019-07-11 NOTE — PROGRESS NOTES
Progress Note - Behavioral Health     Valente Ramirez 44 y o  male MRN: [id number]   Unit/Bed#: [facility] 258-02 Encounter: [id number]    Behavior over the last 24 hours: Jeane Levin was seen for an inpatient follow-up psychiatric visit this date  At today's visit, he was calm, pleasant, and cooperative  He relates he is feeling very sad but denies any current suicidal or homicidal ideation  He remains profoundly depressed and is having difficulty controlling his anger  He is taking his medications as prescribed and denies any side effects  ROS: no complaints    Mental Status Evaluation:    Appearance:  casually dressed, dressed appropriately   Behavior:  cooperative, calm   Speech:  normal rate and volume   Mood:  depressed, dysphoric, anxious   Affect:  tearful, flat   Thought Process:  organized, logical, coherent   Associations: intact associations   Thought Content:  normal, no overt delusions   Perceptual Disturbances: none   Risk Potential: Suicidal ideation - None  Homicidal ideation - None  Potential for aggression - No   Sensorium:  oriented to person, place and time/date   Memory:  recent and remote memory grossly intact   Consciousness:  alert and awake   Attention: attention span and concentration are age appropriate   Insight:  fair   Judgment: fair   Gait/Station: normal gait/station, normal balance   Motor Activity: no abnormal movements     Vital signs in last 24 hours:    Temp:  [98 1 °F (36 7 °C)-98 9 °F (37 2 °C)] 98 9 °F (37 2 °C)  HR:  [] 88  Resp:  [16-18] 18  BP: (137-160)/(86-96) 160/96    Laboratory results:  I have personally reviewed all pertinent laboratory/tests results      Progress Toward Goals: progressing    Assessment/Plan   Principal Problem:    Severe bipolar I disorder, most recent episode depressed without psychotic features ([facility] )  Active Problems:    Tobacco use disorder    Essential hypertension    DM (diabetes mellitus), secondary uncontrolled ([facility] )    Recommended Treatment: Thorazine increased to 50 mg 3 times a day for anger control  Repeat Depakote level ordered  Continue other medications as prescribed  Continue to observe  Discharge disposition and planning are ongoing  All current active medications have been reviewed    Encourage group therapy, milieu therapy and occupational therapy  Behavioral Health checks every 7 minutes      Current Facility-Administered Medications:  acetaminophen 650 mg Oral Q6H PRN Mandy Bautista, CRNP   acetaminophen 650 mg Oral Q4H PRN Mandy Bautista, CRNP   acetaminophen 975 mg Oral Q6H PRN Mandy Bautista, CRNP   aluminum-magnesium hydroxide-simethicone 30 mL Oral Q4H PRN Mandy Bautista, CRNP   benztropine 1 mg Intramuscular Q6H PRN Mandy Bautista, CRNP   benztropine 1 mg Oral Q6H PRN Mandy Bautista, CRNP   benztropine 1 mg Oral BID Mandy Bautista, CRNP   busPIRone 10 mg Oral BID Mandy Bautista, CRNP   chlorproMAZINE 50 mg Oral TID Mandy Bautista, CRNP   divalproex sodium 2,000 mg Oral QPM Mandy Bautista, CRNP   DULoxetine 60 mg Oral Daily Mandy Bautista, CRNP   gabapentin 400 mg Oral TID Mandy Bautista, CRNP   glyBURIDE 1 25 mg Oral Daily With Breakfast Mandy Bautista, CRNP   haloperidol 5 mg Oral Q6H PRN Mandy Bautista, CRNP   haloperidol lactate 5 mg Intramuscular Q6H PRN Mandy Bautista, CRNP   hydrOXYzine HCL 25 mg Oral Q6H PRN Mandy Bautista, CRNP   hydrOXYzine HCL 50 mg Oral Q4H PRN Mandy Bautista, CRNP   insulin glargine 32 Units Subcutaneous HS Doyle Constanza, CRNP   insulin lispro 2-12 Units Subcutaneous TID AC Mayra Jaskaran, CRNP   insulin lispro 2-12 Units Subcutaneous HS Mayra Jaskaran, CRNP   LORazepam 2 mg Intramuscular Q6H PRN Mandy Bautista, CRNP   magnesium hydroxide 30 mL Oral Daily PRN Mandy Bautista, CRNP   nicotine 1 patch Transdermal Daily Mandy Bautista, CRNP   OLANZapine 10 mg Intramuscular Q3H PRN Mandy Bautista, CRNP   risperiDONE 1 mg Oral Q3H PRN TAYLOR Lux   traZODone 50 mg Oral HS PRN TAYLOR Lux   traZODone 50 mg Oral HS TAYLOR Lux       Risks / Benefits of Treatment:    Risks, benefits, and possible side effects of medications explained to patient and patient verbalizes understanding and agreement for treatment  Counseling / Coordination of Care:      Patient's progress discussed with staff in treatment team meeting  Medications, treatment progress and treatment plan reviewed with patient

## 2019-07-11 NOTE — PROGRESS NOTES
Patient bright, alert, awake, ate breakfast with peers, back to bed after eating, declines to attend group " I don't like to be around this many people" Denies si hi and hallucinations and 7 minute checks, no needs identified  Compliant with all medications and care / This nurse changed dressing on right foot  No change in plantar ulcer  Minimal bleeding from walking but no oozing present   Wound care nurse to see patient tomorrow 7/12  Patient agreeable  Patient had requested atarax for increased anxiety on unit and states great effectiveness  Will maintain on safety precautions and 7 minute checks, no needs identified

## 2019-07-11 NOTE — PLAN OF CARE
Problem: NEUROSENSORY - ADULT  Goal: Remains free of injury related to seizures activity  Description  INTERVENTIONS  - Maintain airway, patient safety  and administer oxygen as ordered  - Monitor patient for seizure activity, document and report duration and description of seizure to physician/advanced practitioner  - If seizure occurs,  ensure patient safety during seizure  - Reorient patient post seizure  - Seizure pads on all 4 side rails  - Instruct patient/family to notify RN of any seizure activity including if an aura is experienced  - Instruct patient/family to call for assistance with activity based on nursing assessment  - Administer anti-seizure medications as ordered  - Monitor fetal well being  Outcome: Progressing     Problem: SKIN/TISSUE INTEGRITY - ADULT  Goal: Incision(s), wounds(s) or drain site(s) healing without S/S of infection  Description  INTERVENTIONS  - Assess and document risk factors for skin impairment   - Assess and document dressing, incision, wound bed, drain sites and surrounding tissue  - Initiate Nutrition services consult and/or wound management as needed  Outcome: Progressing     Problem: SAFETY ADULT  Goal: Patient will remain free of falls  Description  INTERVENTIONS:  - Assess patient frequently for physical needs  -  Identify cognitive and physical deficits and behaviors that affect risk of falls    -  [place] fall precautions as indicated by assessment   - Educate patient/family on patient safety including physical limitations  - Instruct patient to call for assistance with activity based on assessment  - Modify environment to reduce risk of injury  - Consider OT/PT consult to assist with strengthening/mobility  Outcome: Progressing     Problem: Risk for Self Injury/Neglect  Goal: Verbalize thoughts and feelings  Description  Interventions:  - Assess and re-assess patient's lethality and potential for self-injury  - Engage patient in 1:1 interactions, daily, for a minimum of 15 minutes  - Encourage patient to express feelings, fears, frustrations, hopes  - Establish rapport/trust with patient   Outcome: Progressing  Goal: Recognize maladaptive responses and adopt new coping mechanisms  Outcome: Progressing  Goal: Complete daily ADLs, including personal hygiene independently, as able  Description  Interventions:  - Observe, teach, and assist patient with ADLS  - Monitor and promote a balance of rest/activity, with adequate nutrition and elimination  Outcome: Progressing     Problem: Depression  Goal: Treatment Goal: Demonstrate behavioral control of depressive symptoms, verbalize feelings of improved mood/affect, and adopt new coping skills prior to discharge  Outcome: Progressing  Goal: Refrain from self-neglect  Outcome: Progressing     Problem: Anxiety  Goal: Anxiety is at manageable level  Description  Interventions:  - Assess and monitor patient's anxiety level  - Monitor for signs and symptoms of anxiety both physical and emotional (heart palpitations, chest pain, shortness of breath, headaches, nausea, feeling jumpy, restlessness, irritable, apprehensive)  - Collaborate with interdisciplinary team and initiate plan and interventions as ordered    - [place] patient to unit/surroundings  - Explain treatment plan  - Encourage participation in care  - Encourage verbalization of concerns/fears  - Identify coping mechanisms  - Assist in developing anxiety-reducing skills  - Administer/offer alternative therapies  - Limit or eliminate stimulants  Outcome: Progressing     Problem: Ineffective Coping  Goal: Participates in unit activities  Description  Interventions:  - Provide therapeutic environment   - Provide required programming   - Redirect inappropriate behaviors   Outcome: Progressing     Problem: DISCHARGE PLANNING - CARE MANAGEMENT  Goal: Discharge to post-acute care or home with appropriate resources  Description  INTERVENTIONS:  - Conduct assessment to determine patient/family and health care team treatment goals, and need for post-acute services based on payer coverage, community resources, and patient preferences, and barriers to discharge  - Address psychosocial, clinical, and financial barriers to discharge as identified in assessment in conjunction with the patient/family and health care team  - Arrange appropriate level of post-acute services according to patient's   needs and preference and payer coverage in collaboration with the physician and health care team  - Communicate with and update the patient/family, physician, and health care team regarding progress on the discharge plan  - Arrange appropriate transportation to post-acute venues   Outcome: Progressing     Problem: Risk for Violence/Aggression Toward Others  Goal: Treatment Goal: Refrain from acts of violence/aggression during length of stay, and demonstrate improved impulse control at the time of discharge  Outcome: Progressing  Goal: Refrain from harming others  Outcome: Progressing  Goal: Refrain from destructive acts on the environment or property  Outcome: Progressing  Goal: Control angry outbursts  Description  Interventions:  - Monitor patient closely, per order  - Ensure early verbal de-escalation  - Monitor prn medication needs  - Set reasonable/therapeutic limits, outline behavioral expectations, and consequences   - Provide a non-threatening milieu, utilizing the least restrictive interventions   Outcome: Progressing  Goal: Attend and participate in unit activities, including therapeutic, recreational, and educational groups  Description  Interventions:  - Provide therapeutic and educational activities daily, encourage attendance and participation, and document same in the medical record   Outcome: Progressing  Goal: Identify appropriate positive anger management techniques  Description  Interventions:  - Offer anger management and coping skills groups   - Staff will provide positive feedback for appropriate anger control  Outcome: Progressing

## 2019-07-11 NOTE — PROGRESS NOTES
Pt has been withdrawn to his room since the beginning of the shift  No distress noted on visual assessment  7 minute checks continue  Will continue to monitor

## 2019-07-11 NOTE — PROGRESS NOTES
07/11/19 0953   Team Meeting   Meeting Type Daily Rounds   Patient/Family Present   Patient Present No   Patient's Family Present No     Daily Psychiatric Rounds    Team Members Present:    MD Tierney Pisano, TAYLOR Ramires [facility], [place], [place]  Krishna Pritchard, BLAKE Louise, RN    Discussion:      Pt has been calm and controlled  Identifying treatment goals  Discussing discharge options and services with CM

## 2019-07-11 NOTE — SOCIAL WORK
SW met with pt for completion of psycho/social assessment; he presented as flat / depressed  Pt stated that trigger for hospitalization was SI / OD due to homelessness / depression / zoran / lack of medical services - no PCP, no glucometer  Pt's goal for hospitalization is to obtain stabilization, normalization of his life, medical services, and help such as ICM available at [facility]  Pt described his personal strengths as sense of humor and sense of seriousness  He thinks he can improve self by gaining maturity and budgeting skills, as he was unable to manage an independent lifestyle in a responsible manner  Pt had medication management provided by [facility], Ketty Smith, prior to [facility] admission, but he does not remember name of psychiatrist   Pt denied current Trinh Plush / Kaden Moors / [place] / [place]  He stated that he had OD about seven times in the past   He denied AH / VH / paranoia  Reportedly, he has no access to firearms, and he does not think that he is at risk for harming self / others  Pt denied current / past experience of abuse  He stated that his mother experienced depression  He denied family HX of D&A problems and suicide / homicide  Pt stated that he currently uses marijuana, on rare occasions, in small amounts, with his last use being in 4/19; he started using at age 22 and does not know how long he experienced sobriety  In the past, he experimented with cocaine, hash, mushrooms, nitrous oxide, opium, gel tabs,  acid - having 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 "trips "  Reportedly, he does not abuse alcohol and does not want rehab; he had about 17 rehabs in dual DX facilities since 2003, and one OP rehab  Pt smokes cigarettes in the amount of 1 5 packs per day and wants smoking cessation counseling  Pt stated that he had two DUIs and charge of use of car without permission    Reportedly, he was never charged with a felony, and twice was incarcerated in [address] [place] [address] [facility]  Pt denied current legal concerns  Pt stated that he was  and is now single  He has no children  His parents have passed  He has no siblings and no family supports  He has a  girlfriend, Paresh FofanaAVILA, for whom he signed JENNIFER  Reportedly, she is in process of  her  with whom she still shares a house with the couples three daughters under 25  Pt stated that she holds unrealistic expectations for pt to obtain a residence that should be maintained well  At times, pt thinks he needs a group home, but as he was a direct care worker, he knows that such placement is not available quickly  Pt is a HS graduate and attended college for one year  Pt's Orthodoxy is Orthodox / Pentecostal; he has no cultural needs  Pt receives SSDI in the amount of $1005 / mo  He has [place] and [place] that pays for his medication  Pt's local preferred pharmacy is [facility]  Pt stated that he would like CCCT registration, were he to remain in [address] [place] [address]  He also would consider obtaining a room / shared apt; however, he currently has no money and will not receive his [employer] deposit until 8/2/19  In view of his homelessness, pt is willing to consider seeking shelter, for example, at the [address] [place] [address] in [facility], if there is an available vacancy  Pt wants to obtain a PCP, medication management, and PHP- for which he signed JENNIFER for [employer] [place],  and ICM  His coping skills include cooking and sleeping

## 2019-07-12 LAB
GLUCOSE SERPL-MCNC: 190 MG/DL (ref 65–140)
GLUCOSE SERPL-MCNC: 195 MG/DL (ref 65–140)
GLUCOSE SERPL-MCNC: 199 MG/DL (ref 65–140)
VALPROATE SERPL-MCNC: 65.9 UG/ML (ref 50–125)

## 2019-07-12 PROCEDURE — 80164 ASSAY DIPROPYLACETIC ACD TOT: CPT | Performed by: NURSE PRACTITIONER

## 2019-07-12 PROCEDURE — 82948 REAGENT STRIP/BLOOD GLUCOSE: CPT

## 2019-07-12 PROCEDURE — 99232 SBSQ HOSP IP/OBS MODERATE 35: CPT | Performed by: PSYCHIATRY & NEUROLOGY

## 2019-07-12 RX ORDER — PALIPERIDONE 3 MG/1
3 TABLET, EXTENDED RELEASE ORAL DAILY
Status: DISCONTINUED | OUTPATIENT
Start: 2019-07-12 | End: 2019-08-02 | Stop reason: HOSPADM

## 2019-07-12 RX ADMIN — INSULIN LISPRO 2 UNITS: 100 INJECTION, SOLUTION INTRAVENOUS; SUBCUTANEOUS at 12:46

## 2019-07-12 RX ADMIN — GABAPENTIN 400 MG: 300 CAPSULE ORAL at 21:31

## 2019-07-12 RX ADMIN — BUSPIRONE HYDROCHLORIDE 10 MG: 5 TABLET ORAL at 18:30

## 2019-07-12 RX ADMIN — BENZTROPINE MESYLATE 1 MG: 1 TABLET ORAL at 18:30

## 2019-07-12 RX ADMIN — INSULIN LISPRO 2 UNITS: 100 INJECTION, SOLUTION INTRAVENOUS; SUBCUTANEOUS at 16:58

## 2019-07-12 RX ADMIN — DIVALPROEX SODIUM 2000 MG: 500 TABLET, FILM COATED, EXTENDED RELEASE ORAL at 18:31

## 2019-07-12 RX ADMIN — DULOXETINE HYDROCHLORIDE 60 MG: 60 CAPSULE, DELAYED RELEASE ORAL at 08:33

## 2019-07-12 RX ADMIN — BENZTROPINE MESYLATE 1 MG: 1 TABLET ORAL at 08:32

## 2019-07-12 RX ADMIN — CHLORPROMAZINE HYDROCHLORIDE 50 MG: 25 TABLET, SUGAR COATED ORAL at 21:31

## 2019-07-12 RX ADMIN — INSULIN GLARGINE 32 UNITS: 100 INJECTION, SOLUTION SUBCUTANEOUS at 22:44

## 2019-07-12 RX ADMIN — BUSPIRONE HYDROCHLORIDE 10 MG: 5 TABLET ORAL at 08:32

## 2019-07-12 RX ADMIN — NICOTINE 1 PATCH: 21 PATCH, EXTENDED RELEASE TRANSDERMAL at 08:33

## 2019-07-12 RX ADMIN — INSULIN LISPRO 2 UNITS: 100 INJECTION, SOLUTION INTRAVENOUS; SUBCUTANEOUS at 21:33

## 2019-07-12 RX ADMIN — GLYBURIDE 1.25 MG: 1.25 TABLET ORAL at 08:33

## 2019-07-12 RX ADMIN — TRAZODONE HYDROCHLORIDE 50 MG: 50 TABLET ORAL at 21:31

## 2019-07-12 RX ADMIN — HYDROXYZINE HYDROCHLORIDE 50 MG: 25 TABLET ORAL at 16:55

## 2019-07-12 RX ADMIN — GABAPENTIN 400 MG: 300 CAPSULE ORAL at 08:32

## 2019-07-12 RX ADMIN — CHLORPROMAZINE HYDROCHLORIDE 50 MG: 25 TABLET, SUGAR COATED ORAL at 08:32

## 2019-07-12 RX ADMIN — PALIPERIDONE 3 MG: 3 TABLET, EXTENDED RELEASE ORAL at 12:42

## 2019-07-12 RX ADMIN — INSULIN LISPRO 2 UNITS: 100 INJECTION, SOLUTION INTRAVENOUS; SUBCUTANEOUS at 08:34

## 2019-07-12 RX ADMIN — CHLORPROMAZINE HYDROCHLORIDE 50 MG: 25 TABLET, SUGAR COATED ORAL at 16:55

## 2019-07-12 RX ADMIN — GABAPENTIN 400 MG: 300 CAPSULE ORAL at 16:55

## 2019-07-12 NOTE — PROGRESS NOTES
Progress Note - Behavioral Health   Sho Marcelo 44 y o  male MRN: [id number]  Unit/Bed#: [facility] 258-02 Encounter: [id number]    Assessment/Plan   Principal Problem:    Severe bipolar I disorder, most recent episode depressed without psychotic features ([facility] )  Active Problems:    Tobacco use disorder    Essential hypertension    DM (diabetes mellitus), secondary uncontrolled ([facility] )    Plan  Start Invega 3 mg once a day    Behavior over the last 24 hours:  Unchanged  Patient reports that he was on Invega in the past and he did helped him a lot and he would like to go back on it  We reviewed all of his psychotropic medications and he wants to stay on all of his current medications and add the Invega  He is aware of the benefits and risks including the weight gain potential     Sleep: insomnia  Appetite: normal  Medication side effects: No  ROS: no complaints    Mental Status Evaluation:  Appearance:  disheveled   Behavior:  guarded   Speech:  soft   Mood:  sad   Affect:  constricted   Thought Process:  circumstantial   Thought Content:  normal   Perceptual Disturbances: None   Risk Potential: Suicidal Ideations without plan   Sensorium:  person and place   Cognition:  grossly intact   Consciousness:  alert and awake    Attention: attention span and concentration were age appropriate   Insight:  limited   Judgment: limited   Gait/Station: slow   Motor Activity: no abnormal movements     Progress Toward Goals: ongoing    Recommended Treatment: Continue with group therapy, milieu therapy and occupational therapy  Risks, benefits and possible side effects of Medications:   Risks, benefits, and possible side effects of medications explained to patient and patient verbalizes understanding  Medications: all current active meds have been reviewed and continue current psychiatric medications  Labs: reviewed    Counseling / Coordination of Care  Total floor / unit time spent today 15 minutes   Greater than 50% of total time was spent with the patient and / or family counseling and / or coordination of care   A description of the counseling / coordination of care:

## 2019-07-12 NOTE — PROGRESS NOTES
07/12/19 1001   Team Meeting   Meeting Type Daily Rounds   Patient/Family Present   Patient Present No   Patient's Family Present No     Daily Psychiatric Rounds    Team Members Present:    MD Bereket Bacon CRNP  [facility], [facility]  Moses Rainey, RN BSN  Ced Mclaughlin, BLAKE    Discussion:     Has been controlled and polite on the unit  Requesting invega be restarted d/t positive past treatment response  Wound consult to be completed today

## 2019-07-12 NOTE — CONSULTS
Progress Note - Wound   Gopi Rawls 44 y o  male MRN: [id number]  Unit/Bed#: [facility] 537-17 Encounter: [id number]      Assessment:   Wound care consult for patient admitted with a neuropathic ulcer to the right foot, plantar surface at base of TMA scar  Patient was pleasant and cooperative during assessment  Patient has bilateral TMA's, the right is the most recent, April 2019  Patient history includes bipolar, uncontrolled DDM2, HTN, substance abuse, ETOH, he is a current smoker and also uses smokeless tobacco   At this time patient is homeless  Bilateral lower legs hemosiderin discoloration and mild non-pitting edema  Patient had been following a podiatrist in [place] but was not able to make an appointment that was scheduled for 7/09/2019  Patient does not have shoes to accommodate bilateral TMA's  Requested podiatry consult for further recommendations  1  POA neuropathic ulcer to the right foot plantar surface  Nursing staff states that area bleeds when he ambulates  Wound bed is beefy red, is not draining at this time  Periwound is calloused but intact  2  Bilateral heels intact  Small calloused area to the left posterior heel, measures 0 2X0 2, scaly not open   3  Right TMA scar with scaly area to the medial portion of the scar, no open areas    Plan:   1  Cleanse right foot ulcer with NSS, pat dry, place non-adherent dressing to wound base, cover with Maxorb Ag, 4X4 and wrap with cipriano, change daily or with soilage or dislodgement  2   Skin nourishing cream daily      Wound 07/12/19 Neuropathic/diabetic ulcer Foot Right;Medial;Inner (Active)   Wound Description Beefy red 7/12/2019 11:00 AM   Lacie-wound Assessment Dry 7/12/2019 11:00 AM   Wound Length (cm) 1 2 cm 7/12/2019 11:00 AM   Wound Width (cm) 0 6 cm 7/12/2019 11:00 AM   Wound Depth (cm) 0 3 7/12/2019 11:00 AM   Calculated Wound Area (cm^2) 0 72 cm^2 7/12/2019 11:00 AM   Calculated Wound Volume (cm^3) 0 22 cm^3 7/12/2019 11:00 AM   Drainage Amount Small 7/12/2019 11:00 AM   Drainage Description Bloody 7/12/2019 11:00 AM   Non-staged Wound Description Full thickness 7/12/2019 11:00 AM   Treatments Cleansed 7/12/2019 11:00 AM   Dressing Calcium Alginate with Silver;Dry dressing;Non adherent;Gauze 7/12/2019 11:00 AM   Wound packed?  No 7/12/2019 11:00 AM   Dressing Changed New 7/12/2019 11:00 AM   Patient Tolerance Tolerated well 7/12/2019 11:00 AM     Reviewed plan of care with primary BLAKE Toro  Recommendations written as orders  Wound care to follow weekly  Questions or concerns contact Natasha RUIZN, RN

## 2019-07-12 NOTE — DISCHARGE INSTR - OTHER ORDERS
Cleanse right foot ulcer with soap and water, pat dry, place non-adherent dressing to wound base, cover with Maxorb, 4X4 and wrap with cipriano, change daily or with soilage or dislodgement  You are being discharged to [facility], located at [address] [place] [address], [address] [place] [address]; Madeleine Riddle, Manager; Phone:  [phone] or [phone]  Triggers you have identified during your hospitalization that led to your suicidal ideation included:  distressed mood; interpersonal conflict with your girlfriend; depression; lack of medical services; and homelessness  Coping skills you have identified during your hospitalization include:  Cooking; sleeping; relaxation techniques  If you are unable to deal with your distressed mood alone, please contact your therapist Moe Giron at [address], [address], [address]; Phone:  [phone]; Fax:  [phone]  If that is not effective and you continue to have distress, (e g  suicidal ideation, homicidal ideation, depressed mood, overwhelmed, feeling of being in crisis), please contact [facility]: [phone], [address] [place] [address]:  [phone], Peer Support Talk Line (Seven days a week, 1:00 PM  9:00 PM) Call: [phone] or Text: [phone], Alcohol Anonymous: 625.897.8645, [place] [facility]: 874.714.5217, [place] on [address] [facility] ([facility]) [employer]: 696.872.9607/Email: www jessica  org, or Substance Abuse and Mental Health Services Administration([facility]) [employer], which is a confidential, free, 24-hour-a-day, 365-day-a-year, information service for individuals and family members facing mental health and/or substance use disorders  This service provides referrals to local treatment facilities, support groups, and community-based organizations  Callers can also order free publications and other information    Call 0-073-025-485-311-5196/JGVOF: www [facility] gov

## 2019-07-12 NOTE — PROGRESS NOTES
Pt is more visible on the unit this evening  Non-social, polite with peers  Flat, depressed  Irritable edge  Pt denies SI/HI//AH/VH  Pt states he would like to be place back on invega and did well on it in the past   Reports racing thoughts and still does not feel like himself "whatever that may be"  Able to make needs known  Will continue to monitor

## 2019-07-12 NOTE — PLAN OF CARE
Problem: NEUROSENSORY - ADULT  Goal: Remains free of injury related to seizures activity  Description  INTERVENTIONS  - Maintain airway, patient safety  and administer oxygen as ordered  - Monitor patient for seizure activity, document and report duration and description of seizure to physician/advanced practitioner  - If seizure occurs,  ensure patient safety during seizure  - Reorient patient post seizure  - Seizure pads on all 4 side rails  - Instruct patient/family to notify RN of any seizure activity including if an aura is experienced  - Instruct patient/family to call for assistance with activity based on nursing assessment  - Administer anti-seizure medications as ordered  - Monitor fetal well being  Outcome: Progressing     Problem: SKIN/TISSUE INTEGRITY - ADULT  Goal: Incision(s), wounds(s) or drain site(s) healing without S/S of infection  Description  INTERVENTIONS  - Assess and document risk factors for skin impairment   - Assess and document dressing, incision, wound bed, drain sites and surrounding tissue  - Initiate Nutrition services consult and/or wound management as needed  Outcome: Progressing     Problem: SAFETY ADULT  Goal: Patient will remain free of falls  Description  INTERVENTIONS:  - Assess patient frequently for physical needs  -  Identify cognitive and physical deficits and behaviors that affect risk of falls    -  [place] fall precautions as indicated by assessment   - Educate patient/family on patient safety including physical limitations  - Instruct patient to call for assistance with activity based on assessment  - Modify environment to reduce risk of injury  - Consider OT/PT consult to assist with strengthening/mobility  Outcome: Progressing     Problem: Risk for Self Injury/Neglect  Goal: Verbalize thoughts and feelings  Description  Interventions:  - Assess and re-assess patient's lethality and potential for self-injury  - Engage patient in 1:1 interactions, daily, for a minimum of 15 minutes  - Encourage patient to express feelings, fears, frustrations, hopes  - Establish rapport/trust with patient   Outcome: Progressing  Goal: Recognize maladaptive responses and adopt new coping mechanisms  Outcome: Progressing  Goal: Complete daily ADLs, including personal hygiene independently, as able  Description  Interventions:  - Observe, teach, and assist patient with ADLS  - Monitor and promote a balance of rest/activity, with adequate nutrition and elimination  Outcome: Progressing     Problem: Depression  Goal: Treatment Goal: Demonstrate behavioral control of depressive symptoms, verbalize feelings of improved mood/affect, and adopt new coping skills prior to discharge  Outcome: Progressing  Goal: Refrain from self-neglect  Outcome: Progressing     Problem: Anxiety  Goal: Anxiety is at manageable level  Description  Interventions:  - Assess and monitor patient's anxiety level  - Monitor for signs and symptoms of anxiety both physical and emotional (heart palpitations, chest pain, shortness of breath, headaches, nausea, feeling jumpy, restlessness, irritable, apprehensive)  - Collaborate with interdisciplinary team and initiate plan and interventions as ordered    - [place] patient to unit/surroundings  - Explain treatment plan  - Encourage participation in care  - Encourage verbalization of concerns/fears  - Identify coping mechanisms  - Assist in developing anxiety-reducing skills  - Administer/offer alternative therapies  - Limit or eliminate stimulants  Outcome: Progressing     Problem: Ineffective Coping  Goal: Participates in unit activities  Description  Interventions:  - Provide therapeutic environment   - Provide required programming   - Redirect inappropriate behaviors   Outcome: Progressing     Problem: DISCHARGE PLANNING - CARE MANAGEMENT  Goal: Discharge to post-acute care or home with appropriate resources  Description  INTERVENTIONS:  - Conduct assessment to determine patient/family and health care team treatment goals, and need for post-acute services based on payer coverage, community resources, and patient preferences, and barriers to discharge  - Address psychosocial, clinical, and financial barriers to discharge as identified in assessment in conjunction with the patient/family and health care team  - Arrange appropriate level of post-acute services according to patient's   needs and preference and payer coverage in collaboration with the physician and health care team  - Communicate with and update the patient/family, physician, and health care team regarding progress on the discharge plan  - Arrange appropriate transportation to post-acute venues   Outcome: Progressing     Problem: Risk for Violence/Aggression Toward Others  Goal: Treatment Goal: Refrain from acts of violence/aggression during length of stay, and demonstrate improved impulse control at the time of discharge  Outcome: Progressing  Goal: Refrain from harming others  Outcome: Progressing  Goal: Refrain from destructive acts on the environment or property  Outcome: Progressing  Goal: Control angry outbursts  Description  Interventions:  - Monitor patient closely, per order  - Ensure early verbal de-escalation  - Monitor prn medication needs  - Set reasonable/therapeutic limits, outline behavioral expectations, and consequences   - Provide a non-threatening milieu, utilizing the least restrictive interventions   Outcome: Progressing  Goal: Attend and participate in unit activities, including therapeutic, recreational, and educational groups  Description  Interventions:  - Provide therapeutic and educational activities daily, encourage attendance and participation, and document same in the medical record   Outcome: Progressing  Goal: Identify appropriate positive anger management techniques  Description  Interventions:  - Offer anger management and coping skills groups   - Staff will provide positive feedback for appropriate anger control  Outcome: Progressing     Problem: Nutrition/Hydration-ADULT  Goal: Nutrient/Hydration intake appropriate for improving, restoring or maintaining nutritional needs  Description  Monitor and assess patient's nutrition/hydration status for malnutrition (ex- brittle hair, bruises, dry skin, pale skin and conjunctiva, muscle wasting, smooth red tongue, and disorientation)  Collaborate with interdisciplinary team and initiate plan and interventions as ordered  Monitor patient's weight and dietary intake as ordered or per policy  Utilize nutrition screening tool and intervene per policy  Determine patient's food preferences and provide high-protein, high-caloric foods as appropriate       INTERVENTIONS:  - Monitor oral intake, urinary output, labs, and treatment plans  - Assess nutrition and hydration status and recommend course of action  - Evaluate amount of meals eaten  - Assist patient with eating if necessary   - Allow adequate time for meals  - Recommend/ encourage appropriate diets, oral nutritional supplements, and vitamin/mineral supplements  - Order, calculate, and assess calorie counts as needed  - Recommend, monitor, and adjust tube feedings and TPN/PPN based on assessed needs  - Assess need for intravenous fluids  - Provide specific nutrition/hydration education as appropriate  - Include patient/family/caregiver in decisions related to nutrition  Outcome: Progressing

## 2019-07-13 LAB
GLUCOSE SERPL-MCNC: 114 MG/DL (ref 65–140)
GLUCOSE SERPL-MCNC: 163 MG/DL (ref 65–140)
GLUCOSE SERPL-MCNC: 191 MG/DL (ref 65–140)
GLUCOSE SERPL-MCNC: 204 MG/DL (ref 65–140)

## 2019-07-13 PROCEDURE — 82948 REAGENT STRIP/BLOOD GLUCOSE: CPT

## 2019-07-13 RX ADMIN — PALIPERIDONE 3 MG: 3 TABLET, EXTENDED RELEASE ORAL at 09:44

## 2019-07-13 RX ADMIN — DULOXETINE HYDROCHLORIDE 60 MG: 60 CAPSULE, DELAYED RELEASE ORAL at 09:44

## 2019-07-13 RX ADMIN — INSULIN GLARGINE 32 UNITS: 100 INJECTION, SOLUTION SUBCUTANEOUS at 22:11

## 2019-07-13 RX ADMIN — CHLORPROMAZINE HYDROCHLORIDE 50 MG: 25 TABLET, SUGAR COATED ORAL at 17:00

## 2019-07-13 RX ADMIN — CHLORPROMAZINE HYDROCHLORIDE 50 MG: 25 TABLET, SUGAR COATED ORAL at 09:44

## 2019-07-13 RX ADMIN — BENZTROPINE MESYLATE 1 MG: 1 TABLET ORAL at 09:44

## 2019-07-13 RX ADMIN — BENZTROPINE MESYLATE 1 MG: 1 TABLET ORAL at 17:28

## 2019-07-13 RX ADMIN — INSULIN LISPRO 2 UNITS: 100 INJECTION, SOLUTION INTRAVENOUS; SUBCUTANEOUS at 08:00

## 2019-07-13 RX ADMIN — GLYBURIDE 1.25 MG: 1.25 TABLET ORAL at 08:00

## 2019-07-13 RX ADMIN — INSULIN LISPRO 2 UNITS: 100 INJECTION, SOLUTION INTRAVENOUS; SUBCUTANEOUS at 12:26

## 2019-07-13 RX ADMIN — CHLORPROMAZINE HYDROCHLORIDE 50 MG: 25 TABLET, SUGAR COATED ORAL at 21:08

## 2019-07-13 RX ADMIN — GABAPENTIN 400 MG: 300 CAPSULE ORAL at 17:00

## 2019-07-13 RX ADMIN — TRAZODONE HYDROCHLORIDE 50 MG: 50 TABLET ORAL at 21:09

## 2019-07-13 RX ADMIN — BUSPIRONE HYDROCHLORIDE 10 MG: 5 TABLET ORAL at 09:44

## 2019-07-13 RX ADMIN — BUSPIRONE HYDROCHLORIDE 10 MG: 5 TABLET ORAL at 17:27

## 2019-07-13 RX ADMIN — INSULIN LISPRO 4 UNITS: 100 INJECTION, SOLUTION INTRAVENOUS; SUBCUTANEOUS at 21:13

## 2019-07-13 RX ADMIN — DIVALPROEX SODIUM 2000 MG: 500 TABLET, FILM COATED, EXTENDED RELEASE ORAL at 17:27

## 2019-07-13 RX ADMIN — GABAPENTIN 400 MG: 300 CAPSULE ORAL at 21:08

## 2019-07-13 RX ADMIN — NICOTINE 1 PATCH: 21 PATCH, EXTENDED RELEASE TRANSDERMAL at 09:00

## 2019-07-13 RX ADMIN — GABAPENTIN 400 MG: 300 CAPSULE ORAL at 09:44

## 2019-07-13 NOTE — PROGRESS NOTES
Right foot ulcer dressing changed  Area cleansed with NSS, non-adherent dressing placed, covered with Maxorb AG and wrapped with [place]  Education provided to pt, no concerns expressed by pt or noted by RN  Will continue to monitor

## 2019-07-13 NOTE — PROGRESS NOTES
Pt was visible for the first part of the shift  Non-social, irritable edge with peers  Polite with staff  Pt compliant with meals and medications  Unit was loud this evening so pt did got to his room early  Able to make needs known  Pt has been sleeping through out the night  No distress noted on visual assessment  Will continue to monitor

## 2019-07-13 NOTE — PROGRESS NOTES
Patient is visible on the unit walking with cane, using cane appropriately  Cooperative with medications  Positive for morning meal  Good eye contact  Patient denies SI and HI currently but expressed high depression  Patient verbalized that he would like to get into a room to rent upon discharge  Pt  and received coverage  Patient attending groups and unit activities  Patient verbalized he has no money for a room to rent and does not get money unit 8/2/19  Alert and oriented  Able to make needs known  No signs or symptoms of distress  SP 1 and Q 7 minute checks will be maintained for patient safety  Will continue to monitor

## 2019-07-13 NOTE — PROGRESS NOTES
Progress Note - Behavioral Health     Becky Mariano 44 y o  male MRN: [id number]   Unit/Bed#: [facility]02 Encounter: [id number]    Behavior over the last 24 hours:  Agueda Hernandez was seen for an inpatient follow-up psychiatric visit this date  At today's visit, he states he is still feeling depressed but denies any current suicidal or homicidal ideation  He was med seeking for benzodiazepines and complained of anxiety  He is taking his medications as prescribed without side effects  He is tolerating the recent addition of Invega well  He still has occasional difficulty controlling his anger but feels that it is improving  He is also using coping skills  ROS: no complaints    Mental Status Evaluation:    Appearance:  casually dressed, dressed appropriately   Behavior:  normal, pleasant, cooperative   Speech:  normal rate and volume   Mood:  normal   Affect:  normal range and intensity   Thought Process:  organized, logical, coherent   Associations: intact associations   Thought Content:  normal, no overt delusions   Perceptual Disturbances: none   Risk Potential: Suicidal ideation - None  Homicidal ideation - None  Potential for aggression - No   Sensorium:  oriented to person, place and time/date   Memory:  recent and remote memory grossly intact   Consciousness:  alert and awake   Attention: attention span and concentration are age appropriate   Insight:  fair   Judgment: fair   Gait/Station: normal gait/station, normal balance   Motor Activity: no abnormal movements     Vital signs in last 24 hours:    Temp:  [97 5 °F (36 4 °C)-97 8 °F (36 6 °C)] 97 8 °F (36 6 °C)  HR:  [88-90] 88  Resp:  [16] 16  BP: (154-181)/(90-91) 154/90    Laboratory results:  I have personally reviewed all pertinent laboratory/tests results      Progress Toward Goals: progressing    Assessment/Plan   Principal Problem:    Severe bipolar I disorder, most [facility] episode [facility]  Active Problems:    Tobacco use disorder    Essential hypertension    DM (diabetes mellitus), secondary uncontrolled ([facility] )    Recommended Treatment:   Continue current medications as prescribed  Continue to monitor  Discharge disposition and planning are ongoing  All current active medications have been reviewed    Encourage group therapy, milieu therapy and occupational therapy  [facility] checks every 7 minutes      Current Facility-Administered Medications:  acetaminophen 650 mg Oral Q6H PRN Mandy Mcdonnellics, CRNP   acetaminophen 650 mg Oral Q4H PRN Mandy Mcdonnellics, CRNP   acetaminophen 975 mg Oral Q6H PRN Mandy Mcdonnellics, CRNP   aluminum-magnesium hydroxide-simethicone 30 mL Oral Q4H PRN Mandy Mcdonnellics, CRNP   benztropine 1 mg Intramuscular Q6H PRN Mandy Mcdonnellics, CRNP   benztropine 1 mg Oral Q6H PRN Mnady Mcdonnellics, CRNP   benztropine 1 mg Oral BID Mandy Bautista, CRNP   busPIRone 10 mg Oral BID Mandy Bautista, CRNP   chlorproMAZINE 50 mg Oral TID Mandy Bautista, CRNP   divalproex sodium 2,000 mg Oral QPM Mandy Bautista, CRNP   DULoxetine 60 mg Oral Daily Mandy Bautista, CRNP   gabapentin 400 mg Oral TID Mandy Bautista, CRNP   glyBURIDE 1 25 mg Oral Daily With Breakfast Mandy Bautista, CRNP   haloperidol 5 mg Oral Q6H PRN Mandy Bautista, CRNP   haloperidol lactate 5 mg Intramuscular Q6H PRN Mandy Bautista, CRNP   hydrOXYzine HCL 25 mg Oral Q6H PRN Mandy Mcdonnellics, CRNP   hydrOXYzine HCL 50 mg Oral Q4H PRN Mandy Mcdonnellics, CRNP   insulin glargine 32 Units Subcutaneous HS Mayra Jaskaran, CRNP   insulin lispro 2-12 Units Subcutaneous TID AC Mayra Jaskaran, CRNP   insulin lispro 2-12 Units Subcutaneous HS Mayraconstance Jessica, CRNP   LORazepam 2 mg Intramuscular Q6H PRN Mandy Mcdonnellics, CRNP   magnesium hydroxide 30 mL Oral Daily PRN Mandy Bautista, CRNP   nicotine 1 patch Transdermal Daily Mandy Bautista, CRNP   OLANZapine 10 mg Intramuscular Q3H PRN Jocelin Oconnell, CRNP paliperidone 3 mg Oral Daily Marco A Pritchett MD   risperiDONE 1 mg Oral Q3H PRN TAYLOR Lux   traZODone 50 mg Oral HS PRN TAYLOR Lux   traZODone 50 mg Oral HS TAYLOR Lux       Risks / Benefits of Treatment:    Risks, benefits, and possible side effects of medications explained to patient and patient verbalizes understanding and agreement for treatment  Counseling / Coordination of Care:      Patient's progress discussed with staff in treatment team meeting  Medications, treatment progress and treatment plan reviewed with patient

## 2019-07-13 NOTE — PLAN OF CARE
Problem: NEUROSENSORY - ADULT  Goal: Remains free of injury related to seizures activity  Description  INTERVENTIONS  - Maintain airway, patient safety  and administer oxygen as ordered  - Monitor patient for seizure activity, document and report duration and description of seizure to physician/advanced practitioner  - If seizure occurs,  ensure patient safety during seizure  - Reorient patient post seizure  - Seizure pads on all 4 side rails  - Instruct patient/family to notify RN of any seizure activity including if an aura is experienced  - Instruct patient/family to call for assistance with activity based on nursing assessment  - Administer anti-seizure medications as ordered  - Monitor fetal well being  Outcome: Progressing     Problem: SKIN/TISSUE INTEGRITY - ADULT  Goal: Incision(s), wounds(s) or drain site(s) healing without S/S of infection  Description  INTERVENTIONS  - Assess and document risk factors for skin impairment   - Assess and document dressing, incision, wound bed, drain sites and surrounding tissue  - Initiate Nutrition services consult and/or wound management as needed  Outcome: Progressing     Problem: SAFETY ADULT  Goal: Patient will remain free of falls  Description  INTERVENTIONS:  - Assess patient frequently for physical needs  -  Identify cognitive and physical deficits and behaviors that affect risk of falls    -  [place] fall precautions as indicated by assessment   - Educate patient/family on patient safety including physical limitations  - Instruct patient to call for assistance with activity based on assessment  - Modify environment to reduce risk of injury  - Consider OT/PT consult to assist with strengthening/mobility  Outcome: Progressing     Problem: Risk for Self Injury/Neglect  Goal: Verbalize thoughts and feelings  Description  Interventions:  - Assess and re-assess patient's lethality and potential for self-injury  - Engage patient in 1:1 interactions, daily, for a minimum of 15 minutes  - Encourage patient to express feelings, fears, frustrations, hopes  - Establish rapport/trust with patient   Outcome: Progressing  Goal: Recognize maladaptive responses and adopt new coping mechanisms  Outcome: Progressing  Goal: Complete daily ADLs, including personal hygiene independently, as able  Description  Interventions:  - Observe, teach, and assist patient with ADLS  - Monitor and promote a balance of rest/activity, with adequate nutrition and elimination  Outcome: Progressing     Problem: Depression  Goal: Treatment Goal: Demonstrate behavioral control of depressive symptoms, verbalize feelings of improved mood/affect, and adopt new coping skills prior to discharge  Outcome: Progressing  Goal: Refrain from self-neglect  Outcome: Progressing     Problem: Ineffective Coping  Goal: Participates in unit activities  Description  Interventions:  - Provide therapeutic environment   - Provide required programming   - Redirect inappropriate behaviors   Outcome: Progressing     Problem: DISCHARGE PLANNING - CARE MANAGEMENT  Goal: Discharge to post-acute care or home with appropriate resources  Description  INTERVENTIONS:  - Conduct assessment to determine patient/family and health care team treatment goals, and need for post-acute services based on payer coverage, community resources, and patient preferences, and barriers to discharge  - Address psychosocial, clinical, and financial barriers to discharge as identified in assessment in conjunction with the patient/family and health care team  - Arrange appropriate level of post-acute services according to patient's   needs and preference and payer coverage in collaboration with the physician and health care team  - Communicate with and update the patient/family, physician, and health care team regarding progress on the discharge plan  - Arrange appropriate transportation to post-acute venues   Outcome: Progressing     Problem: Risk for Violence/Aggression Toward Others  Goal: Treatment Goal: Refrain from acts of violence/aggression during length of stay, and demonstrate improved impulse control at the time of discharge  Outcome: Progressing  Goal: Refrain from harming others  Outcome: Progressing  Goal: Refrain from destructive acts on the environment or property  Outcome: Progressing  Goal: Control angry outbursts  Description  Interventions:  - Monitor patient closely, per order  - Ensure early verbal de-escalation  - Monitor prn medication needs  - Set reasonable/therapeutic limits, outline behavioral expectations, and consequences   - Provide a non-threatening milieu, utilizing the least restrictive interventions   Outcome: Progressing  Goal: Attend and participate in unit activities, including therapeutic, recreational, and educational groups  Description  Interventions:  - Provide therapeutic and educational activities daily, encourage attendance and participation, and document same in the medical record   Outcome: Progressing  Goal: Identify appropriate positive anger management techniques  Description  Interventions:  - Offer anger management and coping skills groups   - Staff will provide positive feedback for appropriate anger control  Outcome: Progressing     Problem: Nutrition/Hydration-ADULT  Goal: Nutrient/Hydration intake appropriate for improving, restoring or maintaining nutritional needs  Description  Monitor and assess patient's nutrition/hydration status for malnutrition (ex- brittle hair, bruises, dry skin, pale skin and conjunctiva, muscle wasting, smooth red tongue, and disorientation)  Collaborate with interdisciplinary team and initiate plan and interventions as ordered  Monitor patient's weight and dietary intake as ordered or per policy  Utilize nutrition screening tool and intervene per policy  Determine patient's food preferences and provide high-protein, high-caloric foods as appropriate       INTERVENTIONS:  - Monitor oral intake, urinary output, labs, and treatment plans  - Assess nutrition and hydration status and recommend course of action  - Evaluate amount of meals eaten  - Assist patient with eating if necessary   - Allow adequate time for meals  - Recommend/ encourage appropriate diets, oral nutritional supplements, and vitamin/mineral supplements  - Order, calculate, and assess calorie counts as needed  - Recommend, monitor, and adjust tube feedings and TPN/PPN based on assessed needs  - Assess need for intravenous fluids  - Provide specific nutrition/hydration education as appropriate  - Include patient/family/caregiver in decisions related to nutrition  Outcome: Progressing

## 2019-07-14 LAB
GLUCOSE SERPL-MCNC: 106 MG/DL (ref 65–140)
GLUCOSE SERPL-MCNC: 161 MG/DL (ref 65–140)
GLUCOSE SERPL-MCNC: 167 MG/DL (ref 65–140)
GLUCOSE SERPL-MCNC: 196 MG/DL (ref 65–140)
GLUCOSE SERPL-MCNC: 201 MG/DL (ref 65–140)

## 2019-07-14 PROCEDURE — 82948 REAGENT STRIP/BLOOD GLUCOSE: CPT

## 2019-07-14 RX ADMIN — CHLORPROMAZINE HYDROCHLORIDE 50 MG: 25 TABLET, SUGAR COATED ORAL at 09:47

## 2019-07-14 RX ADMIN — GABAPENTIN 400 MG: 300 CAPSULE ORAL at 21:39

## 2019-07-14 RX ADMIN — NICOTINE 1 PATCH: 21 PATCH, EXTENDED RELEASE TRANSDERMAL at 09:00

## 2019-07-14 RX ADMIN — GABAPENTIN 400 MG: 300 CAPSULE ORAL at 09:47

## 2019-07-14 RX ADMIN — BENZTROPINE MESYLATE 1 MG: 1 TABLET ORAL at 18:08

## 2019-07-14 RX ADMIN — BENZTROPINE MESYLATE 1 MG: 1 TABLET ORAL at 09:48

## 2019-07-14 RX ADMIN — GLYBURIDE 1.25 MG: 1.25 TABLET ORAL at 08:30

## 2019-07-14 RX ADMIN — BUSPIRONE HYDROCHLORIDE 10 MG: 5 TABLET ORAL at 09:48

## 2019-07-14 RX ADMIN — CHLORPROMAZINE HYDROCHLORIDE 50 MG: 25 TABLET, SUGAR COATED ORAL at 21:39

## 2019-07-14 RX ADMIN — GABAPENTIN 400 MG: 300 CAPSULE ORAL at 17:00

## 2019-07-14 RX ADMIN — INSULIN GLARGINE 32 UNITS: 100 INJECTION, SOLUTION SUBCUTANEOUS at 21:39

## 2019-07-14 RX ADMIN — INSULIN LISPRO 2 UNITS: 100 INJECTION, SOLUTION INTRAVENOUS; SUBCUTANEOUS at 21:38

## 2019-07-14 RX ADMIN — CHLORPROMAZINE HYDROCHLORIDE 50 MG: 25 TABLET, SUGAR COATED ORAL at 17:00

## 2019-07-14 RX ADMIN — DIVALPROEX SODIUM 2000 MG: 500 TABLET, FILM COATED, EXTENDED RELEASE ORAL at 18:05

## 2019-07-14 RX ADMIN — TRAZODONE HYDROCHLORIDE 50 MG: 50 TABLET ORAL at 21:39

## 2019-07-14 RX ADMIN — HYDROXYZINE HYDROCHLORIDE 25 MG: 25 TABLET ORAL at 18:07

## 2019-07-14 RX ADMIN — INSULIN LISPRO 4 UNITS: 100 INJECTION, SOLUTION INTRAVENOUS; SUBCUTANEOUS at 12:13

## 2019-07-14 RX ADMIN — DULOXETINE HYDROCHLORIDE 60 MG: 60 CAPSULE, DELAYED RELEASE ORAL at 09:48

## 2019-07-14 RX ADMIN — INSULIN LISPRO 2 UNITS: 100 INJECTION, SOLUTION INTRAVENOUS; SUBCUTANEOUS at 17:00

## 2019-07-14 RX ADMIN — BUSPIRONE HYDROCHLORIDE 10 MG: 5 TABLET ORAL at 18:05

## 2019-07-14 RX ADMIN — PALIPERIDONE 3 MG: 3 TABLET, EXTENDED RELEASE ORAL at 09:48

## 2019-07-14 NOTE — PROGRESS NOTES
Patient has been visible on the unit  Attending and participating in evening unit activities  Denies any current s/i and says "I like the safety of the unit " He said he is afraid if he leaves he is afraid he "will take more pills "  He says his stressors are that he "can't do herminia shit" and he can't "catch the perla ring " He says he needs a medical dr, a psychiatrist he can talk to, and out patient therapy    Encouraged him to discuss his aftercare needs with SW

## 2019-07-14 NOTE — PROGRESS NOTES
Right foot ulcer dressing changed  Area cleansed with NSS, non-adherent dressing placed, covered with Maxorb AG and wrapped with [place]  Education provided to pt, no concerns expressed by pt or noted by RN  Will continue to monitor

## 2019-07-14 NOTE — PROGRESS NOTES
Progress Note - Behavioral Health     Danielle Stevenson 44 y o  male MRN: [id number]   Unit/Bed#: [facility] [place] 258-02 Encounter: [id number]    Behavior over the last 24 hours:  Muna Alejandro was seen for an inpatient follow-up psychiatric visit this date  He is taking his medications as prescribed and denies any side effects  He is happy with his current medication regimen and feels it is helping  He still relates feeling depressed and hopeless  He is able to contract for safety on the unit  At today's visit, he appears less angry and irritable  ROS: no complaints    Mental Status Evaluation:    Appearance:  casually dressed, dressed appropriately   Behavior:  normal, pleasant, cooperative, calm   Speech:  normal rate, normal volume   Mood:  normal   Affect:  normal range and intensity   Thought Process:  organized, logical, coherent   Associations: intact associations   Thought Content:  normal   Perceptual Disturbances: none   Risk Potential: Suicidal ideation - None at present  Homicidal ideation - None  Potential for aggression - No   Sensorium:  oriented to person, place and time/date   Memory:  recent and remote memory grossly intact   Consciousness:  alert and awake   Attention: attention span and concentration are age appropriate   Insight:  fair   Judgment: fair   Gait/Station: normal gait/station, normal balance   Motor Activity: no abnormal movements     Vital signs in last 24 hours:    Temp:  [97 7 °F (36 5 °C)-98 °F (36 7 °C)] 98 °F (36 7 °C)  HR:  [] 88  Resp:  [16] 16  BP: (120-125)/(75-78) 120/75    Laboratory results:  I have personally reviewed all pertinent laboratory/tests results      Progress Toward Goals: progressing    Assessment/Plan   Principal Problem:    Severe bipolar I disorder, most recent episode depressed without psychotic features ([address] )  Active Problems:    Tobacco use disorder    Essential hypertension    DM (diabetes mellitus), secondary uncontrolled ([address] )    Recommended Treatment:   Continue current medications as prescribed  Continue to monitor  Discharge disposition and planning are ongoing  All current active medications have been reviewed    Encourage group therapy, milieu therapy and occupational therapy  Behavioral Health checks every 7 minutes      Current Facility-Administered Medications:  acetaminophen 650 mg Oral Q6H PRN Mandy Bautista, CRNP   acetaminophen 650 mg Oral Q4H PRN Mandy Bautista, CRNP   acetaminophen 975 mg Oral Q6H PRN Mandy Bautista, CRNP   aluminum-magnesium hydroxide-simethicone 30 mL Oral Q4H PRN Mandy Bautista, CRNP   benztropine 1 mg Intramuscular Q6H PRN Mandy Bautista, CRNP   benztropine 1 mg Oral Q6H PRN Mandy Bautista, CRNP   benztropine 1 mg Oral BID Mandy Bautista, CRNP   busPIRone 10 mg Oral BID Mandy Bautista, CRNP   chlorproMAZINE 50 mg Oral TID Mandy Bautista, CRNP   divalproex sodium 2,000 mg Oral QPM Mandy Bautista, CRNP   DULoxetine 60 mg Oral Daily Mandy Bautista, CRNP   gabapentin 400 mg Oral TID Mandy Bautista, CRNP   glyBURIDE 1 25 mg Oral Daily With Breakfast Mandy Bautista, CRNP   haloperidol 5 mg Oral Q6H PRN Mandy Bautista, CRNP   haloperidol lactate 5 mg Intramuscular Q6H PRN Mandy Bautista, CRNP   hydrOXYzine HCL 25 mg Oral Q6H PRN Mandy Bautista, CRNP   hydrOXYzine HCL 50 mg Oral Q4H PRN Mandy Bautista, CRNP   insulin glargine 32 Units Subcutaneous HS Shanon Kehr, CRNP   insulin lispro 2-12 Units Subcutaneous TID AC Mayra Jessica, CRNP   insulin lispro 2-12 Units Subcutaneous HS Mayra Jessica, CRNP   LORazepam 2 mg Intramuscular Q6H PRN Mandy Bautista, CRNP   magnesium hydroxide 30 mL Oral Daily PRN Mandy Bautista, CRNP   nicotine 1 patch Transdermal Daily Mandy Bautista, CRNP   OLANZapine 10 mg Intramuscular Q3H PRN Mandy Bautista, CRNP   paliperidone 3 mg Oral Daily Ermias Montaño MD   risperiDONE 1 mg Oral Q3H PRN Mandy Bautista, CRNP   traZODone 50 mg Oral HS PRN TAYLOR Lux   traZODone 50 mg Oral HS TAYLOR Lux       Risks / Benefits of Treatment:    Risks, benefits, and possible side effects of medications explained to patient and patient verbalizes understanding and agreement for treatment  Counseling / Coordination of Care:      Patient's progress discussed with staff in treatment team meeting  Medications, treatment progress and treatment plan reviewed with patient

## 2019-07-14 NOTE — PROGRESS NOTES
07/14/19 1108   Team Meeting   Meeting Type Daily Rounds   Initial Conference Date 07/14/19   Team Members Present   Team Members Present Physician;Nurse   Physician Team Member [id number]   Nursing Team Member Ryann Greco RN   Patient/Family Present   Patient Present No   Patient's Family Present No     Daily Psychiatric Rounding    Team Members Present    TAYLOR Johnson, RN

## 2019-07-14 NOTE — PLAN OF CARE
Problem: NEUROSENSORY - ADULT  Goal: Remains free of injury related to seizures activity  Description  INTERVENTIONS  - Maintain airway, patient safety  and administer oxygen as ordered  - Monitor patient for seizure activity, document and report duration and description of seizure to physician/advanced practitioner  - If seizure occurs,  ensure patient safety during seizure  - Reorient patient post seizure  - Seizure pads on all 4 side rails  - Instruct patient/family to notify RN of any seizure activity including if an aura is experienced  - Instruct patient/family to call for assistance with activity based on nursing assessment  - Administer anti-seizure medications as ordered  - Monitor fetal well being  Outcome: Progressing     Problem: SKIN/TISSUE INTEGRITY - ADULT  Goal: Incision(s), wounds(s) or drain site(s) healing without S/S of infection  Description  INTERVENTIONS  - Assess and document risk factors for skin impairment   - Assess and document dressing, incision, wound bed, drain sites and surrounding tissue  - Initiate Nutrition services consult and/or wound management as needed  Outcome: Progressing     Problem: Risk for Self Injury/Neglect  Goal: Verbalize thoughts and feelings  Description  Interventions:  - Assess and re-assess patient's lethality and potential for self-injury  - Engage patient in 1:1 interactions, daily, for a minimum of 15 minutes  - Encourage patient to express feelings, fears, frustrations, hopes  - Establish rapport/trust with patient   Outcome: Progressing  Goal: Recognize maladaptive responses and adopt new coping mechanisms  Outcome: Progressing  Goal: Complete daily ADLs, including personal hygiene independently, as able  Description  Interventions:  - Observe, teach, and assist patient with ADLS  - Monitor and promote a balance of rest/activity, with adequate nutrition and elimination  Outcome: Progressing     Problem: Depression  Goal: Treatment Goal: Demonstrate behavioral control of depressive symptoms, verbalize feelings of improved mood/affect, and adopt new coping skills prior to discharge  Outcome: Progressing  Goal: Refrain from self-neglect  Outcome: Progressing     Problem: Ineffective Coping  Goal: Participates in unit activities  Description  Interventions:  - Provide therapeutic environment   - Provide required programming   - Redirect inappropriate behaviors   Outcome: Progressing     Problem: DISCHARGE PLANNING - CARE MANAGEMENT  Goal: Discharge to post-acute care or home with appropriate resources  Description  INTERVENTIONS:  - Conduct assessment to determine patient/family and health care team treatment goals, and need for post-acute services based on payer coverage, community resources, and patient preferences, and barriers to discharge  - Address psychosocial, clinical, and financial barriers to discharge as identified in assessment in conjunction with the patient/family and health care team  - Arrange appropriate level of post-acute services according to patient's   needs and preference and payer coverage in collaboration with the physician and health care team  - Communicate with and update the patient/family, physician, and health care team regarding progress on the discharge plan  - Arrange appropriate transportation to post-acute venues   Outcome: Progressing     Problem: Risk for Violence/Aggression Toward Others  Goal: Treatment Goal: Refrain from acts of violence/aggression during length of stay, and demonstrate improved impulse control at the time of discharge  Outcome: Progressing  Goal: Refrain from harming others  Outcome: Progressing  Goal: Refrain from destructive acts on the environment or property  Outcome: Progressing  Goal: Control angry outbursts  Description  Interventions:  - Monitor patient closely, per order  - Ensure early verbal de-escalation  - Monitor prn medication needs  - Set reasonable/therapeutic limits, outline behavioral expectations, and consequences   - Provide a non-threatening milieu, utilizing the least restrictive interventions   Outcome: Progressing  Goal: Attend and participate in unit activities, including therapeutic, recreational, and educational groups  Description  Interventions:  - Provide therapeutic and educational activities daily, encourage attendance and participation, and document same in the medical record   Outcome: Progressing  Goal: Identify appropriate positive anger management techniques  Description  Interventions:  - Offer anger management and coping skills groups   - Staff will provide positive feedback for appropriate anger control  Outcome: Progressing     Problem: Nutrition/Hydration-ADULT  Goal: Nutrient/Hydration intake appropriate for improving, restoring or maintaining nutritional needs  Description  Monitor and assess patient's nutrition/hydration status for malnutrition (ex- brittle hair, bruises, dry skin, pale skin and conjunctiva, muscle wasting, smooth red tongue, and disorientation)  Collaborate with interdisciplinary team and initiate plan and interventions as ordered  Monitor patient's weight and dietary intake as ordered or per policy  Utilize nutrition screening tool and intervene per policy  Determine patient's food preferences and provide high-protein, high-caloric foods as appropriate       INTERVENTIONS:  - Monitor oral intake, urinary output, labs, and treatment plans  - Assess nutrition and hydration status and recommend course of action  - Evaluate amount of meals eaten  - Assist patient with eating if necessary   - Allow adequate time for meals  - Recommend/ encourage appropriate diets, oral nutritional supplements, and vitamin/mineral supplements  - Order, calculate, and assess calorie counts as needed  - Recommend, monitor, and adjust tube feedings and TPN/PPN based on assessed needs  - Assess need for intravenous fluids  - Provide specific nutrition/hydration education as appropriate  - Include patient/family/caregiver in decisions related to nutrition  Outcome: Progressing

## 2019-07-14 NOTE — PROGRESS NOTES
Patient is visible on the unit, quiet but social with select other pts and offers  conversation with staff members  Smiles occasionally  Pleasant  Patient denies SI and HI currently and verbalized he has always received good care on this unit  Good eye contact  Cooperative with medications  BS in the morning 106 and no coverage needed  Patient affect flat at rest  Attending morning group  Patient appears sad and express having fears about discharge  Patient verbalize having " high" depression but controled anxiety  Alert and oriented  Able to make needs known  No signs or symptoms of distress  SP1 and Q 7 minute checks will be maintained for safety  Will continue to monitor

## 2019-07-14 NOTE — PROGRESS NOTES
Patient complains of increased anxiety and requested PRN atarax  Provided pt with PRN anxiety medications  Will continue to monitor

## 2019-07-15 LAB
GLUCOSE SERPL-MCNC: 146 MG/DL (ref 65–140)
GLUCOSE SERPL-MCNC: 157 MG/DL (ref 65–140)
GLUCOSE SERPL-MCNC: 173 MG/DL (ref 65–140)
GLUCOSE SERPL-MCNC: 275 MG/DL (ref 65–140)

## 2019-07-15 PROCEDURE — 82948 REAGENT STRIP/BLOOD GLUCOSE: CPT

## 2019-07-15 PROCEDURE — 99232 SBSQ HOSP IP/OBS MODERATE 35: CPT | Performed by: NURSE PRACTITIONER

## 2019-07-15 RX ORDER — GABAPENTIN 300 MG/1
600 CAPSULE ORAL 3 TIMES DAILY
Status: DISCONTINUED | OUTPATIENT
Start: 2019-07-15 | End: 2019-08-02 | Stop reason: HOSPADM

## 2019-07-15 RX ADMIN — GLYBURIDE 1.25 MG: 1.25 TABLET ORAL at 08:14

## 2019-07-15 RX ADMIN — INSULIN GLARGINE 32 UNITS: 100 INJECTION, SOLUTION SUBCUTANEOUS at 23:30

## 2019-07-15 RX ADMIN — DULOXETINE HYDROCHLORIDE 60 MG: 60 CAPSULE, DELAYED RELEASE ORAL at 08:14

## 2019-07-15 RX ADMIN — BENZTROPINE MESYLATE 1 MG: 1 TABLET ORAL at 08:15

## 2019-07-15 RX ADMIN — ACETAMINOPHEN 975 MG: 325 TABLET ORAL at 08:11

## 2019-07-15 RX ADMIN — BUSPIRONE HYDROCHLORIDE 10 MG: 5 TABLET ORAL at 17:14

## 2019-07-15 RX ADMIN — CHLORPROMAZINE HYDROCHLORIDE 50 MG: 25 TABLET, SUGAR COATED ORAL at 17:14

## 2019-07-15 RX ADMIN — CHLORPROMAZINE HYDROCHLORIDE 50 MG: 25 TABLET, SUGAR COATED ORAL at 08:11

## 2019-07-15 RX ADMIN — CHLORPROMAZINE HYDROCHLORIDE 50 MG: 25 TABLET, SUGAR COATED ORAL at 22:18

## 2019-07-15 RX ADMIN — BENZTROPINE MESYLATE 1 MG: 1 TABLET ORAL at 17:14

## 2019-07-15 RX ADMIN — NICOTINE 1 PATCH: 21 PATCH, EXTENDED RELEASE TRANSDERMAL at 09:00

## 2019-07-15 RX ADMIN — GABAPENTIN 400 MG: 300 CAPSULE ORAL at 08:11

## 2019-07-15 RX ADMIN — BUSPIRONE HYDROCHLORIDE 10 MG: 5 TABLET ORAL at 08:11

## 2019-07-15 RX ADMIN — TRAZODONE HYDROCHLORIDE 50 MG: 50 TABLET ORAL at 22:18

## 2019-07-15 RX ADMIN — PALIPERIDONE 3 MG: 3 TABLET, EXTENDED RELEASE ORAL at 08:14

## 2019-07-15 RX ADMIN — DIVALPROEX SODIUM 2000 MG: 500 TABLET, FILM COATED, EXTENDED RELEASE ORAL at 17:15

## 2019-07-15 RX ADMIN — INSULIN LISPRO 2 UNITS: 100 INJECTION, SOLUTION INTRAVENOUS; SUBCUTANEOUS at 08:00

## 2019-07-15 RX ADMIN — GABAPENTIN 600 MG: 300 CAPSULE ORAL at 17:14

## 2019-07-15 RX ADMIN — INSULIN LISPRO 6 UNITS: 100 INJECTION, SOLUTION INTRAVENOUS; SUBCUTANEOUS at 22:18

## 2019-07-15 RX ADMIN — INSULIN LISPRO 2 UNITS: 100 INJECTION, SOLUTION INTRAVENOUS; SUBCUTANEOUS at 12:30

## 2019-07-15 RX ADMIN — INSULIN LISPRO 2 UNITS: 100 INJECTION, SOLUTION INTRAVENOUS; SUBCUTANEOUS at 17:17

## 2019-07-15 RX ADMIN — GABAPENTIN 600 MG: 300 CAPSULE ORAL at 22:18

## 2019-07-15 NOTE — PROGRESS NOTES
Patient has been visible on the unit  Attending and participating in evening unit activities  Minimal socialization with peers  When this writer assessed him, his comments were nearly identical to previous evening  He states he as up all day but for a short nap in the afternoon  Says he went to meditation group  Then stated he still feels like he would "take pills" if he were not in the safe environment of the hospital   He says "my life is a mess" and "I don't want to be here for three hots and a cot "  He said the benefits of being in the hospital are that he is receiving "therapy," medication management, and he is unable to self medicate here

## 2019-07-15 NOTE — PROGRESS NOTES
Progress Note - Behavioral Health     Jonny Tavares 44 y o  male MRN: [id number]   Unit/Bed#: [facility]-02 Encounter: [id number]    Behavior over the last 24 hours:  Mary Dc was seen for an inpatient follow-up psychiatric visit this date  At today's visit, he relates he is not having any suicidal or homicidal ideations on the unit but is unable to contract for safety if not in the hospital   He still feels depressed and overwhelmed but his main complaint at today's visit is anxiety  He is requesting a medication increase  His appetite and sleep are within normal limits  He is taking his medications as scheduled and denies any side effects  ROS: no complaints    Mental Status Evaluation:    Appearance:  casually dressed, dressed appropriately   Behavior:  normal, pleasant, cooperative, calm   Speech:  normal rate and volume   Mood:  improved; anxious   Affect:  normal range and intensity   Thought Process:  organized, logical, coherent, goal directed   Associations: intact associations   Thought Content:  normal, no overt delusions   Perceptual Disturbances: none   Risk Potential: Suicidal ideation - None  Homicidal ideation - None  Potential for aggression - No   Sensorium:  oriented to person, place and time/date   Memory:  recent and remote memory grossly intact   Consciousness:  alert and awake   Attention: attention span and concentration are age appropriate   Insight:  poor   Judgment: poor   Gait/Station: normal gait/station, normal balance   Motor Activity: no abnormal movements     Vital signs in last 24 hours:    Temp:  [97 7 °F (36 5 °C)-98 °F (36 7 °C)] 98 °F (36 7 °C)  HR:  [92-98] 92  Resp:  [16-18] 18  BP: (141-146)/() 146/100    Laboratory results:  I have personally reviewed all pertinent laboratory/tests results      Progress Toward Goals: progressing    Assessment/Plan   Principal Problem:    Severe bipolar I disorder, most recent episode depressed without psychotic features ([facility] )  Active Problems:    Tobacco use disorder    Essential hypertension    DM (diabetes mellitus), secondary uncontrolled ([facility] )    Recommended Treatment:   Neurontin increased to 600 mg 3 times daily  Continue to monitor  Discharge disposition and planning are ongoing  All current active medications have been reviewed    Encourage group therapy, milieu therapy and occupational therapy  Behavioral Health checks every 7 minutes      Current Facility-Administered Medications:  acetaminophen 650 mg Oral Q6H PRN Mandy Mcdonnellics, CRNP   acetaminophen 650 mg Oral Q4H PRN Mandy Mcdonnellics, CRNP   acetaminophen 975 mg Oral Q6H PRN Mandy Bautista, CRNP   aluminum-magnesium hydroxide-simethicone 30 mL Oral Q4H PRN Mandy Bautista, CRNP   benztropine 1 mg Intramuscular Q6H PRN Mandy Bautista, CRNP   benztropine 1 mg Oral Q6H PRN Mandy Bautista, CRNP   benztropine 1 mg Oral BID Mandy Bautista, CRNP   busPIRone 10 mg Oral BID Mandy Bautista, CRNP   chlorproMAZINE 50 mg Oral TID Mandy Bautista, CRNP   divalproex sodium 2,000 mg Oral QPM Mandy Bautista, CRNP   DULoxetine 60 mg Oral Daily Mandy Bautista, CRNP   gabapentin 600 mg Oral TID Mandy Bautista, CRNP   glyBURIDE 1 25 mg Oral Daily With Breakfast Mandy Bautista, CRNP   haloperidol 5 mg Oral Q6H PRN Mandy Bautista, CRNP   haloperidol lactate 5 mg Intramuscular Q6H PRN Mandy Bautista, CRNP   hydrOXYzine HCL 25 mg Oral Q6H PRN Mandy Bautista, CRNP   hydrOXYzine HCL 50 mg Oral Q4H PRN Mandy Bautista, CRNP   insulin glargine 32 Units Subcutaneous HS Vijay Almanzar, CRNP   insulin lispro 2-12 Units Subcutaneous TID AC Mayra Jaskaran, CRNP   insulin lispro 2-12 Units Subcutaneous HS Mayra Jaskaran, CRNP   LORazepam 2 mg Intramuscular Q6H PRN Mandy Bautista, CRNP   magnesium hydroxide 30 mL Oral Daily PRN Mandy Bautista, CRNP   nicotine 1 patch Transdermal Daily Mandy Bautista, CRNP   OLANZapine 10 mg Intramuscular Q3H PRN TAYLOR Lux   paliperidone 3 mg Oral Daily Carolina Coronel MD   risperiDONE 1 mg Oral Q3H PRN TAYLOR Lux   traZODone 50 mg Oral HS PRN TAYLOR Lux   traZODone 50 mg Oral HS TAYLOR Lux       Risks / Benefits of Treatment:    Risks, benefits, and possible side effects of medications explained to patient and patient verbalizes understanding and agreement for treatment  Counseling / Coordination of Care:      Patient's progress discussed with staff in treatment team meeting  Medications, treatment progress and treatment plan reviewed with patient

## 2019-07-15 NOTE — PROGRESS NOTES
Patient is visible on the unit occasionally, quiet, keeps to self  Good eye contact when speaking with staff  Patient denies SI currently but is unable to validate that if he were to leave  Affect flat  Patient appears depression and expressed he is concern with living arrangements and finances  Patient using cane appropriately to ambulate  Positive for morning meal  Cooperative with medications  Alert and oriented  Able to make needs known  No signs or symptoms of distress  SP1 and Q 7 minute checks will be maintained for patient safety  Will continue to monitor

## 2019-07-15 NOTE — PROGRESS NOTES
Patient complains of 8/10 HA and requested PRN Tylenol  Provided patient with PRN pain medications  Will continue to monitor

## 2019-07-15 NOTE — PLAN OF CARE
Problem: NEUROSENSORY - ADULT  Goal: Remains free of injury related to seizures activity  Description  INTERVENTIONS  - Maintain airway, patient safety  and administer oxygen as ordered  - Monitor patient for seizure activity, document and report duration and description of seizure to physician/advanced practitioner  - If seizure occurs,  ensure patient safety during seizure  - Reorient patient post seizure  - Seizure pads on all 4 side rails  - Instruct patient/family to notify RN of any seizure activity including if an aura is experienced  - Instruct patient/family to call for assistance with activity based on nursing assessment  - Administer anti-seizure medications as ordered  - Monitor fetal well being  Outcome: Progressing     Problem: SKIN/TISSUE INTEGRITY - ADULT  Goal: Incision(s), wounds(s) or drain site(s) healing without S/S of infection  Description  INTERVENTIONS  - Assess and document risk factors for skin impairment   - Assess and document dressing, incision, wound bed, drain sites and surrounding tissue  - Initiate Nutrition services consult and/or wound management as needed  Outcome: Progressing     Problem: Risk for Self Injury/Neglect  Goal: Verbalize thoughts and feelings  Description  Interventions:  - Assess and re-assess patient's lethality and potential for self-injury  - Engage patient in 1:1 interactions, daily, for a minimum of 15 minutes  - Encourage patient to express feelings, fears, frustrations, hopes  - Establish rapport/trust with patient   Outcome: Progressing  Goal: Recognize maladaptive responses and adopt new coping mechanisms  Outcome: Progressing  Goal: Complete daily ADLs, including personal hygiene independently, as able  Description  Interventions:  - Observe, teach, and assist patient with ADLS  - Monitor and promote a balance of rest/activity, with adequate nutrition and elimination  Outcome: Progressing     Problem: Depression  Goal: Treatment Goal: Demonstrate behavioral control of depressive symptoms, verbalize feelings of improved mood/affect, and adopt new coping skills prior to discharge  Outcome: Progressing  Goal: Refrain from self-neglect  Outcome: Progressing     Problem: Ineffective Coping  Goal: Participates in unit activities  Description  Interventions:  - Provide therapeutic environment   - Provide required programming   - Redirect inappropriate behaviors   Outcome: Progressing     Problem: DISCHARGE PLANNING - CARE MANAGEMENT  Goal: Discharge to post-acute care or home with appropriate resources  Description  INTERVENTIONS:  - Conduct assessment to determine patient/family and health care team treatment goals, and need for post-acute services based on payer coverage, community resources, and patient preferences, and barriers to discharge  - Address psychosocial, clinical, and financial barriers to discharge as identified in assessment in conjunction with the patient/family and health care team  - Arrange appropriate level of post-acute services according to patient's   needs and preference and payer coverage in collaboration with the physician and health care team  - Communicate with and update the patient/family, physician, and health care team regarding progress on the discharge plan  - Arrange appropriate transportation to post-acute venues   Outcome: Progressing

## 2019-07-15 NOTE — PROGRESS NOTES
Right foot wound dressing changed   Area cleansed with NSS, non-adherent dressing placed, covered with Maxorb AG and wrapped with Efrem   Education provided to pt, no concerns expressed by pt or noted by RN  Claudetta Sands continue to monitor

## 2019-07-15 NOTE — PROGRESS NOTES
07/15/19 1004   Team Meeting   Meeting Type Daily Rounds   Patient/Family Present   Patient Present No   Patient's Family Present No     Daily Psychiatric Rounds    Team Members Present:    MD Ravinder Carranza CRNP Laraine Bailey, MSW Cristie Deer [place], [place]  BLAKE Recinos, BLAKE    Discussion:     Has been controlled  Depressed  Hopeless  Contracts for safety   Discharge planning continues

## 2019-07-16 LAB
GLUCOSE SERPL-MCNC: 192 MG/DL (ref 65–140)
GLUCOSE SERPL-MCNC: 204 MG/DL (ref 65–140)
GLUCOSE SERPL-MCNC: 240 MG/DL (ref 65–140)
GLUCOSE SERPL-MCNC: 94 MG/DL (ref 65–140)

## 2019-07-16 PROCEDURE — 82948 REAGENT STRIP/BLOOD GLUCOSE: CPT

## 2019-07-16 PROCEDURE — 99232 SBSQ HOSP IP/OBS MODERATE 35: CPT | Performed by: PSYCHIATRY & NEUROLOGY

## 2019-07-16 RX ADMIN — GABAPENTIN 600 MG: 300 CAPSULE ORAL at 17:06

## 2019-07-16 RX ADMIN — GABAPENTIN 600 MG: 300 CAPSULE ORAL at 08:14

## 2019-07-16 RX ADMIN — INSULIN LISPRO 4 UNITS: 100 INJECTION, SOLUTION INTRAVENOUS; SUBCUTANEOUS at 17:07

## 2019-07-16 RX ADMIN — CHLORPROMAZINE HYDROCHLORIDE 50 MG: 25 TABLET, SUGAR COATED ORAL at 17:06

## 2019-07-16 RX ADMIN — INSULIN LISPRO 4 UNITS: 100 INJECTION, SOLUTION INTRAVENOUS; SUBCUTANEOUS at 21:02

## 2019-07-16 RX ADMIN — GABAPENTIN 600 MG: 300 CAPSULE ORAL at 21:32

## 2019-07-16 RX ADMIN — BENZTROPINE MESYLATE 1 MG: 1 TABLET ORAL at 08:14

## 2019-07-16 RX ADMIN — CHLORPROMAZINE HYDROCHLORIDE 50 MG: 25 TABLET, SUGAR COATED ORAL at 08:14

## 2019-07-16 RX ADMIN — BUSPIRONE HYDROCHLORIDE 10 MG: 5 TABLET ORAL at 08:14

## 2019-07-16 RX ADMIN — BENZTROPINE MESYLATE 1 MG: 1 TABLET ORAL at 17:06

## 2019-07-16 RX ADMIN — DIVALPROEX SODIUM 2000 MG: 500 TABLET, FILM COATED, EXTENDED RELEASE ORAL at 17:06

## 2019-07-16 RX ADMIN — NICOTINE 1 PATCH: 21 PATCH, EXTENDED RELEASE TRANSDERMAL at 08:15

## 2019-07-16 RX ADMIN — INSULIN LISPRO 2 UNITS: 100 INJECTION, SOLUTION INTRAVENOUS; SUBCUTANEOUS at 12:19

## 2019-07-16 RX ADMIN — INSULIN GLARGINE 32 UNITS: 100 INJECTION, SOLUTION SUBCUTANEOUS at 21:02

## 2019-07-16 RX ADMIN — BUSPIRONE HYDROCHLORIDE 10 MG: 5 TABLET ORAL at 17:06

## 2019-07-16 RX ADMIN — TRAZODONE HYDROCHLORIDE 50 MG: 50 TABLET ORAL at 21:01

## 2019-07-16 RX ADMIN — CHLORPROMAZINE HYDROCHLORIDE 50 MG: 25 TABLET, SUGAR COATED ORAL at 21:00

## 2019-07-16 RX ADMIN — PALIPERIDONE 3 MG: 3 TABLET, EXTENDED RELEASE ORAL at 08:14

## 2019-07-16 RX ADMIN — GLYBURIDE 1.25 MG: 1.25 TABLET ORAL at 08:14

## 2019-07-16 RX ADMIN — DULOXETINE HYDROCHLORIDE 60 MG: 60 CAPSULE, DELAYED RELEASE ORAL at 08:14

## 2019-07-16 NOTE — PROGRESS NOTES
Patient bright, alert, awake, ate breakfast with peers, back to bed after eating  Patient states " I cant say yes or no" when asked if having SI  Denies hi  Hallucinations is having "racing and dangerous thoughts"  Complaint with medications and has a goal of not needing insulin today by eating healthy  Will maintain on safety precautions and 7 minute checks, no needs identified

## 2019-07-16 NOTE — PROGRESS NOTES
Progress Note - Behavioral Health   Rizwan Robertson 44 y o  male MRN: [id number]  Unit/Bed#: [facility] 258-02 Encounter: [id number]    Assessment/Plan   Principal Problem:    Severe bipolar I disorder, most recent episode depressed without psychotic features ([facility] 75 )  Active Problems:    Tobacco use disorder    Essential hypertension    DM (diabetes mellitus), secondary uncontrolled ([facility] 75 )    P:  Continue current treatment    Behavior over the last 24 hours:  Unchanged  Patient reports he still feel depressed and does not feel ready for discharge  I reassured him that medications would take some time to kick him and he seems receptive to reassurance  Sleep: hypersomnia  Appetite: poor  Medication side effects: No  ROS: headache    Mental Status Evaluation:  Appearance:  disheveled   Behavior:  guarded   Speech:  soft   Mood:  sad   Affect:  normal   Thought Process:  circumstantial   Thought Content:  normal   Perceptual Disturbances: None   Risk Potential: Suicidal Ideations none   Sensorium:  person and place   Cognition:  grossly intact   Consciousness:  awake    Attention: attention span appeared shorter than expected for age   Insight:  limited   Judgment: limited   Gait/Station: slow   Motor Activity: no abnormal movements     Progress Toward Goals: ongoing    Recommended Treatment: Continue with group therapy, milieu therapy and occupational therapy  Risks, benefits and possible side effects of Medications:   Risks, benefits, and possible side effects of medications explained to patient and patient verbalizes understanding  Medications: all current active meds have been reviewed and continue current psychiatric medications  Labs: reviewed    Counseling / Coordination of Care  Total floor / unit time spent today 15 minutes  Greater than 50% of total time was spent with the patient and / or family counseling and / or coordination of care   A description of the counseling / coordination of care:

## 2019-07-16 NOTE — PROGRESS NOTES
Patient wound care performed on right foot plantar surface per order    Minimal drainage, mild odor  Tolerated well

## 2019-07-16 NOTE — SOCIAL WORK
[facility] facilitated pt's call to [facility] office at which Eitan Kerr informed pt that his SSDI benefit remaining ongoing and that his redetermination is conducted every three years  As pt had redetermination last year, he will not be subjected to another until 2021  Pt felt relieved to be informed regarding same  Pt's current monthly benefit is $1005; he receives MSP benefit so that he does not pay Part B premium  Pt expressed willingness to discharge to homeless shelter in [facility], [address], from which he intends to arrange his own housing, likely to an apt in [place] where pt's girlfriend prefers that he live, as she herself intends to move to live with her parents  Pt noted that, as he will receive his SS check on 8/2/19, he will have enough money to pay rent and deposit  Pt intends to use bus transportation from [facility] to [facility] where he thinks his girlfriend can pick him up  Pt requested that AIDA schedule an appointment in his behalf with Dr Jeferson Louis for provision of primary care services that he intends to access following [facility]  AIDA scheduled his appointment for Tuesday, 8/13/19, at 11:15 am     As pt requested that he be assisted to access psych services for medication management and individual therapy at [place] [address], AIDA faxed his requisite documentation to this agency whose rep will call SW to schedule appointments

## 2019-07-16 NOTE — PROGRESS NOTES
Daily Rounds Documentation    Team Members Present:   MD Baron Sohail Barreto, RN  Carolina Flores, LSW   Chau Espinoza [place]    Can only contract for safety on the unit  Wound care daily  No behaviors  [facility] with pt today

## 2019-07-16 NOTE — PROGRESS NOTES
Patient has been visible on the unit  Attending and participating in evening unit activities  Minimally social with peers  Reports he is having some anxiety over his SS benefits and having to requalify for them  He states his SW told him she would call with him and help him with the process  He also reports he still believes he would be unsafe outside the hospital due to the temptation to OD  States if he "got caught between a rock and a hard place" he would take an OD    Denies s/i while here in the hospital

## 2019-07-17 LAB
GLUCOSE SERPL-MCNC: 155 MG/DL (ref 65–140)
GLUCOSE SERPL-MCNC: 178 MG/DL (ref 65–140)
GLUCOSE SERPL-MCNC: 194 MG/DL (ref 65–140)
GLUCOSE SERPL-MCNC: 203 MG/DL (ref 65–140)

## 2019-07-17 PROCEDURE — 99231 SBSQ HOSP IP/OBS SF/LOW 25: CPT | Performed by: PSYCHIATRY & NEUROLOGY

## 2019-07-17 PROCEDURE — 82948 REAGENT STRIP/BLOOD GLUCOSE: CPT

## 2019-07-17 RX ADMIN — CHLORPROMAZINE HYDROCHLORIDE 50 MG: 25 TABLET, SUGAR COATED ORAL at 17:14

## 2019-07-17 RX ADMIN — GABAPENTIN 600 MG: 300 CAPSULE ORAL at 17:13

## 2019-07-17 RX ADMIN — DIVALPROEX SODIUM 2000 MG: 500 TABLET, FILM COATED, EXTENDED RELEASE ORAL at 17:14

## 2019-07-17 RX ADMIN — NICOTINE 1 PATCH: 21 PATCH, EXTENDED RELEASE TRANSDERMAL at 08:43

## 2019-07-17 RX ADMIN — BENZTROPINE MESYLATE 1 MG: 1 TABLET ORAL at 17:14

## 2019-07-17 RX ADMIN — CHLORPROMAZINE HYDROCHLORIDE 50 MG: 25 TABLET, SUGAR COATED ORAL at 08:43

## 2019-07-17 RX ADMIN — GLYBURIDE 1.25 MG: 1.25 TABLET ORAL at 08:43

## 2019-07-17 RX ADMIN — INSULIN LISPRO 2 UNITS: 100 INJECTION, SOLUTION INTRAVENOUS; SUBCUTANEOUS at 12:16

## 2019-07-17 RX ADMIN — TRAZODONE HYDROCHLORIDE 50 MG: 50 TABLET ORAL at 21:03

## 2019-07-17 RX ADMIN — BUSPIRONE HYDROCHLORIDE 10 MG: 5 TABLET ORAL at 08:43

## 2019-07-17 RX ADMIN — INSULIN LISPRO 2 UNITS: 100 INJECTION, SOLUTION INTRAVENOUS; SUBCUTANEOUS at 17:14

## 2019-07-17 RX ADMIN — BENZTROPINE MESYLATE 1 MG: 1 TABLET ORAL at 08:43

## 2019-07-17 RX ADMIN — BUSPIRONE HYDROCHLORIDE 10 MG: 5 TABLET ORAL at 17:14

## 2019-07-17 RX ADMIN — CHLORPROMAZINE HYDROCHLORIDE 50 MG: 25 TABLET, SUGAR COATED ORAL at 21:03

## 2019-07-17 RX ADMIN — INSULIN GLARGINE 32 UNITS: 100 INJECTION, SOLUTION SUBCUTANEOUS at 21:03

## 2019-07-17 RX ADMIN — GABAPENTIN 600 MG: 300 CAPSULE ORAL at 21:02

## 2019-07-17 RX ADMIN — DULOXETINE HYDROCHLORIDE 60 MG: 60 CAPSULE, DELAYED RELEASE ORAL at 08:43

## 2019-07-17 RX ADMIN — INSULIN LISPRO 4 UNITS: 100 INJECTION, SOLUTION INTRAVENOUS; SUBCUTANEOUS at 21:03

## 2019-07-17 RX ADMIN — GABAPENTIN 600 MG: 300 CAPSULE ORAL at 08:43

## 2019-07-17 RX ADMIN — INSULIN LISPRO 2 UNITS: 100 INJECTION, SOLUTION INTRAVENOUS; SUBCUTANEOUS at 07:46

## 2019-07-17 RX ADMIN — PALIPERIDONE 3 MG: 3 TABLET, EXTENDED RELEASE ORAL at 08:43

## 2019-07-17 NOTE — PROGRESS NOTES
Daily Rounds Documentation    Team Members Present:   MD Benito Garza, RN  Dipesh Merino, ALECIAW   Darrel Tinoco [place], [place]  Michael Smith, PharmD    Denying acute symptoms  Controlled and polite  Wants to stay until 8/2/19  Discharge Wednesday

## 2019-07-17 NOTE — PROGRESS NOTES
Patient bright, alert, awake, ate breakfast with peers, back to bed after eating  Did not attend groups but did request hygiene products for shower and am care  Removed bandage from right foot, minimal sanguinous drainage  Showered  Rewrapped right foot as ordered  Tolerated well  Patient has been calm, pleasant, polite, thankful for care  Tells this nurse he is feeling less racey than yesterday and is "letting the day take him along"  Denies si hi and hallucinations at this time  Will maintain on safety precautions and 7 minute checks, no needs identified

## 2019-07-17 NOTE — PROGRESS NOTES
Clinical Pharmacy Note: Antipsychotic Monitoring Requirements     Bhavya Powers is a 44 y o  male currently on the following medications:    Current Facility-Administered Medications:     acetaminophen (TYLENOL) tablet 650 mg, 650 mg, Oral, Q6H PRN, TAYLOR Lux    acetaminophen (TYLENOL) tablet 650 mg, 650 mg, Oral, Q4H PRN, Mandy Bautista, CRNP, 650 mg at 07/10/19 1345    acetaminophen (TYLENOL) tablet 975 mg, 975 mg, Oral, Q6H PRN, Mandy Bautista, RICKNP, 975 mg at 07/15/19 0811    aluminum-magnesium hydroxide-simethicone (MYLANTA) 200-200-20 mg/5 mL oral suspension 30 mL, 30 mL, Oral, Q4H PRN, TAYLOR Lux    benztropine (COGENTIN) injection 1 mg, 1 mg, Intramuscular, Q6H PRN, TAYLOR Lux    benztropine (COGENTIN) tablet 1 mg, 1 mg, Oral, Q6H PRN, TAYLOR Lux    benztropine (COGENTIN) tablet 1 mg, 1 mg, Oral, BID, RICK LuxNP, 1 mg at 07/17/19 0843    busPIRone (BUSPAR) tablet 10 mg, 10 mg, Oral, BID, Mandy Bautista, CRNP, 10 mg at 07/17/19 0843    chlorproMAZINE (THORAZINE) tablet 50 mg, 50 mg, Oral, TID, RICK LuxNP, 50 mg at 07/17/19 0843    divalproex sodium (DEPAKOTE ER) 24 hr tablet 2,000 mg, 2,000 mg, Oral, QPM, Mandy Bautista, RICKNP, 2,000 mg at 07/16/19 1706    DULoxetine (CYMBALTA) delayed release capsule 60 mg, 60 mg, Oral, Daily, Mandy Bautista, CRNP, 60 mg at 07/17/19 0843    gabapentin (NEURONTIN) capsule 600 mg, 600 mg, Oral, TID, Mandy Bautista CRNP, 600 mg at 07/17/19 0843    glyBURIDE (DIABETA) tablet 1 25 mg, 1 25 mg, Oral, Daily With Breakfast, TAYLOR Lux, 1 25 mg at 07/17/19 0843    haloperidol (HALDOL) tablet 5 mg, 5 mg, Oral, Q6H PRN, TAYLOR Lux    haloperidol lactate (HALDOL) injection 5 mg, 5 mg, Intramuscular, Q6H PRN, TAYLOR Lux    hydrOXYzine HCL (ATARAX) tablet 25 mg, 25 mg, Oral, Q6H PRN, TAYLOR Lux, 25 mg at 07/14/19 1807    hydrOXYzine HCL (ATARAX) tablet 50 mg, 50 mg, Oral, Q4H PRN, RICK LuxNP, 50 mg at 07/12/19 1655    insulin glargine (LANTUS) subcutaneous injection 32 Units 0 32 mL, 32 Units, Subcutaneous, HS, Renaee Deep, CRNP, 32 Units at 07/16/19 2102    insulin lispro (HumaLOG) 100 units/mL subcutaneous injection 2-12 Units, 2-12 Units, Subcutaneous, TID AC, 2 Units at 07/17/19 1216 **AND** Fingerstick Glucose (POCT), , , TID AC, TAYLOR Woody    insulin lispro (HumaLOG) 100 units/mL subcutaneous injection 2-12 Units, 2-12 Units, Subcutaneous, HS, Renaee Deep, CRNP, 4 Units at 07/16/19 2102    LORazepam (ATIVAN) 2 mg/mL injection 2 mg, 2 mg, Intramuscular, Q6H PRN, TAYLOR Lux    magnesium hydroxide (MILK OF MAGNESIA) 400 mg/5 mL oral suspension 30 mL, 30 mL, Oral, Daily PRN, TAYLOR Lux    nicotine (NICODERM CQ) 21 mg/24 hr TD 24 hr patch 1 patch, 1 patch, Transdermal, Daily, TAYLOR Lux, 1 patch at 07/17/19 0843    OLANZapine (ZyPREXA) IM injection 10 mg, 10 mg, Intramuscular, Q3H PRN, TAYLOR Lux    paliperidone (INVEGA) 24 hr tablet 3 mg, 3 mg, Oral, Daily, Marco A Pritchett MD, 3 mg at 07/17/19 0843    risperiDONE (RisperDAL M-TABS) dispersible tablet 1 mg, 1 mg, Oral, Q3H PRN, TAYLOR Lux    traZODone (DESYREL) tablet 50 mg, 50 mg, Oral, HS PRN, TAYLOR Lux, 50 mg at 07/10/19 2241    traZODone (DESYREL) tablet 50 mg, 50 mg, Oral, HS, Mandy Bautista, RICKNP, 50 mg at 07/16/19 2101      Performing metabolic monitoring on patients receiving any antipsychotics is a Centers for [facility] [employer] (CMS) requirement  Antipsychotic treatment increases the risk of developing type 2 diabetes mellitus, hypertension, and hyperlipidemia   According to the [facility] (NICE) guidelines, baseline weight, waist circumference, pulse, blood pressure, fasting blood glucose, hemoglobin A1c, and lipid profile should be collected  These guidelines coincide with Centers and Medicare & Medicaid Services (CMS) requirements including baseline Body Mass Index, blood pressure, fasting glucose or hemoglobin A1c, and fasting lipid panel  CMS states laboratory values collected 12 months from discharge date are acceptable for evaluation  CMS Checklist:     BMI: yes  BP: yes  Fasting glucose: yes       OR A1c: yes  Lipid panel within last 12 months: yes  Nicotine Replacement Therapy: yes    The following values have been collected:  Value Status Result    BMI Body mass index is 37 75 kg/m²     Blood pressure /79 (BP Location: Left arm)   Pulse 87   Temp 97 7 °F (36 5 °C) (Temporal)   Resp 18   Ht 6' 3" (1 905 m)   Wt (!) 137 kg (302 lb)   SpO2 96%   BMI 37 75 kg/m²    Fasting glucose 0   Lab Value Date/Time    GLUC 277 (H) 07/09/2019 1114        Hemoglobin A1c 0   Lab Value Date/Time    HGBA1C 8 4 (H) 07/10/2019 0612        Lipid panel 0   Lab Value Date/Time    CHOLESTEROL 162 07/10/2019 0612       0   Lab Value Date/Time    HDL 32 (L) 07/10/2019 0612       0   Lab Value Date/Time    LDLCALC 92 07/10/2019 0612        0   Lab Value Date/Time    TRIG 191 (H) 07/10/2019 0612          Recommendations:  CMS requirements have been completed    Pharmacy will continue to follow patient with team   Electronically signed by: Modesto Garcia, Pharmacist

## 2019-07-17 NOTE — PROGRESS NOTES
Patient out in the milieu keeps to himself  Minimally social with peers and staff  Ate HS snack  Ambulates with the cane  Upon approach patient's mood and affect is flat and depressed  Denies SI and HI  Accucheck was 240  Gave 2 units of Humalog  Took medications without difficulty  No behavioral issues  Will continue to monitor safety and behaviors every 7 minutes

## 2019-07-17 NOTE — PROGRESS NOTES
Patient appears to be sleeping without difficulty throughout the night  No behavioral issues  No complaints or concerns  Will continue to monitor safety and behaviors every 7 minutes

## 2019-07-17 NOTE — PLAN OF CARE
PT continues to attend at least 50% of art therapy groups offered where he does actively engage in group directives and in projects of choice  PT has been pleasant and cooperative, maintains good eye contact and has positive social interactions  During group processing, PT does display fair insight into his illness and recovery and has identified several healthy coping strategies  PT will continue to attend at least 75% of art therapy groups to increase positive self esteem, to identify a positive support system and to allow for creative expression of feelings and emotions       Problem: Ineffective Coping  Goal: Participates in unit activities  Description  Interventions:  - Provide therapeutic environment   - Provide required programming   - Redirect inappropriate behaviors   Outcome: Progressing

## 2019-07-18 LAB
GLUCOSE SERPL-MCNC: 155 MG/DL (ref 65–140)
GLUCOSE SERPL-MCNC: 159 MG/DL (ref 65–140)
GLUCOSE SERPL-MCNC: 173 MG/DL (ref 65–140)
GLUCOSE SERPL-MCNC: 263 MG/DL (ref 65–140)

## 2019-07-18 PROCEDURE — 99231 SBSQ HOSP IP/OBS SF/LOW 25: CPT | Performed by: PSYCHIATRY & NEUROLOGY

## 2019-07-18 PROCEDURE — 82948 REAGENT STRIP/BLOOD GLUCOSE: CPT

## 2019-07-18 RX ADMIN — GABAPENTIN 600 MG: 300 CAPSULE ORAL at 09:08

## 2019-07-18 RX ADMIN — CHLORPROMAZINE HYDROCHLORIDE 50 MG: 25 TABLET, SUGAR COATED ORAL at 16:23

## 2019-07-18 RX ADMIN — INSULIN LISPRO 2 UNITS: 100 INJECTION, SOLUTION INTRAVENOUS; SUBCUTANEOUS at 16:48

## 2019-07-18 RX ADMIN — INSULIN LISPRO 2 UNITS: 100 INJECTION, SOLUTION INTRAVENOUS; SUBCUTANEOUS at 11:45

## 2019-07-18 RX ADMIN — BENZTROPINE MESYLATE 1 MG: 1 TABLET ORAL at 18:16

## 2019-07-18 RX ADMIN — GABAPENTIN 600 MG: 300 CAPSULE ORAL at 21:04

## 2019-07-18 RX ADMIN — BENZTROPINE MESYLATE 1 MG: 1 TABLET ORAL at 08:38

## 2019-07-18 RX ADMIN — BUSPIRONE HYDROCHLORIDE 10 MG: 5 TABLET ORAL at 18:16

## 2019-07-18 RX ADMIN — GLYBURIDE 1.25 MG: 1.25 TABLET ORAL at 08:38

## 2019-07-18 RX ADMIN — INSULIN LISPRO 6 UNITS: 100 INJECTION, SOLUTION INTRAVENOUS; SUBCUTANEOUS at 21:05

## 2019-07-18 RX ADMIN — TRAZODONE HYDROCHLORIDE 50 MG: 50 TABLET ORAL at 21:05

## 2019-07-18 RX ADMIN — DIVALPROEX SODIUM 2000 MG: 500 TABLET, FILM COATED, EXTENDED RELEASE ORAL at 18:16

## 2019-07-18 RX ADMIN — INSULIN GLARGINE 32 UNITS: 100 INJECTION, SOLUTION SUBCUTANEOUS at 21:05

## 2019-07-18 RX ADMIN — GABAPENTIN 600 MG: 300 CAPSULE ORAL at 16:24

## 2019-07-18 RX ADMIN — CHLORPROMAZINE HYDROCHLORIDE 50 MG: 25 TABLET, SUGAR COATED ORAL at 21:04

## 2019-07-18 RX ADMIN — PALIPERIDONE 3 MG: 3 TABLET, EXTENDED RELEASE ORAL at 08:38

## 2019-07-18 RX ADMIN — CHLORPROMAZINE HYDROCHLORIDE 50 MG: 25 TABLET, SUGAR COATED ORAL at 08:37

## 2019-07-18 RX ADMIN — BUSPIRONE HYDROCHLORIDE 10 MG: 5 TABLET ORAL at 08:38

## 2019-07-18 RX ADMIN — DULOXETINE HYDROCHLORIDE 60 MG: 60 CAPSULE, DELAYED RELEASE ORAL at 08:38

## 2019-07-18 RX ADMIN — NICOTINE 1 PATCH: 21 PATCH, EXTENDED RELEASE TRANSDERMAL at 08:39

## 2019-07-18 RX ADMIN — INSULIN LISPRO 2 UNITS: 100 INJECTION, SOLUTION INTRAVENOUS; SUBCUTANEOUS at 08:38

## 2019-07-18 NOTE — PLAN OF CARE
Problem: NEUROSENSORY - ADULT  Goal: Remains free of injury related to seizures activity  Description  INTERVENTIONS  - Maintain airway, patient safety  and administer oxygen as ordered  - Monitor patient for seizure activity, document and report duration and description of seizure to physician/advanced practitioner  - If seizure occurs,  ensure patient safety during seizure  - Reorient patient post seizure  - Seizure pads on all 4 side rails  - Instruct patient/family to notify RN of any seizure activity including if an aura is experienced  - Instruct patient/family to call for assistance with activity based on nursing assessment  - Administer anti-seizure medications as ordered  - Monitor fetal well being  7/18/2019 1406 by Corbin Parra RN  Outcome: Progressing  7/18/2019 1137 by Corbin Parra RN  Outcome: Progressing     Problem: SKIN/TISSUE INTEGRITY - ADULT  Goal: Incision(s), wounds(s) or drain site(s) healing without S/S of infection  Description  INTERVENTIONS  - Assess and document risk factors for skin impairment   - Assess and document dressing, incision, wound bed, drain sites and surrounding tissue  - Initiate Nutrition services consult and/or wound management as needed  7/18/2019 1406 by Corbin Parra RN  Outcome: Progressing  7/18/2019 1137 by Corbin Parra RN  Outcome: Progressing     Problem: Risk for Self Injury/Neglect  Goal: Verbalize thoughts and feelings  Description  Interventions:  - Assess and re-assess patient's lethality and potential for self-injury  - Engage patient in 1:1 interactions, daily, for a minimum of 15 minutes  - Encourage patient to express feelings, fears, frustrations, hopes  - Establish rapport/trust with patient   7/18/2019 1406 by Corbin Parra RN  Outcome: Progressing  7/18/2019 1137 by Corbin Parra RN  Outcome: Progressing  Goal: Recognize maladaptive responses and adopt new coping mechanisms  7/18/2019 1406 by Corbin Parra RN  Outcome: Progressing  7/18/2019 1137 by Ya Francisco RN  Outcome: Progressing  Goal: Complete daily ADLs, including personal hygiene independently, as able  Description  Interventions:  - Observe, teach, and assist patient with ADLS  - Monitor and promote a balance of rest/activity, with adequate nutrition and elimination  7/18/2019 1406 by Ya Francisco RN  Outcome: Progressing  7/18/2019 1137 by Ya Francisco RN  Outcome: Progressing     Problem: Depression  Goal: Treatment Goal: Demonstrate behavioral control of depressive symptoms, verbalize feelings of improved mood/affect, and adopt new coping skills prior to discharge  7/18/2019 1406 by Ya Francisco RN  Outcome: Progressing  7/18/2019 1137 by Ya Francisco RN  Outcome: Progressing  Goal: Refrain from self-neglect  7/18/2019 1406 by Ya Francisco RN  Outcome: Progressing  7/18/2019 1137 by Ya Francisco RN  Outcome: Progressing     Problem: Ineffective Coping  Goal: Participates in unit activities  Description  Interventions:  - Provide therapeutic environment   - Provide required programming   - Redirect inappropriate behaviors   7/18/2019 1406 by Ya Francisco RN  Outcome: Progressing  7/18/2019 1137 by Ya Francisco RN  Outcome: Progressing     Problem: DISCHARGE PLANNING - CARE MANAGEMENT  Goal: Discharge to post-acute care or home with appropriate resources  Description  INTERVENTIONS:  - Conduct assessment to determine patient/family and health care team treatment goals, and need for post-acute services based on payer coverage, community resources, and patient preferences, and barriers to discharge  - Address psychosocial, clinical, and financial barriers to discharge as identified in assessment in conjunction with the patient/family and health care team  - Arrange appropriate level of post-acute services according to patient's   needs and preference and payer coverage in collaboration with the physician and health care team  - Communicate with and update the patient/family, physician, and health care team regarding progress on the discharge plan  - Arrange appropriate transportation to post-acute venues   7/18/2019 1406 by Vic Gutiérrez RN  Outcome: Progressing  7/18/2019 1137 by Vic Gutiérrez RN  Outcome: Progressing     Problem: Nutrition/Hydration-ADULT  Goal: Nutrient/Hydration intake appropriate for improving, restoring or maintaining nutritional needs  Description  Monitor and assess patient's nutrition/hydration status for malnutrition (ex- brittle hair, bruises, dry skin, pale skin and conjunctiva, muscle wasting, smooth red tongue, and disorientation)  Collaborate with interdisciplinary team and initiate plan and interventions as ordered  Monitor patient's weight and dietary intake as ordered or per policy  Utilize nutrition screening tool and intervene per policy  Determine patient's food preferences and provide high-protein, high-caloric foods as appropriate  INTERVENTIONS:  - Monitor oral intake, urinary output, labs, and treatment plans  - Assess nutrition and hydration status and recommend course of action  - Evaluate amount of meals eaten  - Assist patient with eating if necessary   - Allow adequate time for meals  - Recommend/ encourage appropriate diets, oral nutritional supplements, and vitamin/mineral supplements  - Order, calculate, and assess calorie counts as needed  - Recommend, monitor, and adjust tube feedings and TPN/PPN based on assessed needs  - Assess need for intravenous fluids  - Provide specific nutrition/hydration education as appropriate  - Include patient/family/caregiver in decisions related to nutrition      7-18-19    he is on a level 1 meal plan with double portions of protein  His intake was listed at 100 %  He has a diabetic right foot ulcer  His weight was 271 on 7-9 then up to 302 on 7-13 with a 31  # gain noted  RDN to check his weight status   His POC glucose was 178 H on 7-18 RDN to evaluate his labs when available as he is on lantus with hx of diabetes  RDN visited on rounds this am and he offered no c/o   RDN to reassess his nutrition needs at moderate risk and follow PRN   Outcome: Progressing

## 2019-07-18 NOTE — CMS CERTIFICATION NOTE
Certification Statement -  Rizwan Robertson  :1975  MRN: [id number]    [facility] [address] Room / Bed: [place] [address]/[facility] 993-85 Encounter: [id number]    I certify that Rizwan Robertson requires further inpatient hospitalization beyond 20 days due to having suicidal thoughts and urges to hurt self  Patient has been getting increasingly depressed lately      Maryann Hardin MD     Date: 2019  Time: 12:55 PM

## 2019-07-18 NOTE — PROGRESS NOTES
Progress Note - Behavioral Health   Gopi Rawls 44 y o  male MRN: [id number]  Unit/Bed#: [facility] 258-02 Encounter: [id number]    Assessment/Plan   Principal Problem:    Severe bipolar I disorder, most recent episode depressed without psychotic features ([facility] )  Active Problems:    Tobacco use disorder    Essential hypertension    DM (diabetes mellitus), secondary uncontrolled ([facility]      Behavior over the last 24 hours:  Unchanged  Patient reports that he is getting more depressed  He is worried about what can he do in the future as he cannot go back to his girlfriend  Patient reports current meds are okay but they seem to need time to kick in    Sleep: normal  Appetite: poor  Medication side effects: No  ROS: no complaints    Mental Status Evaluation:  Appearance:  casually dressed and disheveled   Behavior:  deviant   Speech:  soft   Mood:  sad   Affect:  constricted   Thought Process:  circumstantial   Thought Content:  normal   Perceptual Disturbances: None   Risk Potential: Suicidal Ideations without plan   Sensorium:  person and place   Cognition:  grossly intact   Consciousness:  alert and awake    Attention: attention span appeared shorter than expected for age   Insight:  limited   Judgment: limited   Gait/Station: slow   Motor Activity: no abnormal movements     Progress Toward Goals: ongoing    Recommended Treatment: Continue with group therapy, milieu therapy and occupational therapy  Risks, benefits and possible side effects of Medications:   Risks, benefits, and possible side effects of medications explained to patient and patient verbalizes understanding  Medications: all current active meds have been reviewed and continue current psychiatric medications  Labs: reviewed    Counseling / Coordination of Care  Total floor / unit time spent today 15 minutes  Greater than 50% of total time was spent with the patient and / or family counseling and / or coordination of care   A description of the counseling / coordination of care:

## 2019-07-18 NOTE — PROGRESS NOTES
Patient bright, alert, awake, visible on unit for meals but mostly withdrawn to self  Pt has been taking the iniciative to make healthier nutrition choices to prevent the need for sliding scale insulin coverage  Denies si hi and hallucinations but maintains racing thoughts  Right foot wound care performed as ordered, tolerated well  He is pleasant, polite, calm and thankful for care  Declines to go to group frequently  Will maintain on safety precautions and 7 minute checks  No needs identified

## 2019-07-18 NOTE — PROGRESS NOTES
Daily Rounds Documentation    Team Members Present:   MD Sammie Bloom, RN  Pola Clark, ALECIAW   [facility], [facility]    Pleasant  Comfortable on unit  Racing thoughts and feels he is not ready to go  Tentative discharge Wednesday to a shelter

## 2019-07-18 NOTE — PROGRESS NOTES
Pt is visible on the unit  Quiet, polite, pleasant  Pt denies SI/HI/AH/VH  Pt states "I have racing thoughts, I'm not ready to go yet"  Compliant with meds  Able to make needs known  Will continue to monitor

## 2019-07-19 LAB
GLUCOSE SERPL-MCNC: 119 MG/DL (ref 65–140)
GLUCOSE SERPL-MCNC: 131 MG/DL (ref 65–140)
GLUCOSE SERPL-MCNC: 255 MG/DL (ref 65–140)
GLUCOSE SERPL-MCNC: 258 MG/DL (ref 65–140)

## 2019-07-19 PROCEDURE — 82948 REAGENT STRIP/BLOOD GLUCOSE: CPT

## 2019-07-19 PROCEDURE — 99231 SBSQ HOSP IP/OBS SF/LOW 25: CPT | Performed by: PSYCHIATRY & NEUROLOGY

## 2019-07-19 RX ADMIN — CHLORPROMAZINE HYDROCHLORIDE 50 MG: 25 TABLET, SUGAR COATED ORAL at 17:06

## 2019-07-19 RX ADMIN — INSULIN LISPRO 6 UNITS: 100 INJECTION, SOLUTION INTRAVENOUS; SUBCUTANEOUS at 21:58

## 2019-07-19 RX ADMIN — INSULIN LISPRO 4 UNITS: 100 INJECTION, SOLUTION INTRAVENOUS; SUBCUTANEOUS at 12:13

## 2019-07-19 RX ADMIN — GABAPENTIN 600 MG: 300 CAPSULE ORAL at 08:13

## 2019-07-19 RX ADMIN — TRAZODONE HYDROCHLORIDE 50 MG: 50 TABLET ORAL at 21:58

## 2019-07-19 RX ADMIN — DIVALPROEX SODIUM 2000 MG: 500 TABLET, FILM COATED, EXTENDED RELEASE ORAL at 18:10

## 2019-07-19 RX ADMIN — GLYBURIDE 1.25 MG: 1.25 TABLET ORAL at 08:13

## 2019-07-19 RX ADMIN — BENZTROPINE MESYLATE 1 MG: 1 TABLET ORAL at 08:13

## 2019-07-19 RX ADMIN — BUSPIRONE HYDROCHLORIDE 10 MG: 5 TABLET ORAL at 18:10

## 2019-07-19 RX ADMIN — INSULIN GLARGINE 32 UNITS: 100 INJECTION, SOLUTION SUBCUTANEOUS at 21:58

## 2019-07-19 RX ADMIN — CHLORPROMAZINE HYDROCHLORIDE 50 MG: 25 TABLET, SUGAR COATED ORAL at 08:13

## 2019-07-19 RX ADMIN — GABAPENTIN 600 MG: 300 CAPSULE ORAL at 17:06

## 2019-07-19 RX ADMIN — DULOXETINE HYDROCHLORIDE 60 MG: 60 CAPSULE, DELAYED RELEASE ORAL at 08:13

## 2019-07-19 RX ADMIN — BENZTROPINE MESYLATE 1 MG: 1 TABLET ORAL at 18:10

## 2019-07-19 RX ADMIN — ACETAMINOPHEN 650 MG: 325 TABLET ORAL at 18:44

## 2019-07-19 RX ADMIN — BUSPIRONE HYDROCHLORIDE 10 MG: 5 TABLET ORAL at 08:13

## 2019-07-19 RX ADMIN — CHLORPROMAZINE HYDROCHLORIDE 50 MG: 25 TABLET, SUGAR COATED ORAL at 21:58

## 2019-07-19 RX ADMIN — GABAPENTIN 600 MG: 300 CAPSULE ORAL at 21:58

## 2019-07-19 RX ADMIN — NICOTINE 1 PATCH: 21 PATCH, EXTENDED RELEASE TRANSDERMAL at 08:15

## 2019-07-19 RX ADMIN — PALIPERIDONE 3 MG: 3 TABLET, EXTENDED RELEASE ORAL at 08:13

## 2019-07-19 NOTE — PLAN OF CARE
Problem: NEUROSENSORY - ADULT  Goal: Remains free of injury related to seizures activity  Description  INTERVENTIONS  - Maintain airway, patient safety  and administer oxygen as ordered  - Monitor patient for seizure activity, document and report duration and description of seizure to physician/advanced practitioner  - If seizure occurs,  ensure patient safety during seizure  - Reorient patient post seizure  - Seizure pads on all 4 side rails  - Instruct patient/family to notify RN of any seizure activity including if an aura is experienced  - Instruct patient/family to call for assistance with activity based on nursing assessment  - Administer anti-seizure medications as ordered  - Monitor fetal well being  Outcome: Progressing     Problem: SKIN/TISSUE INTEGRITY - ADULT  Goal: Incision(s), wounds(s) or drain site(s) healing without S/S of infection  Description  INTERVENTIONS  - Assess and document risk factors for skin impairment   - Assess and document dressing, incision, wound bed, drain sites and surrounding tissue  - Initiate Nutrition services consult and/or wound management as needed  Outcome: Progressing     Problem: Risk for Self Injury/Neglect  Goal: Verbalize thoughts and feelings  Description  Interventions:  - Assess and re-assess patient's lethality and potential for self-injury  - Engage patient in 1:1 interactions, daily, for a minimum of 15 minutes  - Encourage patient to express feelings, fears, frustrations, hopes  - Establish rapport/trust with patient   Outcome: Progressing  Goal: Recognize maladaptive responses and adopt new coping mechanisms  Outcome: Progressing  Goal: Complete daily ADLs, including personal hygiene independently, as able  Description  Interventions:  - Observe, teach, and assist patient with ADLS  - Monitor and promote a balance of rest/activity, with adequate nutrition and elimination  Outcome: Progressing     Problem: Depression  Goal: Treatment Goal: Demonstrate behavioral control of depressive symptoms, verbalize feelings of improved mood/affect, and adopt new coping skills prior to discharge  Outcome: Progressing  Goal: Refrain from self-neglect  Outcome: Progressing     Problem: Ineffective Coping  Goal: Participates in unit activities  Description  Interventions:  - Provide therapeutic environment   - Provide required programming   - Redirect inappropriate behaviors   Outcome: Progressing     Problem: DISCHARGE PLANNING - CARE MANAGEMENT  Goal: Discharge to post-acute care or home with appropriate resources  Description  INTERVENTIONS:  - Conduct assessment to determine patient/family and health care team treatment goals, and need for post-acute services based on payer coverage, community resources, and patient preferences, and barriers to discharge  - Address psychosocial, clinical, and financial barriers to discharge as identified in assessment in conjunction with the patient/family and health care team  - Arrange appropriate level of post-acute services according to patient's   needs and preference and payer coverage in collaboration with the physician and health care team  - Communicate with and update the patient/family, physician, and health care team regarding progress on the discharge plan  - Arrange appropriate transportation to post-acute venues   Outcome: Progressing     Problem: Nutrition/Hydration-ADULT  Goal: Nutrient/Hydration intake appropriate for improving, restoring or maintaining nutritional needs  Description  Monitor and assess patient's nutrition/hydration status for malnutrition (ex- brittle hair, bruises, dry skin, pale skin and conjunctiva, muscle wasting, smooth red tongue, and disorientation)  Collaborate with interdisciplinary team and initiate plan and interventions as ordered  Monitor patient's weight and dietary intake as ordered or per policy  Utilize nutrition screening tool and intervene per policy   Determine patient's food preferences and provide high-protein, high-caloric foods as appropriate  INTERVENTIONS:  - Monitor oral intake, urinary output, labs, and treatment plans  - Assess nutrition and hydration status and recommend course of action  - Evaluate amount of meals eaten  - Assist patient with eating if necessary   - Allow adequate time for meals  - Recommend/ encourage appropriate diets, oral nutritional supplements, and vitamin/mineral supplements  - Order, calculate, and assess calorie counts as needed  - Recommend, monitor, and adjust tube feedings and TPN/PPN based on assessed needs  - Assess need for intravenous fluids  - Provide specific nutrition/hydration education as appropriate  - Include patient/family/caregiver in decisions related to nutrition      7-18-19    he is on a level 1 meal plan with double portions of protein  His intake was listed at 100 %  He has a diabetic right foot ulcer  His weight was 271 on 7-9 then up to 302 on 7-13 with a 31  # gain noted  RDN to check his weight status  His POC glucose was 178 H on  7-18 RDN to evaluate his labs when available as he is on lantus with hx of diabetes  RDN visited on rounds this am and he offered no c/o   RDN to reassess his nutrition needs at moderate risk and follow PRN   Outcome: Progressing

## 2019-07-19 NOTE — WOUND OSTOMY CARE
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 at base of TMA scar  Patient was in bed for the assessment and cooperative  Patient has been showering daily and with daily wound treatment  Wound has improved in size  Dressing removed, small amount of bloody drainage noted on dressing and dried blood to the wound bed and lacie-wound  Podiatry consult requested, BLAKE Choi will follow up  Patient does not have supportive shoes and has slip on sneakers at the bedside  1  POA neuropathic ulcer to the right foot plantar surface which has improved since last assessment, wound bed is dark pink, wound is clean  Skin to lacie-wound is calloused and flaky  Drainage occurs when patient is ambulating  2  Bilateral heels intact  3  Right TMA scar with scaly area to the medical portion of the scar without open areas    Plan:   No changes to the current treatment      Wound 07/12/19 Neuropathic/diabetic ulcer Foot Right;Medial;Inner (Active)   Wound Description Clean;Dry;Pink 7/19/2019  9:00 AM   Lacie-wound Assessment Dry; Intact 7/19/2019  9:00 AM   Wound Length (cm) 0 8 cm 7/19/2019  9:00 AM   Wound Width (cm) 0 5 cm 7/19/2019  9:00 AM   Wound Depth (cm) 0 2 7/19/2019  9:00 AM   Calculated Wound Area (cm^2) 0 4 cm^2 7/19/2019  9:00 AM   Calculated Wound Volume (cm^3) 0 08 cm^3 7/19/2019  9:00 AM   Change in Wound Size % 63 64 7/19/2019  9:00 AM   Drainage Amount Small 7/19/2019  9:00 AM   Drainage Description Bloody 7/19/2019  9:00 AM   Non-staged Wound Description Full thickness 7/19/2019  9:00 AM   Treatments Cleansed 7/19/2019  9:00 AM   Dressing Calcium Alginate with Silver;Non adherent;Dry dressing;Gauze 7/19/2019  9:00 AM   Wound packed?  No 7/15/2019 11:00 AM   Dressing Changed Changed 7/15/2019 11:00 AM   Patient Tolerance Tolerated well 7/19/2019  9:00 AM   Dressing Status Clean;Dry 7/15/2019 11:00 AM     Reviewed plan of care with primary RN Itzel Zaragoza  Wound care to follow weekly  Questions or concerns contact Anuel RUIZN, RN

## 2019-07-19 NOTE — PROGRESS NOTES
Progress Note - Behavioral Health   Sho Marcelo 44 y o  male MRN: [id number]  Unit/Bed#: [facility] 258-02 Encounter: [id number]    Assessment/Plan   Principal Problem:    Severe bipolar I disorder, most recent episode depressed without psychotic features ([facility] [facility] )  Active Problems:    Tobacco use disorder    Essential hypertension    DM (diabetes mellitus), [facility] ([facility] )      Behavior over the last 24 hours:  Unchanged  Patient reports that he would like to stay until the in the summer months as his feeling sad and depressed  Patient tends to be avoidant and keeps to himself at times  Overall he has been redirectable and no major incidents  Ten use to report feeling depressed and suicidal at times  Sleep: normal  Appetite: normal  Medication side effects: No  ROS: no complaints    Mental Status Evaluation:  Appearance:  disheveled   Behavior:  guarded   Speech:  tangential   Mood:  anxious   Affect:  labile   Thought Process:  circumstantial   Thought Content:  normal   Perceptual Disturbances: None   Risk Potential: Suicidal Ideations without plan   Sensorium:  person and place   Cognition:  grossly intact   Consciousness:  awake    Attention: attention span appeared shorter than expected for age   Insight:  limited   Judgment: limited   Gait/Station: slow   Motor Activity: no abnormal movements     Progress Toward Goals: ongoing    Recommended Treatment: Continue with group therapy, milieu therapy and occupational therapy  Risks, benefits and possible side effects of Medications:   Risks, benefits, and possible side effects of medications explained to patient and patient verbalizes understanding  Medications: all current active meds have been reviewed and continue current psychiatric medications  Labs: reviewed    Counseling / Coordination of Care  Total floor / unit time spent today 15 minutes   Greater than 50% of total time was spent with the patient and / or family counseling and / or coordination of care   A description of the counseling / coordination of care:

## 2019-07-19 NOTE — PROGRESS NOTES
Progress Note - Behavioral Health     Rosa Perez 44 y o  male MRN: [id number]   Unit/Bed#: [facility] 258-02 Encounter: [id number]    Behavior over the last 24 hours:  Britta Lewis was seen for an inpatient follow-up psychiatric visit this date  He remains somewhat depressed and anxious but denies any suicidal or homicidal ideation  He is taking his medications as prescribed and denies any side effects  Appetite and sleep are within normal limits  He is anxious about being discharged next week  He plans to go to a shelter in [facility], [place] and stay there until he receives his monthly check  Case management is assisting him with discharge arrangements  ROS: no complaints    Mental Status Evaluation:    Appearance:  casually dressed, dressed appropriately   Behavior:  cooperative, calm   Speech:  normal rate and volume   Mood:  less depressed, less irritable   Affect:  normal range and intensity   Thought Process:  organized, logical, coherent   Associations: intact associations   Thought Content:  normal, no overt delusions   Perceptual Disturbances: none   Risk Potential: Suicidal ideation - None  Homicidal ideation - None  Potential for aggression - No   Sensorium:  oriented to person, place and time/date   Memory:  recent and remote memory grossly intact   Consciousness:  alert and awake   Attention: attention span and concentration are age appropriate   Insight:  fair   Judgment: fair   Gait/Station: normal gait/station, normal balance   Motor Activity: no abnormal movements     Vital signs in last 24 hours:    Temp:  [97 7 °F (36 5 °C)-97 9 °F (36 6 °C)] 97 7 °F (36 5 °C)  HR:  [85-88] 85  Resp:  [16-18] 16  BP: (144-179)/(66-98) 144/66    Laboratory results:  I have personally reviewed all pertinent laboratory/tests results      Progress Toward Goals: progressing    Assessment/Plan   Principal Problem:    Severe bipolar I disorder, most recent episode depressed without psychotic features ([facility] )  Active Problems:    Tobacco use disorder    Essential hypertension    DM (diabetes mellitus), secondary uncontrolled ([facility] [address] [facility] )    Recommended Treatment:   Continue current medications as prescribed  Continue to monitor  Discharge disposition and planning are ongoing  All current active medications have been reviewed    Encourage group therapy, milieu therapy and occupational therapy  [facility] checks every 7 minutes      Current Facility-Administered Medications:  acetaminophen 650 mg Oral Q6H PRN Mandy Mcdonnellics, CRNP   acetaminophen 650 mg Oral Q4H PRN Mandy Mcdonnellics, CRNP   acetaminophen 975 mg Oral Q6H PRN Mandy Mcdonnellics, CRNP   aluminum-magnesium hydroxide-simethicone 30 mL Oral Q4H PRN Mandy Mcdonnellics, CRNP   benztropine 1 mg Intramuscular Q6H PRN Mandy Mcdonnellics, CRNP   benztropine 1 mg Oral Q6H PRN Mandy Mcdonnellics, CRNP   benztropine 1 mg Oral BID Mandy Bautista, CRNP   busPIRone 10 mg Oral BID Mandy Bautista, CRNP   chlorproMAZINE 50 mg Oral TID Mandy Bautista, CRNP   divalproex sodium 2,000 mg Oral QPM Mandy Bautista, CRNP   DULoxetine 60 mg Oral Daily Mandy Bautista, CRNP   gabapentin 600 mg Oral TID Mandy Bautista, CRNP   glyBURIDE 1 25 mg Oral Daily With Breakfast aMndy Bautista, CRNP   haloperidol 5 mg Oral Q6H PRN Mandy Bautista, CRNP   haloperidol lactate 5 mg Intramuscular Q6H PRN Mandy Mcdonnellics, CRNP   hydrOXYzine HCL 25 mg Oral Q6H PRN Mandy Mcdonnellics, CRNP   hydrOXYzine HCL 50 mg Oral Q4H PRN Mandy Mcdonnellics, CRNP   insulin glargine 32 Units Subcutaneous HS Mayra Jaskaran, CRNP   insulin lispro 2-12 Units Subcutaneous TID AC Mayra Jaskaran, CRNP   insulin lispro 2-12 Units Subcutaneous HS Mayra Jaskaran, CRNP   LORazepam 2 mg Intramuscular Q6H PRN Mandy Mcdonnellics, CRNP   magnesium hydroxide 30 mL Oral Daily PRN Mandy Bautista, CRNP   nicotine 1 patch Transdermal Daily Mandy Bautista, CRNP   OLANZapine 10 mg Intramuscular Q3H PRN Lynae Lefort, CRNP paliperidone 3 mg Oral Daily Adan Gutierres MD   risperiDONE 1 mg Oral Q3H PRN TAYLOR Lux   traZODone 50 mg Oral HS PRN TAYLOR Lux   traZODone 50 mg Oral HS TAYLOR Lux       Risks / Benefits of Treatment:    Risks, benefits, and possible side effects of medications explained to patient and patient verbalizes understanding and agreement for treatment  Counseling / Coordination of Care:      Patient's progress discussed with staff in treatment team meeting  Medications, treatment progress and treatment plan reviewed with patient

## 2019-07-19 NOTE — PROGRESS NOTES
Daily Rounds Documentation    Team Members Present:   MD Isai De La Rosa, TAYLOR Ford, RN  He Mora, [place], [place]    Still not wanting to discharge until August   Discharge Wednesday

## 2019-07-19 NOTE — PROGRESS NOTES
Patient in bed upon arrival of shift, woke easily for vitals and breakfast  Patient happy he has achieved his goal of no insulin coverage this am related to his improved nutritional choices  Denies si hi and hallucinations  Seen by wound care nurse at bedside for wound assessment  Wound documented as decreased in size, no signs of infection  Podiatry consult placed for shoe assessment  Will maintain on safety precautions and 7 minute checks, no needs identified

## 2019-07-19 NOTE — PROGRESS NOTES
Patient visible on the unit  Pleasant and cooperative  Wound dressing done to right foot, skin dry, no drainage  Offers no complaints  Will continue to monitor

## 2019-07-19 NOTE — PROGRESS NOTES
Pt is visible on the unit  Withdrawn to self  Pt states he is concerned that he will be at a shelter with no money until he gets paid and no place to go during the day so "I'll probably rip my feet up walking around"  Dressing on right foot intact  Able to make needs known  Will continue to monitor

## 2019-07-20 LAB
GLUCOSE SERPL-MCNC: 124 MG/DL (ref 65–140)
GLUCOSE SERPL-MCNC: 133 MG/DL (ref 65–140)
GLUCOSE SERPL-MCNC: 244 MG/DL (ref 65–140)
GLUCOSE SERPL-MCNC: 251 MG/DL (ref 65–140)

## 2019-07-20 PROCEDURE — 82948 REAGENT STRIP/BLOOD GLUCOSE: CPT

## 2019-07-20 PROCEDURE — 99232 SBSQ HOSP IP/OBS MODERATE 35: CPT | Performed by: PSYCHIATRY & NEUROLOGY

## 2019-07-20 RX ADMIN — DIVALPROEX SODIUM 2000 MG: 500 TABLET, FILM COATED, EXTENDED RELEASE ORAL at 17:17

## 2019-07-20 RX ADMIN — BUSPIRONE HYDROCHLORIDE 10 MG: 5 TABLET ORAL at 08:40

## 2019-07-20 RX ADMIN — BUSPIRONE HYDROCHLORIDE 10 MG: 5 TABLET ORAL at 17:18

## 2019-07-20 RX ADMIN — PALIPERIDONE 3 MG: 3 TABLET, EXTENDED RELEASE ORAL at 08:40

## 2019-07-20 RX ADMIN — BENZTROPINE MESYLATE 1 MG: 1 TABLET ORAL at 08:40

## 2019-07-20 RX ADMIN — CHLORPROMAZINE HYDROCHLORIDE 50 MG: 25 TABLET, SUGAR COATED ORAL at 17:17

## 2019-07-20 RX ADMIN — BENZTROPINE MESYLATE 1 MG: 1 TABLET ORAL at 17:18

## 2019-07-20 RX ADMIN — DULOXETINE HYDROCHLORIDE 60 MG: 60 CAPSULE, DELAYED RELEASE ORAL at 08:40

## 2019-07-20 RX ADMIN — INSULIN LISPRO 6 UNITS: 100 INJECTION, SOLUTION INTRAVENOUS; SUBCUTANEOUS at 22:09

## 2019-07-20 RX ADMIN — INSULIN LISPRO 4 UNITS: 100 INJECTION, SOLUTION INTRAVENOUS; SUBCUTANEOUS at 12:30

## 2019-07-20 RX ADMIN — GABAPENTIN 600 MG: 300 CAPSULE ORAL at 17:18

## 2019-07-20 RX ADMIN — GABAPENTIN 600 MG: 300 CAPSULE ORAL at 08:40

## 2019-07-20 RX ADMIN — CHLORPROMAZINE HYDROCHLORIDE 50 MG: 25 TABLET, SUGAR COATED ORAL at 22:08

## 2019-07-20 RX ADMIN — ACETAMINOPHEN 975 MG: 325 TABLET ORAL at 08:41

## 2019-07-20 RX ADMIN — HYDROXYZINE HYDROCHLORIDE 25 MG: 25 TABLET ORAL at 22:17

## 2019-07-20 RX ADMIN — NICOTINE 1 PATCH: 21 PATCH, EXTENDED RELEASE TRANSDERMAL at 09:00

## 2019-07-20 RX ADMIN — INSULIN GLARGINE 32 UNITS: 100 INJECTION, SOLUTION SUBCUTANEOUS at 22:09

## 2019-07-20 RX ADMIN — TRAZODONE HYDROCHLORIDE 50 MG: 50 TABLET ORAL at 22:09

## 2019-07-20 RX ADMIN — CHLORPROMAZINE HYDROCHLORIDE 50 MG: 25 TABLET, SUGAR COATED ORAL at 08:40

## 2019-07-20 RX ADMIN — GLYBURIDE 1.25 MG: 1.25 TABLET ORAL at 08:30

## 2019-07-20 RX ADMIN — GABAPENTIN 600 MG: 300 CAPSULE ORAL at 22:08

## 2019-07-20 NOTE — PROGRESS NOTES
Patient is visible on the unit occasionally through out the day  Brightens on approached, good eye contact  Cooperative with medications  Positive for morning meal  Patient denies SI and HI currently  Patient wound dressing cleaned with NS, non adherent, silver, dry dressing and guaze  Patient cooperative and tolerated well  Patient affect flat and patient verbalized some depression  Patient denies anxiety  Social with staff but minimal socialization with peers  Alert and oriented  Able to make needs known  No signs or symptoms of distress  SP1 and Q 7 minute checks will be maintained for patient safety  Will continue to monitor

## 2019-07-20 NOTE — PROGRESS NOTES
Progress Note - Behavioral Health   Becky Mariano 44 y o  male MRN: [id number]  Unit/Bed#: [facility]-28 Encounter: [id number]    The patient was seen for continuing care and reviewed with treatment team     Behavior over the last 24 hours:  unchanged  Sleep: normal  Appetite: normal  Medication side effects: No  ROS: no complaints    Vitals:    07/20/19 0750   BP: 134/83   Pulse: 83   Resp: 16   Temp: 98 9 °F (37 2 °C)   SpO2: 97%        Labs:  Reviewed      Mental Status Evaluation:  Appearance:  Marginal/poor hygiene   Behavior:  calm, cooperative and guarded   Mood:  depressed   Affect: blunted   Speech: Normal rate and Normal volume   Thought Process:  Goal directed and coherent   Thought Content:  Paranoid and mistrustful   Perceptual Disturbances: Denies hallucinations and does not appear to be responding to internal stimuli   Risk Potential: No suicidal or homicidal ideation   Attention/Concentration attention span and concentration were age appropriate   Orientation:      Gait/Station: Not observed   Motor Activity: No abnormal movement noted     Progress Toward Goals: Patient reports he feels medications have been helping and he is no longer having suicidal thoughts  However, he does still present as flat and depressed and isolative with low motivation  He does seem to be accepting of discharge next week to shelter  Continues with poor insight into illness and history of non compliance  Assessment/Plan    Principal Problem:    Severe bipolar I disorder, most recent episode depressed without psychotic features ([facility] [address] [facility] )  Active Problems:    Tobacco use disorder    Essential hypertension    DM (diabetes mellitus), secondary uncontrolled ([facility] )      Recommended Treatment: Continue with pharmacotherapy, group therapy, milieu therapy and occupational therapy    The patient will be maintained on the following medications:    Current Facility-Administered Medications:  acetaminophen 650 mg Oral Q6H PRN Socorro Sandra Lodics, CRNP   acetaminophen 650 mg Oral Q4H PRN Mandy Mcdonnellics, CRNP   acetaminophen 975 mg Oral Q6H PRN Mandy Bautista, CRNP   aluminum-magnesium hydroxide-simethicone 30 mL Oral Q4H PRN Mandy Mcdonnellics, CRNP   benztropine 1 mg Intramuscular Q6H PRN Mandy Mcdonnellics, CRNP   benztropine 1 mg Oral Q6H PRN Mandy Mcdonnellics, CRNP   benztropine 1 mg Oral BID Mandy Bautista, CRNP   busPIRone 10 mg Oral BID Mandy Bautista, CRNP   chlorproMAZINE 50 mg Oral TID Mandy Bautista, CRNP   divalproex sodium 2,000 mg Oral QPM Mandy Bautista, CRNP   DULoxetine 60 mg Oral Daily Mandy Bautista, CRNP   gabapentin 600 mg Oral TID Mandy Bautista, CRNP   glyBURIDE 1 25 mg Oral Daily With Breakfast Mandy Bautista, CRNP   haloperidol 5 mg Oral Q6H PRN Mandy Bautista, CRNP   haloperidol lactate 5 mg Intramuscular Q6H PRN Mandy Bautista, CRNP   hydrOXYzine HCL 25 mg Oral Q6H PRN Mandy Bautista, CRNP   hydrOXYzine HCL 50 mg Oral Q4H PRN Mandy Bautista, CRNP   insulin glargine 32 Units Subcutaneous HS Gay Elio, CRNP   insulin lispro 2-12 Units Subcutaneous TID AC Mayra Jaskaran, CRNP   insulin lispro 2-12 Units Subcutaneous HS Mayra Jaskaran, CRNP   LORazepam 2 mg Intramuscular Q6H PRN Mandy Mcdonnellics, CRNP   magnesium hydroxide 30 mL Oral Daily PRN Mandy Bautista, CRNP   nicotine 1 patch Transdermal Daily Mandy Bautista, CRNP   OLANZapine 10 mg Intramuscular Q3H PRN Mandy Mcdonnellics, CRNP   paliperidone 3 mg Oral Daily Maryann Hardin MD   risperiDONE 1 mg Oral Q3H PRN Mandy Mcdonnellics, CRNP   traZODone 50 mg Oral HS PRN Mandy Bautista, CRNP   traZODone 50 mg Oral HS Mandy Bautista, CRNP       Risks, benefits and possible side effects of Medications:   Risks, benefits, and possible side effects of medications explained to patient and patient verbalizes understanding

## 2019-07-20 NOTE — PROGRESS NOTES
NURSING DISCHARGE NOTE    Discharged Home via infant carrier. Accompanied by Family member  Belongings Taken by patient/family. Pt discharged to home per MD order.  Discussed pt's POC with Dr. Brando Bland via phone call this AM and he agreed with plan and Patient verbalized decreased back pain  Will continue to monitor

## 2019-07-20 NOTE — PROGRESS NOTES
Patient verbalized 8/10 pain in mid back and requested PRN Tylenol  Provided patient with PRN pain medication  Will continue to monitor

## 2019-07-20 NOTE — PROGRESS NOTES
Pt is visible on the unit  Non-social, withdraws to room after HS snack  Denies needs  Worries about discharge on Wednesday  Able to make needs known  Appears flat, depressed  Denies SI/HI/AH/VH  Will continue to monitor

## 2019-07-21 LAB
GLUCOSE SERPL-MCNC: 125 MG/DL (ref 65–140)
GLUCOSE SERPL-MCNC: 142 MG/DL (ref 65–140)
GLUCOSE SERPL-MCNC: 263 MG/DL (ref 65–140)
GLUCOSE SERPL-MCNC: 317 MG/DL (ref 65–140)

## 2019-07-21 PROCEDURE — 99232 SBSQ HOSP IP/OBS MODERATE 35: CPT | Performed by: PSYCHIATRY & NEUROLOGY

## 2019-07-21 PROCEDURE — 82948 REAGENT STRIP/BLOOD GLUCOSE: CPT

## 2019-07-21 RX ADMIN — CHLORPROMAZINE HYDROCHLORIDE 50 MG: 25 TABLET, SUGAR COATED ORAL at 22:10

## 2019-07-21 RX ADMIN — GABAPENTIN 600 MG: 300 CAPSULE ORAL at 22:10

## 2019-07-21 RX ADMIN — DIVALPROEX SODIUM 2000 MG: 500 TABLET, FILM COATED, EXTENDED RELEASE ORAL at 17:38

## 2019-07-21 RX ADMIN — BENZTROPINE MESYLATE 1 MG: 1 TABLET ORAL at 08:05

## 2019-07-21 RX ADMIN — INSULIN GLARGINE 32 UNITS: 100 INJECTION, SOLUTION SUBCUTANEOUS at 22:10

## 2019-07-21 RX ADMIN — BUSPIRONE HYDROCHLORIDE 10 MG: 5 TABLET ORAL at 08:04

## 2019-07-21 RX ADMIN — INSULIN LISPRO 8 UNITS: 100 INJECTION, SOLUTION INTRAVENOUS; SUBCUTANEOUS at 22:09

## 2019-07-21 RX ADMIN — GABAPENTIN 600 MG: 300 CAPSULE ORAL at 16:03

## 2019-07-21 RX ADMIN — CHLORPROMAZINE HYDROCHLORIDE 50 MG: 25 TABLET, SUGAR COATED ORAL at 16:03

## 2019-07-21 RX ADMIN — TRAZODONE HYDROCHLORIDE 50 MG: 50 TABLET ORAL at 22:10

## 2019-07-21 RX ADMIN — PALIPERIDONE 3 MG: 3 TABLET, EXTENDED RELEASE ORAL at 08:05

## 2019-07-21 RX ADMIN — NICOTINE 1 PATCH: 21 PATCH, EXTENDED RELEASE TRANSDERMAL at 09:00

## 2019-07-21 RX ADMIN — BENZTROPINE MESYLATE 1 MG: 1 TABLET ORAL at 17:38

## 2019-07-21 RX ADMIN — GLYBURIDE 1.25 MG: 1.25 TABLET ORAL at 08:05

## 2019-07-21 RX ADMIN — ACETAMINOPHEN 975 MG: 325 TABLET ORAL at 08:05

## 2019-07-21 RX ADMIN — CHLORPROMAZINE HYDROCHLORIDE 50 MG: 25 TABLET, SUGAR COATED ORAL at 08:04

## 2019-07-21 RX ADMIN — GABAPENTIN 600 MG: 300 CAPSULE ORAL at 08:05

## 2019-07-21 RX ADMIN — INSULIN LISPRO 6 UNITS: 100 INJECTION, SOLUTION INTRAVENOUS; SUBCUTANEOUS at 12:30

## 2019-07-21 RX ADMIN — BUSPIRONE HYDROCHLORIDE 10 MG: 5 TABLET ORAL at 17:38

## 2019-07-21 RX ADMIN — DULOXETINE HYDROCHLORIDE 60 MG: 60 CAPSULE, DELAYED RELEASE ORAL at 08:05

## 2019-07-21 NOTE — PROGRESS NOTES
Patient has been visible on the unit  Attending and participating in evening unit activities  Minimal socialization with peers  Denies any current s/i and says he is expecting to be dc to [facility] on Wednesday  States he feels ready for dc and he has heard the [employer] has a good program   He did request prn atarax before going to bed for anxiety related to his uncertain future

## 2019-07-21 NOTE — PROGRESS NOTES
Patient is withdrawn to room calm and cooperative  Quiet  Patient denies SI and HI currently, affect remains flat  Good eye contact  Patient attending activities on the unit  Walking with cane appropriately  Cooperative with medications  Positive for meals  Patient verbalized he is looking forward to discharge at the [facility] rehab  Alert and oriented  Able to make needs known  No signs or symptoms of distress  SP 1 and Q 7 minute checks will be maintained for patient safety  Will continue to monitor

## 2019-07-21 NOTE — PROGRESS NOTES
Right foot wound dressing changed   Area cleansed with NSS, non-adherent dressing placed, silver dressing, dry dressing and guaze wrap   Education provided to pt, no concerns expressed by pt or noted by RN  Nancy Triplett continue to monitor

## 2019-07-21 NOTE — PROGRESS NOTES
Progress Note - Behavioral Health   Andry Montanez 44 y o  male MRN: [id number]  Unit/Bed#: [facility] 332-50 Encounter: [id number]    The patient was seen for continuing care and reviewed with treatment team     Behavior over the last 24 hours:  unchanged     Sleep: hypersomnia  Appetite: normal  Medication side effects: No  ROS: no complaints    Vitals:    07/21/19 0740   BP: 125/79   Pulse: 90   Resp: 18   Temp: 98 1 °F (36 7 °C)   SpO2: 95%        Labs:  Reviewed  Admission on 07/09/2019   Component Date Value    WBC 07/09/2019 7 80     RBC 07/09/2019 4 97     Hemoglobin 07/09/2019 13 3*    Hematocrit 07/09/2019 39 3*    MCV 07/09/2019 79*    MCH 07/09/2019 26 8     MCHC 07/09/2019 33 9     RDW 07/09/2019 14 9*    MPV 07/09/2019 7 4*    Platelets 10/49/4410 250     Neutrophils Relative 07/09/2019 69     Lymphocytes Relative 07/09/2019 22     Monocytes Relative 07/09/2019 8     Eosinophils Relative 07/09/2019 0     Basophils Relative 07/09/2019 0     Neutrophils Absolute 07/09/2019 5 40     Lymphocytes Absolute 07/09/2019 1 70     Monocytes Absolute 07/09/2019 0 60     Eosinophils Absolute 07/09/2019 0 00     Basophils Absolute 07/09/2019 0 00     Sodium 07/09/2019 132*    Potassium 07/09/2019 4 8     Chloride 07/09/2019 97*    CO2 07/09/2019 28     ANION GAP 07/09/2019 7     BUN 07/09/2019 23     Creatinine 07/09/2019 1 22     Glucose 07/09/2019 277*    Calcium 07/09/2019 9 8     AST 07/09/2019 18     ALT 07/09/2019 13     Alkaline Phosphatase 07/09/2019 56     Total Protein 07/09/2019 7 2     Albumin 07/09/2019 4 1     Total Bilirubin 07/09/2019 0 40     eGFR 07/09/2019 73     Magnesium 07/09/2019 1 8*    Valproic Acid, Total 07/09/2019 59 0     Ethanol Lvl 07/09/2019 <10     Amph/Meth UR 07/09/2019 Negative     Barbiturate Ur 07/09/2019 Negative     Benzodiazepine Urine 07/09/2019 Negative     Cocaine Urine 07/09/2019 Negative     Methadone Urine 07/09/2019 Negative     Opiate Urine 07/09/2019 Negative     PCP Ur 07/09/2019 Negative     THC Urine 07/09/2019 Negative     Acetaminophen Level 88/38/1933 <56*    Salicylate Lvl 84/87/4889 <5*    POC Glucose 07/09/2019 292*    POC Glucose 07/09/2019 242*    Ventricular Rate 07/09/2019 80     Atrial Rate 07/09/2019 80     DC Interval 07/09/2019 150     QRSD Interval 07/09/2019 76     QT Interval 07/09/2019 366     QTC Interval 07/09/2019 422     P Axis 07/09/2019 33     QRS Axis 07/09/2019 5     T Wave Axis 07/09/2019 37     TSH 3RD GENERATON 07/10/2019 0 320*    RPR 07/10/2019 Non-Reactive     Cholesterol 07/10/2019 162     Triglycerides 07/10/2019 191*    HDL, Direct 07/10/2019 32*    LDL Calculated 07/10/2019 92     Non-HDL-Chol (CHOL-HDL) 07/10/2019 130     Hemoglobin A1C 07/10/2019 8 4*    EAG 07/10/2019 194     POC Glucose 07/10/2019 168*    POC Glucose 07/10/2019 233*    POC Glucose 07/10/2019 96     POC Glucose 07/11/2019 168*    POC Glucose 07/11/2019 180*    POC Glucose 07/11/2019 171*    POC Glucose 07/11/2019 213*    POC Glucose 07/12/2019 199*    Valproic Acid, Total 07/12/2019 65 9     POC Glucose 07/12/2019 195*    POC Glucose 07/12/2019 190*    POC Glucose 07/13/2019 163*    POC Glucose 07/13/2019 191*    POC Glucose 07/13/2019 114     POC Glucose 07/13/2019 204*    POC Glucose 07/14/2019 106     POC Glucose 07/14/2019 201*    POC Glucose 07/12/2019 167*    POC Glucose 07/14/2019 161*    POC Glucose 07/14/2019 196*    POC Glucose 07/15/2019 146*    POC Glucose 07/15/2019 173*    POC Glucose 07/15/2019 157*    POC Glucose 07/15/2019 275*    POC Glucose 07/16/2019 94     POC Glucose 07/16/2019 192*    POC Glucose 07/16/2019 204*    POC Glucose 07/16/2019 240*    POC Glucose 07/17/2019 194*    POC Glucose 07/17/2019 178*    POC Glucose 07/17/2019 155*    POC Glucose 07/17/2019 203*    POC Glucose 07/18/2019 159*    POC Glucose 07/18/2019 173*    POC Glucose 07/18/2019 155*  POC Glucose 07/18/2019 263*    POC Glucose 07/19/2019 131     POC Glucose 07/19/2019 255*    POC Glucose 07/19/2019 119     POC Glucose 07/19/2019 258*    POC Glucose 07/20/2019 133     POC Glucose 07/20/2019 244*    POC Glucose 07/20/2019 124     POC Glucose 07/20/2019 251*    POC Glucose 07/21/2019 142*    POC Glucose 07/21/2019 263*       Mental Status Evaluation:  Appearance:  Marginal/poor hygiene and Poor eye contact   Behavior:  cooperative and psychomotor retardation   Mood:  depressed   Affect: blunted   Speech: Normal rate and Normal volume   Thought Process:  Goal directed and coherent   Thought Content:  Does not verbalize delusional material   Perceptual Disturbances: Denies hallucinations and does not appear to be responding to internal stimuli   Risk Potential: No suicidal or homicidal ideation   Attention/Concentration attention span and concentration were age appropriate   Orientation:   Oriented x 3   Gait/Station: normal gait/station and normal balance   Motor Activity: No abnormal movement noted     Progress Toward Goals: States he continues to have anxiety regarding discharge to shelter, otherwise, symptoms continue to improve on current medications  He is isolative to his room, in bed although awake, and poorly motivated  He denies any suicidal thoughts today  Assessment/Plan    Principal Problem:    Severe bipolar I disorder, most recent episode depressed without psychotic features ([facility] )  Active Problems:    Tobacco use disorder    Essential hypertension    DM (diabetes mellitus), secondary uncontrolled ([facility] )      Recommended Treatment: Continue with pharmacotherapy, group therapy, milieu therapy and occupational therapy  The patient will be maintained on the following medications:      Risks, benefits and possible side effects of Medications:   Risks, benefits, and possible side effects of medications explained to patient and patient verbalizes understanding

## 2019-07-21 NOTE — PROGRESS NOTES
07/21/19 1400   Team Meeting   Meeting Type Daily Rounds   Initial Conference Date 07/21/19   Team Members Present   Team Members Present Physician;Nurse   Physician Team Member Marjorie Turner   Nursing Team Member Albert Reid RN   Patient/Family Present   Patient Present No   Patient's Family Present No     Daily Psychiatric Rounding    Team Members Present    TAYLOR Jaimes, RN

## 2019-07-22 LAB
GLUCOSE SERPL-MCNC: 139 MG/DL (ref 65–140)
GLUCOSE SERPL-MCNC: 160 MG/DL (ref 65–140)
GLUCOSE SERPL-MCNC: 229 MG/DL (ref 65–140)
GLUCOSE SERPL-MCNC: 251 MG/DL (ref 65–140)

## 2019-07-22 PROCEDURE — 82948 REAGENT STRIP/BLOOD GLUCOSE: CPT

## 2019-07-22 PROCEDURE — 99231 SBSQ HOSP IP/OBS SF/LOW 25: CPT | Performed by: PSYCHIATRY & NEUROLOGY

## 2019-07-22 RX ADMIN — DIVALPROEX SODIUM 2000 MG: 500 TABLET, FILM COATED, EXTENDED RELEASE ORAL at 17:07

## 2019-07-22 RX ADMIN — INSULIN GLARGINE 32 UNITS: 100 INJECTION, SOLUTION SUBCUTANEOUS at 22:07

## 2019-07-22 RX ADMIN — NICOTINE 1 PATCH: 21 PATCH, EXTENDED RELEASE TRANSDERMAL at 09:00

## 2019-07-22 RX ADMIN — BENZTROPINE MESYLATE 1 MG: 1 TABLET ORAL at 08:43

## 2019-07-22 RX ADMIN — GABAPENTIN 600 MG: 300 CAPSULE ORAL at 22:08

## 2019-07-22 RX ADMIN — BUSPIRONE HYDROCHLORIDE 10 MG: 5 TABLET ORAL at 08:43

## 2019-07-22 RX ADMIN — PALIPERIDONE 3 MG: 3 TABLET, EXTENDED RELEASE ORAL at 08:43

## 2019-07-22 RX ADMIN — BENZTROPINE MESYLATE 1 MG: 1 TABLET ORAL at 17:07

## 2019-07-22 RX ADMIN — GLYBURIDE 1.25 MG: 1.25 TABLET ORAL at 08:30

## 2019-07-22 RX ADMIN — INSULIN LISPRO 4 UNITS: 100 INJECTION, SOLUTION INTRAVENOUS; SUBCUTANEOUS at 12:09

## 2019-07-22 RX ADMIN — CHLORPROMAZINE HYDROCHLORIDE 50 MG: 25 TABLET, SUGAR COATED ORAL at 22:07

## 2019-07-22 RX ADMIN — BUSPIRONE HYDROCHLORIDE 10 MG: 5 TABLET ORAL at 17:07

## 2019-07-22 RX ADMIN — DULOXETINE HYDROCHLORIDE 60 MG: 60 CAPSULE, DELAYED RELEASE ORAL at 08:43

## 2019-07-22 RX ADMIN — TRAZODONE HYDROCHLORIDE 50 MG: 50 TABLET ORAL at 22:08

## 2019-07-22 RX ADMIN — INSULIN LISPRO 6 UNITS: 100 INJECTION, SOLUTION INTRAVENOUS; SUBCUTANEOUS at 22:08

## 2019-07-22 RX ADMIN — INSULIN LISPRO 2 UNITS: 100 INJECTION, SOLUTION INTRAVENOUS; SUBCUTANEOUS at 17:00

## 2019-07-22 RX ADMIN — GABAPENTIN 600 MG: 300 CAPSULE ORAL at 08:43

## 2019-07-22 RX ADMIN — GABAPENTIN 600 MG: 300 CAPSULE ORAL at 18:04

## 2019-07-22 RX ADMIN — CHLORPROMAZINE HYDROCHLORIDE 50 MG: 25 TABLET, SUGAR COATED ORAL at 16:30

## 2019-07-22 RX ADMIN — CHLORPROMAZINE HYDROCHLORIDE 50 MG: 25 TABLET, SUGAR COATED ORAL at 08:44

## 2019-07-22 NOTE — PROGRESS NOTES
Progress Note - Behavioral Health   Deyvi Gibson 44 y o  male MRN: [id number]  Unit/Bed#: [facility] 258-02 Encounter: [id number]    Assessment/Plan   Principal Problem:    Severe bipolar I disorder, most recent episode depressed without psychotic features ([facility] [address] )  Active Problems:    Tobacco use disorder    Essential hypertension    DM (diabetes mellitus), secondary uncontrolled ([facility] )      Behavior over the last 24 hours:  Regressed  Patient reports that he feels anxious about discharge as he had bad experiences in shelters before  He is receptive to reassurance but continues to be anxious  Sleep: hypersomnia  Appetite: normal  Medication side effects: No  ROS: no complaints    Mental Status Evaluation:  Appearance:  disheveled   Behavior:  catatonic   Speech:  normal pitch   Mood:  anxious   Affect:  constricted   Thought Process:  concrete   Thought Content:  obsessions   Perceptual Disturbances: None   Risk Potential: Potential for Aggression No   Sensorium:  person and place   Cognition:  grossly intact   Consciousness:  awake    Attention: attention span appeared shorter than expected for age   Insight:  limited   Judgment: limited   Gait/Station: slow   Motor Activity: no abnormal movements     Progress Toward Goals: reviewed    Recommended Treatment: Continue with group therapy, milieu therapy and occupational therapy  Risks, benefits and possible side effects of Medications: reviewed    Medications: all current active meds have been reviewed and continue current psychiatric medications  Labs: reviewed    Counseling / Coordination of Care  Total floor / unit time spent today 15 minutes  Greater than 50% of total time was spent with the patient and / or family counseling and / or coordination of care   A description of the counseling / coordination of care:

## 2019-07-22 NOTE — PROGRESS NOTES
Patient is withdrawn to room, cooperative with medications  Positive for morning and lunch meal  Patient affect flat  Patient expressed being anxious and states " Im not ready to go to the [employer], I just don't think its a good idea " Patient denies SI and HI currently but states " I need to stay here until I get my check, I will feel much better, if I need to stab myself in the neck to stay longer I guess that's what I'll have to do " Made ELDER Bautista aware of pts statement  Patient declined to attend unit activities or groups through out the day  Offered patient PRN anxiety medication, pt declined  Patient wound was cleaned with NSS, dried, non adherent, silver gauze, dry gauze and gauze wrap  Minimal bloody drainage on gauze  Tolerate well  Alert and oriented  Able to make needs known  No signs or symptoms of distress  SP 1 and Q 7 minute checks will be maintained for patient safety  Will continue to monitor

## 2019-07-22 NOTE — PROGRESS NOTES
Daily Rounds Documentation    Team Members Present:   Dr Jacoby Zaragoza, MD Mat Dickens, TAYLOR Lamb, RN  uNria Fleming, DANIELA Coffman [place]    Discharge Wednesday to [facility]  No changes; feels ready to leave

## 2019-07-22 NOTE — SOCIAL WORK
AIDA met with pt about his possible option to seek shelter and to participate in [address] [place] [address] in [place], regarding which pt expressed interest   Todd Casas provided contact information for pt who stated willingness to call  Gerard Barthel today  AIDA will follow-up to support pt's plan

## 2019-07-22 NOTE — PROGRESS NOTES
Patient was out on the unit for the early part of evening shift  Keeps to himself  Appears flat and depressed but brightens on approach  Denies s/i  Reports no changes since yesterday

## 2019-07-22 NOTE — DISCHARGE INSTR - APPOINTMENTS
The treatment team recommends ongoing medication management and case management services upon discharge  An appointment has been scheduled on your behalf with Dr Lissett Kebede for Wednesday, August 14, 2019, at 12:30 pm, at [address], [address], [address]; Phone:  [phone]; Fax:  [phone]  Please plan to arrive early and bring your insurance card(s) and photo ID  Your summary of care will be faxed to this provider for continuity of care  An appointment has been scheduled on your behalf with therapist Dariusz Ramos for Friday, August 16, 2019, at 10:00 am, at [address], [address], [address]; Phone:  [phone]; Fax:  [phone]  Please plan to arrive early and bring your insurance card(s) and photo ID  Your summary of care will be faxed to this provider for continuity of care  An appointment has been scheduled in your behalf with Dr Tatiana Lowe for Tuesday, August 13, 2019, at 11:15 am, at [address] [facility], [facility]; Phone:  [phone]; Fax:  [phone]  Please plan to arrive early and bring your insurance card(s) and photo ID  Your summary of care will be faxed to this provider for continuity of care

## 2019-07-23 LAB
GLUCOSE SERPL-MCNC: 147 MG/DL (ref 65–140)
GLUCOSE SERPL-MCNC: 167 MG/DL (ref 65–140)
GLUCOSE SERPL-MCNC: 235 MG/DL (ref 65–140)
GLUCOSE SERPL-MCNC: 277 MG/DL (ref 65–140)

## 2019-07-23 PROCEDURE — 99231 SBSQ HOSP IP/OBS SF/LOW 25: CPT | Performed by: PSYCHIATRY & NEUROLOGY

## 2019-07-23 PROCEDURE — 82948 REAGENT STRIP/BLOOD GLUCOSE: CPT

## 2019-07-23 RX ADMIN — BUSPIRONE HYDROCHLORIDE 10 MG: 5 TABLET ORAL at 17:47

## 2019-07-23 RX ADMIN — ACETAMINOPHEN 975 MG: 325 TABLET ORAL at 18:35

## 2019-07-23 RX ADMIN — INSULIN LISPRO 2 UNITS: 100 INJECTION, SOLUTION INTRAVENOUS; SUBCUTANEOUS at 08:06

## 2019-07-23 RX ADMIN — GABAPENTIN 600 MG: 300 CAPSULE ORAL at 21:39

## 2019-07-23 RX ADMIN — NICOTINE 1 PATCH: 21 PATCH, EXTENDED RELEASE TRANSDERMAL at 08:35

## 2019-07-23 RX ADMIN — GABAPENTIN 600 MG: 300 CAPSULE ORAL at 08:35

## 2019-07-23 RX ADMIN — GLYBURIDE 1.25 MG: 1.25 TABLET ORAL at 08:34

## 2019-07-23 RX ADMIN — BENZTROPINE MESYLATE 1 MG: 1 TABLET ORAL at 17:47

## 2019-07-23 RX ADMIN — TRAZODONE HYDROCHLORIDE 50 MG: 50 TABLET ORAL at 21:39

## 2019-07-23 RX ADMIN — DULOXETINE HYDROCHLORIDE 60 MG: 60 CAPSULE, DELAYED RELEASE ORAL at 08:34

## 2019-07-23 RX ADMIN — BUSPIRONE HYDROCHLORIDE 10 MG: 5 TABLET ORAL at 08:34

## 2019-07-23 RX ADMIN — BENZTROPINE MESYLATE 1 MG: 1 TABLET ORAL at 08:35

## 2019-07-23 RX ADMIN — CHLORPROMAZINE HYDROCHLORIDE 50 MG: 25 TABLET, SUGAR COATED ORAL at 21:39

## 2019-07-23 RX ADMIN — PALIPERIDONE 3 MG: 3 TABLET, EXTENDED RELEASE ORAL at 08:34

## 2019-07-23 RX ADMIN — DIVALPROEX SODIUM 2000 MG: 500 TABLET, FILM COATED, EXTENDED RELEASE ORAL at 17:47

## 2019-07-23 RX ADMIN — CHLORPROMAZINE HYDROCHLORIDE 50 MG: 25 TABLET, SUGAR COATED ORAL at 08:34

## 2019-07-23 RX ADMIN — INSULIN LISPRO 4 UNITS: 100 INJECTION, SOLUTION INTRAVENOUS; SUBCUTANEOUS at 12:26

## 2019-07-23 RX ADMIN — INSULIN GLARGINE 32 UNITS: 100 INJECTION, SOLUTION SUBCUTANEOUS at 21:39

## 2019-07-23 RX ADMIN — HYDROXYZINE HYDROCHLORIDE 25 MG: 25 TABLET ORAL at 14:56

## 2019-07-23 RX ADMIN — INSULIN LISPRO 6 UNITS: 100 INJECTION, SOLUTION INTRAVENOUS; SUBCUTANEOUS at 21:40

## 2019-07-23 RX ADMIN — GABAPENTIN 600 MG: 300 CAPSULE ORAL at 16:01

## 2019-07-23 RX ADMIN — CHLORPROMAZINE HYDROCHLORIDE 50 MG: 25 TABLET, SUGAR COATED ORAL at 16:01

## 2019-07-23 NOTE — PROGRESS NOTES
Patient visible on the unit for the early part of evening shift  Appears flat and depressed  Here reports there is the possibility of him "getting more time here "  He says he all through lunch he "thought about stabbing myself in the neck" bc he feels hopeless and helpless  Reports "a lot of anxiety and racing thoughts" but he does contract for safety while here on the unit

## 2019-07-23 NOTE — PROGRESS NOTES
Pt was present in the milieu for meals, compliant with medication, group and meals  Was pleasant and cooperative with staff  Wound care performed on left foot as per order  Pt tolerated well  Pt denies pain, depression, AH, VH  Moderate anxiety 5/10, administered PRN atarax  Patient expressed interest in ECT, provider notified  Will continue to monitor

## 2019-07-23 NOTE — PROGRESS NOTES
Daily Rounds Documentation    Team Members Present:   MD Ulises Martin, RN  Genesis Blankenship, [place], [place]    Threatening to stab himself in the neck with a pen in order to stay; stated yesterday and last night  Wants to stay until August   Discharge tomorrow

## 2019-07-23 NOTE — PLAN OF CARE
PT has mostly been declining invites to attend the offered art therapy groups in the past few days and has been isolative to PT room and withdrawn  PT did meet 1:1 with AT yesterday after group, where PT reported that he was feeling anti-social for a few days and desired to be alone for awhile  PT did state that he would make an attempt to attend the unit programming today as he was starting to feel more hopeful regarding his plans upon discharge  PT will continue to attend at least 50% of art therapy groups to increase positive social interactions, to identify a positive support system and to allow for creative expression of feelings and emotions       Problem: Ineffective Coping  Goal: Participates in unit activities  Description  Interventions:  - Provide therapeutic environment   - Provide required programming   - Redirect inappropriate behaviors   Outcome: Progressing

## 2019-07-24 LAB
GLUCOSE SERPL-MCNC: 153 MG/DL (ref 65–140)
GLUCOSE SERPL-MCNC: 158 MG/DL (ref 65–140)
GLUCOSE SERPL-MCNC: 180 MG/DL (ref 65–140)
GLUCOSE SERPL-MCNC: 259 MG/DL (ref 65–140)

## 2019-07-24 PROCEDURE — 82948 REAGENT STRIP/BLOOD GLUCOSE: CPT

## 2019-07-24 PROCEDURE — 99231 SBSQ HOSP IP/OBS SF/LOW 25: CPT | Performed by: PSYCHIATRY & NEUROLOGY

## 2019-07-24 RX ADMIN — INSULIN LISPRO 2 UNITS: 100 INJECTION, SOLUTION INTRAVENOUS; SUBCUTANEOUS at 08:28

## 2019-07-24 RX ADMIN — GABAPENTIN 600 MG: 300 CAPSULE ORAL at 08:29

## 2019-07-24 RX ADMIN — NICOTINE 1 PATCH: 21 PATCH, EXTENDED RELEASE TRANSDERMAL at 08:29

## 2019-07-24 RX ADMIN — GLYBURIDE 1.25 MG: 1.25 TABLET ORAL at 08:30

## 2019-07-24 RX ADMIN — CHLORPROMAZINE HYDROCHLORIDE 50 MG: 25 TABLET, SUGAR COATED ORAL at 08:29

## 2019-07-24 RX ADMIN — BENZTROPINE MESYLATE 1 MG: 1 TABLET ORAL at 08:29

## 2019-07-24 RX ADMIN — INSULIN LISPRO 6 UNITS: 100 INJECTION, SOLUTION INTRAVENOUS; SUBCUTANEOUS at 21:03

## 2019-07-24 RX ADMIN — DULOXETINE HYDROCHLORIDE 60 MG: 60 CAPSULE, DELAYED RELEASE ORAL at 08:30

## 2019-07-24 RX ADMIN — INSULIN LISPRO 2 UNITS: 100 INJECTION, SOLUTION INTRAVENOUS; SUBCUTANEOUS at 12:25

## 2019-07-24 RX ADMIN — CHLORPROMAZINE HYDROCHLORIDE 50 MG: 25 TABLET, SUGAR COATED ORAL at 17:27

## 2019-07-24 RX ADMIN — BENZTROPINE MESYLATE 1 MG: 1 TABLET ORAL at 17:27

## 2019-07-24 RX ADMIN — BUSPIRONE HYDROCHLORIDE 10 MG: 5 TABLET ORAL at 17:27

## 2019-07-24 RX ADMIN — GABAPENTIN 600 MG: 300 CAPSULE ORAL at 17:27

## 2019-07-24 RX ADMIN — ACETAMINOPHEN 975 MG: 325 TABLET ORAL at 17:30

## 2019-07-24 RX ADMIN — BUSPIRONE HYDROCHLORIDE 10 MG: 5 TABLET ORAL at 08:29

## 2019-07-24 RX ADMIN — CHLORPROMAZINE HYDROCHLORIDE 50 MG: 25 TABLET, SUGAR COATED ORAL at 21:01

## 2019-07-24 RX ADMIN — PALIPERIDONE 3 MG: 3 TABLET, EXTENDED RELEASE ORAL at 08:29

## 2019-07-24 RX ADMIN — DIVALPROEX SODIUM 2000 MG: 500 TABLET, FILM COATED, EXTENDED RELEASE ORAL at 17:27

## 2019-07-24 RX ADMIN — GABAPENTIN 600 MG: 300 CAPSULE ORAL at 21:01

## 2019-07-24 RX ADMIN — INSULIN GLARGINE 32 UNITS: 100 INJECTION, SOLUTION SUBCUTANEOUS at 21:01

## 2019-07-24 RX ADMIN — INSULIN LISPRO 2 UNITS: 100 INJECTION, SOLUTION INTRAVENOUS; SUBCUTANEOUS at 17:26

## 2019-07-24 RX ADMIN — TRAZODONE HYDROCHLORIDE 50 MG: 50 TABLET ORAL at 21:01

## 2019-07-24 RX ADMIN — HYDROXYZINE HYDROCHLORIDE 50 MG: 25 TABLET ORAL at 14:15

## 2019-07-24 NOTE — PROGRESS NOTES
Progress Note - Behavioral Health   Valente Ramirez 44 y o  male MRN: [id number]  Unit/Bed#: [facility] 258-02 Encounter: [id number]    Assessment/Plan   Principal Problem:    Severe bipolar I disorder, most recent episode depressed without psychotic features ([facility] [address] )  Active Problems:    Tobacco use disorder    Essential hypertension    DM (diabetes mellitus), secondary uncontrolled ([facility] [address] )      Behavior over the last 24 hours: Patient reported he was thinking of stabbing himself with a pen im his neck and he has been having a lot of suicidal thoughts  We agreed on cancelling the projected discharge for tomorrow and discussed trying ECT as he had successful treatment with ECT  In 2017  Sleep: normal  Appetite: poor  Medication side effects: No  ROS: no complaints    Mental Status Evaluation:  Appearance:  disheveled   Behavior:  psychomotor agitation   Speech:  loud   Mood:  irritable   Affect:  mood-congruent   Thought Process:  perserverative   Thought Content:  normal   Perceptual Disturbances: None   Risk Potential: Suicidal Ideations with plan to stab self   Sensorium:  person and place   Cognition:  grossly intact   Consciousness:  awake    Attention: attention span appeared shorter than expected for age   Insight:  limited   Judgment: limited   Gait/Station: slow   Motor Activity: no abnormal movements     Progress Toward Goals: regressed    Recommended Treatment: Continue with group therapy, milieu therapy and occupational therapy  Risks, benefits and possible side effects of Medications:   Risks, benefits, and possible side effects of medications explained to patient and patient verbalizes understanding  Medications: all current active meds have been reviewed and continue current psychiatric medications  Labs: reviewed    Counseling / Coordination of Care  Total floor / unit time spent today 15 minutes   Greater than 50% of total time was spent with the patient and / or family counseling and / or coordination of care   A description of the counseling / coordination of care:

## 2019-07-24 NOTE — PLAN OF CARE
Problem: NEUROSENSORY - ADULT  Goal: Remains free of injury related to seizures activity  Description  INTERVENTIONS  - Maintain airway, patient safety  and administer oxygen as ordered  - Monitor patient for seizure activity, document and report duration and description of seizure to physician/advanced practitioner  - If seizure occurs,  ensure patient safety during seizure  - Reorient patient post seizure  - Seizure pads on all 4 side rails  - Instruct patient/family to notify RN of any seizure activity including if an aura is experienced  - Instruct patient/family to call for assistance with activity based on nursing assessment  - Administer anti-seizure medications as ordered  - Monitor fetal well being  Outcome: Progressing     Problem: SKIN/TISSUE INTEGRITY - ADULT  Goal: Incision(s), wounds(s) or drain site(s) healing without S/S of infection  Description  INTERVENTIONS  - Assess and document risk factors for skin impairment   - Assess and document dressing, incision, wound bed, drain sites and surrounding tissue  - Initiate Nutrition services consult and/or wound management as needed  Outcome: Progressing     Problem: Risk for Self Injury/Neglect  Goal: Verbalize thoughts and feelings  Description  Interventions:  - Assess and re-assess patient's lethality and potential for self-injury  - Engage patient in 1:1 interactions, daily, for a minimum of 15 minutes  - Encourage patient to express feelings, fears, frustrations, hopes  - Establish rapport/trust with patient   Outcome: Progressing  Goal: Recognize maladaptive responses and adopt new coping mechanisms  Outcome: Progressing  Goal: Complete daily ADLs, including personal hygiene independently, as able  Description  Interventions:  - Observe, teach, and assist patient with ADLS  - Monitor and promote a balance of rest/activity, with adequate nutrition and elimination  Outcome: Progressing     Problem: Depression  Goal: Treatment Goal: Demonstrate behavioral control of depressive symptoms, verbalize feelings of improved mood/affect, and adopt new coping skills prior to discharge  Outcome: Progressing  Goal: Refrain from self-neglect  Outcome: Progressing     Problem: Ineffective Coping  Goal: Participates in unit activities  Description  Interventions:  - Provide therapeutic environment   - Provide required programming   - Redirect inappropriate behaviors   Outcome: Progressing     Problem: Nutrition/Hydration-ADULT  Goal: Nutrient/Hydration intake appropriate for improving, restoring or maintaining nutritional needs  Description  Monitor and assess patient's nutrition/hydration status for malnutrition (ex- brittle hair, bruises, dry skin, pale skin and conjunctiva, muscle wasting, smooth red tongue, and disorientation)  Collaborate with interdisciplinary team and initiate plan and interventions as ordered  Monitor patient's weight and dietary intake as ordered or per policy  Utilize nutrition screening tool and intervene per policy  Determine patient's food preferences and provide high-protein, high-caloric foods as appropriate  INTERVENTIONS:  - Monitor oral intake, urinary output, labs, and treatment plans  - Assess nutrition and hydration status and recommend course of action  - Evaluate amount of meals eaten  - Assist patient with eating if necessary   - Allow adequate time for meals  - Recommend/ encourage appropriate diets, oral nutritional supplements, and vitamin/mineral supplements  - Order, calculate, and assess calorie counts as needed  - Recommend, monitor, and adjust tube feedings and TPN/PPN based on assessed needs  - Assess need for intravenous fluids  - Provide specific nutrition/hydration education as appropriate  - Include patient/family/caregiver in decisions related to nutrition      7-18-19    he is on a level 1 meal plan with double portions of protein  His intake was listed at 100 %  He has a diabetic right foot ulcer   His weight was 271 on 7-9 then up to 302 on 7-13 with a 31  # gain noted  RDN to check his weight status  His POC glucose was 178 H on  7-18 RDN to evaluate his labs when available as he is on lantus with hx of diabetes  RDN visited on rounds this am and he offered no c/o  RDN to reassess his nutrition needs at moderate risk and follow PRN       7-24-19  he is now on a level 3 with protein offered with double portions  His intake has been 100 %  His last weight was 302 on 7-13 RDN to check his weight status  RDN visited on rounds this am and he was happy and had everything he wanted for breakfast  He has 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

## 2019-07-24 NOTE — QUICK NOTE
Notified by nursing staff regarding to incident with a control team  Advised to monitor right leg closely  Currently patient is having some mild discomfort but no erythema or wound on the right leg

## 2019-07-24 NOTE — PROGRESS NOTES
Patient presents with flat affect  Endorses anxiety and depression but does not rate them  He is cooperative and compliant with medications  HS  and patient received 6 units of Humalog  No complaints of pain  Will continue monitoring

## 2019-07-24 NOTE — PROGRESS NOTES
Daily Rounds Documentation    Team Members Present:   MD Daryl Bloom CRNP Carlisle Hoops, BLAKE Clark, [address] [place] [address]  Glenn Rater, PharmD    Wants his foot wound rechecked  Now interested in ECT; can we get pre-cert; if so transfer to [facility]

## 2019-07-24 NOTE — PROGRESS NOTES
Clinical Pharmacy Note: Medication Education Group     Intervention:  Open Forum    Length of Group: 60 min     Methods/resources used: verbal discussion     Topics Discussed: Medication adherence, side effect management, nonpharmacologic strategies, medication effectiveness and indications      Time spent in group: Entire time      Patient Specific concerns: Zahraa Cough presented to group today dressed in street clothing and appeared somewhat disheveled and alert and oriented to person, place and time  Zahraa Cough seemed melancholic  Patient's affect was mainly pleasant and he actively participated in group today  Patient was respectful of others throughout and interacted appropriately with peers  Zahraa Cough did have specific concerns about paliperidone and mood stabulizers  Writer discussed the mechanism, indications, and side effects of paliperidone  Zahraa Cough seemingly got distressed and asked the writer at the end of group "what does it say about me that I need three mood stabilizers?"  Writer discussed the various roles of medications      Patient understood counseling: yes     Electronically signed by: Oli Ford, Pharmacist

## 2019-07-24 NOTE — PROGRESS NOTES
Pt was present in the hallway and milieu for meals, and group  Pleasant and cooperative  Wound care performed on foot  Pt tolerated well  Wound appears pink, healthy  Pt reports light, red drainage  Two tiny open wounds, pencil tip thickness  No S/S infection  Gauze, non-adherent pad, maxsorb applied by RN  Pt denies anxiety, depression  Brightens when talking about ECT, provider made aware  Denies AH/VH  Will continue to monitor

## 2019-07-25 ENCOUNTER — APPOINTMENT (INPATIENT)
Dept: RADIOLOGY | Facility: HOSPITAL | Age: 44
DRG: 885 | End: 2019-07-25
Payer: MEDICARE

## 2019-07-25 PROBLEM — Z01.818 PREOPERATIVE CLEARANCE: Status: ACTIVE | Noted: 2019-07-25

## 2019-07-25 LAB
ATRIAL RATE: 85 BPM
GLUCOSE SERPL-MCNC: 165 MG/DL (ref 65–140)
GLUCOSE SERPL-MCNC: 206 MG/DL (ref 65–140)
GLUCOSE SERPL-MCNC: 211 MG/DL (ref 65–140)
GLUCOSE SERPL-MCNC: 301 MG/DL (ref 65–140)
P AXIS: 32 DEGREES
PR INTERVAL: 148 MS
QRS AXIS: 16 DEGREES
QRSD INTERVAL: 80 MS
QT INTERVAL: 346 MS
QTC INTERVAL: 411 MS
T WAVE AXIS: 35 DEGREES
VENTRICULAR RATE: 85 BPM

## 2019-07-25 PROCEDURE — 93005 ELECTROCARDIOGRAM TRACING: CPT

## 2019-07-25 PROCEDURE — 99222 1ST HOSP IP/OBS MODERATE 55: CPT | Performed by: NURSE PRACTITIONER

## 2019-07-25 PROCEDURE — 99231 SBSQ HOSP IP/OBS SF/LOW 25: CPT | Performed by: PSYCHIATRY & NEUROLOGY

## 2019-07-25 PROCEDURE — 71046 X-RAY EXAM CHEST 2 VIEWS: CPT

## 2019-07-25 PROCEDURE — 93010 ELECTROCARDIOGRAM REPORT: CPT | Performed by: INTERNAL MEDICINE

## 2019-07-25 PROCEDURE — 82948 REAGENT STRIP/BLOOD GLUCOSE: CPT

## 2019-07-25 RX ORDER — LISINOPRIL 10 MG/1
10 TABLET ORAL DAILY
Status: DISCONTINUED | OUTPATIENT
Start: 2019-07-25 | End: 2019-08-02 | Stop reason: HOSPADM

## 2019-07-25 RX ADMIN — DIVALPROEX SODIUM 2000 MG: 500 TABLET, FILM COATED, EXTENDED RELEASE ORAL at 18:45

## 2019-07-25 RX ADMIN — BUSPIRONE HYDROCHLORIDE 10 MG: 5 TABLET ORAL at 18:45

## 2019-07-25 RX ADMIN — INSULIN GLARGINE 32 UNITS: 100 INJECTION, SOLUTION SUBCUTANEOUS at 21:57

## 2019-07-25 RX ADMIN — INSULIN LISPRO 4 UNITS: 100 INJECTION, SOLUTION INTRAVENOUS; SUBCUTANEOUS at 12:14

## 2019-07-25 RX ADMIN — CHLORPROMAZINE HYDROCHLORIDE 50 MG: 25 TABLET, SUGAR COATED ORAL at 17:15

## 2019-07-25 RX ADMIN — INSULIN LISPRO 8 UNITS: 100 INJECTION, SOLUTION INTRAVENOUS; SUBCUTANEOUS at 21:57

## 2019-07-25 RX ADMIN — BENZTROPINE MESYLATE 1 MG: 1 TABLET ORAL at 18:45

## 2019-07-25 RX ADMIN — CHLORPROMAZINE HYDROCHLORIDE 50 MG: 25 TABLET, SUGAR COATED ORAL at 08:42

## 2019-07-25 RX ADMIN — ACETAMINOPHEN 975 MG: 325 TABLET ORAL at 19:34

## 2019-07-25 RX ADMIN — TRAZODONE HYDROCHLORIDE 50 MG: 50 TABLET ORAL at 21:57

## 2019-07-25 RX ADMIN — PALIPERIDONE 3 MG: 3 TABLET, EXTENDED RELEASE ORAL at 08:43

## 2019-07-25 RX ADMIN — METOPROLOL TARTRATE 25 MG: 25 TABLET, FILM COATED ORAL at 13:19

## 2019-07-25 RX ADMIN — CHLORPROMAZINE HYDROCHLORIDE 50 MG: 25 TABLET, SUGAR COATED ORAL at 21:57

## 2019-07-25 RX ADMIN — NICOTINE 1 PATCH: 21 PATCH, EXTENDED RELEASE TRANSDERMAL at 08:42

## 2019-07-25 RX ADMIN — LISINOPRIL 10 MG: 10 TABLET ORAL at 13:20

## 2019-07-25 RX ADMIN — GABAPENTIN 600 MG: 300 CAPSULE ORAL at 21:57

## 2019-07-25 RX ADMIN — GABAPENTIN 600 MG: 300 CAPSULE ORAL at 17:14

## 2019-07-25 RX ADMIN — METOPROLOL TARTRATE 25 MG: 25 TABLET, FILM COATED ORAL at 21:57

## 2019-07-25 RX ADMIN — BENZTROPINE MESYLATE 1 MG: 1 TABLET ORAL at 08:43

## 2019-07-25 RX ADMIN — INSULIN LISPRO 2 UNITS: 100 INJECTION, SOLUTION INTRAVENOUS; SUBCUTANEOUS at 17:08

## 2019-07-25 RX ADMIN — GLYBURIDE 1.25 MG: 1.25 TABLET ORAL at 08:43

## 2019-07-25 RX ADMIN — METFORMIN HYDROCHLORIDE 1000 MG: 500 TABLET ORAL at 17:15

## 2019-07-25 RX ADMIN — BUSPIRONE HYDROCHLORIDE 10 MG: 5 TABLET ORAL at 08:43

## 2019-07-25 RX ADMIN — GABAPENTIN 600 MG: 300 CAPSULE ORAL at 09:45

## 2019-07-25 RX ADMIN — ACETAMINOPHEN 975 MG: 325 TABLET ORAL at 12:15

## 2019-07-25 RX ADMIN — DULOXETINE HYDROCHLORIDE 60 MG: 60 CAPSULE, DELAYED RELEASE ORAL at 08:42

## 2019-07-25 RX ADMIN — INSULIN LISPRO 4 UNITS: 100 INJECTION, SOLUTION INTRAVENOUS; SUBCUTANEOUS at 08:47

## 2019-07-25 NOTE — PROGRESS NOTES
Pt administered Tylenol 975 mg for 8/10 lower back pain @ 11:30  Reassessed at 1230, pain reduced to 4/10  Pt wound care performed  Dressing is dry, intact  Scant amount of red discharge on old dressing  Wound is pink/red, no S/S infection  Redressed  Will continue to monitor

## 2019-07-25 NOTE — ASSESSMENT & PLAN NOTE
· BP has not been well-controlled     · Patient take lisinopril 10 mg daily and metoprolol 25 mg BID- these medications have been held   · Will resume his home regimen  · Monitor BP closely

## 2019-07-25 NOTE — CONSULTS
Consult- Argenis Beavers 1975, 44 y o  male MRN: [id number]    Unit/Bed#: [facility] 258-02 Encounter: [id number]    Primary Care Provider: No primary care provider on file  Date and time admitted to hospital: 7/9/2019 10:53 AM      Inpatient consult to Internal Medicine  Consult performed by: TAYLOR Donald  Consult ordered by: Jason Brewster MD          * Severe bipolar I disorder, most recent episode depressed without psychotic features [facility])  Assessment & Plan  · Treatment per primary team     Preoperative clearance  Assessment & Plan  · Patient is scheduled for an ECT sometime next week at [address] [place] [address]    · Will check EKG and chest x-ray       DM (diabetes mellitus), secondary uncontrolled ([address] )  Assessment & Plan  Lab Results   Component Value Date    HGBA1C 8 4 (H) 07/10/2019       Recent Labs     07/24/19  1640 07/24/19  1931 07/25/19  0700 07/25/19  1151   POCGLU 153* 259* 206* 211*       Blood Sugar Average: Last 72 hrs:  (P) 198   · Uncontrolled   · Will resume metformin 1000 mg BID   · Will continue with glyburide 1 25 mg at breakfast, lantus 32 units at HS, ISS   · Patient does not take short-acting at home as he does not have a glucometer  · Will need to get a glucometer before discharge     Essential hypertension  Assessment & Plan  · BP has not been well-controlled  · Patient take lisinopril 10 mg daily and metoprolol 25 mg BID- these medications have been held   · Will resume his home regimen  · Monitor BP closely     Tobacco use disorder  Assessment & Plan  · Nicotine patch while in-house        VTE Prophylaxis: Sequential compression device Timothy Abiodun)       Recommendations for Discharge:  · Per Primary Service      History of Present Illness:  Argenis Beavers is a 44 y o  male who is originally admitted to the [facility] service due to suicidal attempt and ideation  We are consulted for HTN and DM management and medical clearance for ECT   Patient with history of essential hypertension and diabetes mellitus type 2  He denies any chest pain, palpitation, dyspnea, nausea, vomiting, abdominal pain, urinary symptoms  He denies any headaches or any visual disturbances  Complains of chronic lower back pain  States that his blood sugar has been a bit higher for the past few days  Review of Systems:  Review of Systems   Constitutional: Negative for chills, fatigue and fever  HENT: Negative for congestion, ear pain, postnasal drip and sore throat  Eyes: Negative for discharge, itching and visual disturbance  Respiratory: Negative for cough, chest tightness and shortness of breath  Cardiovascular: Negative for chest pain, palpitations and leg swelling  Gastrointestinal: Negative for abdominal pain, nausea and vomiting  Endocrine: Negative for cold intolerance and heat intolerance  Genitourinary: Negative for difficulty urinating, dysuria and hematuria  Musculoskeletal: Positive for back pain (chronic lower back pain)  Negative for gait problem and neck pain  Skin: Negative for rash and wound  Neurological: Negative for dizziness, speech difficulty, weakness, light-headedness and headaches  Psychiatric/Behavioral: Negative for hallucinations, self-injury and suicidal ideas  Past Medical and Surgical History:   Past Medical History:   Diagnosis Date    Anxiety     Bipolar 1 disorder ([place] [address] )     Chronic pain disorder     Depression     Diabetes mellitus ([place] [address] )     Hypertension     Psychiatric illness     PTSD (post-traumatic stress disorder)     Sleep difficulties     Substance abuse ([place] [address] )     Suicide attempt ([place] [address] )        Past Surgical History:   Procedure Laterality Date    APPENDECTOMY      COLON SURGERY      TONSILLECTOMY         Meds/Allergies:  all medications and allergies reviewed    Allergies:    Allergies   Allergen Reactions    Penicillins Rash and Other (See Comments)     rash (Tolerating Ancef-per RN)  Last noted when patient was a child       Social History:     Marital Status: Single    Substance Use History:   Social History     Substance and Sexual Activity   Alcohol Use No     Social History     Tobacco Use   Smoking Status Current Every Day Smoker    Packs/day: 1 50    Years: 22 00    Pack years: 33 00    Types: Cigarettes   Smokeless Tobacco Current User    Types: Chew     Social History     Substance and Sexual Activity   Drug Use Yes    Types: Marijuana    Comment: monthly       Family History:  I have reviewed the patients family history    Physical Exam:   Vitals:   Blood Pressure: 124/73 (07/25/19 0746)  Pulse: 84 (07/25/19 0746)  Temperature: 98 5 °F (36 9 °C) (07/25/19 0746)  Temp Source: Temporal (07/25/19 0746)  Respirations: 16 (07/25/19 0746)  Height: 6' 3" (190 5 cm) (07/11/19 1435)  Weight - Scale: (!) 137 kg (302 lb) (07/13/19 0723)  SpO2: 93 % (07/25/19 0746)    Physical Exam   Constitutional: He is oriented to person, place, and time  He appears well-developed and well-nourished  No distress  HENT:   Head: Normocephalic and atraumatic  Mouth/Throat: Oropharynx is clear and moist    Eyes: Pupils are equal, round, and reactive to light  Conjunctivae and EOM are normal    Neck: Normal range of motion  Neck supple  No thyromegaly present  Cardiovascular: Normal rate, regular rhythm, normal heart sounds and intact distal pulses  Pulmonary/Chest: Effort normal and breath sounds normal  No respiratory distress  He has no wheezes  Abdominal: Soft  Bowel sounds are normal  He exhibits no distension  There is no tenderness  Musculoskeletal: Normal range of motion  He exhibits no edema or deformity  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     Neurological: He is alert and oriented to person, place, and time  He has normal reflexes  Skin: Skin is warm and dry  No erythema  Dry healing ulcer on right plantar   Psychiatric: He has a normal mood and affect   His behavior is normal  Thought content normal    Vitals reviewed  Additional Data:   Lab Results: I have personally reviewed pertinent reports  Invalid input(s): LABALBU          Lab Results   Component Value Date/Time    HGBA1C 8 4 (H) 07/10/2019 06:12 AM    HGBA1C 10 0 (H) 12/18/2018 06:21 AM     Results from last 7 days   Lab Units 07/25/19  1151 07/25/19  0700 07/24/19  1931 07/24/19  1640 07/24/19  1159 07/24/19  0704 07/23/19  2041 07/23/19  1636 07/23/19  1129 07/23/19  0721 07/22/19  1931 07/22/19  1642   POC GLUCOSE mg/dl 211* 206* 259* 153* 158* 180* 277* 147* 235* 167* 251* 160*       Imaging: I have personally reviewed pertinent reports  XR chest pa & lateral    (Results Pending)       EKG, Pathology, and Other Studies Reviewed on Admission:   · EKG: pending     ** Please Note: This note has been constructed using a voice recognition system   **

## 2019-07-25 NOTE — PROGRESS NOTES
Daily Rounds Documentation    Team Members Present:   Dr Kaleigh Gonzalez, MD Priscilla Hill, TAYLOR Jaffe, RN  Jason Mcelroy, RN  Fiorella Ferguson, LSW   Kathy Sandoval, [facility]    Pleasant  Want to transfer for ECT  Waiting for insurance approval   Medical consult in

## 2019-07-25 NOTE — PLAN OF CARE
PT continues to attend at least 75% of art therapy groups offered where he actively engages in group directives and in projects of choice  PT has been pleasant and cooperative and easily follows group instructions  PT displays a depressed mood, flat affect, however does brighten at times throughout conversation and has positive social interactions when prompted  PT does display fair insight into his illness and recovery   PT will continue to attend at least 75% of at groups to increase self esteem, to identify at least 3 healthy coping skills and to allow for creative expression of feelings and emotions     Problem: Ineffective Coping  Goal: Participates in unit activities  Description  Interventions:  - Provide therapeutic environment   - Provide required programming   - Redirect inappropriate behaviors   Outcome: Progressing

## 2019-07-25 NOTE — PROGRESS NOTES
Progress Note - Behavioral Health   Valente Ramirez 44 y o  male MRN: [id number]  Unit/Bed#: [facility] 258-02 Encounter: [id number]    Assessment/Plan   Principal Problem:    Severe bipolar I disorder, most recent episode depressed without psychotic features ([facility] [address] )  Active Problems:    Tobacco use disorder    Essential hypertension    DM (diabetes mellitus), secondary uncontrolled ([facility] )      Behavior over the last 24 hours:  Unchanged  Patient seems very motivated to try ECT  He said it really helped him 2 years ago and he stayed for a while out of hospitals  Discussed exploring that option  Sleep: normal  Appetite: normal  Medication side effects: No  ROS: no complaints    Mental Status Evaluation:  Appearance:  disheveled   Behavior:  normal   Speech:  loud   Mood:  anxious and irritable   Affect:  increased in intensity   Thought Process:  circumstantial   Thought Content:  normal   Perceptual Disturbances: None   Risk Potential: Suicidal Ideations without plan   Sensorium:  person and place   Cognition:  grossly intact   Consciousness:  awake    Attention: attention span appeared shorter than expected for age   Insight:  limited   Judgment: limited   Gait/Station: slow   Motor Activity: no abnormal movements     Progress Toward Goals: ongoing    Recommended Treatment: Continue with group therapy, milieu therapy and occupational therapy  Risks, benefits and possible side effects of Medications:   Risks, benefits, and possible side effects of medications explained to patient and patient verbalizes understanding  Medications: all current active meds have been reviewed and continue current psychiatric medications  Labs: reviewed    Counseling / Coordination of Care  Total floor / unit time spent today 15 minutes  Greater than 50% of total time was spent with the patient and / or family counseling and / or coordination of care   A description of the counseling / coordination of care:

## 2019-07-25 NOTE — PROGRESS NOTES
Patient was out in the milieu all evening but keeps to himself  Presents with flat affect and depressed mood  Does brighten with conversation  Discussed plan for ECT, states it helped him a few years ago and optimistic that it will help him again  Compliant with meds  Behavior calm and controlled  Denies any current SI/HI/AH/VH  Ambulates on unit with cane  No c/o pain  Will continue to observe

## 2019-07-25 NOTE — PROGRESS NOTES
Patient has been sleeping well through the night  No changes or problems  Q 7 minute checks maintained

## 2019-07-25 NOTE — PROGRESS NOTES
Progress Note - Behavioral Health     Storm Garcia 44 y o  male MRN: [id number]   Unit/Bed#: [facility] 258-02 Encounter: [id number]    Behavior over the last 24 hours:  Nichole Nash was seen for an inpatient follow-up psychiatric visit this date  He relates he is still extremely depressed but denies any suicidal or homicidal ideation  He is hopeful about receiving ECT and request information  He is taking his medications as prescribed and denies any side effects  He is requesting metformin and metoprolol be restarted  His behavior has been calm and controlled and he is interacting appropriately with staff and peers  ROS: no complaints    Mental Status Evaluation:    Appearance:  casually dressed, dressed appropriately   Behavior:  cooperative, calm   Speech:  normal rate and volume   Mood:  depressed, dysphoric   Affect:  flat   Thought Process:  organized, logical, coherent   Associations: intact associations   Thought Content:  normal, no overt delusions   Perceptual Disturbances: none   Risk Potential: Suicidal ideation - None  Homicidal ideation - None  Potential for aggression - No   Sensorium:  oriented to person, place and time/date   Memory:  recent and remote memory grossly intact   Consciousness:  alert and awake   Attention: attention span and concentration are age appropriate   Insight:  fair   Judgment: fair   Gait/Station: normal gait/station, normal balance   Motor Activity: no abnormal movements     Vital signs in last 24 hours:    Temp:  [97 7 °F (36 5 °C)-98 5 °F (36 9 °C)] 98 5 °F (36 9 °C)  HR:  [] 84  Resp:  [16-18] 16  BP: (124-168)/(73-98) 124/73    Laboratory results:  I have personally reviewed all pertinent laboratory/tests results      Progress Toward Goals: progressing    Assessment/Plan   Principal Problem:    Severe bipolar I disorder, most recent episode depressed without psychotic features [facility] )  Active Problems:    Tobacco use disorder    Essential hypertension    DM (diabetes mellitus), secondary uncontrolled ([address] )    Preoperative clearance    Recommended Treatment:   Continue current medications as prescribed  Medical consult place to advise regarding Glucophage and metoprolol  Continue to monitor  Plan is for transfer to [facility] agreed her for ECT once patient is medically cleared and arrangements are made by   All current active medications have been reviewed    Encourage group therapy, milieu therapy and occupational therapy  Behavioral Health checks every 7 minutes      Current Facility-Administered Medications:  acetaminophen 650 mg Oral Q6H PRN Mandy RUFFIN Lodics, CRNP   acetaminophen 650 mg Oral Q4H PRN Mandy Mcdonnellics, CRNP   acetaminophen 975 mg Oral Q6H PRN Mandy Mcdonnellics, CRNP   aluminum-magnesium hydroxide-simethicone 30 mL Oral Q4H PRN Mandy Bautista, CRNP   benztropine 1 mg Intramuscular Q6H PRN Mandy Mcdonnellics, CRNP   benztropine 1 mg Oral Q6H PRN Mandy Mcdonnellics, CRNP   benztropine 1 mg Oral BID Mandy Bautista, CRNP   busPIRone 10 mg Oral BID Mandy Bautista, CRNP   chlorproMAZINE 50 mg Oral TID Mandy Bautista, CRNP   divalproex sodium 2,000 mg Oral QPM Mandy Bautista, CRNP   DULoxetine 60 mg Oral Daily Mandy Bautista, CRNP   gabapentin 600 mg Oral TID Mandy Bautista, CRNP   glyBURIDE 1 25 mg Oral Daily With Breakfast Mandy Bautista, CRNP   haloperidol 5 mg Oral Q6H PRN Mandy Bautista, CRNP   haloperidol lactate 5 mg Intramuscular Q6H PRN Mandy Bautista, CRNP   hydrOXYzine HCL 25 mg Oral Q6H PRN Mandy Bautista, CRNP   hydrOXYzine HCL 50 mg Oral Q4H PRN Mandy Mcdonnellics, CRNP   insulin glargine 32 Units Subcutaneous HS Mayra Jaskaran, CRNP   insulin lispro 2-12 Units Subcutaneous TID AC Mayra Jaskaran, CRNP   insulin lispro 2-12 Units Subcutaneous HS Vickie Gravfior, CRNP   lisinopril 10 mg Oral Daily Vickie Johnson, CRNP   LORazepam 2 mg Intramuscular Q6H PRN Mandy Bautista, CRNP   magnesium hydroxide 30 mL Oral Daily PRN TAYLOR Lux   metFORMIN 1,000 mg Oral BID With Meals [facility], CRNP   metoprolol tartrate 25 mg Oral Q12H [address] [facility], CRNP   nicotine 1 patch Transdermal Daily TAYLOR Lux   OLANZapine 10 mg Intramuscular Q3H PRN TAYLOR Lux   paliperidone 3 mg Oral Daily Kaleigh Gonzalez MD   risperiDONE 1 mg Oral Q3H PRN TAYLOR Lux   traZODone 50 mg Oral HS PRN TAYLOR Lux   traZODone 50 mg Oral HS TAYLOR Lux       Risks / Benefits of Treatment:    Risks, benefits, and possible side effects of medications explained to patient and patient verbalizes understanding and agreement for treatment  Counseling / Coordination of Care:      Patient's progress discussed with staff in treatment team meeting  Medications, treatment progress and treatment plan reviewed with patient

## 2019-07-25 NOTE — PROGRESS NOTES
Pt present at meals, cooperative and compliant with meds and meals  Pt expressed concerns about not getting BP medications  Provider notified Marston Castleman)  Consult ordered and performed  Pt denies AH, VH, Anxiety, Depression but appears depressed and withdrawn  Denies SI/HI  Talked to provider about ECT  Will continue to monitor

## 2019-07-25 NOTE — ASSESSMENT & PLAN NOTE
· Patient is scheduled for an ECT sometime next week at [address] [place] [address]    · Will check EKG and chest x-ray

## 2019-07-25 NOTE — ASSESSMENT & PLAN NOTE
Lab Results   Component Value Date    HGBA1C 8 4 (H) 07/10/2019       Recent Labs     07/24/19  1640 07/24/19  1931 07/25/19  0700 07/25/19  1151   POCGLU 153* 259* 206* 211*       Blood Sugar Average: Last 72 hrs:  (P) 198   · Uncontrolled   · Will resume metformin 1000 mg BID   · Will continue with glyburide 1 25 mg at breakfast, lantus 32 units at HS, ISS   · Patient does not take short-acting at home as he does not have a glucometer     · Will need to get a glucometer before discharge

## 2019-07-26 LAB
GLUCOSE SERPL-MCNC: 121 MG/DL (ref 65–140)
GLUCOSE SERPL-MCNC: 162 MG/DL (ref 65–140)
GLUCOSE SERPL-MCNC: 174 MG/DL (ref 65–140)
GLUCOSE SERPL-MCNC: 183 MG/DL (ref 65–140)

## 2019-07-26 PROCEDURE — 82948 REAGENT STRIP/BLOOD GLUCOSE: CPT

## 2019-07-26 PROCEDURE — 99231 SBSQ HOSP IP/OBS SF/LOW 25: CPT | Performed by: PSYCHIATRY & NEUROLOGY

## 2019-07-26 RX ORDER — IBUPROFEN 800 MG/1
800 TABLET ORAL EVERY 6 HOURS PRN
Status: DISCONTINUED | OUTPATIENT
Start: 2019-07-26 | End: 2019-08-02 | Stop reason: HOSPADM

## 2019-07-26 RX ORDER — SODIUM CHLORIDE 9 MG/ML
50 INJECTION, SOLUTION INTRAVENOUS CONTINUOUS
Status: CANCELLED | OUTPATIENT
Start: 2019-07-26

## 2019-07-26 RX ORDER — SIMETHICONE 80 MG
80 TABLET,CHEWABLE ORAL EVERY 6 HOURS PRN
Status: DISCONTINUED | OUTPATIENT
Start: 2019-07-26 | End: 2019-08-02 | Stop reason: HOSPADM

## 2019-07-26 RX ORDER — IBUPROFEN 600 MG/1
600 TABLET ORAL EVERY 6 HOURS PRN
Status: DISCONTINUED | OUTPATIENT
Start: 2019-07-26 | End: 2019-08-02 | Stop reason: HOSPADM

## 2019-07-26 RX ORDER — IBUPROFEN 400 MG/1
400 TABLET ORAL EVERY 6 HOURS PRN
Status: DISCONTINUED | OUTPATIENT
Start: 2019-07-26 | End: 2019-08-02 | Stop reason: HOSPADM

## 2019-07-26 RX ADMIN — GABAPENTIN 600 MG: 300 CAPSULE ORAL at 08:41

## 2019-07-26 RX ADMIN — GLYBURIDE 1.25 MG: 1.25 TABLET ORAL at 08:42

## 2019-07-26 RX ADMIN — CHLORPROMAZINE HYDROCHLORIDE 50 MG: 25 TABLET, SUGAR COATED ORAL at 08:42

## 2019-07-26 RX ADMIN — INSULIN LISPRO 2 UNITS: 100 INJECTION, SOLUTION INTRAVENOUS; SUBCUTANEOUS at 12:31

## 2019-07-26 RX ADMIN — METOPROLOL TARTRATE 25 MG: 25 TABLET, FILM COATED ORAL at 08:42

## 2019-07-26 RX ADMIN — METOPROLOL TARTRATE 25 MG: 25 TABLET, FILM COATED ORAL at 21:04

## 2019-07-26 RX ADMIN — LISINOPRIL 10 MG: 10 TABLET ORAL at 08:42

## 2019-07-26 RX ADMIN — PALIPERIDONE 3 MG: 3 TABLET, EXTENDED RELEASE ORAL at 08:42

## 2019-07-26 RX ADMIN — BUSPIRONE HYDROCHLORIDE 10 MG: 5 TABLET ORAL at 18:31

## 2019-07-26 RX ADMIN — CHLORPROMAZINE HYDROCHLORIDE 50 MG: 25 TABLET, SUGAR COATED ORAL at 16:28

## 2019-07-26 RX ADMIN — CHLORPROMAZINE HYDROCHLORIDE 50 MG: 25 TABLET, SUGAR COATED ORAL at 16:21

## 2019-07-26 RX ADMIN — INSULIN GLARGINE 32 UNITS: 100 INJECTION, SOLUTION SUBCUTANEOUS at 22:07

## 2019-07-26 RX ADMIN — IBUPROFEN 800 MG: 800 TABLET ORAL at 16:28

## 2019-07-26 RX ADMIN — BUSPIRONE HYDROCHLORIDE 10 MG: 5 TABLET ORAL at 08:42

## 2019-07-26 RX ADMIN — INSULIN LISPRO 2 UNITS: 100 INJECTION, SOLUTION INTRAVENOUS; SUBCUTANEOUS at 08:44

## 2019-07-26 RX ADMIN — NICOTINE 1 PATCH: 21 PATCH, EXTENDED RELEASE TRANSDERMAL at 08:47

## 2019-07-26 RX ADMIN — METFORMIN HYDROCHLORIDE 1000 MG: 500 TABLET ORAL at 16:29

## 2019-07-26 RX ADMIN — DIVALPROEX SODIUM 2000 MG: 500 TABLET, FILM COATED, EXTENDED RELEASE ORAL at 18:00

## 2019-07-26 RX ADMIN — GABAPENTIN 600 MG: 300 CAPSULE ORAL at 16:21

## 2019-07-26 RX ADMIN — METFORMIN HYDROCHLORIDE 1000 MG: 500 TABLET ORAL at 16:21

## 2019-07-26 RX ADMIN — GABAPENTIN 600 MG: 300 CAPSULE ORAL at 16:29

## 2019-07-26 RX ADMIN — METFORMIN HYDROCHLORIDE 1000 MG: 500 TABLET ORAL at 08:42

## 2019-07-26 RX ADMIN — GABAPENTIN 600 MG: 300 CAPSULE ORAL at 21:04

## 2019-07-26 RX ADMIN — CHLORPROMAZINE HYDROCHLORIDE 50 MG: 25 TABLET, SUGAR COATED ORAL at 21:04

## 2019-07-26 RX ADMIN — TRAZODONE HYDROCHLORIDE 50 MG: 50 TABLET ORAL at 22:07

## 2019-07-26 RX ADMIN — BENZTROPINE MESYLATE 1 MG: 1 TABLET ORAL at 19:33

## 2019-07-26 RX ADMIN — DULOXETINE HYDROCHLORIDE 60 MG: 60 CAPSULE, DELAYED RELEASE ORAL at 08:42

## 2019-07-26 RX ADMIN — BENZTROPINE MESYLATE 1 MG: 1 TABLET ORAL at 08:43

## 2019-07-26 NOTE — PROGRESS NOTES
[id number]  Patient sleeping without difficulty  No behaviors or complaints  Will continue monitoring  Unable to obtain blood work after several sticks

## 2019-07-26 NOTE — WOUND OSTOMY CARE
Progress Note - Wound   Danielle Stevenson 44 y o  male MRN: [id number]  Unit/Bed#: [facility] 196-26 Encounter: [id number]      Assessment:   Patient seen for follow up for neuropathic ulcer to the plantar surface of the right foot at base of TMA scar  Patient was in bed for the assessment and cooperative  Patient has been showering daily and with daily wound treatment  Wound has improved in size  Small amount of bloody drainage to the wound bed and lacie-wound, patient did not have dressing on at this time  Patient does not have supportive shoes and has slip on sneakers at the bedside  1  1  POA neuropathic ulcer to the right foot plantar surface which has improved since last assessment, wound bed is dark pink, wound is clean  Skin to lacie-wound is calloused and flaky  Drainage occurs when patient is ambulating  2  Bilateral heels intact  3  Right TMA scar with scaly area to the medical portion of the scar without open areas    Plan:   1  Cleanse right foot ulcer with NSS, pat dry, place non-adherent dressing to wound base, cover with Maxorb, 4X4 and wrap with cipriano, change daily or with soilage or dislodgement            Wound 07/12/19 Neuropathic/diabetic ulcer Foot Right;Medial;Inner (Active)   Wound Description Dry 7/26/2019  3:00 PM   Lacie-wound Assessment Intact;Dry 7/26/2019  3:00 PM   Wound Length (cm) 0 5 cm 7/26/2019  3:00 PM   Wound Width (cm) 0 3 cm 7/26/2019  3:00 PM   Wound Depth (cm) 0 1 7/26/2019  3:00 PM   Calculated Wound Area (cm^2) 0 15 cm^2 7/26/2019  3:00 PM   Calculated Wound Volume (cm^3) 0 02 cm^3 7/26/2019  3:00 PM   Change in Wound Size % 90 91 7/26/2019  3:00 PM   Drainage Amount Small 7/26/2019  3:00 PM   Drainage Description Bloody 7/26/2019  3:00 PM   Non-staged Wound Description Full thickness 7/26/2019  3:00 PM   Treatments Cleansed 7/26/2019  3:00 PM   Dressing Calcium Alginate with Silver;Dry dressing 7/26/2019  3:00 PM   Wound packed?  No 7/20/2019  1:00 PM   Dressing Changed Changed 7/20/2019  1:00 PM   Patient Tolerance Tolerated well 7/26/2019  3:00 PM   Dressing Status Clean;Dry 7/20/2019  1:00 PM     Reviewed plan of care with primary RN  Wound care to follow weekly  Questions or concerns contact Rob RUIZN, RN

## 2019-07-26 NOTE — PROGRESS NOTES
Daily Rounds Documentation    Team Members Present:   MD Alyssa De Leon, TAYLOR Antonio, RN  Yun Herrera, LSW [place], [place]    Working on ECT; does not need pre-cert  Look for transfer on Monday

## 2019-07-26 NOTE — PROGRESS NOTES
Progress Note - Behavioral Health     [facility] 44 [facility]  [facility] MRN: [id number]   Unit/Bed#: [facility] 258-02 Encounter: [id number]    Behavior over the last 24 hours: Mary Dc was seen for an inpatient, follow-up, psychiatric visit this date  At today's visit, he remains depressed but is hopeful regarding ECT and is looking forward to being transferred  He feels it may really help stabilize his mood  He is taking his medications as prescribed and denies any side effects  ROS: no complaints    Mental Status Evaluation:    Appearance:  casually dressed, dressed appropriately   Behavior:  pleasant, cooperative, calm   Speech:  normal rate and volume   Mood:  depressed, dysphoric   Affect:  flat   Thought Process:  organized, logical, coherent   Associations: intact associations   Thought Content:  no overt delusions   Perceptual Disturbances: none   Risk Potential: Suicidal ideation - Yes, fleeting suicidal thoughts  Homicidal ideation - None  Potential for aggression - No   Sensorium:  oriented to person, place and time/date   Memory:  recent and remote memory grossly intact   Consciousness:  alert and awake   Attention: decreased concentration and decreased attention span   Insight:  fair   Judgment: fair   Gait/Station: normal gait/station, normal balance   Motor Activity: no abnormal movements     Vital signs in last 24 hours:    Temp:  [98 °F (36 7 °C)-98 9 °F (37 2 °C)] 98 °F (36 7 °C)  HR:  [75] 75  Resp:  [16-18] 18  BP: (132-168)/(73-92) 168/92    Laboratory results:  I have personally reviewed all pertinent laboratory/tests results      Progress Toward Goals: still very depressed    Assessment/Plan   Principal Problem:    Severe bipolar I disorder, most recent episode depressed without psychotic features ([facility] )  Active Problems:    Tobacco use disorder    Essential hypertension    DM (diabetes mellitus), secondary uncontrolled ([facility] )    Preoperative clearance    Recommended Treatment:   Continue current medications  Continue to monitor  Plan is to transfer to [facility] on Monday for ECT  All current active medications have been reviewed    Encourage group therapy, milieu therapy and occupational therapy  [address] checks every 7 minutes      Current Facility-Administered Medications:  aluminum-magnesium hydroxide-simethicone 30 mL Oral Q4H PRN Mandy Bautista, CRNP   benztropine 1 mg Intramuscular Q6H PRN Mandy Mcdonnellics, CRNP   benztropine 1 mg Oral Q6H PRN Mandy Mcdonnellics, CRNP   benztropine 1 mg Oral BID Mandy Bautista, CRNP   busPIRone 10 mg Oral BID Mandy Bautista, CRNP   chlorproMAZINE 50 mg Oral TID Mandy Bautista, CRNP   divalproex sodium 2,000 mg Oral QPM Mandy Bautista, CRNP   DULoxetine 60 mg Oral Daily Mandy Bautista, CRNP   gabapentin 600 mg Oral TID Mandy Bautista, CRNP   glyBURIDE 1 25 mg Oral Daily With Breakfast Mandy Bautista, CRNP   haloperidol 5 mg Oral Q6H PRN Mandy Bautista, CRNP   haloperidol lactate 5 mg Intramuscular Q6H PRN Mandy Mcdonnellics, CRNP   hydrOXYzine HCL 25 mg Oral Q6H PRN Mandy Mcodnnellics, CRNP   hydrOXYzine HCL 50 mg Oral Q4H PRN Mandy Mcdonnellics, CRNP   ibuprofen 400 mg Oral Q6H PRN Mandy Mcdonnellics, CRNP   ibuprofen 600 mg Oral Q6H PRN Mandy Mcdonnellics, CRNP   ibuprofen 800 mg Oral Q6H PRN Mandy Mcdonnellics, CRNP   insulin glargine 32 Units Subcutaneous HS Mayra Jessica, CRNP   insulin lispro 2-12 Units Subcutaneous TID AC Mayra Jessica, CRNP   insulin lispro 2-12 Units Subcutaneous HS Soraidarie Patriciaa, CRNP   lisinopril 10 mg Oral Daily Kulwinder Mcneill, RICKNP   LORazepam 2 mg Intramuscular Q6H PRN Mandy Mcdonnellics, CRNP   magnesium hydroxide 30 mL Oral Daily PRN Mandy Mcdonnellics, CRNP   metFORMIN 1,000 mg Oral BID With Meals Kulwinder Mcneill, CRNP   metoprolol tartrate 25 mg Oral Q12H [address] Florrie Gakerlinea, CRNP   nicotine 1 patch Transdermal Daily TAYLOR Lux   OLANZapine 10 mg Intramuscular Q3H PRN TAYLOR Gandara   paliperidone 3 mg Oral Daily Ermias Montaño MD   risperiDONE 1 mg Oral Q3H PRN TAYLOR Lux   traZODone 50 mg Oral HS PRN TAYLOR Lux   traZODone 50 mg Oral HS TAYLOR Lux       Risks / Benefits of Treatment:    Risks, benefits, and possible side effects of medications explained to patient and patient verbalizes understanding and agreement for treatment  Counseling / Coordination of Care:      Patient's progress discussed with staff in treatment team meeting  Medications, treatment progress and treatment plan reviewed with patient

## 2019-07-26 NOTE — PROGRESS NOTES
Pt visible on unit  Social with peers able to communicate needs  Offers no complaints at this time  Safety precautions maintained  Will continue to monitor and assess

## 2019-07-26 NOTE — PROGRESS NOTES
Patient is out in the milieu but keeps to himself  Minimal socialization with peers  Presents with flat affect and depressed mood  Looking forward to going to [facility] for ECT  Denies any current SI/HI/AH/VH  Pleasant and cooperative  Tylenol 975mg given for c/o back pain  Will continue to monitor

## 2019-07-27 LAB
ALBUMIN SERPL BCP-MCNC: 3.3 G/DL (ref 3.5–5.7)
ALP SERPL-CCNC: 48 U/L (ref 40–150)
ALT SERPL W P-5'-P-CCNC: 10 U/L (ref 7–52)
ANION GAP SERPL CALCULATED.3IONS-SCNC: 7 MMOL/L (ref 4–13)
AST SERPL W P-5'-P-CCNC: 11 U/L (ref 13–39)
BILIRUB SERPL-MCNC: 0.3 MG/DL (ref 0.2–1)
BUN SERPL-MCNC: 32 MG/DL (ref 7–25)
CALCIUM SERPL-MCNC: 8.8 MG/DL (ref 8.6–10.5)
CHLORIDE SERPL-SCNC: 105 MMOL/L (ref 98–107)
CO2 SERPL-SCNC: 25 MMOL/L (ref 21–31)
CREAT SERPL-MCNC: 1.19 MG/DL (ref 0.7–1.3)
GFR SERPL CREATININE-BSD FRML MDRD: 75 ML/MIN/1.73SQ M
GLUCOSE P FAST SERPL-MCNC: 154 MG/DL (ref 65–99)
GLUCOSE SERPL-MCNC: 133 MG/DL (ref 65–140)
GLUCOSE SERPL-MCNC: 154 MG/DL (ref 65–99)
GLUCOSE SERPL-MCNC: 165 MG/DL (ref 65–140)
GLUCOSE SERPL-MCNC: 171 MG/DL (ref 65–140)
GLUCOSE SERPL-MCNC: 182 MG/DL (ref 65–140)
MAGNESIUM SERPL-MCNC: 1.6 MG/DL (ref 1.9–2.7)
POTASSIUM SERPL-SCNC: 4.3 MMOL/L (ref 3.5–5.5)
PROT SERPL-MCNC: 5.8 G/DL (ref 6.4–8.9)
SODIUM SERPL-SCNC: 137 MMOL/L (ref 134–143)

## 2019-07-27 PROCEDURE — 99232 SBSQ HOSP IP/OBS MODERATE 35: CPT | Performed by: PSYCHIATRY & NEUROLOGY

## 2019-07-27 PROCEDURE — 83735 ASSAY OF MAGNESIUM: CPT | Performed by: PSYCHIATRY & NEUROLOGY

## 2019-07-27 PROCEDURE — 80053 COMPREHEN METABOLIC PANEL: CPT | Performed by: PSYCHIATRY & NEUROLOGY

## 2019-07-27 PROCEDURE — 82948 REAGENT STRIP/BLOOD GLUCOSE: CPT

## 2019-07-27 RX ADMIN — PALIPERIDONE 3 MG: 3 TABLET, EXTENDED RELEASE ORAL at 08:05

## 2019-07-27 RX ADMIN — INSULIN LISPRO 2 UNITS: 100 INJECTION, SOLUTION INTRAVENOUS; SUBCUTANEOUS at 21:05

## 2019-07-27 RX ADMIN — METOPROLOL TARTRATE 25 MG: 25 TABLET, FILM COATED ORAL at 08:04

## 2019-07-27 RX ADMIN — METFORMIN HYDROCHLORIDE 1000 MG: 500 TABLET ORAL at 17:30

## 2019-07-27 RX ADMIN — INSULIN LISPRO 2 UNITS: 100 INJECTION, SOLUTION INTRAVENOUS; SUBCUTANEOUS at 11:30

## 2019-07-27 RX ADMIN — BUSPIRONE HYDROCHLORIDE 10 MG: 5 TABLET ORAL at 17:33

## 2019-07-27 RX ADMIN — LISINOPRIL 10 MG: 10 TABLET ORAL at 08:05

## 2019-07-27 RX ADMIN — IBUPROFEN 800 MG: 800 TABLET ORAL at 19:36

## 2019-07-27 RX ADMIN — CHLORPROMAZINE HYDROCHLORIDE 50 MG: 25 TABLET, SUGAR COATED ORAL at 17:00

## 2019-07-27 RX ADMIN — BENZTROPINE MESYLATE 1 MG: 1 TABLET ORAL at 08:05

## 2019-07-27 RX ADMIN — CHLORPROMAZINE HYDROCHLORIDE 50 MG: 25 TABLET, SUGAR COATED ORAL at 21:05

## 2019-07-27 RX ADMIN — NICOTINE 1 PATCH: 21 PATCH, EXTENDED RELEASE TRANSDERMAL at 08:04

## 2019-07-27 RX ADMIN — CHLORPROMAZINE HYDROCHLORIDE 50 MG: 25 TABLET, SUGAR COATED ORAL at 08:05

## 2019-07-27 RX ADMIN — DIVALPROEX SODIUM 2000 MG: 500 TABLET, FILM COATED, EXTENDED RELEASE ORAL at 17:33

## 2019-07-27 RX ADMIN — BUSPIRONE HYDROCHLORIDE 10 MG: 5 TABLET ORAL at 08:05

## 2019-07-27 RX ADMIN — INSULIN LISPRO 2 UNITS: 100 INJECTION, SOLUTION INTRAVENOUS; SUBCUTANEOUS at 17:36

## 2019-07-27 RX ADMIN — GABAPENTIN 600 MG: 300 CAPSULE ORAL at 08:05

## 2019-07-27 RX ADMIN — IBUPROFEN 800 MG: 800 TABLET ORAL at 08:04

## 2019-07-27 RX ADMIN — GABAPENTIN 600 MG: 300 CAPSULE ORAL at 17:00

## 2019-07-27 RX ADMIN — BENZTROPINE MESYLATE 1 MG: 1 TABLET ORAL at 17:33

## 2019-07-27 RX ADMIN — INSULIN GLARGINE 32 UNITS: 100 INJECTION, SOLUTION SUBCUTANEOUS at 21:05

## 2019-07-27 RX ADMIN — GLYBURIDE 1.25 MG: 1.25 TABLET ORAL at 08:05

## 2019-07-27 RX ADMIN — METFORMIN HYDROCHLORIDE 1000 MG: 500 TABLET ORAL at 08:05

## 2019-07-27 RX ADMIN — TRAZODONE HYDROCHLORIDE 50 MG: 50 TABLET ORAL at 21:06

## 2019-07-27 RX ADMIN — DULOXETINE HYDROCHLORIDE 60 MG: 60 CAPSULE, DELAYED RELEASE ORAL at 08:04

## 2019-07-27 RX ADMIN — METOPROLOL TARTRATE 25 MG: 25 TABLET, FILM COATED ORAL at 21:06

## 2019-07-27 RX ADMIN — GABAPENTIN 600 MG: 300 CAPSULE ORAL at 21:06

## 2019-07-27 NOTE — PROGRESS NOTES
Patient was withdrawn to his all evening  Denied any SI, remain fall and depressed  Refused to have rx done to his foot, stating he was going to shower in the morning  Med compliant  No c/o pain  Will continue to observe

## 2019-07-27 NOTE — PROGRESS NOTES
C/O" I ma going to have ECT treatment "    Report from staff regarding this patient received and record reviewed  prior to seeing this patient   Behavior over the last 24 hours:  Reports history of depression which was traeted effectively with ECT in the past   Sleep:ok  Appetite:ok  Medication side effects:denied  ROS:MDD  Mental Status Evaluation:  Appearance:  Dressed appropraitely   Behavior:  cooperative   Speech:  normal   Mood:  Depression    Affect:  appropriate     Thought Process:  Goal directed   Thought Content:  normal   Perceptual Disturbances: Denied AV hallucination   Risk Potential: NO CAMILA    Sensorium:  normal   Cognition:  intact   Consciousness:  Alert, OX3   Attention: Fair   Insight:  fair   Judgment: fair   Gait/Station: With in normal range   Motor Activity: With in normal range     Progress Toward Goals: working on current treatment goals, no changes  Made in treatment plan   Recommended Treatment: Continue with group therapy, milieu therapy and occupational therapy  Risks, benefits and possible side effects of Medications:   Risks, benefits, and possible side effects of medications explained to patient and patient verbalizes understanding        Medications:   current meds:   Current Facility-Administered Medications   Medication Dose Route Frequency    benztropine (COGENTIN) injection 1 mg  1 mg Intramuscular Q6H PRN    benztropine (COGENTIN) tablet 1 mg  1 mg Oral Q6H PRN    benztropine (COGENTIN) tablet 1 mg  1 mg Oral BID    busPIRone (BUSPAR) tablet 10 mg  10 mg Oral BID    chlorproMAZINE (THORAZINE) tablet 50 mg  50 mg Oral TID    divalproex sodium (DEPAKOTE ER) 24 hr tablet 2,000 mg  2,000 mg Oral QPM    DULoxetine (CYMBALTA) delayed release capsule 60 mg  60 mg Oral Daily    gabapentin (NEURONTIN) capsule 600 mg  600 mg Oral TID    glyBURIDE (DIABETA) tablet 1 25 mg  1 25 mg Oral Daily With Breakfast    haloperidol (HALDOL) tablet 5 mg  5 mg Oral Q6H PRN    haloperidol lactate (HALDOL) injection 5 mg  5 mg Intramuscular Q6H PRN    hydrOXYzine HCL (ATARAX) tablet 25 mg  25 mg Oral Q6H PRN    hydrOXYzine HCL (ATARAX) tablet 50 mg  50 mg Oral Q4H PRN    ibuprofen (MOTRIN) tablet 400 mg  400 mg Oral Q6H PRN    ibuprofen (MOTRIN) tablet 600 mg  600 mg Oral Q6H PRN    ibuprofen (MOTRIN) tablet 800 mg  800 mg Oral Q6H PRN    insulin glargine (LANTUS) subcutaneous injection 32 Units 0 32 mL  32 Units Subcutaneous HS    insulin lispro (HumaLOG) 100 units/mL subcutaneous injection 2-12 Units  2-12 Units Subcutaneous TID AC    insulin lispro (HumaLOG) 100 units/mL subcutaneous injection 2-12 Units  2-12 Units Subcutaneous HS    lisinopril (ZESTRIL) tablet 10 mg  10 mg Oral Daily    LORazepam (ATIVAN) 2 mg/mL injection 2 mg  2 mg Intramuscular Q6H PRN    magnesium hydroxide (MILK OF MAGNESIA) 400 mg/5 mL oral suspension 30 mL  30 mL Oral Daily PRN    metFORMIN (GLUCOPHAGE) tablet 1,000 mg  1,000 mg Oral BID With Meals    metoprolol tartrate (LOPRESSOR) tablet 25 mg  25 mg Oral Q12H RUSH    nicotine (NICODERM CQ) 21 mg/24 hr TD 24 hr patch 1 patch  1 patch Transdermal Daily    OLANZapine (ZyPREXA) IM injection 10 mg  10 mg Intramuscular Q3H PRN    paliperidone (INVEGA) 24 hr tablet 3 mg  3 mg Oral Daily    risperiDONE (RisperDAL M-TABS) dispersible tablet 1 mg  1 mg Oral Q3H PRN    simethicone (MYLICON) chewable tablet 80 mg  80 mg Oral Q6H PRN    traZODone (DESYREL) tablet 50 mg  50 mg Oral HS PRN    traZODone (DESYREL) tablet 50 mg  50 mg Oral HS     Labs: NA    Assessment, Diagnosis  and Plan: continue with current meds and goals, F/U tomorrow    Counseling / Coordination of Care  Total floor / unit time spent today20 minutes  minutes  Greater than 50% of total time was spent with the patient and / or family counseling and / or coordination of care   A description of the counseling / coordination of care:  ECT will be  Good for this patient     Sylvia Ferrell Mary Jo Wiseman MD

## 2019-07-27 NOTE — PROGRESS NOTES
Pt visible on unit  Social with select peers  Calm and controlled with staff  Denies active SI, HI, or hallucinations  Awaiting transfer to appropriate facility for ECT  Positive outlook on treatment  Right foot wound cleaned with NSS  No drainage noted  Dressed with Maxorb and telfa pad  Wrapped in gauze as prescribed  Dressing clean dry and intact  Safety precautions maintained  Will continue to monitor and assess

## 2019-07-27 NOTE — PROGRESS NOTES
07/27/19 0900   Team Meeting   Meeting Type Daily Rounds   Initial Conference Date 07/27/19   Team Members Present   Team Members Present Physician;Nurse   Physician Team Member Dr Shankar Ortega Team Member Bob العراقي RN   Patient/Family Present   Patient Present No   Patient's Family Present No     Daily Psychiatric Rounding    Team Members Present    MD Bob Escobar, RN

## 2019-07-28 LAB
GLUCOSE SERPL-MCNC: 106 MG/DL (ref 65–140)
GLUCOSE SERPL-MCNC: 146 MG/DL (ref 65–140)
GLUCOSE SERPL-MCNC: 200 MG/DL (ref 65–140)
GLUCOSE SERPL-MCNC: 206 MG/DL (ref 65–140)

## 2019-07-28 PROCEDURE — 82948 REAGENT STRIP/BLOOD GLUCOSE: CPT

## 2019-07-28 PROCEDURE — 99232 SBSQ HOSP IP/OBS MODERATE 35: CPT | Performed by: PHYSICIAN ASSISTANT

## 2019-07-28 RX ADMIN — INSULIN LISPRO 4 UNITS: 100 INJECTION, SOLUTION INTRAVENOUS; SUBCUTANEOUS at 21:56

## 2019-07-28 RX ADMIN — NICOTINE 1 PATCH: 21 PATCH, EXTENDED RELEASE TRANSDERMAL at 09:00

## 2019-07-28 RX ADMIN — BUSPIRONE HYDROCHLORIDE 10 MG: 5 TABLET ORAL at 16:22

## 2019-07-28 RX ADMIN — BENZTROPINE MESYLATE 1 MG: 1 TABLET ORAL at 16:22

## 2019-07-28 RX ADMIN — GABAPENTIN 600 MG: 300 CAPSULE ORAL at 16:22

## 2019-07-28 RX ADMIN — LISINOPRIL 10 MG: 10 TABLET ORAL at 08:16

## 2019-07-28 RX ADMIN — CHLORPROMAZINE HYDROCHLORIDE 50 MG: 25 TABLET, SUGAR COATED ORAL at 08:16

## 2019-07-28 RX ADMIN — IBUPROFEN 800 MG: 800 TABLET ORAL at 17:42

## 2019-07-28 RX ADMIN — METOPROLOL TARTRATE 25 MG: 25 TABLET, FILM COATED ORAL at 21:56

## 2019-07-28 RX ADMIN — CHLORPROMAZINE HYDROCHLORIDE 50 MG: 25 TABLET, SUGAR COATED ORAL at 21:56

## 2019-07-28 RX ADMIN — METFORMIN HYDROCHLORIDE 1000 MG: 500 TABLET ORAL at 08:16

## 2019-07-28 RX ADMIN — INSULIN LISPRO 4 UNITS: 100 INJECTION, SOLUTION INTRAVENOUS; SUBCUTANEOUS at 12:30

## 2019-07-28 RX ADMIN — INSULIN GLARGINE 32 UNITS: 100 INJECTION, SOLUTION SUBCUTANEOUS at 21:56

## 2019-07-28 RX ADMIN — DULOXETINE HYDROCHLORIDE 60 MG: 60 CAPSULE, DELAYED RELEASE ORAL at 08:16

## 2019-07-28 RX ADMIN — BENZTROPINE MESYLATE 1 MG: 1 TABLET ORAL at 08:16

## 2019-07-28 RX ADMIN — GABAPENTIN 600 MG: 300 CAPSULE ORAL at 21:56

## 2019-07-28 RX ADMIN — TRAZODONE HYDROCHLORIDE 50 MG: 50 TABLET ORAL at 21:55

## 2019-07-28 RX ADMIN — PALIPERIDONE 3 MG: 3 TABLET, EXTENDED RELEASE ORAL at 08:16

## 2019-07-28 RX ADMIN — METOPROLOL TARTRATE 25 MG: 25 TABLET, FILM COATED ORAL at 08:16

## 2019-07-28 RX ADMIN — GLYBURIDE 1.25 MG: 1.25 TABLET ORAL at 08:16

## 2019-07-28 RX ADMIN — GABAPENTIN 600 MG: 300 CAPSULE ORAL at 08:16

## 2019-07-28 RX ADMIN — BUSPIRONE HYDROCHLORIDE 10 MG: 5 TABLET ORAL at 08:16

## 2019-07-28 RX ADMIN — CHLORPROMAZINE HYDROCHLORIDE 50 MG: 25 TABLET, SUGAR COATED ORAL at 16:21

## 2019-07-28 RX ADMIN — METFORMIN HYDROCHLORIDE 1000 MG: 500 TABLET ORAL at 16:21

## 2019-07-28 RX ADMIN — DIVALPROEX SODIUM 500 MG: 500 TABLET, FILM COATED, EXTENDED RELEASE ORAL at 16:21

## 2019-07-28 NOTE — PROGRESS NOTES
07/28/19 1800   Team Meeting   Meeting Type Daily Rounds   Initial Conference Date 07/28/19   Team Members Present   Team Members Present Physician;Nurse   Physician Team Member iTffany DAVALOS   Nursing Team Member Chikis Renner RN   Patient/Family Present   Patient Present No   Patient's Family Present No     Daily Psychiatric Rounding    Team Members Present    Octavio Sanders, BLAKE

## 2019-07-28 NOTE — PROGRESS NOTES
Patient has been visible on the unit  Attended evening unit activities and was observed socializing this evening with select peers  Reports he is looking forward to ECT as he has had the tx in the past and it was effective for him  Feels hopeful    Denies any current s/i

## 2019-07-28 NOTE — PROGRESS NOTES
Pt visible on unit  Social with select peers  Dressing changed on right foot after patient showered  Order followed as prerscribed  No drainage noted  Dressing clean dry and intact  Awaiting discharge to [facility] for ECT on Monday  Positive outlook on treatment  Safety precautions maintained  Will continue to monitor and assess

## 2019-07-28 NOTE — PROGRESS NOTES
Progress Note - Behavioral Health     Hilario Ching 44 y o  male MRN: [id number]   Unit/Bed#: [facility] 258-02 Encounter: [id number]    Behavior over the last 24 hours: perri Díaz was seen today for continuation of care and case was reviewed with nursing  Today patient was seen at bedside as he reports upon approach I do not want to see any new providers, I just want to see the provider I have been seeing    Patient was pleasant and calm upon approach  He did report that he had no concerns and depression is still the same and he is looking forward to ECT and transfer to [facility]  Sleep: normal per staff  Appetite: normal per staff  Medication side effects: No   ROS: no complaints    Mental Status Evaluation:    Appearance:  disheveled   Behavior:  pleasant, calm, guarded, good eye contact   Speech:  normal rate, normal volume, normal pitch   Mood:  depressed   Affect:  mood-congruent   Thought Process:  coherent, goal directed   Associations: intact associations   Thought Content:  no overt delusions   Perceptual Disturbances: does not appear responding to internal stimuli   Risk Potential: Suicidal ideation - None at present  Homicidal ideation - None at present  Potential for aggression - Not at present   Sensorium:  oriented to person, place and time/date   Memory:  Not formally assessed today   Consciousness:  alert and awake   Attention: attention span and concentration appear shorter than expected for age   Insight:  fair   Judgment: fair   Gait/Station: in bed   Motor Activity: no abnormal movements     Vital signs in last 24 hours:    Temp:  [98 4 °F (36 9 °C)-98 7 °F (37 1 °C)] 98 4 °F (36 9 °C)  HR:  [72-77] 76  Resp:  [18] 18  BP: (120-150)/(60-90) 120/60    Laboratory results:    I have personally reviewed all pertinent laboratory/tests results    Most Recent Labs:   Lab Results   Component Value Date    WBC 7 80 07/09/2019    RBC 4 97 07/09/2019    HGB 13 3 (L) 07/09/2019    HCT 39 3 (L) 07/09/2019  07/09/2019    RDW 14 9 (H) 07/09/2019    NEUTROABS 5 40 07/09/2019    SODIUM 137 07/27/2019    K 4 3 07/27/2019     07/27/2019    CO2 25 07/27/2019    BUN 32 (H) 07/27/2019    CREATININE 1 19 07/27/2019    GLUC 154 (H) 07/27/2019    GLUF 154 (H) 07/27/2019    CALCIUM 8 8 07/27/2019    AST 11 (L) 07/27/2019    ALT 10 07/27/2019    ALKPHOS 48 07/27/2019    TP 5 8 (L) 07/27/2019    ALB 3 3 (L) 07/27/2019    TBILI 0 30 07/27/2019    CHOLESTEROL 162 07/10/2019    HDL 32 (L) 07/10/2019    TRIG 191 (H) 07/10/2019    LDLCALC 92 07/10/2019    [place] 130 07/10/2019    VALPROICTOT 65 9 07/12/2019    LITHIUM <0 2 (L) 02/26/2018    BTP4KKQEMRRB 0 320 (L) 07/10/2019    RPR Non-Reactive 07/10/2019    HGBA1C 8 4 (H) 07/10/2019     07/10/2019       Progress Toward Goals: no significant progress    Assessment/Plan   Principal Problem:    Severe bipolar I disorder, most recent episode depressed without psychotic features ([facility] [address] )  Active Problems:    Tobacco use disorder    Essential hypertension    DM (diabetes mellitus), secondary uncontrolled (HCC)    Preoperative clearance    Recommended Treatment:     Planned medication and treatment changes: All current active medications have been reviewed    Encourage group therapy, milieu therapy and occupational therapy  Behavioral Health checks every 7 minutes  Continue all medications as prescribed  Plan for transfer to [facility] tomorrow for ECT treatment for depression      Current Facility-Administered Medications:  benztropine 1 mg Intramuscular Q6H PRN Mandy N Lodics, CRNP   benztropine 1 mg Oral Q6H PRN Mandy N Lodics, CRNP   benztropine 1 mg Oral BID Mandy N Lodics, CRNP   busPIRone 10 mg Oral BID Mandy N Lodics, CRNP   chlorproMAZINE 50 mg Oral TID Mandy N Lodics, CRNP   divalproex sodium 2,000 mg Oral QPM Mandy N Lodics, CRNP   DULoxetine 60 mg Oral Daily Mandy N Lodics, CRNP   gabapentin 600 mg Oral TID TAYLOR Lux   glyBURIDE 1 25 mg Oral Daily With Breakfast Mandy Bautista, CRNP   haloperidol 5 mg Oral Q6H PRN Mandy Bautista, CRNP   haloperidol lactate 5 mg Intramuscular Q6H PRN Mandy Bautista, CRNP   hydrOXYzine HCL 25 mg Oral Q6H PRN Mandy Bautista, CRNP   hydrOXYzine HCL 50 mg Oral Q4H PRN Mandy Bautista, CRNP   ibuprofen 400 mg Oral Q6H PRN Mandy Bautista, CRNP   ibuprofen 600 mg Oral Q6H PRN Mandy Bautista, CRNP   ibuprofen 800 mg Oral Q6H PRN Mandy Bautista, CRNP   insulin glargine 32 Units Subcutaneous HS Alexandrea Kyung, CRNP   insulin lispro 2-12 Units Subcutaneous TID AC Mayra Jessica, CRNP   insulin lispro 2-12 Units Subcutaneous HS Alexandrea Kyung, CRNP   lisinopril 10 mg Oral Daily Alexandrea Tracey, CRNP   LORazepam 2 mg Intramuscular Q6H PRN Mandy Bautista, CRNP   magnesium hydroxide 30 mL Oral Daily PRN Mandy Bautista, CRNP   metFORMIN 1,000 mg Oral BID With Meals Alexandrea Tracey, CRNP   metoprolol tartrate 25 mg Oral Q12H [address] Alexandrea Tracey, CRNP   nicotine 1 patch Transdermal Daily Mandy Bautista, CRNP   OLANZapine 10 mg Intramuscular Q3H PRN Mandy Bautista, CRNP   paliperidone 3 mg Oral Daily Barb Garcia MD   risperiDONE 1 mg Oral Q3H PRN Mandy Bautista, CRNP   simethicone 80 mg Oral Q6H PRN Mandy Bautista, CRNP   traZODone 50 mg Oral HS PRN Mandy Bautista, CRNP   traZODone 50 mg Oral HS Mandy Bautista, CRNP       Risks / Benefits of Treatment:    Risks, benefits, and possible side effects of medications explained to patient and patient verbalizes understanding and agreement for treatment  Counseling / Coordination of Care: Total floor / unit time spent today 15 minutes  Greater than 50% of total time was spent with the patient and / or family counseling and / or coordination of care  A description of counseling / coordination of care:  Patient's progress discussed with staff in treatment team meeting    Medications, treatment progress and treatment plan reviewed with patient

## 2019-07-29 LAB
GLUCOSE SERPL-MCNC: 122 MG/DL (ref 65–140)
GLUCOSE SERPL-MCNC: 165 MG/DL (ref 65–140)
GLUCOSE SERPL-MCNC: 187 MG/DL (ref 65–140)
GLUCOSE SERPL-MCNC: 192 MG/DL (ref 65–140)

## 2019-07-29 PROCEDURE — 99231 SBSQ HOSP IP/OBS SF/LOW 25: CPT | Performed by: PSYCHIATRY & NEUROLOGY

## 2019-07-29 PROCEDURE — 82948 REAGENT STRIP/BLOOD GLUCOSE: CPT

## 2019-07-29 RX ADMIN — INSULIN LISPRO 2 UNITS: 100 INJECTION, SOLUTION INTRAVENOUS; SUBCUTANEOUS at 21:43

## 2019-07-29 RX ADMIN — METFORMIN HYDROCHLORIDE 1000 MG: 500 TABLET ORAL at 08:53

## 2019-07-29 RX ADMIN — GLYBURIDE 1.25 MG: 1.25 TABLET ORAL at 08:54

## 2019-07-29 RX ADMIN — NICOTINE 1 PATCH: 21 PATCH, EXTENDED RELEASE TRANSDERMAL at 08:53

## 2019-07-29 RX ADMIN — CHLORPROMAZINE HYDROCHLORIDE 50 MG: 25 TABLET, SUGAR COATED ORAL at 08:54

## 2019-07-29 RX ADMIN — BUSPIRONE HYDROCHLORIDE 10 MG: 5 TABLET ORAL at 17:44

## 2019-07-29 RX ADMIN — CHLORPROMAZINE HYDROCHLORIDE 50 MG: 25 TABLET, SUGAR COATED ORAL at 21:41

## 2019-07-29 RX ADMIN — BENZTROPINE MESYLATE 1 MG: 1 TABLET ORAL at 17:44

## 2019-07-29 RX ADMIN — IBUPROFEN 600 MG: 600 TABLET ORAL at 13:03

## 2019-07-29 RX ADMIN — DIVALPROEX SODIUM 2000 MG: 500 TABLET, FILM COATED, EXTENDED RELEASE ORAL at 17:44

## 2019-07-29 RX ADMIN — DULOXETINE HYDROCHLORIDE 60 MG: 60 CAPSULE, DELAYED RELEASE ORAL at 08:54

## 2019-07-29 RX ADMIN — METOPROLOL TARTRATE 25 MG: 25 TABLET, FILM COATED ORAL at 21:41

## 2019-07-29 RX ADMIN — GABAPENTIN 600 MG: 300 CAPSULE ORAL at 21:40

## 2019-07-29 RX ADMIN — INSULIN LISPRO 2 UNITS: 100 INJECTION, SOLUTION INTRAVENOUS; SUBCUTANEOUS at 12:24

## 2019-07-29 RX ADMIN — BUSPIRONE HYDROCHLORIDE 10 MG: 5 TABLET ORAL at 08:54

## 2019-07-29 RX ADMIN — CHLORPROMAZINE HYDROCHLORIDE 50 MG: 25 TABLET, SUGAR COATED ORAL at 15:56

## 2019-07-29 RX ADMIN — INSULIN GLARGINE 32 UNITS: 100 INJECTION, SOLUTION SUBCUTANEOUS at 21:43

## 2019-07-29 RX ADMIN — METOPROLOL TARTRATE 25 MG: 25 TABLET, FILM COATED ORAL at 08:53

## 2019-07-29 RX ADMIN — TRAZODONE HYDROCHLORIDE 50 MG: 50 TABLET ORAL at 21:41

## 2019-07-29 RX ADMIN — BENZTROPINE MESYLATE 1 MG: 1 TABLET ORAL at 08:55

## 2019-07-29 RX ADMIN — PALIPERIDONE 3 MG: 3 TABLET, EXTENDED RELEASE ORAL at 08:54

## 2019-07-29 RX ADMIN — METFORMIN HYDROCHLORIDE 1000 MG: 500 TABLET ORAL at 15:55

## 2019-07-29 RX ADMIN — GABAPENTIN 600 MG: 300 CAPSULE ORAL at 15:56

## 2019-07-29 RX ADMIN — LISINOPRIL 10 MG: 10 TABLET ORAL at 08:54

## 2019-07-29 RX ADMIN — GABAPENTIN 600 MG: 300 CAPSULE ORAL at 08:55

## 2019-07-29 RX ADMIN — SIMETHICONE CHEW TAB 80 MG 80 MG: 80 TABLET ORAL at 12:38

## 2019-07-29 RX ADMIN — INSULIN LISPRO 2 UNITS: 100 INJECTION, SOLUTION INTRAVENOUS; SUBCUTANEOUS at 08:53

## 2019-07-29 NOTE — PLAN OF CARE
Problem: NEUROSENSORY - ADULT  Goal: Remains free of injury related to seizures activity  Description  INTERVENTIONS  - Maintain airway, patient safety  and administer oxygen as ordered  - Monitor patient for seizure activity, document and report duration and description of seizure to physician/advanced practitioner  - If seizure occurs,  ensure patient safety during seizure  - Reorient patient post seizure  - Seizure pads on all 4 side rails  - Instruct patient/family to notify RN of any seizure activity including if an aura is experienced  - Instruct patient/family to call for assistance with activity based on nursing assessment  - Administer anti-seizure medications as ordered  - Monitor fetal well being  Outcome: Progressing     Problem: SKIN/TISSUE INTEGRITY - ADULT  Goal: Incision(s), wounds(s) or drain site(s) healing without S/S of infection  Description  INTERVENTIONS  - Assess and document risk factors for skin impairment   - Assess and document dressing, incision, wound bed, drain sites and surrounding tissue  - Initiate Nutrition services consult and/or wound management as needed  Outcome: Progressing     Problem: Risk for Self Injury/Neglect  Goal: Verbalize thoughts and feelings  Description  Interventions:  - Assess and re-assess patient's lethality and potential for self-injury  - Engage patient in 1:1 interactions, daily, for a minimum of 15 minutes  - Encourage patient to express feelings, fears, frustrations, hopes  - Establish rapport/trust with patient   Outcome: Progressing  Goal: Complete daily ADLs, including personal hygiene independently, as able  Description  Interventions:  - Observe, teach, and assist patient with ADLS  - Monitor and promote a balance of rest/activity, with adequate nutrition and elimination  Outcome: Progressing     Problem: Depression  Goal: Treatment Goal: Demonstrate behavioral control of depressive symptoms, verbalize feelings of improved mood/affect, and adopt new coping skills prior to discharge  Outcome: Progressing  Goal: Refrain from self-neglect  Outcome: Progressing     Problem: DISCHARGE PLANNING - CARE MANAGEMENT  Goal: Discharge to post-acute care or home with appropriate resources  Description  INTERVENTIONS:  - Conduct assessment to determine patient/family and health care team treatment goals, and need for post-acute services based on payer coverage, community resources, and patient preferences, and barriers to discharge  - Address psychosocial, clinical, and financial barriers to discharge as identified in assessment in conjunction with the patient/family and health care team  - Arrange appropriate level of post-acute services according to patient's   needs and preference and payer coverage in collaboration with the physician and health care team  - Communicate with and update the patient/family, physician, and health care team regarding progress on the discharge plan  - Arrange appropriate transportation to post-acute venues   Outcome: Progressing     Problem: Nutrition/Hydration-ADULT  Goal: Nutrient/Hydration intake appropriate for improving, restoring or maintaining nutritional needs  Description  Monitor and assess patient's nutrition/hydration status for malnutrition (ex- brittle hair, bruises, dry skin, pale skin and conjunctiva, muscle wasting, smooth red tongue, and disorientation)  Collaborate with interdisciplinary team and initiate plan and interventions as ordered  Monitor patient's weight and dietary intake as ordered or per policy  Utilize nutrition screening tool and intervene per policy  Determine patient's food preferences and provide high-protein, high-caloric foods as appropriate       INTERVENTIONS:  - Monitor oral intake, urinary output, labs, and treatment plans  - Assess nutrition and hydration status and recommend course of action  - Evaluate amount of meals eaten  - Assist patient with eating if necessary   - Allow adequate time for meals  - Recommend/ encourage appropriate diets, oral nutritional supplements, and vitamin/mineral supplements  - Order, calculate, and assess calorie counts as needed  - Recommend, monitor, and adjust tube feedings and TPN/PPN based on assessed needs  - Assess need for intravenous fluids  - Provide specific nutrition/hydration education as appropriate  - Include patient/family/caregiver in decisions related to nutrition      7-18-19    he is on a level 1 meal plan with double portions of protein  His intake was listed at 100 %  He has a diabetic right foot ulcer  His weight was 271 on 7-9 then up to 302 on 7-13 with a 31  # gain noted  RDN to check his weight status  His POC glucose was 178 H on  7-18 RDN to evaluate his labs when available as he is on lantus with hx of diabetes  RDN visited on rounds this am and he offered no c/o  RDN to reassess his nutrition needs at moderate risk and follow PRN       7-24-19  he is now on a level 3 with protein offered with double portions  His intake has been 100 %  His last weight was 302 on 7-13 RDN to check his weight status  RDN visited on rounds this am and he was happy and had everything he wanted for breakfast  He has 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

## 2019-07-29 NOTE — PROGRESS NOTES
07/29/19 1000   Team Meeting   Meeting Type Daily Rounds   Patient/Family Present   Patient Present No   Patient's Family Present No     Daily Psychiatric Rounds    Team Members Present:    MD Mandeep Colby, [address] [place] [address]  Caterina Sanabria, BLAKE Diaz RN    Discussion:     Behaviorally controlled  Appropriate  Looking to schedule discharge to [address] [place] [address] or ECT later this week, given bed availability  Pt is medically cleared for ECT

## 2019-07-29 NOTE — PLAN OF CARE
PT continues to attend at least 75% of art therapy groups offered where he does actively engage in group directives and in projects of choice  PT has been cooperative and calm throughout groups and does have some positive social interactions  PT will continue to attend at least 75% of art therapy groups to increase self esteem, to identify at least 3 healthy coping skills and to allow for creative expression of feelings and emotions       Problem: Ineffective Coping  Goal: Participates in unit activities  Description  Interventions:  - Provide therapeutic environment   - Provide required programming   - Redirect inappropriate behaviors   Outcome: Progressing

## 2019-07-29 NOTE — PROGRESS NOTES
Patient was visible on the unit for the early part of evening shift and retired to his room relatively early  Keeping to himself  Again mentioned that he is hopeful for ECT    No significant changes

## 2019-07-29 NOTE — PROGRESS NOTES
Pt visible on the unit for meals and returns to his room to nap periodically  Pt was cooperative with wound care during the AM   At that time pt  Expressed some enthusiasm for upcoming transfer for ECT  Pt further stated that ECT in the past was effective and results lasted for one and one half years  Later patient verbalized to another staff member that he feels better and is considering "letting the medications work for awhile," instead of getting ECT treatments at this time  Pt c/o left elbow pain and reports it has bothered him for several days  Pt given 600 mg motrin for pain  Medication was effective

## 2019-07-30 LAB
GLUCOSE SERPL-MCNC: 126 MG/DL (ref 65–140)
GLUCOSE SERPL-MCNC: 142 MG/DL (ref 65–140)
GLUCOSE SERPL-MCNC: 143 MG/DL (ref 65–140)
GLUCOSE SERPL-MCNC: 183 MG/DL (ref 65–140)

## 2019-07-30 PROCEDURE — 82948 REAGENT STRIP/BLOOD GLUCOSE: CPT

## 2019-07-30 PROCEDURE — 99231 SBSQ HOSP IP/OBS SF/LOW 25: CPT | Performed by: PSYCHIATRY & NEUROLOGY

## 2019-07-30 RX ADMIN — TRAZODONE HYDROCHLORIDE 50 MG: 50 TABLET ORAL at 21:03

## 2019-07-30 RX ADMIN — DIVALPROEX SODIUM 2000 MG: 500 TABLET, FILM COATED, EXTENDED RELEASE ORAL at 17:51

## 2019-07-30 RX ADMIN — GABAPENTIN 600 MG: 300 CAPSULE ORAL at 15:44

## 2019-07-30 RX ADMIN — GABAPENTIN 600 MG: 300 CAPSULE ORAL at 21:03

## 2019-07-30 RX ADMIN — GLYBURIDE 1.25 MG: 1.25 TABLET ORAL at 08:19

## 2019-07-30 RX ADMIN — BENZTROPINE MESYLATE 1 MG: 1 TABLET ORAL at 17:51

## 2019-07-30 RX ADMIN — CHLORPROMAZINE HYDROCHLORIDE 50 MG: 25 TABLET, SUGAR COATED ORAL at 15:44

## 2019-07-30 RX ADMIN — METOPROLOL TARTRATE 25 MG: 25 TABLET, FILM COATED ORAL at 08:19

## 2019-07-30 RX ADMIN — INSULIN GLARGINE 32 UNITS: 100 INJECTION, SOLUTION SUBCUTANEOUS at 21:04

## 2019-07-30 RX ADMIN — LISINOPRIL 10 MG: 10 TABLET ORAL at 08:19

## 2019-07-30 RX ADMIN — NICOTINE 1 PATCH: 21 PATCH, EXTENDED RELEASE TRANSDERMAL at 08:20

## 2019-07-30 RX ADMIN — METOPROLOL TARTRATE 25 MG: 25 TABLET, FILM COATED ORAL at 21:03

## 2019-07-30 RX ADMIN — CHLORPROMAZINE HYDROCHLORIDE 50 MG: 25 TABLET, SUGAR COATED ORAL at 08:19

## 2019-07-30 RX ADMIN — BENZTROPINE MESYLATE 1 MG: 1 TABLET ORAL at 08:19

## 2019-07-30 RX ADMIN — BUSPIRONE HYDROCHLORIDE 10 MG: 5 TABLET ORAL at 08:19

## 2019-07-30 RX ADMIN — SIMETHICONE CHEW TAB 80 MG 80 MG: 80 TABLET ORAL at 15:45

## 2019-07-30 RX ADMIN — IBUPROFEN 800 MG: 800 TABLET ORAL at 21:03

## 2019-07-30 RX ADMIN — METFORMIN HYDROCHLORIDE 1000 MG: 500 TABLET ORAL at 15:44

## 2019-07-30 RX ADMIN — IBUPROFEN 800 MG: 800 TABLET ORAL at 04:47

## 2019-07-30 RX ADMIN — DULOXETINE HYDROCHLORIDE 60 MG: 60 CAPSULE, DELAYED RELEASE ORAL at 08:18

## 2019-07-30 RX ADMIN — METFORMIN HYDROCHLORIDE 1000 MG: 500 TABLET ORAL at 08:19

## 2019-07-30 RX ADMIN — INSULIN LISPRO 2 UNITS: 100 INJECTION, SOLUTION INTRAVENOUS; SUBCUTANEOUS at 21:04

## 2019-07-30 RX ADMIN — PALIPERIDONE 3 MG: 3 TABLET, EXTENDED RELEASE ORAL at 08:19

## 2019-07-30 RX ADMIN — BUSPIRONE HYDROCHLORIDE 10 MG: 5 TABLET ORAL at 17:51

## 2019-07-30 RX ADMIN — CHLORPROMAZINE HYDROCHLORIDE 50 MG: 25 TABLET, SUGAR COATED ORAL at 21:03

## 2019-07-30 RX ADMIN — GABAPENTIN 600 MG: 300 CAPSULE ORAL at 08:19

## 2019-07-30 NOTE — PLAN OF CARE
Problem: NEUROSENSORY - ADULT  Goal: Remains free of injury related to seizures activity  Description  INTERVENTIONS  - Maintain airway, patient safety  and administer oxygen as ordered  - Monitor patient for seizure activity, document and report duration and description of seizure to physician/advanced practitioner  - If seizure occurs,  ensure patient safety during seizure  - Reorient patient post seizure  - Seizure pads on all 4 side rails  - Instruct patient/family to notify RN of any seizure activity including if an aura is experienced  - Instruct patient/family to call for assistance with activity based on nursing assessment  - Administer anti-seizure medications as ordered  - Monitor fetal well being  Outcome: Progressing     Problem: SKIN/TISSUE INTEGRITY - ADULT  Goal: Incision(s), wounds(s) or drain site(s) healing without S/S of infection  Description  INTERVENTIONS  - Assess and document risk factors for skin impairment   - Assess and document dressing, incision, wound bed, drain sites and surrounding tissue  - Initiate Nutrition services consult and/or wound management as needed  Outcome: Progressing     Problem: Risk for Self Injury/Neglect  Goal: Verbalize thoughts and feelings  Description  Interventions:  - Assess and re-assess patient's lethality and potential for self-injury  - Engage patient in 1:1 interactions, daily, for a minimum of 15 minutes  - Encourage patient to express feelings, fears, frustrations, hopes  - Establish rapport/trust with patient   Outcome: Progressing  Goal: Recognize maladaptive responses and adopt new coping mechanisms  Outcome: Progressing  Goal: Complete daily ADLs, including personal hygiene independently, as able  Description  Interventions:  - Observe, teach, and assist patient with ADLS  - Monitor and promote a balance of rest/activity, with adequate nutrition and elimination  Outcome: Progressing     Problem: Depression  Goal: Treatment Goal: Demonstrate behavioral control of depressive symptoms, verbalize feelings of improved mood/affect, and adopt new coping skills prior to discharge  Outcome: Progressing  Goal: Refrain from self-neglect  Outcome: Progressing     Problem: Ineffective Coping  Goal: Participates in unit activities  Description  Interventions:  - Provide therapeutic environment   - Provide required programming   - Redirect inappropriate behaviors   Outcome: Progressing     Problem: DISCHARGE PLANNING - CARE MANAGEMENT  Goal: Discharge to post-acute care or home with appropriate resources  Description  INTERVENTIONS:  - Conduct assessment to determine patient/family and health care team treatment goals, and need for post-acute services based on payer coverage, community resources, and patient preferences, and barriers to discharge  - Address psychosocial, clinical, and financial barriers to discharge as identified in assessment in conjunction with the patient/family and health care team  - Arrange appropriate level of post-acute services according to patient's   needs and preference and payer coverage in collaboration with the physician and health care team  - Communicate with and update the patient/family, physician, and health care team regarding progress on the discharge plan  - Arrange appropriate transportation to post-acute venues   Outcome: Progressing     Problem: Nutrition/Hydration-ADULT  Goal: Nutrient/Hydration intake appropriate for improving, restoring or maintaining nutritional needs  Description  Monitor and assess patient's nutrition/hydration status for malnutrition (ex- brittle hair, bruises, dry skin, pale skin and conjunctiva, muscle wasting, smooth red tongue, and disorientation)  Collaborate with interdisciplinary team and initiate plan and interventions as ordered  Monitor patient's weight and dietary intake as ordered or per policy  Utilize nutrition screening tool and intervene per policy   Determine patient's food preferences and provide high-protein, high-caloric foods as appropriate  INTERVENTIONS:  - Monitor oral intake, urinary output, labs, and treatment plans  - Assess nutrition and hydration status and recommend course of action  - Evaluate amount of meals eaten  - Assist patient with eating if necessary   - Allow adequate time for meals  - Recommend/ encourage appropriate diets, oral nutritional supplements, and vitamin/mineral supplements  - Order, calculate, and assess calorie counts as needed  - Recommend, monitor, and adjust tube feedings and TPN/PPN based on assessed needs  - Assess need for intravenous fluids  - Provide specific nutrition/hydration education as appropriate  - Include patient/family/caregiver in decisions related to nutrition      7-18-19    he is on a level 1 meal plan with double portions of protein  His intake was listed at 100 %  He has a diabetic right foot ulcer  His weight was 271 on 7-9 then up to 302 on 7-13 with a 31  # gain noted  RDN to check his weight status  His POC glucose was 178 H on  7-18 RDN to evaluate his labs when available as he is on lantus with hx of diabetes  RDN visited on rounds this am and he offered no c/o  RDN to reassess his nutrition needs at moderate risk and follow PRN       7-24-19  he is now on a level 3 with protein offered with double portions  His intake has been 100 %  His last weight was 302 on 7-13 RDN to check his weight status  RDN visited on rounds this am and he was happy and had everything he wanted for breakfast  He has 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

## 2019-07-30 NOTE — PROGRESS NOTES
Progress Note - Behavioral Health   Valente Ramirez 44 y o  male MRN: [id number]  Unit/Bed#: [facility] 258-02 Encounter: [id number]    Assessment/Plan   Principal Problem:    Severe bipolar I disorder, most recent episode depressed without psychotic features ([facility] [address] )  Active Problems:    Tobacco use disorder    Essential hypertension    DM (diabetes mellitus), secondary uncontrolled ([facility] [address] )    Preoperative clearance      Behavior over the last 24 hours:  Unchanged  Patient is ambivalent about ECT as he feels the medications did help him the patient is [place]  Patient overall has been showing some improvement over the past few days  Sleep: normal  Appetite: increased  Medication side effects: No  ROS: no complaints    Mental Status Evaluation:  Appearance:  casually dressed   Behavior:  guarded   Speech:  normal volume   Mood:  constricted   Affect:  constricted   Thought Process:  circumstantial   Thought Content:  normal   Perceptual Disturbances: None   Risk Potential: Potential for Aggression No   Sensorium:  person and place   Cognition:  grossly intact   Consciousness:  awake    Attention: attention span and concentration were age appropriate   Insight:  limited   Judgment: limited   Gait/Station: slow   Motor Activity: no abnormal movements     Progress Toward Goals: ongoing    Recommended Treatment: Continue with group therapy, milieu therapy and occupational therapy  Risks, benefits and possible side effects of Medications:   Risks, benefits, and possible side effects of medications explained to patient and patient verbalizes understanding  Medications: continue current psychiatric medications  Labs: reviewed    Counseling / Coordination of Care  Total floor / unit time spent today 15 minutes  Greater than 50% of total time was spent with the patient and / or family counseling and / or coordination of care   A description of the counseling / coordination of care:

## 2019-07-30 NOTE — PROGRESS NOTES
Pt sought out this writer early this am requesting to sign a 72 hour notice  Pt shared he feels the invega has been effective in stabilizing his mood and he no longer feels as though a transfer to [address] [place] [address] for ECT is necessary  Pt informs the alternative discharge plan would be to discharge to A room for me in [address]  This writer encouraged the patient to discuss these thoughts with the psychiatrist prior to signing, pt agreeable   Will notify the team during daily rounds

## 2019-07-30 NOTE — PLAN OF CARE
PT is attending groups and actively participating in treatment and recovery goals       Problem: Ineffective Coping  Goal: Participates in unit activities  Description  Interventions:  - Provide therapeutic environment   - Provide required programming   - Redirect inappropriate behaviors   7/30/2019 0907 by Luis Patterson  Outcome: Progressing

## 2019-07-30 NOTE — PLAN OF CARE
Problem: Ineffective Coping  Goal: Participates in unit activities  Description  Interventions:  - Provide therapeutic environment   - Provide required programming   - Redirect inappropriate behaviors   7/30/2019 0904 by Luciana Pablo  Outcome: Not Progressing

## 2019-07-30 NOTE — PROGRESS NOTES
07/30/19 0948   Team Meeting   Meeting Type Daily Rounds   Patient/Family Present   Patient Present No   Patient's Family Present No     Daily Psychiatric Rounds    Team Members Present:    MD Quinten Hoffman CRNP Viviann Kells, MSW  Luciano Larose, RN  Elpdiio Wilson, RN    Discussion:     Pt expressing interest in discharge  Questioning 72hr notice   Projected for discharge friday

## 2019-07-30 NOTE — PROGRESS NOTES
Progress Note - Behavioral Health   Jeison Alas 44 y o  male MRN: [id number]  Unit/Bed#: [facility] 258-02 Encounter: [id number]    Assessment/Plan   Principal Problem:    Severe bipolar I disorder, most recent episode depressed without psychotic features ([facility] [address] )  Active Problems:    Tobacco use disorder    Essential hypertension    DM (diabetes mellitus), secondary uncontrolled ([facility] [address] )    Preoperative clearance      Behavior over the last 24 hours:  Patient did confirm that he is still willing to try ECT  He reports that he feels it will help him with his chronic depression and mood lability  I informed him that I contacted the psychiatrist at [facility] and he informed me that they had so many admissions and as of today they have no beds but they will try to do that transferred tomorrow  Patient is understanding of the situation  Sleep: hypersomnia  Appetite: normal  Medication side effects: No  ROS: no complaints    Mental Status Evaluation:  Appearance:  older than stated age   Behavior:  normal   Speech:  soft   Mood:  anxious   Affect:  constricted   Thought Process:  circumstantial   Thought Content:  normal   Perceptual Disturbances: None   Risk Potential: Suicidal Ideations without plan   Sensorium:  person and place   Cognition:  grossly intact   Consciousness:  awake    Attention: attention span appeared shorter than expected for age   Insight:  limited   Judgment: limited   Gait/Station: slow   Motor Activity: no abnormal movements     Progress Toward Goals: ongoing    Recommended Treatment: Continue with group therapy, milieu therapy and occupational therapy  Risks, benefits and possible side effects of Medications:   Risks, benefits, and possible side effects of medications explained to patient and patient verbalizes understanding  Risks of medications in pregnancy explained if female patient  Patient verbalizes understanding and agrees to notify her doctor if she becomes pregnant      Medications: all current active meds have been reviewed  Labs: reviewed    Counseling / Coordination of Care  Total floor / unit time spent today 15 minutes  Greater than 50% of total time was spent with the patient and / or family counseling and / or coordination of care   A description of the counseling / coordination of care:

## 2019-07-30 NOTE — PROGRESS NOTES
Patient was out on the unit for the early part of evening shift  Attended snack and then returned to his room  Appears flat, disheveled, and unkempt  He now states he is having second thoughts about ECT and he believes the Chelly Beto has allowed him to have a more positive outlook on things  He said he plans on signing a 72 hour notice tomorrow  Denies s/i

## 2019-07-30 NOTE — CMS CERTIFICATION NOTE
Certification Statement -  Andres Alvarez  :1975  MRN: [id number]    [address] Room / Bed: Dilanirma Monzon [facility] 254-40 Encounter: [id number]    I certify that Andres Alvarez requires further inpatient hospitalization beyond 20 days due to his depressive syumptoms and uncertainty about getting ECT treatment or not  He had on and off suicidal thoughts and had multiple failed discharge attempts      Kaela Nunez MD     Date: 2019  Time: 4:00 PM

## 2019-07-30 NOTE — PROGRESS NOTES
Pt visible on unit, attending select groups  Social with select peers  Pt believes that Barbi Gamboa has helped him and no longer wants ECT  Signed 72 hour notice  Denies SI, HI, A/T/V  Compliant with meals and meds  Wound redressed without Maxsorb as none is available at this time  Requested from wound care nurse  Wound has no drainage and shows no signs of infection  No behavioral issues  Monitoring continues

## 2019-07-31 LAB
GLUCOSE SERPL-MCNC: 110 MG/DL (ref 65–140)
GLUCOSE SERPL-MCNC: 141 MG/DL (ref 65–140)
GLUCOSE SERPL-MCNC: 174 MG/DL (ref 65–140)
GLUCOSE SERPL-MCNC: 216 MG/DL (ref 65–140)

## 2019-07-31 PROCEDURE — 99231 SBSQ HOSP IP/OBS SF/LOW 25: CPT | Performed by: PSYCHIATRY & NEUROLOGY

## 2019-07-31 PROCEDURE — 82948 REAGENT STRIP/BLOOD GLUCOSE: CPT

## 2019-07-31 RX ADMIN — DIVALPROEX SODIUM 2000 MG: 500 TABLET, FILM COATED, EXTENDED RELEASE ORAL at 17:55

## 2019-07-31 RX ADMIN — BENZTROPINE MESYLATE 1 MG: 1 TABLET ORAL at 08:43

## 2019-07-31 RX ADMIN — LISINOPRIL 10 MG: 10 TABLET ORAL at 08:43

## 2019-07-31 RX ADMIN — DULOXETINE HYDROCHLORIDE 60 MG: 60 CAPSULE, DELAYED RELEASE ORAL at 08:43

## 2019-07-31 RX ADMIN — BUSPIRONE HYDROCHLORIDE 10 MG: 5 TABLET ORAL at 17:55

## 2019-07-31 RX ADMIN — GLYBURIDE 1.25 MG: 1.25 TABLET ORAL at 08:43

## 2019-07-31 RX ADMIN — METOPROLOL TARTRATE 25 MG: 25 TABLET, FILM COATED ORAL at 21:31

## 2019-07-31 RX ADMIN — GABAPENTIN 600 MG: 300 CAPSULE ORAL at 21:32

## 2019-07-31 RX ADMIN — CHLORPROMAZINE HYDROCHLORIDE 50 MG: 25 TABLET, SUGAR COATED ORAL at 21:32

## 2019-07-31 RX ADMIN — TRAZODONE HYDROCHLORIDE 50 MG: 50 TABLET ORAL at 21:31

## 2019-07-31 RX ADMIN — GABAPENTIN 600 MG: 300 CAPSULE ORAL at 15:20

## 2019-07-31 RX ADMIN — INSULIN LISPRO 2 UNITS: 100 INJECTION, SOLUTION INTRAVENOUS; SUBCUTANEOUS at 12:16

## 2019-07-31 RX ADMIN — INSULIN GLARGINE 32 UNITS: 100 INJECTION, SOLUTION SUBCUTANEOUS at 21:32

## 2019-07-31 RX ADMIN — INSULIN LISPRO 4 UNITS: 100 INJECTION, SOLUTION INTRAVENOUS; SUBCUTANEOUS at 21:32

## 2019-07-31 RX ADMIN — GABAPENTIN 600 MG: 300 CAPSULE ORAL at 08:42

## 2019-07-31 RX ADMIN — METFORMIN HYDROCHLORIDE 1000 MG: 500 TABLET ORAL at 15:20

## 2019-07-31 RX ADMIN — PALIPERIDONE 3 MG: 3 TABLET, EXTENDED RELEASE ORAL at 08:42

## 2019-07-31 RX ADMIN — METFORMIN HYDROCHLORIDE 1000 MG: 500 TABLET ORAL at 08:42

## 2019-07-31 RX ADMIN — BENZTROPINE MESYLATE 1 MG: 1 TABLET ORAL at 17:55

## 2019-07-31 RX ADMIN — NICOTINE 1 PATCH: 21 PATCH, EXTENDED RELEASE TRANSDERMAL at 08:44

## 2019-07-31 RX ADMIN — METOPROLOL TARTRATE 25 MG: 25 TABLET, FILM COATED ORAL at 08:42

## 2019-07-31 RX ADMIN — HYDROXYZINE HYDROCHLORIDE 50 MG: 25 TABLET ORAL at 15:19

## 2019-07-31 RX ADMIN — BUSPIRONE HYDROCHLORIDE 10 MG: 5 TABLET ORAL at 08:43

## 2019-07-31 RX ADMIN — CHLORPROMAZINE HYDROCHLORIDE 50 MG: 25 TABLET, SUGAR COATED ORAL at 08:43

## 2019-07-31 RX ADMIN — CHLORPROMAZINE HYDROCHLORIDE 50 MG: 25 TABLET, SUGAR COATED ORAL at 15:20

## 2019-07-31 NOTE — PROGRESS NOTES
Clinical Pharmacy Note: Medication Education Group     Intervention:  Open Forum    Length of Group: 45 min     Methods/resources used: verbal discussion     Topics Discussed: Medication adherence, side effect management, nonpharmacologic strategies, medication effectiveness and indications      Time spent in group: Entire time      Patient Specific concerns: Keeley Cevallos presented to group today dressed in street clothing and appeared somewhat disheveled and alert and oriented to person, place and time  Keeley Cevallos seemed melancholic  Patient's affect was mainly pleasant and quiet and flat and he listened attentively and asked a few questions  Patient was respectful of others throughout and interacted appropriately with peers  Keeley Cevallos did have specific concerns and asked how long it would take to feel a dose increase of duloxetine  Writer discussed the typical timeline of symptom improvement with anti-depressants      Patient understood counseling: yes     Electronically signed by: Bernardino Murphy, Pharmacist

## 2019-07-31 NOTE — PROGRESS NOTES
Pt visible on unit for meals, groups, and meds  Minimally social with peers  Behaviors are appropriate  Denies SI, HI, A/T/V  Pt has no acute issues or concerns  Monitoring continues

## 2019-07-31 NOTE — PROGRESS NOTES
Patient out in the milieu social with peers and staff  Ate HS snack and attended group  Upon approach patient's mood and affect is flat and labile  Denies SI/HI/AH/VH  Complained of 8/10 low back pain  Gave  mg of Motrin with HS medications  Will continue to monitor safety and behaviors every 7 minutes

## 2019-07-31 NOTE — PLAN OF CARE
PT continues to attend at least 75% of art therapy groups where he does actively engage in group directives  PT has been pleasant and cooperative, displays an appropriate mood and affect towards groups and has positive social interactions  PT does display fair insight into his illness and recovery and is able to make his needs known appropriately  PT will continue to attend at least 75% of art therapy groups to increase self esteem, to identify at least 3 healthy coping skills and to allow for creative expression of feelings and emotions       Problem: Ineffective Coping  Goal: Participates in unit activities  Description  Interventions:  - Provide therapeutic environment   - Provide required programming   - Redirect inappropriate behaviors   Outcome: Progressing

## 2019-07-31 NOTE — SOCIAL WORK
AIDA called manager Madeleine Riddle at [address] [place] [address] for [facility], [phone], regarding which there is room availability  At pt's request, AIDA made reservation for Muna Alejandro who will need to pay $110 plus $50 deposit for first week's rent and pay thereafter on a weekly basis  AIDA noted that pt will discharge from hospital at 1 pm     AIDA completed CCCT application that AIDA mailed to [facility]  AIDA completed ICM application for Muna Alejandro that AIDA faxed to [facility]  SW discussed discharge with pt, having expressed need for him to keep aftercare appointments made at [address] [place] [address] for med management and therapy, and at office of Dr Александр Corey  Pt agreed to comply, as needed

## 2019-07-31 NOTE — PROGRESS NOTES
Progress Note - Behavioral Health   Argenis Beavers 44 y o  male MRN: [id number]  Unit/Bed#: [facility] 258-02 Encounter: [id number]    Assessment/Plan   Principal Problem:    Severe bipolar I disorder, most recent episode depressed without psychotic features ([facility] [address] )  Active Problems:    Tobacco use disorder    Essential hypertension    DM (diabetes mellitus), secondary uncontrolled ([facility] [address] )    Preoperative clearance    P: increase Cymbalta to 90 mg daily    Behavior over the last 24 hours:  Improved  Reports he feels better but requests increase in his Cymbalta  He feels  better overall but feels his depression is lingering  He has plans to go to [facility] after leaving here and he is appreciative of the help he obtained  Sleep: normal  Appetite: normal  Medication side effects: No  ROS: no complaints    Mental Status Evaluation:  Appearance:  disheveled   Behavior:  normal   Speech:  soft   Mood:  sad   Affect:  mood-congruent   Thought Process:  circumstantial   Thought Content:  normal   Perceptual Disturbances: None   Risk Potential: minimal   Sensorium:  person and place   Cognition:  grossly intact   Consciousness:  awake    Attention: attention span and concentration were age appropriate   Insight:  limited   Judgment: limited   Gait/Station: slow   Motor Activity: no abnormal movements     Progress Toward Goals: ongoing    Recommended Treatment: Continue with group therapy, milieu therapy and occupational therapy  Risks, benefits and possible side effects of Medications:   Risks, benefits, and possible side effects of medications explained to patient and patient verbalizes understanding  Medications: all current active meds have been reviewed and continue current psychiatric medications  Labs: reviewed    Counseling / Coordination of Care  Total floor / unit time spent today 15 minutes  Greater than 50% of total time was spent with the patient and / or family counseling and / or coordination of care   A description of the counseling / coordination of care:

## 2019-07-31 NOTE — PROGRESS NOTES
07/31/19 0955   Team Meeting   Meeting Type Daily Rounds   Patient/Family Present   Patient Present No   Patient's Family Present No     Daily Psychiatric Rounds    Team Members Present:    Mat Dickens, [address] [place] [address], [facility]  Lloyd Coffman, [place]  Gil Cabral PharmD  Brain Yue, RN  Hemal English, BLAKE    Discussion:     Pt signed a 72 hour notice yesterday at 1110  Again reports a change in mind about ECT but did mention request for increased cymbalta

## 2019-08-01 LAB
GLUCOSE SERPL-MCNC: 118 MG/DL (ref 65–140)
GLUCOSE SERPL-MCNC: 133 MG/DL (ref 65–140)
GLUCOSE SERPL-MCNC: 155 MG/DL (ref 65–140)
GLUCOSE SERPL-MCNC: 233 MG/DL (ref 65–140)

## 2019-08-01 PROCEDURE — 82948 REAGENT STRIP/BLOOD GLUCOSE: CPT

## 2019-08-01 PROCEDURE — 99232 SBSQ HOSP IP/OBS MODERATE 35: CPT | Performed by: PSYCHIATRY & NEUROLOGY

## 2019-08-01 RX ADMIN — TRAZODONE HYDROCHLORIDE 50 MG: 50 TABLET ORAL at 21:20

## 2019-08-01 RX ADMIN — INSULIN LISPRO 2 UNITS: 100 INJECTION, SOLUTION INTRAVENOUS; SUBCUTANEOUS at 12:10

## 2019-08-01 RX ADMIN — GABAPENTIN 600 MG: 300 CAPSULE ORAL at 21:19

## 2019-08-01 RX ADMIN — GABAPENTIN 600 MG: 300 CAPSULE ORAL at 08:36

## 2019-08-01 RX ADMIN — CHLORPROMAZINE HYDROCHLORIDE 50 MG: 25 TABLET, SUGAR COATED ORAL at 08:36

## 2019-08-01 RX ADMIN — BUSPIRONE HYDROCHLORIDE 10 MG: 5 TABLET ORAL at 17:58

## 2019-08-01 RX ADMIN — PALIPERIDONE 3 MG: 3 TABLET, EXTENDED RELEASE ORAL at 08:36

## 2019-08-01 RX ADMIN — INSULIN GLARGINE 32 UNITS: 100 INJECTION, SOLUTION SUBCUTANEOUS at 21:37

## 2019-08-01 RX ADMIN — BENZTROPINE MESYLATE 1 MG: 1 TABLET ORAL at 08:36

## 2019-08-01 RX ADMIN — CHLORPROMAZINE HYDROCHLORIDE 50 MG: 25 TABLET, SUGAR COATED ORAL at 21:19

## 2019-08-01 RX ADMIN — IBUPROFEN 800 MG: 800 TABLET ORAL at 03:36

## 2019-08-01 RX ADMIN — CHLORPROMAZINE HYDROCHLORIDE 50 MG: 25 TABLET, SUGAR COATED ORAL at 16:34

## 2019-08-01 RX ADMIN — GLYBURIDE 1.25 MG: 1.25 TABLET ORAL at 08:36

## 2019-08-01 RX ADMIN — INSULIN LISPRO 4 UNITS: 100 INJECTION, SOLUTION INTRAVENOUS; SUBCUTANEOUS at 21:22

## 2019-08-01 RX ADMIN — BENZTROPINE MESYLATE 1 MG: 1 TABLET ORAL at 17:58

## 2019-08-01 RX ADMIN — DULOXETINE HYDROCHLORIDE 90 MG: 60 CAPSULE, DELAYED RELEASE ORAL at 08:35

## 2019-08-01 RX ADMIN — BUSPIRONE HYDROCHLORIDE 10 MG: 5 TABLET ORAL at 08:35

## 2019-08-01 RX ADMIN — DIVALPROEX SODIUM 2000 MG: 500 TABLET, FILM COATED, EXTENDED RELEASE ORAL at 17:58

## 2019-08-01 RX ADMIN — METOPROLOL TARTRATE 25 MG: 25 TABLET, FILM COATED ORAL at 08:35

## 2019-08-01 RX ADMIN — GABAPENTIN 600 MG: 300 CAPSULE ORAL at 16:34

## 2019-08-01 RX ADMIN — NICOTINE 1 PATCH: 21 PATCH, EXTENDED RELEASE TRANSDERMAL at 08:37

## 2019-08-01 RX ADMIN — LISINOPRIL 10 MG: 10 TABLET ORAL at 08:36

## 2019-08-01 RX ADMIN — METFORMIN HYDROCHLORIDE 1000 MG: 500 TABLET ORAL at 08:36

## 2019-08-01 RX ADMIN — METFORMIN HYDROCHLORIDE 1000 MG: 500 TABLET ORAL at 16:34

## 2019-08-01 NOTE — PROGRESS NOTES
Pt visible on unit, attending groups  Minimally social with peers  Rescinded 72 hour notice today, stating "everything is going well  I don't want to rush out of here " However, pt asked later if he would still DC tomorrow  Denies current SI, HI, A/t/V  Compliant with meals and meds  Wound redressed as ordered  No drainage or signs of infection noted  Monitoring continues

## 2019-08-01 NOTE — PROGRESS NOTES
Progress Note - Behavioral Health     Storm Garcia 44 y o  male MRN: [id number]   Unit/Bed#: [facility] 258-02 Encounter: [id number]    Behavior over the last 24 hours:  Nichole Nash was seen for an inpatient follow-up psychiatric visit this date  His mood remains stable and he has had no change in status over the past 24 hours  He is taking his medications as prescribed and denies any side effects  Appetite and sleep are within normal limits  He is looking forward to being discharged tomorrow  ROS: no complaints    Mental Status Evaluation:    Appearance:  casually dressed, dressed appropriately   Behavior:  normal, pleasant, cooperative   Speech:  normal rate and volume   Mood:  normal   Affect:  normal range and intensity   Thought Process:  organized, logical, coherent   Associations: intact associations   Thought Content:  normal   Perceptual Disturbances: none   Risk Potential: Suicidal ideation - None  Homicidal ideation - None  Potential for aggression - No   Sensorium:  oriented to person, place and time/date   Memory:  recent and remote memory grossly intact   Consciousness:  alert and awake   Attention: attention span and concentration are age appropriate   Insight:  fair   Judgment: fair   Gait/Station: normal gait/station, normal balance   Motor Activity: no abnormal movements     Vital signs in last 24 hours:    Temp:  [97 8 °F (36 6 °C)-98 3 °F (36 8 °C)] 98 3 °F (36 8 °C)  HR:  [75-78] 75  Resp:  [16-18] 16  BP: (110-150)/(73-86) 123/73    Laboratory results:  I have personally reviewed all pertinent laboratory/tests results      Progress Toward Goals: progressing    Assessment/Plan   Principal Problem:    Severe bipolar I disorder, most recent episode depressed without psychotic features ([facility] )  Active Problems:    Tobacco use disorder    Essential hypertension    DM (diabetes mellitus), secondary uncontrolled ([facility] )    Preoperative clearance    Recommended Treatment:   Continue current psychiatric medications as prescribed  Continue to monitor  Discharge disposition and planning are ongoing  All current active medications have been reviewed    Encourage group therapy, milieu therapy and occupational therapy  [facility] checks every 7 minutes      Current Facility-Administered Medications:  benztropine 1 mg Intramuscular Q6H PRN Mandy Bautista, CRNP   benztropine 1 mg Oral Q6H PRN Mandy Mcdonnellics, CRNP   benztropine 1 mg Oral BID Mandy Bautista, CRNP   busPIRone 10 mg Oral BID Mandy Bautista, CRNP   chlorproMAZINE 50 mg Oral TID Mandy Bautista, CRNP   divalproex sodium 2,000 mg Oral QPM Manyd Bautista, CRNP   DULoxetine 90 mg Oral Daily Giovanni Cadena MD   gabapentin 600 mg Oral TID Mandy Bautista, CRNP   glyBURIDE 1 25 mg Oral Daily With Breakfast Mandy Bautista, CRNP   haloperidol 5 mg Oral Q6H PRN Mandy Bautista, CRNP   haloperidol lactate 5 mg Intramuscular Q6H PRN Mandy Bautista, CRNP   hydrOXYzine HCL 25 mg Oral Q6H PRN Mandy Mcdonnellics, CRNP   hydrOXYzine HCL 50 mg Oral Q4H PRN Mandy Bautista, CRNP   ibuprofen 400 mg Oral Q6H PRN Mandy Bautista, CRNP   ibuprofen 600 mg Oral Q6H PRN Madny Bautista, CRNP   ibuprofen 800 mg Oral Q6H PRN Mandy Mcdonnellics, CRNP   insulin glargine 32 Units Subcutaneous HS Mayra Jaskaran, CRNP   insulin lispro 2-12 Units Subcutaneous TID AC Mayra Jaskaran, CRNP   insulin lispro 2-12 Units Subcutaneous HS Haroon Landry, CRNP   lisinopril 10 mg Oral Daily Haroon Landry, CRNP   LORazepam 2 mg Intramuscular Q6H PRN Mandy Bautista, CRNP   magnesium hydroxide 30 mL Oral Daily PRN Mandy Bautista, CRNP   metFORMIN 1,000 mg Oral BID With Meals Haroon Landry, CRNP   metoprolol tartrate 25 mg Oral Q12H [facility] Haroon Landry, CRNP   nicotine 1 patch Transdermal Daily Mandy Bautista, CRNP   OLANZapine 10 mg Intramuscular Q3H PRN Mandy Bautista, CRNP   paliperidone 3 mg Oral Daily Giovanni Cadena MD   risperiDONE 1 mg Oral Q3H PRN TAYLOR Ryan   simethicone 80 mg Oral Q6H PRN TAYLOR Lux   traZODone 50 mg Oral HS PRN TAYLOR Lux   traZODone 50 mg Oral HS TAYLOR Lux       Risks / Benefits of Treatment:    Risks, benefits, and possible side effects of medications explained to patient and patient verbalizes understanding and agreement for treatment  Counseling / Coordination of Care:      Patient's progress discussed with staff in treatment team meeting  Medications, treatment progress and treatment plan reviewed with patient

## 2019-08-01 NOTE — PROGRESS NOTES
Patient has been visible on the unit, behavior calm and controlled  Has flat affect, good eye contact  States he is planning on recinding his 72 hr request for discharge tomorrow after he checks if his money is in his account  Planning on discharge on Friday, states he feels ready and has a place to go  Denies any SI/HI/AH/VH  Med compliant

## 2019-08-01 NOTE — PROGRESS NOTES
Patient woke at 0330, came out c/o low back pain 9/10  Motrin given for pain and pt went right back to bed  Will monitor effects

## 2019-08-01 NOTE — PROGRESS NOTES
Daily Rounds Documentation    Team Members Present:   MD Caio Gonzalez, ALECIA ChauhanW [facility] [place], [place] DC on Friday to [facility]

## 2019-08-02 VITALS
HEART RATE: 73 BPM | TEMPERATURE: 97.9 F | OXYGEN SATURATION: 98 % | BODY MASS INDEX: 39.17 KG/M2 | SYSTOLIC BLOOD PRESSURE: 143 MMHG | HEIGHT: 75 IN | WEIGHT: 315 LBS | DIASTOLIC BLOOD PRESSURE: 80 MMHG | RESPIRATION RATE: 18 BRPM

## 2019-08-02 LAB
GLUCOSE SERPL-MCNC: 150 MG/DL (ref 65–140)
GLUCOSE SERPL-MCNC: 159 MG/DL (ref 65–140)

## 2019-08-02 PROCEDURE — 82948 REAGENT STRIP/BLOOD GLUCOSE: CPT

## 2019-08-02 PROCEDURE — 99239 HOSP IP/OBS DSCHRG MGMT >30: CPT | Performed by: PSYCHIATRY & NEUROLOGY

## 2019-08-02 RX ORDER — CHLORPROMAZINE HYDROCHLORIDE 50 MG/1
50 TABLET, FILM COATED ORAL 3 TIMES DAILY
Qty: 90 TABLET | Refills: 0 | Status: SHIPPED | OUTPATIENT
Start: 2019-08-02 | End: 2019-08-14 | Stop reason: SDUPTHER

## 2019-08-02 RX ORDER — DIVALPROEX SODIUM 500 MG/1
2000 TABLET, EXTENDED RELEASE ORAL EVERY EVENING
Qty: 120 TABLET | Refills: 0 | Status: SHIPPED | OUTPATIENT
Start: 2019-08-02 | End: 2019-08-14 | Stop reason: SDUPTHER

## 2019-08-02 RX ORDER — BUSPIRONE HYDROCHLORIDE 10 MG/1
10 TABLET ORAL 2 TIMES DAILY
Qty: 60 TABLET | Refills: 0 | Status: SHIPPED | OUTPATIENT
Start: 2019-08-02 | End: 2019-08-14 | Stop reason: SDUPTHER

## 2019-08-02 RX ORDER — PALIPERIDONE 3 MG/1
3 TABLET, EXTENDED RELEASE ORAL DAILY
Qty: 30 TABLET | Refills: 0 | Status: SHIPPED | OUTPATIENT
Start: 2019-08-03 | End: 2019-08-02

## 2019-08-02 RX ORDER — BENZTROPINE MESYLATE 1 MG/1
1 TABLET ORAL 2 TIMES DAILY
Qty: 60 TABLET | Refills: 0 | Status: SHIPPED | OUTPATIENT
Start: 2019-08-02 | End: 2019-08-14 | Stop reason: SDUPTHER

## 2019-08-02 RX ORDER — TRAZODONE HYDROCHLORIDE 50 MG/1
50 TABLET ORAL
Qty: 30 TABLET | Refills: 0 | Status: SHIPPED | OUTPATIENT
Start: 2019-08-02 | End: 2019-08-14 | Stop reason: SDUPTHER

## 2019-08-02 RX ORDER — DULOXETIN HYDROCHLORIDE 30 MG/1
90 CAPSULE, DELAYED RELEASE ORAL DAILY
Qty: 90 CAPSULE | Refills: 0 | Status: SHIPPED | OUTPATIENT
Start: 2019-08-03 | End: 2019-08-02

## 2019-08-02 RX ORDER — DULOXETIN HYDROCHLORIDE 30 MG/1
90 CAPSULE, DELAYED RELEASE ORAL DAILY
Qty: 90 CAPSULE | Refills: 0 | Status: SHIPPED | OUTPATIENT
Start: 2019-08-03 | End: 2019-08-14

## 2019-08-02 RX ORDER — PALIPERIDONE 3 MG/1
3 TABLET, EXTENDED RELEASE ORAL DAILY
Qty: 30 TABLET | Refills: 0 | Status: SHIPPED | OUTPATIENT
Start: 2019-08-03 | End: 2019-08-14 | Stop reason: SDUPTHER

## 2019-08-02 RX ORDER — GABAPENTIN 300 MG/1
600 CAPSULE ORAL 3 TIMES DAILY
Qty: 180 CAPSULE | Refills: 0 | Status: SHIPPED | OUTPATIENT
Start: 2019-08-02 | End: 2019-08-14 | Stop reason: SDUPTHER

## 2019-08-02 RX ADMIN — PALIPERIDONE 3 MG: 3 TABLET, EXTENDED RELEASE ORAL at 08:54

## 2019-08-02 RX ADMIN — GLYBURIDE 1.25 MG: 1.25 TABLET ORAL at 08:54

## 2019-08-02 RX ADMIN — BUSPIRONE HYDROCHLORIDE 10 MG: 5 TABLET ORAL at 08:54

## 2019-08-02 RX ADMIN — CHLORPROMAZINE HYDROCHLORIDE 50 MG: 25 TABLET, SUGAR COATED ORAL at 08:53

## 2019-08-02 RX ADMIN — BENZTROPINE MESYLATE 1 MG: 1 TABLET ORAL at 08:54

## 2019-08-02 RX ADMIN — GABAPENTIN 600 MG: 300 CAPSULE ORAL at 08:54

## 2019-08-02 RX ADMIN — METFORMIN HYDROCHLORIDE 1000 MG: 500 TABLET ORAL at 08:53

## 2019-08-02 RX ADMIN — DULOXETINE HYDROCHLORIDE 90 MG: 60 CAPSULE, DELAYED RELEASE ORAL at 08:54

## 2019-08-02 RX ADMIN — LISINOPRIL 10 MG: 10 TABLET ORAL at 08:54

## 2019-08-02 RX ADMIN — METOPROLOL TARTRATE 25 MG: 25 TABLET, FILM COATED ORAL at 08:53

## 2019-08-02 NOTE — PROGRESS NOTES
Team Members present:  MD Isai De La Rosa, TAYLOR Ford, BLAKE Guevara, [facility] [place]  Krishan Dimas, BLAKE    Poor attitude   DC today at 1 pm

## 2019-08-02 NOTE — PROGRESS NOTES
Sealed envelope sent from pharmacy home medications packed with personal belongings for discharge today

## 2019-08-02 NOTE — PROGRESS NOTES
Patient has been sleeping well through the night, no changes or problems  Q 7 minute safety checks maintained

## 2019-08-02 NOTE — PROGRESS NOTES
C Deirdre REYEST and Jude Youssef RN witnessed patient receive $30 00 cash as reimbursement for destroyed hat from [employer]

## 2019-08-02 NOTE — PROGRESS NOTES
Patient has been out in the milieu all evening  Remains flat and depressed  Denies any SI,HI, AH,VH  Looking forward to discharge tomorrow  Pleasant and cooperative, no c/o pain  Med compliant  Will continue to observe

## 2019-08-02 NOTE — DISCHARGE SUMMARY
Discharge Summary - [facility] [facility] 44 y o  male MRN: [id number]  Unit/Bed#: [facility] 258-02 Encounter: [id number]     Admission Date: 7/9/2019         Discharge Date: 08/02/2019    Attending Psychiatrist: Kaela Nunez MD    Reason for Admission/HPI: Principal Problem:    Severe bipolar I disorder, most recent episode depressed without psychotic features ([facility] )  Active Problems:    Tobacco use disorder    Essential hypertension    DM (diabetes mellitus), secondary uncontrolled ([facility] )    Preoperative clearance      Phuong Robetro is a 71-year-old male patient admitted on a voluntary 12 commitment basis to the adult behavioral health unit due to depression with suicidal ideation  Per crisis evaluation completed by crisis worker Tiki Peters:    "Pt presents due to SI w/ an attempt to kill himself yesterday via an O/D of his meds  Pt is A & O X 4 and was calm and ccoperative throughout the assessment  Pt was able to answer all assessment questions w/ appropriate elaboration  Pt continues to endorse SI and cannot contract for safety at a less intensive level of care than a hospital  Pt denies any HI or thoughts to hurt others  Pt denies any AH, VH, or delusions  Pt is experiencing increased depression and increased anxiety w/ panic attacks  Pt states his sleep was poor but has had Trazodone since Sat and is sleeping much better  Pt is in agreement w/ I/P psuych admission  Pt's voluntary paperwork, e g  201, and his rights were explained in detail  Pt signed his 12 and was given a copy of his rights along w/ a copy of the 72 hr withdraw from tx explanation "    Per Psychiatric evaluation completed by Dr Kaela Nunez:    "Patient is a 44 y o  male with long history of bipolar illness who is currently homeless and has history wondering [place] the [place]  He has history of medication noncompliance and episodes of depression and psychosis    He does have 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 due to previous episodes of diabetic feet  Patient reports that he has not been taking his medication recently her starting getting depressed and feeling suicidal   Patient had a plan to overdose on some of his medications  Patient reports gaining weight lately and has been struggling with very low energy and poor concentration "    Dong Flaherty has an extensive psychiatric history of bipolar disorder and has been hospitalized on several occasions  He is well known to this facility  He has been trialed on several different psychiatric medications  He has most recently been receiving outpatient psychiatric care in [place] but but has now moved back to this area  He has a history of noncompliance with both medical and psychiatric care  Hospital Course:   Dong Flaherty was admitted to the [facility] unit after being medically cleared  Once on the unit, he was seen by medical doctor for physical examination  He was placed on 7 minutes behavioral health checks for patient safety  He was encouraged to attend both group and occupational therapy  Psychiatric medication was initiated and  titrated to appropriate dosages  Before any medication was administered, risk versus benefit was discussed  Dong Flaherty agreed to participate in psychiatric treatment and take medications as prescribed  Initially after admission, he remained isolative, tearful, and depressed  After having his medications adjusted becoming accustomed to the therapeutic milieu of the unit, his symptoms began to resolve  His mood stabilized rapidly without complications  His appetite and sleep returned to normal, and his anxiety and depression return to manageable levels  Because he was doing so much better, the Psychiatric Care Team felt it would be both safe and appropriate to discharge him to care on an outpatient basis    Follow-up appointments were scheduled on his behalf with both his primary care physician and with [facility]  On the day of discharge, Jerry Díaz denies any suicidal or homicidal ideation as well as any auditory visual hallucinations  His mood was stable, and he was looking for to going home  Mental Status at time of Discharge:     Appearance:  casually dressed   Behavior:  normal   Speech:  normal pitch and normal volume   Mood:  normal   Affect:  normal   Thought Process:  normal   Thought Content:  normal   Perceptual Disturbances: None   Risk Potential: Patient denies any suicidal or homicidal ideations  Sensorium:  person, place, time/date and situation   Cognition:  grossly intact   Consciousness:  alert and awake    Attention: attention span and concentration were age appropriate   Insight:  fair   Judgment: fair   Gait/Station: normal gait/station and normal balance   Motor Activity: no abnormal movements     Admission Diagnosis:Physical exam [Z00 00]  Suicidal ideations [R45 851]  Psychiatric complaint [F69]  Depression with suicidal ideation [F32 9, R45 851]    Discharge Diagnosis:   Principal Problem:    Severe bipolar I disorder, most recent episode depressed without psychotic features ([facility] )  Active Problems:    Tobacco use disorder    Essential hypertension    DM (diabetes mellitus), secondary uncontrolled ([facility] )    Preoperative clearance  Resolved Problems:    * No resolved hospital problems   *        Lab results:  Admission on 07/09/2019   Component Date Value    WBC 07/09/2019 7 80     RBC 07/09/2019 4 97     Hemoglobin 07/09/2019 13 3*    Hematocrit 07/09/2019 39 3*    MCV 07/09/2019 79*    MCH 07/09/2019 26 8     MCHC 07/09/2019 33 9     RDW 07/09/2019 14 9*    MPV 07/09/2019 7 4*    Platelets 37/52/9893 250     Neutrophils Relative 07/09/2019 69     Lymphocytes Relative 07/09/2019 22     Monocytes Relative 07/09/2019 8     Eosinophils Relative 07/09/2019 0     Basophils Relative 07/09/2019 0     Neutrophils Absolute 07/09/2019 5 40     Lymphocytes Absolute 07/09/2019 1 70     Monocytes Absolute 07/09/2019 0 60     Eosinophils Absolute 07/09/2019 0 00     Basophils Absolute 07/09/2019 0 00     Sodium 07/09/2019 132*    Potassium 07/09/2019 4 8     Chloride 07/09/2019 97*    CO2 07/09/2019 28     ANION GAP 07/09/2019 7     BUN 07/09/2019 23     Creatinine 07/09/2019 1 22     Glucose 07/09/2019 277*    Calcium 07/09/2019 9 8     AST 07/09/2019 18     ALT 07/09/2019 13     Alkaline Phosphatase 07/09/2019 56     Total Protein 07/09/2019 7 2     Albumin 07/09/2019 4 1     Total Bilirubin 07/09/2019 0 40     eGFR 07/09/2019 73     Magnesium 07/09/2019 1 8*    Valproic Acid, Total 07/09/2019 59 0     Ethanol Lvl 07/09/2019 <10     Amph/Meth UR 07/09/2019 Negative     Barbiturate Ur 07/09/2019 Negative     Benzodiazepine Urine 07/09/2019 Negative     Cocaine Urine 07/09/2019 Negative     Methadone Urine 07/09/2019 Negative     Opiate Urine 07/09/2019 Negative     PCP Ur 07/09/2019 Negative     THC Urine 07/09/2019 Negative     Acetaminophen Level 42/59/1549 <42*    Salicylate Lvl 47/57/1550 <5*    POC Glucose 07/09/2019 292*    POC Glucose 07/09/2019 242*    Ventricular Rate 07/09/2019 80     Atrial Rate 07/09/2019 80     CA Interval 07/09/2019 150     QRSD Interval 07/09/2019 76     QT Interval 07/09/2019 366     QTC Interval 07/09/2019 422     P Axis 07/09/2019 33     QRS Axis 07/09/2019 5     T Wave Axis 07/09/2019 37     TSH 3RD GENERATON 07/10/2019 0 320*    RPR 07/10/2019 Non-Reactive     Cholesterol 07/10/2019 162     Triglycerides 07/10/2019 191*    HDL, Direct 07/10/2019 32*    LDL Calculated 07/10/2019 92     Non-HDL-Chol (CHOL-HDL) 07/10/2019 130     Hemoglobin A1C 07/10/2019 8 4*    EAG 07/10/2019 194     POC Glucose 07/10/2019 168*    POC Glucose 07/10/2019 233*    POC Glucose 07/10/2019 96     POC Glucose 07/11/2019 168*    POC Glucose 07/11/2019 180*    POC Glucose 07/11/2019 171*    POC Glucose 07/11/2019 213*    POC Glucose 07/12/2019 199*    Valproic Acid, Total 07/12/2019 65 9     POC Glucose 07/12/2019 195*    POC Glucose 07/12/2019 190*    POC Glucose 07/13/2019 163*    POC Glucose 07/13/2019 191*    POC Glucose 07/13/2019 114     POC Glucose 07/13/2019 204*    POC Glucose 07/14/2019 106     POC Glucose 07/14/2019 201*    POC Glucose 07/12/2019 167*    POC Glucose 07/14/2019 161*    POC Glucose 07/14/2019 196*    POC Glucose 07/15/2019 146*    POC Glucose 07/15/2019 173*    POC Glucose 07/15/2019 157*    POC Glucose 07/15/2019 275*    POC Glucose 07/16/2019 94     POC Glucose 07/16/2019 192*    POC Glucose 07/16/2019 204*    POC Glucose 07/16/2019 240*    POC Glucose 07/17/2019 194*    POC Glucose 07/17/2019 178*    POC Glucose 07/17/2019 155*    POC Glucose 07/17/2019 203*    POC Glucose 07/18/2019 159*    POC Glucose 07/18/2019 173*    POC Glucose 07/18/2019 155*    POC Glucose 07/18/2019 263*    POC Glucose 07/19/2019 131     POC Glucose 07/19/2019 255*    POC Glucose 07/19/2019 119     POC Glucose 07/19/2019 258*    POC Glucose 07/20/2019 133     POC Glucose 07/20/2019 244*    POC Glucose 07/20/2019 124     POC Glucose 07/20/2019 251*    POC Glucose 07/21/2019 142*    POC Glucose 07/21/2019 263*    POC Glucose 07/21/2019 125     POC Glucose 07/21/2019 317*    POC Glucose 07/22/2019 139     POC Glucose 07/22/2019 229*    POC Glucose 07/22/2019 160*    POC Glucose 07/22/2019 251*    POC Glucose 07/23/2019 167*    POC Glucose 07/23/2019 235*    POC Glucose 07/23/2019 147*    POC Glucose 07/23/2019 277*    POC Glucose 07/24/2019 180*    POC Glucose 07/24/2019 158*    POC Glucose 07/24/2019 153*    POC Glucose 07/24/2019 259*    POC Glucose 07/25/2019 206*    POC Glucose 07/25/2019 211*    Ventricular Rate 07/25/2019 85     Atrial Rate 07/25/2019 85     W[place] Interval 07/25/2019 148     QRSD Interval 07/25/2019 80     QT Interval 07/25/2019 346     QTC Interval 07/25/2019 411     P Axis 07/25/2019 32     QRS Axis 07/25/2019 16     T Wave Axis 07/25/2019 35     POC Glucose 07/25/2019 165*    POC Glucose 07/25/2019 301*    Sodium 07/27/2019 137     Potassium 07/27/2019 4 3     Chloride 07/27/2019 105     CO2 07/27/2019 25     ANION GAP 07/27/2019 7     BUN 07/27/2019 32*    Creatinine 07/27/2019 1 19     Glucose 07/27/2019 154*    Glucose, Fasting 07/27/2019 154*    Calcium 07/27/2019 8 8     AST 07/27/2019 11*    ALT 07/27/2019 10     Alkaline Phosphatase 07/27/2019 48     Total Protein 07/27/2019 5 8*    Albumin 07/27/2019 3 3*    Total Bilirubin 07/27/2019 0 30     eGFR 07/27/2019 75     Magnesium 07/27/2019 1 6*    POC Glucose 07/26/2019 183*    POC Glucose 07/26/2019 174*    POC Glucose 07/26/2019 121     POC Glucose 07/26/2019 162*    POC Glucose 07/27/2019 133     POC Glucose 07/27/2019 171*    POC Glucose 07/27/2019 165*    POC Glucose 07/27/2019 182*    POC Glucose 07/28/2019 146*    POC Glucose 07/28/2019 206*    POC Glucose 07/28/2019 106     POC Glucose 07/28/2019 200*    POC Glucose 07/29/2019 165*    POC Glucose 07/29/2019 187*    POC Glucose 07/29/2019 122     POC Glucose 07/29/2019 192*    POC Glucose 07/30/2019 143*    POC Glucose 07/30/2019 142*    POC Glucose 07/30/2019 126     POC Glucose 07/30/2019 183*    POC Glucose 07/31/2019 141*    POC Glucose 07/31/2019 174*    POC Glucose 07/31/2019 110     POC Glucose 07/31/2019 216*    POC Glucose 08/01/2019 133     POC Glucose 08/01/2019 155*    POC Glucose 08/01/2019 118     POC Glucose 08/01/2019 233*    POC Glucose 08/02/2019 150*    POC Glucose 08/02/2019 159*       Discharge Medications:  Current Discharge Medication List      START taking these medications    Details   busPIRone (BUSPAR) 10 mg tablet Take 1 tablet (10 mg total) by mouth 2 (two) times a day for 30 days  Qty: 60 tablet, Refills: 0    Associated Diagnoses: Severe bipolar I disorder, most recent episode depressed without psychotic features ([facility] )      DULoxetine (CYMBALTA) 30 mg delayed release capsule Take 3 capsules (90 mg total) by mouth daily for 30 days  Qty: 90 capsule, Refills: 0    Associated Diagnoses: Severe bipolar I disorder, most recent episode depressed without psychotic features (HCC)      paliperidone (INVEGA) 3 mg 24 hr tablet Take 1 tablet (3 mg total) by mouth daily for 30 days  Qty: 30 tablet, Refills: 0    Associated Diagnoses: Severe bipolar I disorder, most recent episode depressed without psychotic features [facility])            Current Discharge Medication List         Current Discharge Medication List      CONTINUE these medications which have CHANGED    Details   benztropine (COGENTIN) 1 mg tablet Take 1 tablet (1 mg total) by mouth 2 (two) times a day for 30 days  Qty: 60 tablet, Refills: 0    Associated Diagnoses: Severe bipolar I disorder, most recent episode depressed without psychotic features (HCC)      chlorproMAZINE (THORAZINE) 50 mg tablet Take 1 tablet (50 mg total) by mouth 3 (three) times a day for 30 days  Qty: 90 tablet, Refills: 0    Associated Diagnoses: Severe bipolar I disorder, most recent episode depressed without psychotic features ([facility])      divalproex sodium (DEPAKOTE ER) 500 mg 24 hr tablet Take 4 tablets (2,000 mg total) by mouth every evening for 30 days  Qty: 120 tablet, Refills: 0    Associated Diagnoses: Severe bipolar I disorder, most recent episode depressed without psychotic features (HCC)      gabapentin (NEURONTIN) 300 mg capsule Take 2 capsules (600 mg total) by mouth 3 (three) times a day for 30 days  Qty: 180 capsule, Refills: 0    Associated Diagnoses: Severe bipolar I disorder, most recent episode depressed without psychotic features (HCC)      traZODone (DESYREL) 50 mg tablet Take 1 tablet (50 mg total) by mouth daily at bedtime for 30 days  Qty: 30 tablet, Refills: 0    Associated Diagnoses: Severe bipolar I disorder, most recent episode depressed without psychotic features ([facility] )            Current Discharge Medication List      CONTINUE these medications which have NOT CHANGED    Details   glyBURIDE (DIABETA) 1 25 mg tablet Take 1 25 mg by mouth daily with breakfast      insulin aspart (NovoLOG) 100 Units/mL injection pen As directed on discharge instructions      insulin glargine (LANTUS SOLOSTAR) 100 units/mL injection pen Inject 30 Units under the skin      Insulin Pen Needle (B-D ULTRAFINE III SHORT PEN) 31G X 8 MM MISC As directed      lisinopril (ZESTRIL) 5 mg tablet Take 1 tablet (5 mg total) by mouth daily  Qty: 30 tablet, Refills: 0    Associated Diagnoses: Essential hypertension      metFORMIN (GLUCOPHAGE) 850 mg tablet Take 1 tablet (850 mg total) by mouth 2 (two) times a day with meals  Qty: 60 tablet, Refills: 0    Associated Diagnoses: DM (diabetes mellitus), type 2 (HCC)      metoprolol tartrate (LOPRESSOR) 25 mg tablet Take 1 tablet (25 mg total) by mouth every 12 (twelve) hours  Qty: 60 tablet, Refills: 0    Associated Diagnoses: Essential hypertension      ONE TOUCH LANCETS MISC As directed      glucose blood test strip As directed              Discharge instructions/Information to patient and family:   See after visit summary for information provided to patient and family  Provisions for Follow-Up Care:  See after visit summary for information related to follow-up care and any pertinent home health orders  Discharge Statement   I spent 38 minutes discharging the patient  This time was spent on the day of discharge  I had direct contact with the patient on the day of discharge  Additional documentation is required if more than 30 minutes were spent on discharge

## 2019-08-02 NOTE — PLAN OF CARE
Problem: NEUROSENSORY - ADULT  Goal: Remains free of injury related to seizures activity  Description  INTERVENTIONS  - Maintain airway, patient safety  and administer oxygen as ordered  - Monitor patient for seizure activity, document and report duration and description of seizure to physician/advanced practitioner  - If seizure occurs,  ensure patient safety during seizure  - Reorient patient post seizure  - Seizure pads on all 4 side rails  - Instruct patient/family to notify RN of any seizure activity including if an aura is experienced  - Instruct patient/family to call for assistance with activity based on nursing assessment  - Administer anti-seizure medications as ordered  - Monitor fetal well being  Outcome: Completed     Problem: SKIN/TISSUE INTEGRITY - ADULT  Goal: Incision(s), wounds(s) or drain site(s) healing without S/S of infection  Description  INTERVENTIONS  - Assess and document risk factors for skin impairment   - Assess and document dressing, incision, wound bed, drain sites and surrounding tissue  - Initiate Nutrition services consult and/or wound management as needed  Outcome: Completed     Problem: Risk for Self Injury/Neglect  Goal: Verbalize thoughts and feelings  Description  Interventions:  - Assess and re-assess patient's lethality and potential for self-injury  - Engage patient in 1:1 interactions, daily, for a minimum of 15 minutes  - Encourage patient to express feelings, fears, frustrations, hopes  - Establish rapport/trust with patient   Outcome: Completed  Goal: Recognize maladaptive responses and adopt new coping mechanisms  Outcome: Completed  Goal: Complete daily ADLs, including personal hygiene independently, as able  Description  Interventions:  - Observe, teach, and assist patient with ADLS  - Monitor and promote a balance of rest/activity, with adequate nutrition and elimination  Outcome: Completed     Problem: Risk for Self Injury/Neglect  Goal: Recognize maladaptive responses and adopt new coping mechanisms  Outcome: Completed     Problem: Risk for Self Injury/Neglect  Goal: Complete daily ADLs, including personal hygiene independently, as able  Description  Interventions:  - Observe, teach, and assist patient with ADLS  - Monitor and promote a balance of rest/activity, with adequate nutrition and elimination  Outcome: Completed     Problem: Depression  Goal: Treatment Goal: Demonstrate behavioral control of depressive symptoms, verbalize feelings of improved mood/affect, and adopt new coping skills prior to discharge  Outcome: Completed  Goal: Refrain from self-neglect  Outcome: Completed     Problem: Ineffective Coping  Goal: Participates in unit activities  Description  Interventions:  - Provide therapeutic environment   - Provide required programming   - Redirect inappropriate behaviors   Outcome: Completed     Problem: Nutrition/Hydration-ADULT  Goal: Nutrient/Hydration intake appropriate for improving, restoring or maintaining nutritional needs  Description  Monitor and assess patient's nutrition/hydration status for malnutrition (ex- brittle hair, bruises, dry skin, pale skin and conjunctiva, muscle wasting, smooth red tongue, and disorientation)  Collaborate with interdisciplinary team and initiate plan and interventions as ordered  Monitor patient's weight and dietary intake as ordered or per policy  Utilize nutrition screening tool and intervene per policy  Determine patient's food preferences and provide high-protein, high-caloric foods as appropriate       INTERVENTIONS:  - Monitor oral intake, urinary output, labs, and treatment plans  - Assess nutrition and hydration status and recommend course of action  - Evaluate amount of meals eaten  - Assist patient with eating if necessary   - Allow adequate time for meals  - Recommend/ encourage appropriate diets, oral nutritional supplements, and vitamin/mineral supplements  - Order, calculate, and assess calorie counts as needed  - Recommend, monitor, and adjust tube feedings and TPN/PPN based on assessed needs  - Assess need for intravenous fluids  - Provide specific nutrition/hydration education as appropriate  - Include patient/family/caregiver in decisions related to nutrition      7-18-19    he is on a level 1 meal plan with double portions of protein  His intake was listed at 100 %  He has a diabetic right foot ulcer  His weight was 271 on 7-9 then up to 302 on 7-13 with a 31  # gain noted  RDN to check his weight status  His POC glucose was 178 H on  7-18 RDN to evaluate his labs when available as he is on lantus with hx of diabetes  RDN visited on rounds this am and he offered no c/o  RDN to reassess his nutrition needs at moderate risk and follow PRN       7-24-19  he is now on a level 3 with protein offered with double portions  His intake has been 100 %  His last weight was 302 on 7-13 RDN to check his weight status  RDN visited on rounds this am and he was happy and had everything he wanted for breakfast  He has a neuropathic diabetic ulcer on his medial inner right foot  His glucose remains high as he is on amaryl  RDN to follow his nutrition needs at moderate risk with him and his team PRN     Outcome: Completed     Problem: Nutrition/Hydration-ADULT  Goal: Nutrient/Hydration intake appropriate for improving, restoring or maintaining nutritional needs  Description  Monitor and assess patient's nutrition/hydration status for malnutrition (ex- brittle hair, bruises, dry skin, pale skin and conjunctiva, muscle wasting, smooth red tongue, and disorientation)  Collaborate with interdisciplinary team and initiate plan and interventions as ordered  Monitor patient's weight and dietary intake as ordered or per policy  Utilize nutrition screening tool and intervene per policy  Determine patient's food preferences and provide high-protein, high-caloric foods as appropriate       INTERVENTIONS:  - Monitor oral intake, urinary output, labs, and treatment plans  - Assess nutrition and hydration status and recommend course of action  - Evaluate amount of meals eaten  - Assist patient with eating if necessary   - Allow adequate time for meals  - Recommend/ encourage appropriate diets, oral nutritional supplements, and vitamin/mineral supplements  - Order, calculate, and assess calorie counts as needed  - Recommend, monitor, and adjust tube feedings and TPN/PPN based on assessed needs  - Assess need for intravenous fluids  - Provide specific nutrition/hydration education as appropriate  - Include patient/family/caregiver in decisions related to nutrition

## 2019-08-02 NOTE — WOUND OSTOMY CARE
Progress Note - Wound   Davis Ohm 44 y o  male MRN: [id number]  Unit/Bed#: [facility] 186-07 Encounter: [id number]      Assessment:   Wound care follow up for patient admitted with a neuropathic ulcer to the plantar surface of the right foot at base of TMA scar  Patient was cooperative during assessment  Patient has been showering daily and with daily wound treatment  Wound has improved in size  Calloused area of periwound peeling away on the distal portion of the periwound revealing healthy pink tissue  Peeling/flaking skin to the right TMA scar as well  Podiatry consult had been requested to evaluate wound and make recommendations as well as the need for patient supportive shoes due to patient having bilateral TMA's  Patient is to be discharged today  Plan:   1  Cleanse right foot ulcer with soap and water, pat dry, place non-adherent dressing to wound base, cover with Maxorb, 4X4, and wrap with cipriano, change daily or with soilage or dislodgement  2  Skin nourishing cream daily        Wound 07/12/19 Neuropathic/diabetic ulcer Foot Right;Medial;Inner (Active)   Wound Description Fragile 8/2/2019  9:00 AM   Lacie-wound Assessment Dry;Clean 8/2/2019  9:00 AM   Wound Length (cm) 0 6 cm 8/2/2019  9:00 AM   Wound Width (cm) 0 4 cm 8/2/2019  9:00 AM   Wound Depth (cm) 0 2 8/2/2019  9:00 AM   Calculated Wound Area (cm^2) 0 24 cm^2 8/2/2019  9:00 AM   Calculated Wound Volume (cm^3) 0 05 cm^3 8/2/2019  9:00 AM   Change in Wound Size % 77 27 8/2/2019  9:00 AM   Drainage Amount Small 8/2/2019  9:00 AM   Drainage Description Serosanguineous 8/2/2019  9:00 AM   Non-staged Wound Description Full thickness 8/2/2019  9:00 AM   Treatments Cleansed 8/2/2019  9:00 AM   Dressing Calcium Alginate;Non adherent;Dry dressing;Gauze 8/2/2019  9:00 AM   Wound packed?  No 8/2/2019  9:00 AM   Dressing Changed Changed 7/29/2019  1:03 PM   Patient Tolerance Tolerated well 8/2/2019  9:00 AM   Dressing Status Clean;Dry 7/20/2019  1:00 PM Reviewed plan of care with RN Luther Goldmann  Recommendations written as orders  Wound care to follow weekly  Questions or concerns contact Ilsa RUIZN, RN

## 2019-08-13 ENCOUNTER — OFFICE VISIT (OUTPATIENT)
Dept: FAMILY MEDICINE CLINIC | Facility: CLINIC | Age: 44
End: 2019-08-13
Payer: MEDICARE

## 2019-08-13 VITALS
HEART RATE: 92 BPM | DIASTOLIC BLOOD PRESSURE: 68 MMHG | WEIGHT: 308 LBS | OXYGEN SATURATION: 99 % | HEIGHT: 75 IN | BODY MASS INDEX: 38.3 KG/M2 | SYSTOLIC BLOOD PRESSURE: 125 MMHG

## 2019-08-13 DIAGNOSIS — E11.42 TYPE 2 DIABETES MELLITUS WITH DIABETIC POLYNEUROPATHY, WITH LONG-TERM CURRENT USE OF INSULIN (HCC): ICD-10-CM

## 2019-08-13 DIAGNOSIS — Z89.421 ACQUIRED ABSENCE OF OTHER RIGHT TOE(S) (HCC): ICD-10-CM

## 2019-08-13 DIAGNOSIS — M15.0 PRIMARY GENERALIZED (OSTEO)ARTHRITIS: ICD-10-CM

## 2019-08-13 DIAGNOSIS — F31.4 SEVERE BIPOLAR I DISORDER, MOST RECENT EPISODE DEPRESSED WITHOUT PSYCHOTIC FEATURES (HCC): Chronic | ICD-10-CM

## 2019-08-13 DIAGNOSIS — I73.9 PVD (PERIPHERAL VASCULAR DISEASE) (HCC): ICD-10-CM

## 2019-08-13 DIAGNOSIS — Z13.5 SCREENING FOR DIABETIC RETINOPATHY: Primary | ICD-10-CM

## 2019-08-13 DIAGNOSIS — E11.621 DIABETIC ULCER OF RIGHT MIDFOOT ASSOCIATED WITH TYPE 2 DIABETES MELLITUS, LIMITED TO BREAKDOWN OF SKIN (HCC): ICD-10-CM

## 2019-08-13 DIAGNOSIS — L97.411 DIABETIC ULCER OF RIGHT MIDFOOT ASSOCIATED WITH TYPE 2 DIABETES MELLITUS, LIMITED TO BREAKDOWN OF SKIN (HCC): ICD-10-CM

## 2019-08-13 DIAGNOSIS — F25.0 SCHIZOAFFECTIVE DISORDER, BIPOLAR TYPE (HCC): ICD-10-CM

## 2019-08-13 DIAGNOSIS — F17.210 CIGARETTE NICOTINE DEPENDENCE, UNCOMPLICATED: ICD-10-CM

## 2019-08-13 DIAGNOSIS — E11.00 TYPE 2 DIABETES MELLITUS WITH HYPEROSMOLARITY WITHOUT COMA, WITHOUT LONG-TERM CURRENT USE OF INSULIN (HCC): ICD-10-CM

## 2019-08-13 DIAGNOSIS — I10 ESSENTIAL HYPERTENSION: ICD-10-CM

## 2019-08-13 DIAGNOSIS — Z79.4 TYPE 2 DIABETES MELLITUS WITH DIABETIC POLYNEUROPATHY, WITH LONG-TERM CURRENT USE OF INSULIN (HCC): ICD-10-CM

## 2019-08-13 PROCEDURE — 99204 OFFICE O/P NEW MOD 45 MIN: CPT | Performed by: FAMILY MEDICINE

## 2019-08-13 RX ORDER — LANCETS 28 GAUGE
EACH MISCELLANEOUS
Qty: 100 EACH | Refills: 5 | Status: SHIPPED | OUTPATIENT
Start: 2019-08-13 | End: 2019-09-04 | Stop reason: SDUPTHER

## 2019-08-13 RX ORDER — NICOTINE 21 MG/24HR
1 PATCH, TRANSDERMAL 24 HOURS TRANSDERMAL EVERY 24 HOURS
Qty: 28 PATCH | Refills: 0 | Status: SHIPPED | OUTPATIENT
Start: 2019-08-13 | End: 2020-02-03 | Stop reason: HOSPADM

## 2019-08-13 RX ORDER — BLOOD-GLUCOSE METER
KIT MISCELLANEOUS 3 TIMES DAILY
Qty: 1 EACH | Refills: 0 | Status: SHIPPED | OUTPATIENT
Start: 2019-08-13 | End: 2020-03-04 | Stop reason: SDUPTHER

## 2019-08-13 NOTE — ASSESSMENT & PLAN NOTE
Patient has osteoarthritis  He requested a topical treatment  He was prescribed Voltaren gel, which she can apply to both knees

## 2019-08-13 NOTE — ASSESSMENT & PLAN NOTE
Lab Results   Component Value Date    HGBA1C 8 4 (H) 07/10/2019    Patient has been referred to Podiatry    No results for input(s): POCGLU in the last 72 hours      Blood Sugar Average: Last 72 hrs:

## 2019-08-13 NOTE — ASSESSMENT & PLAN NOTE
Patient has history of peripheral vascular disease  Again, he is high risk for further limb loss  I strongly advised the patient to stop smoking  I counseled the patient regarding smoking cessation  He was started on nicotine patches 21 mg per 24 hours    When I see him next month, if he is refraining from smoking, I will decrease the dose to the 14 mg patch

## 2019-08-13 NOTE — PATIENT INSTRUCTIONS
Foot Care for People with Diabetes   AMBULATORY CARE:   What you need to know about foot care:   · Foot care helps protect your feet and prevent foot ulcers or sores  Long-term high blood sugar levels can damage the blood vessels and nerves in your legs and feet  This damage makes it hard to feel pressure, pain, temperature, and touch  You may not be able to feel a cut or sore, or shoes that are too tight  Foot care is needed to prevent serious problems, such as an infection or amputation  · Diabetes may cause your toes to become crooked or curved under  These changes may affect the way you walk and can lead to increased pressure on your foot  The pressure can decrease blood flow to your feet  Lack of blood flow increases your risk for a foot ulcer  Do not ignore small problems, such as dry skin or small wounds  These can become life-threatening over time without proper care  Contact your healthcare provider if:   · Your feet become numb, weak, or hard to move  · You have pus draining from a sore on your foot  · You have a wound on your foot that gets bigger, deeper, or does not heal      · You see blisters, cuts, scratches, calluses, or sores on your foot  · You have a fever, and your feet become red, warm, and swollen  · Your toenails become thick, curled, or yellow  · You find it hard to check your feet because your vision is poor  · You have questions or concerns about your condition or care  How to care for your feet:   · Check your feet each day  Look at your whole foot, including the bottom, and between and under your toes  Check for wounds, corns, and calluses  Use a mirror to see the bottom of your feet  The skin on your feet may be shiny, tight, or darker than normal  Your feet may also be cold and pale  Feel your feet by running your hands along the tops, bottoms, sides, and between your toes   Redness, swelling, and warmth are signs of blood flow problems that can lead to a foot ulcer  Do not try to remove corns or calluses yourself  · Wash your feet each day with soap and warm water  Do not use hot water, because this can injure your foot  Dry your feet gently with a towel after you wash them  Dry between and under your toes  · Apply lotion or a moisturizer on your dry feet  Ask your healthcare provider what lotions are best to use  Do not put lotion or moisturizer between your toes  · Cut your toenails correctly  File or cut your toenails straight across  Use a soft brush to clean around your toenails  If your toenails are very thick, you may need to have a healthcare provider or specialist cut them  · Protect your feet  Do not walk barefoot or wear your shoes without socks  Check your shoes for rocks or other objects that can hurt your feet  Wear cotton socks to help keep your feet dry  Wear socks without toe seams, or wear them with the seams inside out  Change your socks each day  Do not wear socks that are dirty or damp  · Wear shoes that fit well  Wear shoes that do not rub against any area of your feet  Your shoes should be ½ to ¾ inch (1 to 2 centimeters) longer than your feet  Your shoes should also have extra space around the widest part of your feet  Walking or athletic shoes with laces or straps that adjust are best  Ask your healthcare provider for help to choose shoes that fit you best  Ask him if you need to wear an insert, orthotic, or bandage on your feet  · Go to your follow-up visits  Your healthcare provider will do a foot exam at least once a year  You may need a foot exam more often if you have nerve damage, foot deformities, or ulcers  He will check for nerve damage and how well you can feel your feet  He will check your shoes to see if they fit well  Follow up with your healthcare provider or foot specialist as directed: You will need to have your feet checked at least once a year   You may need a foot exam more often if you have nerve damage, foot deformities, or ulcers  Write down your questions so you remember to ask them during your visits  © 2017 [employer] Information is for End User's use only and may not be sold, redistributed or otherwise used for commercial purposes  All illustrations and images included in CareNotes® are the copyrighted property of [employer]  or [employer]  The above information is an  only  It is not intended as medical advice for individual conditions or treatments  Talk to your doctor, nurse or pharmacist before following any medical regimen to see if it is safe and effective for you

## 2019-08-13 NOTE — ASSESSMENT & PLAN NOTE
We discussed his psychiatric diagnoses  He had some concerns about his Cymbalta dose  He has an appointment to see Psychiatry tomorrow    I asked him to discuss these concerns with his psychiatrist

## 2019-08-13 NOTE — PROGRESS NOTES
Assessment/Plan:    Type 2 diabetes mellitus with diabetic polyneuropathy, with long-term current use of insulin (HCC)  Lab Results   Component Value Date    HGBA1C 8 4 (H) 07/10/2019    Patient has type 2 diabetes mellitus  In reviewing his history, it seems he has had fragmented care over the years as well as compliance issues  I recommended he begin checking his own blood sugars regularly  I ordered a freestyle glucometer is well as lancets and test strips  I asked him to check his blood sugars 3 times per day, fasting in the morning, 4:00 p m , and 10:00 p m     I referred him to a dietitian for dietary counseling  He was referred to Podiatry for routine diabetic foot care  He is at risk for further limb loss  He is a small ulceration present on the right foot which he reports is healing  He was also referred for or a dilated eye exam   I ordered fasting blood work  I scheduled him a follow-up visit in 4 weeks  No results for input(s): POCGLU in the last 72 hours  Blood Sugar Average: Last 72 hrs:      Essential hypertension  Blood pressure is currently under good control  He will continue lisinopril 10 mg daily  PVD (peripheral vascular disease) ([facility] [address] )  Patient has history of peripheral vascular disease  Again, he is high risk for further limb loss  I strongly advised the patient to stop smoking  I counseled the patient regarding smoking cessation  He was started on nicotine patches 21 mg per 24 hours  When I see him next month, if he is refraining from smoking, I will decrease the dose to the 14 mg patch    Diabetic ulcer of right midfoot (HCC)  Lab Results   Component Value Date    HGBA1C 8 4 (H) 07/10/2019    Patient has been referred to Podiatry    No results for input(s): POCGLU in the last 72 hours  Blood Sugar Average: Last 72 hrs:      Primary generalized (osteo)arthritis  Patient has osteoarthritis  He requested a topical treatment    He was prescribed Voltaren gel, which she can apply to both knees  Severe bipolar I disorder, most recent episode depressed without psychotic features ([facility] )  We discussed his psychiatric diagnoses  He had some concerns about his Cymbalta dose  He has an appointment to see Psychiatry tomorrow  I asked him to discuss these concerns with his psychiatrist        Diagnoses and all orders for this visit:    Screening for diabetic retinopathy  -     Ambulatory referral to Ophthalmology; Future    Type 2 diabetes mellitus with hyperosmolarity without coma, without long-term current use of insulin ([facility])  -     Microalbumin / creatinine urine ratio  -     Ambulatory referral to Podiatry; Future  -     Ambulatory referral to Nutrition Services; Future  -     Blood Glucose Monitoring Suppl (FREESTYLE LITE) KATIE; by Does not apply route 3 (three) times a day  -     glucose blood (FREESTYLE LITE) test strip; Use to check sugar three times a day  -     Lancets (FREESTYLE) lancets; Use to check blood sugar three times a day  -     Lipid panel; Future  -     TSH, 3rd generation with Free T4 reflex; Future  -     Hemoglobin A1C; Future  -     Comprehensive metabolic panel; Future    Schizoaffective disorder, bipolar type ([place] [address] )    Acquired absence of other right toe(s) ([place] [address] )    PVD (peripheral vascular disease) ([facility])    Cigarette nicotine dependence, uncomplicated  -     nicotine (NICODERM CQ) 21 mg/24 hr TD 24 hr patch; Place 1 patch on the skin every 24 hours    Primary generalized (osteo)arthritis  -     diclofenac sodium (VOLTAREN) 1 %;  Apply 2 g topically 4 (four) times a day    Type 2 diabetes mellitus with diabetic polyneuropathy, with long-term current use of insulin ([facility])    Essential hypertension    Diabetic ulcer of right midfoot associated with type 2 diabetes mellitus, limited to breakdown of skin ([facility])    Severe bipolar I disorder, most recent episode depressed without psychotic features ([place] [address] )        Tobacco Cessation Counseling: Tobacco cessation counseling and education was provided  The patient is sincerely urged to quit consumption of tobacco  He is ready to quit tobacco  The numerous health risks of tobacco consumption were discussed  Prescribed the following medications: nicotine patch  BMI Counseling: Body mass index is 38 5 kg/m²  Discussed the patient's BMI with him  The BMI is above average  BMI counseling and education was provided to the patient  Nutrition recommendations include reducing portion sizes, decreasing overall calorie intake, reducing fast food intake, consuming healthier snacks, decreasing soda and/or juice intake and moderation in carbohydrate intake  Patient does not drive but he does take the bus    Subjective:        Patient ID: Gladis Farfan is a 44 y o  male  This patient is a 81-year-old white male who presents to the office today to become established at this practice  The patient has a known history of diabetes mellitus as well as bipolar disorder  He had been living [place] in the [facility] area  He moved here approximately 2 weeks ago  He has had numerous psychiatric admissions over the years  His diabetes is poorly controlled and he has stopped his medications at times in the past   He reports at this point, he wants to try to get himself in shape and get his diabetes under control  The following portions of the patient's history were reviewed and updated as appropriate: allergies, current medications, past family history, past medical history, past social history, past surgical history and problem list     Review of Systems   Eyes: Negative for pain, itching and visual disturbance  Respiratory: Negative for cough, chest tightness, shortness of breath and wheezing  Cardiovascular: Positive for leg swelling  Negative for chest pain and palpitations  Gastrointestinal: Negative for abdominal distention, abdominal pain, blood in stool, constipation, diarrhea, nausea and vomiting     Endocrine: Positive for polydipsia, polyphagia and polyuria  Musculoskeletal: Positive for gait problem  Psychiatric/Behavioral: Positive for dysphoric mood  Negative for confusion, self-injury and suicidal ideas  The patient is not nervous/anxious  Patient does have history of suicidal thoughts as well as suicide attempts         Objective:      /68 (BP Location: Left arm, Patient Position: Sitting, Cuff Size: Adult)   Pulse 92   Ht 6' 3" (1 905 m)   Wt (!) 140 kg (308 lb)   SpO2 99%   BMI 38 50 kg/m²          Physical Exam   Constitutional:   Patient is an obese 27-year-old white male who appears his stated age  He was in no apparent distress   HENT:   Head: Normocephalic and atraumatic  Right Ear: External ear normal    Left Ear: External ear normal    Mouth/Throat: Oropharynx is clear and moist  No oropharyngeal exudate  Tympanic membranes were clear   Eyes: Pupils are equal, round, and reactive to light  Conjunctivae are normal  Right eye exhibits no discharge  Left eye exhibits no discharge  No scleral icterus  Neck: Neck supple  No tracheal deviation present  No thyromegaly present  Cardiovascular: Normal rate, regular rhythm and normal heart sounds  Exam reveals no gallop and no friction rub  Pulses are weak pulses  No murmur heard  Pulses:       Dorsalis pedis pulses are 0 on the right side, and 0 on the left side  Posterior tibial pulses are 0 on the right side, and 0 on the left side  Pulmonary/Chest: Effort normal and breath sounds normal  No stridor  No respiratory distress  He has no wheezes  He has no rales  Abdominal: Soft  Bowel sounds are normal  He exhibits no distension and no mass  There is no tenderness  There is no rebound and no guarding  There is a large midline scar present  There is also a large ventral hernia present which is reducible  Feet:   Right Foot: amputated  Skin Integrity: Positive for ulcer   Negative for skin breakdown, erythema, warmth, callus or dry skin  Left Foot: amputated  Skin Integrity: Negative for ulcer, skin breakdown, erythema, warmth, callus or dry skin  Lymphadenopathy:     He has no cervical adenopathy  Skin: Skin is warm and dry  No rash noted  Psychiatric: He has a normal mood and affect  His behavior is normal  Judgment and thought content normal    Vitals reviewed  extremities:  Without cyanosis or clubbing  There was trace to 1/4 lower extremity edema  Patient's shoes and socks removed  Right Foot/Ankle   Right Foot Inspection  Skin Exam: ulcer skin not intact, no dry skin, no warmth, no callus, no erythema, no maceration, no abnormal color, no pre-ulcer and no callus                        Amputation: amputation right foot (Comments: Transmetatarsal amputation)    Sensory   Vibration: absent    Monofilament testing: absent  Vascular    The right DP pulse is 0  The right PT pulse is 0  Right Toe  - Comprehensive Exam  Arch: pes planus    Left Foot/Ankle  Left Foot Inspection  Skin Exam: skin not intact, no dry skin, no warmth, no erythema, no maceration, normal color, no pre-ulcer, no ulcer and no callus                       Amputation: amputation left foot (Comments: Transmetatarsal amputation)                  Sensory   Vibration: absent    Monofilament: absent  Vascular    The left DP pulse is 0  The left PT pulse is 0  Left Toe  - Comprehensive Exam  Arch: pes planus  Assign Risk Category:  Deformity present;  Loss of protective sensation; Weak pulses       Risk: 3

## 2019-08-13 NOTE — ASSESSMENT & PLAN NOTE
Lab Results   Component Value Date    HGBA1C 8 4 (H) 07/10/2019    Patient has type 2 diabetes mellitus  In reviewing his history, it seems he has had fragmented care over the years as well as compliance issues  I recommended he begin checking his own blood sugars regularly  I ordered a freestyle glucometer is well as lancets and test strips  I asked him to check his blood sugars 3 times per day, fasting in the morning, 4:00 p m , and 10:00 p m     I referred him to a dietitian for dietary counseling  He was referred to Podiatry for routine diabetic foot care  He is at risk for further limb loss  He is a small ulceration present on the right foot which he reports is healing  He was also referred for or a dilated eye exam   I ordered fasting blood work  I scheduled him a follow-up visit in 4 weeks  No results for input(s): POCGLU in the last 72 hours      Blood Sugar Average: Last 72 hrs:

## 2019-08-14 ENCOUNTER — OFFICE VISIT (OUTPATIENT)
Dept: PSYCHIATRY | Facility: CLINIC | Age: 44
End: 2019-08-14
Payer: MEDICARE

## 2019-08-14 DIAGNOSIS — I10 ESSENTIAL HYPERTENSION: ICD-10-CM

## 2019-08-14 DIAGNOSIS — E11.9 DM (DIABETES MELLITUS), TYPE 2 (HCC): ICD-10-CM

## 2019-08-14 DIAGNOSIS — F31.81 BIPOLAR 2 DISORDER (HCC): ICD-10-CM

## 2019-08-14 PROCEDURE — 90792 PSYCH DIAG EVAL W/MED SRVCS: CPT | Performed by: HOSPITALIST

## 2019-08-14 RX ORDER — SERTRALINE HYDROCHLORIDE 25 MG/1
25 TABLET, FILM COATED ORAL DAILY
Qty: 30 TABLET | Refills: 1 | Status: SHIPPED | OUTPATIENT
Start: 2019-08-14 | End: 2020-02-03 | Stop reason: HOSPADM

## 2019-08-14 RX ORDER — BUSPIRONE HYDROCHLORIDE 10 MG/1
10 TABLET ORAL 2 TIMES DAILY
Qty: 60 TABLET | Refills: 2 | Status: ON HOLD | OUTPATIENT
Start: 2019-08-14 | End: 2020-02-03

## 2019-08-14 RX ORDER — TRAZODONE HYDROCHLORIDE 50 MG/1
50 TABLET ORAL
Qty: 30 TABLET | Refills: 2 | Status: SHIPPED | OUTPATIENT
Start: 2019-08-14 | End: 2020-02-03 | Stop reason: HOSPADM

## 2019-08-14 RX ORDER — PALIPERIDONE 3 MG/1
3 TABLET, EXTENDED RELEASE ORAL DAILY
Qty: 30 TABLET | Refills: 2 | Status: SHIPPED | OUTPATIENT
Start: 2019-08-14 | End: 2019-09-05 | Stop reason: SDUPTHER

## 2019-08-14 RX ORDER — GLYBURIDE 1.25 MG/1
1.25 TABLET ORAL
Qty: 30 TABLET | Refills: 5 | Status: SHIPPED | OUTPATIENT
Start: 2019-08-14 | End: 2019-09-05 | Stop reason: CLARIF

## 2019-08-14 RX ORDER — LISINOPRIL 10 MG/1
10 TABLET ORAL DAILY
Qty: 30 TABLET | Refills: 5 | Status: SHIPPED | OUTPATIENT
Start: 2019-08-14 | End: 2020-02-03 | Stop reason: HOSPADM

## 2019-08-14 RX ORDER — BENZTROPINE MESYLATE 1 MG/1
1 TABLET ORAL 2 TIMES DAILY
Qty: 60 TABLET | Refills: 2 | Status: ON HOLD | OUTPATIENT
Start: 2019-08-14 | End: 2020-02-03

## 2019-08-14 RX ORDER — CHLORPROMAZINE HYDROCHLORIDE 50 MG/1
50 TABLET, FILM COATED ORAL 3 TIMES DAILY
Qty: 90 TABLET | Refills: 2 | Status: SHIPPED | OUTPATIENT
Start: 2019-08-14 | End: 2019-09-05 | Stop reason: SDUPTHER

## 2019-08-14 RX ORDER — GABAPENTIN 300 MG/1
600 CAPSULE ORAL 3 TIMES DAILY
Qty: 180 CAPSULE | Refills: 2 | Status: SHIPPED | OUTPATIENT
Start: 2019-08-14 | End: 2020-02-03 | Stop reason: HOSPADM

## 2019-08-14 RX ORDER — DIVALPROEX SODIUM 500 MG/1
2000 TABLET, EXTENDED RELEASE ORAL EVERY EVENING
Qty: 120 TABLET | Refills: 2 | Status: SHIPPED | OUTPATIENT
Start: 2019-08-14 | End: 2020-02-03 | Stop reason: HOSPADM

## 2019-08-14 NOTE — PSYCH
Treatment Plan Tracking    # 0Treatment Plan not completed within required time limits due to: Treatment Plan not completed within required time limits due to :    Due to time limitations due to lengthy discussion of treatment options, medication education and counseling  Salma English
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
## 2019-08-15 ENCOUNTER — APPOINTMENT (OUTPATIENT)
Dept: LAB | Facility: HOSPITAL | Age: 44
End: 2019-08-15
Attending: FAMILY MEDICINE
Payer: MEDICARE

## 2019-08-15 DIAGNOSIS — E11.00 TYPE 2 DIABETES MELLITUS WITH HYPEROSMOLARITY WITHOUT COMA, WITHOUT LONG-TERM CURRENT USE OF INSULIN (HCC): ICD-10-CM

## 2019-08-15 DIAGNOSIS — F31.81 BIPOLAR 2 DISORDER (HCC): ICD-10-CM

## 2019-08-15 DIAGNOSIS — Z79.4 TYPE 2 DIABETES MELLITUS WITH DIABETIC POLYNEUROPATHY, WITH LONG-TERM CURRENT USE OF INSULIN (HCC): Primary | ICD-10-CM

## 2019-08-15 DIAGNOSIS — E11.42 TYPE 2 DIABETES MELLITUS WITH DIABETIC POLYNEUROPATHY, WITH LONG-TERM CURRENT USE OF INSULIN (HCC): Primary | ICD-10-CM

## 2019-08-15 LAB
ALBUMIN SERPL BCP-MCNC: 3.4 G/DL (ref 3.5–5)
ALP SERPL-CCNC: 55 U/L (ref 46–116)
ALT SERPL W P-5'-P-CCNC: 17 U/L (ref 12–78)
ANION GAP SERPL CALCULATED.3IONS-SCNC: 8 MMOL/L (ref 4–13)
AST SERPL W P-5'-P-CCNC: 15 U/L (ref 5–45)
BILIRUB SERPL-MCNC: 0.4 MG/DL (ref 0.2–1)
BUN SERPL-MCNC: 22 MG/DL (ref 5–25)
CALCIUM SERPL-MCNC: 9 MG/DL (ref 8.3–10.1)
CHLORIDE SERPL-SCNC: 103 MMOL/L (ref 100–108)
CHOLEST SERPL-MCNC: 159 MG/DL (ref 50–200)
CO2 SERPL-SCNC: 27 MMOL/L (ref 21–32)
CREAT SERPL-MCNC: 1.21 MG/DL (ref 0.6–1.3)
CREAT UR-MCNC: 119 MG/DL
GFR SERPL CREATININE-BSD FRML MDRD: 73 ML/MIN/1.73SQ M
GLUCOSE P FAST SERPL-MCNC: 133 MG/DL (ref 65–99)
HDLC SERPL-MCNC: 38 MG/DL (ref 40–60)
LDLC SERPL CALC-MCNC: 100 MG/DL (ref 0–100)
MICROALBUMIN UR-MCNC: 41.5 MG/L (ref 0–20)
MICROALBUMIN/CREAT 24H UR: 35 MG/G CREATININE (ref 0–30)
NONHDLC SERPL-MCNC: 121 MG/DL
POTASSIUM SERPL-SCNC: 4.6 MMOL/L (ref 3.5–5.3)
PROT SERPL-MCNC: 7.7 G/DL (ref 6.4–8.2)
SODIUM SERPL-SCNC: 138 MMOL/L (ref 136–145)
TRIGL SERPL-MCNC: 105 MG/DL
TSH SERPL DL<=0.05 MIU/L-ACNC: 0.63 UIU/ML (ref 0.36–3.74)
VALPROATE SERPL-MCNC: 74 UG/ML (ref 50–100)

## 2019-08-15 PROCEDURE — 80061 LIPID PANEL: CPT

## 2019-08-15 PROCEDURE — 36415 COLL VENOUS BLD VENIPUNCTURE: CPT

## 2019-08-15 PROCEDURE — 80053 COMPREHEN METABOLIC PANEL: CPT

## 2019-08-15 PROCEDURE — 82043 UR ALBUMIN QUANTITATIVE: CPT | Performed by: FAMILY MEDICINE

## 2019-08-15 PROCEDURE — 80164 ASSAY DIPROPYLACETIC ACD TOT: CPT

## 2019-08-15 PROCEDURE — 84443 ASSAY THYROID STIM HORMONE: CPT

## 2019-08-15 PROCEDURE — 82570 ASSAY OF URINE CREATININE: CPT | Performed by: FAMILY MEDICINE

## 2019-08-16 DIAGNOSIS — E78.5 DYSLIPIDEMIA: Primary | ICD-10-CM

## 2019-08-16 RX ORDER — ROSUVASTATIN CALCIUM 10 MG/1
10 TABLET, COATED ORAL DAILY
Qty: 30 TABLET | Refills: 5 | Status: SHIPPED | OUTPATIENT
Start: 2019-08-16 | End: 2019-09-05 | Stop reason: SDUPTHER

## 2019-08-26 ENCOUNTER — HOSPITAL ENCOUNTER (OUTPATIENT)
Dept: RADIOLOGY | Facility: HOSPITAL | Age: 44
Discharge: HOME | End: 2019-08-26
Payer: MEDICARE

## 2019-08-26 ENCOUNTER — TRANSCRIBE ORDERS (OUTPATIENT)
Dept: ADMINISTRATIVE | Facility: HOSPITAL | Age: 44
End: 2019-08-26

## 2019-08-26 DIAGNOSIS — M86.9 OSTEOMYELITIS, UNSPECIFIED SITE, UNSPECIFIED TYPE (HCC): ICD-10-CM

## 2019-08-26 DIAGNOSIS — M86.9 OSTEOMYELITIS, UNSPECIFIED SITE, UNSPECIFIED TYPE (HCC): Primary | ICD-10-CM

## 2019-08-26 PROCEDURE — 73630 X-RAY EXAM OF FOOT: CPT

## 2019-08-28 LAB
LEFT EYE DIABETIC RETINOPATHY: NORMAL
RIGHT EYE DIABETIC RETINOPATHY: NORMAL

## 2019-09-04 DIAGNOSIS — E11.00 TYPE 2 DIABETES MELLITUS WITH HYPEROSMOLARITY WITHOUT COMA, WITHOUT LONG-TERM CURRENT USE OF INSULIN (HCC): ICD-10-CM

## 2019-09-04 DIAGNOSIS — F31.81 BIPOLAR 2 DISORDER (HCC): ICD-10-CM

## 2019-09-04 RX ORDER — CHLORPROMAZINE HYDROCHLORIDE 50 MG/1
TABLET, FILM COATED ORAL
Qty: 90 TABLET | Refills: 0 | OUTPATIENT
Start: 2019-09-04

## 2019-09-05 DIAGNOSIS — E11.9 DM (DIABETES MELLITUS), TYPE 2 (HCC): ICD-10-CM

## 2019-09-05 DIAGNOSIS — I10 ESSENTIAL HYPERTENSION: ICD-10-CM

## 2019-09-05 DIAGNOSIS — F31.81 BIPOLAR 2 DISORDER (HCC): ICD-10-CM

## 2019-09-05 DIAGNOSIS — E78.5 DYSLIPIDEMIA: ICD-10-CM

## 2019-09-05 RX ORDER — LANCETS 28 GAUGE
EACH MISCELLANEOUS
Qty: 100 EACH | Refills: 5 | Status: SHIPPED | OUTPATIENT
Start: 2019-09-05 | End: 2020-03-04 | Stop reason: SDUPTHER

## 2019-09-05 RX ORDER — PALIPERIDONE 3 MG/1
3 TABLET, EXTENDED RELEASE ORAL DAILY
Qty: 30 TABLET | Refills: 2 | Status: SHIPPED | OUTPATIENT
Start: 2019-09-05 | End: 2019-09-06 | Stop reason: SDUPTHER

## 2019-09-05 RX ORDER — CHLORPROMAZINE HYDROCHLORIDE 50 MG/1
50 TABLET, FILM COATED ORAL 3 TIMES DAILY
Qty: 90 TABLET | Refills: 2 | Status: ON HOLD | OUTPATIENT
Start: 2019-09-05 | End: 2020-02-03

## 2019-09-05 RX ORDER — GLIMEPIRIDE 1 MG/1
1 TABLET ORAL
Qty: 30 TABLET | Refills: 5 | Status: ON HOLD | OUTPATIENT
Start: 2019-09-05 | End: 2020-02-03 | Stop reason: SDUPTHER

## 2019-09-05 RX ORDER — ROSUVASTATIN CALCIUM 10 MG/1
10 TABLET, COATED ORAL DAILY
Qty: 30 TABLET | Refills: 5 | Status: SHIPPED | OUTPATIENT
Start: 2019-09-05 | End: 2020-02-03 | Stop reason: HOSPADM

## 2019-09-05 NOTE — TELEPHONE ENCOUNTER
Patient stated he can not get in touch with his pychiatry office would you be willing to  Fill these RX?

## 2019-09-06 DIAGNOSIS — F31.81 BIPOLAR 2 DISORDER (HCC): ICD-10-CM

## 2019-09-06 RX ORDER — PALIPERIDONE 3 MG/1
TABLET, EXTENDED RELEASE ORAL
Qty: 90 TABLET | Refills: 2 | Status: SHIPPED | OUTPATIENT
Start: 2019-09-06 | End: 2020-02-03 | Stop reason: HOSPADM

## 2019-09-11 ENCOUNTER — TELEPHONE (OUTPATIENT)
Dept: BEHAVIORAL/MENTAL HEALTH CLINIC | Facility: CLINIC | Age: 44
End: 2019-09-11

## 2019-09-11 NOTE — TELEPHONE ENCOUNTER
Provider called patient to reschedule his missed appointment on this day  Provider left message with rescheduling information

## 2020-01-17 ENCOUNTER — HOSPITAL ENCOUNTER (INPATIENT)
Facility: HOSPITAL | Age: 45
LOS: 1 days | Discharge: HOME | DRG: 876 | End: 2020-01-18
Attending: EMERGENCY MEDICINE | Admitting: PSYCHIATRY & NEUROLOGY
Payer: MEDICARE

## 2020-01-17 ENCOUNTER — APPOINTMENT (EMERGENCY)
Dept: CT IMAGING | Facility: HOSPITAL | Age: 45
DRG: 876 | End: 2020-01-17
Payer: MEDICARE

## 2020-01-17 DIAGNOSIS — E11.9 DIABETES (HCC): ICD-10-CM

## 2020-01-17 DIAGNOSIS — F32.A DEPRESSION: Primary | ICD-10-CM

## 2020-01-17 DIAGNOSIS — R45.851 SUICIDAL IDEATIONS: ICD-10-CM

## 2020-01-17 DIAGNOSIS — Z91.19 NON-COMPLIANCE: ICD-10-CM

## 2020-01-17 DIAGNOSIS — I10 HYPERTENSION: ICD-10-CM

## 2020-01-17 LAB
ALBUMIN SERPL BCP-MCNC: 4.3 G/DL (ref 3–5.2)
ALP SERPL-CCNC: 81 U/L (ref 43–122)
ALT SERPL W P-5'-P-CCNC: 23 U/L (ref 9–52)
AMPHETAMINES SERPL QL SCN: NEGATIVE
ANION GAP SERPL CALCULATED.3IONS-SCNC: 12 MMOL/L (ref 5–14)
ANISOCYTOSIS BLD QL SMEAR: PRESENT
AST SERPL W P-5'-P-CCNC: 16 U/L (ref 17–59)
BACTERIA UR QL AUTO: ABNORMAL /HPF
BARBITURATES UR QL: NEGATIVE
BENZODIAZ UR QL: NEGATIVE
BILIRUB SERPL-MCNC: 0.9 MG/DL
BILIRUB UR QL STRIP: NEGATIVE
BUN SERPL-MCNC: 13 MG/DL (ref 5–25)
CALCIUM SERPL-MCNC: 9.4 MG/DL (ref 8.4–10.2)
CHLORIDE SERPL-SCNC: 94 MMOL/L (ref 97–108)
CLARITY UR: ABNORMAL
CO2 SERPL-SCNC: 24 MMOL/L (ref 22–30)
COCAINE UR QL: NEGATIVE
COLOR UR: YELLOW
CREAT SERPL-MCNC: 1.2 MG/DL (ref 0.7–1.5)
ERYTHROCYTE [DISTWIDTH] IN BLOOD BY AUTOMATED COUNT: 14.3 %
ETHANOL SERPL-MCNC: <10 MG/DL (ref 0–10)
FLUAV RNA NPH QL NAA+PROBE: NORMAL
FLUBV RNA NPH QL NAA+PROBE: NORMAL
GFR SERPL CREATININE-BSD FRML MDRD: 74 ML/MIN/1.73SQ M
GLUCOSE SERPL-MCNC: 253 MG/DL (ref 65–140)
GLUCOSE SERPL-MCNC: 328 MG/DL (ref 70–99)
GLUCOSE UR STRIP-MCNC: ABNORMAL MG/DL
HCT VFR BLD AUTO: 45.4 % (ref 41–53)
HGB BLD-MCNC: 15.3 G/DL (ref 13.5–17.5)
HGB UR QL STRIP.AUTO: 250
KETONES UR STRIP-MCNC: ABNORMAL MG/DL
LEUKOCYTE ESTERASE UR QL STRIP: NEGATIVE
LYMPHOCYTES # BLD AUTO: 0.92 THOUSAND/UL (ref 0.5–4)
LYMPHOCYTES # BLD AUTO: 7 % (ref 25–45)
MCH RBC QN AUTO: 26.9 PG (ref 26–34)
MCHC RBC AUTO-ENTMCNC: 33.7 G/DL (ref 31–36)
MCV RBC AUTO: 80 FL (ref 80–100)
METHADONE UR QL: NEGATIVE
MICROCYTES BLD QL AUTO: PRESENT
MONOCYTES # BLD AUTO: 0.53 THOUSAND/UL (ref 0.2–0.9)
MONOCYTES NFR BLD AUTO: 4 % (ref 1–10)
NEUTS BAND NFR BLD MANUAL: 3 % (ref 0–8)
NEUTS SEG # BLD: 11.75 THOUSAND/UL (ref 1.8–7.8)
NEUTS SEG NFR BLD AUTO: 86 %
NITRITE UR QL STRIP: NEGATIVE
NON-SQ EPI CELLS URNS QL MICRO: ABNORMAL /HPF
OPIATES UR QL SCN: NEGATIVE
PCP UR QL: NEGATIVE
PH UR STRIP.AUTO: 7 [PH]
PLATELET # BLD AUTO: 216 THOUSANDS/UL (ref 150–450)
PLATELET BLD QL SMEAR: ADEQUATE
PMV BLD AUTO: 8.2 FL (ref 8.9–12.7)
POTASSIUM SERPL-SCNC: 4.6 MMOL/L (ref 3.6–5)
PROT SERPL-MCNC: 7.9 G/DL (ref 5.9–8.4)
PROT UR STRIP-MCNC: >=500 MG/DL
RBC # BLD AUTO: 5.7 MILLION/UL (ref 4.5–5.9)
RBC #/AREA URNS AUTO: ABNORMAL /HPF
RBC MORPH BLD: ABNORMAL
RSV RNA NPH QL NAA+PROBE: NORMAL
SODIUM SERPL-SCNC: 130 MMOL/L (ref 137–147)
SP GR UR STRIP.AUTO: 1 (ref 1–1.04)
THC UR QL: NEGATIVE
TOTAL CELLS COUNTED SPEC: 100
TROPONIN I SERPL-MCNC: 0.02 NG/ML (ref 0–0.03)
UROBILINOGEN UA: NEGATIVE MG/DL
VALPROATE SERPL-MCNC: 38.7 UG/ML (ref 50–120)
WBC # BLD AUTO: 13.2 THOUSAND/UL (ref 4.5–11)
WBC #/AREA URNS AUTO: ABNORMAL /HPF

## 2020-01-17 PROCEDURE — 84484 ASSAY OF TROPONIN QUANT: CPT | Performed by: EMERGENCY MEDICINE

## 2020-01-17 PROCEDURE — 36415 COLL VENOUS BLD VENIPUNCTURE: CPT | Performed by: EMERGENCY MEDICINE

## 2020-01-17 PROCEDURE — 80053 COMPREHEN METABOLIC PANEL: CPT | Performed by: EMERGENCY MEDICINE

## 2020-01-17 PROCEDURE — 93005 ELECTROCARDIOGRAM TRACING: CPT

## 2020-01-17 PROCEDURE — 87631 RESP VIRUS 3-5 TARGETS: CPT | Performed by: EMERGENCY MEDICINE

## 2020-01-17 PROCEDURE — NC001 PR NO CHARGE: Performed by: EMERGENCY MEDICINE

## 2020-01-17 PROCEDURE — 80164 ASSAY DIPROPYLACETIC ACD TOT: CPT | Performed by: EMERGENCY MEDICINE

## 2020-01-17 PROCEDURE — 81001 URINALYSIS AUTO W/SCOPE: CPT | Performed by: EMERGENCY MEDICINE

## 2020-01-17 PROCEDURE — 96361 HYDRATE IV INFUSION ADD-ON: CPT

## 2020-01-17 PROCEDURE — 82948 REAGENT STRIP/BLOOD GLUCOSE: CPT

## 2020-01-17 PROCEDURE — 99285 EMERGENCY DEPT VISIT HI MDM: CPT

## 2020-01-17 PROCEDURE — 80320 DRUG SCREEN QUANTALCOHOLS: CPT | Performed by: EMERGENCY MEDICINE

## 2020-01-17 PROCEDURE — 96372 THER/PROPH/DIAG INJ SC/IM: CPT

## 2020-01-17 PROCEDURE — 74177 CT ABD & PELVIS W/CONTRAST: CPT

## 2020-01-17 PROCEDURE — 96360 HYDRATION IV INFUSION INIT: CPT

## 2020-01-17 PROCEDURE — 85007 BL SMEAR W/DIFF WBC COUNT: CPT | Performed by: EMERGENCY MEDICINE

## 2020-01-17 PROCEDURE — 80307 DRUG TEST PRSMV CHEM ANLYZR: CPT | Performed by: EMERGENCY MEDICINE

## 2020-01-17 PROCEDURE — 99285 EMERGENCY DEPT VISIT HI MDM: CPT | Performed by: EMERGENCY MEDICINE

## 2020-01-17 PROCEDURE — 85027 COMPLETE CBC AUTOMATED: CPT | Performed by: EMERGENCY MEDICINE

## 2020-01-17 RX ORDER — LISINOPRIL 10 MG/1
10 TABLET ORAL ONCE
Status: COMPLETED | OUTPATIENT
Start: 2020-01-17 | End: 2020-01-17

## 2020-01-17 RX ORDER — IBUPROFEN 400 MG/1
800 TABLET ORAL ONCE
Status: COMPLETED | OUTPATIENT
Start: 2020-01-17 | End: 2020-01-17

## 2020-01-17 RX ORDER — ACETAMINOPHEN 325 MG/1
650 TABLET ORAL ONCE
Status: COMPLETED | OUTPATIENT
Start: 2020-01-17 | End: 2020-01-17

## 2020-01-17 RX ORDER — LORAZEPAM 0.5 MG/1
1 TABLET ORAL ONCE
Status: COMPLETED | OUTPATIENT
Start: 2020-01-17 | End: 2020-01-17

## 2020-01-17 RX ORDER — METOPROLOL TARTRATE 50 MG/1
25 TABLET, FILM COATED ORAL ONCE
Status: COMPLETED | OUTPATIENT
Start: 2020-01-17 | End: 2020-01-17

## 2020-01-17 RX ORDER — ACETAMINOPHEN 325 MG/1
975 TABLET ORAL ONCE
Status: COMPLETED | OUTPATIENT
Start: 2020-01-17 | End: 2020-01-17

## 2020-01-17 RX ADMIN — LORAZEPAM 1 MG: 0.5 TABLET ORAL at 16:34

## 2020-01-17 RX ADMIN — INSULIN HUMAN 10 UNITS: 100 INJECTION, SOLUTION PARENTERAL at 16:45

## 2020-01-17 RX ADMIN — IBUPROFEN 800 MG: 400 TABLET ORAL at 21:09

## 2020-01-17 RX ADMIN — LISINOPRIL 10 MG: 10 TABLET ORAL at 16:34

## 2020-01-17 RX ADMIN — METFORMIN HYDROCHLORIDE 1000 MG: 500 TABLET ORAL at 16:38

## 2020-01-17 RX ADMIN — METOPROLOL TARTRATE 25 MG: 50 TABLET, FILM COATED ORAL at 16:34

## 2020-01-17 RX ADMIN — ACETAMINOPHEN 975 MG: 325 TABLET ORAL at 21:09

## 2020-01-17 RX ADMIN — IOHEXOL 100 ML: 350 INJECTION, SOLUTION INTRAVENOUS at 23:17

## 2020-01-17 RX ADMIN — ACETAMINOPHEN 650 MG: 325 TABLET ORAL at 18:43

## 2020-01-17 RX ADMIN — SODIUM CHLORIDE 1000 ML: 0.9 INJECTION, SOLUTION INTRAVENOUS at 22:51

## 2020-01-17 NOTE — ED PROVIDER NOTES
History  Chief Complaint   Patient presents with    Psychiatric Evaluation     "I haven't taken my meds for about 1 1/2 weeks and I need to be stabilized "  + SI  denies HI  + AH   " can't fill the meds, I don't have a co-pay"     18-year-old gentleman presents for psychiatric evaluation  He reports that he has been off his medications for the past 1-2 weeks and is having difficulty functioning  He is having severe depression with intermittent thoughts of self-harm, auditory hallucinations, insomnia, and decreased appetite  He denies drug or alcohol abuse  He states he has also not been taking his medications for his blood pressure or diabetes  He denies any recent acute medical concerns  Psychiatric Evaluation   Presenting symptoms: depression, hallucinations and suicidal thoughts    Onset quality:  Gradual  Timing:  Constant  Progression:  Worsening  Chronicity:  Recurrent  Context: noncompliance    Treatment compliance:  Untreated  Relieved by:  Nothing  Worsened by:  Nothing  Ineffective treatments:  None tried  Associated symptoms: anxiety, feelings of worthlessness and poor judgment    Associated symptoms: no abdominal pain, no chest pain and no fatigue        Prior to Admission Medications   Prescriptions Last Dose Informant Patient Reported? Taking?    Blood Glucose Monitoring Suppl (FREESTYLE LITE) KATIE   No No   Sig: by Does not apply route 3 (three) times a day   Insulin Pen Needle (B-D ULTRAFINE III SHORT PEN) 31G X 8 MM MISC   Yes No   Sig: As directed   Lancets (FREESTYLE) lancets   No No   Sig: Use to check blood sugar three times a day   ONE TOUCH LANCETS MISC   Yes No   Sig: As directed   benztropine (COGENTIN) 1 mg tablet   No No   Sig: Take 1 tablet (1 mg total) by mouth 2 (two) times a day for 30 days   busPIRone (BUSPAR) 10 mg tablet   No No   Sig: Take 1 tablet (10 mg total) by mouth 2 (two) times a day for 30 days   chlorproMAZINE (THORAZINE) 50 mg tablet   No No   Sig: Take 1 tablet (50 mg total) by mouth 3 (three) times a day for 90 days   diclofenac sodium (VOLTAREN) 1 %   No No   Sig: Apply 2 g topically 4 (four) times a day   divalproex sodium (DEPAKOTE ER) 500 mg 24 hr tablet   No No   Sig: Take 4 tablets (2,000 mg total) by mouth every evening for 30 days   gabapentin (NEURONTIN) 300 mg capsule   No No   Sig: Take 2 capsules (600 mg total) by mouth 3 (three) times a day for 30 days   glimepiride (AMARYL) 1 mg tablet   No No   Sig: Take 1 tablet (1 mg total) by mouth daily with breakfast   glucose blood (FREESTYLE LITE) test strip   No No   Sig: Use to check sugar three times a day   insulin aspart (NovoLOG) 100 Units/mL injection pen   Yes No   Sig: As directed on discharge instructions   insulin glargine (LANTUS SOLOSTAR) 100 units/mL injection pen   Yes No   Sig: Inject 30 Units under the skin   lisinopril (ZESTRIL) 10 mg tablet   No No   Sig: Take 1 tablet (10 mg total) by mouth daily   metFORMIN (GLUCOPHAGE) 1000 MG tablet   No No   Sig: Take 1 tablet (1,000 mg total) by mouth 2 (two) times a day with meals   metoprolol tartrate (LOPRESSOR) 25 mg tablet   No No   Sig: Take 1 tablet (25 mg total) by mouth every 12 (twelve) hours   nicotine (NICODERM CQ) 21 mg/24 hr TD 24 hr patch   No No   Sig: Place 1 patch on the skin every 24 hours   paliperidone (INVEGA) 3 mg 24 hr tablet   No No   Sig: TAKE 1 TABLET(3 MG) BY MOUTH DAILY   rosuvastatin (CRESTOR) 10 MG tablet   No No   Sig: Take 1 tablet (10 mg total) by mouth daily   sertraline (ZOLOFT) 25 mg tablet   No No   Sig: Take 1 tablet (25 mg total) by mouth daily   traZODone (DESYREL) 50 mg tablet   No No   Sig: Take 1 tablet (50 mg total) by mouth daily at bedtime for 30 days      Facility-Administered Medications: None       Past Medical History:   Diagnosis Date    Anxiety     Bipolar 1 disorder (HCC)     Chronic pain disorder     Depression     Diabetes mellitus ([facility] [address] )     Hypertension     Psychiatric illness     PTSD (post-traumatic stress disorder)     Sleep difficulties     Substance abuse ([facility] )     Suicide attempt ([facility] )     Type 2 diabetes mellitus with diabetic polyneuropathy, with long-term current use of insulin ([facility])        Past Surgical History:   Procedure Laterality Date    APPENDECTOMY      COLON SURGERY      TOE AMPUTATION      TONSILLECTOMY         Family History   Problem Relation Age of Onset    Hypertension Mother     Diabetes Mother     Depression Mother    [facility] Leukemia Mother     No Known Problems Father     No Known Problems Sister     No Known Problems Brother     No Known Problems Maternal Aunt     No Known Problems Paternal Aunt     No Known Problems Maternal Uncle     No Known Problems Paternal Uncle     No Known Problems Maternal Grandfather     No Known Problems Maternal Grandmother     No Known Problems Paternal Grandfather     No Known Problems Paternal Grandmother     No Known Problems Cousin     ADD / ADHD Neg Hx     Alcohol abuse Neg Hx     Anxiety disorder Neg Hx     Bipolar disorder Neg Hx     Completed Suicide  Neg Hx     Dementia Neg Hx     Drug abuse Neg Hx     OCD Neg Hx     Psychiatric Illness Neg Hx     Psychosis Neg Hx     Schizoaffective Disorder  Neg Hx     Schizophrenia Neg Hx     Self-Injury Neg Hx     Suicide Attempts Neg Hx      I have reviewed and agree with the history as documented  Social History     Tobacco Use    Smoking status: Current Every Day Smoker     Packs/day: 1 50     Years: 22 00     Pack years: 33 00     Types: Cigarettes    Smokeless tobacco: Current User     Types: Chew   Substance Use Topics    Alcohol use: Yes     Frequency: Monthly or less     Drinks per session: 1 or 2     Binge frequency: Less than monthly    Drug use: Not Currently     Types: Marijuana     Comment: monthly        Review of Systems   Constitutional: Negative for activity change, chills, fatigue and fever  HENT: Negative    Negative for congestion, postnasal drip, rhinorrhea, sinus pain, sore throat and trouble swallowing  Eyes: Negative  Respiratory: Negative  Cardiovascular: Negative for chest pain  Gastrointestinal: Negative for abdominal pain, constipation, diarrhea, nausea and vomiting  Endocrine: Negative  Genitourinary: Negative  Musculoskeletal: Negative  Negative for arthralgias, back pain and myalgias  Skin: Negative  Allergic/Immunologic: Negative  Neurological: Negative  Hematological: Negative  Psychiatric/Behavioral: Positive for hallucinations, sleep disturbance and suicidal ideas  The patient is nervous/anxious  Physical Exam  Physical Exam   Constitutional: He is oriented to person, place, and time  He appears well-developed and well-nourished  No distress  HENT:   Head: Normocephalic and atraumatic  Mouth/Throat: Oropharynx is clear and moist    Eyes: Pupils are equal, round, and reactive to light  Conjunctivae are normal    Neck: Neck supple  Cardiovascular: Normal rate, regular rhythm and normal heart sounds  Pulmonary/Chest: Effort normal and breath sounds normal  No respiratory distress  Abdominal: Soft  Bowel sounds are normal  He exhibits no distension  There is no tenderness  There is no guarding  Musculoskeletal: Normal range of motion  He exhibits no edema  Neurological: He is alert and oriented to person, place, and time  Skin: Skin is warm and dry  Capillary refill takes less than 2 seconds  He is not diaphoretic  Psychiatric: He is withdrawn  He exhibits a depressed mood  He expresses suicidal ideation  He expresses suicidal plans  Flat affect     Nursing note and vitals reviewed        Vital Signs  ED Triage Vitals [01/17/20 1421]   Temperature Pulse Respirations Blood Pressure SpO2   99 5 °F (37 5 °C) (!) 122 20 (!) 172/103 97 %      Temp Source Heart Rate Source Patient Position - Orthostatic VS BP Location FiO2 (%)   Tympanic Monitor Sitting Left arm --      Pain Score       --           Vitals:    01/17/20 1421   BP: (!) 172/103   Pulse: (!) 122   Patient Position - Orthostatic VS: Sitting         Visual Acuity      ED Medications  Medications   LORazepam (ATIVAN) tablet 1 mg (has no administration in time range)       Diagnostic Studies  Results Reviewed     Procedure Component Value Units Date/Time    UA (URINE) with reflex to Scope [[id number]]  (Abnormal) Collected:  01/17/20 1557    Lab Status:  Final result Specimen:  Urine, Clean Catch Updated:  01/17/20 1613     Color, UA Yellow     Clarity, UA Slightly Cloudy     Specific [place], UA 1 005     pH, UA 7 0     Leukocytes, UA Negative     Nitrite, UA Negative     Protein, UA >=500 mg/dl      Glucose, UA >=1000 (1%) mg/dl      Ketones, UA 50 (2+) mg/dl      Bilirubin, UA Negative     Blood,  0     UROBILINOGEN UA Negative mg/dL     Urine Microscopic [[id number]] Collected:  01/17/20 1557    Lab Status: In process Specimen:  Urine, Clean Catch Updated:  01/17/20 1612    Troponin I [[id number]]  (Normal) Collected:  01/17/20 1520    Lab Status:  Final result Specimen:  Blood from Arm, Left Updated:  01/17/20 1602     Troponin I 0 02 ng/mL     Rapid drug screen, urine [[id number]] Collected:  01/17/20 1557    Lab Status:   In process Specimen:  Urine, Clean Catch Updated:  01/17/20 1602    Valproic acid level, total [[id number]]  (Abnormal) Collected:  01/17/20 1520    Lab Status:  Final result Specimen:  Blood from Arm, Left Updated:  01/17/20 1555     Valproic Acid, Total 38 7 ug/mL     Ethanol [[id number]]  (Normal) Collected:  01/17/20 1520    Lab Status:  Final result Specimen:  Blood from Arm, Left Updated:  01/17/20 1553     Ethanol Lvl <10 mg/dL     Comprehensive metabolic panel [[id number]]  (Abnormal) Collected:  01/17/20 1520    Lab Status:  Final result Specimen:  Blood from Arm, Left Updated:  01/17/20 1551     Sodium 130 mmol/L      Potassium 4 6 mmol/L      Chloride 94 mmol/L      CO2 24 mmol/L      ANION GAP 12 mmol/L      BUN 13 mg/dL      Creatinine 1 20 mg/dL      Glucose 328 mg/dL      Calcium 9 4 mg/dL      AST 16 U/L      ALT 23 U/L      Alkaline Phosphatase 81 U/L      Total Protein 7 9 g/dL      Albumin 4 3 g/dL      Total Bilirubin 0 90 mg/dL      eGFR 74 ml/min/1 73sq m     Narrative:       [place] guidelines for Chronic Kidney Disease (CKD):     Stage 1 with normal or high GFR (GFR > 90 mL/min/1 73 square meters)    Stage 2 Mild CKD (GFR = 60-89 mL/min/1 73 square meters)    Stage 3A Moderate CKD (GFR = 45-59 mL/min/1 73 square meters)    Stage 3B Moderate CKD (GFR = 30-44 mL/min/1 73 square meters)    Stage 4 Severe CKD (GFR = 15-29 mL/min/1 73 square meters)    Stage 5 End Stage CKD (GFR <15 mL/min/1 73 square meters)  Note: GFR calculation is accurate only with a steady state creatinine    CBC and differential [[id number]]  (Abnormal) Collected:  01/17/20 1520    Lab Status:  Final result Specimen:  Blood from Arm, Left Updated:  01/17/20 1538     WBC 13 20 Thousand/uL      RBC 5 70 Million/uL      Hemoglobin 15 3 g/dL      Hematocrit 45 4 %      MCV 80 fL      MCH 26 9 pg      MCHC 33 7 g/dL      RDW 14 3 %      MPV 8 2 fL      Platelets 391 Thousands/uL                  No orders to display              Procedures  ECG 12 Lead Documentation Only  Date/Time: 1/17/2020 4:18 PM  Performed by: Caridad Bose DO  Authorized by:  Caridad Bose DO     ECG reviewed by me, the ED Provider: yes    Patient location:  ED  Rate:     ECG rate:  104    ECG rate assessment: tachycardic    Rhythm:     Rhythm: sinus tachycardia    Ectopy:     Ectopy: none    QRS:     QRS axis:  Left  Conduction:     Conduction: normal    ST segments:     ST segments:  Normal  T waves:     T waves: non-specific               ED Course                               MDM      Disposition  Final diagnoses:   Depression   Suicidal ideations   Hypertension     Time reflects when diagnosis was documented in both MDM as applicable and the Disposition within this note     Time User Action Codes Description Comment    1/17/2020  4:18 PM Munira Garcia Add [F32 9] Depression     1/17/2020  4:18 PM Munira Garcia Add [P73 398] Suicidal ideations     1/17/2020  4:18 PM Galo Gutiérrez, [address] Hypertension       ED Disposition     None      Follow-up Information    None         Patient's Medications   Discharge Prescriptions    No medications on file     No discharge procedures on file      ED Provider  Electronically Signed by           Evelyn Almendarez DO  01/17/20 1006

## 2020-01-17 NOTE — ED NOTES
The patient was self-referred for decompensation of a bipolar condition  He stated he has been without his psychiatric medication for the past week due to overspending relative to his budget, then not being able to afford the copays  Patient is a fair historian  with some disorganization and tangential thoughts  He stated he has been increasingly depressed , now with plans to jump frpom a bridge  He has a history of a medically significant suicide attempt in 2014 by overdose of medication  Patient states he has been experiencing auditory hallucinations that discourage things that he knows are important to his health  He has been having difficulty sleepijng and has decreased appetite  He states, " I see red that is pain and anguish in a tailspin "  He has several obstacle to treatment as his providers are, per the patient, [facility]  He does not have a local pharmacy

## 2020-01-18 ENCOUNTER — APPOINTMENT (INPATIENT)
Dept: RADIOLOGY | Facility: HOSPITAL | Age: 45
DRG: 876 | End: 2020-01-18
Payer: MEDICARE

## 2020-01-18 ENCOUNTER — HOSPITAL ENCOUNTER (INPATIENT)
Facility: HOSPITAL | Age: 45
LOS: 5 days | DRG: 876 | End: 2020-01-23
Attending: FAMILY MEDICINE | Admitting: FAMILY MEDICINE
Payer: MEDICARE

## 2020-01-18 VITALS
OXYGEN SATURATION: 100 % | BODY MASS INDEX: 34.44 KG/M2 | DIASTOLIC BLOOD PRESSURE: 88 MMHG | SYSTOLIC BLOOD PRESSURE: 145 MMHG | HEIGHT: 75 IN | WEIGHT: 277 LBS | HEART RATE: 98 BPM | RESPIRATION RATE: 16 BRPM | TEMPERATURE: 102 F

## 2020-01-18 DIAGNOSIS — IMO0002 DM (DIABETES MELLITUS), SECONDARY UNCONTROLLED: ICD-10-CM

## 2020-01-18 DIAGNOSIS — L03.115 CELLULITIS OF RIGHT LOWER EXTREMITY: ICD-10-CM

## 2020-01-18 DIAGNOSIS — L97.411 DIABETIC ULCER OF RIGHT MIDFOOT ASSOCIATED WITH TYPE 2 DIABETES MELLITUS, LIMITED TO BREAKDOWN OF SKIN (HCC): ICD-10-CM

## 2020-01-18 DIAGNOSIS — E11.621 DIABETIC ULCER OF RIGHT MIDFOOT ASSOCIATED WITH TYPE 2 DIABETES MELLITUS, LIMITED TO BREAKDOWN OF SKIN (HCC): ICD-10-CM

## 2020-01-18 DIAGNOSIS — N17.0 ACUTE KIDNEY INJURY (AKI) WITH ACUTE TUBULAR NECROSIS (ATN) (HCC): ICD-10-CM

## 2020-01-18 DIAGNOSIS — F31.81 BIPOLAR 2 DISORDER (HCC): Primary | ICD-10-CM

## 2020-01-18 DIAGNOSIS — E11.9 DIABETES (HCC): ICD-10-CM

## 2020-01-18 PROBLEM — A41.9 SEPSIS (HCC): Status: ACTIVE | Noted: 2020-01-18

## 2020-01-18 PROBLEM — L03.90 CELLULITIS: Status: ACTIVE | Noted: 2020-01-18

## 2020-01-18 PROBLEM — F32.A DEPRESSION: Status: ACTIVE | Noted: 2020-01-18

## 2020-01-18 LAB
ALBUMIN SERPL BCP-MCNC: 3.7 G/DL (ref 3–5.2)
ALP SERPL-CCNC: 72 U/L (ref 43–122)
ALT SERPL W P-5'-P-CCNC: 18 U/L (ref 9–52)
ANION GAP SERPL CALCULATED.3IONS-SCNC: 8 MMOL/L (ref 5–14)
AST SERPL W P-5'-P-CCNC: 17 U/L (ref 17–59)
ATRIAL RATE: 104 BPM
BASOPHILS # BLD AUTO: 0 THOUSANDS/ΜL (ref 0–0.1)
BASOPHILS NFR BLD AUTO: 0 % (ref 0–1)
BILIRUB SERPL-MCNC: 0.7 MG/DL
BUN SERPL-MCNC: 14 MG/DL (ref 5–25)
CALCIUM SERPL-MCNC: 8.6 MG/DL (ref 8.4–10.2)
CHLORIDE SERPL-SCNC: 97 MMOL/L (ref 97–108)
CO2 SERPL-SCNC: 26 MMOL/L (ref 22–30)
CREAT SERPL-MCNC: 1.33 MG/DL (ref 0.7–1.5)
EOSINOPHIL # BLD AUTO: 0 THOUSAND/ΜL (ref 0–0.4)
EOSINOPHIL NFR BLD AUTO: 0 % (ref 0–6)
ERYTHROCYTE [DISTWIDTH] IN BLOOD BY AUTOMATED COUNT: 13.9 %
GFR SERPL CREATININE-BSD FRML MDRD: 65 ML/MIN/1.73SQ M
GLUCOSE SERPL-MCNC: 169 MG/DL (ref 65–140)
GLUCOSE SERPL-MCNC: 183 MG/DL (ref 65–140)
GLUCOSE SERPL-MCNC: 207 MG/DL (ref 65–140)
GLUCOSE SERPL-MCNC: 208 MG/DL (ref 65–140)
GLUCOSE SERPL-MCNC: 219 MG/DL (ref 65–140)
GLUCOSE SERPL-MCNC: 233 MG/DL (ref 70–99)
HCT VFR BLD AUTO: 42.6 % (ref 41–53)
HGB BLD-MCNC: 14.5 G/DL (ref 13.5–17.5)
LACTATE SERPL-SCNC: 1.3 MMOL/L (ref 0.7–2)
LYMPHOCYTES # BLD AUTO: 0.7 THOUSANDS/ΜL (ref 0.5–4)
LYMPHOCYTES NFR BLD AUTO: 6 % (ref 25–45)
MCH RBC QN AUTO: 27 PG (ref 26–34)
MCHC RBC AUTO-ENTMCNC: 34.2 G/DL (ref 31–36)
MCV RBC AUTO: 79 FL (ref 80–100)
MICROCYTES BLD QL AUTO: PRESENT
MONOCYTES # BLD AUTO: 0.9 THOUSAND/ΜL (ref 0.2–0.9)
MONOCYTES NFR BLD AUTO: 8 % (ref 1–10)
NEUTROPHILS # BLD AUTO: 9.6 THOUSANDS/ΜL (ref 1.8–7.8)
NEUTS SEG NFR BLD AUTO: 86 % (ref 45–65)
P AXIS: 44 DEGREES
PLATELET # BLD AUTO: 183 THOUSANDS/UL (ref 150–450)
PLATELET BLD QL SMEAR: ADEQUATE
PMV BLD AUTO: 7.7 FL (ref 8.9–12.7)
POTASSIUM SERPL-SCNC: 4.5 MMOL/L (ref 3.6–5)
PR INTERVAL: 154 MS
PROT SERPL-MCNC: 6.9 G/DL (ref 5.9–8.4)
QRS AXIS: -2 DEGREES
QRSD INTERVAL: 80 MS
QT INTERVAL: 316 MS
QTC INTERVAL: 415 MS
RBC # BLD AUTO: 5.39 MILLION/UL (ref 4.5–5.9)
RBC MORPH BLD: NORMAL
SODIUM SERPL-SCNC: 131 MMOL/L (ref 137–147)
T WAVE AXIS: 47 DEGREES
VENTRICULAR RATE: 104 BPM
WBC # BLD AUTO: 11.3 THOUSAND/UL (ref 4.5–11)

## 2020-01-18 PROCEDURE — 82948 REAGENT STRIP/BLOOD GLUCOSE: CPT

## 2020-01-18 PROCEDURE — 87205 SMEAR GRAM STAIN: CPT | Performed by: FAMILY MEDICINE

## 2020-01-18 PROCEDURE — 93010 ELECTROCARDIOGRAM REPORT: CPT | Performed by: INTERNAL MEDICINE

## 2020-01-18 PROCEDURE — 85025 COMPLETE CBC W/AUTO DIFF WBC: CPT | Performed by: FAMILY MEDICINE

## 2020-01-18 PROCEDURE — 83605 ASSAY OF LACTIC ACID: CPT | Performed by: FAMILY MEDICINE

## 2020-01-18 PROCEDURE — 87077 CULTURE AEROBIC IDENTIFY: CPT | Performed by: FAMILY MEDICINE

## 2020-01-18 PROCEDURE — 87070 CULTURE OTHR SPECIMN AEROBIC: CPT | Performed by: FAMILY MEDICINE

## 2020-01-18 PROCEDURE — 87147 CULTURE TYPE IMMUNOLOGIC: CPT | Performed by: FAMILY MEDICINE

## 2020-01-18 PROCEDURE — NC001 PR NO CHARGE: Performed by: PSYCHIATRY & NEUROLOGY

## 2020-01-18 PROCEDURE — 80053 COMPREHEN METABOLIC PANEL: CPT | Performed by: FAMILY MEDICINE

## 2020-01-18 PROCEDURE — 99222 1ST HOSP IP/OBS MODERATE 55: CPT | Performed by: PSYCHIATRY & NEUROLOGY

## 2020-01-18 PROCEDURE — 99223 1ST HOSP IP/OBS HIGH 75: CPT | Performed by: FAMILY MEDICINE

## 2020-01-18 PROCEDURE — 93005 ELECTROCARDIOGRAM TRACING: CPT

## 2020-01-18 PROCEDURE — 73630 X-RAY EXAM OF FOOT: CPT

## 2020-01-18 RX ORDER — MAGNESIUM HYDROXIDE/ALUMINUM HYDROXICE/SIMETHICONE 120; 1200; 1200 MG/30ML; MG/30ML; MG/30ML
15 SUSPENSION ORAL EVERY 4 HOURS PRN
Status: CANCELLED | OUTPATIENT
Start: 2020-01-18

## 2020-01-18 RX ORDER — IBUPROFEN 400 MG/1
800 TABLET ORAL EVERY 8 HOURS PRN
Status: DISCONTINUED | OUTPATIENT
Start: 2020-01-18 | End: 2020-01-18

## 2020-01-18 RX ORDER — ACETAMINOPHEN 325 MG/1
325 TABLET ORAL EVERY 6 HOURS PRN
Status: DISCONTINUED | OUTPATIENT
Start: 2020-01-18 | End: 2020-01-18

## 2020-01-18 RX ORDER — ACETAMINOPHEN 325 MG/1
650 TABLET ORAL EVERY 4 HOURS PRN
Status: DISCONTINUED | OUTPATIENT
Start: 2020-01-18 | End: 2020-01-23 | Stop reason: HOSPADM

## 2020-01-18 RX ORDER — ACETAMINOPHEN 325 MG/1
325 TABLET ORAL EVERY 6 HOURS PRN
Status: DISCONTINUED | OUTPATIENT
Start: 2020-01-18 | End: 2020-01-18 | Stop reason: HOSPADM

## 2020-01-18 RX ORDER — BENZTROPINE MESYLATE 2 MG/1
2 TABLET ORAL EVERY 6 HOURS PRN
Status: DISCONTINUED | OUTPATIENT
Start: 2020-01-18 | End: 2020-01-18 | Stop reason: HOSPADM

## 2020-01-18 RX ORDER — ACETAMINOPHEN 325 MG/1
650 TABLET ORAL EVERY 6 HOURS PRN
Status: DISCONTINUED | OUTPATIENT
Start: 2020-01-18 | End: 2020-01-18 | Stop reason: HOSPADM

## 2020-01-18 RX ORDER — MAGNESIUM HYDROXIDE/ALUMINUM HYDROXICE/SIMETHICONE 120; 1200; 1200 MG/30ML; MG/30ML; MG/30ML
15 SUSPENSION ORAL EVERY 4 HOURS PRN
Status: DISCONTINUED | OUTPATIENT
Start: 2020-01-18 | End: 2020-01-23 | Stop reason: HOSPADM

## 2020-01-18 RX ORDER — BENZTROPINE MESYLATE 1 MG/1
1 TABLET ORAL 2 TIMES DAILY
Status: DISCONTINUED | OUTPATIENT
Start: 2020-01-18 | End: 2020-01-23 | Stop reason: HOSPADM

## 2020-01-18 RX ORDER — SERTRALINE HYDROCHLORIDE 25 MG/1
25 TABLET, FILM COATED ORAL DAILY
Status: DISCONTINUED | OUTPATIENT
Start: 2020-01-18 | End: 2020-01-23 | Stop reason: HOSPADM

## 2020-01-18 RX ORDER — ACETAMINOPHEN 325 MG/1
325 TABLET ORAL EVERY 6 HOURS PRN
Status: CANCELLED | OUTPATIENT
Start: 2020-01-18

## 2020-01-18 RX ORDER — BENZTROPINE MESYLATE 1 MG/ML
2 INJECTION INTRAMUSCULAR; INTRAVENOUS EVERY 6 HOURS PRN
Status: CANCELLED | OUTPATIENT
Start: 2020-01-18

## 2020-01-18 RX ORDER — SODIUM CHLORIDE 9 MG/ML
75 INJECTION, SOLUTION INTRAVENOUS CONTINUOUS
Status: DISCONTINUED | OUTPATIENT
Start: 2020-01-18 | End: 2020-01-21

## 2020-01-18 RX ORDER — SENNOSIDES 8.6 MG
1 TABLET ORAL
Status: DISCONTINUED | OUTPATIENT
Start: 2020-01-18 | End: 2020-01-18

## 2020-01-18 RX ORDER — HYDROXYZINE HYDROCHLORIDE 25 MG/1
25 TABLET, FILM COATED ORAL EVERY 6 HOURS PRN
Status: DISCONTINUED | OUTPATIENT
Start: 2020-01-18 | End: 2020-01-18 | Stop reason: HOSPADM

## 2020-01-18 RX ORDER — IBUPROFEN 400 MG/1
800 TABLET ORAL EVERY 8 HOURS PRN
Status: CANCELLED | OUTPATIENT
Start: 2020-01-18

## 2020-01-18 RX ORDER — PALIPERIDONE 3 MG/1
3 TABLET, EXTENDED RELEASE ORAL DAILY
Status: DISCONTINUED | OUTPATIENT
Start: 2020-01-18 | End: 2020-01-19

## 2020-01-18 RX ORDER — LISINOPRIL 10 MG/1
10 TABLET ORAL DAILY
Status: DISCONTINUED | OUTPATIENT
Start: 2020-01-18 | End: 2020-01-19

## 2020-01-18 RX ORDER — IBUPROFEN 400 MG/1
800 TABLET ORAL EVERY 8 HOURS PRN
Status: DISCONTINUED | OUTPATIENT
Start: 2020-01-18 | End: 2020-01-18 | Stop reason: HOSPADM

## 2020-01-18 RX ORDER — HALOPERIDOL 5 MG
5 TABLET ORAL EVERY 8 HOURS PRN
Status: DISCONTINUED | OUTPATIENT
Start: 2020-01-18 | End: 2020-01-18 | Stop reason: HOSPADM

## 2020-01-18 RX ORDER — BENZTROPINE MESYLATE 2 MG/1
2 TABLET ORAL EVERY 6 HOURS PRN
Status: CANCELLED | OUTPATIENT
Start: 2020-01-18

## 2020-01-18 RX ORDER — HYDROXYZINE HYDROCHLORIDE 25 MG/1
25 TABLET, FILM COATED ORAL EVERY 6 HOURS PRN
Status: DISCONTINUED | OUTPATIENT
Start: 2020-01-18 | End: 2020-01-23 | Stop reason: HOSPADM

## 2020-01-18 RX ORDER — ONDANSETRON 2 MG/ML
4 INJECTION INTRAMUSCULAR; INTRAVENOUS EVERY 6 HOURS PRN
Status: DISCONTINUED | OUTPATIENT
Start: 2020-01-18 | End: 2020-01-23 | Stop reason: HOSPADM

## 2020-01-18 RX ORDER — HALOPERIDOL 5 MG
5 TABLET ORAL EVERY 8 HOURS PRN
Status: DISCONTINUED | OUTPATIENT
Start: 2020-01-18 | End: 2020-01-23 | Stop reason: HOSPADM

## 2020-01-18 RX ORDER — HALOPERIDOL 5 MG/ML
5 INJECTION INTRAMUSCULAR EVERY 8 HOURS PRN
Status: CANCELLED | OUTPATIENT
Start: 2020-01-18

## 2020-01-18 RX ORDER — ACETAMINOPHEN 325 MG/1
650 TABLET ORAL EVERY 4 HOURS PRN
Status: DISCONTINUED | OUTPATIENT
Start: 2020-01-18 | End: 2020-01-18 | Stop reason: HOSPADM

## 2020-01-18 RX ORDER — ACETAMINOPHEN 325 MG/1
650 TABLET ORAL EVERY 4 HOURS PRN
Status: CANCELLED | OUTPATIENT
Start: 2020-01-18

## 2020-01-18 RX ORDER — CHLORPROMAZINE HYDROCHLORIDE 25 MG/1
50 TABLET, FILM COATED ORAL 3 TIMES DAILY
Status: DISCONTINUED | OUTPATIENT
Start: 2020-01-18 | End: 2020-01-19

## 2020-01-18 RX ORDER — DIVALPROEX SODIUM 500 MG/1
2000 TABLET, EXTENDED RELEASE ORAL EVERY EVENING
Status: DISCONTINUED | OUTPATIENT
Start: 2020-01-18 | End: 2020-01-19

## 2020-01-18 RX ORDER — SENNOSIDES 8.6 MG
1 TABLET ORAL
Status: DISCONTINUED | OUTPATIENT
Start: 2020-01-18 | End: 2020-01-23 | Stop reason: HOSPADM

## 2020-01-18 RX ORDER — RISPERIDONE 1 MG/1
1 TABLET, ORALLY DISINTEGRATING ORAL EVERY 12 HOURS PRN
Status: CANCELLED | OUTPATIENT
Start: 2020-01-18

## 2020-01-18 RX ORDER — HALOPERIDOL 5 MG/ML
5 INJECTION INTRAMUSCULAR EVERY 8 HOURS PRN
Status: DISCONTINUED | OUTPATIENT
Start: 2020-01-18 | End: 2020-01-18

## 2020-01-18 RX ORDER — MAGNESIUM HYDROXIDE/ALUMINUM HYDROXICE/SIMETHICONE 120; 1200; 1200 MG/30ML; MG/30ML; MG/30ML
15 SUSPENSION ORAL EVERY 4 HOURS PRN
Status: DISCONTINUED | OUTPATIENT
Start: 2020-01-18 | End: 2020-01-18 | Stop reason: HOSPADM

## 2020-01-18 RX ORDER — HYDROXYZINE HYDROCHLORIDE 25 MG/1
25 TABLET, FILM COATED ORAL EVERY 6 HOURS PRN
Status: CANCELLED | OUTPATIENT
Start: 2020-01-18

## 2020-01-18 RX ORDER — ACETAMINOPHEN 325 MG/1
650 TABLET ORAL EVERY 6 HOURS PRN
Status: CANCELLED | OUTPATIENT
Start: 2020-01-18

## 2020-01-18 RX ORDER — ATORVASTATIN CALCIUM 40 MG/1
40 TABLET, FILM COATED ORAL
Status: DISCONTINUED | OUTPATIENT
Start: 2020-01-18 | End: 2020-01-23 | Stop reason: HOSPADM

## 2020-01-18 RX ORDER — HALOPERIDOL 5 MG
5 TABLET ORAL EVERY 8 HOURS PRN
Status: CANCELLED | OUTPATIENT
Start: 2020-01-18

## 2020-01-18 RX ORDER — BENZTROPINE MESYLATE 1 MG/ML
2 INJECTION INTRAMUSCULAR; INTRAVENOUS EVERY 6 HOURS PRN
Status: DISCONTINUED | OUTPATIENT
Start: 2020-01-18 | End: 2020-01-18

## 2020-01-18 RX ORDER — HALOPERIDOL 5 MG/ML
5 INJECTION INTRAMUSCULAR EVERY 8 HOURS PRN
Status: DISCONTINUED | OUTPATIENT
Start: 2020-01-18 | End: 2020-01-18 | Stop reason: HOSPADM

## 2020-01-18 RX ORDER — RISPERIDONE 1 MG/1
1 TABLET, ORALLY DISINTEGRATING ORAL EVERY 12 HOURS PRN
Status: DISCONTINUED | OUTPATIENT
Start: 2020-01-18 | End: 2020-01-18 | Stop reason: HOSPADM

## 2020-01-18 RX ORDER — RISPERIDONE 1 MG/1
1 TABLET, ORALLY DISINTEGRATING ORAL EVERY 12 HOURS PRN
Status: DISCONTINUED | OUTPATIENT
Start: 2020-01-18 | End: 2020-01-18

## 2020-01-18 RX ORDER — GABAPENTIN 300 MG/1
600 CAPSULE ORAL 3 TIMES DAILY
Status: DISCONTINUED | OUTPATIENT
Start: 2020-01-18 | End: 2020-01-19

## 2020-01-18 RX ORDER — CEFTRIAXONE 1 G/50ML
1000 INJECTION, SOLUTION INTRAVENOUS EVERY 12 HOURS
Status: DISCONTINUED | OUTPATIENT
Start: 2020-01-18 | End: 2020-01-19

## 2020-01-18 RX ORDER — BENZTROPINE MESYLATE 1 MG/ML
2 INJECTION INTRAMUSCULAR; INTRAVENOUS EVERY 6 HOURS PRN
Status: DISCONTINUED | OUTPATIENT
Start: 2020-01-18 | End: 2020-01-18 | Stop reason: HOSPADM

## 2020-01-18 RX ORDER — BUSPIRONE HYDROCHLORIDE 5 MG/1
10 TABLET ORAL 2 TIMES DAILY
Status: DISCONTINUED | OUTPATIENT
Start: 2020-01-18 | End: 2020-01-23 | Stop reason: HOSPADM

## 2020-01-18 RX ORDER — ACETAMINOPHEN 325 MG/1
650 TABLET ORAL EVERY 6 HOURS PRN
Status: DISCONTINUED | OUTPATIENT
Start: 2020-01-18 | End: 2020-01-23 | Stop reason: HOSPADM

## 2020-01-18 RX ORDER — TRAZODONE HYDROCHLORIDE 50 MG/1
50 TABLET ORAL
Status: DISCONTINUED | OUTPATIENT
Start: 2020-01-18 | End: 2020-01-23 | Stop reason: HOSPADM

## 2020-01-18 RX ORDER — BENZTROPINE MESYLATE 1 MG/1
2 TABLET ORAL EVERY 6 HOURS PRN
Status: DISCONTINUED | OUTPATIENT
Start: 2020-01-18 | End: 2020-01-18

## 2020-01-18 RX ORDER — ASPIRIN 81 MG/1
81 TABLET ORAL DAILY
Status: DISCONTINUED | OUTPATIENT
Start: 2020-01-18 | End: 2020-01-23 | Stop reason: HOSPADM

## 2020-01-18 RX ADMIN — CHLORPROMAZINE HYDROCHLORIDE 50 MG: 25 TABLET, SUGAR COATED ORAL at 12:36

## 2020-01-18 RX ADMIN — DIVALPROEX SODIUM 2000 MG: 500 TABLET, EXTENDED RELEASE ORAL at 17:22

## 2020-01-18 RX ADMIN — CHLORPROMAZINE HYDROCHLORIDE 50 MG: 25 TABLET, SUGAR COATED ORAL at 22:45

## 2020-01-18 RX ADMIN — TRAZODONE HYDROCHLORIDE 50 MG: 50 TABLET ORAL at 22:46

## 2020-01-18 RX ADMIN — ENOXAPARIN SODIUM 40 MG: 40 INJECTION SUBCUTANEOUS at 12:42

## 2020-01-18 RX ADMIN — CEFTRIAXONE 1000 MG: 1 INJECTION, SOLUTION INTRAVENOUS at 13:07

## 2020-01-18 RX ADMIN — SERTRALINE HYDROCHLORIDE 25 MG: 25 TABLET ORAL at 12:36

## 2020-01-18 RX ADMIN — GABAPENTIN 600 MG: 300 CAPSULE ORAL at 23:07

## 2020-01-18 RX ADMIN — PALIPERIDONE 3 MG: 3 TABLET, FILM COATED, EXTENDED RELEASE ORAL at 12:36

## 2020-01-18 RX ADMIN — ATORVASTATIN CALCIUM 40 MG: 40 TABLET, FILM COATED ORAL at 17:22

## 2020-01-18 RX ADMIN — GABAPENTIN 600 MG: 300 CAPSULE ORAL at 17:22

## 2020-01-18 RX ADMIN — INSULIN LISPRO 4 UNITS: 100 INJECTION, SOLUTION INTRAVENOUS; SUBCUTANEOUS at 22:46

## 2020-01-18 RX ADMIN — CHLORPROMAZINE HYDROCHLORIDE 50 MG: 25 TABLET, SUGAR COATED ORAL at 17:23

## 2020-01-18 RX ADMIN — INSULIN LISPRO 2 UNITS: 100 INJECTION, SOLUTION INTRAVENOUS; SUBCUTANEOUS at 17:29

## 2020-01-18 RX ADMIN — ASPIRIN 81 MG: 81 TABLET ORAL at 12:37

## 2020-01-18 RX ADMIN — HYDROXYZINE HYDROCHLORIDE 25 MG: 25 TABLET ORAL at 02:00

## 2020-01-18 RX ADMIN — SODIUM CHLORIDE 75 ML/HR: 0.9 INJECTION, SOLUTION INTRAVENOUS at 13:05

## 2020-01-18 RX ADMIN — METOPROLOL TARTRATE 25 MG: 25 TABLET, FILM COATED ORAL at 12:36

## 2020-01-18 RX ADMIN — METOPROLOL TARTRATE 25 MG: 25 TABLET, FILM COATED ORAL at 22:46

## 2020-01-18 RX ADMIN — ACETAMINOPHEN 650 MG: 325 TABLET ORAL at 22:52

## 2020-01-18 RX ADMIN — BUSPIRONE HYDROCHLORIDE 10 MG: 5 TABLET ORAL at 12:36

## 2020-01-18 RX ADMIN — BUSPIRONE HYDROCHLORIDE 10 MG: 5 TABLET ORAL at 17:22

## 2020-01-18 RX ADMIN — GABAPENTIN 600 MG: 300 CAPSULE ORAL at 12:37

## 2020-01-18 RX ADMIN — INSULIN LISPRO 4 UNITS: 100 INJECTION, SOLUTION INTRAVENOUS; SUBCUTANEOUS at 12:37

## 2020-01-18 RX ADMIN — ACETAMINOPHEN 650 MG: 325 TABLET ORAL at 09:47

## 2020-01-18 RX ADMIN — LISINOPRIL 10 MG: 10 TABLET ORAL at 12:36

## 2020-01-18 NOTE — H&P
Psychiatric Evaluation - Behavioral Health   Atul Reza 38 y o  male MRN: [id number]  Unit/Bed#: U 383-01 Encounter: [id number]    Assessment/Plan   Principal Problem:    Depression    Plan:   Hold psychiatric meds right now, can consult psychiatry while on medicine if needed  Interview was cut short due to pt excessively vomiting and being unable to participate  Pt is being transferred to medical due to being febrile and concern for possible cellulitis  Chief Complaint: "I've been off my meds for a week and a half"    History of Present Illness     Patient is a 38 y o  male with a self reported h/o bipolar II  He was admitted as a 201 from the ER after coming in and reporting he was off his meds, depressed, and suicidal   He says he had been thinking about jumping off of the [address] [place]  Pt reports being homeless for the last 2 years  He denies any drug/alcohol history  He has been moving around a lot over the past few months  He says that about 2 weeks ago he took a  Bus here from [place] to be closer to family  He has been living at the [facility]  He was previously at [employer] [facility] in [place], but didn't like it there and decided to leave bc he has family in the [place]  He reports that he doesn't know the names of his psych meds, but that he gets them from the [facility]  He was seeing a dr Dale Busch, but hasn't been there in more than 1 month  He has been feeling depressed x 2 weeks  He thinks this is just bc he is off of his meds, and denies any inciting stressors  He says his mood is depressed, he is also anxious  Sleep is poor, appetite is low, energy is poor, concentration is poor, no current SI, no HI, and no AVH  He is more hopeful today that he will  Get back on meds, says yesterday he was hopeless and suicidal   He becomes very nauseous during the interview and started vomiting and interview needed to be ended  Medical was contacted  Psychiatric Review Of Systems:  sleep: poor  appetite changes: poor  weight changes: no  energy/anergy: low  interest/pleasure/anhedonia: low  somatic symptoms: nausea  anxiety/panic: yes  zoran: no  guilty/hopeless: no  self injurious behavior/risky behavior: no    Historical Information     Past [facility]  Says he has been inpt 20x, most recently at [facility] in Oct 2019  Currently in treatment with Dr Anna Leon  Past Suicide attempts: 4x    Unable to give details due to nausea/vomiting  Past Violent behavior: Unable to give details due to nausea/vomiting  Past Psychiatric medication trial: yes, unsure of names    Substance Abuse History:  Social History     Tobacco History     Smoking Status  Current Every Day Smoker Smoking Frequency  1 5 packs/day for 22 years (33 pk yrs) Smoking Tobacco Type  Cigarettes    Smokeless Tobacco Use  Current User Smokeless Tobacco Type  Chew          Alcohol History     Alcohol Use Status  Yes          Drug Use     Drug Use Status  Not Currently Types  Marijuana Comment  monthly          Sexual Activity     Sexually Active  Not Currently Partners  Female          Activities of Daily Living    Not Asked               Additional Substance Use Detail     Questions Responses    Substance Use Assessment Substance use within the past 12 months    Alcohol Use Frequency Experimented    Cannabis frequency 3 or more times/week    Comment: 3 or more times/week on 7/11/2018     Heroin Frequency Denies use in past 12 months    Cannabis method Smoke    Comment: Smoke on 7/11/2018     Cocaine frequency Never used    Comment: Never used on 7/11/2018     Crack Cocaine Frequency Denies use in past 12 months    Methamphetamine Frequency Denies use in past 12 months    Narcotic Frequency Denies use in past 12 months    Benzodiazepine Frequency Denies use in past 12 months    Amphetamine frequency Denies use in past 12 months    Barbituate Frequency Denies use use in past 12 months    Inhalant frequency Never used    Comment: Never used on 7/11/2018     Hallucinogen frequency Never used    Comment: Never used on 7/11/2018     Ecstasy frequency Never used    Comment: Never used on 7/11/2018     Other drug frequency Never used    Comment: Never used on 7/11/2018     Opiate frequency Denies use in past 12 months    Last reviewed by Fabian Topete RN on 1/18/2020        I have assessed this patient for substance use within the past 12 months    Family Psychiatric History:   Unable to give details due to nausea/vomiting    Social History:  Unable to give details due to nausea/vomiting      Traumatic History:   Unable to give details due to nausea/vomiting    Past Medical History:   Diagnosis Date    Anxiety     Bipolar 1 disorder ([place] [address] )     Chronic pain disorder     Depression     Diabetes mellitus ([place] [address] )     Hypertension     Psychiatric illness     PTSD (post-traumatic stress disorder)     Sleep difficulties     Substance abuse ([place] [address] )     Suicide attempt ([place] [address] )     Type 2 diabetes mellitus with diabetic polyneuropathy, with long-term current use of insulin ([place] [address] )        Medical Review Of Systems:  nausea, vomiting, fever, chills    Meds/Allergies   all current active meds have been reviewed  Allergies   Allergen Reactions    Penicillins Rash and Other (See Comments)     rash (Tolerating Ancef-per RN)  Last noted when patient was a child       Objective   Vital signs in last 24 hours:  Temp:  [96 2 °F (35 7 °C)-102 °F (38 9 °C)] 102 °F (38 9 °C)  HR:  [] 98  Resp:  [16-20] 16  BP: (115-196)/() 145/88      Intake/Output Summary (Last 24 hours) at 1/18/2020 1039  Last data filed at 1/18/2020 0108  Gross per 24 hour   Intake 800 ml   Output    Net 800 ml       Mental Status Evaluation:  Appearance:  casually dressed, disheveled and overweight   Behavior:  vomiting   Speech:  normal pitch and normal volume   Mood: depressed   Affect:  constricted   Language: intact   Thought Process:  normal   Thought Content:  normal   Perceptual Disturbances: None   Risk Potential: Suicidal Ideations none, Homicidal Ideations none and Potential for Aggression No   Sensorium:  person and place   Cognition:  grossly intact   Consciousness:  alert and awake    Attention: attention span and concentration were age appropriate, limited by vomiting   Intellect: normal   Fund of Knowledge: awareness of current events: grossly intact   Insight:  limited   Judgment: fair   Muscle Strength and Tone: grossly intact   Gait/Station: limp   Motor Activity: no abnormal movements     Lab Results:   I have personally reviewed pertinent lab results  , CBC:   Lab Results   Component Value Date    WBC 11 30 (H) 01/18/2020    HGB 14 5 01/18/2020    HCT 42 6 01/18/2020    MCV 79 (L) 01/18/2020     01/18/2020    MCH 27 0 01/18/2020    MCHC 34 2 01/18/2020    RDW 13 9 01/18/2020    MPV 7 7 (L) 01/18/2020   , BMP/CMP:   Lab Results   Component Value Date    K 4 6 01/17/2020    CL 94 (L) 01/17/2020    CO2 24 01/17/2020    BUN 13 01/17/2020    CREATININE 1 20 01/17/2020    CALCIUM 9 4 01/17/2020    AST 16 (L) 01/17/2020    ALT 23 01/17/2020    ALKPHOS 81 01/17/2020    EGFR 74 01/17/2020   , TSH: No results found for: TSH, Depakote:   Lab Results   Component Value Date    VALPROICTOT 38 7 (L) 01/17/2020   , Drug Screen:   Lab Results   Component Value Date    BARBTUR Negative 01/17/2020    BDZUR Negative 01/17/2020    THCUR Negative 01/17/2020    COCAINEUR Negative 01/17/2020    METHADONEUR Negative 01/17/2020    OPIATEUR Negative 01/17/2020    PCPUR Negative 01/17/2020       Imaging Studies: reviewed  EKG, Pathology, and Other Studies: n/a    Code Status: Level 1 - Full Code  Advance Directive and Living Will:      Power of :    POLST:        Patient Strengths/Assets: ability for insight, average or above intelligence, cooperative, communication skills, general fund of knowledge, patient is on a voluntary commitment, patient is willing to work on problems    Patient Barriers/Limitations: homeless, limited supports    Counseling / Coordination of Care  Total floor / unit time spent today 45 minutes  Greater than 50% of total time was spent with the patient and / or family counseling and / or coordination of care

## 2020-01-18 NOTE — ASSESSMENT & PLAN NOTE
Patient has sepsis present on admission with fever of 102 this morning along with mild tachycardia noted and leukocytosis noted  Source of infection is the right leg cellulitis    Placed on IV antibiotics and IV fluid hydration

## 2020-01-18 NOTE — ASSESSMENT & PLAN NOTE
Patient had accelerated blood pressure yesterday however his blood pressure is well controlled today    Continue lisinopril and metoprolol

## 2020-01-18 NOTE — ED PROVIDER NOTES
History  Chief Complaint   Patient presents with    Psychiatric Evaluation     "I haven't taken my meds for about 1 1/2 weeks and I need to be stabilized "  + SI  denies HI  + AH   " can't fill the meds, I don't have a co-pay"     HPI    Prior to Admission Medications   Prescriptions Last Dose Informant Patient Reported? Taking?    Blood Glucose Monitoring Suppl (FREESTYLE LITE) KATIE Unknown at Unknown time  No No   Sig: by Does not apply route 3 (three) times a day   Insulin Pen Needle (B-D ULTRAFINE III SHORT PEN) 31G X 8 MM MISC Unknown at Unknown time  Yes No   Sig: As directed   Lancets (FREESTYLE) lancets Unknown at Unknown time  No No   Sig: Use to check blood sugar three times a day   ONE TOUCH LANCETS MISC Unknown at Unknown time  Yes No   Sig: As directed   benztropine (COGENTIN) 1 mg tablet   No No   Sig: Take 1 tablet (1 mg total) by mouth 2 (two) times a day for 30 days   busPIRone (BUSPAR) 10 mg tablet   No No   Sig: Take 1 tablet (10 mg total) by mouth 2 (two) times a day for 30 days   chlorproMAZINE (THORAZINE) 50 mg tablet   No No   Sig: Take 1 tablet (50 mg total) by mouth 3 (three) times a day for 90 days   diclofenac sodium (VOLTAREN) 1 %   No No   Sig: Apply 2 g topically 4 (four) times a day   divalproex sodium (DEPAKOTE ER) 500 mg 24 hr tablet   No No   Sig: Take 4 tablets (2,000 mg total) by mouth every evening for 30 days   gabapentin (NEURONTIN) 300 mg capsule   No No   Sig: Take 2 capsules (600 mg total) by mouth 3 (three) times a day for 30 days   glimepiride (AMARYL) 1 mg tablet Unknown at Unknown time  No No   Sig: Take 1 tablet (1 mg total) by mouth daily with breakfast   glucose blood (FREESTYLE LITE) test strip Unknown at Unknown time  No No   Sig: Use to check sugar three times a day   insulin aspart (NovoLOG) 100 Units/mL injection pen Unknown at Unknown time  Yes No   Sig: As directed on discharge instructions   insulin glargine (LANTUS SOLOSTAR) 100 units/mL injection pen Unknown at Unknown time  Yes No   Sig: Inject 30 Units under the skin   lisinopril (ZESTRIL) 10 mg tablet Unknown at Unknown time  No No   Sig: Take 1 tablet (10 mg total) by mouth daily   metFORMIN (GLUCOPHAGE) 1000 MG tablet Unknown at Unknown time  No No   Sig: Take 1 tablet (1,000 mg total) by mouth 2 (two) times a day with meals   metoprolol tartrate (LOPRESSOR) 25 mg tablet Unknown at Unknown time  No No   Sig: Take 1 tablet (25 mg total) by mouth every 12 (twelve) hours   nicotine (NICODERM CQ) 21 mg/24 hr TD 24 hr patch Unknown at Unknown time  No No   Sig: Place 1 patch on the skin every 24 hours   paliperidone (INVEGA) 3 mg 24 hr tablet Unknown at Unknown time  No No   Sig: TAKE 1 TABLET(3 MG) BY MOUTH DAILY   rosuvastatin (CRESTOR) 10 MG tablet Unknown at Unknown time  No No   Sig: Take 1 tablet (10 mg total) by mouth daily   sertraline (ZOLOFT) 25 mg tablet Unknown at Unknown time  No No   Sig: Take 1 tablet (25 mg total) by mouth daily   traZODone (DESYREL) 50 mg tablet   No No   Sig: Take 1 tablet (50 mg total) by mouth daily at bedtime for 30 days      Facility-Administered Medications: None       Past Medical History:   Diagnosis Date    Anxiety     Bipolar 1 disorder ([facility])     Chronic pain disorder     Depression     Diabetes mellitus ([facility])     Hypertension     Psychiatric illness     PTSD (post-traumatic stress disorder)     Sleep difficulties     [facility]     [facility]icide attempt ([facility] )     Type 2 diabetes mellitus with diabetic polyneuropathy, with long-term current use of insulin ([facility])        Past Surgical History:   Procedure Laterality Date    APPENDECTOMY      COLON SURGERY      TOE AMPUTATION      TONSILLECTOMY         Family History   Problem Relation Age of Onset    Hypertension Mother     Diabetes Mother     Depression Mother     Leukemia Mother     No Known Problems Father     No Known Problems Sister     No Known Problems Brother     No Known Problems Maternal Aunt     No Known Problems Paternal Aunt     No Known Problems Maternal Uncle     No Known Problems Paternal Uncle     No Known Problems Maternal Grandfather     No Known Problems Maternal Grandmother     No Known Problems Paternal Grandfather     No Known Problems Paternal Grandmother     No Known Problems Cousin     ADD / ADHD Neg Hx     Alcohol abuse Neg Hx     Anxiety disorder Neg Hx     Bipolar disorder Neg Hx     Completed Suicide  Neg Hx     Dementia Neg Hx     Drug abuse Neg Hx     OCD Neg Hx     Psychiatric Illness Neg Hx     Psychosis Neg Hx     Schizoaffective Disorder  Neg Hx     Schizophrenia Neg Hx     Self-Injury Neg Hx     Suicide Attempts Neg Hx      I have reviewed and agree with the history as documented      Social History     Tobacco Use    Smoking status: Current Every Day Smoker     Packs/day: 1 50     Years: 22 00     Pack years: 33 00     Types: Cigarettes    Smokeless tobacco: Current User     Types: Chew   Substance Use Topics    Alcohol use: Yes     Frequency: Monthly or less     Drinks per session: 1 or 2     Binge frequency: Less than monthly    Drug use: Not Currently     Types: Marijuana     Comment: monthly        Review of Systems    Physical Exam  Physical Exam    Vital Signs  ED Triage Vitals   Temperature Pulse Respirations Blood Pressure SpO2   01/17/20 1421 01/17/20 1421 01/17/20 1421 01/17/20 1421 01/17/20 1421   99 5 °F (37 5 °C) (!) 122 20 (!) 172/103 97 %      Temp Source Heart Rate Source Patient Position - Orthostatic VS BP Location FiO2 (%)   01/17/20 1421 01/17/20 1421 01/17/20 1421 01/17/20 1421 --   Tympanic Monitor Sitting Left arm       Pain Score       01/17/20 2109       8           Vitals:    01/17/20 1815 01/17/20 2104 01/18/20 0109 01/18/20 0140   BP: 163/88 157/78 115/66 119/68   Pulse: 98 101 88 83   Patient Position - Orthostatic VS: Sitting Lying Sitting Sitting         Visual Acuity      ED Medications  Medications   hydrOXYzine HCL (ATARAX) tablet 25 mg (has no administration in time range)   risperiDONE (RisperDAL M-TABS) dispersible tablet 1 mg (has no administration in time range)   haloperidol (HALDOL) tablet 5 mg (has no administration in time range)   haloperidol lactate (HALDOL) injection 5 mg (has no administration in time range)   magnesium hydroxide (MILK OF MAGNESIA) 400 mg/5 mL oral suspension 20 mL (has no administration in time range)   aluminum-magnesium hydroxide-simethicone (MYLANTA) 200-200-20 mg/5 mL oral suspension 15 mL (has no administration in time range)   acetaminophen (TYLENOL) tablet 325 mg (has no administration in time range)   benztropine (COGENTIN) tablet 2 mg (has no administration in time range)   benztropine (COGENTIN) injection 2 mg (has no administration in time range)   acetaminophen (TYLENOL) tablet 650 mg (has no administration in time range)   acetaminophen (TYLENOL) tablet 650 mg (has no administration in time range)   ibuprofen (MOTRIN) tablet 800 mg (has no administration in time range)   LORazepam (ATIVAN) tablet 1 mg (1 mg Oral Given 1/17/20 1634)   metoprolol tartrate (LOPRESSOR) tablet 25 mg (25 mg Oral Given 1/17/20 1634)   lisinopril (ZESTRIL) tablet 10 mg (10 mg Oral Given 1/17/20 1634)   metFORMIN (GLUCOPHAGE) tablet 1,000 mg (1,000 mg Oral Given 1/17/20 1638)   insulin regular (HumuLIN R,NovoLIN R) injection 10 Units (10 Units Subcutaneous Given 1/17/20 1645)   acetaminophen (TYLENOL) tablet 650 mg (650 mg Oral Given 1/17/20 1843)   acetaminophen (TYLENOL) tablet 975 mg (975 mg Oral Given 1/17/20 2109)   ibuprofen (MOTRIN) tablet 800 mg (800 mg Oral Given 1/17/20 2109)   sodium chloride 0 9 % bolus 1,000 mL (0 mL Intravenous Stopped 1/18/20 0108)   iohexol (OMNIPAQUE) 350 MG/ML injection (MULTI-DOSE) 100 mL (100 mL Intravenous Given 1/17/20 2317)       Diagnostic Studies  Results Reviewed     Procedure Component Value Units Date/Time    Fingerstick Glucose (POCT) [[id number]]  (Abnormal) Collected:  01/18/20 0012    Lab Status:  Final result Updated:  01/18/20 0016     POC Glucose 183 mg/dl     Influenza A/B and RSV PCR [[id number]]  (Normal) Collected:  01/17/20 2108    Lab Status:  Final result Specimen:  Nose Updated:  01/17/20 2201     INFLUENZA A PCR None Detected     INFLUENZA B PCR None Detected     RSV PCR None Detected    Fingerstick Glucose (POCT) [[id number]]  (Abnormal) Collected:  01/17/20 1958    Lab Status:  Final result Updated:  01/17/20 2022     POC Glucose 253 mg/dl     Urine Microscopic [[id number]]  (Abnormal) Collected:  01/17/20 1557    Lab Status:  Final result Specimen:  Urine, Clean Catch Updated:  01/17/20 1629     RBC, UA 10-20 /hpf      WBC, UA 0-1 /hpf      Epithelial Cells Moderate /hpf      Bacteria, UA Moderate /hpf     Rapid drug screen, urine [[id number]]  (Normal) Collected:  01/17/20 1557    Lab Status:  Final result Specimen:  Urine, Clean Catch Updated:  01/17/20 1628     Amph/Meth UR Negative     Barbiturate Ur Negative     Benzodiazepine Urine Negative     Cocaine Urine Negative     Methadone Urine Negative     Opiate Urine Negative     PCP Ur Negative     THC Urine Negative    Narrative:       FOR MEDICAL PURPOSES ONLY  IF CONFIRMATION NEEDED PLEASE CONTACT THE LAB WITHIN 5 DAYS      Drug Screen Cutoff Levels:  AMPHETAMINE/METHAMPHETAMINES  1000 ng/mL  BARBITURATES     200 ng/mL  BENZODIAZEPINES     200 ng/mL  COCAINE      300 ng/mL  METHADONE      300 ng/mL  OPIATES      300 ng/mL  PHENCYCLIDINE     25 ng/mL  THC       50 ng/mL      UA (URINE) with reflex to Scope [[id number]]  (Abnormal) Collected:  01/17/20 1557    Lab Status:  Final result Specimen:  Urine, Clean Catch Updated:  01/17/20 1613     Color, UA Yellow     Clarity, UA Slightly Cloudy     Specific [place], UA 1 005     pH, UA 7 0     Leukocytes, UA Negative     Nitrite, UA Negative     Protein, UA >=500 mg/dl      Glucose, UA >=1000 (1%) mg/dl      Ketones, UA 50 (2+) mg/dl      Bilirubin, UA Negative     Blood,  0     UROBILINOGEN UA Negative mg/dL     Troponin I [[id number]]  (Normal) Collected:  01/17/20 1520    Lab Status:  Final result Specimen:  Blood from Arm, Left Updated:  01/17/20 1602     Troponin I 0 02 ng/mL     Valproic acid level, total [[id number]]  (Abnormal) Collected:  01/17/20 1520    Lab Status:  Final result Specimen:  Blood from Arm, Left Updated:  01/17/20 1555     Valproic Acid, Total 38 7 ug/mL     Ethanol [[id number]]  (Normal) Collected:  01/17/20 1520    Lab Status:  Final result Specimen:  Blood from Arm, Left Updated:  01/17/20 1553     Ethanol Lvl <10 mg/dL     Comprehensive metabolic panel [[id number]]  (Abnormal) Collected:  01/17/20 1520    Lab Status:  Final result Specimen:  Blood from Arm, Left Updated:  01/17/20 1551     Sodium 130 mmol/L      Potassium 4 6 mmol/L      Chloride 94 mmol/L      CO2 24 mmol/L      ANION GAP 12 mmol/L      BUN 13 mg/dL      Creatinine 1 20 mg/dL      Glucose 328 mg/dL      Calcium 9 4 mg/dL      AST 16 U/L      ALT 23 U/L      Alkaline Phosphatase 81 U/L      Total Protein 7 9 g/dL      Albumin 4 3 g/dL      Total Bilirubin 0 90 mg/dL      eGFR 74 ml/min/1 73sq m     Narrative:       [place] guidelines for Chronic Kidney Disease (CKD):     Stage 1 with normal or high GFR (GFR > 90 mL/min/1 73 square meters)    Stage 2 Mild CKD (GFR = 60-89 mL/min/1 73 square meters)    Stage 3A Moderate CKD (GFR = 45-59 mL/min/1 73 square meters)    Stage 3B Moderate CKD (GFR = 30-44 mL/min/1 73 square meters)    Stage 4 Severe CKD (GFR = 15-29 mL/min/1 73 square meters)    Stage 5 End Stage CKD (GFR <15 mL/min/1 73 square meters)  Note: GFR calculation is accurate only with a steady state creatinine    CBC and differential [[id number]]  (Abnormal) Collected:  01/17/20 1520    Lab Status:  Final result Specimen:  Blood from Arm, Left Updated:  01/17/20 1538     WBC 13 20 Thousand/uL      RBC 5 70 Million/uL Hemoglobin 15 3 g/dL      Hematocrit 45 4 %      MCV 80 fL      MCH 26 9 pg      MCHC 33 7 g/dL      RDW 14 3 %      MPV 8 2 fL      Platelets 399 Thousands/uL                  CT abdomen pelvis with contrast   Final Result by Lizabeth Barkley MD (01/17 9873)      No evidence of acute intra-abdominal or pelvic pathology  Several anterior abdominal wall hernias containing loops of nonobstructed small and large bowel  1 5 cm left adrenal nodule  In patients with no cancer history and new/enlarging adrenal nodule, recommend adrenal mass protocol CT to characterize, and biochemical evaluation and depending on rate of growth, surgical resection (without biopsy) to treat    possible adrenal cortical carcinoma may be warranted  The study was marked in EPIC for significant notification  Workstation performed: [id number]                    Procedures  Procedures         ED Course  ED Course as of Jan 18 0505   Sat Jan 18, 2020   0001 Reviewed CT findings  Patient with a 1 5 cm adrenal nodule  Can be followed up medical consult while inpatient psych  Flu test the order for are negative  Temp has resolved with Tylenol                                    MDM      Disposition  Final diagnoses:   Depression   Suicidal ideations   Hypertension   Non-compliance   Diabetes ([facility] )     Time reflects when diagnosis was documented in both MDM as applicable and the Disposition within this note     Time User Action Codes Description Comment    1/17/2020  4:18 PM Mark Kind Add [F32 9] Depression     1/17/2020  4:18 PM Conklin Cons Suicidal ideations     1/17/2020  4:18 PM Mark Kind Add [I10] Hypertension     1/17/2020  4:19 PM Mark Kind Add [Z91 19] Non-compliance     1/17/2020  4:27 PM Mark Kind Add [E11 9] Diabetes [facility])       ED Disposition     ED Disposition Condition Date/Time Comment    Transfer to [facility] Jan 18, 2020  1:17 AM Jennifer Jang should be transferred out to [facility] and has been medically cleared          MD Documentation      Most Recent Value   Sending MD Gaudencio Song MD      Follow-up Information    None         Current Discharge Medication List      CONTINUE these medications which have NOT CHANGED    Details   benztropine (COGENTIN) 1 mg tablet Take 1 tablet (1 mg total) by mouth 2 (two) times a day for 30 days  Qty: 60 tablet, Refills: 2    Associated Diagnoses: Bipolar 2 disorder (HCC)      Blood Glucose Monitoring Suppl (FREESTYLE LITE) KATIE by Does not apply route 3 (three) times a day  Qty: 1 each, Refills: 0    Associated Diagnoses: Type 2 diabetes mellitus with hyperosmolarity without coma, without long-term current use of insulin ([facility])      busPIRone (BUSPAR) 10 mg tablet Take 1 tablet (10 mg total) by mouth 2 (two) times a day for 30 days  Qty: 60 tablet, Refills: 2    Associated Diagnoses: Bipolar 2 disorder (HCC)      chlorproMAZINE (THORAZINE) 50 mg tablet Take 1 tablet (50 mg total) by mouth 3 (three) times a day for 90 days  Qty: 90 tablet, Refills: 2    Associated Diagnoses: Bipolar 2 disorder (HCC)      diclofenac sodium (VOLTAREN) 1 % Apply 2 g topically 4 (four) times a day  Qty: 200 g, Refills: 5    Associated Diagnoses: Primary generalized (osteo)arthritis      divalproex sodium (DEPAKOTE ER) 500 mg 24 hr tablet Take 4 tablets (2,000 mg total) by mouth every evening for 30 days  Qty: 120 tablet, Refills: 2    Associated Diagnoses: Bipolar 2 disorder (HCC)      gabapentin (NEURONTIN) 300 mg capsule Take 2 capsules (600 mg total) by mouth 3 (three) times a day for 30 days  Qty: 180 capsule, Refills: 2    Associated Diagnoses: Bipolar 2 disorder (HCC)      glimepiride (AMARYL) 1 mg tablet Take 1 tablet (1 mg total) by mouth daily with breakfast  Qty: 30 tablet, Refills: 5    Associated Diagnoses: DM (diabetes mellitus), type 2 (HCC)      glucose blood (FREESTYLE LITE) test strip Use to check sugar three times a day  Qty: 100 each, Refills: 5    Associated Diagnoses: Type 2 diabetes mellitus with hyperosmolarity without coma, without long-term current use of insulin ([facility])      insulin aspart (NovoLOG) 100 Units/mL injection pen As directed on discharge instructions      insulin glargine (LANTUS SOLOSTAR) 100 units/mL injection pen Inject 30 Units under the skin      Insulin Pen Needle (B-D ULTRAFINE III SHORT PEN) 31G X 8 MM MISC As directed      !!  Lancets (FREESTYLE) lancets Use to check blood sugar three times a day  Qty: 100 each, Refills: 5    Associated Diagnoses: Type 2 diabetes mellitus with hyperosmolarity without coma, without long-term current use of insulin ([facility])      lisinopril (ZESTRIL) 10 mg tablet Take 1 tablet (10 mg total) by mouth daily  Qty: 30 tablet, Refills: 5    Associated Diagnoses: Essential hypertension      metFORMIN (GLUCOPHAGE) 1000 MG tablet Take 1 tablet (1,000 mg total) by mouth 2 (two) times a day with meals  Qty: 60 tablet, Refills: 5    Associated Diagnoses: DM (diabetes mellitus), type 2 ([facility])      metoprolol tartrate (LOPRESSOR) 25 mg tablet Take 1 tablet (25 mg total) by mouth every 12 (twelve) hours  Qty: 60 tablet, Refills: 5    Associated Diagnoses: Essential hypertension      nicotine (NICODERM CQ) 21 mg/24 hr TD 24 hr patch Place 1 patch on the skin every 24 hours  Qty: 28 patch, Refills: 0    Associated Diagnoses: Cigarette nicotine dependence, uncomplicated      !! ONE TOUCH LANCETS MISC As directed      paliperidone (INVEGA) 3 mg 24 hr tablet TAKE 1 TABLET(3 MG) BY MOUTH DAILY  Qty: 90 tablet, Refills: 2    Comments: **Patient requests 90 days supply**  Associated Diagnoses: Bipolar 2 disorder (HCC)      rosuvastatin (CRESTOR) 10 MG tablet Take 1 tablet (10 mg total) by mouth daily  Qty: 30 tablet, Refills: 5    Associated Diagnoses: Dyslipidemia      sertraline (ZOLOFT) 25 mg tablet Take 1 tablet (25 mg total) by mouth daily  Qty: 30 tablet, Refills: 1    Associated Diagnoses: Bipolar 2 disorder (HCC) traZODone (DESYREL) 50 mg tablet Take 1 tablet (50 mg total) by mouth daily at bedtime for 30 days  Qty: 30 tablet, Refills: 2    Associated Diagnoses: [facility]       ! ! - Potential duplicate medications found  Please discuss with provider  No discharge procedures on file      ED Provider  Electronically Signed by           Davian Phillips MD  01/18/20 2615

## 2020-01-18 NOTE — ED NOTES
EVS (Eligibility Verification System) called - [phone]  Automated system indicates: Eligible  Recipient #[id number]

## 2020-01-18 NOTE — NURSING NOTE
Patient arrived to room 717-01 awake, alert and oriented to PPT  Ambulated to bed, gait steady  Denies pain or shortness of breath  Complains of generalized weakness and nausea  Lungs CTA  Redness, warmth and swelling noted to RLE, scabbing noted as well  Patient oriented to room and use of call  Bed in lowest position, call bell within reach

## 2020-01-18 NOTE — ASSESSMENT & PLAN NOTE
Patient has known history of peripheral vascular disease has bilateral TMA    Place on aspirin 81 mg daily

## 2020-01-18 NOTE — ASSESSMENT & PLAN NOTE
Lab Results   Component Value Date    HGBA1C 8 4 (H) 07/10/2019       Recent Labs     01/17/20  1958 01/18/20  0012 01/18/20  0928 01/18/20  1128   POCGLU 253* 183* 207* 219*       Blood Sugar Average: Last 72 hrs:  (P) 219     Patient noticed to have a small nonhealing ulcer on the base of the right midfoot and also some bleeding noted at the site of his previous TMA    Will ask Podiatry for evaluation to and x-ray of the right foot to rule out any underlying foreign body or gas

## 2020-01-18 NOTE — ASSESSMENT & PLAN NOTE
Lab Results   Component Value Date    HGBA1C 8 4 (H) 07/10/2019       Recent Labs     01/17/20 1958 01/18/20  0012 01/18/20  0928 01/18/20  1128   POCGLU 253* 183* 207* 219*       Blood Sugar Average: Last 72 hrs:  (P) 219   Uncontrolled diabetic  Patient states that he was prescribed insulin but never picked it upper used it  His blood sugars have been over 200 since admission to behavioral health  Will currently place him on insulin sliding scale and a diabetic diet and also initiate him on 15 u of Lantus daily at bedtime  If his seems agreeable to using Lantus long-term will continue would otherwise will try to come up with an oral medication regimen which at least he will comply with      Continue Neurontin for neuropathic pain

## 2020-01-18 NOTE — ASSESSMENT & PLAN NOTE
Patient has right leg cellulitis  Denies any injury however he has not been taking his medications for the last 1 and half week and is a known diabetic with uncontrolled blood sugars  He also has nonhealing wound on his right midfoot  Cellulitis includes redness and swelling extending up to almost the right knee  Patient had a fever of 102 this morning and had leukocytosis noted  Transferred to the medical floor  Placed on IV ceftriaxone b i d    Labs done stat reviewed

## 2020-01-18 NOTE — NURSING NOTE
Admission note:  38year old male admitted to / unit on a 201 committment status  Patient was transferred from [facility]/ED  Patient remained cooperative with the assessment  Good eye contact maintained  Affect flat, mood depressed  Denied SI  Patient diabetic and reported he is not compliant with medication or monitoring  Patient denied any other medical issues  It was noted patients right toes were amputated and draining small amounts of blood  Will notify medical  Patient denied pain  Patient stated he is here to get back on medications  He has a history of a medically significant suicide attempt in 2014 by overdose of medication  Patient reported 3/4 for anxiety and 9/10 depression  PRN atarax given, Patient retreated to his room for sleep without issue  Will monitor

## 2020-01-18 NOTE — ED CARE HANDOFF
Emergency Department Sign Out Note        Sign out and transfer of care from Dr Denis Dudley  See Separate Emergency Department note  The patient, Pallavi Raza, was evaluated by the previous provider for bipolar and off meds  Was initially medically cleared but spiked fever was complaint belly pain  Fluid was drawn CT was done as well as given meds  Patient's sodium low but due to patient's elevated glucose  Workup Completed:  CT    ED Course / Workup Pending (followup):  Pending CT results  Finish IV fluids as previously ordered pending placement for psych  Procedures  MDM    Disposition  Final diagnoses:   Depression   Suicidal ideations   Hypertension   Non-compliance   Diabetes ([facility] )     Time reflects when diagnosis was documented in both MDM as applicable and the Disposition within this note     Time User Action Codes Description Comment    1/17/2020  4:18 PM Chrissy Lucks Add [F32 9] Depression     1/17/2020  4:18 PM Chrissy Lucks Add [R45 851] Suicidal ideations     1/17/2020  4:18 PM Chrissy Lucks Add [I10] Hypertension     1/17/2020  4:19 PM Chrissy Lucks Add [Z91 19] Non-compliance     1/17/2020  4:27 PM Chrissy Lucks Add [E11 9] Diabetes [facility])       ED Disposition     None      MD Documentation      Most Recent Value   Sending MD Lionel Shetty MD      Follow-up Information    None       Patient's Medications   Discharge Prescriptions    No medications on file     No discharge procedures on file         ED Provider  Electronically Signed by     Bere Cochran MD  01/17/20 5520

## 2020-01-18 NOTE — ED NOTES
Insurance Authorization for admission:   Primary payor is Medicare  Preauthorization is not required

## 2020-01-18 NOTE — ASSESSMENT & PLAN NOTE
Patient has uncontrolled bipolar disorder  He did not take his meds for the last 1 and half week  Complains of suicidal ideation  Was admitted to [facility] but had to be transferred to the medical floor for treatment for cellulitis    Will ask Psychiatry for evaluation  Resume all of his home psychiatric medications including Thorazine, BuSpar, Depakote, sertraline and Invega  Does not require 1 is to 1 observation at this time  Place on Q 15 minutes behavioral health checks

## 2020-01-18 NOTE — ED NOTES
Patient is accepted at [facility]  Patient is accepted by Dr Haroon Zuleta per [facility]  Patient may go to the floor at [facility]  Nurse report is to be called to  prior to patient transfer

## 2020-01-19 ENCOUNTER — APPOINTMENT (INPATIENT)
Dept: CT IMAGING | Facility: HOSPITAL | Age: 45
DRG: 876 | End: 2020-01-19
Payer: MEDICARE

## 2020-01-19 PROBLEM — N17.0 ACUTE KIDNEY INJURY (AKI) WITH ACUTE TUBULAR NECROSIS (ATN) (HCC): Status: ACTIVE | Noted: 2020-01-19

## 2020-01-19 LAB
ALBUMIN SERPL BCP-MCNC: 3.2 G/DL (ref 3–5.2)
ALP SERPL-CCNC: 71 U/L (ref 43–122)
ALT SERPL W P-5'-P-CCNC: 19 U/L (ref 9–52)
ANION GAP SERPL CALCULATED.3IONS-SCNC: 10 MMOL/L (ref 5–14)
ANION GAP SERPL CALCULATED.3IONS-SCNC: 6 MMOL/L (ref 5–14)
AST SERPL W P-5'-P-CCNC: 13 U/L (ref 17–59)
ATRIAL RATE: 89 BPM
BILIRUB SERPL-MCNC: 0.4 MG/DL
BUN SERPL-MCNC: 20 MG/DL (ref 5–25)
BUN SERPL-MCNC: 22 MG/DL (ref 5–25)
CALCIUM SERPL-MCNC: 6 MG/DL (ref 8.4–10.2)
CALCIUM SERPL-MCNC: 8 MG/DL (ref 8.4–10.2)
CHLORIDE SERPL-SCNC: 108 MMOL/L (ref 97–108)
CHLORIDE SERPL-SCNC: 97 MMOL/L (ref 97–108)
CO2 SERPL-SCNC: 18 MMOL/L (ref 22–30)
CO2 SERPL-SCNC: 21 MMOL/L (ref 22–30)
CREAT SERPL-MCNC: 1.61 MG/DL (ref 0.7–1.5)
CREAT SERPL-MCNC: 2.4 MG/DL (ref 0.7–1.5)
CRP SERPL QL: >90 MG/L
ERYTHROCYTE [DISTWIDTH] IN BLOOD BY AUTOMATED COUNT: 14 %
ERYTHROCYTE [SEDIMENTATION RATE] IN BLOOD: 68 MM/HOUR (ref 1–20)
GFR SERPL CREATININE-BSD FRML MDRD: 32 ML/MIN/1.73SQ M
GFR SERPL CREATININE-BSD FRML MDRD: 52 ML/MIN/1.73SQ M
GLUCOSE SERPL-MCNC: 183 MG/DL (ref 70–99)
GLUCOSE SERPL-MCNC: 218 MG/DL (ref 65–140)
GLUCOSE SERPL-MCNC: 226 MG/DL (ref 70–99)
GLUCOSE SERPL-MCNC: 253 MG/DL (ref 65–140)
GLUCOSE SERPL-MCNC: 267 MG/DL (ref 65–140)
GLUCOSE SERPL-MCNC: 282 MG/DL (ref 65–140)
HCT VFR BLD AUTO: 38.9 % (ref 41–53)
HGB BLD-MCNC: 13 G/DL (ref 13.5–17.5)
LACTATE SERPL-SCNC: 1.8 MMOL/L (ref 0.7–2)
MCH RBC QN AUTO: 26.7 PG (ref 26–34)
MCHC RBC AUTO-ENTMCNC: 33.4 G/DL (ref 31–36)
MCV RBC AUTO: 80 FL (ref 80–100)
P AXIS: 32 DEGREES
PLATELET # BLD AUTO: 194 THOUSANDS/UL (ref 150–450)
PMV BLD AUTO: 8.3 FL (ref 8.9–12.7)
POTASSIUM SERPL-SCNC: 3.5 MMOL/L (ref 3.6–5)
POTASSIUM SERPL-SCNC: 3.9 MMOL/L (ref 3.6–5)
PR INTERVAL: 144 MS
PROT SERPL-MCNC: 6.5 G/DL (ref 5.9–8.4)
QRS AXIS: -1 DEGREES
QRSD INTERVAL: 84 MS
QT INTERVAL: 344 MS
QTC INTERVAL: 418 MS
RBC # BLD AUTO: 4.87 MILLION/UL (ref 4.5–5.9)
SODIUM SERPL-SCNC: 128 MMOL/L (ref 137–147)
SODIUM SERPL-SCNC: 132 MMOL/L (ref 137–147)
T WAVE AXIS: 23 DEGREES
TSH SERPL DL<=0.05 MIU/L-ACNC: 0.92 UIU/ML (ref 0.47–4.68)
URATE SERPL-MCNC: 5.7 MG/DL (ref 3.5–8.5)
VENTRICULAR RATE: 89 BPM
WBC # BLD AUTO: 13 THOUSAND/UL (ref 4.5–11)

## 2020-01-19 PROCEDURE — 85027 COMPLETE CBC AUTOMATED: CPT | Performed by: FAMILY MEDICINE

## 2020-01-19 PROCEDURE — 87186 SC STD MICRODIL/AGAR DIL: CPT | Performed by: PODIATRIST

## 2020-01-19 PROCEDURE — 87147 CULTURE TYPE IMMUNOLOGIC: CPT | Performed by: PODIATRIST

## 2020-01-19 PROCEDURE — 83605 ASSAY OF LACTIC ACID: CPT | Performed by: FAMILY MEDICINE

## 2020-01-19 PROCEDURE — 84550 ASSAY OF BLOOD/URIC ACID: CPT | Performed by: FAMILY MEDICINE

## 2020-01-19 PROCEDURE — 99222 1ST HOSP IP/OBS MODERATE 55: CPT | Performed by: PHYSICIAN ASSISTANT

## 2020-01-19 PROCEDURE — 82948 REAGENT STRIP/BLOOD GLUCOSE: CPT

## 2020-01-19 PROCEDURE — 73700 CT LOWER EXTREMITY W/O DYE: CPT

## 2020-01-19 PROCEDURE — 80053 COMPREHEN METABOLIC PANEL: CPT | Performed by: FAMILY MEDICINE

## 2020-01-19 PROCEDURE — 85652 RBC SED RATE AUTOMATED: CPT | Performed by: FAMILY MEDICINE

## 2020-01-19 PROCEDURE — 80048 BASIC METABOLIC PNL TOTAL CA: CPT | Performed by: FAMILY MEDICINE

## 2020-01-19 PROCEDURE — 87205 SMEAR GRAM STAIN: CPT | Performed by: PODIATRIST

## 2020-01-19 PROCEDURE — 99233 SBSQ HOSP IP/OBS HIGH 50: CPT | Performed by: FAMILY MEDICINE

## 2020-01-19 PROCEDURE — 87077 CULTURE AEROBIC IDENTIFY: CPT | Performed by: PODIATRIST

## 2020-01-19 PROCEDURE — 86140 C-REACTIVE PROTEIN: CPT | Performed by: FAMILY MEDICINE

## 2020-01-19 PROCEDURE — 0JBQ0ZZ EXCISION OF RIGHT FOOT SUBCUTANEOUS TISSUE AND FASCIA, OPEN APPROACH: ICD-10-PCS | Performed by: PODIATRIST

## 2020-01-19 PROCEDURE — 93010 ELECTROCARDIOGRAM REPORT: CPT | Performed by: INTERNAL MEDICINE

## 2020-01-19 PROCEDURE — 84443 ASSAY THYROID STIM HORMONE: CPT | Performed by: FAMILY MEDICINE

## 2020-01-19 PROCEDURE — 87070 CULTURE OTHR SPECIMN AEROBIC: CPT | Performed by: PODIATRIST

## 2020-01-19 RX ORDER — CEFEPIME HYDROCHLORIDE 1 G/1
1000 INJECTION, POWDER, FOR SOLUTION INTRAMUSCULAR; INTRAVENOUS EVERY 12 HOURS
Status: DISCONTINUED | OUTPATIENT
Start: 2020-01-19 | End: 2020-01-19

## 2020-01-19 RX ORDER — CHLORPROMAZINE HYDROCHLORIDE 25 MG/1
50 TABLET, FILM COATED ORAL 2 TIMES DAILY
Status: DISCONTINUED | OUTPATIENT
Start: 2020-01-19 | End: 2020-01-23 | Stop reason: HOSPADM

## 2020-01-19 RX ORDER — LORAZEPAM 2 MG/ML
1 INJECTION INTRAMUSCULAR EVERY 8 HOURS PRN
Status: DISCONTINUED | OUTPATIENT
Start: 2020-01-19 | End: 2020-01-23 | Stop reason: HOSPADM

## 2020-01-19 RX ORDER — CEFTRIAXONE 1 G/50ML
1000 INJECTION, SOLUTION INTRAVENOUS ONCE
Status: COMPLETED | OUTPATIENT
Start: 2020-01-19 | End: 2020-01-19

## 2020-01-19 RX ORDER — SACCHAROMYCES BOULARDII 250 MG
250 CAPSULE ORAL 2 TIMES DAILY
Status: DISCONTINUED | OUTPATIENT
Start: 2020-01-19 | End: 2020-01-23 | Stop reason: HOSPADM

## 2020-01-19 RX ORDER — CEFEPIME HYDROCHLORIDE 1 G/50ML
1000 INJECTION, SOLUTION INTRAVENOUS EVERY 12 HOURS
Status: DISCONTINUED | OUTPATIENT
Start: 2020-01-19 | End: 2020-01-20

## 2020-01-19 RX ORDER — CLINDAMYCIN PHOSPHATE 600 MG/50ML
600 INJECTION INTRAVENOUS EVERY 8 HOURS
Status: DISCONTINUED | OUTPATIENT
Start: 2020-01-19 | End: 2020-01-19

## 2020-01-19 RX ORDER — HEPARIN SODIUM 5000 [USP'U]/ML
5000 INJECTION, SOLUTION INTRAVENOUS; SUBCUTANEOUS EVERY 8 HOURS SCHEDULED
Status: DISCONTINUED | OUTPATIENT
Start: 2020-01-19 | End: 2020-01-23 | Stop reason: HOSPADM

## 2020-01-19 RX ORDER — DIVALPROEX SODIUM 500 MG/1
1000 TABLET, EXTENDED RELEASE ORAL EVERY EVENING
Status: DISCONTINUED | OUTPATIENT
Start: 2020-01-19 | End: 2020-01-19

## 2020-01-19 RX ORDER — GABAPENTIN 100 MG/1
100 CAPSULE ORAL 3 TIMES DAILY
Status: DISCONTINUED | OUTPATIENT
Start: 2020-01-19 | End: 2020-01-19

## 2020-01-19 RX ORDER — CALCIUM CHLORIDE 100 MG/ML
1 INJECTION INTRAVENOUS; INTRAVENTRICULAR ONCE
Status: DISCONTINUED | OUTPATIENT
Start: 2020-01-19 | End: 2020-01-19

## 2020-01-19 RX ORDER — INSULIN GLARGINE 100 [IU]/ML
20 INJECTION, SOLUTION SUBCUTANEOUS EVERY MORNING
Status: DISCONTINUED | OUTPATIENT
Start: 2020-01-19 | End: 2020-01-20

## 2020-01-19 RX ORDER — METRONIDAZOLE 500 MG/1
500 TABLET ORAL EVERY 8 HOURS SCHEDULED
Status: DISCONTINUED | OUTPATIENT
Start: 2020-01-19 | End: 2020-01-19

## 2020-01-19 RX ORDER — LORAZEPAM 0.5 MG/1
0.5 TABLET ORAL EVERY 8 HOURS PRN
Status: DISCONTINUED | OUTPATIENT
Start: 2020-01-19 | End: 2020-01-23 | Stop reason: HOSPADM

## 2020-01-19 RX ADMIN — CLINDAMYCIN IN 5 PERCENT DEXTROSE 600 MG: 12 INJECTION, SOLUTION INTRAVENOUS at 12:55

## 2020-01-19 RX ADMIN — TRAZODONE HYDROCHLORIDE 50 MG: 50 TABLET ORAL at 21:33

## 2020-01-19 RX ADMIN — INSULIN LISPRO 6 UNITS: 100 INJECTION, SOLUTION INTRAVENOUS; SUBCUTANEOUS at 12:55

## 2020-01-19 RX ADMIN — HEPARIN SODIUM 5000 UNITS: 5000 INJECTION INTRAVENOUS; SUBCUTANEOUS at 21:27

## 2020-01-19 RX ADMIN — CEFTRIAXONE 1000 MG: 1 INJECTION, SOLUTION INTRAVENOUS at 00:19

## 2020-01-19 RX ADMIN — VANCOMYCIN HYDROCHLORIDE 2000 MG: 1 INJECTION, POWDER, LYOPHILIZED, FOR SOLUTION INTRAVENOUS at 17:32

## 2020-01-19 RX ADMIN — PALIPERIDONE 3 MG: 3 TABLET, FILM COATED, EXTENDED RELEASE ORAL at 08:21

## 2020-01-19 RX ADMIN — SERTRALINE HYDROCHLORIDE 25 MG: 25 TABLET ORAL at 08:21

## 2020-01-19 RX ADMIN — CEFTRIAXONE 1000 MG: 1 INJECTION, SOLUTION INTRAVENOUS at 09:59

## 2020-01-19 RX ADMIN — CALCIUM CHLORIDE 1 G: 100 INJECTION, SOLUTION INTRAVENOUS at 20:33

## 2020-01-19 RX ADMIN — GABAPENTIN 100 MG: 100 CAPSULE ORAL at 08:21

## 2020-01-19 RX ADMIN — CHLORPROMAZINE HYDROCHLORIDE 50 MG: 25 TABLET, SUGAR COATED ORAL at 17:29

## 2020-01-19 RX ADMIN — HEPARIN SODIUM 5000 UNITS: 5000 INJECTION INTRAVENOUS; SUBCUTANEOUS at 14:49

## 2020-01-19 RX ADMIN — INSULIN LISPRO 4 UNITS: 100 INJECTION, SOLUTION INTRAVENOUS; SUBCUTANEOUS at 16:52

## 2020-01-19 RX ADMIN — CEFEPIME HYDROCHLORIDE 1000 MG: 1 INJECTION, SOLUTION INTRAVENOUS at 16:47

## 2020-01-19 RX ADMIN — HEPARIN SODIUM 5000 UNITS: 5000 INJECTION INTRAVENOUS; SUBCUTANEOUS at 08:25

## 2020-01-19 RX ADMIN — ATORVASTATIN CALCIUM 40 MG: 40 TABLET, FILM COATED ORAL at 16:49

## 2020-01-19 RX ADMIN — INSULIN GLARGINE 20 UNITS: 100 INJECTION, SOLUTION SUBCUTANEOUS at 08:16

## 2020-01-19 RX ADMIN — Medication 250 MG: at 08:21

## 2020-01-19 RX ADMIN — INSULIN LISPRO 6 UNITS: 100 INJECTION, SOLUTION INTRAVENOUS; SUBCUTANEOUS at 21:27

## 2020-01-19 RX ADMIN — Medication 250 MG: at 17:29

## 2020-01-19 RX ADMIN — METOPROLOL TARTRATE 25 MG: 25 TABLET, FILM COATED ORAL at 21:33

## 2020-01-19 RX ADMIN — MORPHINE SULFATE 2 MG: 2 INJECTION, SOLUTION INTRAMUSCULAR; INTRAVENOUS at 20:54

## 2020-01-19 RX ADMIN — BUSPIRONE HYDROCHLORIDE 10 MG: 5 TABLET ORAL at 08:22

## 2020-01-19 RX ADMIN — CHLORPROMAZINE HYDROCHLORIDE 50 MG: 25 TABLET, SUGAR COATED ORAL at 08:21

## 2020-01-19 RX ADMIN — MORPHINE SULFATE 2 MG: 2 INJECTION, SOLUTION INTRAMUSCULAR; INTRAVENOUS at 16:47

## 2020-01-19 RX ADMIN — INSULIN LISPRO 6 UNITS: 100 INJECTION, SOLUTION INTRAVENOUS; SUBCUTANEOUS at 06:25

## 2020-01-19 RX ADMIN — MORPHINE SULFATE 2 MG: 2 INJECTION, SOLUTION INTRAMUSCULAR; INTRAVENOUS at 13:01

## 2020-01-19 RX ADMIN — BUSPIRONE HYDROCHLORIDE 10 MG: 5 TABLET ORAL at 17:29

## 2020-01-19 RX ADMIN — ASPIRIN 81 MG: 81 TABLET ORAL at 08:21

## 2020-01-19 RX ADMIN — SODIUM CHLORIDE 1000 ML: 0.9 INJECTION, SOLUTION INTRAVENOUS at 08:25

## 2020-01-19 NOTE — ASSESSMENT & PLAN NOTE
Patient has right leg cellulitis  Denies any injury however he has not been taking his medications for the last 1 and half week and is a known diabetic with uncontrolled blood sugars  He also has nonhealing wound on his right midfoot  Cellulitis includes redness and swelling extending up to almost the right knee  Patient had a fever of 102 on 01/18/2020 and had leukocytosis noted  Transferred to the medical floor  Placed on IV ceftriaxone     Worsening swelling redness and pain noted of the right leg today  Worsening leukocytosis noted today  Worsening renal function noted today  Will check a stat CK total level, ESR, CRP, uric acid level  Renally dose ceftriaxone and added clindamycin in addition  Need to closely monitor progress over the next 24 hours  If no improvement noted well need a CT of the right leg to rule out underlying abscess    Also do a venous Doppler tomorrow to rule out underlying DVT

## 2020-01-19 NOTE — ASSESSMENT & PLAN NOTE
Lab Results   Component Value Date    HGBA1C 8 4 (H) 07/10/2019       Recent Labs     01/18/20  1128 01/18/20  1557 01/18/20 2032 01/19/20  0529   POCGLU 219* 169* 208* 253*       Blood Sugar Average: Last 72 hrs:  (P) 212 25   Uncontrolled diabetic  Patient states that he was prescribed insulin but never picked it upper used it  His blood sugars have been over 200 since admission to behavioral health  Will currently place him on insulin sliding scale and a diabetic diet and also initiate him on 20 u of Lantus daily at bedtime  If his seems agreeable to using Lantus long-term will continue would otherwise will try to come up with an oral medication regimen which at least he will comply with      Continue Neurontin for neuropathic pain

## 2020-01-19 NOTE — CONSULTS
Consult - Podiatry   Chaya Gold 38 y o  male MRN: [id number]  Unit/Bed#: 7T [facility] 717-01 Encounter: [id number]    Assessment/Plan     Assessment:  1  Cellulitis of the right leg  2  Wound of the right foot with possible OM and abscess formation 2/2 #3  3  DM with neuropathy  Plan:  - Due to unknown duration of erythema combined with benign appearing foot wound with worsening labs  I am concerned regarding necrotizing fasciitis  Patient placed npo, will obtain stat CT of the right leg   -Significant cellulitis of the RLE, recommend ID consult  - Continue IV abx  - MRI of the R foot with and without contrast ordered to r/o abscess formation  - Following debridement of the right foot, deep cultures were taken  No deep probing or tunneling noted  There was some malodor within the wound, but not significantly  - XR reviewed and show no definitive signs of OM and no subcutaneous emphysema  - Erythema was demarcated on 1/19    Addendum: I discussed the RLE CT in detail with Dr Leilani Denson in radiology, there are no findings concerning for necrotizing fasciitis  I also discussed this in detail with Dr Ever Cali and Dr Berry Tomas  Will plan for broader spectrum abx to resolve cellulitis and closely monitor  History of Present Illness     HPI:  Chaya Gold is a 38 y o  male who presents with wound of the plantar aspect of the right foot which he states has been there since September  He noticed increased pain and redness of the right foot for the past week  However, he states that the swelling of the right foot has been getting progressively worse over the past two weeks  Previous history of b/l TMA    Inpatient consult to Podiatry  Consult performed by: Humberto Mariano DPM  Consult ordered by: Jama River MD        Review of Systems   Constitutional: Negative  HENT: Negative  Eyes: Negative  Respiratory: Negative  Cardiovascular: Negative  Gastrointestinal: Negative  Musculoskeletal: as above      Skin:as above     Neurological: Negative  Psych: negative         Historical Information   Past Medical History:   Diagnosis Date    Acute kidney injury (NAIF) with acute tubular necrosis (ATN) ([place] [address] ) 1/19/2020    Anxiety     Bipolar 1 disorder ([facility])     Chronic pain disorder     Depression     Diabetes mellitus ([facility])     Hypertension     Psychiatric illness     PTSD (post-traumatic stress disorder)     Sleep difficulties     Substance abuse ([place] [address] )     Suicide attempt ([place] [address] )     Type 2 diabetes mellitus with diabetic polyneuropathy, with long-term current use of insulin ([facility])      Past Surgical History:   Procedure Laterality Date    APPENDECTOMY      COLON SURGERY      TOE AMPUTATION      TONSILLECTOMY       Social History   Social History     Substance and Sexual Activity   Alcohol Use Yes    Frequency: Monthly or less    Drinks per session: 1 or 2    Binge frequency: Less than monthly     Social History     Substance and Sexual Activity   Drug Use Not Currently    Types: Marijuana    Comment: monthly     Social History     Tobacco Use   Smoking Status Current Every Day Smoker    Packs/day: 1 50    Years: 22 00    Pack years: 33 00    Types: Cigarettes   Smokeless Tobacco Current User    Types: Chew     Family History:   Family History   Problem Relation Age of Onset    Hypertension Mother     Diabetes Mother     Depression Mother     Leukemia Mother     No Known Problems Father     No Known Problems Sister     No Known Problems Brother     No Known Problems Maternal Aunt     No Known Problems Paternal Aunt     No Known Problems Maternal Uncle     No Known Problems Paternal Uncle     No Known Problems Maternal Grandfather     No Known Problems Maternal Grandmother     No Known Problems Paternal Grandfather     No Known Problems Paternal Grandmother     No Known Problems Cousin     ADD / ADHD Neg Hx     Alcohol abuse Neg Hx     Anxiety disorder Neg Hx     Bipolar disorder Neg Hx  Completed Suicide  Neg Hx     Dementia Neg Hx     Drug abuse Neg Hx     OCD Neg Hx     Psychiatric Illness Neg Hx     Psychosis Neg Hx     Schizoaffective Disorder  Neg Hx     Schizophrenia Neg Hx     Self-Injury Neg Hx     Suicide Attempts Neg Hx        Meds/Allergies   Medications Prior to Admission   Medication    benztropine (COGENTIN) 1 mg tablet    Blood Glucose Monitoring Suppl (FREESTYLE LITE) KATIE    busPIRone (BUSPAR) 10 mg tablet    chlorproMAZINE (THORAZINE) 50 mg tablet    diclofenac sodium (VOLTAREN) 1 %    divalproex sodium (DEPAKOTE ER) 500 mg 24 hr tablet    gabapentin (NEURONTIN) 300 mg capsule    glimepiride (AMARYL) 1 mg tablet    glucose blood (FREESTYLE LITE) test strip    insulin aspart (NovoLOG) 100 Units/mL injection pen    insulin glargine (LANTUS SOLOSTAR) 100 units/mL injection pen    Insulin Pen Needle (B-D ULTRAFINE III SHORT PEN) 31G X 8 MM MISC    Lancets (FREESTYLE) lancets    lisinopril (ZESTRIL) 10 mg tablet    metFORMIN (GLUCOPHAGE) 1000 MG tablet    metoprolol tartrate (LOPRESSOR) 25 mg tablet    nicotine (NICODERM CQ) 21 mg/24 hr TD 24 hr patch    ONE TOUCH LANCETS MISC    paliperidone (INVEGA) 3 mg 24 hr tablet    rosuvastatin (CRESTOR) 10 MG tablet    sertraline (ZOLOFT) 25 mg tablet    traZODone (DESYREL) 50 mg tablet     Allergies   Allergen Reactions    Penicillins Rash and Other (See Comments)     rash (Tolerating Ancef-per RN)  Last noted when patient was a child       Objective   First Vitals:   Blood Pressure: 126/83 (01/18/20 1100)  Pulse: 95 (01/18/20 1100)  Temperature: 99 7 °F (37 6 °C) (01/18/20 1100)  Temp Source: Temporal (01/18/20 1100)  Respirations: 20 (01/18/20 1100)  Height: 6' 3" (190 5 cm) (01/18/20 1545)  Weight - Scale: 126 kg (276 lb 14 4 oz) (01/18/20 1545)  SpO2: 98 % (01/18/20 1100)    Current Vitals:   Blood Pressure: 102/66 (01/19/20 0818)  Pulse: 80 (01/19/20 0818)  Temperature: 98 4 °F (36 9 °C) (01/19/20 9594)  Temp Source: Temporal (01/19/20 0818)  Respirations: 20 (01/19/20 0818)  Height: 6' 3" (190 5 cm) (01/18/20 1545)  Weight - Scale: 126 kg (276 lb 14 4 oz) (01/18/20 1545)  SpO2: 94 % (01/19/20 0818)        /66 (BP Location: Left arm)   Pulse 80   Temp 98 4 °F (36 9 °C) (Temporal)   Resp 20   Ht 6' 3" (1 905 m)   Wt 126 kg (276 lb 14 4 oz)   SpO2 94%   BMI 34 61 kg/m²      General Appearance:    Alert, cooperative, no distress   Head:    Normocephalic, without obvious abnormality, atraumatic   Eyes:    PERRL, conjunctiva/corneas clear, EOM's intact        Nose:   Moist mucous membranes   Neck:   Supple, symmetrical, trachea midline   Back:     Symmetric   Lungs:     Respirations unlabored   Heart:    Regular rate and rhythm, S1 and S2 normal, no murmur, rub   or gallop   Abdomen:     Soft, non-tender   Extremities:   L TMA, TMA to the right foot with a plantar right foot wound measuring approximately 2x2x0  3cm with no PTB  There is minimal drainage  Minimal TTP of this ulceration  There is extensive erythema of the right leg ascending into the posterior mid calf  This is very TTP    Pulses:   Palpable DP and PT bilaterally   Skin:   As above   Neurologic:   Gross sensation is intact  Protective sensation is absent             Lab Results:   Admission on 01/18/2020   Component Date Value    POC Glucose 01/18/2020 219*    Gram Stain Result 01/18/2020 No polys seen*    Gram Stain Result 01/18/2020 Rare Gram positive cocci in pairs*    POC Glucose 01/18/2020 169*    POC Glucose 01/18/2020 208*    TSH 3RD GENERATON 01/19/2020 0 916     Sodium 01/19/2020 128*    Potassium 01/19/2020 3 5*    Chloride 01/19/2020 97     CO2 01/19/2020 21*    ANION GAP 01/19/2020 10     BUN 01/19/2020 20     Creatinine 01/19/2020 2 40*    Glucose 01/19/2020 226*    Calcium 01/19/2020 8 0*    AST 01/19/2020 13*    ALT 01/19/2020 19     Alkaline Phosphatase 01/19/2020 71     Total Protein 01/19/2020 6 5     Albumin 01/19/2020 3 2     Total Bilirubin 01/19/2020 0 40     eGFR 01/19/2020 32*    WBC 01/19/2020 13 00*    RBC 01/19/2020 4 87     Hemoglobin 01/19/2020 13 0*    Hematocrit 01/19/2020 38 9*    MCV 01/19/2020 80     MCH 01/19/2020 26 7     MCHC 01/19/2020 33 4     RDW 01/19/2020 14 0     Platelets 09/04/2704 194     MPV 01/19/2020 8 3*    POC Glucose 01/19/2020 253*    Sed Rate 01/19/2020 68*    CRP 01/19/2020 >90 0*    Uric Acid 01/19/2020 5 7     LACTIC ACID 01/19/2020 1 8     POC Glucose 01/19/2020 282*    Ventricular Rate 01/18/2020 89     Atrial Rate 01/18/2020 89     MI Interval 01/18/2020 144     QRSD Interval 01/18/2020 84     QT Interval 01/18/2020 344     QTC Interval 01/18/2020 418     P Axis 01/18/2020 32     QRS Axis 01/18/2020 -1     T [place] 01/18/2020 23        Results from last 7 days   Lab Units 01/18/20  1728   GRAM STAIN RESULT  No polys seen*  Rare Gram positive cocci in pairs*             Invalid input(s): LABAEARO            Imaging: I have personally reviewed pertinent films in PACS  EKG, Pathology, and Other Studies: I have personally reviewed pertinent reports        Code Status: Level 1 - Full Code  Advance Directive and Living Will:      Power of :    POLST:

## 2020-01-19 NOTE — PLAN OF CARE
Problem: Potential for Falls  Goal: Patient will remain free of falls  Description  INTERVENTIONS:  - Assess patient frequently for physical needs  -  Identify cognitive and physical deficits and behaviors that affect risk of falls    -  [place] fall precautions as indicated by assessment   - Educate patient/family on patient safety including physical limitations  - Instruct patient to call for assistance with activity based on assessment  - Modify environment to reduce risk of injury  - Consider OT/PT consult to assist with strengthening/mobility  Outcome: Progressing     Problem: PAIN - ADULT  Goal: Verbalizes/displays adequate comfort level or baseline comfort level  Description  Interventions:  - Encourage patient to monitor pain and request assistance  - Assess pain using appropriate pain scale  - Administer analgesics based on type and severity of pain and evaluate response  - Implement non-pharmacological measures as appropriate and evaluate response  - Consider cultural and social influences on pain and pain management  - Notify physician/advanced practitioner if interventions unsuccessful or patient reports new pain  Outcome: Progressing     Problem: INFECTION - ADULT  Goal: Absence or prevention of progression during hospitalization  Description  INTERVENTIONS:  - Assess and monitor for signs and symptoms of infection  - Monitor lab/diagnostic results  - Monitor all insertion sites, i e  indwelling lines, tubes, and drains  - Monitor endotracheal if appropriate and nasal secretions for changes in amount and color  - [place] appropriate cooling/warming therapies per order  - Administer medications as ordered  - Instruct and encourage patient and family to use good hand hygiene technique  - Identify and instruct in appropriate isolation precautions for identified infection/condition  Outcome: Progressing  Goal: Absence of fever/infection during neutropenic period  Description  INTERVENTIONS:  - Monitor WBC    Outcome: Progressing     Problem: SAFETY ADULT  Goal: Patient will remain free of falls  Description  INTERVENTIONS:  - Assess patient frequently for physical needs  -  Identify cognitive and physical deficits and behaviors that affect risk of falls    -  [place] fall precautions as indicated by assessment   - Educate patient/family on patient safety including physical limitations  - Instruct patient to call for assistance with activity based on assessment  - Modify environment to reduce risk of injury  - Consider OT/PT consult to assist with strengthening/mobility  Outcome: Progressing  Goal: Maintain or return to baseline ADL function  Description  INTERVENTIONS:  -  Assess patient's ability to carry out ADLs; assess patient's baseline for ADL function and identify physical deficits which impact ability to perform ADLs (bathing, care of mouth/teeth, toileting, grooming, dressing, etc )  - Assess/evaluate cause of self-care deficits   - Assess range of motion  - Assess patient's mobility; develop plan if impaired  - Assess patient's need for assistive devices and provide as appropriate  - Encourage maximum independence but intervene and supervise when necessary  - Involve family in performance of ADLs  - Assess for home care needs following discharge   - Consider OT consult to assist with ADL evaluation and planning for discharge  - Provide patient education as appropriate  Outcome: Progressing  Goal: Maintain or return mobility status to optimal level  Description  INTERVENTIONS:  - Assess patient's baseline mobility status (ambulation, transfers, stairs, etc )    - Identify cognitive and physical deficits and behaviors that affect mobility  - Identify mobility aids required to assist with transfers and/or ambulation (gait belt, sit-to-stand, lift, walker, cane, etc )  - [place] fall precautions as indicated by assessment  - Record patient progress and toleration of activity level on Mobility SBAR; progress patient to next Phase/Stage  - Instruct patient to call for assistance with activity based on assessment  - Consider rehabilitation consult to assist with strengthening/weightbearing, etc   Outcome: Progressing     Problem: DISCHARGE PLANNING  Goal: Discharge to home or other facility with appropriate resources  Description  INTERVENTIONS:  - Identify barriers to discharge w/patient and caregiver  - Arrange for needed discharge resources and transportation as appropriate  - Identify discharge learning needs (meds, wound care, etc )  - Arrange for interpretive services to assist at discharge as needed  - Refer to Case Management Department for coordinating discharge planning if the patient needs post-hospital services based on physician/advanced practitioner order or complex needs related to functional status, cognitive ability, or social support system  Outcome: Progressing     Problem: Knowledge Deficit  Goal: Patient/family/caregiver demonstrates understanding of disease process, treatment plan, medications, and discharge instructions  Description  Complete learning assessment and assess knowledge base    Interventions:  - Provide teaching at level of understanding  - Provide teaching via preferred learning methods  Outcome: Progressing

## 2020-01-19 NOTE — PROGRESS NOTES
Progress Note - Luis Antonio Alva 1975, 38 y o  male MRN: [id number]    Unit/Bed#: 7T [facility] 717-01 Encounter: [id number]    Primary Care Provider: No primary care provider on file  Date and time admitted to hospital: 1/18/2020 11:02 AM        * Cellulitis of right lower extremity  Assessment & Plan  Patient has right leg cellulitis  Denies any injury however he has not been taking his medications for the last 1 and half week and is a known diabetic with uncontrolled blood sugars  He also has nonhealing wound on his right midfoot  Cellulitis includes redness and swelling extending up to almost the right knee  Patient had a fever of 102 on 01/18/2020 and had leukocytosis noted  Transferred to the medical floor  Placed on IV ceftriaxone     Worsening swelling redness and pain noted of the right leg today  Worsening leukocytosis noted today  Worsening renal function noted today  Will check a stat CK total level, ESR, CRP, uric acid level  Renally dose ceftriaxone and added clindamycin in addition  Need to closely monitor progress over the next 24 hours  If no improvement noted well need a CT of the right leg to rule out underlying abscess  Also do a venous Doppler tomorrow to rule out underlying DVT    Acute kidney injury (NAIF) with acute tubular necrosis (ATN) ([facility] [address] )  Assessment & Plan  Patient noted to have worsening creatinine of 2 4 today from baseline creatinine of 1-1 3  Suspect patient might have ATN secondary to sepsis  Will discontinue lisinopril  Renally dose all medications and discontinue Depakote and decrease dose of Neurontin  Will give 1 L fluid bolus today in addition increase IV fluid rate to 125 per hour  Recheck BMP at 3:00 p m  And then again tomorrow  Avoid any nephrotoxic agents    Sepsis [facility])  Assessment & Plan  Patient has sepsis present on admission with fever of 102  along with mild tachycardia noted and leukocytosis noted on 1/18    Source of infection is the right leg cellulitis  Placed on IV antibiotics and IV fluid hydration    PVD (peripheral vascular disease) [facility])  Assessment & Plan  Patient has known history of peripheral vascular disease has bilateral TMA  Place on aspirin 81 mg daily    Essential hypertension  Assessment & Plan  Patient had accelerated blood pressure 2 days ago  now however his blood pressure is well controlled today  Continue metoprolol and discontinue lisinopril due to Emiliano     Type 2 diabetes mellitus with diabetic polyneuropathy, with long-term current use of insulin [facility])  Assessment & Plan  Lab Results   Component Value Date    HGBA1C 8 4 (H) 07/10/2019       Recent Labs     01/18/20  1128 01/18/20  1557 01/18/20  2032 01/19/20  0529   POCGLU 219* 169* 208* 253*       Blood Sugar Average: Last 72 hrs:  (P) 212 25   Uncontrolled diabetic  Patient states that he was prescribed insulin but never picked it upper used it  His blood sugars have been over 200 since admission to behavioral health  Will currently place him on insulin sliding scale and a diabetic diet and also initiate him on 20 u of Lantus daily at bedtime  If his seems agreeable to using Lantus long-term will continue would otherwise will try to come up with an oral medication regimen which at least he will comply with  Continue Neurontin for neuropathic pain    Diabetic ulcer of right midfoot [facility])  Assessment & Plan  Lab Results   Component Value Date    HGBA1C 8 4 (H) 07/10/2019       Recent Labs     01/18/20  1128 01/18/20  1557 01/18/20  2032 01/19/20  0529   POCGLU 219* 169* 208* 253*       Blood Sugar Average: Last 72 hrs:  (P) 212 25     Patient noticed to have a small nonhealing ulcer on the base of the right midfoot and also some bleeding noted at the site of his previous TMA    Will ask Podiatry for evaluation to and x-ray of the right foot to rule out any underlying foreign body or gas    Tobacco use disorder  Assessment & Plan  Counseled on smoking cessation and placed on nicotine replacement therapy    Bipolar 2 disorder [facility])  Assessment & Plan  Patient has uncontrolled bipolar disorder  He did not take his meds for the last 1 and half week  Complains of suicidal ideation  Was admitted to [facility] but had to be transferred to the medical floor for treatment for cellulitis  Will ask Psychiatry for evaluation  Resume all of his home psychiatric medications including Thorazine, BuSpar, Depakote, sertraline , Neurontin and Invega  Does not require 1 is to 1 observation at this time or Q 15 minutes behavioral health checks  Secondary to worsening renal function decreased dose of Depakote from 2000 at bedtime to none  Also decrease dose of Thorazine and decrease dose of Neurontin from 600 t i d  To 100 t i d       VTE Pharmacologic Prophylaxis:   Pharmacologic: Heparin  Mechanical VTE Prophylaxis in Place:  No secondary to right leg pain    Patient Centered Rounds: I have performed bedside rounds with nursing staff today  Discussions with Specialists or Other Care Team Provider:  None    Education and Discussions with Family / Patient:  Discussed with patient at bedside    Time Spent for Care: 45 minutes  More than 50% of total time spent on counseling and coordination of care as described above  Current Length of Stay: 1 day(s)    Current Patient Status: Inpatient   Certification Statement: The patient will continue to require additional inpatient hospital stay due to Acute kidney injury and right leg worsening cellulitis    Discharge Plan:  Pending progress    Code Status: Level 1 - Full Code      Subjective:   Patient states that his right leg pain is worse today it is 8 x 10  Also complains of worsening redness and swelling  Denies any chest pain or shortness of breath or abdominal pain but it feels very fatigued and tired    No more fevers reported in the last 12 hours    Objective:     Vitals:   Temp (24hrs), Av 7 °F (37 6 °C), Min:97 5 °F (36 4 °C), Max:102 °F (38 9 °C)    Temp:  [97 5 °F (36 4 °C)-102 °F (38 9 °C)] 97 5 °F (36 4 °C)  HR:  [] 99  Resp:  [19-20] 19  BP: (108-139)/(65-90) 108/65  SpO2:  [96 %-99 %] 96 %  Body mass index is 34 61 kg/m²  Input and Output Summary (last 24 hours): Intake/Output Summary (Last 24 hours) at 1/19/2020 0814  Last data filed at 1/19/2020 0603  Gross per 24 hour   Intake 660 ml   Output --   Net 660 ml       Physical Exam:     Physical Exam   Constitutional: He is oriented to person, place, and time  He appears well-developed and well-nourished  HENT:   Head: Normocephalic and atraumatic  Right Ear: External ear normal    Left Ear: External ear normal    Mouth/Throat: Oropharynx is clear and moist    Eyes: Conjunctivae and EOM are normal    Neck: Normal range of motion  Neck supple  Cardiovascular: Normal rate, regular rhythm and normal heart sounds  Pulmonary/Chest: Effort normal and breath sounds normal    Abdominal: Soft  Bowel sounds are normal  He exhibits no mass  There is no tenderness  There is no rebound and no guarding  Genitourinary:   Genitourinary Comments: deferred   Musculoskeletal: Normal range of motion  Right leg swelling which is 2+ nonpitting edema with redness tenderness and warmth extending from below the knee to the entire leg up to the ankle   Neurological: He is alert and oriented to person, place, and time  He has normal reflexes  Cranial nerves 2-12 are normal   Normal neurological exam   Skin: Skin is warm and dry  No rash noted  Psychiatric:   Flat affect   Nursing note and vitals reviewed          Additional Data:     Labs:    Results from last 7 days   Lab Units 01/19/20  0512 01/18/20  1031 01/17/20  1520   WBC Thousand/uL 13 00* 11 30* 13 20*   HEMOGLOBIN g/dL 13 0* 14 5 15 3   HEMATOCRIT % 38 9* 42 6 45 4   PLATELETS Thousands/uL 194 183 216   BANDS PCT %  --   --  3   NEUTROS PCT %  --  86*  --    LYMPHS PCT %  --  6*  --    LYMPHO PCT %  --   --  7*   MONOS PCT %  --  8  --    MONO PCT %  --   --  4   EOS PCT %  --  0  --      Results from last 7 days   Lab Units 01/19/20  0512   SODIUM mmol/L 128*   POTASSIUM mmol/L 3 5*   CHLORIDE mmol/L 97   CO2 mmol/L 21*   BUN mg/dL 20   CREATININE mg/dL 2 40*   ANION GAP mmol/L 10   CALCIUM mg/dL 8 0*   ALBUMIN g/dL 3 2   TOTAL BILIRUBIN mg/dL 0 40   ALK PHOS U/L 71   ALT U/L 19   AST U/L 13*   GLUCOSE RANDOM mg/dL 226*         Results from last 7 days   Lab Units 01/19/20  0529 01/18/20  2032 01/18/20  1557 01/18/20  1128 01/18/20  0928 01/18/20  0012 01/17/20  1958   POC GLUCOSE mg/dl 253* 208* 169* 219* 207* 183* 253*         Results from last 7 days   Lab Units 01/18/20  1031   LACTIC ACID mmol/L 1 3           * I Have Reviewed All Lab Data Listed Above  * Additional Pertinent Lab Tests Reviewed:  [address] [facility] Admission Reviewed    Imaging:    Imaging Reports Reviewed Today Include:  X-ray foot  Imaging Personally Reviewed by Myself Includes:  X-ray right foot    Recent Cultures (last 7 days):           Last 24 Hours Medication List:     Current Facility-Administered Medications:  acetaminophen 650 mg Oral Q6H PRN Ayesha Murillo MD    acetaminophen 650 mg Oral Q4H PRN Ayesha Murillo MD    aluminum-magnesium hydroxide-simethicone 15 mL Oral Q4H PRN Ayesha Murillo MD    aspirin 81 mg Oral Daily Ayesha Murillo MD    atorvastatin 40 mg Oral Daily With Dinner Ayesha Murillo MD    benztropine 1 mg Oral BID Ayesha Murillo MD    busPIRone 10 mg Oral BID Ayesha Murillo MD    cefTRIAXone 1,000 mg Intravenous Once Ayesha Murillo MD    chlorproMAZINE 50 mg Oral BID Ayesha Murillo MD    clindamycin 600 mg Intravenous Q8H Ayesha Murillo MD    gabapentin 100 mg Oral TID Ayesha Murillo MD    haloperidol 5 mg Oral Q8H PRN Ayesha Murillo MD    heparin (porcine) 5,000 Units Subcutaneous Q8H [facility] Ayesha Murillo MD    hydrOXYzine HCL 25 mg Oral Q6H PRN Ayesha Murillo MD    insulin glargine 20 Units Subcutaneous QAM Ayesha Murillo MD    insulin lispro 2-12 Units Subcutaneous TID AC Lily Vidal MD    insulin lispro 2-12 Units Subcutaneous HS Lily Vidal MD    magnesium hydroxide 20 mL Oral Daily PRN Lily Vidal MD    metoprolol tartrate 25 mg Oral Q12H [address] Lily Vidal MD    nicotine 1 patch Transdermal Daily Lily Vidal MD    ondansetron 4 mg Intravenous Q6H PRN Lily Vidal MD    paliperidone 3 mg Oral Daily Lily Vidal MD    saccharomyces boulardii 250 mg Oral BID Lily Vidal MD    senna 1 tablet Oral HS PRN Lily Vidal MD    sertraline 25 mg Oral Daily Lily Vidal MD    sodium chloride 1,000 mL Intravenous Once Lily Vidal MD    sodium chloride 125 mL/hr Intravenous Continuous Lily Vidal MD Last Rate: 75 mL/hr (01/18/20 1305)   traZODone 50 mg Oral HS Lily Vidal MD         Today, Patient Was Seen By: Lily Vidal MD    ** Please Note: Dictation voice to text software may have been used in the creation of this document   **

## 2020-01-19 NOTE — CONSULTS
Progress Note - Behavioral Health   Larry Hardin 38 y o  male MRN: [id number]  Unit/Bed#: 7T [facility] 717-01 Encounter: [id number]    Assessment/Plan   Principal Problem:    Cellulitis of right lower extremity  Active Problems:    Bipolar 2 disorder ([facility])    Tobacco use disorder    Diabetic ulcer of right midfoot ([facility])    Type 2 diabetes mellitus with diabetic polyneuropathy, with long-term current use of insulin ([facility])    Essential hypertension    PVD (peripheral vascular disease) ([facility])    Sepsis ([address] )    Acute kidney injury (NAIF) with acute tubular necrosis (ATN) ([facility])      Subjective:  Patient was seen yesterday which he H&P completed  He was subsequently transferred to [address] due to cellulitis and acute renal failure  Was off of psychiatric medications for 1 5 weeks  States he was no longer able to afford medications due to inability to pay for 12 dollar co-pay  States he is currently homeless, has no support system, and is financially struggling  Living [facility] and is in some sort of program to get himself back on his feet  Currently presents depressed with apathy, lack of energy, and low self-esteem  Currently denied suicidal thoughts  Contracts for safety  Does say he has increased anxiety with racing thoughts due to his homelessness and health condition  Does report feeling irritable at times of mood swings  No signs of irritability or mood swings during interview  Not currently manic  Denies psychotic symptoms  Denied delusional material   Denies current side effects from medications      Current Medications:  Current Facility-Administered Medications   Medication Dose Route Frequency    acetaminophen (TYLENOL) tablet 650 mg  650 mg Oral Q6H PRN    acetaminophen (TYLENOL) tablet 650 mg  650 mg Oral Q4H PRN    aluminum-magnesium hydroxide-simethicone (MYLANTA) 200-200-20 mg/5 mL oral suspension 15 mL  15 mL Oral Q4H PRN    aspirin (ECOTRIN LOW STRENGTH) EC tablet 81 mg  81 mg Oral Daily    atorvastatin (LIPITOR) tablet 40 mg  40 mg Oral Daily With Dinner    benztropine (COGENTIN) tablet 1 mg  1 mg Oral BID    busPIRone (BUSPAR) tablet 10 mg  10 mg Oral BID    chlorproMAZINE (THORAZINE) tablet 50 mg  50 mg Oral BID    clindamycin (CLEOCIN) IVPB (premix) 600 mg  600 mg Intravenous Q8H    haloperidol (HALDOL) tablet 5 mg  5 mg Oral Q8H PRN    heparin (porcine) subcutaneous injection 5,000 Units  5,000 Units Subcutaneous Q8H [address]    hydrOXYzine HCL (ATARAX) tablet 25 mg  25 mg Oral Q6H PRN    insulin glargine (LANTUS) subcutaneous injection 20 Units 0 2 mL  20 Units Subcutaneous QAM    insulin lispro (HumaLOG) 100 units/mL subcutaneous injection 2-12 Units  2-12 Units Subcutaneous TID AC    insulin lispro (HumaLOG) 100 units/mL subcutaneous injection 2-12 Units  2-12 Units Subcutaneous HS    LORazepam (ATIVAN) 2 mg/mL injection 1 mg  1 mg Intravenous Q8H PRN    LORazepam (ATIVAN) tablet 0 5 mg  0 5 mg Oral Q8H PRN    magnesium hydroxide (MILK OF MAGNESIA) 400 mg/5 mL oral suspension 20 mL  20 mL Oral Daily PRN    metoprolol tartrate (LOPRESSOR) tablet 25 mg  25 mg Oral Q12H RUSH    morphine injection 2 mg  2 mg Intravenous Q4H PRN    nicotine (NICODERM CQ) 7 mg/24hr TD 24 hr patch 1 patch  1 patch Transdermal Daily    ondansetron (ZOFRAN) injection 4 mg  4 mg Intravenous Q6H PRN    saccharomyces boulardii (FLORASTOR) capsule 250 mg  250 mg Oral BID    senna (SENOKOT) tablet 8 6 mg  1 tablet Oral HS PRN    sertraline (ZOLOFT) tablet 25 mg  25 mg Oral Daily    sodium chloride 0 9 % infusion  125 mL/hr Intravenous Continuous    traZODone (DESYREL) tablet 50 mg  50 mg Oral HS       Behavioral Health Medications: all current active meds have been reviewed and continue current psychiatric medications      Vitals:  Vitals:    01/19/20 0818   BP: 102/66   Pulse: 80   Resp: 20   Temp: 98 4 °F (36 9 °C)   SpO2: 94%       Laboratory results:    I have personally reviewed all pertinent laboratory/tests results  Most Recent Labs:   Lab Results   Component Value Date    WBC 13 00 (H) 01/19/2020    RBC 4 87 01/19/2020    HGB 13 0 (L) 01/19/2020    HCT 38 9 (L) 01/19/2020     01/19/2020    RDW 14 0 01/19/2020    NEUTROABS 9 60 (H) 01/18/2020    SODIUM 128 (L) 01/19/2020    K 3 5 (L) 01/19/2020    CL 97 01/19/2020    CO2 21 (L) 01/19/2020    BUN 20 01/19/2020    CREATININE 2 40 (H) 01/19/2020    GLUC 226 (H) 01/19/2020    GLUF 133 (H) 08/15/2019    CALCIUM 8 0 (L) 01/19/2020    AST 13 (L) 01/19/2020    ALT 19 01/19/2020    ALKPHOS 71 01/19/2020    TP 6 5 01/19/2020    ALB 3 2 01/19/2020    TBILI 0 40 01/19/2020    CHOLESTEROL 159 08/15/2019    HDL 38 (L) 08/15/2019    TRIG 105 08/15/2019    LDLCALC 100 08/15/2019    NONHDLC 121 08/15/2019    VALPROICTOT 38 7 (L) 01/17/2020    LITHIUM <0 2 (L) 02/26/2018    [id number] 0 916 01/19/2020    RPR Non-Reactive 07/10/2019    HGBA1C 8 4 (H) 07/10/2019     07/10/2019       Psychiatric Review of Systems:  Behavior over the last 24 hours:  unchanged  Sleep: normal  Appetite: normal  Medication side effects: No  ROS: no complaints, all others negative    Mental Status Evaluation:  Appearance: In hospital attire   Behavior:  guarded but cooperative   Speech:  soft   Mood:  anxious and depressed   Affect:  mood-congruent   Language Appropriate   Thought Process:  logical   Thought Content:  Denied delusions and obsessions   Perceptual Disturbances: None   Risk Potential: Denied SI/HI    Potential for aggression no   Sensorium:  person, place and time/date   Cognition:  grossly intact   Consciousness:  alert and awake    Recent and Remote Memory fair   Attention: attention span appeared shorter than expected for age   Insight:  Poor   Judgment: Partial   Gait/Station: Did not assess   Motor Activity: no abnormal movements     Progress Toward Goals:  Unchanged    Recommended Treatment: Continue with group therapy, milieu therapy and occupational therapy  1   Discontinue Gabapentin due to acute renal failure  Currently on BuSpar 10mg BID and Zoloft 25mg QD for anxiety  Will add Ativan   5mg q8h PO and 1mg IV PRN for acute anxiety  2   Discontinue Invega 3mg QD due to already being on antipsychotic, not presenting as psychotic, and not currently manic  Continue Thorazine 50mg BID at this time for mood stabilization  3   Depakote 2000mg QD discontinued by Internal Medicine  Advise restarting medication when medically cleared  4  Continue Trazodone 50mg HS for insomnia  5  Continue Cogentin 1mg BID for EPS prevention  6  Contact psychiatry on PRN basis and when patient is medically cleared  7   Defer follow-up to weekly consulting Psychiatry team    Risks, benefits and possible side effects of Medications:   Risks, benefits, and possible side effects of medications explained to patient and patient verbalizes understanding        Eleni Rick PA-C

## 2020-01-19 NOTE — ASSESSMENT & PLAN NOTE
Patient has uncontrolled bipolar disorder  He did not take his meds for the last 1 and half week  Complains of suicidal ideation  Was admitted to [facility] but had to be transferred to the medical floor for treatment for cellulitis  Will ask Psychiatry for evaluation  Resume all of his home psychiatric medications including Thorazine, BuSpar, Depakote, sertraline , Neurontin and Invega  Does not require 1 is to 1 observation at this time or Q 15 minutes behavioral health checks  Secondary to worsening renal function decreased dose of Depakote from 2000 at bedtime to none  Also decrease dose of Thorazine and decrease dose of Neurontin from 600 t i d   To 100 t i d

## 2020-01-19 NOTE — ASSESSMENT & PLAN NOTE
Lab Results   Component Value Date    HGBA1C 8 4 (H) 07/10/2019       Recent Labs     01/18/20  1128 01/18/20  1557 01/18/20 2032 01/19/20  0529   POCGLU 219* 169* 208* 253*       Blood Sugar Average: Last 72 hrs:  (P) 212 25     Patient noticed to have a small nonhealing ulcer on the base of the right midfoot and also some bleeding noted at the site of his previous TMA    Will ask Podiatry for evaluation to and x-ray of the right foot to rule out any underlying foreign body or gas

## 2020-01-19 NOTE — ASSESSMENT & PLAN NOTE
Patient has sepsis present on admission with fever of 102  along with mild tachycardia noted and leukocytosis noted on 1/18  Source of infection is the right leg cellulitis    Placed on IV antibiotics and IV fluid hydration

## 2020-01-19 NOTE — ASSESSMENT & PLAN NOTE
Patient had accelerated blood pressure 2 days ago  now however his blood pressure is well controlled today    Continue metoprolol and discontinue lisinopril due to [facility] GENESIS

## 2020-01-19 NOTE — ASSESSMENT & PLAN NOTE
Patient noted to have worsening creatinine of 2 4 today from baseline creatinine of 1-1 3  Suspect patient might have ATN secondary to sepsis  Will discontinue lisinopril  Renally dose all medications and discontinue Depakote and decrease dose of Neurontin  Will give 1 L fluid bolus today in addition increase IV fluid rate to 125 per hour  Recheck BMP at 3:00 p m   And then again tomorrow  Avoid any nephrotoxic agents

## 2020-01-20 ENCOUNTER — APPOINTMENT (INPATIENT)
Dept: NON INVASIVE DIAGNOSTICS | Facility: HOSPITAL | Age: 45
DRG: 876 | End: 2020-01-20
Payer: MEDICARE

## 2020-01-20 ENCOUNTER — APPOINTMENT (INPATIENT)
Dept: MRI IMAGING | Facility: HOSPITAL | Age: 45
DRG: 876 | End: 2020-01-20
Payer: MEDICARE

## 2020-01-20 LAB
ANION GAP SERPL CALCULATED.3IONS-SCNC: 8 MMOL/L (ref 5–14)
BUN SERPL-MCNC: 25 MG/DL (ref 5–25)
CALCIUM SERPL-MCNC: 8.3 MG/DL (ref 8.4–10.2)
CHLORIDE SERPL-SCNC: 105 MMOL/L (ref 97–108)
CK SERPL-CCNC: 67 U/L (ref 55–170)
CO2 SERPL-SCNC: 18 MMOL/L (ref 22–30)
CREAT SERPL-MCNC: 1.46 MG/DL (ref 0.7–1.5)
ERYTHROCYTE [DISTWIDTH] IN BLOOD BY AUTOMATED COUNT: 14.3 %
GFR SERPL CREATININE-BSD FRML MDRD: 58 ML/MIN/1.73SQ M
GLUCOSE SERPL-MCNC: 218 MG/DL (ref 70–99)
GLUCOSE SERPL-MCNC: 228 MG/DL (ref 65–140)
GLUCOSE SERPL-MCNC: 239 MG/DL (ref 65–140)
GLUCOSE SERPL-MCNC: 251 MG/DL (ref 65–140)
GLUCOSE SERPL-MCNC: 304 MG/DL (ref 65–140)
HCT VFR BLD AUTO: 36.9 % (ref 41–53)
HGB BLD-MCNC: 12.4 G/DL (ref 13.5–17.5)
MCH RBC QN AUTO: 26.9 PG (ref 26–34)
MCHC RBC AUTO-ENTMCNC: 33.6 G/DL (ref 31–36)
MCV RBC AUTO: 80 FL (ref 80–100)
PLATELET # BLD AUTO: 186 THOUSANDS/UL (ref 150–450)
PMV BLD AUTO: 8.8 FL (ref 8.9–12.7)
POTASSIUM SERPL-SCNC: 4.3 MMOL/L (ref 3.6–5)
RBC # BLD AUTO: 4.62 MILLION/UL (ref 4.5–5.9)
SODIUM SERPL-SCNC: 131 MMOL/L (ref 137–147)
WBC # BLD AUTO: 8.9 THOUSAND/UL (ref 4.5–11)

## 2020-01-20 PROCEDURE — 73720 MRI LWR EXTREMITY W/O&W/DYE: CPT

## 2020-01-20 PROCEDURE — 93971 EXTREMITY STUDY: CPT

## 2020-01-20 PROCEDURE — A9585 GADOBUTROL INJECTION: HCPCS | Performed by: FAMILY MEDICINE

## 2020-01-20 PROCEDURE — 80048 BASIC METABOLIC PNL TOTAL CA: CPT | Performed by: FAMILY MEDICINE

## 2020-01-20 PROCEDURE — 99223 1ST HOSP IP/OBS HIGH 75: CPT | Performed by: INTERNAL MEDICINE

## 2020-01-20 PROCEDURE — 82948 REAGENT STRIP/BLOOD GLUCOSE: CPT

## 2020-01-20 PROCEDURE — 82550 ASSAY OF CK (CPK): CPT | Performed by: FAMILY MEDICINE

## 2020-01-20 PROCEDURE — 99232 SBSQ HOSP IP/OBS MODERATE 35: CPT | Performed by: FAMILY MEDICINE

## 2020-01-20 PROCEDURE — 85027 COMPLETE CBC AUTOMATED: CPT | Performed by: FAMILY MEDICINE

## 2020-01-20 PROCEDURE — 93971 EXTREMITY STUDY: CPT | Performed by: SURGERY

## 2020-01-20 RX ORDER — METRONIDAZOLE 500 MG/1
500 TABLET ORAL EVERY 8 HOURS SCHEDULED
Status: DISCONTINUED | OUTPATIENT
Start: 2020-01-20 | End: 2020-01-23

## 2020-01-20 RX ORDER — CEFAZOLIN SODIUM 2 G/50ML
2000 SOLUTION INTRAVENOUS EVERY 8 HOURS
Status: DISCONTINUED | OUTPATIENT
Start: 2020-01-20 | End: 2020-01-23

## 2020-01-20 RX ORDER — INSULIN GLARGINE 100 [IU]/ML
30 INJECTION, SOLUTION SUBCUTANEOUS EVERY MORNING
Status: DISCONTINUED | OUTPATIENT
Start: 2020-01-20 | End: 2020-01-21

## 2020-01-20 RX ORDER — DIVALPROEX SODIUM 500 MG/1
1000 TABLET, EXTENDED RELEASE ORAL
Status: DISCONTINUED | OUTPATIENT
Start: 2020-01-20 | End: 2020-01-23 | Stop reason: HOSPADM

## 2020-01-20 RX ORDER — SODIUM BICARBONATE 650 MG/1
650 TABLET ORAL
Status: DISCONTINUED | OUTPATIENT
Start: 2020-01-20 | End: 2020-01-23 | Stop reason: HOSPADM

## 2020-01-20 RX ADMIN — HEPARIN SODIUM 5000 UNITS: 5000 INJECTION INTRAVENOUS; SUBCUTANEOUS at 05:33

## 2020-01-20 RX ADMIN — INSULIN LISPRO 8 UNITS: 100 INJECTION, SOLUTION INTRAVENOUS; SUBCUTANEOUS at 22:20

## 2020-01-20 RX ADMIN — INSULIN LISPRO 5 UNITS: 100 INJECTION, SOLUTION INTRAVENOUS; SUBCUTANEOUS at 11:59

## 2020-01-20 RX ADMIN — VANCOMYCIN 2000 MG: 1 INJECTION, SOLUTION INTRAVENOUS at 10:48

## 2020-01-20 RX ADMIN — ATORVASTATIN CALCIUM 40 MG: 40 TABLET, FILM COATED ORAL at 17:11

## 2020-01-20 RX ADMIN — HEPARIN SODIUM 5000 UNITS: 5000 INJECTION INTRAVENOUS; SUBCUTANEOUS at 22:20

## 2020-01-20 RX ADMIN — Medication 250 MG: at 09:19

## 2020-01-20 RX ADMIN — Medication 250 MG: at 17:11

## 2020-01-20 RX ADMIN — INSULIN LISPRO 5 UNITS: 100 INJECTION, SOLUTION INTRAVENOUS; SUBCUTANEOUS at 17:12

## 2020-01-20 RX ADMIN — MORPHINE SULFATE 2 MG: 2 INJECTION, SOLUTION INTRAMUSCULAR; INTRAVENOUS at 18:12

## 2020-01-20 RX ADMIN — BUSPIRONE HYDROCHLORIDE 10 MG: 5 TABLET ORAL at 17:11

## 2020-01-20 RX ADMIN — METOPROLOL TARTRATE 25 MG: 25 TABLET, FILM COATED ORAL at 22:19

## 2020-01-20 RX ADMIN — MORPHINE SULFATE 2 MG: 2 INJECTION, SOLUTION INTRAMUSCULAR; INTRAVENOUS at 09:56

## 2020-01-20 RX ADMIN — MORPHINE SULFATE 2 MG: 2 INJECTION, SOLUTION INTRAMUSCULAR; INTRAVENOUS at 02:05

## 2020-01-20 RX ADMIN — INSULIN LISPRO 6 UNITS: 100 INJECTION, SOLUTION INTRAVENOUS; SUBCUTANEOUS at 11:59

## 2020-01-20 RX ADMIN — BUSPIRONE HYDROCHLORIDE 10 MG: 5 TABLET ORAL at 09:19

## 2020-01-20 RX ADMIN — SERTRALINE HYDROCHLORIDE 25 MG: 25 TABLET ORAL at 09:19

## 2020-01-20 RX ADMIN — ACETAMINOPHEN 650 MG: 325 TABLET ORAL at 05:32

## 2020-01-20 RX ADMIN — CEFAZOLIN SODIUM 2000 MG: 2 SOLUTION INTRAVENOUS at 18:13

## 2020-01-20 RX ADMIN — CEFEPIME HYDROCHLORIDE 1000 MG: 1 INJECTION, SOLUTION INTRAVENOUS at 17:15

## 2020-01-20 RX ADMIN — TRAZODONE HYDROCHLORIDE 50 MG: 50 TABLET ORAL at 22:19

## 2020-01-20 RX ADMIN — MORPHINE SULFATE 2 MG: 2 INJECTION, SOLUTION INTRAMUSCULAR; INTRAVENOUS at 06:10

## 2020-01-20 RX ADMIN — INSULIN GLARGINE 30 UNITS: 100 INJECTION, SOLUTION SUBCUTANEOUS at 09:17

## 2020-01-20 RX ADMIN — GADOBUTROL 12 ML: 604.72 INJECTION INTRAVENOUS at 09:16

## 2020-01-20 RX ADMIN — CHLORPROMAZINE HYDROCHLORIDE 50 MG: 25 TABLET, SUGAR COATED ORAL at 09:19

## 2020-01-20 RX ADMIN — MORPHINE SULFATE 2 MG: 2 INJECTION, SOLUTION INTRAMUSCULAR; INTRAVENOUS at 14:12

## 2020-01-20 RX ADMIN — METRONIDAZOLE 500 MG: 500 TABLET, FILM COATED ORAL at 22:19

## 2020-01-20 RX ADMIN — CEFEPIME HYDROCHLORIDE 1000 MG: 1 INJECTION, SOLUTION INTRAVENOUS at 04:59

## 2020-01-20 RX ADMIN — INSULIN LISPRO 4 UNITS: 100 INJECTION, SOLUTION INTRAVENOUS; SUBCUTANEOUS at 17:12

## 2020-01-20 RX ADMIN — DIVALPROEX SODIUM 1000 MG: 500 TABLET, EXTENDED RELEASE ORAL at 22:19

## 2020-01-20 RX ADMIN — SODIUM BICARBONATE 650 MG: 650 TABLET ORAL at 10:00

## 2020-01-20 RX ADMIN — INSULIN LISPRO 4 UNITS: 100 INJECTION, SOLUTION INTRAVENOUS; SUBCUTANEOUS at 06:12

## 2020-01-20 RX ADMIN — SODIUM BICARBONATE 650 MG: 650 TABLET ORAL at 17:10

## 2020-01-20 RX ADMIN — HEPARIN SODIUM 5000 UNITS: 5000 INJECTION INTRAVENOUS; SUBCUTANEOUS at 14:41

## 2020-01-20 RX ADMIN — MORPHINE SULFATE 2 MG: 2 INJECTION, SOLUTION INTRAMUSCULAR; INTRAVENOUS at 22:20

## 2020-01-20 RX ADMIN — ASPIRIN 81 MG: 81 TABLET ORAL at 09:19

## 2020-01-20 RX ADMIN — METOPROLOL TARTRATE 25 MG: 25 TABLET, FILM COATED ORAL at 09:19

## 2020-01-20 RX ADMIN — CHLORPROMAZINE HYDROCHLORIDE 50 MG: 25 TABLET, SUGAR COATED ORAL at 17:11

## 2020-01-20 RX ADMIN — ACETAMINOPHEN 650 MG: 325 TABLET ORAL at 00:32

## 2020-01-20 NOTE — CONSULTS
Consultation - Infectious Disease   Dexter Siemens 38 y o  male MRN: [id number]  Unit/Bed#: 7T [facility] 717-01 Encounter: [id number]      IMPRESSION & RECOMMENDATIONS:     27-year-old male patient with history of diabetes mellitus and peripheral vascular disease, requiring bilateral TMA, transferred from [facility] floor to general medical floor for right lower extremity cellulitis secondary to chronic right foot wound infection  1- Sepsis, POA:  Fever 102, leukocytosis 13,000  Sepsis is likely secondary to right foot cellulitis and chronic right foot wound infection  Patient had debridement done by Podiatry, his deep wound cultures are positive for group C strep  Unfortunately blood cultures were not collected  - continue antibiotics as below  - close clinical monitoring, trend fever curve  - repeat CBC in a m   - follow-up final results of wound culture collected 01/18/2020      2- Right lower extremity cellulitis:  Likely secondary to an infected right foot wound ulcer  CT scan showed no evidence of necrotizing fasciitis, MRI showing mild edema and plantar and medial aspect of medial cuneiform    Lower extremity venous duplex negative for DVT  Wound culture preliminarly positive for group C strep  - stop vancomycin, stop cefepime  - start cefazolin iv and flagyl po  - continue local wound care  - podiatry follow up  - follow-up final results of wound culture and adjust antibiotics accordingly    3- diabetes mellitus:  Last A1c 8 4%  - management as per primary service    4- peripheral vascular disease  Patient has history of bilateral TMA in 2015 and in 2016    5- acute kidney injury:  Likely prerenal in setting of sepsis  Creatinine now slowly trending down, creatinine today 1 46 mg/dL  - will adjust antibiotic dose based on creatinine clearance  - follow-up BMP and avoid nephrotoxic medications    Plan mentioned above discussed in details with patient  Case discussed with primary service    HISTORY OF PRESENT ILLNESS:  Reason for Consult:  Cellulitis, suspicion for osteomyelitis  HPI: Bertrand Becerra is a 38y o  year old male with history of hypertension, bipolar disorder, diabetes mellitus type 2, peripheral arterial disease, history of bilateral TMA in 2015 and 2016, chronic right foot ulcer, admitted 01/18/2020 for right foot pain, with fever  Patient was in [facility] unit for the past 10 days for depression and noncompliance with psychiatric medications  On 01/18/2020 patient was noted febrile at 102, his right leg was noted to be red and painful  Patient was transferred to medical floor where he was noted to be hemodynamically stable, but with mild leukocytosis of 13,000  Patient was started on ceftriaxone, then given vancomycin, clindamycin and cefepime  He was evaluated by Podiatry, and had a bedside debridement of the right foot wound, deep cultures were taken  his CT scan and MRI where negative for acute bony abnormality  MRI showed evidence of plantar and medial aspect of the medial cuneiform edema, but indicating rather a degenerative disease or reactive disease rather than infectious etiology  Patient has been afebrile for the past 48 hours, his leukocytosis has resolved  His wound culture is showing group C strep  REVIEW OF SYSTEMS:  A complete 12 point system-based review of systems is negative other than that noted in the HPI      PAST MEDICAL HISTORY:  Past Medical History:   Diagnosis Date    Acute kidney injury (NAIF) with acute tubular necrosis (ATN) ([facility] ) 1/19/2020    Anxiety     Bipolar 1 disorder (HCC)     Chronic pain disorder     Depression     Diabetes mellitus ([facility] )     Hypertension     Psychiatric illness     PTSD (post-traumatic stress disorder)     Sleep difficulties     Substance abuse ([facility] )     Suicide attempt ([facility] )     Type 2 diabetes mellitus with diabetic polyneuropathy, with long-term current use of insulin ([facility] )      Past Surgical History:   Procedure Laterality Date    APPENDECTOMY      COLON SURGERY      TOE AMPUTATION      TONSILLECTOMY         FAMILY HISTORY:  Non-contributory    SOCIAL HISTORY:  Social History   Social History     Substance and Sexual Activity   Alcohol Use Yes    Frequency: Monthly or less    Drinks per session: 1 or 2    Binge frequency: Less than monthly     Social History     Substance and Sexual Activity   Drug Use Not Currently    Types: Marijuana    Comment: monthly     Social History     Tobacco Use   Smoking Status Current Every Day Smoker    Packs/day: 1 50    Years: 22 00    Pack years: 33 00    Types: Cigarettes   Smokeless Tobacco Current User    Types: Chew       ALLERGIES:  Allergies   Allergen Reactions    Penicillins Rash and Other (See Comments)     rash (Tolerating Ancef-per RN)  Last noted when patient was a child       MEDICATIONS:  All current active medications have been reviewed        PHYSICAL EXAM:  Temp:  [97 °F (36 1 °C)-99 °F (37 2 °C)] 97 7 °F (36 5 °C)  HR:  [72-89] 74  Resp:  [17-20] 20  BP: (128-143)/(84-90) 143/84  SpO2:  [95 %-98 %] 98 %  Temp (24hrs), Av 9 °F (36 6 °C), Min:97 °F (36 1 °C), Max:99 °F (37 2 °C)  Current: Temperature: 97 7 °F (36 5 °C)    Intake/Output Summary (Last 24 hours) at 2020 1651  Last data filed at 2020 1500  Gross per 24 hour   Intake 1640 ml   Output --   Net 1640 ml       General Appearance:  Appearing well, nontoxic, and in no distress   Head:  Normocephalic, without obvious abnormality, atraumatic   Eyes:  Conjunctiva pink and sclera anicteric, both eyes   Nose: Nares normal, mucosa normal, no drainage   Throat: Oropharynx moist without lesions   Neck: Supple, symmetrical, no adenopathy, no tenderness/mass/nodules   Back:   Symmetric, no curvature, ROM normal, no CVA tenderness   Lungs:   Clear to auscultation bilaterally, respirations unlabored   Chest Wall:  No tenderness or deformity   Heart:  RRR; no murmur, rub or gallop   Abdomen:   Soft, non-tender, non-distended, positive bowel sounds    Extremities: No cyanosis, clubbing   Edema of the right leg, with erythema and warmth at the distal aspect of the shin  Erythema is regressing compared to a previously drawn line  Distal leg is  to touch   Skin: No rashes or lesions  No draining wounds noted  Lymph nodes: Cervical, supraclavicular nodes normal   Neurologic: Alert and oriented times 3, extremity strength 5/5 and symmetric       LABS, IMAGING, & OTHER STUDIES:  Lab Results:  I have personally reviewed pertinent labs  Results from last 7 days   Lab Units 01/20/20  0529 01/19/20  0512 01/18/20  1031   WBC Thousand/uL 8 90 13 00* 11 30*   HEMOGLOBIN g/dL 12 4* 13 0* 14 5   PLATELETS Thousands/uL 186 194 183     Results from last 7 days   Lab Units 01/20/20  0529 01/19/20  1636 01/19/20  0512 01/18/20  1031 01/17/20  1520   SODIUM mmol/L 131* 132* 128* 131* 130*   POTASSIUM mmol/L 4 3 3 9 3 5* 4 5 4 6   CHLORIDE mmol/L 105 108 97 97 94*   CO2 mmol/L 18* 18* 21* 26 24   BUN mg/dL 25 22 20 14 13   CREATININE mg/dL 1 46 1 61* 2 40* 1 33 1 20   EGFR ml/min/1 73sq m 58* 52* 32* 65 74   CALCIUM mg/dL 8 3* 6 0* 8 0* 8 6 9 4   AST U/L  --   --  13* 17 16*   ALT U/L  --   --  19 18 23   ALK PHOS U/L  --   --  71 72 81     Results from last 7 days   Lab Units 01/19/20  1511 01/18/20  1728   GRAM STAIN RESULT  Rare Polys*  1+ Gram negative rods*  1+ Gram positive cocci in pairs* No polys seen*  Rare Gram positive cocci in pairs*   WOUND CULTURE  Culture too young- will reincubate 2+ Growth of Beta Hemolytic Streptococcus Group C*  3+ Growth of            Imaging Studies:   I have personally reviewed pertinent imaging study reports and images in PACS  CT scan abdomen pelvis showing no evidence of acute intra-abdominal or pelvic pathology, but several anterior abdominal wall hernia and a left adrenal nodule    Right foot x-ray showing no acute bony abnormalities    Stable appearance of transmetatarsal resection    CT right lower extremity showing no acute bony abnormalities  Findings of lower leg cellulitis, no evidence of nec fasciitis    MRI right foot showing edema in the plantar musculature at the level of the 1st metatarsal stump with area of ulceration  Evidence of edema in the plantar and medial aspect of medial cuneiform, appearing degenerative or reactive rather than indicating osteomyelitis  Other Studies:   I have personally reviewed pertinent reports

## 2020-01-20 NOTE — PROGRESS NOTES
Vancomycin Pharmacy to Dose Assessment    Yany Gutierres is a 38 y o  male who is currently receiving vancomycin 2000 mg IV every 12 hours for right lower extremity cellulitis  Relevant clinical data and objective history reviewed:  Creatinine   Date Value Ref Range Status   01/20/2020 1 46 0 70 - 1 50 mg/dL Final     Comment:     Standardized to IDMS reference method   01/19/2020 1 61 (H) 0 70 - 1 50 mg/dL Final     Comment:     Standardized to IDMS reference method   01/19/2020 2 40 (H) 0 70 - 1 50 mg/dL Final     Comment:     Standardized to IDMS reference method   06/20/2018 1 15 0 7 - 1 3 MG/DL Final     Comment:     IDMS method performed  The drugs Metamizole, Sulfasalazine, and Sulfapyridine may interfere and  result in false low results  06/15/2018 0 96 0 7 - 1 3 MG/DL Final     Comment:     IDMS method performed  The drugs Metamizole, Sulfasalazine, and Sulfapyridine may interfere and  result in false low results  06/09/2018 1 22 0 7 - 1 3 MG/DL Final     Comment:     IDMS method performed  The drugs Metamizole, Sulfasalazine, and Sulfapyridine may interfere and  result in false low results  /90 (BP Location: Right arm)   Pulse 72   Temp (!) 97 °F (36 1 °C) (Temporal)   Resp 18   Ht 6' 3" (1 905 m)   Wt 126 kg (276 lb 14 4 oz)   SpO2 95%   BMI 34 61 kg/m²   I/O last 3 completed shifts:   In: 9885 [P O :1820]  Out: -   Lab Results   Component Value Date/Time    BUN 25 01/20/2020 05:29 AM    BUN 22 06/20/2018 09:15 PM    WBC 8 90 01/20/2020 05:29 AM    WBC 8 7 06/09/2018 08:00 PM    HGB 12 4 (L) 01/20/2020 05:29 AM    HGB 15 4 06/09/2018 08:00 PM    HCT 36 9 (L) 01/20/2020 05:29 AM    HCT 45 6 06/09/2018 08:00 PM    MCV 80 01/20/2020 05:29 AM    MCV 77 7 (L) 06/09/2018 08:00 PM     01/20/2020 05:29 AM     06/09/2018 08:00 PM     Temp Readings from Last 3 Encounters:   01/20/20 (!) 97 °F (36 1 °C) (Temporal)   01/18/20 (!) 102 °F (38 9 °C) (Temporal)   08/02/19 97 9 °F (36 6 °C) (Temporal)     Vancomycin Days of Therapy: 2    Assessment/Plan  The patient is currently on vancomycin utilizing scheduled dosing based on adjusted body weight (due to obesity)  Baseline risks associated with therapy include: pre-existing renal impairment  The patient is currently receiving 2000 mg (20 mg/kg) IV every 12 hours which is clinically appropriate and dose will be continued  Pharmacy will also follow closely for s/sx of nephrotoxicity, infusion reactions and appropriateness of therapy  BMP and CBC will be ordered per protocol  Plan for trough as patient approaches steady state, prior to the 5th  dose at approximately 0930 on 1/22/20  Due to infection severity, will target a trough of 15-20 mcg/ml  Pharmacy will continue to follow the patients culture results and clinical progress daily      Kiara Coley, Pharmacist

## 2020-01-20 NOTE — ASSESSMENT & PLAN NOTE
Lab Results   Component Value Date    HGBA1C 8 4 (H) 07/10/2019       Recent Labs     01/19/20  1058 01/19/20  1639 01/19/20 2001 01/20/20  0549   POCGLU 282* 218* 267* 228*       Blood Sugar Average: Last 72 hrs:  (P) 230 5   Uncontrolled diabetic  Patient states that he was prescribed insulin but never picked it upper used it  His blood sugars have been over 200 since admission to behavioral health  Will currently place him on insulin sliding scale and a diabetic diet and also adjust dose of Lantus and also placed on Humalog 5 units t i d  In addition to a sliding scale    Try to control his blood sugar more aggressively    Continue Neurontin for neuropathic pain

## 2020-01-20 NOTE — ASSESSMENT & PLAN NOTE
Lab Results   Component Value Date    HGBA1C 8 4 (H) 07/10/2019       Recent Labs     01/19/20  1058 01/19/20  1639 01/19/20 2001 01/20/20  0549   POCGLU 282* 218* 267* 228*       Blood Sugar Average: Last 72 hrs:  (P) 230 5     Patient noticed to have a small nonhealing ulcer on the base of the right midfoot and also some bleeding noted at the site of his previous TMA    For MRI of his foot

## 2020-01-20 NOTE — ASSESSMENT & PLAN NOTE
Patient  renal function is improving and creatinine improved from 2 4-1  46  Baseline creatinine is around 1-1 3  Continue to hold lisinopril     Decreased IV fluids to 50 cc an hour  Continue to renally dose medications  Avoid any nephrotoxic agents

## 2020-01-20 NOTE — PROGRESS NOTES
Progress Note - Wound   Nella Ibarra 38 y o  male MRN: [id number]  Unit/Bed#: 7T [facility] 717-01 Encounter: [id number]      Assessment:   1  Cellulitis RLE  2  Pain RLE  3  Edema RLE  4  R plantar foot wound    Plan:   - pt eval and managed today  - xeroform and DSD applied to R foot wound today  - continue with IV antibiotics  Erythema improved from yesterday  - pain slightly improved per patient  - case was d/w Primary team in detail today  - we will continue to monitor the patient while he is in house    Subjective:  Pt eval and managed today  States pain is slightly better  Thinks redness improved as well  No other complaints  Objective:    Vitals: Blood pressure 128/90, pulse 72, temperature (!) 97 °F (36 1 °C), temperature source Temporal, resp  rate 18, height 6' 3" (1 905 m), weight 126 kg (276 lb 14 4 oz), SpO2 95 %  ,Body mass index is 34 61 kg/m²  Physical Exam:  L TMA, TMA to the right foot with a plantar right foot wound measuring approximately 1 5cmx1 2cmx0 3cm with no PTB  There minimal drainage  Minimal TTP of this ulceration  erythema has improved compared to area of demarcation from yesterday  still with significant edema  Still TTP  Lab, Imaging and other studies: I have personally reviewed pertinent reports  Wound 07/12/19 Neuropathic/diabetic ulcer Foot Right;Medial;Inner (Active)   Drainage Amount Scant 1/20/2020 11:02 AM   Drainage Description Serosanguineous 1/20/2020 11:02 AM   Treatments Cleansed 1/18/2020  4:25 PM   Dressing Dry dressing 1/20/2020 11:02 AM   Dressing Changed Changed by provider 1/19/2020 10:00 AM   Patient Tolerance Tolerated well 1/18/2020  4:25 PM   Dressing Status Old drainage; Intact 1/20/2020 11:02 AM

## 2020-01-20 NOTE — PROGRESS NOTES
Progress Note - Nivia  1975, 38 y o  male MRN: [id number]    Unit/Bed#: 7T [facility] 717-01 Encounter: [id number]    Primary Care Provider: No primary care provider on file  Date and time admitted to hospital: 1/18/2020 11:02 AM        * Cellulitis of right lower extremity  Assessment & Plan  Patient has right leg cellulitis  Denies any injury however he has not been taking his medications for the last 1 and half week and is a known diabetic with uncontrolled blood sugars  He also has nonhealing wound on his right midfoot  Cellulitis includes redness and swelling extending up to almost the right knee  Patient had a fever of 102 on 01/18/2020 and had leukocytosis noted  Transferred to the medical floor  Placed on IV ceftriaxone     Worsening leukocytosis, swelling redness and pain noted of the right leg 1/19/2020  Continue renally dosed vancomycin and cefepime  Also do a venous Doppler to rule out underlying DVT  CT leg did not show any evidence of gas or necrotizing fasciitis however it is clinically a little bit better today   Appreciate input by podiatry and also discussed with Infectious Disease    Acute kidney injury (NAIF) with acute tubular necrosis (ATN) ([facility] [address] )  Assessment & Plan  Patient  renal function is improving and creatinine improved from 2 4-1  46  Baseline creatinine is around 1-1 3  Continue to hold lisinopril   Decreased IV fluids to 50 cc an hour  Continue to renally dose medications  Avoid any nephrotoxic agents    Sepsis [facility])  Assessment & Plan  Patient has sepsis present on admission with fever of 102  along with mild tachycardia noted and leukocytosis noted on 1/18  Source of infection is the right leg cellulitis  Placed on IV antibiotics and IV fluid hydration    PVD (peripheral vascular disease) [facility])  Assessment & Plan  Patient has known history of peripheral vascular disease has bilateral TMA    Place on aspirin 81 mg daily    Essential hypertension  Assessment & Plan  Patient had accelerated blood pressure 2 days ago  now however his blood pressure is well controlled today  Continue metoprolol and discontinue lisinopril due to Emiliano     Type 2 diabetes mellitus with diabetic polyneuropathy, with long-term current use of insulin [facility])  Assessment & Plan  Lab Results   Component Value Date    HGBA1C 8 4 (H) 07/10/2019       Recent Labs     01/19/20  1058 01/19/20  1639 01/19/20 2001 01/20/20  0549   POCGLU 282* 218* 267* 228*       Blood Sugar Average: Last 72 hrs:  (P) 230 5   Uncontrolled diabetic  Patient states that he was prescribed insulin but never picked it upper used it  His blood sugars have been over 200 since admission to behavioral health  Will currently place him on insulin sliding scale and a diabetic diet and also adjust dose of Lantus and also placed on Humalog 5 units t i d  In addition to a sliding scale  Try to control his blood sugar more aggressively    Continue Neurontin for neuropathic pain    Diabetic ulcer of right midfoot [facility])  Assessment & Plan  Lab Results   Component Value Date    HGBA1C 8 4 (H) 07/10/2019       Recent Labs     01/19/20  1058 01/19/20  1639 01/19/20 2001 01/20/20  0549   POCGLU 282* 218* 267* 228*       Blood Sugar Average: Last 72 hrs:  (P) 230 5     Patient noticed to have a small nonhealing ulcer on the base of the right midfoot and also some bleeding noted at the site of his previous TMA  For MRI of his foot    Tobacco use disorder  Assessment & Plan  Counseled on smoking cessation and placed on nicotine replacement therapy    Bipolar 2 disorder [facility])  Assessment & Plan  Patient has uncontrolled bipolar disorder  He did not take his meds for the last 1 and half week  Complains of suicidal ideation  Was admitted to [facility] but had to be transferred to the medical floor for treatment for cellulitis    Resume all of his home psychiatric medications including Thorazine, BuSpar, Depakote, sertraline , Neurontin    Does not require 1 is to 1 observation at this time or Q 15 minutes behavioral health checks  Secondary to worsening renal function decreased dose of Depakote from 2000 at bedtime to 1000mg hs  Also decrease dose of Thorazine and decrease dose of Neurontin from 600 t i d  To 100 t i d       VTE Pharmacologic Prophylaxis:   Pharmacologic: Heparin  Mechanical VTE Prophylaxis in Place: Yes    Patient Centered Rounds: I have performed bedside rounds with nursing staff today  Discussions with Specialists or Other Care Team Provider:  Discussed with podiatry    Education and Discussions with Family / Patient:  Discussed with patient at bedside    Time Spent for Care: 30 minutes  More than 50% of total time spent on counseling and coordination of care as described above  Current Length of Stay: 2 day(s)    Current Patient Status: Inpatient   Certification Statement: The patient will continue to require additional inpatient hospital stay due to Right leg pain and cellulitis    Discharge Plan:  Pending progress    Code Status: Level 1 - Full Code      Subjective:   Patient currently denies any chest pain or shortness of breath or abdominal pain  He states that the pain in his right leg is still 7 x 10 but it it is a little less warm and red compared to yesterday but it is still swollen    Objective:     Vitals:   Temp (24hrs), Av 1 °F (36 7 °C), Min:97 °F (36 1 °C), Max:99 °F (37 2 °C)    Temp:  [97 °F (36 1 °C)-99 °F (37 2 °C)] 97 °F (36 1 °C)  HR:  [72-89] 72  Resp:  [17-20] 18  BP: (119-143)/(78-90) 128/90  SpO2:  [93 %-95 %] 95 %  Body mass index is 34 61 kg/m²  Input and Output Summary (last 24 hours): Intake/Output Summary (Last 24 hours) at 2020 0902  Last data filed at 2020 1725  Gross per 24 hour   Intake 1040 ml   Output --   Net 1040 ml       Physical Exam:     Physical Exam   Constitutional: He is oriented to person, place, and time   He appears well-developed and well-nourished  HENT:   Head: Normocephalic and atraumatic  Right Ear: External ear normal    Left Ear: External ear normal    Mouth/Throat: Oropharynx is clear and moist    Eyes: Conjunctivae and EOM are normal    Neck: Normal range of motion  Neck supple  Cardiovascular: Normal rate, regular rhythm and normal heart sounds  Pulmonary/Chest: Effort normal and breath sounds normal    Abdominal: Soft  Bowel sounds are normal  He exhibits no mass  There is no tenderness  There is no rebound and no guarding  Genitourinary:   Genitourinary Comments: deferred   Musculoskeletal: Normal range of motion  Right leg tenderness and swelling noted from the knee to the ankle  Mild redness noted and mild warmth noted as well  Bilateral feet TMA days  Small diabetic foot wound noted on the right midfoot with mild serosanguineous drainage noted which is 2 x 2 cm   Neurological: He is alert and oriented to person, place, and time  He has normal reflexes  Cranial nerves 2-12 are normal   Normal neurological exam   Skin: Skin is warm and dry  No rash noted  Psychiatric:   Flat affect   Nursing note and vitals reviewed  Additional Data:     Labs:    Results from last 7 days   Lab Units 01/20/20  0529  01/18/20  1031 01/17/20  1520   WBC Thousand/uL 8 90   < > 11 30* 13 20*   HEMOGLOBIN g/dL 12 4*   < > 14 5 15 3   HEMATOCRIT % 36 9*   < > 42 6 45 4   PLATELETS Thousands/uL 186   < > 183 216   BANDS PCT %  --   --   --  3   NEUTROS PCT %  --   --  86*  --    LYMPHS PCT %  --   --  6*  --    LYMPHO PCT %  --   --   --  7*   MONOS PCT %  --   --  8  --    MONO PCT %  --   --   --  4   EOS PCT %  --   --  0  --     < > = values in this interval not displayed       Results from last 7 days   Lab Units 01/20/20  0529  01/19/20  0512   SODIUM mmol/L 131*   < > 128*   POTASSIUM mmol/L 4 3   < > 3 5*   CHLORIDE mmol/L 105   < > 97   CO2 mmol/L 18*   < > 21*   BUN mg/dL 25   < > 20   CREATININE mg/dL 1 46   < > 2 40* ANION GAP mmol/L 8   < > 10   CALCIUM mg/dL 8 3*   < > 8 0*   ALBUMIN g/dL  --   --  3 2   TOTAL BILIRUBIN mg/dL  --   --  0 40   ALK PHOS U/L  --   --  71   ALT U/L  --   --  19   AST U/L  --   --  13*   GLUCOSE RANDOM mg/dL 218*   < > 226*    < > = values in this interval not displayed  Results from last 7 days   Lab Units 01/20/20  0549 01/19/20 2001 01/19/20  1639 01/19/20  1058 01/19/20  0529 01/18/20  2032 01/18/20  1557 01/18/20  1128 01/18/20  0928 01/18/20  0012 01/17/20  1958   POC GLUCOSE mg/dl 228* 267* 218* 282* 253* 208* 169* 219* 207* 183* 253*         Results from last 7 days   Lab Units 01/19/20  0901 01/18/20  1031   LACTIC ACID mmol/L 1 8 1 3           * I Have Reviewed All Lab Data Listed Above  * Additional Pertinent Lab Tests Reviewed:  Sherly [address] Admission Reviewed    Imaging:    Imaging Reports Reviewed Today Include:  CT leg  Imaging Personally Reviewed by Myself Includes:  CT leg    Recent Cultures (last 7 days):     Results from last 7 days   Lab Units 01/18/20  1728   GRAM STAIN RESULT  No polys seen*  Rare Gram positive cocci in pairs*       Last 24 Hours Medication List:     Current Facility-Administered Medications:  acetaminophen 650 mg Oral Q6H PRN Jose Terry MD    acetaminophen 650 mg Oral Q4H PRN Jose Terry MD    aluminum-magnesium hydroxide-simethicone 15 mL Oral Q4H PRN Jose Terry MD    aspirin 81 mg Oral Daily Jose Terry MD    atorvastatin 40 mg Oral Daily With Dinner Jose Terry MD    benztropine 1 mg Oral BID Jose Terry MD    busPIRone 10 mg Oral BID Jose Terry MD    cefepime 1,000 mg Intravenous Q12H Jose Terry MD Last Rate: 1,000 mg (01/20/20 0459)   chlorproMAZINE 50 mg Oral BID Jose Terry MD    divalproex sodium 1,000 mg Oral HS Jose Terry MD    haloperidol 5 mg Oral Q8H PRN Jose Terry MD    heparin (porcine) 5,000 Units Subcutaneous Q8H [address] Jose Terry MD    hydrOXYzine HCL 25 mg Oral Q6H PRN Jose Terry MD    insulin glargine 30 Units Subcutaneous QAM Refugescobar Mcguire, MD    insulin lispro 2-12 Units Subcutaneous TID AC Refugescobar Mcguire, MD    insulin lispro 2-12 Units Subcutaneous HS Refugia Lips, MD    insulin lispro 5 Units Subcutaneous TID With Meals Refugia Lips, MD    LORazepam 1 mg Intravenous Q8H PRN Loreta Butler PA-C    LORazepam 0 5 mg Oral Q8H PRN Loreta Butler PA-C    magnesium hydroxide 20 mL Oral Daily PRN Refugia Lips, MD    metoprolol tartrate 25 mg Oral Q12H [address] Refugia Lips, MD    morphine injection 2 mg Intravenous Q4H PRN Refugia Lips, MD    nicotine 1 patch Transdermal Daily Refugia Lips, MD    ondansetron 4 mg Intravenous Q6H PRN Refugia Lips, MD    saccharomyces boulardii 250 mg Oral BID Refugia Lips, MD    senna 1 tablet Oral HS PRN Refugia Lips, MD    sertraline 25 mg Oral Daily Refugia Lips, MD    sodium chloride 75 mL/hr Intravenous Continuous Refugia Shayla, MD Last Rate: 75 mL/hr (01/20/20 7751)   traZODone 50 mg Oral HS Refugia Lips, MD    vancomycin 20 mg/kg (Adjusted) Intravenous Q12H Refugia Shayla, MD         Today, Patient Was Seen By: Claude Mcguire MD    ** Please Note: Dictation voice to text software may have been used in the creation of this document   **

## 2020-01-20 NOTE — ASSESSMENT & PLAN NOTE
Patient has uncontrolled bipolar disorder  He did not take his meds for the last 1 and half week  Complains of suicidal ideation  Was admitted to [facility] but had to be transferred to the medical floor for treatment for cellulitis  Resume all of his home psychiatric medications including Thorazine, BuSpar, Depakote, sertraline , Neurontin   Does not require 1 is to 1 observation at this time or Q 15 minutes behavioral health checks  Secondary to worsening renal function decreased dose of Depakote from 2000 at bedtime to 1000mg hs  Also decrease dose of Thorazine and decrease dose of Neurontin from 600 t i d   To 100 t i d

## 2020-01-20 NOTE — ASSESSMENT & PLAN NOTE
Patient had accelerated blood pressure 2 days ago  now however his blood pressure is well controlled today    Continue metoprolol and discontinue lisinopril due to [facility]

## 2020-01-21 LAB
ANION GAP SERPL CALCULATED.3IONS-SCNC: 8 MMOL/L (ref 5–14)
BACTERIA WND AEROBE CULT: ABNORMAL
BACTERIA WND AEROBE CULT: ABNORMAL
BUN SERPL-MCNC: 15 MG/DL (ref 5–25)
CALCIUM SERPL-MCNC: 8 MG/DL (ref 8.4–10.2)
CHLORIDE SERPL-SCNC: 104 MMOL/L (ref 97–108)
CO2 SERPL-SCNC: 21 MMOL/L (ref 22–30)
CREAT SERPL-MCNC: 1.03 MG/DL (ref 0.7–1.5)
GFR SERPL CREATININE-BSD FRML MDRD: 89 ML/MIN/1.73SQ M
GLUCOSE SERPL-MCNC: 181 MG/DL (ref 65–140)
GLUCOSE SERPL-MCNC: 185 MG/DL (ref 65–140)
GLUCOSE SERPL-MCNC: 210 MG/DL (ref 65–140)
GLUCOSE SERPL-MCNC: 220 MG/DL (ref 65–140)
GLUCOSE SERPL-MCNC: 224 MG/DL (ref 70–99)
GRAM STN SPEC: ABNORMAL
GRAM STN SPEC: ABNORMAL
POTASSIUM SERPL-SCNC: 4.1 MMOL/L (ref 3.6–5)
SODIUM SERPL-SCNC: 133 MMOL/L (ref 137–147)

## 2020-01-21 PROCEDURE — 99232 SBSQ HOSP IP/OBS MODERATE 35: CPT | Performed by: INTERNAL MEDICINE

## 2020-01-21 PROCEDURE — 99232 SBSQ HOSP IP/OBS MODERATE 35: CPT | Performed by: FAMILY MEDICINE

## 2020-01-21 PROCEDURE — 80048 BASIC METABOLIC PNL TOTAL CA: CPT | Performed by: FAMILY MEDICINE

## 2020-01-21 PROCEDURE — 82948 REAGENT STRIP/BLOOD GLUCOSE: CPT

## 2020-01-21 RX ORDER — INSULIN GLARGINE 100 [IU]/ML
40 INJECTION, SOLUTION SUBCUTANEOUS EVERY MORNING
Status: DISCONTINUED | OUTPATIENT
Start: 2020-01-21 | End: 2020-01-23 | Stop reason: HOSPADM

## 2020-01-21 RX ADMIN — HEPARIN SODIUM 5000 UNITS: 5000 INJECTION INTRAVENOUS; SUBCUTANEOUS at 14:20

## 2020-01-21 RX ADMIN — MORPHINE SULFATE 2 MG: 2 INJECTION, SOLUTION INTRAMUSCULAR; INTRAVENOUS at 04:21

## 2020-01-21 RX ADMIN — CEFAZOLIN SODIUM 2000 MG: 2 SOLUTION INTRAVENOUS at 08:45

## 2020-01-21 RX ADMIN — CEFAZOLIN SODIUM 2000 MG: 2 SOLUTION INTRAVENOUS at 17:09

## 2020-01-21 RX ADMIN — METRONIDAZOLE 500 MG: 500 TABLET, FILM COATED ORAL at 14:20

## 2020-01-21 RX ADMIN — BUSPIRONE HYDROCHLORIDE 10 MG: 5 TABLET ORAL at 08:45

## 2020-01-21 RX ADMIN — INSULIN GLARGINE 40 UNITS: 100 INJECTION, SOLUTION SUBCUTANEOUS at 08:46

## 2020-01-21 RX ADMIN — INSULIN LISPRO 4 UNITS: 100 INJECTION, SOLUTION INTRAVENOUS; SUBCUTANEOUS at 21:52

## 2020-01-21 RX ADMIN — Medication 250 MG: at 17:09

## 2020-01-21 RX ADMIN — CHLORPROMAZINE HYDROCHLORIDE 50 MG: 25 TABLET, SUGAR COATED ORAL at 17:09

## 2020-01-21 RX ADMIN — MORPHINE SULFATE 2 MG: 2 INJECTION, SOLUTION INTRAMUSCULAR; INTRAVENOUS at 08:47

## 2020-01-21 RX ADMIN — CEFAZOLIN SODIUM 2000 MG: 2 SOLUTION INTRAVENOUS at 02:10

## 2020-01-21 RX ADMIN — TRAZODONE HYDROCHLORIDE 50 MG: 50 TABLET ORAL at 21:52

## 2020-01-21 RX ADMIN — METRONIDAZOLE 500 MG: 500 TABLET, FILM COATED ORAL at 06:21

## 2020-01-21 RX ADMIN — ATORVASTATIN CALCIUM 40 MG: 40 TABLET, FILM COATED ORAL at 17:02

## 2020-01-21 RX ADMIN — HEPARIN SODIUM 5000 UNITS: 5000 INJECTION INTRAVENOUS; SUBCUTANEOUS at 06:21

## 2020-01-21 RX ADMIN — HEPARIN SODIUM 5000 UNITS: 5000 INJECTION INTRAVENOUS; SUBCUTANEOUS at 21:52

## 2020-01-21 RX ADMIN — CHLORPROMAZINE HYDROCHLORIDE 50 MG: 25 TABLET, SUGAR COATED ORAL at 08:46

## 2020-01-21 RX ADMIN — BENZTROPINE MESYLATE 1 MG: 1 TABLET ORAL at 08:45

## 2020-01-21 RX ADMIN — ASPIRIN 81 MG: 81 TABLET ORAL at 08:45

## 2020-01-21 RX ADMIN — METOPROLOL TARTRATE 25 MG: 25 TABLET, FILM COATED ORAL at 21:53

## 2020-01-21 RX ADMIN — SERTRALINE HYDROCHLORIDE 25 MG: 25 TABLET ORAL at 08:45

## 2020-01-21 RX ADMIN — INSULIN LISPRO 2 UNITS: 100 INJECTION, SOLUTION INTRAVENOUS; SUBCUTANEOUS at 17:02

## 2020-01-21 RX ADMIN — INSULIN LISPRO 2 UNITS: 100 INJECTION, SOLUTION INTRAVENOUS; SUBCUTANEOUS at 12:22

## 2020-01-21 RX ADMIN — MORPHINE SULFATE 2 MG: 2 INJECTION, SOLUTION INTRAMUSCULAR; INTRAVENOUS at 14:20

## 2020-01-21 RX ADMIN — METRONIDAZOLE 500 MG: 500 TABLET, FILM COATED ORAL at 21:52

## 2020-01-21 RX ADMIN — Medication 250 MG: at 08:45

## 2020-01-21 RX ADMIN — DIVALPROEX SODIUM 1000 MG: 500 TABLET, EXTENDED RELEASE ORAL at 21:52

## 2020-01-21 RX ADMIN — SODIUM BICARBONATE 650 MG: 650 TABLET ORAL at 17:02

## 2020-01-21 RX ADMIN — INSULIN LISPRO 4 UNITS: 100 INJECTION, SOLUTION INTRAVENOUS; SUBCUTANEOUS at 06:21

## 2020-01-21 RX ADMIN — SODIUM BICARBONATE 650 MG: 650 TABLET ORAL at 08:45

## 2020-01-21 RX ADMIN — MORPHINE SULFATE 2 MG: 2 INJECTION, SOLUTION INTRAMUSCULAR; INTRAVENOUS at 19:46

## 2020-01-21 RX ADMIN — BUSPIRONE HYDROCHLORIDE 10 MG: 5 TABLET ORAL at 17:09

## 2020-01-21 NOTE — PROGRESS NOTES
Progress Note - Dexter Siemens 1975, 38 y o  male MRN: [id number]    Unit/Bed#: 7T [facility] 717-01 Encounter: [id number]    Primary Care Provider: No primary care provider on file  Date and time admitted to hospital: 1/18/2020 11:02 AM        * Cellulitis of right lower extremity  Assessment & Plan  Patient has right leg cellulitis  Denies any injury however he has not been taking his medications for the last 1 and half week and is a known diabetic with uncontrolled blood sugars  He also has nonhealing wound on his right midfoot  Cellulitis includes redness and swelling extending up to almost the right knee  Patient had a fever of 102 on 01/18/2020 and had leukocytosis noted  Transferred to the medical floor  Placed on IV ceftriaxone     Worsening leukocytosis, swelling redness and pain noted of the right leg 1/19/2020  Patient is finally showing slow clinical improvement  Wound culture showing beta-hemolytic Streptococcus  Continue antibiotic as per Infectious Disease  Venous Doppler negative for DVT but does show a large inguinal lymph nodes which may be infectious versus malignancy will need outpatient follow-up  CT leg did not show any evidence of gas or necrotizing fasciitis however it is clinically a little bit better today   Appreciate input by podiatry and also discussed with Infectious Disease    Acute kidney injury (NAIF) with acute tubular necrosis (ATN) ([facility] [address] )  Assessment & Plan  Patient  renal function is improving and creatinine improved from 2 4-1 46 -1 03  Baseline creatinine is around 1-1 3  Continue to hold lisinopril   Discontinue IV fluids  Continue to renally dose medications  Avoid any nephrotoxic agents    Sepsis [facility])  Assessment & Plan  Patient has sepsis present on admission with fever of 102  along with mild tachycardia noted and leukocytosis noted on 1/18  Source of infection is the right leg cellulitis    Placed on IV antibiotics and IV fluid hydration    Sepsis has now resolved    PVD (peripheral vascular disease) [facility])  Assessment & Plan  Patient has known history of peripheral vascular disease has bilateral TMA  Place on aspirin 81 mg daily    Essential hypertension  Assessment & Plan  Patient had accelerated blood pressure 2 days ago  now however his blood pressure is well controlled today  Continue metoprolol and discontinue lisinopril due to Emiliano     Type 2 diabetes mellitus with diabetic polyneuropathy, with long-term current use of insulin [facility])  Assessment & Plan  Lab Results   Component Value Date    HGBA1C 8 4 (H) 07/10/2019       Recent Labs     01/20/20  1112 01/20/20  1610 01/20/20 2039 01/21/20  0531   POCGLU 251* 239* 304* 220*       Blood Sugar Average: Last 72 hrs:  (P) 656 [id number]   Uncontrolled diabetic  Patient states that he was prescribed insulin but never picked it upper used it  His blood sugars have been over 200 since admission to behavioral health  Will currently place him on insulin sliding scale and a diabetic diet and also adjust dose of Lantus to 40 units daily and also placed on Humalog 8 units t i d  In addition to a sliding scale  Try to control his blood sugar more aggressively    Continue Neurontin for neuropathic pain    Diabetic ulcer of right midfoot [facility])  Assessment & Plan  Lab Results   Component Value Date    HGBA1C 8 4 (H) 07/10/2019       Recent Labs     01/20/20  1112 01/20/20  1610 01/20/20 2039 01/21/20  0531   POCGLU 251* 239* 304* 220*       Blood Sugar Average: Last 72 hrs:  (P) 800 [id number]     Patient noticed to have a small nonhealing ulcer on the base of the right midfoot and also some bleeding noted at the site of his previous TMA    MRI foot shows cellulitis but no osteomyelitis    Tobacco use disorder  Assessment & Plan  Counseled on smoking cessation and placed on nicotine replacement therapy    Bipolar 2 disorder [facility])  Assessment & Plan  Patient has uncontrolled bipolar disorder  He did not take his meds for the last 1 and half week  Complains of suicidal ideation  Was admitted to [address] [place] [address] but had to be transferred to the medical floor for treatment for cellulitis  Resume all of his home psychiatric medications including Thorazine, BuSpar, Depakote, sertraline , Neurontin   Does not require 1 is to 1 observation at this time or Q 15 minutes behavioral health checks  Secondary to worsening renal function decreased dose of Depakote from 2000 at bedtime to 1000mg hs  Also decrease dose of Thorazine and decrease dose of Neurontin from 600 t i d  To 100 t i d       VTE Pharmacologic Prophylaxis:   Pharmacologic: Heparin  Mechanical VTE Prophylaxis in Place: No    Patient Centered Rounds: I have performed bedside rounds with nursing staff today  Discussions with Specialists or Other Care Team Provider:  Discussed with Infectious Disease and Podiatry    Education and Discussions with Family / Patient:  Discussed with patient at bedside    Time Spent for Care: 30 minutes  More than 50% of total time spent on counseling and coordination of care as described above  Current Length of Stay: 3 day(s)    Current Patient Status: Inpatient   Certification Statement: The patient will continue to require additional inpatient hospital stay due to Right leg cellulitis and uncontrolled diabetes    Discharge Plan:  Pending progress  Needs antibiotics and monitoring of cellulitis for another 24-48 hours and also requires evaluation by Psychiatry for depression    Code Status: Level 1 - Full Code      Subjective:   Patient complains of 5 x 10 pain in his right leg  Denies any chest pain or shortness of breath or abdominal pain    Also states he is depressed    Objective:     Vitals:   Temp (24hrs), Av 4 °F (36 3 °C), Min:96 8 °F (36 °C), Max:97 7 °F (36 5 °C)    Temp:  [96 8 °F (36 °C)-97 7 °F (36 5 °C)] 97 5 °F (36 4 °C)  HR:  [74-84] 84  Resp:  [18-20] 18  BP: (103-143)/(74-85) 103/80  SpO2:  [94 %-99 %] 99 %  Body mass index is 34 61 kg/m²  Input and Output Summary (last 24 hours): Intake/Output Summary (Last 24 hours) at 1/21/2020 4286  Last data filed at 1/21/2020 0500  Gross per 24 hour   Intake 2760 ml   Output --   Net 2760 ml       Physical Exam:     Physical Exam   Constitutional: He is oriented to person, place, and time  He appears well-developed and well-nourished  HENT:   Head: Normocephalic and atraumatic  Right Ear: External ear normal    Left Ear: External ear normal    Mouth/Throat: Oropharynx is clear and moist    Eyes: Conjunctivae and EOM are normal    Neck: Normal range of motion  Neck supple  Cardiovascular: Normal rate, regular rhythm and normal heart sounds  Pulmonary/Chest: Effort normal and breath sounds normal    Abdominal: Soft  Bowel sounds are normal  He exhibits no mass  There is no tenderness  There is no rebound and no guarding  Genitourinary:   Genitourinary Comments: deferred   Musculoskeletal: Normal range of motion  Right leg 2+ nonpitting edema, redness and swelling noted  Tenderness on palpation of the right calf   Neurological: He is alert and oriented to person, place, and time  He has normal reflexes  Cranial nerves 2-12 are normal   Normal neurological exam   Skin: Skin is warm and dry  No rash noted  Psychiatric: He has a normal mood and affect  Nursing note and vitals reviewed  Additional Data:     Labs:    Results from last 7 days   Lab Units 01/20/20  0529  01/18/20  1031 01/17/20  1520   WBC Thousand/uL 8 90   < > 11 30* 13 20*   HEMOGLOBIN g/dL 12 4*   < > 14 5 15 3   HEMATOCRIT % 36 9*   < > 42 6 45 4   PLATELETS Thousands/uL 186   < > 183 216   BANDS PCT %  --   --   --  3   NEUTROS PCT %  --   --  86*  --    LYMPHS PCT %  --   --  6*  --    LYMPHO PCT %  --   --   --  7*   MONOS PCT %  --   --  8  --    MONO PCT %  --   --   --  4   EOS PCT %  --   --  0  --     < > = values in this interval not displayed       Results from last 7 days   Lab Units 01/21/20  0446  01/19/20  0512   SODIUM mmol/L 133*   < > 128*   POTASSIUM mmol/L 4 1   < > 3 5*   CHLORIDE mmol/L 104   < > 97   CO2 mmol/L 21*   < > 21*   BUN mg/dL 15   < > 20   CREATININE mg/dL 1 03   < > 2 40*   ANION GAP mmol/L 8   < > 10   CALCIUM mg/dL 8 0*   < > 8 0*   ALBUMIN g/dL  --   --  3 2   TOTAL BILIRUBIN mg/dL  --   --  0 40   ALK PHOS U/L  --   --  71   ALT U/L  --   --  19   AST U/L  --   --  13*   GLUCOSE RANDOM mg/dL 224*   < > 226*    < > = values in this interval not displayed  Results from last 7 days   Lab Units 01/21/20  0531 01/20/20  2039 01/20/20  1610 01/20/20  1112 01/20/20  0549 01/19/20  2001 01/19/20  1639 01/19/20  1058 01/19/20  0529 01/18/20  2032 01/18/20  1557 01/18/20  1128   POC GLUCOSE mg/dl 220* 304* 239* 251* 228* 267* 218* 282* 253* 208* 169* 219*         Results from last 7 days   Lab Units 01/19/20  0901 01/18/20  1031   LACTIC ACID mmol/L 1 8 1 3           * I Have Reviewed All Lab Data Listed Above  * Additional Pertinent Lab Tests Reviewed:  Sherly [address] Admission Reviewed    Imaging:    Imaging Reports Reviewed Today Include:  None  Imaging Personally Reviewed by Myself Includes:  none    Recent Cultures (last 7 days):     Results from last 7 days   Lab Units 01/19/20  1511 01/18/20  1728   GRAM STAIN RESULT  Rare Polys*  1+ Gram negative rods*  1+ Gram positive cocci in pairs* No polys seen*  Rare Gram positive cocci in pairs*   WOUND CULTURE  Culture too young- will reincubate 2+ Growth of Beta Hemolytic Streptococcus Group C*  3+ Growth of        Last 24 Hours Medication List:     Current Facility-Administered Medications:  acetaminophen 650 mg Oral Q6H PRN Lulú Gu MD    acetaminophen 650 mg Oral Q4H PRN Lulú Gu MD    aluminum-magnesium hydroxide-simethicone 15 mL Oral Q4H PRN Lulú Gu MD    aspirin 81 mg Oral Daily Lulú Gu MD    atorvastatin 40 mg Oral Daily With Eliazar Muse MD benztropine 1 mg Oral BID Annabel Parnell MD    busPIRone 10 mg Oral BID Annabel Parnell MD    cefazolin 2,000 mg Intravenous Q8H Soy Aguayo MD Last Rate: 2,000 mg (01/21/20 0210)   chlorproMAZINE 50 mg Oral BID Annabel Parnell MD    divalproex sodium 1,000 mg Oral HS Annabel Parnell MD    haloperidol 5 mg Oral Q8H PRN Annabel Parnell MD    heparin (porcine) 5,000 Units Subcutaneous [facility] Annabel Parnell MD    hydrOXYzine HCL 25 mg Oral Q6H PRN Annabel Parnell MD    insulin glargine 40 Units Subcutaneous QAM Annabel Parnell MD    insulin lispro 2-12 Units Subcutaneous TID AC Annabel Parnell MD    insulin lispro 2-12 Units Subcutaneous HS Annabel Parnell MD    insulin lispro 8 Units Subcutaneous TID With Meals Annabel Parnell MD    LORazepam 1 mg Intravenous Q8H PRN Sherly Magdaleno PA-C    LORazepam 0 5 mg Oral Q8H PRN Sherly Magdaleno PA-C    magnesium hydroxide 20 mL Oral Daily PRN Annabel Parnell MD    metoprolol tartrate 25 mg Oral Q12H [address] Annabel Parnell MD    metroNIDAZOLE 500 mg Oral Q8H [address] Soy Aguayo MD    morphine injection 2 mg Intravenous Q4H PRN Annabel Parnell MD    nicotine 1 patch Transdermal Daily Annabel Parnell MD    ondansetron 4 mg Intravenous Q6H PRN Annabel Parnell MD    saccharomyces boulardii 250 mg Oral BID Annabel Parnell MD    senna 1 tablet Oral HS PRN Annabel Parnell MD    sertraline 25 mg Oral Daily Annabel Parnell MD    sodium bicarbonate 650 mg Oral BID after meals Annabel Parnell MD    traZODone 50 mg Oral HS Annabel Parnell MD         Today, Patient Was Seen By: Annabel Parnell MD    ** Please Note: Dictation voice to text software may have been used in the creation of this document   **

## 2020-01-21 NOTE — ASSESSMENT & PLAN NOTE
Patient had accelerated blood pressure 2 days ago  now however his blood pressure is well controlled today    Continue metoprolol and discontinue lisinopril due to [facility]

## 2020-01-21 NOTE — ASSESSMENT & PLAN NOTE
Lab Results   Component Value Date    HGBA1C 8 4 (H) 07/10/2019       Recent Labs     01/20/20  1112 01/20/20  1610 01/20/20 2039 01/21/20  0531   POCGLU 251* 239* 304* 220*       Blood Sugar Average: Last 72 hrs:  (P) 037 [id number]     Patient noticed to have a small nonhealing ulcer on the base of the right midfoot and also some bleeding noted at the site of his previous TMA    MRI foot shows cellulitis but no osteomyelitis

## 2020-01-21 NOTE — PROGRESS NOTES
Progress Note - Infectious Disease   Hilary Delgado 38 y o  male MRN: [id number]  Unit/Bed#: 7T [facility] 717-01 Encounter: [id number]      Impression/Plan:    64-year-old male patient with history of diabetes mellitus and peripheral vascular disease, requiring bilateral TMA, transferred from [address] [place] [address] floor to general medical floor for right lower extremity cellulitis secondary to chronic right foot wound infection      1- Sepsis, POA:  Fever 102, leukocytosis 13,000  Sepsis is likely secondary to right foot cellulitis and chronic right foot wound infection  Patient had debridement done by Podiatry, his deep wound cultures are positive for group C strep and E coli  Unfortunately blood cultures were not collected  - continue antibiotics as below  - close clinical monitoring, trend fever curve  - repeat CBC in a m   - follow-up final results of wound culture collected 01/18/2020        2- Right lower extremity cellulitis:  Likely secondary to an infected right foot wound ulcer  CT scan showed no evidence of necrotizing fasciitis, MRI showing mild edema and plantar and medial aspect of medial cuneiform  Lower extremity venous duplex negative for DVT  Wound culture preliminarly positive for group C strep and E coli  Patient is significantly improving on cefazolin IV and Flagyl, with resolution of fever and leukocytosis over the past 72 hours; erythema that was tracking up to the proximal shin, has currently resolved  Patient is still complaining of tenderness of the leg and foot  MRI reviewed and case discussed at length with podiatrist Dr Sunita Guthrie  No evidence of osteomyelitis at this time, will treat for skin and soft tissue infection    - continue cefazolin iv and flagyl po  - continue local wound care  - podiatry follow up  - follow-up final results of wound culture and adjust antibiotics accordingly     3- diabetes mellitus:  Last A1c 8 4%  - management as per primary service     4- peripheral vascular disease  Patient has history of bilateral TMA in 2015 and in 2016     5- acute kidney injury:  Likely prerenal in setting of sepsis  Creatinine now slowly trending down, creatinine today 1 03 mg/dL  - follow-up BMP and avoid nephrotoxic medications     Plan mentioned above discussed in details with patient  Case discussed with primary service    Antibiotics:  Cefazolin day 2   Flagyl day 2  IV antibiotics day 4    Subjective:  Patient has no fever, chills, sweats; no nausea, vomiting, diarrhea; no cough, shortness of breath; Patient is still complaining of right foot pain  He reports that erythema has significantly regressed  No fever or chills    Objective:  Vitals:  Temp:  [96 8 °F (36 °C)-98 7 °F (37 1 °C)] 98 7 °F (37 1 °C)  HR:  [76-88] 88  Resp:  [18] 18  BP: (103-155)/(74-85) 155/84  SpO2:  [94 %-99 %] 95 %  Temp (24hrs), Av 7 °F (36 5 °C), Min:96 8 °F (36 °C), Max:98 7 °F (37 1 °C)  Current: Temperature: 98 7 °F (37 1 °C)    Physical Exam:   General Appearance:  Alert, interactive, nontoxic, no acute distress  Throat: Oropharynx moist without lesions  Lungs:   Clear to auscultation bilaterally; no wheezes, rhonchi or rales; respirations unlabored   Heart:  RRR; no murmur, rub or gallop   Abdomen:   Soft, non-tender, non-distended, positive bowel sounds  Extremities: No clubbing, cyanosis  Right leg shows some swelling and tenderness to touch  Erythema almost completely resolved  Skin: No new rashes or lesions  Wound at the plantar aspect of the right foot, close to the stump area    No surrounding erythema on today's exam, no purulence       Labs, Imaging, & Other studies:   All pertinent labs and imaging studies were personally reviewed  Results from last 7 days   Lab Units 20  0529 20  0512 20  1031   WBC Thousand/uL 8 90 13 00* 11 30*   HEMOGLOBIN g/dL 12 4* 13 0* 14 5   PLATELETS Thousands/uL 186 194 183     Results from last 7 days   Lab Units 20  5276 01/20/20  0529 01/19/20  1636 01/19/20  0512 01/18/20  1031 01/17/20  1520   SODIUM mmol/L 133* 131* 132* 128* 131* 130*   POTASSIUM mmol/L 4 1 4 3 3 9 3 5* 4 5 4 6   CHLORIDE mmol/L 104 105 108 97 97 94*   CO2 mmol/L 21* 18* 18* 21* 26 24   BUN mg/dL 15 25 22 20 14 13   CREATININE mg/dL 1 03 1 46 1 61* 2 40* 1 33 1 20   EGFR ml/min/1 73sq m 89 58* 52* 32* 65 74   CALCIUM mg/dL 8 0* 8 3* 6 0* 8 0* 8 6 9 4   AST U/L  --   --   --  13* 17 16*   ALT U/L  --   --   --  19 18 23   ALK PHOS U/L  --   --   --  71 72 81     Results from last 7 days   Lab Units 01/19/20  1511 01/18/20  1728   GRAM STAIN RESULT  Rare Polys*  1+ Gram negative rods*  1+ Gram positive cocci in pairs* No polys seen*  Rare Gram positive cocci in pairs*   WOUND CULTURE  1+ Growth of Escherichia coli*  1+ Growth of  2+ Growth of Beta Hemolytic Streptococcus Group C*  3+ Growth of - mixed skin danielle including Corynebacterium striatum group*

## 2020-01-21 NOTE — ASSESSMENT & PLAN NOTE
Lab Results   Component Value Date    HGBA1C 8 4 (H) 07/10/2019       Recent Labs     01/20/20  1112 01/20/20  1610 01/20/20 2039 01/21/20  0531   POCGLU 251* 239* 304* 220*       Blood Sugar Average: Last 72 hrs:  (P) 058 [id number]   Uncontrolled diabetic  Patient states that he was prescribed insulin but never picked it upper used it  His blood sugars have been over 200 since admission to behavioral health  Will currently place him on insulin sliding scale and a diabetic diet and also adjust dose of Lantus to 40 units daily and also placed on Humalog 8 units t i d  In addition to a sliding scale    Try to control his blood sugar more aggressively    Continue Neurontin for neuropathic pain

## 2020-01-21 NOTE — CONSULTS
Vancomycin therapy has been discontinued after two doses  Thank you for this consult  Pharmacy will sign-off now

## 2020-01-21 NOTE — ASSESSMENT & PLAN NOTE
Patient has uncontrolled bipolar disorder  He did not take his meds for the last 1 and half week  Complains of suicidal ideation  Was admitted to [facility] but had to be transferred to the medical floor for treatment for cellulitis  Resume all of his home psychiatric medications including Thorazine, BuSpar, Depakote, sertraline , Neurontin   Does not require 1 is to 1 observation at this time or Q 15 minutes behavioral health checks  Secondary to worsening renal function decreased dose of Depakote from 2000 at bedtime to 1000mg hs  Also decrease dose of Thorazine and decrease dose of Neurontin from 600 t i d   To 100 t i d

## 2020-01-21 NOTE — PROGRESS NOTES
Progress Note - Wound   Peña Siemens 38 y o  male MRN: [id number]  Unit/Bed#: 7T [facility] 717-01 Encounter: [id number]      Assessment:   1  Cellulitis RLE  2  Pain RLE  3  Edema RLE  4  R plantar foot wound    Plan:   - pt eval and managed today  - continue IV antibiotics as per ID    - clinically do not suspect OM  MRI likely reactive changes > OM  - erythema improving, however, swelling and discomfort still exist  - we will continue to monitor pt while he is in house    Subjective:  Pt eval and managed today  No new changes  Redness better, but discomfort still present  Objective:    Vitals: Blood pressure 103/80, pulse 84, temperature 97 5 °F (36 4 °C), temperature source Temporal, resp  rate 18, height 6' 3" (1 905 m), weight 126 kg (276 lb 14 4 oz), SpO2 99 %  ,Body mass index is 34 61 kg/m²  Physical Exam:  L TMA, TMA to the right foot with a plantar right foot wound measuring approximately 1 5cmx1 2cmx0 3cm with no PTB  There minimal drainage  Minimal TTP of this ulceration  erythema has improved compared to area of demarcation from yesterday  still with significant edema  Still TTP  Lab, Imaging and other studies: I have personally reviewed pertinent reports  Wound 07/12/19 Neuropathic/diabetic ulcer Foot Right;Medial;Inner (Active)   Drainage Amount Scant 1/21/2020 11:00 AM   Drainage Description Serosanguineous 1/21/2020 11:00 AM   Treatments Cleansed 1/18/2020  4:25 PM   Dressing Dry dressing 1/21/2020 11:00 AM   Dressing Changed Changed by provider 1/19/2020 10:00 AM   Patient Tolerance Tolerated well 1/18/2020  4:25 PM   Dressing Status Old drainage; Intact 1/21/2020 11:00 AM

## 2020-01-21 NOTE — ASSESSMENT & PLAN NOTE
Patient has right leg cellulitis  Denies any injury however he has not been taking his medications for the last 1 and half week and is a known diabetic with uncontrolled blood sugars  He also has nonhealing wound on his right midfoot  Cellulitis includes redness and swelling extending up to almost the right knee  Patient had a fever of 102 on 01/18/2020 and had leukocytosis noted  Transferred to the medical floor  Placed on IV ceftriaxone     Worsening leukocytosis, swelling redness and pain noted of the right leg 1/19/2020  Patient is finally showing slow clinical improvement  Wound culture showing beta-hemolytic Streptococcus  Continue antibiotic as per Infectious Disease  Venous Doppler negative for DVT but does show a large inguinal lymph nodes which may be infectious versus malignancy will need outpatient follow-up  CT leg did not show any evidence of gas or necrotizing fasciitis however it is clinically a little bit better today     Appreciate input by podiatry and also discussed with Infectious Disease

## 2020-01-21 NOTE — PLAN OF CARE
Problem: Potential for Falls  Goal: Patient will remain free of falls  Description  INTERVENTIONS:  - Assess patient frequently for physical needs  -  Identify cognitive and physical deficits and behaviors that affect risk of falls    -  [place] fall precautions as indicated by assessment   - Educate patient/family on patient safety including physical limitations  - Instruct patient to call for assistance with activity based on assessment  - Modify environment to reduce risk of injury  - Consider OT/PT consult to assist with strengthening/mobility  Outcome: Progressing     Problem: PAIN - ADULT  Goal: Verbalizes/displays adequate comfort level or baseline comfort level  Description  Interventions:  - Encourage patient to monitor pain and request assistance  - Assess pain using appropriate pain scale  - Administer analgesics based on type and severity of pain and evaluate response  - Implement non-pharmacological measures as appropriate and evaluate response  - Consider cultural and social influences on pain and pain management  - Notify physician/advanced practitioner if interventions unsuccessful or patient reports new pain  Outcome: Progressing     Problem: INFECTION - ADULT  Goal: Absence or prevention of progression during hospitalization  Description  INTERVENTIONS:  - Assess and monitor for signs and symptoms of infection  - Monitor lab/diagnostic results  - Monitor all insertion sites, i e  indwelling lines, tubes, and drains  - Monitor endotracheal if appropriate and nasal secretions for changes in amount and color  - [place] appropriate cooling/warming therapies per order  - Administer medications as ordered  - Instruct and encourage patient and family to use good hand hygiene technique  - Identify and instruct in appropriate isolation precautions for identified infection/condition  Outcome: Progressing  Goal: Absence of fever/infection during neutropenic period  Description  INTERVENTIONS:  - Monitor WBC    Outcome: Progressing     Problem: SAFETY ADULT  Goal: Patient will remain free of falls  Description  INTERVENTIONS:  - Assess patient frequently for physical needs  -  Identify cognitive and physical deficits and behaviors that affect risk of falls    -  [place] fall precautions as indicated by assessment   - Educate patient/family on patient safety including physical limitations  - Instruct patient to call for assistance with activity based on assessment  - Modify environment to reduce risk of injury  - Consider OT/PT consult to assist with strengthening/mobility  Outcome: Progressing  Goal: Maintain or return to baseline ADL function  Description  INTERVENTIONS:  -  Assess patient's ability to carry out ADLs; assess patient's baseline for ADL function and identify physical deficits which impact ability to perform ADLs (bathing, care of mouth/teeth, toileting, grooming, dressing, etc )  - Assess/evaluate cause of self-care deficits   - Assess range of motion  - Assess patient's mobility; develop plan if impaired  - Assess patient's need for assistive devices and provide as appropriate  - Encourage maximum independence but intervene and supervise when necessary  - Involve family in performance of ADLs  - Assess for home care needs following discharge   - Consider OT consult to assist with ADL evaluation and planning for discharge  - Provide patient education as appropriate  Outcome: Progressing  Goal: Maintain or return mobility status to optimal level  Description  INTERVENTIONS:  - Assess patient's baseline mobility status (ambulation, transfers, stairs, etc )    - Identify cognitive and physical deficits and behaviors that affect mobility  - Identify mobility aids required to assist with transfers and/or ambulation (gait belt, sit-to-stand, lift, walker, cane, etc )  - [place] fall precautions as indicated by assessment  - Record patient progress and toleration of activity level on Mobility SBAR; progress patient to next Phase/Stage  - Instruct patient to call for assistance with activity based on assessment  - Consider rehabilitation consult to assist with strengthening/weightbearing, etc   Outcome: Progressing     Problem: DISCHARGE PLANNING  Goal: Discharge to home or other facility with appropriate resources  Description  INTERVENTIONS:  - Identify barriers to discharge w/patient and caregiver  - Arrange for needed discharge resources and transportation as appropriate  - Identify discharge learning needs (meds, wound care, etc )  - Arrange for interpretive services to assist at discharge as needed  - Refer to Case Management Department for coordinating discharge planning if the patient needs post-hospital services based on physician/advanced practitioner order or complex needs related to functional status, cognitive ability, or social support system  Outcome: Progressing     Problem: Knowledge Deficit  Goal: Patient/family/caregiver demonstrates understanding of disease process, treatment plan, medications, and discharge instructions  Description  Complete learning assessment and assess knowledge base    Interventions:  - Provide teaching at level of understanding  - Provide teaching via preferred learning methods  Outcome: Progressing     Problem: Prexisting or High Potential for Compromised Skin Integrity  Goal: Skin integrity is maintained or improved  Description  INTERVENTIONS:  - Identify patients at risk for skin breakdown  - Assess and monitor skin integrity  - Assess and monitor nutrition and hydration status  - Monitor labs   - Assess for incontinence   - Turn and reposition patient  - Assist with mobility/ambulation  - Relieve pressure over bony prominences  - Avoid friction and shearing  - Provide appropriate hygiene as needed including keeping skin clean and dry  - Evaluate need for skin moisturizer/barrier cream  - Collaborate with interdisciplinary team   - Patient/family teaching  - Consider wound care consult   Outcome: Progressing

## 2020-01-21 NOTE — ASSESSMENT & PLAN NOTE
Patient  renal function is improving and creatinine improved from 2 4-1 46 -1 03  Baseline creatinine is around 1-1 3  Continue to hold lisinopril     Discontinue IV fluids  Continue to renally dose medications  Avoid any nephrotoxic agents

## 2020-01-21 NOTE — SOCIAL WORK
LOS: 3 GMLOS: n/a    SW met w/ pt to present w/ and explain bundle notice  Pt provided verbal  Understanding  SW provided pt w/ copy of bundle notice  SW presented and explained IMM  (original was done incorrectly)  Pt gave verbal understanding  Pt signed  SW provided pt w/ copy and placed original in scan bin

## 2020-01-22 LAB
BACTERIA WND AEROBE CULT: ABNORMAL
BASOPHILS # BLD AUTO: 0 THOUSANDS/ΜL (ref 0–0.1)
BASOPHILS NFR BLD AUTO: 1 % (ref 0–1)
EOSINOPHIL # BLD AUTO: 0 THOUSAND/ΜL (ref 0–0.4)
EOSINOPHIL NFR BLD AUTO: 0 % (ref 0–6)
ERYTHROCYTE [DISTWIDTH] IN BLOOD BY AUTOMATED COUNT: 13.9 %
GLUCOSE SERPL-MCNC: 125 MG/DL (ref 65–140)
GLUCOSE SERPL-MCNC: 136 MG/DL (ref 65–140)
GLUCOSE SERPL-MCNC: 143 MG/DL (ref 65–140)
GLUCOSE SERPL-MCNC: 210 MG/DL (ref 65–140)
GRAM STN SPEC: ABNORMAL
HCT VFR BLD AUTO: 36.3 % (ref 41–53)
HGB BLD-MCNC: 12 G/DL (ref 13.5–17.5)
LYMPHOCYTES # BLD AUTO: 1.5 THOUSANDS/ΜL (ref 0.5–4)
LYMPHOCYTES NFR BLD AUTO: 21 % (ref 25–45)
MCH RBC QN AUTO: 26.1 PG (ref 26–34)
MCHC RBC AUTO-ENTMCNC: 33.2 G/DL (ref 31–36)
MCV RBC AUTO: 79 FL (ref 80–100)
MONOCYTES # BLD AUTO: 0.6 THOUSAND/ΜL (ref 0.2–0.9)
MONOCYTES NFR BLD AUTO: 9 % (ref 1–10)
NEUTROPHILS # BLD AUTO: 5 THOUSANDS/ΜL (ref 1.8–7.8)
NEUTS SEG NFR BLD AUTO: 70 % (ref 45–65)
PLATELET # BLD AUTO: 229 THOUSANDS/UL (ref 150–450)
PLATELET BLD QL SMEAR: ADEQUATE
PMV BLD AUTO: 8.7 FL (ref 8.9–12.7)
RBC # BLD AUTO: 4.61 MILLION/UL (ref 4.5–5.9)
RBC MORPH BLD: NORMAL
WBC # BLD AUTO: 7.2 THOUSAND/UL (ref 4.5–11)

## 2020-01-22 PROCEDURE — 85025 COMPLETE CBC W/AUTO DIFF WBC: CPT | Performed by: INTERNAL MEDICINE

## 2020-01-22 PROCEDURE — 99233 SBSQ HOSP IP/OBS HIGH 50: CPT | Performed by: INTERNAL MEDICINE

## 2020-01-22 PROCEDURE — 82948 REAGENT STRIP/BLOOD GLUCOSE: CPT

## 2020-01-22 PROCEDURE — 99232 SBSQ HOSP IP/OBS MODERATE 35: CPT | Performed by: FAMILY MEDICINE

## 2020-01-22 RX ORDER — FUROSEMIDE 20 MG/1
20 TABLET ORAL DAILY
Status: DISCONTINUED | OUTPATIENT
Start: 2020-01-22 | End: 2020-01-23 | Stop reason: HOSPADM

## 2020-01-22 RX ADMIN — MORPHINE SULFATE 2 MG: 2 INJECTION, SOLUTION INTRAMUSCULAR; INTRAVENOUS at 08:30

## 2020-01-22 RX ADMIN — Medication 250 MG: at 08:30

## 2020-01-22 RX ADMIN — MORPHINE SULFATE 2 MG: 2 INJECTION, SOLUTION INTRAMUSCULAR; INTRAVENOUS at 18:30

## 2020-01-22 RX ADMIN — HEPARIN SODIUM 5000 UNITS: 5000 INJECTION INTRAVENOUS; SUBCUTANEOUS at 14:35

## 2020-01-22 RX ADMIN — CHLORPROMAZINE HYDROCHLORIDE 50 MG: 25 TABLET, SUGAR COATED ORAL at 17:07

## 2020-01-22 RX ADMIN — BUSPIRONE HYDROCHLORIDE 10 MG: 5 TABLET ORAL at 08:30

## 2020-01-22 RX ADMIN — METRONIDAZOLE 500 MG: 500 TABLET, FILM COATED ORAL at 06:29

## 2020-01-22 RX ADMIN — FUROSEMIDE 20 MG: 20 TABLET ORAL at 11:49

## 2020-01-22 RX ADMIN — SERTRALINE HYDROCHLORIDE 25 MG: 25 TABLET ORAL at 08:30

## 2020-01-22 RX ADMIN — INSULIN GLARGINE 40 UNITS: 100 INJECTION, SOLUTION SUBCUTANEOUS at 09:23

## 2020-01-22 RX ADMIN — DIVALPROEX SODIUM 1000 MG: 500 TABLET, EXTENDED RELEASE ORAL at 21:22

## 2020-01-22 RX ADMIN — SODIUM BICARBONATE 650 MG: 650 TABLET ORAL at 17:07

## 2020-01-22 RX ADMIN — METOPROLOL TARTRATE 25 MG: 25 TABLET, FILM COATED ORAL at 21:22

## 2020-01-22 RX ADMIN — HEPARIN SODIUM 5000 UNITS: 5000 INJECTION INTRAVENOUS; SUBCUTANEOUS at 06:29

## 2020-01-22 RX ADMIN — ASPIRIN 81 MG: 81 TABLET ORAL at 08:30

## 2020-01-22 RX ADMIN — TRAZODONE HYDROCHLORIDE 50 MG: 50 TABLET ORAL at 21:22

## 2020-01-22 RX ADMIN — METRONIDAZOLE 500 MG: 500 TABLET, FILM COATED ORAL at 14:35

## 2020-01-22 RX ADMIN — CHLORPROMAZINE HYDROCHLORIDE 50 MG: 25 TABLET, SUGAR COATED ORAL at 08:30

## 2020-01-22 RX ADMIN — MORPHINE SULFATE 2 MG: 2 INJECTION, SOLUTION INTRAMUSCULAR; INTRAVENOUS at 01:48

## 2020-01-22 RX ADMIN — SODIUM BICARBONATE 650 MG: 650 TABLET ORAL at 08:30

## 2020-01-22 RX ADMIN — Medication 250 MG: at 17:07

## 2020-01-22 RX ADMIN — MORPHINE SULFATE 2 MG: 2 INJECTION, SOLUTION INTRAMUSCULAR; INTRAVENOUS at 22:19

## 2020-01-22 RX ADMIN — CEFAZOLIN SODIUM 2000 MG: 2 SOLUTION INTRAVENOUS at 09:22

## 2020-01-22 RX ADMIN — METOPROLOL TARTRATE 25 MG: 25 TABLET, FILM COATED ORAL at 08:30

## 2020-01-22 RX ADMIN — HEPARIN SODIUM 5000 UNITS: 5000 INJECTION INTRAVENOUS; SUBCUTANEOUS at 21:22

## 2020-01-22 RX ADMIN — MORPHINE SULFATE 2 MG: 2 INJECTION, SOLUTION INTRAMUSCULAR; INTRAVENOUS at 18:34

## 2020-01-22 RX ADMIN — MORPHINE SULFATE 2 MG: 2 INJECTION, SOLUTION INTRAMUSCULAR; INTRAVENOUS at 14:35

## 2020-01-22 RX ADMIN — BUSPIRONE HYDROCHLORIDE 10 MG: 5 TABLET ORAL at 17:07

## 2020-01-22 RX ADMIN — CEFAZOLIN SODIUM 2000 MG: 2 SOLUTION INTRAVENOUS at 01:31

## 2020-01-22 RX ADMIN — ATORVASTATIN CALCIUM 40 MG: 40 TABLET, FILM COATED ORAL at 17:07

## 2020-01-22 RX ADMIN — INSULIN LISPRO 4 UNITS: 100 INJECTION, SOLUTION INTRAVENOUS; SUBCUTANEOUS at 17:08

## 2020-01-22 RX ADMIN — CEFAZOLIN SODIUM 2000 MG: 2 SOLUTION INTRAVENOUS at 17:07

## 2020-01-22 RX ADMIN — METRONIDAZOLE 500 MG: 500 TABLET, FILM COATED ORAL at 21:22

## 2020-01-22 NOTE — SOCIAL WORK
CM met with pt to introduce CM role and begin discharge planning  Pt is currently living at the [facility] and plans to return at time of discharge  He states that he is part of the [facility]  Pt's emergency contact is his Gabo Berumen ([phone]), no designated POA  Pt reports being independent with ADLs PTA, uses cane to ambulate  Pt reports history of VNA and STR but could not tell CM when or what facilities/agency  Pt has history of inpatient MH treatment reports no history of D/A treatment  Most recent psych admission was at [facility] on 7/2019  Pt does not drive, he walks or takes public transportation when necessary  Pt receives SSD benefits  Pt does not have a PCP and did not want information on [employer]  Pt uses [facility] in [facility] but would prefer to use [address] [place] [address] pharmacy  Pt is concerned about transportation at time of discharge back to [facility] and is also concerned about payment if he requires medication at time of discharge because he states that he does not have money at this time  CM reviewed d/c planning process including the following: identifying help at home, patient preference for d/c planning needs, Discharge Lounge, [facility] Meds to Bed program, availability of treatment team to discuss questions or concerns patient and/or family may have regarding understanding medications and recognizing signs and symptoms once discharged  CM also encouraged patient to follow up with all recommended appointments after discharge  Patient advised of importance for patient and family to participate in managing patients medical well being

## 2020-01-22 NOTE — ASSESSMENT & PLAN NOTE
Patient had accelerated blood pressure 2 days ago  now however his blood pressure is well controlled today    Continue metoprolol and discontinue lisinopril due to [facility]

## 2020-01-22 NOTE — ASSESSMENT & PLAN NOTE
Patient has sepsis present on admission with fever of 102  along with mild tachycardia noted and leukocytosis noted on 1/18  Source of infection is the right leg cellulitis    Placed on IV antibiotics and IV fluid hydration    Sepsis has now resolved

## 2020-01-22 NOTE — ASSESSMENT & PLAN NOTE
Lab Results   Component Value Date    HGBA1C 8 4 (H) 07/10/2019       Recent Labs     01/21/20  1114 01/21/20  1644 01/21/20 2012 01/22/20  0548   POCGLU 181* 185* 210* 136       Blood Sugar Average: Last 72 hrs:  (P) 228 [id number]     Patient noticed to have a small nonhealing ulcer on the base of the right midfoot and also some bleeding noted at the site of his previous TMA    MRI foot shows cellulitis but no osteomyelitis

## 2020-01-22 NOTE — ASSESSMENT & PLAN NOTE
Patient has uncontrolled bipolar disorder  He did not take his meds for the last 1 and half week  Complains of suicidal ideation  Was admitted to [address] [place] [address] but had to be transferred to the medical floor for treatment for cellulitis  Resume all of his home psychiatric medications including Thorazine, BuSpar, Depakote, sertraline , Neurontin   Does not require 1 is to 1 observation at this time or Q 15 minutes behavioral health checks  Secondary to worsening renal function decreased dose of Depakote from 2000 at bedtime to 1000mg hs  Also decrease dose of Thorazine and decrease dose of Neurontin from 600 t i d   To 100 t i d

## 2020-01-22 NOTE — ASSESSMENT & PLAN NOTE
Lab Results   Component Value Date    HGBA1C 8 4 (H) 07/10/2019       Recent Labs     01/21/20  1114 01/21/20  1644 01/21/20 2012 01/22/20  0548   POCGLU 181* 185* 210* 136       Blood Sugar Average: Last 72 hrs:  (P) 228 [id number]   Uncontrolled diabetic  Patient states that he was prescribed insulin but never picked it upper used it  His blood sugars have been over 200 since admission to behavioral health  Will currently place him on insulin sliding scale and a diabetic diet and also adjust dose of Lantus to 40 units daily and also placed on Humalog 8 units t i d  In addition to a sliding scale    Try to control his blood sugar more aggressively    Continue Neurontin for neuropathic pain

## 2020-01-22 NOTE — PROGRESS NOTES
Progress Note - Wound   Larry Hardin 38 y o  male MRN: [id number]  Unit/Bed#: 7T [facility] 717-01 Encounter: [id number]      Assessment:   1  Cellulitis RLE, significant improvement  2  Pain RLE  3  Edema RLE  4  R plantar foot wound    Plan:   - pt eval and managed today  - continue IV antibiotics as per ID    - clinically do not suspect OM  MRI likely reactive changes > OM  - erythema with significant improvement, however, swelling and discomfort still exist  - I've d/w Primary team pt information in detail  We will continue to monitor swelling of patient   - we will continue to monitor pt while he is in house    Subjective:  Pt eval and managed today  Still with discomfort to RLE  Swelling still present as well  Objective:    Vitals: Blood pressure 163/89, pulse 70, temperature (!) 97 °F (36 1 °C), temperature source Temporal, resp  rate 18, height 6' 3" (1 905 m), weight 126 kg (276 lb 14 4 oz), SpO2 96 %  ,Body mass index is 34 61 kg/m²  Physical Exam: R foot dressing remained intact  Swelling present with TTP  Erythema with significant improvement    Lab, Imaging and other studies: I have personally reviewed pertinent reports  Wound 07/12/19 Neuropathic/diabetic ulcer Foot Right;Medial;Inner (Active)   Drainage Amount None 1/21/2020  7:46 PM   Drainage Description Serosanguineous 1/21/2020 11:00 AM   Treatments Cleansed 1/18/2020  4:25 PM   Dressing Dry dressing 1/21/2020  7:46 PM   Dressing Changed Changed by provider 1/19/2020 10:00 AM   Patient Tolerance Tolerated well 1/18/2020  4:25 PM   Dressing Status Dry; Intact; Old drainage 1/21/2020  7:46 PM

## 2020-01-22 NOTE — PROGRESS NOTES
Progress Note - Infectious Disease   Larry Hardin 38 y o  male MRN: [id number]  Unit/Bed#: 7T [facility] 717-01 Encounter: [id number]      Impression/Plan:    60-year-old male patient with history of diabetes mellitus and peripheral vascular disease, requiring bilateral TMA, transferred from [facility] floor to general medical floor for right lower extremity cellulitis secondary to chronic right foot wound infection      1- Sepsis, POA:  Fever 102, leukocytosis 13,000 on admission  Sepsis is likely secondary to right foot cellulitis and chronic right foot wound infection  Patient had debridement done by Podiatry, his deep wound cultures are positive for group C strep and E coli  Unfortunately blood cultures were not collected  He remains hemodynamically stable  He has been afebrile in the past 72h  - continue antibiotics as below  - close clinical monitoring, trend fever curve  - repeat CBC in a m   - follow-up final results of wound culture collected 01/18/2020        2- Right lower extremity cellulitis:  Likely secondary to an infected right foot wound ulcer  CT scan showed no evidence of necrotizing fasciitis, MRI showing mild edema and plantar and medial aspect of medial cuneiform   Lower extremity venous duplex negative for DVT  Wound culture preliminarly positive for group C strep and E coli  Patient is significantly improving on cefazolin IV and Flagyl, with resolution of fever and leukocytosis over the past 72 hours; erythema that was tracking up to the proximal shin, has significantly improved  Patient is still complaining of tenderness of the leg and foot  MRI reviewed and case discussed at length with podiatrist Dr Kat Mark  No evidence of osteomyelitis at this time, will treat for skin and soft tissue infection    - continue cefazolin iv and flagyl po  - continue local wound care  - podiatry follow up  - follow-up final results of wound culture and adjust antibiotics accordingly     3- diabetes mellitus:  Last A1c 8 4%  - management as per primary service     4- peripheral vascular disease  Patient has history of bilateral TMA in 2015 and in 2016     5- acute kidney injury:  Likely prerenal in setting of sepsis  Creatinine now slowly trending down, creatinine today 1 03 mg/dL  - follow-up BMP and avoid nephrotoxic medications     Plan mentioned above discussed in details with patient  Case discussed with primary service     Antibiotics:  Cefazolin day 3  Flagyl day 3  IV antibiotics day 5    Subjective:  Patient has no fever, chills, sweats; no nausea, vomiting, diarrhea; no cough, shortness of breath; Complaining of right lower extremity pain  No significant improvement compared to yesterday  Objective:  Vitals:  Temp:  [96 8 °F (36 °C)-98 7 °F (37 1 °C)] 98 °F (36 7 °C)  HR:  [71-88] 71  Resp:  [18] 18  BP: (103-157)/(69-86) 123/69  SpO2:  [94 %-99 %] 96 %  Temp (24hrs), Av 8 °F (36 6 °C), Min:96 8 °F (36 °C), Max:98 7 °F (37 1 °C)  Current: Temperature: 98 °F (36 7 °C)    Physical Exam:   General Appearance:  Alert, interactive, nontoxic, no acute distress  Throat: Oropharynx moist without lesions  Lungs:   Clear to auscultation bilaterally; no wheezes, rhonchi or rales; respirations unlabored   Heart:  RRR; no murmur, rub or gallop   Abdomen:   Soft, non-tender, non-distended, positive bowel sounds  Extremities: No clubbing, cyanosis or edema   Skin: No new rashes or lesions  No draining wounds noted  Erythema is noted posterior aspect of the right leg  Tender to touch         Labs, Imaging, & Other studies:   All pertinent labs and imaging studies were personally reviewed  Results from last 7 days   Lab Units 20  0529 20  0512 20  1031   WBC Thousand/uL 8 90 13 00* 11 30*   HEMOGLOBIN g/dL 12 4* 13 0* 14 5   PLATELETS Thousands/uL 186 194 183     Results from last 7 days   Lab Units 20  0446 20  0529 20  1636 20  0512 20  1031 01/17/20  1520   SODIUM mmol/L 133* 131* 132* 128* 131* 130*   POTASSIUM mmol/L 4 1 4 3 3 9 3 5* 4 5 4 6   CHLORIDE mmol/L 104 105 108 97 97 94*   CO2 mmol/L 21* 18* 18* 21* 26 24   BUN mg/dL 15 25 22 20 14 13   CREATININE mg/dL 1 03 1 46 1 61* 2 40* 1 33 1 20   EGFR ml/min/1 73sq m 89 58* 52* 32* 65 74   CALCIUM mg/dL 8 0* 8 3* 6 0* 8 0* 8 6 9 4   AST U/L  --   --   --  13* 17 16*   ALT U/L  --   --   --  19 18 23   ALK PHOS U/L  --   --   --  71 72 81     Results from last 7 days   Lab Units 01/19/20  1511 01/18/20  1728   GRAM STAIN RESULT  Rare Polys*  1+ Gram negative rods*  1+ Gram positive cocci in pairs* No polys seen*  Rare Gram positive cocci in pairs*   WOUND CULTURE  1+ Growth of Escherichia coli*  1+ Growth of  2+ Growth of Beta Hemolytic Streptococcus Group C*  3+ Growth of - mixed skin danielle including Corynebacterium striatum group*

## 2020-01-22 NOTE — ASSESSMENT & PLAN NOTE
Patient has right leg cellulitis  Denies any injury however he has not been taking his medications for the last 1 and half week and is a known diabetic with uncontrolled blood sugars  He also has nonhealing wound on his right midfoot  Cellulitis includes redness and swelling extending up to almost the right knee  Patient had a fever of 102 on 01/18/2020 and had leukocytosis noted  Transferred to the medical floor  Placed on IV ceftriaxone     Worsening leukocytosis, swelling redness and pain noted of the right leg 1/19/2020  Patient is finally showing slow clinical improvement  Wound culture showing beta-hemolytic Streptococcus, E coli, corynebacterium  Continue antibiotic as per Infectious Disease  Venous Doppler negative for DVT but does show a large inguinal lymph nodes which may be infectious versus malignancy will need outpatient follow-up  CT leg did not show any evidence of gas or necrotizing fasciitis however it is clinically a little bit better today     Appreciate input by podiatry and also discussed with Infectious Disease

## 2020-01-22 NOTE — PROGRESS NOTES
Progress Note - Curtis Singh 1975, 38 y o  male MRN: [id number]    Unit/Bed#: 7T [facility] 717-01 Encounter: [id number]    Primary Care Provider: No primary care provider on file  Date and time admitted to hospital: 1/18/2020 11:02 AM        * Cellulitis of right lower extremity  Assessment & Plan  Patient has right leg cellulitis  Denies any injury however he has not been taking his medications for the last 1 and half week and is a known diabetic with uncontrolled blood sugars  He also has nonhealing wound on his right midfoot  Cellulitis includes redness and swelling extending up to almost the right knee  Patient had a fever of 102 on 01/18/2020 and had leukocytosis noted  Transferred to the medical floor  Placed on IV ceftriaxone     Worsening leukocytosis, swelling redness and pain noted of the right leg 1/19/2020  Patient is finally showing slow clinical improvement  Wound culture showing beta-hemolytic Streptococcus, E coli, corynebacterium  Continue antibiotic as per Infectious Disease  Venous Doppler negative for DVT but does show a large inguinal lymph nodes which may be infectious versus malignancy will need outpatient follow-up  CT leg did not show any evidence of gas or necrotizing fasciitis however it is clinically a little bit better today   Appreciate input by podiatry and also discussed with Infectious Disease    Acute kidney injury (NAIF) with acute tubular necrosis (ATN) ([facility] [address] )  Assessment & Plan  Patient  renal function is improving and creatinine improved from 2 4-1 46 -1 03  Baseline creatinine is around 1-1 3  Continue to hold lisinopril   Discontinue IV fluids  Continue to renally dose medications  Avoid any nephrotoxic agents    Sepsis [facility])  Assessment & Plan  Patient has sepsis present on admission with fever of 102  along with mild tachycardia noted and leukocytosis noted on 1/18  Source of infection is the right leg cellulitis    Placed on IV antibiotics and IV fluid hydration    Sepsis has now resolved    PVD (peripheral vascular disease) [facility])  Assessment & Plan  Patient has known history of peripheral vascular disease has bilateral TMA  Place on aspirin 81 mg daily    Essential hypertension  Assessment & Plan  Patient had accelerated blood pressure 2 days ago  now however his blood pressure is well controlled today  Continue metoprolol and discontinue lisinopril due to Emiliano     Type 2 diabetes mellitus with diabetic polyneuropathy, with long-term current use of insulin [facility])  Assessment & Plan  Lab Results   Component Value Date    HGBA1C 8 4 (H) 07/10/2019       Recent Labs     01/21/20  1114 01/21/20  1644 01/21/20 2012 01/22/20  0548   POCGLU 181* 185* 210* 136       Blood Sugar Average: Last 72 hrs:  (P) 228 [id number]   Uncontrolled diabetic  Patient states that he was prescribed insulin but never picked it upper used it  His blood sugars have been over 200 since admission to behavioral health  Will currently place him on insulin sliding scale and a diabetic diet and also adjust dose of Lantus to 40 units daily and also placed on Humalog 8 units t i d  In addition to a sliding scale  Try to control his blood sugar more aggressively    Continue Neurontin for neuropathic pain    Diabetic ulcer of right midfoot [facility])  Assessment & Plan  Lab Results   Component Value Date    HGBA1C 8 4 (H) 07/10/2019       Recent Labs     01/21/20  1114 01/21/20  1644 01/21/20 2012 01/22/20  0548   POCGLU 181* 185* 210* 136       Blood Sugar Average: Last 72 hrs:  (P) 228 [id number]     Patient noticed to have a small nonhealing ulcer on the base of the right midfoot and also some bleeding noted at the site of his previous TMA    MRI foot shows cellulitis but no osteomyelitis    Tobacco use disorder  Assessment & Plan  Counseled on smoking cessation and placed on nicotine replacement therapy    Bipolar 2 disorder [facility])  Assessment & Plan  Patient has uncontrolled bipolar disorder  He did not take his meds for the last 1 and half week  Complains of suicidal ideation  Was admitted to [facility] but had to be transferred to the medical floor for treatment for cellulitis  Resume all of his home psychiatric medications including Thorazine, BuSpar, Depakote, sertraline , Neurontin   Does not require 1 is to 1 observation at this time or Q 15 minutes behavioral health checks  Secondary to worsening renal function decreased dose of Depakote from 2000 at bedtime to 1000mg hs  Also decrease dose of Thorazine and decrease dose of Neurontin from 600 t i d  To 100 t i d       VTE Pharmacologic Prophylaxis:   Pharmacologic: Heparin  Mechanical VTE Prophylaxis in Place: No    Patient Centered Rounds: I have performed bedside rounds with nursing staff today  Discussions with Specialists or Other Care Team Provider:  Discussed with podiatry Infectious Disease    Education and Discussions with Family / Patient:  Discussed with patient at bedside    Time Spent for Care: 30 minutes  More than 50% of total time spent on counseling and coordination of care as described above  Current Length of Stay: 4 day(s)    Current Patient Status: Inpatient   Certification Statement: The patient will continue to require additional inpatient hospital stay due to Right leg cellulitis    Discharge Plan: Pending progress  He will be medically cleared in the next 24-48 hours  Will ask psych for re-evaluation    Code Status: Level 1 - Full Code      Subjective:   Patient denies any chest pain or shortness of breath or abdominal pain  He states he is depressed    He states the pain in his leg is slowly improving but it is still painful and swollen but fortunately the redness and erythema has decreased    Objective:     Vitals:   Temp (24hrs), Av 9 °F (36 6 °C), Min:97 °F (36 1 °C), Max:98 7 °F (37 1 °C)    Temp:  [97 °F (36 1 °C)-98 7 °F (37 1 °C)] 97 °F (36 1 °C)  HR:  [70-88] 70  Resp:  [18] 18  BP: (123-163)/(69-89) 163/89  SpO2:  [95 %-98 %] 96 %  Body mass index is 34 61 kg/m²  Input and Output Summary (last 24 hours): Intake/Output Summary (Last 24 hours) at 1/22/2020 0945  Last data filed at 1/22/2020 6852  Gross per 24 hour   Intake 1185 ml   Output --   Net 1185 ml       Physical Exam:     Physical Exam   Constitutional: He is oriented to person, place, and time  He appears well-developed and well-nourished  HENT:   Head: Normocephalic and atraumatic  Right Ear: External ear normal    Left Ear: External ear normal    Mouth/Throat: Oropharynx is clear and moist    Eyes: Conjunctivae and EOM are normal    Neck: Normal range of motion  Neck supple  Cardiovascular: Normal rate, regular rhythm and normal heart sounds  Pulmonary/Chest: Effort normal and breath sounds normal    Abdominal: Soft  Bowel sounds are normal  He exhibits no mass  There is no tenderness  There is no rebound and no guarding  Genitourinary:   Genitourinary Comments: deferred   Musculoskeletal: Normal range of motion  2+ nonpitting edema right lower extremity  Tenderness on palpation of the right calf area    Bilateral feet TMA   Neurological: He is alert and oriented to person, place, and time  He has normal reflexes  Cranial nerves 2-12 are normal   Normal neurological exam   Skin: Skin is warm and dry  No rash noted  Psychiatric: He has a normal mood and affect  Nursing note and vitals reviewed  Additional Data:     Labs:    Results from last 7 days   Lab Units 01/22/20  0856  01/17/20  1520   WBC Thousand/uL 7 20   < > 13 20*   HEMOGLOBIN g/dL 12 0*   < > 15 3   HEMATOCRIT % 36 3*   < > 45 4   PLATELETS Thousands/uL 229   < > 216   BANDS PCT %  --   --  3   NEUTROS PCT % 70*   < >  --    LYMPHS PCT % 21*   < >  --    LYMPHO PCT %  --   --  7*   MONOS PCT % 9   < >  --    MONO PCT %  --   --  4   EOS PCT % 0   < >  --     < > = values in this interval not displayed       Results from last 7 days   Lab Units 01/21/20  0446  01/19/20  0512   SODIUM mmol/L 133*   < > 128*   POTASSIUM mmol/L 4 1   < > 3 5*   CHLORIDE mmol/L 104   < > 97   CO2 mmol/L 21*   < > 21*   BUN mg/dL 15   < > 20   CREATININE mg/dL 1 03   < > 2 40*   ANION GAP mmol/L 8   < > 10   CALCIUM mg/dL 8 0*   < > 8 0*   ALBUMIN g/dL  --   --  3 2   TOTAL BILIRUBIN mg/dL  --   --  0 40   ALK PHOS U/L  --   --  71   ALT U/L  --   --  19   AST U/L  --   --  13*   GLUCOSE RANDOM mg/dL 224*   < > 226*    < > = values in this interval not displayed  Results from last 7 days   Lab Units 01/22/20  0548 01/21/20 2012 01/21/20  1644 01/21/20  1114 01/21/20  0531 01/20/20  2039 01/20/20  1610 01/20/20  1112 01/20/20  0549 01/19/20 2001 01/19/20  1639 01/19/20  1058   POC GLUCOSE mg/dl 136 210* 185* 181* 220* 304* 239* 251* 228* 267* 218* 282*         Results from last 7 days   Lab Units 01/19/20  0901 01/18/20  1031   LACTIC ACID mmol/L 1 8 1 3           * I Have Reviewed All Lab Data Listed Above  * Additional Pertinent Lab Tests Reviewed:  [facility] Admission Reviewed    Imaging:    Imaging Reports Reviewed Today Include:  None  Imaging Personally Reviewed by Myself Includes:  None    Recent Cultures (last 7 days):     Results from last 7 days   Lab Units 01/19/20  1511 01/18/20  1728   GRAM STAIN RESULT  Rare Polys*  1+ Gram negative rods*  1+ Gram positive cocci in pairs* No polys seen*  Rare Gram positive cocci in pairs*   WOUND CULTURE  1+ Growth of Escherichia coli*  1+ Growth of Beta Hemolytic Streptococcus Group C*  1+ Growth of - mixed skin danielle including Corynebacterium striatum group* 2+ Growth of Beta Hemolytic Streptococcus Group C*  3+ Growth of - mixed skin danielle including Corynebacterium striatum group*       Last 24 Hours Medication List:     Current Facility-Administered Medications:  acetaminophen 650 mg Oral Q6H PRN Annabel Parnell MD    acetaminophen 650 mg Oral Q4H PRN Annabel Parnell MD aluminum-magnesium hydroxide-simethicone 15 mL Oral Q4H PRN Brandi Kay MD    aspirin 81 mg Oral Daily Brandi Kay MD    atorvastatin 40 mg Oral Daily With Imer Sandoval MD    benztropine 1 mg Oral BID Brandi Kay MD    busPIRone 10 mg Oral BID Brandi Kay MD    cefazolin 2,000 mg Intravenous Q8H Soledad Chen MD Last Rate: 2,000 mg (01/22/20 0937)   chlorproMAZINE 50 mg Oral BID Brandi Kay MD    divalproex sodium 1,000 mg Oral HS Brandi Kay MD    furosemide 20 mg Oral Daily Brandi Kay MD    haloperidol 5 mg Oral Q8H PRN Brandi Kay MD    heparin (porcine) 5,000 Units Subcutaneous [facility] Brandi Kay MD    hydrOXYzine HCL 25 mg Oral Q6H PRN Brandi Kay MD    insulin glargine 40 Units Subcutaneous QAM Brandi Kay MD    insulin lispro 2-12 Units Subcutaneous TID AC Brandi Kay MD    insulin lispro 2-12 Units Subcutaneous HS Brandi Kay MD    insulin lispro 9 Units Subcutaneous TID With Meals Brandi Kay MD    LORazepam 1 mg Intravenous Q8H PRN Gucci Branham PA-C    LORazepam 0 5 mg Oral Q8H PRN Gucci Branham PA-C    magnesium hydroxide 20 mL Oral Daily PRN Brandi Kay MD    metoprolol tartrate 25 mg Oral Q12H [address] Brandi Kay MD    metroNIDAZOLE 500 mg Oral Q8H [address] Soy ZeMD amelia    morphine injection 2 mg Intravenous Q4H PRN Brandi Kay MD    nicotine 1 patch Transdermal Daily Brandi Kay MD    ondansetron 4 mg Intravenous Q6H PRN Brandi Kay MD    saccharomyces boulardii 250 mg Oral BID Brandi Kay MD    senna 1 tablet Oral HS PRN Brandi Kay MD    sertraline 25 mg Oral Daily Brandi Kay MD    sodium bicarbonate 650 mg Oral BID after meals Brandi Kay MD    traZODone 50 mg Oral HS Brandi Kay MD         Today, Patient Was Seen By: Brandi Kay MD    ** Please Note: Dictation voice to text software may have been used in the creation of this document   **

## 2020-01-23 ENCOUNTER — HOSPITAL ENCOUNTER (INPATIENT)
Facility: HOSPITAL | Age: 45
LOS: 11 days | Discharge: HOME | DRG: 876 | End: 2020-02-03
Attending: PSYCHIATRY & NEUROLOGY | Admitting: PSYCHIATRY & NEUROLOGY
Payer: MEDICARE

## 2020-01-23 VITALS
RESPIRATION RATE: 20 BRPM | HEIGHT: 75 IN | BODY MASS INDEX: 34.43 KG/M2 | WEIGHT: 276.9 LBS | DIASTOLIC BLOOD PRESSURE: 91 MMHG | HEART RATE: 69 BPM | TEMPERATURE: 98.4 F | OXYGEN SATURATION: 96 % | SYSTOLIC BLOOD PRESSURE: 151 MMHG

## 2020-01-23 DIAGNOSIS — G47.00 INSOMNIA: ICD-10-CM

## 2020-01-23 DIAGNOSIS — Z00.8 MEDICAL CLEARANCE FOR PSYCHIATRIC ADMISSION: ICD-10-CM

## 2020-01-23 DIAGNOSIS — Z79.4 TYPE 2 DIABETES MELLITUS WITH DIABETIC POLYNEUROPATHY, WITH LONG-TERM CURRENT USE OF INSULIN (HCC): ICD-10-CM

## 2020-01-23 DIAGNOSIS — E11.9 DM (DIABETES MELLITUS), TYPE 2 (HCC): ICD-10-CM

## 2020-01-23 DIAGNOSIS — IMO0002 DM (DIABETES MELLITUS), SECONDARY UNCONTROLLED: ICD-10-CM

## 2020-01-23 DIAGNOSIS — F31.81 BIPOLAR 2 DISORDER (HCC): Primary | ICD-10-CM

## 2020-01-23 DIAGNOSIS — I73.9 PVD (PERIPHERAL VASCULAR DISEASE) (HCC): ICD-10-CM

## 2020-01-23 DIAGNOSIS — I10 ESSENTIAL HYPERTENSION: ICD-10-CM

## 2020-01-23 DIAGNOSIS — E78.5 HYPERLIPEMIA: ICD-10-CM

## 2020-01-23 DIAGNOSIS — E11.42 TYPE 2 DIABETES MELLITUS WITH DIABETIC POLYNEUROPATHY, WITH LONG-TERM CURRENT USE OF INSULIN (HCC): ICD-10-CM

## 2020-01-23 DIAGNOSIS — L97.411 DIABETIC ULCER OF RIGHT MIDFOOT ASSOCIATED WITH TYPE 2 DIABETES MELLITUS, LIMITED TO BREAKDOWN OF SKIN (HCC): ICD-10-CM

## 2020-01-23 DIAGNOSIS — N17.0 ACUTE KIDNEY INJURY (AKI) WITH ACUTE TUBULAR NECROSIS (ATN) (HCC): ICD-10-CM

## 2020-01-23 DIAGNOSIS — E11.621 DIABETIC ULCER OF RIGHT MIDFOOT ASSOCIATED WITH TYPE 2 DIABETES MELLITUS, LIMITED TO BREAKDOWN OF SKIN (HCC): ICD-10-CM

## 2020-01-23 LAB
GLUCOSE SERPL-MCNC: 133 MG/DL (ref 65–140)
GLUCOSE SERPL-MCNC: 140 MG/DL (ref 65–140)
GLUCOSE SERPL-MCNC: 189 MG/DL (ref 65–140)
GLUCOSE SERPL-MCNC: 238 MG/DL (ref 65–140)

## 2020-01-23 PROCEDURE — 82948 REAGENT STRIP/BLOOD GLUCOSE: CPT

## 2020-01-23 PROCEDURE — 99239 HOSP IP/OBS DSCHRG MGMT >30: CPT | Performed by: FAMILY MEDICINE

## 2020-01-23 PROCEDURE — 99232 SBSQ HOSP IP/OBS MODERATE 35: CPT | Performed by: INTERNAL MEDICINE

## 2020-01-23 PROCEDURE — 99232 SBSQ HOSP IP/OBS MODERATE 35: CPT | Performed by: PSYCHIATRY & NEUROLOGY

## 2020-01-23 RX ORDER — OLANZAPINE 10 MG/1
10 TABLET ORAL
Status: DISCONTINUED | OUTPATIENT
Start: 2020-01-23 | End: 2020-02-03 | Stop reason: HOSPADM

## 2020-01-23 RX ORDER — CEFPODOXIME PROXETIL 200 MG/1
400 TABLET, FILM COATED ORAL 2 TIMES DAILY WITH MEALS
Status: DISCONTINUED | OUTPATIENT
Start: 2020-01-23 | End: 2020-01-23 | Stop reason: HOSPADM

## 2020-01-23 RX ORDER — BENZTROPINE MESYLATE 1 MG/ML
1 INJECTION INTRAMUSCULAR; INTRAVENOUS EVERY 6 HOURS PRN
Status: DISCONTINUED | OUTPATIENT
Start: 2020-01-23 | End: 2020-02-03 | Stop reason: HOSPADM

## 2020-01-23 RX ORDER — BENZTROPINE MESYLATE 1 MG/ML
1 INJECTION INTRAMUSCULAR; INTRAVENOUS EVERY 6 HOURS PRN
Status: CANCELLED | OUTPATIENT
Start: 2020-01-23

## 2020-01-23 RX ORDER — MAGNESIUM HYDROXIDE/ALUMINUM HYDROXICE/SIMETHICONE 120; 1200; 1200 MG/30ML; MG/30ML; MG/30ML
30 SUSPENSION ORAL EVERY 4 HOURS PRN
Status: DISCONTINUED | OUTPATIENT
Start: 2020-01-23 | End: 2020-02-03 | Stop reason: HOSPADM

## 2020-01-23 RX ORDER — ASPIRIN 81 MG/1
81 TABLET ORAL DAILY
Status: DISCONTINUED | OUTPATIENT
Start: 2020-01-24 | End: 2020-02-03 | Stop reason: HOSPADM

## 2020-01-23 RX ORDER — ONDANSETRON 2 MG/ML
4 INJECTION INTRAMUSCULAR; INTRAVENOUS EVERY 6 HOURS PRN
Status: CANCELLED | OUTPATIENT
Start: 2020-01-23

## 2020-01-23 RX ORDER — DIVALPROEX SODIUM 500 MG/1
1000 TABLET, EXTENDED RELEASE ORAL
Status: DISCONTINUED | OUTPATIENT
Start: 2020-01-23 | End: 2020-01-27

## 2020-01-23 RX ORDER — CEFPODOXIME PROXETIL 200 MG/1
400 TABLET, FILM COATED ORAL 2 TIMES DAILY WITH MEALS
Status: CANCELLED | OUTPATIENT
Start: 2020-01-23

## 2020-01-23 RX ORDER — HYDROXYZINE HYDROCHLORIDE 25 MG/1
25 TABLET, FILM COATED ORAL EVERY 6 HOURS PRN
Status: DISCONTINUED | OUTPATIENT
Start: 2020-01-23 | End: 2020-02-03 | Stop reason: HOSPADM

## 2020-01-23 RX ORDER — HYDROXYZINE HYDROCHLORIDE 25 MG/1
25 TABLET, FILM COATED ORAL EVERY 6 HOURS PRN
Status: CANCELLED | OUTPATIENT
Start: 2020-01-23

## 2020-01-23 RX ORDER — HEPARIN SODIUM 5000 [USP'U]/ML
5000 INJECTION, SOLUTION INTRAVENOUS; SUBCUTANEOUS EVERY 8 HOURS SCHEDULED
Status: CANCELLED | OUTPATIENT
Start: 2020-01-23

## 2020-01-23 RX ORDER — RISPERIDONE 1 MG/1
1 TABLET, ORALLY DISINTEGRATING ORAL
Status: DISCONTINUED | OUTPATIENT
Start: 2020-01-23 | End: 2020-02-03 | Stop reason: HOSPADM

## 2020-01-23 RX ORDER — LORAZEPAM 2 MG/ML
1 INJECTION INTRAMUSCULAR EVERY 8 HOURS PRN
Status: CANCELLED | OUTPATIENT
Start: 2020-01-23

## 2020-01-23 RX ORDER — SACCHAROMYCES BOULARDII 250 MG
250 CAPSULE ORAL 2 TIMES DAILY
Status: DISCONTINUED | OUTPATIENT
Start: 2020-01-24 | End: 2020-02-03 | Stop reason: HOSPADM

## 2020-01-23 RX ORDER — LORAZEPAM 0.5 MG/1
0.5 TABLET ORAL EVERY 8 HOURS PRN
Status: CANCELLED | OUTPATIENT
Start: 2020-01-23

## 2020-01-23 RX ORDER — HALOPERIDOL 5 MG/ML
5 INJECTION INTRAMUSCULAR EVERY 6 HOURS PRN
Status: DISCONTINUED | OUTPATIENT
Start: 2020-01-23 | End: 2020-02-03 | Stop reason: HOSPADM

## 2020-01-23 RX ORDER — SERTRALINE HYDROCHLORIDE 25 MG/1
25 TABLET, FILM COATED ORAL DAILY
Status: DISCONTINUED | OUTPATIENT
Start: 2020-01-24 | End: 2020-01-24

## 2020-01-23 RX ORDER — TRAZODONE HYDROCHLORIDE 50 MG/1
50 TABLET ORAL
Status: CANCELLED | OUTPATIENT
Start: 2020-01-23

## 2020-01-23 RX ORDER — LORAZEPAM 2 MG/ML
1 INJECTION INTRAMUSCULAR EVERY 6 HOURS PRN
Status: CANCELLED | OUTPATIENT
Start: 2020-01-23

## 2020-01-23 RX ORDER — BENZTROPINE MESYLATE 1 MG/1
1 TABLET ORAL EVERY 6 HOURS PRN
Status: DISCONTINUED | OUTPATIENT
Start: 2020-01-23 | End: 2020-02-03 | Stop reason: HOSPADM

## 2020-01-23 RX ORDER — ATORVASTATIN CALCIUM 40 MG/1
40 TABLET, FILM COATED ORAL
Status: CANCELLED | OUTPATIENT
Start: 2020-01-23

## 2020-01-23 RX ORDER — INSULIN GLARGINE 100 [IU]/ML
40 INJECTION, SOLUTION SUBCUTANEOUS EVERY MORNING
Status: DISCONTINUED | OUTPATIENT
Start: 2020-01-24 | End: 2020-01-25

## 2020-01-23 RX ORDER — OLANZAPINE 10 MG/1
10 TABLET ORAL
Status: CANCELLED | OUTPATIENT
Start: 2020-01-23

## 2020-01-23 RX ORDER — SERTRALINE HYDROCHLORIDE 25 MG/1
25 TABLET, FILM COATED ORAL DAILY
Status: CANCELLED | OUTPATIENT
Start: 2020-01-24

## 2020-01-23 RX ORDER — TRAZODONE HYDROCHLORIDE 50 MG/1
50 TABLET ORAL
Status: DISCONTINUED | OUTPATIENT
Start: 2020-01-23 | End: 2020-02-03 | Stop reason: HOSPADM

## 2020-01-23 RX ORDER — METOPROLOL TARTRATE 50 MG/1
50 TABLET, FILM COATED ORAL EVERY 12 HOURS SCHEDULED
Status: DISCONTINUED | OUTPATIENT
Start: 2020-01-23 | End: 2020-02-03 | Stop reason: HOSPADM

## 2020-01-23 RX ORDER — HALOPERIDOL 5 MG/ML
5 INJECTION INTRAMUSCULAR EVERY 6 HOURS PRN
Status: CANCELLED | OUTPATIENT
Start: 2020-01-23

## 2020-01-23 RX ORDER — FUROSEMIDE 20 MG/1
20 TABLET ORAL DAILY
Status: CANCELLED | OUTPATIENT
Start: 2020-01-24 | End: 2020-01-25

## 2020-01-23 RX ORDER — LORAZEPAM 1 MG/1
1 TABLET ORAL EVERY 8 HOURS PRN
Status: CANCELLED | OUTPATIENT
Start: 2020-01-23

## 2020-01-23 RX ORDER — ACETAMINOPHEN 325 MG/1
650 TABLET ORAL EVERY 4 HOURS PRN
Status: CANCELLED | OUTPATIENT
Start: 2020-01-23

## 2020-01-23 RX ORDER — LORAZEPAM 1 MG/1
1 TABLET ORAL EVERY 8 HOURS PRN
Status: DISCONTINUED | OUTPATIENT
Start: 2020-01-23 | End: 2020-01-24

## 2020-01-23 RX ORDER — BENZTROPINE MESYLATE 1 MG/1
1 TABLET ORAL 2 TIMES DAILY
Status: CANCELLED | OUTPATIENT
Start: 2020-01-23

## 2020-01-23 RX ORDER — LORAZEPAM 2 MG/ML
1 INJECTION INTRAMUSCULAR EVERY 8 HOURS PRN
Status: DISCONTINUED | OUTPATIENT
Start: 2020-01-23 | End: 2020-01-23 | Stop reason: SDUPTHER

## 2020-01-23 RX ORDER — LORAZEPAM 2 MG/ML
1 INJECTION INTRAMUSCULAR EVERY 6 HOURS PRN
Status: DISCONTINUED | OUTPATIENT
Start: 2020-01-23 | End: 2020-02-03 | Stop reason: HOSPADM

## 2020-01-23 RX ORDER — CHLORPROMAZINE HYDROCHLORIDE 25 MG/1
50 TABLET, FILM COATED ORAL 2 TIMES DAILY
Status: DISCONTINUED | OUTPATIENT
Start: 2020-01-24 | End: 2020-01-24

## 2020-01-23 RX ORDER — BENZTROPINE MESYLATE 1 MG/1
1 TABLET ORAL 2 TIMES DAILY
Status: DISCONTINUED | OUTPATIENT
Start: 2020-01-24 | End: 2020-02-03 | Stop reason: HOSPADM

## 2020-01-23 RX ORDER — METOPROLOL TARTRATE 50 MG/1
50 TABLET, FILM COATED ORAL EVERY 12 HOURS SCHEDULED
Status: DISCONTINUED | OUTPATIENT
Start: 2020-01-23 | End: 2020-01-23 | Stop reason: HOSPADM

## 2020-01-23 RX ORDER — RISPERIDONE 1 MG/1
1 TABLET, ORALLY DISINTEGRATING ORAL
Status: CANCELLED | OUTPATIENT
Start: 2020-01-23

## 2020-01-23 RX ORDER — OLANZAPINE 10 MG/1
10 INJECTION, POWDER, LYOPHILIZED, FOR SOLUTION INTRAMUSCULAR
Status: DISCONTINUED | OUTPATIENT
Start: 2020-01-23 | End: 2020-02-03 | Stop reason: HOSPADM

## 2020-01-23 RX ORDER — LORAZEPAM 0.5 MG/1
0.5 TABLET ORAL EVERY 8 HOURS PRN
Status: DISCONTINUED | OUTPATIENT
Start: 2020-01-23 | End: 2020-02-03 | Stop reason: HOSPADM

## 2020-01-23 RX ORDER — ACETAMINOPHEN 325 MG/1
975 TABLET ORAL EVERY 6 HOURS PRN
Status: CANCELLED | OUTPATIENT
Start: 2020-01-23

## 2020-01-23 RX ORDER — SODIUM BICARBONATE 650 MG/1
650 TABLET ORAL DAILY
Status: CANCELLED | OUTPATIENT
Start: 2020-01-24

## 2020-01-23 RX ORDER — HALOPERIDOL 5 MG
5 TABLET ORAL EVERY 8 HOURS PRN
Status: CANCELLED | OUTPATIENT
Start: 2020-01-23

## 2020-01-23 RX ORDER — SODIUM BICARBONATE 650 MG/1
650 TABLET ORAL DAILY
Status: DISCONTINUED | OUTPATIENT
Start: 2020-01-24 | End: 2020-02-03 | Stop reason: HOSPADM

## 2020-01-23 RX ORDER — SACCHAROMYCES BOULARDII 250 MG
250 CAPSULE ORAL 2 TIMES DAILY
Status: CANCELLED | OUTPATIENT
Start: 2020-01-23

## 2020-01-23 RX ORDER — MAGNESIUM HYDROXIDE/ALUMINUM HYDROXICE/SIMETHICONE 120; 1200; 1200 MG/30ML; MG/30ML; MG/30ML
15 SUSPENSION ORAL EVERY 4 HOURS PRN
Status: CANCELLED | OUTPATIENT
Start: 2020-01-23

## 2020-01-23 RX ORDER — HALOPERIDOL 5 MG
5 TABLET ORAL EVERY 8 HOURS PRN
Status: DISCONTINUED | OUTPATIENT
Start: 2020-01-23 | End: 2020-02-03 | Stop reason: HOSPADM

## 2020-01-23 RX ORDER — INSULIN GLARGINE 100 [IU]/ML
40 INJECTION, SOLUTION SUBCUTANEOUS EVERY MORNING
Status: CANCELLED | OUTPATIENT
Start: 2020-01-24

## 2020-01-23 RX ORDER — ACETAMINOPHEN 325 MG/1
650 TABLET ORAL EVERY 6 HOURS PRN
Status: CANCELLED | OUTPATIENT
Start: 2020-01-23

## 2020-01-23 RX ORDER — ASPIRIN 81 MG/1
81 TABLET ORAL DAILY
Status: CANCELLED | OUTPATIENT
Start: 2020-01-24

## 2020-01-23 RX ORDER — DIVALPROEX SODIUM 500 MG/1
1000 TABLET, EXTENDED RELEASE ORAL
Status: CANCELLED | OUTPATIENT
Start: 2020-01-23

## 2020-01-23 RX ORDER — CEFPODOXIME PROXETIL 200 MG/1
400 TABLET, FILM COATED ORAL 2 TIMES DAILY WITH MEALS
Status: COMPLETED | OUTPATIENT
Start: 2020-01-24 | End: 2020-01-27

## 2020-01-23 RX ORDER — HEPARIN SODIUM 5000 [USP'U]/ML
5000 INJECTION, SOLUTION INTRAVENOUS; SUBCUTANEOUS EVERY 8 HOURS SCHEDULED
Status: DISCONTINUED | OUTPATIENT
Start: 2020-01-23 | End: 2020-01-23

## 2020-01-23 RX ORDER — FUROSEMIDE 20 MG/1
20 TABLET ORAL DAILY
Status: COMPLETED | OUTPATIENT
Start: 2020-01-24 | End: 2020-01-24

## 2020-01-23 RX ORDER — BUSPIRONE HYDROCHLORIDE 10 MG/1
10 TABLET ORAL 2 TIMES DAILY
Status: DISCONTINUED | OUTPATIENT
Start: 2020-01-24 | End: 2020-02-03 | Stop reason: HOSPADM

## 2020-01-23 RX ORDER — SENNOSIDES 8.6 MG
1 TABLET ORAL
Status: CANCELLED | OUTPATIENT
Start: 2020-01-23

## 2020-01-23 RX ORDER — ACETAMINOPHEN 325 MG/1
975 TABLET ORAL EVERY 6 HOURS PRN
Status: DISCONTINUED | OUTPATIENT
Start: 2020-01-23 | End: 2020-02-03 | Stop reason: HOSPADM

## 2020-01-23 RX ORDER — OLANZAPINE 10 MG/1
10 INJECTION, POWDER, LYOPHILIZED, FOR SOLUTION INTRAMUSCULAR
Status: CANCELLED | OUTPATIENT
Start: 2020-01-23

## 2020-01-23 RX ORDER — ACETAMINOPHEN 325 MG/1
650 TABLET ORAL EVERY 4 HOURS PRN
Status: DISCONTINUED | OUTPATIENT
Start: 2020-01-23 | End: 2020-02-03 | Stop reason: HOSPADM

## 2020-01-23 RX ORDER — BUSPIRONE HYDROCHLORIDE 5 MG/1
10 TABLET ORAL 2 TIMES DAILY
Status: CANCELLED | OUTPATIENT
Start: 2020-01-23

## 2020-01-23 RX ORDER — BENZTROPINE MESYLATE 1 MG/1
1 TABLET ORAL EVERY 6 HOURS PRN
Status: CANCELLED | OUTPATIENT
Start: 2020-01-23

## 2020-01-23 RX ORDER — CHLORPROMAZINE HYDROCHLORIDE 25 MG/1
50 TABLET, FILM COATED ORAL 2 TIMES DAILY
Status: CANCELLED | OUTPATIENT
Start: 2020-01-23

## 2020-01-23 RX ORDER — ACETAMINOPHEN 325 MG/1
650 TABLET ORAL EVERY 6 HOURS PRN
Status: DISCONTINUED | OUTPATIENT
Start: 2020-01-23 | End: 2020-02-03 | Stop reason: HOSPADM

## 2020-01-23 RX ORDER — MAGNESIUM HYDROXIDE/ALUMINUM HYDROXICE/SIMETHICONE 120; 1200; 1200 MG/30ML; MG/30ML; MG/30ML
30 SUSPENSION ORAL EVERY 4 HOURS PRN
Status: CANCELLED | OUTPATIENT
Start: 2020-01-23

## 2020-01-23 RX ORDER — METOPROLOL TARTRATE 50 MG/1
50 TABLET, FILM COATED ORAL EVERY 12 HOURS SCHEDULED
Status: CANCELLED | OUTPATIENT
Start: 2020-01-23

## 2020-01-23 RX ORDER — ATORVASTATIN CALCIUM 40 MG/1
40 TABLET, FILM COATED ORAL
Status: DISCONTINUED | OUTPATIENT
Start: 2020-01-24 | End: 2020-02-03 | Stop reason: HOSPADM

## 2020-01-23 RX ADMIN — METRONIDAZOLE 500 MG: 500 TABLET, FILM COATED ORAL at 06:21

## 2020-01-23 RX ADMIN — Medication 250 MG: at 09:40

## 2020-01-23 RX ADMIN — INSULIN LISPRO 2 UNITS: 100 INJECTION, SOLUTION INTRAVENOUS; SUBCUTANEOUS at 22:19

## 2020-01-23 RX ADMIN — ATORVASTATIN CALCIUM 40 MG: 40 TABLET, FILM COATED ORAL at 16:43

## 2020-01-23 RX ADMIN — INSULIN GLARGINE 40 UNITS: 100 INJECTION, SOLUTION SUBCUTANEOUS at 09:42

## 2020-01-23 RX ADMIN — CHLORPROMAZINE HYDROCHLORIDE 50 MG: 25 TABLET, SUGAR COATED ORAL at 09:39

## 2020-01-23 RX ADMIN — CEFAZOLIN SODIUM 2000 MG: 2 SOLUTION INTRAVENOUS at 09:42

## 2020-01-23 RX ADMIN — CEFAZOLIN SODIUM 2000 MG: 2 SOLUTION INTRAVENOUS at 01:30

## 2020-01-23 RX ADMIN — SODIUM BICARBONATE 650 MG: 650 TABLET ORAL at 09:40

## 2020-01-23 RX ADMIN — SERTRALINE HYDROCHLORIDE 25 MG: 25 TABLET ORAL at 09:40

## 2020-01-23 RX ADMIN — HEPARIN SODIUM 5000 UNITS: 5000 INJECTION INTRAVENOUS; SUBCUTANEOUS at 15:08

## 2020-01-23 RX ADMIN — Medication 250 MG: at 17:06

## 2020-01-23 RX ADMIN — MORPHINE SULFATE 2 MG: 2 INJECTION, SOLUTION INTRAMUSCULAR; INTRAVENOUS at 17:06

## 2020-01-23 RX ADMIN — ASPIRIN 81 MG: 81 TABLET ORAL at 09:40

## 2020-01-23 RX ADMIN — CHLORPROMAZINE HYDROCHLORIDE 50 MG: 25 TABLET, SUGAR COATED ORAL at 17:06

## 2020-01-23 RX ADMIN — BUSPIRONE HYDROCHLORIDE 10 MG: 5 TABLET ORAL at 09:40

## 2020-01-23 RX ADMIN — BUSPIRONE HYDROCHLORIDE 10 MG: 5 TABLET ORAL at 17:06

## 2020-01-23 RX ADMIN — MORPHINE SULFATE 2 MG: 2 INJECTION, SOLUTION INTRAMUSCULAR; INTRAVENOUS at 09:42

## 2020-01-23 RX ADMIN — INSULIN LISPRO 4 UNITS: 100 INJECTION, SOLUTION INTRAVENOUS; SUBCUTANEOUS at 16:42

## 2020-01-23 RX ADMIN — TRAZODONE HYDROCHLORIDE 50 MG: 50 TABLET ORAL at 21:56

## 2020-01-23 RX ADMIN — HEPARIN SODIUM 5000 UNITS: 5000 INJECTION INTRAVENOUS; SUBCUTANEOUS at 06:21

## 2020-01-23 RX ADMIN — SODIUM BICARBONATE 650 MG: 650 TABLET ORAL at 16:43

## 2020-01-23 RX ADMIN — METOPROLOL TARTRATE 50 MG: 50 TABLET, FILM COATED ORAL at 21:56

## 2020-01-23 RX ADMIN — METOPROLOL TARTRATE 25 MG: 25 TABLET, FILM COATED ORAL at 09:40

## 2020-01-23 RX ADMIN — CEFPODOXIME PROXETIL 400 MG: 200 TABLET, FILM COATED ORAL at 16:43

## 2020-01-23 RX ADMIN — DIVALPROEX SODIUM 1000 MG: 500 TABLET, EXTENDED RELEASE ORAL at 21:56

## 2020-01-23 RX ADMIN — FUROSEMIDE 20 MG: 20 TABLET ORAL at 09:40

## 2020-01-23 NOTE — ASSESSMENT & PLAN NOTE
Patient has sepsis present on admission with fever of 102  along with mild tachycardia noted and leukocytosis noted on 1/18  Source of infection is the right leg cellulitis    Continue oral antibiotics    Sepsis has now resolved

## 2020-01-23 NOTE — NURSING NOTE
Pt will be going to [facility] this evening  Physician reports patient does not need to be on a 1:1 until he is admitted to ps

## 2020-01-23 NOTE — ED NOTES
Patient is accepted at [address] [place] [address] 3P  Patient is accepted by Dr Pete Sandhoff  per Pete Sandhoff   Transportation is arranged with TBD   Transportation is scheduled for TBD   Nurse report is to be called to [phone] prior to patient transfer  Assigned Medical CM will complete COB prior to transfer

## 2020-01-23 NOTE — ASSESSMENT & PLAN NOTE
Patient has right leg cellulitis  Denies any injury however he has not been taking his medications for the last 1 and half week and is a known diabetic with uncontrolled blood sugars  He also has nonhealing wound on his right midfoot  Cellulitis includes redness and swelling extending up to almost the right knee  Patient had a fever of 102 on 01/18/2020 and had leukocytosis noted  Transferred to the medical floor  Placed on IV ceftriaxone     Worsening leukocytosis, swelling redness and pain noted of the right leg 1/19/2020  Patient is finally showing clinical improvement  Wound culture showing beta-hemolytic Streptococcus, E coli, corynebacterium  Continue antibiotic as per Infectious Disease  Venous Doppler negative for DVT but does show a large inguinal lymph nodes which may be infectious versus malignancy will need outpatient follow-up    Transition to vantin 400 mg po q12h through 01/27/2020    Continue local wound care to the low right foot wound

## 2020-01-23 NOTE — PROGRESS NOTES
Progress Note - Infectious Disease   Luis Antonio Alva 38 y o  male MRN: [id number]  Unit/Bed#: 7T [facility] 709-01 Encounter: [id number]      Impression/Plan:    81-year-old male patient with history of diabetes mellitus and peripheral vascular disease, requiring bilateral TMA, transferred from [facility] floor to general medical floor for right lower extremity cellulitis secondary to chronic right foot wound infection      1- Sepsis, POA:  Fever 102, leukocytosis 13,000 on admission  Sepsis is likely secondary to right foot cellulitis and chronic right foot wound infection  Patient had debridement done by Podiatry, his deep wound cultures are positive for group C strep and E coli  He remains hemodynamically stable  He has been afebrile in the past 72h  - continue antibiotics as below  - close clinical monitoring, trend fever curve  - repeat CBC in a m         2- Right lower extremity cellulitis:  Likely secondary to an infected right foot wound ulcer  CT scan showed no evidence of necrotizing fasciitis, MRI showing mild edema and plantar and medial aspect of medial cuneiform   Lower extremity venous duplex negative for DVT  Wound culture preliminarly positive for group C strep and E coli  Patient is significantly improving on cefazolin IV and Flagyl, with resolution of fever and leukocytosis over the past 72 hours; erythema that was tracking up to the proximal shin, has significantly improved   Patient is still complaining of tenderness of the leg and foot    MRI reviewed and case discussed at length with podiatrist Dr Shanell Narvaez evidence of osteomyelitis at this time, will treat for skin and soft tissue infection   - okay to discharge from ID standpoint   Transition to cefadroxil 500 mg po q12h through 01/27/2020  - continue local wound care  - podiatry follow up     3- diabetes mellitus:  Last A1c 8 4%  - management as per primary service     4- peripheral vascular disease  Patient has history of bilateral TMA in  and in 2016     5- acute kidney injury:  Likely prerenal in setting of sepsis  Creatinine now slowly trending down, creatinine yesterday 1 03 mg/dL  - follow-up BMP and avoid nephrotoxic medications     Plan mentioned above discussed in details with patient  Case discussed with primary service     Antibiotics:  Cefazolin day 3  Flagyl day 3  IV antibiotics day 5       Subjective:  Patient has no fever, chills, sweats; no nausea, vomiting, diarrhea; no cough, shortness of breath; Complaining of right  foot pain  Pain is still there but slowly  improving    Objective:  Vitals:  Temp:  [96 8 °F (36 °C)-98 °F (36 7 °C)] 97 2 °F (36 2 °C)  HR:  [67-76] 75  Resp:  [18-20] 20  BP: (135-166)/() 166/101  SpO2:  [94 %-96 %] 96 %  Temp (24hrs), Av 3 °F (36 3 °C), Min:96 8 °F (36 °C), Max:98 °F (36 7 °C)  Current: Temperature: (!) 97 2 °F (36 2 °C)    Physical Exam:   General Appearance:  Alert, interactive, nontoxic, no acute distress  Throat: Oropharynx moist without lesions  Lungs:   Clear to auscultation bilaterally; no wheezes, rhonchi or rales; respirations unlabored   Heart:  RRR; no murmur, rub or gallop   Abdomen:   Soft, non-tender, non-distended, positive bowel sounds  Extremities: No clubbing, cyanosis  Right foot swelling  Leg Erythema has significantly improved  Swelling and tenderness persist    Skin: No new rashes or lesions         Labs, Imaging, & Other studies:   All pertinent labs and imaging studies were personally reviewed  Results from last 7 days   Lab Units 20  0856 20  0529 20  0512   WBC Thousand/uL 7 20 8 90 13 00*   HEMOGLOBIN g/dL 12 0* 12 4* 13 0*   PLATELETS Thousands/uL 229 186 194     Results from last 7 days   Lab Units 20  0446 20  0529 20  1636 20  0512 20  1031 20  1520   SODIUM mmol/L 133* 131* 132* 128* 131* 130*   POTASSIUM mmol/L 4 1 4 3 3 9 3 5* 4 5 4 6   CHLORIDE mmol/L 104 105 108 97 97 94*   CO2 mmol/L 21* 18* 18* 21* 26 24   BUN mg/dL 15 25 22 20 14 13   CREATININE mg/dL 1 03 1 46 1 61* 2 40* 1 33 1 20   EGFR ml/min/1 73sq m 89 58* 52* 32* 65 74   CALCIUM mg/dL 8 0* 8 3* 6 0* 8 0* 8 6 9 4   AST U/L  --   --   --  13* 17 16*   ALT U/L  --   --   --  19 18 23   ALK PHOS U/L  --   --   --  71 72 81     Results from last 7 days   Lab Units 01/19/20  1511 01/18/20  1728   GRAM STAIN RESULT  Rare Polys*  1+ Gram negative rods*  1+ Gram positive cocci in pairs* No polys seen*  Rare Gram positive cocci in pairs*   WOUND CULTURE  1+ Growth of Escherichia coli*  1+ Growth of Beta Hemolytic Streptococcus Group C*  1+ Growth of - mixed skin danielle including Corynebacterium striatum group* 2+ Growth of Beta Hemolytic Streptococcus Group C*  3+ Growth of - mixed skin danielle including Corynebacterium striatum group*

## 2020-01-23 NOTE — ASSESSMENT & PLAN NOTE
Patient had accelerated blood pressure 2 days ago  now however his blood pressure is well controlled today    Continue metoprolol and discontinue lisinopril due to Emiliano   Increase metoprolol to 50 mg twice daily due to uncontrolled blood pressure

## 2020-01-23 NOTE — NURSING NOTE
Pt is AAOx4 with a flat affect  Lungs are clear  Dressing is clean dry and intact  Awaiting transfer to [facility]

## 2020-01-23 NOTE — DISCHARGE SUMMARY
Discharge- Elta Session 1975, 38 y o  male MRN: [id number]    Unit/Bed#: 7T [facility] 709-01 Encounter: [id number]    Primary Care Provider: No primary care provider on file  Date and time admitted to hospital: 1/18/2020 11:02 AM        * Cellulitis of right lower extremity  Assessment & Plan  Patient has right leg cellulitis  Denies any injury however he has not been taking his medications for the last 1 and half week and is a known diabetic with uncontrolled blood sugars  He also has nonhealing wound on his right midfoot  Cellulitis includes redness and swelling extending up to almost the right knee  Patient had a fever of 102 on 01/18/2020 and had leukocytosis noted  Transferred to the medical floor  Placed on IV ceftriaxone     Worsening leukocytosis, swelling redness and pain noted of the right leg 1/19/2020  Patient is finally showing clinical improvement  Wound culture showing beta-hemolytic Streptococcus, E coli, corynebacterium  Continue antibiotic as per Infectious Disease  Venous Doppler negative for DVT but does show a large inguinal lymph nodes which may be infectious versus malignancy will need outpatient follow-up  Transition to vantin 400 mg po q12h through 01/27/2020    Continue local wound care to the low right foot wound          Acute kidney injury (NAIF) with acute tubular necrosis (ATN) ([facility] [address] )  Assessment & Plan  Patient  renal function is improving and creatinine improved from 2 4-1 46 -1 03  Baseline creatinine is around 1-1 3  Continue to hold lisinopril   Discontinue IV fluids  Continue to renally dose medications  Avoid any nephrotoxic agents    Sepsis [facility])  Assessment & Plan  Patient has sepsis present on admission with fever of 102  along with mild tachycardia noted and leukocytosis noted on 1/18  Source of infection is the right leg cellulitis    Continue oral antibiotics    Sepsis has now resolved    Essential hypertension  Assessment & Plan  Patient had accelerated blood pressure 2 days ago  now however his blood pressure is well controlled today  Continue metoprolol and discontinue lisinopril due to Emiliano   Increase metoprolol to 50 mg twice daily due to uncontrolled blood pressure    Type 2 diabetes mellitus with diabetic polyneuropathy, with long-term current use of insulin [facility])  Assessment & Plan  Lab Results   Component Value Date    HGBA1C 8 4 (H) 07/10/2019       Recent Labs     01/22/20  1125 01/22/20  1639 01/22/20 2011 01/23/20  0551   POCGLU 143* 210* 125 133       Blood Sugar Average: Last 72 hrs:  (P) 318 [id number]   Uncontrolled diabetic  Patient states that he was prescribed insulin but never picked it upper used it  His blood sugars have been over 200 since admission to behavioral health  Will currently place him on insulin sliding scale and a diabetic diet and also adjust dose of Lantus to 40 units daily and also placed on Humalog 8 units t i d  In addition to a sliding scale  Try to control his blood sugar more aggressively    Continue Neurontin for neuropathic pain    Diabetic ulcer of right midfoot [facility])  Assessment & Plan  Lab Results   Component Value Date    HGBA1C 8 4 (H) 07/10/2019       Recent Labs     01/22/20  1125 01/22/20  1639 01/22/20 2011 01/23/20  0551   POCGLU 143* 210* 125 133       Blood Sugar Average: Last 72 hrs:  (P) 611 [id number]     Patient noticed to have a small nonhealing ulcer on the base of the right midfoot and also some bleeding noted at the site of his previous TMA  MRI foot shows cellulitis but no osteomyelitis    Tobacco use disorder  Assessment & Plan  Counseled on smoking cessation and placed on nicotine replacement therapy    Bipolar 2 disorder [facility])  Assessment & Plan  Patient has uncontrolled bipolar disorder  He did not take his meds for the last 1 and half week  Complains of suicidal ideation    Was admitted to [facility] but had to be transferred to the medical floor for treatment for cellulitis  Resume all of his home psychiatric medications including Thorazine, BuSpar, Depakote, sertraline , Neurontin   Does not require 1 is to 1 observation at this time or Q 15 minutes behavioral health checks  Secondary to worsening renal function decreased dose of Depakote from 2000 at bedtime to 1000mg hs  Also decrease dose of Thorazine and decrease dose of Neurontin from 600 t i d  To 100 t i d  Re Eval by Psychiatry today      Discharging Physician / Practitioner: Yana Carranza MD  PCP: No primary care provider on file  Admission Date:   Admission Orders (From admission, onward)     Ordered        01/18/20 1127  Inpatient Admission  Once                   Discharge Date: 01/23/20    Resolved Problems  Date Reviewed: 1/23/2020    None          Consultations During Hospital Stay:  · Infectious disease and Psychiatry    Procedures Performed:   · None    Significant Findings / Test Results:   Xr Foot 3+ Vw Right    Result Date: 1/19/2020  Impression: Stable postoperative changes with no acute osseous abnormality  Workstation performed: [id number]     Mri Foot/forefoot Toes Right W Wo Contrast    Result Date: 1/20/2020  Impression: There is no fluid collection seen Edema and increased signal intensity noted in the plantar musculature at the level of the 1st metatarsal stump underlying the area of ulceration A small focus of edema seen in the plantar and medial aspect of the medial cuneiform in the subchondral region, not seen on the previous study probably on degenerative basis or reactive  less likely due to osteomyelitis This measures 5 6 mm and seen in image 28 series 8 The study was marked in EPIC for significant notification  Workstation performed: [id number]     Ct Lower Extremity Wo Contrast Right    Result Date: 1/19/2020  Impression: 1  No acute osseous abnormality  Degenerative changes  2   Mild subcutaneous edema throughout the lower leg compatible with cellulitis    No radiographic findings to suggest necrotizing fasciitis  Clinical correlation is recommended as this is a clinical diagnosis  3   Incidentally noted is fatty atrophy of the posterior musculature of the lower leg to include the medial and lateral head gastrocnemius muscle  This may represent sequela of diabetic neuropathy  Correlation is recommended  4   Skin wound of the medial foot distal to the amputation site only partially visualized on this exam  Workstation performed: [id number]     Incidental Findings:   · none     Test Results Pending at Discharge (will require follow up):   · none     Outpatient Tests Requested:  · Cbc,cmp in 2 weeks    Complications:  none    Reason for Admission: left leg cellulitis    Hospital Course:     Dulce Muniz is a 38 y o  male patient who originally presented to the hospital on 1/18/2020 due to left leg cellulitis  Patient was not compliant with his medications including his behavioral health medications and diabetes medications  He was very depressed and initially admitted to inpatient behavioral health however had to be moved out due to cellulitis and fever     Patient received IV antibiotics due to sepsis and cellulitis which has now clinically improved  The redness erythema has resolved  Leukocytosis has resolved  Patient noted to have of wound on his left foot  Which is also improving  Will now transition him to oral antibiotics for another 5 days  Patient placed on psychiatric medications and seen by behavioral health team as well  Robinson to be very depressed  Not eating and drinking today  States he has thoughts of hurting himself  Will discharge him to inpatient behavioral health treatment  He is medically cleared to be discharged from the hospital medical floor to the behavioral health floor  Please see above list of diagnoses and related plan for additional information       Condition at Discharge: good     Discharge Day Visit / Exam:     Subjective:  Patient denies any chest pain   Or shortness of breath denies any nausea vomiting or abdominal pain  States that the pain and swelling and redness of his left leg is improving  He still states he is depressed and would like some psychiatric help  Vitals: Blood Pressure: (!) 166/101 (01/23/20 0738)  Pulse: 75 (01/23/20 0738)  Temperature: (!) 97 2 °F (36 2 °C) (01/23/20 0738)  Temp Source: Temporal (01/23/20 0738)  Respirations: 20 (01/23/20 0738)  Height: 6' 3" (190 5 cm) (01/18/20 1545)  Weight - Scale: 126 kg (276 lb 14 4 oz) (01/18/20 1545)  SpO2: 96 % (01/23/20 0738)  Exam:   Physical Exam   Constitutional: He is oriented to person, place, and time  He appears well-developed and well-nourished  HENT:   Head: Normocephalic and atraumatic  Right Ear: External ear normal    Left Ear: External ear normal    Mouth/Throat: Oropharynx is clear and moist    Eyes: Pupils are equal, round, and reactive to light  Conjunctivae and EOM are normal    Neck: Normal range of motion  Neck supple  Cardiovascular: Normal rate, regular rhythm and normal heart sounds  Pulmonary/Chest: Effort normal and breath sounds normal    Abdominal: Soft  Bowel sounds are normal  He exhibits no mass  There is no tenderness  There is no rebound and no guarding  Genitourinary:   Genitourinary Comments: deferred   Musculoskeletal: Normal range of motion  He exhibits edema  Left leg 2+ nonpitting edema  no redness or warmth noted today  tma wound healing with no drainage   Neurological: He is alert and oriented to person, place, and time  He has normal reflexes  Cranial nerves 2-12 are normal   Normal neurological exam   Skin: Skin is warm and dry  No rash noted  Psychiatric: He has a normal mood and affect  Flat affect and depressed   Nursing note and vitals reviewed  Discussion with Family: none    Discharge instructions/Information to patient and family:   See after visit summary for information provided to patient and family        Provisions for Follow-Up Care:  See after visit summary for information related to follow-up care and any pertinent home health orders  Disposition:     Other: For Discharges to [facility]:   · Not Applicable to this Patient - Not Applicable to this Patient    Planned Readmission: none     Discharge Statement:  I spent 35 minutes discharging the patient  This time was spent on the day of discharge  I had direct contact with the patient on the day of discharge  Greater than 50% of the total time was spent examining patient, answering all patient questions, arranging and discussing plan of care with patient as well as directly providing post-discharge instructions  Additional time then spent on discharge activities  Discharge Medications:  See after visit summary for reconciled discharge medications provided to patient and family        ** Please Note: This note has been constructed using a voice recognition system **

## 2020-01-23 NOTE — PROGRESS NOTES
Benigno Underwood  was seen for continuing care and reviewed with staff  Progress Note - Behavioral Health   Benigno Underwood 38 y o  male MRN: @MRN   Unit/Bed#: 7T [facility] 709-01 Encounter: [id number]       Report from staff regarding this patient received and discussed, and records reviewed prior to seeing this patient   Pt was found to be depressed with flat affect and expressed recurrent suicidal ideations  He expressed his interest in continuation of treatment of his depression in inpatient treatment in psychiatric unit  Medicine team stated that  the patient was medically stable to his discharge to inpatient psychiatric unit this morning  Sleep: insomnia  Appetite: poor    Medication side effects:no complaints    Mental Status Evaluation: pt was lying in his medical bed, psychomotor retardation is evident  Flat affect  Depressed mood  Expressed SI  Soft, low volume and slow rate of speech  [place] thoughts  Not responding to IS  Alert  Wants help, with some preservation of insight and judgment  Laboratory results:  I have personally reviewed all pertinent laboratory results      BMP:   Recent Labs     01/21/20  0446   K 4 1      CO2 21*   BUN 15     Vitals:    01/23/20 1215   BP: 152/89   Pulse: 64   Resp: 20   Temp: 97 6 °F (36 4 °C)   SpO2: 97%        Medication Administration - last 24 hours from 01/22/2020 1355 to 01/23/2020 1355       Date/Time Order Dose Route Action Action by     01/23/2020 0952 nicotine (NICODERM CQ) 7 mg/24hr TD 24 hr patch 1 patch 1 patch Transdermal Not Given Hiawatha Romberg, RN     01/23/2020 1206 insulin lispro (HumaLOG) 100 units/mL subcutaneous injection 2-12 Units 2 Units Subcutaneous Not Given Hiawatha Romberg, RN     01/23/2020 0617 insulin lispro (HumaLOG) 100 units/mL subcutaneous injection 2-12 Units 2 Units Subcutaneous Not Given Christian Pastor RN     01/22/2020 1708 insulin lispro (HumaLOG) 100 units/mL subcutaneous injection 2-12 Units 4 Units Subcutaneous Given Yazmin Rush Ted, BLAKE     01/22/2020 2112 insulin lispro (HumaLOG) 100 units/mL subcutaneous injection 2-12 Units 2 Units Subcutaneous Not Given Devi Boone, BLAKE     01/23/2020 8135 benztropine (COGENTIN) tablet 1 mg 1 mg Oral Not Given Constantino Monroy, RN     01/22/2020 1708 benztropine (COGENTIN) tablet 1 mg 1 mg Oral Not Given Constantino Monroy, RN     01/23/2020 0940 busPIRone (BUSPAR) tablet 10 mg 10 mg Oral Given Amy Jean, RN     01/22/2020 1707 busPIRone (BUSPAR) tablet 10 mg 10 mg Oral Given Amy Jean, RN     01/23/2020 0940 metoprolol tartrate (LOPRESSOR) tablet 25 mg 25 mg Oral Given Amy Jean, RN     01/22/2020 2122 metoprolol tartrate (LOPRESSOR) tablet 25 mg 25 mg Oral Given Devi Boone RN     01/22/2020 1707 atorvastatin (LIPITOR) tablet 40 mg 40 mg Oral Given Amy Jena, RN     01/22/2020 2122 traZODone (DESYREL) tablet 50 mg 50 mg Oral Given Devi Boone, BLAKE     01/23/2020 0940 sertraline (ZOLOFT) tablet 25 mg 25 mg Oral Given Amy Jean, BLAKE     01/23/2020 0940 aspirin (ECOTRIN LOW STRENGTH) EC tablet 81 mg 81 mg Oral Given Amy Jean, BLAKE     01/23/2020 7181 chlorproMAZINE (THORAZINE) tablet 50 mg 50 mg Oral Given Amy Jean, BLAKE     01/22/2020 1707 chlorproMAZINE (THORAZINE) tablet 50 mg 50 mg Oral Given Amy Jean RN     01/23/2020 0157 heparin (porcine) subcutaneous injection 5,000 Units 5,000 Units Subcutaneous Given Devi Boone, BLAKE     01/22/2020 2122 heparin (porcine) subcutaneous injection 5,000 Units 5,000 Units Subcutaneous Given Devi Boone, BLAKE     01/22/2020 1435 heparin (porcine) subcutaneous injection 5,000 Units 5,000 Units Subcutaneous Given Constantino Monroy, RN     01/23/2020 0940 saccharomyces boulardii (FLORASTOR) capsule 250 mg 250 mg Oral Given Constantino Monroy RN     01/22/2020 1707 saccharomyces boulardii (FLORASTOR) capsule 250 mg 250 mg Oral Given Constantino Monroy RN     01/23/2020 3763 morphine injection 2 mg 2 mg Intravenous Given Constantino Monroy RN 01/22/2020 2219 morphine injection 2 mg 2 mg Intravenous Given Nuria Smith, RN     01/22/2020 1834 morphine injection 2 mg 2 mg Intravenous Given Amy Jean, RN     01/22/2020 1830 morphine injection 2 mg 2 mg Intravenous Given Amy Jean, RN     01/22/2020 1435 morphine injection 2 mg 2 mg Intravenous Given Amy Jean, RN     01/22/2020 2122 divalproex sodium (DEPAKOTE ER) 24 hr tablet 1,000 mg 1,000 mg Oral Given Nurai Smith, RN     01/23/2020 0940 sodium bicarbonate tablet 650 mg 650 mg Oral Given Hiram Music, RN     01/22/2020 1707 sodium bicarbonate tablet 650 mg 650 mg Oral Given Amy Jean, RN     01/23/2020 0942 ceFAZolin (ANCEF) IVPB (premix) 2,000 mg 2,000 mg Intravenous New Bag Amy Jean, RN     01/23/2020 0130 ceFAZolin (ANCEF) IVPB (premix) 2,000 mg 2,000 mg Intravenous Started During Downtime Nuria Smith, RN     01/22/2020 1707 ceFAZolin (ANCEF) IVPB (premix) 2,000 mg 2,000 mg Intravenous [address] [address] Ted, RN     01/23/2020 2122 metroNIDAZOLE (FLAGYL) tablet 500 mg 500 mg Oral Given Nuria Smith, RN     01/22/2020 2122 metroNIDAZOLE (FLAGYL) tablet 500 mg 500 mg Oral Given Nuria Smith, RN     01/22/2020 1435 metroNIDAZOLE (FLAGYL) tablet 500 mg 500 mg Oral Given Amy Jean, RN     01/23/2020 0942 insulin glargine (LANTUS) subcutaneous injection 40 Units 0 4 mL 40 Units Subcutaneous Given Amy Jean, RN     01/23/2020 1206 insulin lispro (HumaLOG) 100 units/mL subcutaneous injection 9 Units 9 Units Subcutaneous Not Given Hiram Music, RN     01/23/2020 0734 insulin lispro (HumaLOG) 100 units/mL subcutaneous injection 9 Units 9 Units Subcutaneous Not Given Hiram Music, RN     01/22/2020 1708 insulin lispro (HumaLOG) 100 units/mL subcutaneous injection 9 Units 9 Units Subcutaneous Given Amy Jean, RN     01/23/2020 0940 furosemide (LASIX) tablet 20 mg 20 mg Oral Given Hiram BLAKE Carvajal              Assessment/Plan   Principal Problem:    Cellulitis of right lower extremity  Active Problems:    Bipolar 2 disorder ([facility])    Tobacco use disorder    Diabetic ulcer of right midfoot ([place] [address] )    Type 2 diabetes mellitus with diabetic polyneuropathy, with long-term current use of insulin ([facility])    Essential hypertension    PVD (peripheral vascular disease) ([facility])    Sepsis ([place] [address] )    Acute kidney injury (NAIF) with acute tubular necrosis (ATN) ([facility])          Current Facility-Administered Medications:     acetaminophen (TYLENOL) tablet 650 mg, 650 mg, Oral, Q6H PRN, Danay Tenoiro MD    acetaminophen (TYLENOL) tablet 650 mg, 650 mg, Oral, Q4H PRN, Danay Tenorio MD, 650 mg at 01/20/20 0532    aluminum-magnesium hydroxide-simethicone (MYLANTA) 200-200-20 mg/5 mL oral suspension 15 mL, 15 mL, Oral, Q4H PRN, Danay Tenorio MD    aspirin (ECOTRIN LOW STRENGTH) EC tablet 81 mg, 81 mg, Oral, Daily, Danay Tenorio MD, 81 mg at 01/23/20 0940    atorvastatin (LIPITOR) tablet 40 mg, 40 mg, Oral, Daily With Dinner, Danay Tenorio MD, 40 mg at 01/22/20 1707    benztropine (COGENTIN) tablet 1 mg, 1 mg, Oral, BID, Danay Tenorio MD, 1 mg at 01/21/20 0845    busPIRone (BUSPAR) tablet 10 mg, 10 mg, Oral, BID, Danay Tenorio MD, 10 mg at 01/23/20 0940    cefpodoxime (VANTIN) tablet 400 mg, 400 mg, Oral, BID With Meals, Danay Tenorio MD    chlorproMAZINE (THORAZINE) tablet 50 mg, 50 mg, Oral, BID, Danay Tenorio MD, 50 mg at 01/23/20 0939    divalproex sodium (DEPAKOTE ER) 24 hr tablet 1,000 mg, 1,000 mg, Oral, HS, Danay Tenorio MD, 1,000 mg at 01/22/20 2122    furosemide (LASIX) tablet 20 mg, 20 mg, Oral, Daily, Danay Tenorio MD, 20 mg at 01/23/20 0940    haloperidol (HALDOL) tablet 5 mg, 5 mg, Oral, Q8H PRN, Danay Tenorio MD    heparin (porcine) subcutaneous injection 5,000 Units, 5,000 Units, Subcutaneous, Q8H [address], Danay Tenorio MD, 5,000 Units at 01/23/20 0621    hydrOXYzine HCL (ATARAX) tablet 25 mg, 25 mg, Oral, Q6H PRN, Danay Tenorio MD    insulin glargine (LANTUS) subcutaneous injection 40 Units 0 4 mL, 40 Units, Subcutaneous, QAM, Mara Mcdonough MD, 40 Units at 01/23/20 0942    insulin lispro (HumaLOG) 100 units/mL subcutaneous injection 2-12 Units, 2-12 Units, Subcutaneous, TID AC, 4 Units at 01/22/20 1708 **AND** Fingerstick Glucose (POCT), , , TID AC, Mara Mcdonough MD    insulin lispro (HumaLOG) 100 units/mL subcutaneous injection 2-12 Units, 2-12 Units, Subcutaneous, HS, Mara Mcdonough MD, 4 Units at 01/21/20 2152    insulin lispro (HumaLOG) 100 units/mL subcutaneous injection 9 Units, 9 Units, Subcutaneous, TID With Meals, Mara Mcdonough MD, 9 Units at 01/22/20 1708    LORazepam (ATIVAN) 2 mg/mL injection 1 mg, 1 mg, Intravenous, Q8H PRN, Corwin [facility] PA-C    LORazepam (ATIVAN) tablet 0 5 mg, 0 5 mg, Oral, Q8H PRN, Corwin Greer PA-C    magnesium hydroxide (MILK OF MAGNESIA) 400 mg/5 mL oral suspension 20 mL, 20 mL, Oral, Daily PRN, Mara Mcdonough MD    metoprolol tartrate (LOPRESSOR) tablet 50 mg, 50 mg, Oral, Q12H [address], Mara Mcdonough MD    morphine injection 2 mg, 2 mg, Intravenous, Q4H PRN, Mara Mcdonough MD, 2 mg at 01/23/20 0942    nicotine (NICODERM CQ) 7 mg/24hr TD 24 hr patch 1 patch, 1 patch, Transdermal, Daily, Mara Mcdonough MD    ondansetron (ZOFRAN) injection 4 mg, 4 mg, Intravenous, Q6H PRN, Mara Mcdonough MD    saccharomyces boulardii (FLORASTOR) capsule 250 mg, 250 mg, Oral, BID, Mara Mcdonough MD, 250 mg at 01/23/20 0940    senna (SENOKOT) tablet 8 6 mg, 1 tablet, Oral, HS PRN, Mara Mcdonough MD    sertraline (ZOLOFT) tablet 25 mg, 25 mg, Oral, Daily, Mara Mcdonough MD, 25 mg at 01/23/20 0940    sodium bicarbonate tablet 650 mg, 650 mg, Oral, BID after meals, Mara Mcdonough MD, 650 mg at 01/23/20 0940    traZODone (DESYREL) tablet 50 mg, 50 mg, Oral, HS, Mara Mcdonough MD, 50 mg at 01/22/20 2122        Recommended Treatment: discharge-readmit to inpatient psychiatric unit for continuation of treatment of severe treatment resistant depression and medication adjustment and therapy   Pt is in agreement with the plan  ** Please Note: This note has been constructed using a voice recognition system   **      ----------------------------------------------------------------------------------------------------------------

## 2020-01-23 NOTE — ASSESSMENT & PLAN NOTE
Lab Results   Component Value Date    HGBA1C 8 4 (H) 07/10/2019       Recent Labs     01/22/20  1125 01/22/20  1639 01/22/20 2011 01/23/20  0551   POCGLU 143* 210* 125 133       Blood Sugar Average: Last 72 hrs:  (P) 735 [id number]     Patient noticed to have a small nonhealing ulcer on the base of the right midfoot and also some bleeding noted at the site of his previous TMA    MRI foot shows cellulitis but no osteomyelitis

## 2020-01-23 NOTE — ASSESSMENT & PLAN NOTE
Patient has uncontrolled bipolar disorder  He did not take his meds for the last 1 and half week  Complains of suicidal ideation  Was admitted to [facility] but had to be transferred to the medical floor for treatment for cellulitis  Resume all of his home psychiatric medications including Thorazine, BuSpar, Depakote, sertraline , Neurontin   Does not require 1 is to 1 observation at this time or Q 15 minutes behavioral health checks  Secondary to worsening renal function decreased dose of Depakote from 2000 at bedtime to 1000mg hs  Also decrease dose of Thorazine and decrease dose of Neurontin from 600 t i d  To 100 t i d    Re Eval by Psychiatry today

## 2020-01-23 NOTE — SOCIAL WORK
Pt is medically cleared for discharge to [address] [place] [address] at [facility] 3P on a 201  SW called [facility] for a COB  SW s/w Tahmina Mcadams and was instructed to do the following: when pt is discharged from [facility] 3P they are to go to [email or web address]; go to "Providers," "Forms", "Discharge"  Complete the form and fax to [facility] [phone]

## 2020-01-23 NOTE — ASSESSMENT & PLAN NOTE
Lab Results   Component Value Date    HGBA1C 8 4 (H) 07/10/2019       Recent Labs     01/22/20  1125 01/22/20  1639 01/22/20 2011 01/23/20  0551   POCGLU 143* 210* 125 133       Blood Sugar Average: Last 72 hrs:  (P) 111 [id number]   Uncontrolled diabetic  Patient states that he was prescribed insulin but never picked it upper used it  His blood sugars have been over 200 since admission to behavioral health  Will currently place him on insulin sliding scale and a diabetic diet and also adjust dose of Lantus to 40 units daily and also placed on Humalog 8 units t i d  In addition to a sliding scale    Try to control his blood sugar more aggressively    Continue Neurontin for neuropathic pain

## 2020-01-24 PROBLEM — Z00.8 MEDICAL CLEARANCE FOR PSYCHIATRIC ADMISSION: Status: ACTIVE | Noted: 2020-01-24

## 2020-01-24 PROBLEM — F31.81 BIPOLAR 2 DISORDER (HCC): Chronic | Status: ACTIVE | Noted: 2018-06-24

## 2020-01-24 LAB
GLUCOSE SERPL-MCNC: 139 MG/DL (ref 65–140)
GLUCOSE SERPL-MCNC: 157 MG/DL (ref 65–140)
GLUCOSE SERPL-MCNC: 99 MG/DL (ref 65–140)

## 2020-01-24 PROCEDURE — 99223 1ST HOSP IP/OBS HIGH 75: CPT | Performed by: NURSE PRACTITIONER

## 2020-01-24 PROCEDURE — 99233 SBSQ HOSP IP/OBS HIGH 50: CPT | Performed by: PSYCHIATRY & NEUROLOGY

## 2020-01-24 PROCEDURE — 82948 REAGENT STRIP/BLOOD GLUCOSE: CPT

## 2020-01-24 RX ORDER — GABAPENTIN 400 MG/1
400 CAPSULE ORAL 3 TIMES DAILY
Status: DISCONTINUED | OUTPATIENT
Start: 2020-01-24 | End: 2020-01-27

## 2020-01-24 RX ORDER — CHLORPROMAZINE HYDROCHLORIDE 25 MG/1
50 TABLET, FILM COATED ORAL 3 TIMES DAILY
Status: DISCONTINUED | OUTPATIENT
Start: 2020-01-24 | End: 2020-02-03 | Stop reason: HOSPADM

## 2020-01-24 RX ORDER — LISINOPRIL 5 MG/1
2.5 TABLET ORAL DAILY
Status: DISCONTINUED | OUTPATIENT
Start: 2020-01-24 | End: 2020-01-28

## 2020-01-24 RX ADMIN — Medication 250 MG: at 21:24

## 2020-01-24 RX ADMIN — CEFPODOXIME PROXETIL 400 MG: 200 TABLET, FILM COATED ORAL at 08:30

## 2020-01-24 RX ADMIN — CHLORPROMAZINE HYDROCHLORIDE 50 MG: 25 TABLET, SUGAR COATED ORAL at 21:25

## 2020-01-24 RX ADMIN — DIVALPROEX SODIUM 1000 MG: 500 TABLET, EXTENDED RELEASE ORAL at 21:24

## 2020-01-24 RX ADMIN — ENOXAPARIN SODIUM 40 MG: 40 INJECTION SUBCUTANEOUS at 08:32

## 2020-01-24 RX ADMIN — CEFPODOXIME PROXETIL 400 MG: 200 TABLET, FILM COATED ORAL at 16:05

## 2020-01-24 RX ADMIN — GABAPENTIN 400 MG: 400 CAPSULE ORAL at 21:24

## 2020-01-24 RX ADMIN — BUSPIRONE HYDROCHLORIDE 10 MG: 10 TABLET ORAL at 17:42

## 2020-01-24 RX ADMIN — Medication 250 MG: at 08:29

## 2020-01-24 RX ADMIN — METOPROLOL TARTRATE 50 MG: 50 TABLET, FILM COATED ORAL at 09:43

## 2020-01-24 RX ADMIN — SODIUM BICARBONATE 650 MG: 650 TABLET ORAL at 08:32

## 2020-01-24 RX ADMIN — FUROSEMIDE 20 MG: 20 TABLET ORAL at 08:30

## 2020-01-24 RX ADMIN — TRAZODONE HYDROCHLORIDE 50 MG: 50 TABLET ORAL at 21:24

## 2020-01-24 RX ADMIN — BUSPIRONE HYDROCHLORIDE 10 MG: 10 TABLET ORAL at 08:29

## 2020-01-24 RX ADMIN — CHLORPROMAZINE HYDROCHLORIDE 50 MG: 25 TABLET, SUGAR COATED ORAL at 08:29

## 2020-01-24 RX ADMIN — METOPROLOL TARTRATE 50 MG: 50 TABLET, FILM COATED ORAL at 21:24

## 2020-01-24 RX ADMIN — ATORVASTATIN CALCIUM 40 MG: 40 TABLET, FILM COATED ORAL at 16:05

## 2020-01-24 RX ADMIN — SERTRALINE HYDROCHLORIDE 25 MG: 25 TABLET ORAL at 08:30

## 2020-01-24 RX ADMIN — BENZTROPINE MESYLATE 1 MG: 1 TABLET ORAL at 08:30

## 2020-01-24 RX ADMIN — ASPIRIN 81 MG: 81 TABLET ORAL at 08:29

## 2020-01-24 RX ADMIN — INSULIN GLARGINE 40 UNITS: 100 INJECTION, SOLUTION SUBCUTANEOUS at 08:33

## 2020-01-24 NOTE — ASSESSMENT & PLAN NOTE
BP is elevated  Continue metoprolol 50 mg twice a day  Now that his renal function has returned to normal will restart lisinopril but will start low-dose more for renal protective this at 2 5 mg daily

## 2020-01-24 NOTE — ASSESSMENT & PLAN NOTE
R leg cellulitis also with nonhealing wound on R plantar midfoot  MRI showed cellulitis, but no osteomyelitis  antibiotic changed to vantin 400mg PO BID through 1/27/2020  Venous Doppler negative for DVT but does show a large inguinal lymph nodes which may be infectious versus malignancy will need outpatient follow-up

## 2020-01-24 NOTE — TREATMENT PLAN
TREATMENT PLAN REVIEW - [address] T[address] Homans 38 y o  1975 male MRN: [id number]    [address] Room / Bed: [address] [place] [address]/[facility] 552-56 Encounter: [id number]          Admit Date/Time:  1/23/2020  7:54 PM    Treatment Team: Attending Provider: Moody Brownlee MD; Care Manager: Mine Mendoza; Patient Care Assistant: Diana Campbell; Registered Nurse: Crhistian Mchugh RN; Nurse Practitioner: TAYLOR Giron; : Haley Mehta; Patient Care Technician: Eri Lepe;  Consulting Physician: TAYLOR Gan; Consulting Physician: Elisa Parra DPM    Diagnosis: Principal Problem:    Bipolar 2 disorder ([place] [address] )  Active Problems:    Tobacco use disorder    Diabetic ulcer of right midfoot ([facility] )    Type 2 diabetes mellitus with diabetic polyneuropathy, with long-term current use of insulin ([place] [address] )    Essential hypertension    Cellulitis of right lower extremity    Acute kidney injury (NAIF) with acute tubular necrosis (ATN) ([place] [address] )    Medical clearance for psychiatric admission    Patient Strengths: average or above intelligence, cooperative, communication skills, compliant with medication, negotiates basic needs, patient is on a voluntary commitment, patient is willing to work on problems     Patient Barriers: chronic pain, chronic mental illness, poor physical health    Short Term Goals: decrease in depressive symptoms, decrease in anxiety symptoms, decrease in suicidal thoughts, ability to stay safe on the unit    Long Term Goals: improvement in depression, improvement in anxiety, stabilization of mood, free of suicidal thoughts, adequate sleep, adequate appetite    Progress Towards Goals: starting psychitric medications as prescribed, continue psychiatric medications as prescribed    Recommended Treatment: medication management, patient medication education, group therapy, milieu therapy, continued Behavioral Health psychiatric evaluation/assessment process     Treatment Frequency: daily medication monitoring, group and milieu therapy daily, monitoring through interdisciplinary rounds, monitoring through weekly patient care conferences    Expected Discharge Date:  5-7 days    Discharge Plan: referral for outpatient medication management with a psychiatrist, referral for outpatient psychotherapy, referral for case management services    Treatment Plan Created/Updated By: Yazmin Lynn MD

## 2020-01-24 NOTE — ASSESSMENT & PLAN NOTE
Renal function improved and back to baseline   1-1 3  Avoid nephrotoxic medications    Repeat BMP on monday

## 2020-01-24 NOTE — PLAN OF CARE
Problem: DEPRESSION  Goal: Will be euthymic at discharge  Description  INTERVENTIONS:  - Administer medication as ordered  - Provide emotional support via 1:1 interaction with staff  - Encourage involvement in milieu/groups/activities  - Monitor for social isolation  Outcome: Not Progressing     Problem: ANXIETY  Goal: Will report anxiety at manageable levels  Description  INTERVENTIONS:  - Administer medication as ordered  - Teach and encourage coping skills  - Provide emotional support  - Assess patient/family for anxiety and ability to cope  Outcome: Not Progressing  Goal: By discharge: Patient will verbalize 2 strategies to deal with anxiety  Description  Interventions:  - Identify any obvious source/trigger to anxiety  - Staff will assist patient in applying identified coping technique/skills  - Encourage attendance of scheduled groups and activities  Outcome: Not Progressing   Care plan initiated

## 2020-01-24 NOTE — ASSESSMENT & PLAN NOTE
Lab Results   Component Value Date    HGBA1C 8 4 (H) 07/10/2019       Recent Labs     01/23/20  1550 01/23/20  2207 01/24/20  0740 01/24/20  1124   POCGLU 238* 189* 139 157*       Blood Sugar Average: Last 72 hrs:  (P) 874 [id number]      uncontrolled diabetic  Continue current regimen of Lantus 40 units daily and Humalog 8 units t i d   Additionally sliding scale insulin coverage  Discussed with patient the importance of controlling his diabetes or he will end up with a further amputation  Re-start low dose lisinopril 2 5mg daily    BMP on monday

## 2020-01-24 NOTE — PROGRESS NOTES
01/24/20 0845   Team Meeting   Meeting Type Daily Rounds   Initial Conference Date 01/24/20   Team Members Present   Team Members Present Physician;Nurse;   Physician Team Member Dr Helms [facility] Team Member [facility]  Management Team Member Shelbi Melissa   Patient/Family Present   Patient Present No   Patient's Family Present No   New 201 admission due to SI to jump of bridge  Meds to be discussed and initiated  Blood pressure running high

## 2020-01-24 NOTE — NURSING NOTE
Dressing changed on right foot without issue  No complaints of pain or discomfort  No signs of infection noted  Will monitor

## 2020-01-24 NOTE — CASE MANAGEMENT
Patient was admitted to [facility] 3P on a 21 admission from [address] [place] [address]  Patient was originally admitted to [facility] 3P, but due to medical concerns, was transferred to [facility]  Patient was calm and cooperative with this CM for intake assessment  Patient originally came to the ER due to increasing depression with suicidal ideations  Patient also was unable to afford his medications due to "overspending"  Patient was calm and cooperative during this assessment  Patient was able to answer all questions appropriately  Patient is familiar with this CM, from previous inpatient treatments  Patient presented as flat and depressed, in contrast to how patient normally is when first admitted to unit  Patient currently rates his depression as 9/10  Patient denies any suicidal or homicidal ideation  Patient currently rates his anxiety as 9/10  Patient denies any auditory or visual hallucinations  Patient currently declines any referral for D&A services  UDS negative upon admission  Patient does disclose that he has a history of substance abuse, but was not able to identify when he last used or what the substances were  Patient currently has no outpatient treatments  Patient was recently going to outpatient treatment at [facility] in [facility], but has not been since September '19  Patient is currently requesting a facility in [facility] for living arrangements and for outpatient services  Patient is currently residing at the [facility], but is requesting to not return  Patient insurance is [address] [employer]  Secondary is [facility]    Patient currently does not have a license or car, and relies on public transportation  Patient current income is $1,021/Month SSD  Patient states he receives his income on the 3rd of the month  Patient currently denies access to any firearms    Patient denies any legal concerns at this time  Patient strengths are: Motivation for Treatment(signed 201)    Patient stressors are:  Homelessness and relationship issues

## 2020-01-24 NOTE — ASSESSMENT & PLAN NOTE
Patient is medically cleared for inpatient psychiatric admission  IM will follow pt on Monday    Case d/w Patrick Bae

## 2020-01-24 NOTE — NURSING NOTE
Patient remained in bed all shift  Refused AM Humalog, compliant with other medications  Refused breakfast  Denied any pain  Denied any unmet needs  Patient encouraged to eat and hydrate  Will monitor

## 2020-01-24 NOTE — ASSESSMENT & PLAN NOTE
Patient with a small nonhealing ulcer right plantar midfoot  Previous TMA  MRI shows cellulitis but no osteomyelitis  Patient was previously following with Podiatry  Reviewed Dr Swati Yee note  Will consult for him to follow patient on Monday  Continue with current dressing changes    Continue vantin 400mg PO BID through 1/27/2020 per ID

## 2020-01-24 NOTE — PROGRESS NOTES
Admission Note: 38year old male admitted to [address] [place] [address] Dr unit from 7T as a 201 commitment status due to an increase in depression and anxiety  Pt's UDS negative on initial admission on 1/17/2020  Pt was pleasant and cooperative during admission  Pt currently reports a 2/4 anxiety and 7/10 depression but denies any SI, HI, AH, or VH  Pt has cellulitis on both feet which required amputation  Dressing changes are required daily and was completed today on 7T  Pt reports a 7/10 foot pain but refused any pain medication  Pt is diabetic  Last sugar was 189, which required 2 units of Humalog at 2219  Will continue to monitor

## 2020-01-24 NOTE — CONSULTS
Consult- Heriberto Ohs 1975, 38 y o  male MRN: [id number]    Unit/Bed#: [facility] 383-02 Encounter: [id number]    Primary Care Provider: No primary care provider on file  Date and time admitted to hospital: 1/23/2020  7:54 PM      Inpatient consult for Medical Clearance for [facility] patient  Consult performed by: TAYLOR Gill  Consult ordered by: Javier Molina MD        Medical clearance for psychiatric admission  Assessment & Plan  Patient is medically cleared for inpatient psychiatric admission  IM will follow pt on Monday  Case d/w Theone Skains    Acute kidney injury (NAIF) with acute tubular necrosis (ATN) ([facility] [address] )  Assessment & Plan  Renal function improved and back to baseline   1-1 3  Avoid nephrotoxic medications  Repeat BMP on monday    Cellulitis of right lower extremity  Assessment & Plan  R leg cellulitis also with nonhealing wound on R plantar midfoot  MRI showed cellulitis, but no osteomyelitis  antibiotic changed to vantin 400mg PO BID through 1/27/2020  Venous Doppler negative for DVT but does show a large inguinal lymph nodes which may be infectious versus malignancy will need outpatient follow-up  Essential hypertension  Assessment & Plan   BP is elevated  Continue metoprolol 50 mg twice a day  Now that his renal function has returned to normal will restart lisinopril but will start low-dose more for renal protective this at 2 5 mg daily  Type 2 diabetes mellitus with diabetic polyneuropathy, with long-term current use of insulin [facility])  Assessment & Plan  Lab Results   Component Value Date    HGBA1C 8 4 (H) 07/10/2019       Recent Labs     01/23/20  1550 01/23/20  2207 01/24/20  0740 01/24/20  1124   POCGLU 238* 189* 139 157*       Blood Sugar Average: Last 72 hrs:  (P) 009 [id number]      uncontrolled diabetic  Continue current regimen of Lantus 40 units daily and Humalog 8 units t i d   Additionally sliding scale insulin coverage    Discussed with patient the importance of controlling his diabetes or he will end up with a further amputation  Re-start low dose lisinopril 2 5mg daily  BMP on monday    Diabetic ulcer of right midfoot [facility])  Assessment & Plan  Patient with a small nonhealing ulcer right plantar midfoot  Previous TMA  MRI shows cellulitis but no osteomyelitis  Patient was previously following with Podiatry  Reviewed Dr Twyla Anderson note  Will consult for him to follow patient on Monday  Continue with current dressing changes  Continue vantin 400mg PO BID through 1/27/2020 per ID        Tobacco use disorder  Assessment & Plan   Provided encouragement  Continue Nicorette gum    Bipolar 2 disorder [facility])  Assessment & Plan   Per psychiatry    History of Present Illness:    Deb Flores is a 38 y o  male who is originally admitted to the psychiatry service on 1/23/2020     Patient is a non compliant diabetic mellitus type 2 insulin dependent  He initially presented to the hospital on 01/18/2020 due to left leg cellulitis  He was not compliant with his medications including his behavior health medications  He was very depressed and initially did admitted to inpatient [address]  However due to cellulitis and fever was transferred to medical floor  Patient started on IV antibiotics for sepsis for right lower extremity cellulitis  Additionally was noted to have a wound to his right plantar  Infectious disease follow-up patient and started him on IV antibiotics and transition to oral   Additionally an MRI was completed that was negative for osteomyelitis but did show cellulitis   Additionally patient's hospitalization course was complicated due to acute kidney injury that resolved with IV fluids    Patient responded well to antibiotics and was transferred back to behavior health unit  We are consulted for medical clearance      Review of Systems:    Review of Systems   Constitutional: Negative for activity change, appetite change, chills, diaphoresis, fatigue and fever  Respiratory: Negative for cough, chest tightness, shortness of breath and wheezing  Cardiovascular: Negative for chest pain, palpitations and leg swelling  Gastrointestinal: Negative for abdominal pain, constipation, diarrhea, nausea and vomiting  Genitourinary: Negative for dysuria and frequency  Musculoskeletal: Positive for arthralgias and gait problem  Skin: Positive for wound  Neurological: Positive for numbness  Negative for dizziness, light-headedness and headaches  Psychiatric/Behavioral: Positive for dysphoric mood and sleep disturbance  The patient is nervous/anxious  Past Medical and Surgical History:     Past Medical History:   Diagnosis Date    Acute kidney injury (NAIF) with acute tubular necrosis (ATN) ([place] [address] ) 1/19/2020    Anxiety     Bipolar 1 disorder (HCC)     Chronic pain disorder     Depression     Diabetes mellitus ([place] [address] )     Hypertension     Psychiatric illness     PTSD (post-traumatic stress disorder)     Sleep difficulties     Substance abuse ([place] [address] )     Suicide attempt ([place] [address] )     Type 2 diabetes mellitus with diabetic polyneuropathy, with long-term current use of insulin ([place] [address] )        Past Surgical History:   Procedure Laterality Date    APPENDECTOMY      COLON SURGERY      TOE AMPUTATION      TONSILLECTOMY         Meds/Allergies:    all medications and allergies reviewed    Allergies:    Allergies   Allergen Reactions    Penicillins Rash and Other (See Comments)     rash (Tolerating Ancef-per RN)  Last noted when patient was a child       Social History:     Marital Status: Single    Substance Use History:   Social History     Substance and Sexual Activity   Alcohol Use Yes    Frequency: Monthly or less    Drinks per session: 1 or 2    Binge frequency: Less than monthly     Social History     Tobacco Use   Smoking Status Current Every Day Smoker    Packs/day: 1 50    Years: 22 00    Pack years: 33 00    Types: Cigarettes   Smokeless Tobacco Current User    Types: Chew     Social History     Substance and Sexual Activity   Drug Use Not Currently    Types: Marijuana    Comment: monthly       Family History:  reviewed    Physical Exam:     Vitals:   Blood Pressure: 157/86 (01/24/20 0900)  Pulse: 69 (01/24/20 0900)  Temperature: 97 7 °F (36 5 °C) (01/24/20 0900)  Temp Source: Temporal (01/24/20 0900)  Respirations: 16 (01/24/20 0900)  Height: 6' 3" (190 5 cm) (01/23/20 2027)  Weight - Scale: 133 kg (293 lb) (01/23/20 2027)  SpO2: 97 % (01/23/20 2027)    Physical Exam   Constitutional: He is oriented to person, place, and time  He appears well-developed and well-nourished  No distress  Cardiovascular: Normal rate and regular rhythm  Pulmonary/Chest: Effort normal and breath sounds normal  No respiratory distress  He has no wheezes  Abdominal: Soft  Bowel sounds are normal  He exhibits no distension  There is no tenderness  Musculoskeletal:   B/L TMA   Neurological: He is alert and oriented to person, place, and time  Skin: Skin is warm and dry  Mild erythema and swelling to RLE  No significant warmth  Plantar surface with small open wound 1 5 cm x 1 5cm  No drainage noted  Dressing was clean and dress  Nursing note and vitals reviewed  Additional Data:     Lab Results: I have personally reviewed pertinent reports        Results from last 7 days   Lab Units 01/22/20  0856   WBC Thousand/uL 7 20   HEMOGLOBIN g/dL 12 0*   HEMATOCRIT % 36 3*   PLATELETS Thousands/uL 229   NEUTROS PCT % 70*   LYMPHS PCT % 21*   MONOS PCT % 9   EOS PCT % 0     Results from last 7 days   Lab Units 01/21/20  0446  01/19/20  0512   POTASSIUM mmol/L 4 1   < > 3 5*   CHLORIDE mmol/L 104   < > 97   CO2 mmol/L 21*   < > 21*   BUN mg/dL 15   < > 20   CREATININE mg/dL 1 03   < > 2 40*   CALCIUM mg/dL 8 0*   < > 8 0*   ALK PHOS U/L  --   --  71   ALT U/L  --   --  19   AST U/L  --   --  13*    < > = values in this interval not displayed  Imaging: I have personally reviewed pertinent reports  EKG, Pathology, and Other Studies Reviewed on Admission:     M*Modal software was used to dictate this note  It may contain errors with dictating incorrect words or incorrect spelling   Please contact the provider directly with any questions

## 2020-01-24 NOTE — CASE MANAGEMENT
Phone call placed to [facility]) to notify of admission to inpatient treatment  Message left with return phone number

## 2020-01-24 NOTE — NURSING NOTE
Patient is alert and orientated this shift  Able to make needs known  Patient has been calm and cooperative, minimally social   Patient is answering questions appropriate by staff  Does brighten on interactions  Presents as flat and depressed  Patient declining insulin and accucheck at this time  Education provided  Will continue to monitor

## 2020-01-25 LAB
ALBUMIN SERPL BCP-MCNC: 3.3 G/DL (ref 3–5.2)
ALP SERPL-CCNC: 74 U/L (ref 43–122)
ALT SERPL W P-5'-P-CCNC: 54 U/L (ref 9–52)
ANION GAP SERPL CALCULATED.3IONS-SCNC: 8 MMOL/L (ref 5–14)
AST SERPL W P-5'-P-CCNC: 80 U/L (ref 17–59)
BILIRUB SERPL-MCNC: 0.4 MG/DL
BUN SERPL-MCNC: 12 MG/DL (ref 5–25)
CALCIUM SERPL-MCNC: 8.9 MG/DL (ref 8.4–10.2)
CHLORIDE SERPL-SCNC: 103 MMOL/L (ref 97–108)
CO2 SERPL-SCNC: 28 MMOL/L (ref 22–30)
CREAT SERPL-MCNC: 0.87 MG/DL (ref 0.7–1.5)
GFR SERPL CREATININE-BSD FRML MDRD: 106 ML/MIN/1.73SQ M
GLUCOSE SERPL-MCNC: 118 MG/DL (ref 65–140)
GLUCOSE SERPL-MCNC: 128 MG/DL (ref 70–99)
GLUCOSE SERPL-MCNC: 134 MG/DL (ref 65–140)
GLUCOSE SERPL-MCNC: 214 MG/DL (ref 65–140)
GLUCOSE SERPL-MCNC: 240 MG/DL (ref 65–140)
POTASSIUM SERPL-SCNC: 3.9 MMOL/L (ref 3.6–5)
PROT SERPL-MCNC: 6.8 G/DL (ref 5.9–8.4)
SODIUM SERPL-SCNC: 139 MMOL/L (ref 137–147)

## 2020-01-25 PROCEDURE — 80053 COMPREHEN METABOLIC PANEL: CPT | Performed by: PSYCHIATRY & NEUROLOGY

## 2020-01-25 PROCEDURE — 99232 SBSQ HOSP IP/OBS MODERATE 35: CPT | Performed by: NURSE PRACTITIONER

## 2020-01-25 PROCEDURE — 82948 REAGENT STRIP/BLOOD GLUCOSE: CPT

## 2020-01-25 RX ORDER — INSULIN GLARGINE 100 [IU]/ML
35 INJECTION, SOLUTION SUBCUTANEOUS EVERY MORNING
Status: DISCONTINUED | OUTPATIENT
Start: 2020-01-26 | End: 2020-01-28

## 2020-01-25 RX ADMIN — GABAPENTIN 400 MG: 400 CAPSULE ORAL at 21:28

## 2020-01-25 RX ADMIN — CEFPODOXIME PROXETIL 400 MG: 200 TABLET, FILM COATED ORAL at 08:36

## 2020-01-25 RX ADMIN — Medication 250 MG: at 17:00

## 2020-01-25 RX ADMIN — INSULIN LISPRO 4 UNITS: 100 INJECTION, SOLUTION INTRAVENOUS; SUBCUTANEOUS at 16:46

## 2020-01-25 RX ADMIN — SODIUM BICARBONATE 650 MG: 650 TABLET ORAL at 08:33

## 2020-01-25 RX ADMIN — CARIPRAZINE 1.5 MG: 1.5 CAPSULE, GELATIN COATED ORAL at 08:33

## 2020-01-25 RX ADMIN — LISINOPRIL 2.5 MG: 5 TABLET ORAL at 08:33

## 2020-01-25 RX ADMIN — CHLORPROMAZINE HYDROCHLORIDE 50 MG: 25 TABLET, SUGAR COATED ORAL at 21:28

## 2020-01-25 RX ADMIN — DIVALPROEX SODIUM 1000 MG: 500 TABLET, EXTENDED RELEASE ORAL at 21:28

## 2020-01-25 RX ADMIN — ATORVASTATIN CALCIUM 40 MG: 40 TABLET, FILM COATED ORAL at 16:50

## 2020-01-25 RX ADMIN — TRAZODONE HYDROCHLORIDE 50 MG: 50 TABLET ORAL at 21:28

## 2020-01-25 RX ADMIN — INSULIN LISPRO 4 UNITS: 100 INJECTION, SOLUTION INTRAVENOUS; SUBCUTANEOUS at 21:28

## 2020-01-25 RX ADMIN — BENZTROPINE MESYLATE 1 MG: 1 TABLET ORAL at 17:00

## 2020-01-25 RX ADMIN — CHLORPROMAZINE HYDROCHLORIDE 50 MG: 25 TABLET, SUGAR COATED ORAL at 08:33

## 2020-01-25 RX ADMIN — METOPROLOL TARTRATE 50 MG: 50 TABLET, FILM COATED ORAL at 21:28

## 2020-01-25 RX ADMIN — BUSPIRONE HYDROCHLORIDE 10 MG: 10 TABLET ORAL at 08:34

## 2020-01-25 RX ADMIN — INSULIN LISPRO 5 UNITS: 100 INJECTION, SOLUTION INTRAVENOUS; SUBCUTANEOUS at 16:49

## 2020-01-25 RX ADMIN — BUSPIRONE HYDROCHLORIDE 10 MG: 10 TABLET ORAL at 17:00

## 2020-01-25 RX ADMIN — INSULIN GLARGINE 40 UNITS: 100 INJECTION, SOLUTION SUBCUTANEOUS at 08:54

## 2020-01-25 RX ADMIN — CHLORPROMAZINE HYDROCHLORIDE 50 MG: 25 TABLET, SUGAR COATED ORAL at 16:48

## 2020-01-25 RX ADMIN — CEFPODOXIME PROXETIL 400 MG: 200 TABLET, FILM COATED ORAL at 16:49

## 2020-01-25 RX ADMIN — METOPROLOL TARTRATE 50 MG: 50 TABLET, FILM COATED ORAL at 08:34

## 2020-01-25 RX ADMIN — BENZTROPINE MESYLATE 1 MG: 1 TABLET ORAL at 08:34

## 2020-01-25 RX ADMIN — GABAPENTIN 400 MG: 400 CAPSULE ORAL at 16:47

## 2020-01-25 RX ADMIN — ASPIRIN 81 MG: 81 TABLET ORAL at 08:33

## 2020-01-25 RX ADMIN — Medication 250 MG: at 08:34

## 2020-01-25 RX ADMIN — ACETAMINOPHEN 650 MG: 325 TABLET ORAL at 21:27

## 2020-01-25 RX ADMIN — GABAPENTIN 400 MG: 400 CAPSULE ORAL at 08:33

## 2020-01-25 NOTE — PROGRESS NOTES
01/25/20 1200   Team Meeting   Meeting Type Daily Rounds   Initial Conference Date 01/25/20   Team Members Present   Team Members Present Nurse; Other (Discipline and Name)   Nursing Team Member Jen Montanez   Other (Discipline and Name) TAYLOR Jules   Patient/Family Present   Patient Present No   Medical, labs and medications reviewed by team  Discharge to be determined by attending   Medication changes to be assessed

## 2020-01-25 NOTE — PROGRESS NOTES
Progress Note - Behavioral Health   Willard Aguirre 38 y o  male MRN: [id number]  Unit/Bed#: [facility] 315-09 Encounter: [id number]    The patient was seen for continuing care and reviewed with treatment team   Staff reports finding drop lots of blood on the floor and believes it may be from patient's toes  Medical will be notified if necessary  Patient was observed lying in bed  He is pleasant and cooperative upon approach, displays good eye contact throughout the encounter with a flat affect noted  Patient is brief in conversation and stated he was very tired  He is reporting his depression as 10/10 with moderate anxiety noted  Patient was asked what has his depression so high and patient stated being without medication"  Patient stated he wants to get better and believes he will now that he is on medications  He is reporting decreased/restless sleep, decreased energy level and decreased appetite  He is currently denying any suicidal/homicidal ideation or auditory/visual hallucinations and did not appear to be responding to internal stimuli at time of encounter  Patient is compliant with medications and reports no side effects at this time  No agitation noted  Patient labs reviewed       Mental Status Evaluation:  Appearance:  Adequate hygiene and grooming   Behavior:  cooperative, friendly and guarded   Mood:  anxious and depressed   Affect: Flat   Speech: Normal rate and Normal volume   Thought Process:  Goal directed and coherent   Thought Content:  Does not verbalize delusional material   Perceptual Disturbances: Denies hallucinations and does not appear to be responding to internal stimuli   Risk Potential: No suicidal or homicidal ideation   Attention/Concentration attention span and concentration were age appropriate   Orientation:   Alert and awake   Gait/Station: Not observed   Motor Activity: No abnormal movement noted     Progress Toward Goals:  No change    Assessment/Plan    Principal Problem: Bipolar 2 disorder (HCC)  Active Problems:    Tobacco use disorder    Diabetic ulcer of right midfoot (HCC)    Type 2 diabetes mellitus with diabetic polyneuropathy, with long-term current use of insulin ([facility])    Essential hypertension    Cellulitis of right lower extremity    Acute kidney injury (NAIF) with acute tubular necrosis (ATN) ([facility])    Medical clearance for psychiatric admission      Recommended Treatment:   1  Continue with pharmacotherapy, group therapy, milieu therapy and occupational therapy  2  Continue with safety checks per unit protocol    3  The patient will be maintained on the following medications:    Current Facility-Administered Medications:  acetaminophen 650 mg Oral Q6H PRN Rachelle Roy MD   acetaminophen 650 mg Oral Q4H PRN Rachelle Roy MD   acetaminophen 975 mg Oral Q6H PRN Rachelle Roy MD   aluminum-magnesium hydroxide-simethicone 30 mL Oral Q4H PRN Rachelle Roy MD   aspirin 81 mg Oral Daily Alena Braun MD   atorvastatin 40 mg Oral Daily With Dinner Alena Braun MD   benztropine 1 mg Intramuscular Q6H PRN Rachelle Roy MD   benztropine 1 mg Oral Q6H PRN Rachelle Roy MD   benztropine 1 mg Oral BID Alena Braun MD   busPIRone 10 mg Oral BID Alena Braun MD   cariprazine 1 5 mg Oral Daily Rachelle Roy MD   cefpodoxime 400 mg Oral BID With Meals TAYLOR Shah   chlorproMAZINE 50 mg Oral TID Rachelle Roy MD   divalproex sodium 1,000 mg Oral HS Alena Braun MD   enoxaparin 40 mg Subcutaneous Q24H [address] Nito SHERMAN PA-C   gabapentin 400 mg Oral TID Rachelle Roy MD   haloperidol 5 mg Oral Q8H PRN Rachelle Roy MD   haloperidol lactate 5 mg Intramuscular Q6H PRN Rachelle Roy MD   hydrOXYzine HCL 25 mg Oral Q6H PRN Rachelle Roy MD   insulin glargine 40 Units Subcutaneous QAM Alena Braun MD   insulin lispro 2-12 Units Subcutaneous HS Alena Braun MD   insulin lispro 2-12 Units Subcutaneous TID AC Alena Braun MD insulin lispro 9 Units Subcutaneous TID With Meals Markel Vazquez MD   lisinopril 2 5 mg Oral Daily TAYLOR Shah   LORazepam 1 mg Intramuscular Q6H PRN Shreya Williamson MD   LORazepam 0 5 mg Oral Q8H PRN Markel Vazquez MD   magnesium hydroxide 30 mL Oral Daily PRN Shreya Williamson MD   metoprolol tartrate 50 mg Oral Q12H [address] Markel Vazquez MD   nicotine polacrilex 2 mg Oral Q2H PRN Shreya Williamson MD   OLANZapine 10 mg Intramuscular Q3H PRN Shreya Williamson MD   OLANZapine 10 mg Oral Q3H PRN Shreya Williamson MD   risperiDONE 1 mg Oral Q3H PRN Shreya Williamson MD   saccharomyces boulardii 250 mg Oral BID Markel Vazquez MD   sodium bicarbonate 650 mg Oral Daily Markel Vazquez MD   traZODone 50 mg Oral HS PRN Shreya Williamson MD   traZODone 50 mg Oral HS Markel Vazquez MD       Risks, benefits and possible side effects of Medications:   Risks, benefits, and possible side effects of medications explained to patient and patient verbalizes understanding  Portions of the record may have been created with voice recognition software  Occasional wrong word or "sound a like" substitutions may have occurred due to the inherent limitations of voice recognition software  Read the chart carefully and recognize, using context, where substitutions have occurred

## 2020-01-25 NOTE — PLAN OF CARE
Problem: ANXIETY  Goal: Will report anxiety at manageable levels  Description  INTERVENTIONS:  - Administer medication as ordered  - Teach and encourage coping skills  - Provide emotional support  - Assess patient/family for anxiety and ability to cope  Outcome: Progressing     Problem: Ineffective Coping  Goal: Participates in unit activities  Description  Interventions:  - Provide therapeutic environment   - Provide required programming   - Redirect inappropriate behaviors   Outcome: Progressing

## 2020-01-25 NOTE — NURSING NOTE
Patient allowed RN to stick his finger and get Vital, and encouraged fluids before going to sleep  Patient not leaving his room for any reason and not eating either  Blood sugar before sleep was 100  Patient in bed at this time and appears to be sleeping  Eyes closed and chest movements noted  Offered no complaints  Patient did not wake up to request any PRN medications during the night

## 2020-01-25 NOTE — H&P
Psychiatric Evaluation - Behavioral Health     Identification Data:Mirza Aponte 38 y o  male MRN: [id number]  Unit/Bed#: [facility] 583-52 Encounter: [id number]    Chief Complaint: "I wanted to die", "I thought I was losing my mind", "I don't want to go on living like this" and suicidal ideation    History of Present Illness     Larry Hardin is a 38 y o  male with a history of Bipolar Disorder who was admitted to the inpatient psychiatric unit on a voluntary 201 commitment basis due to depression, anxiety, unstable mood and inability to care for self because of mental illness  Symptoms prior to admission included worsening depression, depression, feeling depressed, suicidal ideation, hopelessness, helplessness, poor concentration, poor appetite and difficulty sleeping  Onset of symptoms was gradual starting several weeks ago with progressively worsening course since that time  Stressors preceding admission included chronic pain, housing issues, limited support, social difficulties and chronic mental illness  On initial evaluation after admission to the inpatient psychiatric unit the patient Visibly depressed with practically flat affect, he endorsed suicidal thoughts and feelings of hopelessness and helplessness but he contracted for safety in the unit, he stated that he wants to feel better  The patient described that the last time he felt good was in November of last year, about 2 months ago after his discharge from [facility]   The patient stated that he could not afford co-pay of his medications that he patient believed he was helping him and his mental state slowly declined  He became more and more depressed, sad, loss of energy and hope, and suicidal thoughts came to his mind    The patient had history of at least 4 suicidal attempts by overdose in the past   His psychiatric condition complicated by severe medical conditions including a poorly controlled diabetes with peripheral vascular disorder and recent amputation of a right toe  This time, the patient was transferred from medical floor  The patient was initially admitted to psychiatric unit but was medically sick and transferred to medical floor for IV antibiotics  The writer evaluated the patient when he was on medical floor, patient has continued to express suicidal thoughts and was visibly sad depressed with flat affect  Decision was make to readmit the patient to psychiatric unit for continuation of his care        the patient had previous psychiatric hospitalization to [facility]  The the available record indicated that he had similar presentation in the past, but can't improved as a result of medication management in safe environment of psychiatric unit            Psychiatric Review Of Systems:    sleep changes: decreased  appetite changes: decreased  energy/anergy: decreased  anxiety/panic: yes  zoran: past mixed episodes  self injurious behavior/risky behavior: yes  Suicidal ideation: yes, no plan  Homicidal ideation: no  Auditory hallucinations: no  Visual hallucinations: no  Delusional thinking: no      Historical Information     Past Psychiatric History:     Past Inpatient Psychiatric Treatment:   Several past inpatient psychiatric admissions  Past Outpatient Psychiatric Treatment:    Was in outpatient psychiatric treatment in the past with a psychiatrist  Past Suicide Attempts:    yes, by overdose on medications  Past Psychiatric Medication Trials:    multiple psychiatric medication trials     Substance Abuse History:  Social History     Tobacco History     Smoking Status  Current Every Day Smoker Smoking Frequency  1 5 packs/day for 22 years (33 pk yrs) Smoking Tobacco Type  Cigarettes    Smokeless Tobacco Use  Current User Smokeless Tobacco Type  Chew          Alcohol History     Alcohol Use Status  Yes          Drug Use     Drug Use Status  Not Currently Types  Marijuana Comment  monthly          Sexual Activity     Sexually Active  Not Currently Partners  Female          Activities of Daily Living    Not Asked               Additional Substance Use Detail     Questions Responses    Substance Use Assessment Substance use within the past 12 months    Alcohol Use Frequency Experimented    Cannabis frequency 3 or more times/week    Comment: 3 or more times/week on 7/11/2018     Heroin Frequency Denies use in past 12 months    Cannabis method Smoke    Comment: Smoke on 7/11/2018     Cocaine frequency Never used    Comment: Never used on 7/11/2018     Crack Cocaine Frequency Denies use in past 12 months    Methamphetamine Frequency Denies use in past 12 months    Narcotic Frequency Denies use in past 12 months    Benzodiazepine Frequency Denies use in past 12 months    Amphetamine frequency Denies use in past 12 months    Barbituate Frequency Denies use use in past 12 months    Inhalant frequency Never used    Comment: Never used on 7/11/2018     Hallucinogen frequency Never used    Comment: Never used on 7/11/2018     Ecstasy frequency Never used    Comment: Never used on 7/11/2018     Other drug frequency Never used    Comment: Never used on 7/11/2018     Opiate frequency Denies use in past 12 months    Last reviewed by Chen Veras RN on 1/23/2020        I have assessed this patient for substance use within the past 12 months    History of Inpatient/Outpatient rehabilitation program: no  Smoking history: denies current use    Family Psychiatric History:     Psychiatric Illness:   Mother - depression    Social History:      Marital History: single  Occupational History: currently unemployed          Traumatic History:     Abuse: not willing to provide details    Past Medical History:    History of Seizures: no    Past Medical History:   Diagnosis Date    Acute kidney injury (NAIF) with acute tubular necrosis (ATN) ([facility] ) 1/19/2020    Anxiety     Bipolar 1 disorder ([facility] )     Chronic pain disorder     Depression     Diabetes mellitus ([facility] )  Hypertension     Psychiatric illness     PTSD (post-traumatic stress disorder)     Sleep difficulties     Substance abuse ([facility] )     Suicide attempt ([facility] )     Type 2 diabetes mellitus with diabetic polyneuropathy, with long-term current use of insulin ([facility] )      Past Surgical History:   Procedure Laterality Date    APPENDECTOMY      COLON SURGERY      TOE AMPUTATION      TONSILLECTOMY         Medical Review Of Systems:    EFO Review Of Systems: Constitutional: positive for fatigue  Respiratory: negative  Cardiovascular: negative  Gastrointestinal: negative  Musculoskeletal:negative  Neurological: positive for weakness  Endocrine: negative  Allergic/Immunologic: negative    Allergies: Allergies   Allergen Reactions    Penicillins Rash and Other (See Comments)     rash (Tolerating Ancef-per RN)  Last noted when patient was a child       Medications: All current active medications have been reviewed      Objective     Vital signs in last 24 hours:    Temp:  [97 °F (36 1 °C)-97 7 °F (36 5 °C)] 97 °F (36 1 °C)  HR:  [61-69] 61  Resp:  [16] 16  BP: (157-161)/(84-86) 161/84    No intake or output data in the 24 hours ending 01/24/20 2057     Mental Status Evaluation:      Appearance:  dressed in hospital attire   Behavior:  cooperative, calm   Mood:  depressed   Affect: flat    Speech:  slow   Language: appropriate   Thought Process:  concrete   Associations: concrete associations   Thought Content:  negative thinking   Perceptual Disturbances: no auditory hallucinations, no visual hallucinations, denies auditory hallucinations when asked, does not appear responding to internal stimuli   Risk Potential: Suicidal ideation - Yes, without plan  Homicidal ideation - None  Potential for aggression - No   Sensorium:  oriented to person, place and time   Memory:  recent and remote memory grossly intact   Consciousness:  alert and awake   Attention: attention span and concentration are normal   [facility] Knowledge: awareness of current events appropriate   Insight:  impaired   Judgment: impaired   Muscle Tone: normal   Gait/Station: normal gait/station and normal balance   Motor Activity: no abnormal movements               Laboratory Results:   I have personally reviewed all pertinent laboratory/tests results  Most Recent Labs:   Lab Results   Component Value Date    WBC 7 20 01/22/2020    RBC 4 61 01/22/2020    HGB 12 0 (L) 01/22/2020    HCT 36 3 (L) 01/22/2020     01/22/2020    RDW 13 9 01/22/2020    NEUTROABS 5 00 01/22/2020    SODIUM 133 (L) 01/21/2020    K 4 1 01/21/2020     01/21/2020    CO2 21 (L) 01/21/2020    BUN 15 01/21/2020    CREATININE 1 03 01/21/2020    GLUC 224 (H) 01/21/2020    GLUF 133 (H) 08/15/2019    CALCIUM 8 0 (L) 01/21/2020    AST 13 (L) 01/19/2020    ALT 19 01/19/2020    ALKPHOS 71 01/19/2020    TP 6 5 01/19/2020    ALB 3 2 01/19/2020    TBILI 0 40 01/19/2020    CHOLESTEROL 159 08/15/2019    HDL 38 (L) 08/15/2019    TRIG 105 08/15/2019    LDLCALC 100 08/15/2019    NONHDLC 121 08/15/2019    VALPROICTOT 38 7 (L) 01/17/2020    LITHIUM <0 2 (L) 02/26/2018    HXX4DTZSMOZK 0 916 01/19/2020    RPR Non-Reactive 07/10/2019    HGBA1C 8 4 (H) 07/10/2019     07/10/2019       Imaging Studies: Xr Foot 3+ Vw Right    Result Date: 1/19/2020  Narrative: RIGHT FOOT INDICATION:  Right foot pain  COMPARISON:  8/26/2019 VIEWS:  XR FOOT 3+ VW RIGHT FINDINGS: Stable appearance of transmetatarsal resection 1st through 5th digits  There is no acute fracture or dislocation  No significant degenerative changes  No lytic or blastic lesions seen  Some speckled soft tissue calcifications are again noted  Impression: Stable postoperative changes with no acute osseous abnormality  Workstation performed: [id number]     Mri Foot/forefoot Toes Right W Wo Contrast    Result Date: 1/20/2020  Narrative: MRI - RIGHT FOOT WITH AND WITHOUT CONTRAST INDICATION:   possible abscess and OM   COMPARISON: Radiograph from January 18, 2020 previous MRI from February 4 TECHNIQUE:  MR sequences were obtained of the right foot including the following:  Precontrast sequences: localizers, sagittal STIR, sagittal T1, coronal T1, coronal T2 fat sat/STIR, axial T2 fat sat/STIR, axial PD, coronal T1 fat sat  Postcontrast sequences: Coronal T1 fat sat, sagittal T1 fat sat  IV Contrast:  12 mL of gadobutrol injection (MULTI-DOSE) FINDINGS: SUBCUTANEOUS TISSUES: Ulceration is noted at the plantar and medial aspect of the forefoot BONES:  The 1st metatarsal stump, medial cuneiform demonstrates normal signal intensity  The navicular bone, talus and calcaneus demonstrates normal signal intensity The 2nd metatarsal stump, demonstrates normal signal intensity  The 3rd metatarsal, 4th and 5th metatarsal appear unremarkable Mild edema seen in the plantar and medial aspect of the medial cuneiform FIRST MTP JOINT:  Amputation of the 1st toe noted SESAMOID BONES:  Absent OTHER ARTICULAR SURFACES:  Normal  PLANTAR FASCIA:  Intact  FOREFOOT TENDONS:  Intact  INTERMETATARSAL REGIONS:  No bursitis or Marion's neuroma  MUSCULATURE: Edema and increased signal intensity noted in the soft tissue at the plantar aspect of the forefoot underlying the area of the stump     Impression: There is no fluid collection seen Edema and increased signal intensity noted in the plantar musculature at the level of the 1st metatarsal stump underlying the area of ulceration A small focus of edema seen in the plantar and medial aspect of the medial cuneiform in the subchondral region, not seen on the previous study probably on degenerative basis or reactive  less likely due to osteomyelitis This measures 5 6 mm and seen in image 28 series 8 The study was marked in EPIC for significant notification   Workstation performed: [id number]     Vas Lower Limb Venous Duplex Study, Unilateral/limited    Result Date: 1/20/2020  Narrative:  [facility] REPORT CLINICAL: Indications: Patient presents with right lower extremity pain and swelling x 1 week  Operative History: Transmetatarsal amputation bilateral Risk Factors The patient has history of HTN, Diabetes (NIDDM) and smoking (current) 1 5 ppd  The patient current BMI is 34 49, Weight is 276 lb and height is 75 in  FINDINGS:  Segment       Right            Left                        Impression       Impression       CFV           Normal (Patent)  Normal (Patent)  GSV Inguinal  Normal (Patent)                      CONCLUSION:  Impression: RIGHT LOWER LIMB No overt evidence of acute or chronic deep vein thrombosis   Sub-optimal views of the calf veins secondary to  swelling  No evidence of superficial thrombophlebitis noted  Doppler evaluation shows a normal response to augmentation maneuvers  Popliteal, posterior tibial and anterior tibial arterial Doppler waveforms are triphasic  LEFT LOWER LIMB LIMITED Evaluation shows no evidence of thrombus in the common femoral vein  Doppler evaluation shows a normal response to augmentation maneuvers  Tech Note: There are multiple echogenic structures located in the right inguinal region  The largest structure measures approximately 3 8 cm and is consistent with enlarged lymph node and channels  In comparison to the study of 02/04/13 , there is no significant change  Technical findings faxed to chart  SIGNATURE: Electronically Signed Bessy Reinoso on 2020-01-20 04:51:36 PM    Ct Abdomen Pelvis With Contrast    Result Date: 1/17/2020  Narrative: CT ABDOMEN AND PELVIS WITH IV CONTRAST INDICATION:   Abdominal pain, fever  COMPARISON:  None  TECHNIQUE:  CT examination of the abdomen and pelvis was performed  Axial, sagittal, and coronal 2D reformatted images were created from the source data and submitted for interpretation  Radiation dose length product (DLP) for this visit:  [id number] mGy-cm     This examination, like all CT scans performed in the [facility], was performed utilizing techniques to minimize radiation dose exposure, including the use of iterative reconstruction and automated exposure control  IV Contrast:  100 mL of iohexol (OMNIPAQUE) Enteric Contrast:  Enteric contrast was not administered  FINDINGS: ABDOMEN LOWER CHEST:  No clinically significant abnormality identified in the visualized lower chest  LIVER/BILIARY TREE:  Unremarkable  GALLBLADDER:  There are gallstone(s) within the gallbladder, without pericholecystic inflammatory changes  SPLEEN:  The spleen is enlarged, measuring  16 6 cm PANCREAS:  Unremarkable  ADRENAL GLANDS: There is a 1 5 cm left adrenal nodule  In patients with no cancer history and new/enlarging adrenal nodule, recommend adrenal mass protocol CT to characterize, and biochemical evaluation and depending on rate of growth, surgical resection  (without biopsy) to treat possible adrenal cortical carcinoma may be warranted  Adrenal recommendation based on institutional consensus and Journal of [employer] of Radiology 2017;14:0905-2477 KIDNEYS/URETERS:  Unremarkable  No hydronephrosis  STOMACH AND BOWEL:  There is colonic diverticulosis without evidence of acute diverticulitis  Post surgical changes at the sigmoid colon    APPENDIX:  No findings to suggest appendicitis  ABDOMINOPELVIC CAVITY:  No ascites or free intraperitoneal air  No lymphadenopathy  VESSELS:  Unremarkable for patient's age  PELVIS REPRODUCTIVE ORGANS:  Unremarkable for patient's age  URINARY BLADDER:  Unremarkable  ABDOMINAL WALL/INGUINAL REGIONS:  Large broad-based midline ventral abdominal hernia containing numerous loops of nonobstructed bowel  Additional left paracentral ventral hernia containing several loops of nonobstructed bowel  OSSEOUS STRUCTURES:  No acute fracture or destructive osseous lesion  Impression: No evidence of acute intra-abdominal or pelvic pathology   Several anterior abdominal wall hernias containing loops of nonobstructed small and large bowel  1 5 cm left adrenal nodule  In patients with no cancer history and new/enlarging adrenal nodule, recommend adrenal mass protocol CT to characterize, and biochemical evaluation and depending on rate of growth, surgical resection (without biopsy) to treat possible adrenal cortical carcinoma may be warranted  The study was marked in EPIC for significant notification  Workstation performed: [id number]     Ct Lower Extremity Wo Contrast Right    Result Date: 1/19/2020  Narrative: CT right tibia and fibula without IV contrast INDICATION: possible necrotizing fasciitis  COMPARISON: Right foot radiograph 1/18/2020 TECHNIQUE: CT examination of the right tibia and fibula was performed  This examination, like all CT scans performed in the [facility], was performed utilizing techniques to minimize radiation dose exposure, including the use of iterative reconstruction and automated exposure control software  Sagittal and coronal two dimensional reconstructed images were also submitted for interpretation  Rad dose  1049 mGy-cm FINDINGS: OSSEOUS STRUCTURES:  No fracture, dislocation or destructive osseous lesion  Status post transmetatarsal amputation of the 1st through 5th digits  There are degenerative changes of the knee, ankle and foot  VISUALIZED MUSCULATURE:  Incidental note of fatty atrophy of the medial and lateral head gastrocnemius musculature  SOFT TISSUES:  There is mild subcutaneous soft tissue edema and stranding  There is no subcutaneous or deep soft tissue emphysema or deep fascial thickening  OTHER PERTINENT FINDINGS:  Incidental note of a skin wound along the medial foot at the distal aspect of the exam only partially visualized  Impression: 1  No acute osseous abnormality  Degenerative changes  2   Mild subcutaneous edema throughout the lower leg compatible with cellulitis  No radiographic findings to suggest necrotizing fasciitis    Clinical correlation is recommended as this is a clinical diagnosis  3   Incidentally noted is fatty atrophy of the posterior musculature of the lower leg to include the medial and lateral head gastrocnemius muscle  This may represent sequela of diabetic neuropathy  Correlation is recommended  4   Skin wound of the medial foot distal to the amputation site only partially visualized on this exam  Workstation performed: [id number]       Code Status: Level 1 - Full Code    Assessment/Plan   Principal Problem:    Bipolar 2 disorder ([facility] )  Active Problems:    Tobacco use disorder    Diabetic ulcer of right midfoot ([facility] )    Type 2 diabetes mellitus with diabetic polyneuropathy, with long-term current use of insulin ([facility] )    Essential hypertension    Cellulitis of right lower extremity    Acute kidney injury (NAIF) with acute tubular necrosis (ATN) ([place] [id number] )    Medical clearance for psychiatric admission      Treatment Plan:     Planned Treatment and Medication Changes: All current active medications have been reviewed  Encourage group therapy, milieu therapy and occupational therapy  Behavioral Health checks every 7 minutes  Restart   Patient's medications  We reviewed the patient most recent medications his stated that he continued to take Thorazine 50 mg 3 times a day, Depakote was recently decreased to 1000 mg at bedtime  The patient stated he continue to take Neurontin 600 mg 3 times a day but most recently the patient did not take any Neurontin at the writer restarted Neurontin 400 mg 3 times a day  Continue trazodone 50 mg at bedtime  The patient stated he no longer takes Zoloft  Medication to treat his medical condition were restarted and selected by Medicine team, while the patient was on medical floor    The we also discussed that the patient bipolar depression may be benefit from adding new FDA approved medication for Yudelka Pratik, we discussed that the  will help to review the cost of this medication keeping in mind that the patient has insurance  The patient agreed with that treatment plan  Cariprazine was selected because this medication is not associated with significant weight gain and tiredness that the patient complained while he was taking other FDA approved medications to treat his bipolar depression  Cariprazine also may lead to less burden of symptoms of metabolic syndrome keeping in mind that the patient poorly controlled diabetes and FDA approved medications to treat bipolar depression that may lead to worsening of metabolic syndrome are not indicated      Current Facility-Administered Medications:  acetaminophen 650 mg Oral Q6H PRN Zelda Bauer, MD   acetaminophen 650 mg Oral Q4H PRN Zelda Bauer, MD   acetaminophen 975 mg Oral Q6H PRN Zelda Bauer, MD   aluminum-magnesium hydroxide-simethicone 30 mL Oral Q4H PRN Zelda Bauer, MD   aspirin 81 mg Oral Daily Yessenia Ocampo MD   atorvastatin 40 mg Oral Daily With Dinner Yessenia Ocampo MD   benztropine 1 mg Intramuscular Q6H PRN Zelda Bauer MD   benztropine 1 mg Oral Q6H PRN Zelda Bauer, MD   benztropine 1 mg Oral BID Yessenia Ocampo MD   busPIRone 10 mg Oral BID Yessenia Ocampo MD   [START ON 1/25/2020] cariprazine 1 5 mg Oral Daily Zelda Bauer MD   cefpodoxime 400 mg Oral BID With Meals TAYLOR Shah   chlorproMAZINE 50 mg Oral TID Zelda Bauer MD   divalproex sodium 1,000 mg Oral HS Yessenia Ocampo MD   enoxaparin 40 mg Subcutaneous Q24H [address] Nito SHERMAN PA-C   gabapentin 400 mg Oral TID Zelda Bauer MD   haloperidol 5 mg Oral Q8H PRN Zelda Bauer MD   haloperidol lactate 5 mg Intramuscular Q6H PRN Zelda Bauer MD   hydrOXYzine HCL 25 mg Oral Q6H PRN Zelda Bauer MD   insulin glargine 40 Units Subcutaneous QAM Yessenia Ocampo MD   insulin lispro 2-12 Units Subcutaneous HS Yessenia Ocampo MD   insulin lispro 2-12 Units Subcutaneous TID AC Yessenia Ocampo MD   insulin lispro 9 Units Subcutaneous TID With Meals Jose Terry MD   lisinopril 2 5 mg Oral Daily Bere DANNA TAYLOR Roberts   LORazepam 1 mg Intramuscular Q6H PRN Nolene Osgood, MD   LORazepam 0 5 mg Oral Q8H PRN Jose Terry MD   magnesium hydroxide 30 mL Oral Daily PRN Nolene Osgood, MD   metoprolol tartrate 50 mg Oral Q12H [address] Jose Terry MD   nicotine polacrilex 2 mg Oral Q2H PRN Nolene Osgood, MD   OLANZapine 10 mg Intramuscular Q3H PRN Nolene Osgood, MD   OLANZapine 10 mg Oral Q3H PRN Nolene Osgood, MD   risperiDONE 1 mg Oral Q3H PRN Nolene Osgood, MD   saccharomyces boulardii 250 mg Oral BID Jose Terry MD   sodium bicarbonate 650 mg Oral Daily Jose Terry MD   traZODone 50 mg Oral HS PRN Nolene Osgood, MD   traZODone 50 mg Oral HS Jose Terry MD       Risks / Benefits of Treatment:    Risks, benefits, and possible side effects of medications explained to patient and patient verbalizes understanding and agreement for treatment  Counseling / Coordination of Care:    Patient's presentation on admission and proposed treatment plan discussed with treatment team   Diagnosis, medication changes and treatment plan reviewed with patient  Inpatient Psychiatric Certification:    Estimated length of stay: 7 midnights    Based upon physical, mental and social evaluations, I certify that inpatient psychiatric services are medically necessary for this patient for a duration of 7 midnights for the treatment of Bipolar 2 disorder [facility])    Available alternative community resources do not meet the patient's mental health care needs  I further attest that an established written individualized plan of care has been implemented and is outlined in the patient's medical records  ** Please Note: This note has been constructed using a voice recognition system   **

## 2020-01-25 NOTE — PROGRESS NOTES
Patient's right foot draining sero sanguinous drainage  Foot was assessed  Dressing was off, wound was open  Old dressing removed, new dressing applied, 1 - non stick, 2 4x4, and 1 cipriano wrap  New sock applied over dressing  Will continue to monitor for changes in current status

## 2020-01-25 NOTE — PROGRESS NOTES
Patient is isolative to room  Med compliant  Patient did not eat breakfast  When asked why, he stated he doesn't eat much  Patient rated depression a 9-10, anxiety 4-4  He denied SI, HI, audio or visual hallucinations  Patient has a quarter size amount of dried blood on his sock  Will await for medical  Order states no change of dressing for 3 days starting the 24th  Patient does not have concerns, he was encouraged to eat  Will continue to monitor for changes in current status

## 2020-01-26 LAB
GLUCOSE SERPL-MCNC: 116 MG/DL (ref 65–140)
GLUCOSE SERPL-MCNC: 123 MG/DL (ref 65–140)
GLUCOSE SERPL-MCNC: 142 MG/DL (ref 65–140)
GLUCOSE SERPL-MCNC: 207 MG/DL (ref 65–140)
GLUCOSE SERPL-MCNC: 238 MG/DL (ref 65–140)
GLUCOSE SERPL-MCNC: 238 MG/DL (ref 65–140)
PLATELET # BLD AUTO: 376 THOUSANDS/UL (ref 150–450)

## 2020-01-26 PROCEDURE — 99232 SBSQ HOSP IP/OBS MODERATE 35: CPT | Performed by: NURSE PRACTITIONER

## 2020-01-26 PROCEDURE — 85049 AUTOMATED PLATELET COUNT: CPT | Performed by: PHYSICIAN ASSISTANT

## 2020-01-26 PROCEDURE — 82948 REAGENT STRIP/BLOOD GLUCOSE: CPT

## 2020-01-26 RX ADMIN — Medication 250 MG: at 18:18

## 2020-01-26 RX ADMIN — INSULIN LISPRO 5 UNITS: 100 INJECTION, SOLUTION INTRAVENOUS; SUBCUTANEOUS at 18:18

## 2020-01-26 RX ADMIN — INSULIN LISPRO 5 UNITS: 100 INJECTION, SOLUTION INTRAVENOUS; SUBCUTANEOUS at 09:00

## 2020-01-26 RX ADMIN — GABAPENTIN 400 MG: 400 CAPSULE ORAL at 16:07

## 2020-01-26 RX ADMIN — ACETAMINOPHEN 975 MG: 325 TABLET ORAL at 09:39

## 2020-01-26 RX ADMIN — TRAZODONE HYDROCHLORIDE 50 MG: 50 TABLET ORAL at 21:30

## 2020-01-26 RX ADMIN — SODIUM BICARBONATE 650 MG: 650 TABLET ORAL at 09:40

## 2020-01-26 RX ADMIN — LORAZEPAM 0.5 MG: 0.5 TABLET ORAL at 16:04

## 2020-01-26 RX ADMIN — GABAPENTIN 400 MG: 400 CAPSULE ORAL at 21:31

## 2020-01-26 RX ADMIN — ATORVASTATIN CALCIUM 40 MG: 40 TABLET, FILM COATED ORAL at 16:06

## 2020-01-26 RX ADMIN — CHLORPROMAZINE HYDROCHLORIDE 50 MG: 25 TABLET, SUGAR COATED ORAL at 09:40

## 2020-01-26 RX ADMIN — INSULIN LISPRO 4 UNITS: 100 INJECTION, SOLUTION INTRAVENOUS; SUBCUTANEOUS at 13:14

## 2020-01-26 RX ADMIN — BUSPIRONE HYDROCHLORIDE 10 MG: 10 TABLET ORAL at 09:41

## 2020-01-26 RX ADMIN — INSULIN GLARGINE 35 UNITS: 100 INJECTION, SOLUTION SUBCUTANEOUS at 09:00

## 2020-01-26 RX ADMIN — LISINOPRIL 2.5 MG: 5 TABLET ORAL at 09:40

## 2020-01-26 RX ADMIN — Medication 250 MG: at 09:40

## 2020-01-26 RX ADMIN — BENZTROPINE MESYLATE 1 MG: 1 TABLET ORAL at 09:41

## 2020-01-26 RX ADMIN — ASPIRIN 81 MG: 81 TABLET ORAL at 09:40

## 2020-01-26 RX ADMIN — BUSPIRONE HYDROCHLORIDE 10 MG: 10 TABLET ORAL at 18:18

## 2020-01-26 RX ADMIN — INSULIN LISPRO 5 UNITS: 100 INJECTION, SOLUTION INTRAVENOUS; SUBCUTANEOUS at 13:14

## 2020-01-26 RX ADMIN — CARIPRAZINE 1.5 MG: 1.5 CAPSULE, GELATIN COATED ORAL at 09:40

## 2020-01-26 RX ADMIN — INSULIN LISPRO 2 UNITS: 100 INJECTION, SOLUTION INTRAVENOUS; SUBCUTANEOUS at 21:35

## 2020-01-26 RX ADMIN — DIVALPROEX SODIUM 1000 MG: 500 TABLET, EXTENDED RELEASE ORAL at 21:31

## 2020-01-26 RX ADMIN — CEFPODOXIME PROXETIL 400 MG: 200 TABLET, FILM COATED ORAL at 09:41

## 2020-01-26 RX ADMIN — CEFPODOXIME PROXETIL 400 MG: 200 TABLET, FILM COATED ORAL at 16:06

## 2020-01-26 RX ADMIN — CHLORPROMAZINE HYDROCHLORIDE 50 MG: 25 TABLET, SUGAR COATED ORAL at 21:30

## 2020-01-26 RX ADMIN — CHLORPROMAZINE HYDROCHLORIDE 50 MG: 25 TABLET, SUGAR COATED ORAL at 16:07

## 2020-01-26 RX ADMIN — METOPROLOL TARTRATE 50 MG: 50 TABLET, FILM COATED ORAL at 09:41

## 2020-01-26 RX ADMIN — ACETAMINOPHEN 975 MG: 325 TABLET ORAL at 18:45

## 2020-01-26 RX ADMIN — GABAPENTIN 400 MG: 400 CAPSULE ORAL at 09:41

## 2020-01-26 NOTE — PROGRESS NOTES
Patient with quarter size open wound on right foot  Today wound was not draining  He has been wearing his shoe  Patient was assessed by podiatry  Patient's wound was cleaned with NSS, dried with a 2x2 gauze, Santyl applied, 2 (2x2) gauze placed over wound, non stick dressing applied, cipriano wrapped on patient's foot and surgical shoe placed on patient's foot  Patient tolerated procedure  Will continue to monitor for changes in current status

## 2020-01-26 NOTE — PROGRESS NOTES
01/26/20 1000   Team Meeting   Meeting Type Daily Rounds   Initial Conference Date 01/26/20   Team Members Present   Team Members Present Nurse; Other (Discipline and Name)   Nursing Team Member Lindy rojas   Other (Discipline and Name) TAYLOR Trejo   Patient/Family Present   Patient Present No   Patient's Family Present No   Medical, labs and medications reviewed by team  Discharge to be determined by attending  Medication changes to be assessed

## 2020-01-26 NOTE — PROGRESS NOTES
Patient frustrated he states no one is listening to him regarding his medications and that the medications he is currently on is not the medication regimen he was on   He wants to be on the regimen he was on before arriving to the hospital

## 2020-01-26 NOTE — NURSING NOTE
Patient isolative to room throughout the shift  Upon assessment, easy to wake  HS medication compliant  C/O leg pain- tylenol given  Denied any unmet needs  Thinking clear and organized  Continues to have depression  No SI  Will monitor

## 2020-01-26 NOTE — PROGRESS NOTES
Patient is isolative to room  Med and meal compliant  Patient isolative to room  His affect was tearful  He was pleasant upon approach denied being tearful  He stated his depression was a 9-10, denied anxiety although he was noted to be anxious  He denied SI, HI, audio or visual hallucinations  Will continue to monitor for changes in current status

## 2020-01-26 NOTE — PROGRESS NOTES
Progress Note - Behavioral Health   Liliane Nolasco 38 y o  male MRN: [id number]  Unit/Bed#: [facility] 024-34 Encounter: [id number]    The patient was seen for continuing care and reviewed with treatment team   Staff reports no significant change in the past 24 hours  Podiatry was in this morning to evaluate/assess patient's right lower extremity  Patient was observed lying in bed  He is pleasant and cooperative upon approach, continues to display good eye contact throughout the encounter with a flat affect noted  Patient continues to be brief in conversation  Patient had mild improvement in depression as he rates it as a 9/10 today with moderate anxiety noted  Patient stated he is feeling a little better today and is hopeful he will continue to get better  Patient continues to report decreased sleep, decreased energy level and decreased appetite  Patient did report that he ate breakfast this morning  He is currently denying any suicidal/homicidal ideation or auditory/visual hallucinations and did not appear to be responding to internal stimuli at time of encounter  He is compliant with medications and reports no side effects at this time  Patient did state he had concerns with his medications at [facility]  He stated they have Invega at listed as one of his medications and he has not taken that medication in months  Patient does not have that medication ordered at this time  Explained patient can discuss this with his pharmacy upon discharge      Mental Status Evaluation:  Appearance:  Adequate hygiene and grooming   Behavior:  calm, cooperative and friendly   Mood:  anxious and depressed   Affect: Flat   Speech: Normal rate and Normal volume   Thought Process:  Goal directed and coherent   Thought Content:  Does not verbalize delusional material   Perceptual Disturbances: Denies hallucinations and does not appear to be responding to internal stimuli   Risk Potential: No suicidal or homicidal ideation Attention/Concentration attention span and concentration were age appropriate   Orientation:   Alert and awake   Gait/Station: Not observed   Motor Activity: No abnormal movement noted     Progress Toward Goals:  Slight improvement    Assessment/Plan    Principal Problem:    Bipolar 2 disorder ([facility])  Active Problems:    Tobacco use disorder    Diabetic ulcer of right midfoot ([facility])    Type 2 diabetes mellitus with diabetic polyneuropathy, with long-term current use of insulin ([facility])    Essential hypertension    Cellulitis of right lower extremity    Acute kidney injury (NAIF) with acute tubular necrosis (ATN) ([facility])    Medical clearance for psychiatric admission      Recommended Treatment:   1  Continue with pharmacotherapy, group therapy, milieu therapy and occupational therapy  2  Continue with safety checks per unit protocol    3  The patient will be maintained on the following medications:    Current Facility-Administered Medications:  acetaminophen 650 mg Oral Q6H PRN Hollie Leo MD   acetaminophen 650 mg Oral Q4H PRN Hollie Leo MD   acetaminophen 975 mg Oral Q6H PRN Hollie Leo MD   aluminum-magnesium hydroxide-simethicone 30 mL Oral Q4H PRN Hollie Leo MD   aspirin 81 mg Oral Daily Lulú Gu MD   atorvastatin 40 mg Oral Daily With Dinner Lulú Gu MD   benztropine 1 mg Intramuscular Q6H PRN Hollie Leo MD   benztropine 1 mg Oral Q6H PRN Hollie Leo MD   benztropine 1 mg Oral BID Lulú Gu MD   busPIRone 10 mg Oral BID Lulú Gu MD   cariprazine 1 5 mg Oral Daily Hollie Leo MD   cefpodoxime 400 mg Oral BID With Meals TAYLOR Shah   chlorproMAZINE 50 mg Oral TID Hollie Leo MD   collagenase  Topical Daily Francisco Girard DPM   divalproex sodium 1,000 mg Oral HS Lulú Gu MD   gabapentin 400 mg Oral TID Hollie Leo MD   haloperidol 5 mg Oral Q8H PRN Hollie Leo MD   haloperidol lactate 5 mg Intramuscular Q6H PRN Dari Perez MD Maribel   hydrOXYzine HCL 25 mg Oral Q6H PRN Patricio Craig MD   insulin glargine 35 Units Subcutaneous Mayank Liriano MD   insulin lispro 2-12 Units Subcutaneous HS Fabiola Rodriguez MD   insulin lispro 2-12 Units Subcutaneous TID AC Fabiola Rodriguez MD   insulin lispro 5 Units Subcutaneous TID With Meals Narcisa Szymanski MD   lisinopril 2 5 mg Oral Daily Bere TAYLOR Antony   LORazepam 1 mg Intramuscular Q6H PRN Patricio Craig MD   LORazepam 0 5 mg Oral Q8H PRN Fabiola Rodriguez MD   magnesium hydroxide 30 mL Oral Daily PRN Patricio Craig MD   metoprolol tartrate 50 mg Oral Q12H [address] Fabiola Rodriguez MD   nicotine polacrilex 2 mg Oral Q2H PRN Patricio Craig MD   OLANZapine 10 mg Intramuscular Q3H PRN Patricio Craig MD   OLANZapine 10 mg Oral Q3H PRN Patricio Craig MD   risperiDONE 1 mg Oral Q3H PRN Patricio Craig MD   saccharomyces boulardii 250 mg Oral BID Fabiola Rodriguez MD   sodium bicarbonate 650 mg Oral Daily Fabiola Rodriguez MD   traZODone 50 mg Oral HS PRN Patricio Craig MD   traZODone 50 mg Oral HS Fabiola Rodriguez MD       Risks, benefits and possible side effects of Medications:   Risks, benefits, and possible side effects of medications explained to patient and patient verbalizes understanding  Portions of the record may have been created with voice recognition software  Occasional wrong word or "sound a like" substitutions may have occurred due to the inherent limitations of voice recognition software  Read the chart carefully and recognize, using context, where substitutions have occurred
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
## 2020-01-27 ENCOUNTER — EPISODE CHANGES (OUTPATIENT)
Dept: CASE MANAGEMENT | Facility: HOSPITAL | Age: 45
End: 2020-01-27

## 2020-01-27 LAB
ALBUMIN SERPL BCP-MCNC: 3.5 G/DL (ref 3–5.2)
ALP SERPL-CCNC: 76 U/L (ref 43–122)
ALT SERPL W P-5'-P-CCNC: 96 U/L (ref 9–52)
ANION GAP SERPL CALCULATED.3IONS-SCNC: 10 MMOL/L (ref 5–14)
AST SERPL W P-5'-P-CCNC: 80 U/L (ref 17–59)
BILIRUB SERPL-MCNC: 0.3 MG/DL
BUN SERPL-MCNC: 23 MG/DL (ref 5–25)
CALCIUM SERPL-MCNC: 8.7 MG/DL (ref 8.4–10.2)
CHLORIDE SERPL-SCNC: 100 MMOL/L (ref 97–108)
CO2 SERPL-SCNC: 24 MMOL/L (ref 22–30)
CREAT SERPL-MCNC: 1.26 MG/DL (ref 0.7–1.5)
EOSINOPHIL # BLD AUTO: 0.09 THOUSAND/UL (ref 0–0.4)
EOSINOPHIL NFR BLD MANUAL: 1 % (ref 0–6)
ERYTHROCYTE [DISTWIDTH] IN BLOOD BY AUTOMATED COUNT: 14 %
GFR SERPL CREATININE-BSD FRML MDRD: 69 ML/MIN/1.73SQ M
GLUCOSE SERPL-MCNC: 220 MG/DL (ref 65–140)
GLUCOSE SERPL-MCNC: 224 MG/DL (ref 65–140)
GLUCOSE SERPL-MCNC: 236 MG/DL (ref 65–140)
GLUCOSE SERPL-MCNC: 254 MG/DL (ref 70–99)
GLUCOSE SERPL-MCNC: 258 MG/DL (ref 65–140)
HCT VFR BLD AUTO: 40.8 % (ref 41–53)
HGB BLD-MCNC: 13.7 G/DL (ref 13.5–17.5)
LYMPHOCYTES # BLD AUTO: 2.87 THOUSAND/UL (ref 0.5–4)
LYMPHOCYTES # BLD AUTO: 33 % (ref 25–45)
MCH RBC QN AUTO: 27.1 PG (ref 26–34)
MCHC RBC AUTO-ENTMCNC: 33.6 G/DL (ref 31–36)
MCV RBC AUTO: 81 FL (ref 80–100)
METAMYELOCYTES NFR BLD MANUAL: 1 % (ref 0–1)
MONOCYTES # BLD AUTO: 0.61 THOUSAND/UL (ref 0.2–0.9)
MONOCYTES NFR BLD AUTO: 7 % (ref 1–10)
MYELOCYTES NFR BLD MANUAL: 2 % (ref 0–1)
NEUTS BAND NFR BLD MANUAL: 2 % (ref 0–8)
NEUTS SEG # BLD: 4.87 THOUSAND/UL (ref 1.8–7.8)
NEUTS SEG NFR BLD AUTO: 54 %
PLATELET # BLD AUTO: 402 THOUSANDS/UL (ref 150–450)
PLATELET BLD QL SMEAR: ABNORMAL
PMV BLD AUTO: 7.8 FL (ref 8.9–12.7)
POTASSIUM SERPL-SCNC: 4.5 MMOL/L (ref 3.6–5)
PROT SERPL-MCNC: 7.1 G/DL (ref 5.9–8.4)
RBC # BLD AUTO: 5.05 MILLION/UL (ref 4.5–5.9)
RBC MORPH BLD: NORMAL
SODIUM SERPL-SCNC: 134 MMOL/L (ref 137–147)
TOTAL CELLS COUNTED SPEC: 100
VALPROATE SERPL-MCNC: 46.6 UG/ML (ref 50–120)
WBC # BLD AUTO: 8.7 THOUSAND/UL (ref 4.5–11)

## 2020-01-27 PROCEDURE — 99232 SBSQ HOSP IP/OBS MODERATE 35: CPT | Performed by: INTERNAL MEDICINE

## 2020-01-27 PROCEDURE — 85027 COMPLETE CBC AUTOMATED: CPT | Performed by: NURSE PRACTITIONER

## 2020-01-27 PROCEDURE — 82948 REAGENT STRIP/BLOOD GLUCOSE: CPT

## 2020-01-27 PROCEDURE — 99233 SBSQ HOSP IP/OBS HIGH 50: CPT | Performed by: NURSE PRACTITIONER

## 2020-01-27 PROCEDURE — 80164 ASSAY DIPROPYLACETIC ACD TOT: CPT | Performed by: PSYCHIATRY & NEUROLOGY

## 2020-01-27 PROCEDURE — 85007 BL SMEAR W/DIFF WBC COUNT: CPT | Performed by: NURSE PRACTITIONER

## 2020-01-27 PROCEDURE — 80053 COMPREHEN METABOLIC PANEL: CPT | Performed by: PSYCHIATRY & NEUROLOGY

## 2020-01-27 RX ORDER — GABAPENTIN 300 MG/1
600 CAPSULE ORAL 3 TIMES DAILY
Status: DISCONTINUED | OUTPATIENT
Start: 2020-01-27 | End: 2020-01-30

## 2020-01-27 RX ORDER — DIVALPROEX SODIUM 500 MG/1
500 TABLET, EXTENDED RELEASE ORAL
Status: COMPLETED | OUTPATIENT
Start: 2020-01-27 | End: 2020-01-27

## 2020-01-27 RX ORDER — DIVALPROEX SODIUM 500 MG/1
500 TABLET, EXTENDED RELEASE ORAL
Status: DISCONTINUED | OUTPATIENT
Start: 2020-01-27 | End: 2020-01-27

## 2020-01-27 RX ADMIN — Medication 250 MG: at 17:23

## 2020-01-27 RX ADMIN — ATORVASTATIN CALCIUM 40 MG: 40 TABLET, FILM COATED ORAL at 20:39

## 2020-01-27 RX ADMIN — DIVALPROEX SODIUM 500 MG: 500 TABLET, EXTENDED RELEASE ORAL at 21:27

## 2020-01-27 RX ADMIN — INSULIN LISPRO 4 UNITS: 100 INJECTION, SOLUTION INTRAVENOUS; SUBCUTANEOUS at 17:20

## 2020-01-27 RX ADMIN — CEFPODOXIME PROXETIL 400 MG: 200 TABLET, FILM COATED ORAL at 08:46

## 2020-01-27 RX ADMIN — CHLORPROMAZINE HYDROCHLORIDE 50 MG: 25 TABLET, SUGAR COATED ORAL at 08:39

## 2020-01-27 RX ADMIN — METOPROLOL TARTRATE 50 MG: 50 TABLET, FILM COATED ORAL at 21:27

## 2020-01-27 RX ADMIN — INSULIN LISPRO 6 UNITS: 100 INJECTION, SOLUTION INTRAVENOUS; SUBCUTANEOUS at 21:24

## 2020-01-27 RX ADMIN — BENZTROPINE MESYLATE 1 MG: 1 TABLET ORAL at 08:39

## 2020-01-27 RX ADMIN — GABAPENTIN 400 MG: 400 CAPSULE ORAL at 08:39

## 2020-01-27 RX ADMIN — ATORVASTATIN CALCIUM 40 MG: 40 TABLET, FILM COATED ORAL at 17:29

## 2020-01-27 RX ADMIN — LORAZEPAM 0.5 MG: 0.5 TABLET ORAL at 02:50

## 2020-01-27 RX ADMIN — CHLORPROMAZINE HYDROCHLORIDE 50 MG: 25 TABLET, SUGAR COATED ORAL at 17:25

## 2020-01-27 RX ADMIN — CEFPODOXIME PROXETIL 400 MG: 200 TABLET, FILM COATED ORAL at 17:26

## 2020-01-27 RX ADMIN — BUSPIRONE HYDROCHLORIDE 10 MG: 10 TABLET ORAL at 08:39

## 2020-01-27 RX ADMIN — ATORVASTATIN CALCIUM 40 MG: 40 TABLET, FILM COATED ORAL at 20:36

## 2020-01-27 RX ADMIN — ASPIRIN 81 MG: 81 TABLET ORAL at 08:39

## 2020-01-27 RX ADMIN — TRAZODONE HYDROCHLORIDE 50 MG: 50 TABLET ORAL at 21:27

## 2020-01-27 RX ADMIN — INSULIN LISPRO 5 UNITS: 100 INJECTION, SOLUTION INTRAVENOUS; SUBCUTANEOUS at 13:24

## 2020-01-27 RX ADMIN — INSULIN LISPRO 2 UNITS: 100 INJECTION, SOLUTION INTRAVENOUS; SUBCUTANEOUS at 08:52

## 2020-01-27 RX ADMIN — GABAPENTIN 600 MG: 300 CAPSULE ORAL at 21:26

## 2020-01-27 RX ADMIN — Medication 250 MG: at 08:47

## 2020-01-27 RX ADMIN — INSULIN GLARGINE 35 UNITS: 100 INJECTION, SOLUTION SUBCUTANEOUS at 08:53

## 2020-01-27 RX ADMIN — CARIPRAZINE 1.5 MG: 1.5 CAPSULE, GELATIN COATED ORAL at 08:39

## 2020-01-27 RX ADMIN — INSULIN LISPRO 4 UNITS: 100 INJECTION, SOLUTION INTRAVENOUS; SUBCUTANEOUS at 13:24

## 2020-01-27 RX ADMIN — BUSPIRONE HYDROCHLORIDE 10 MG: 10 TABLET ORAL at 17:24

## 2020-01-27 RX ADMIN — SODIUM BICARBONATE 650 MG: 650 TABLET ORAL at 08:47

## 2020-01-27 RX ADMIN — METOPROLOL TARTRATE 50 MG: 50 TABLET, FILM COATED ORAL at 08:47

## 2020-01-27 RX ADMIN — LISINOPRIL 2.5 MG: 5 TABLET ORAL at 08:47

## 2020-01-27 RX ADMIN — METFORMIN HYDROCHLORIDE 1000 MG: 500 TABLET ORAL at 17:24

## 2020-01-27 RX ADMIN — GABAPENTIN 600 MG: 300 CAPSULE ORAL at 17:25

## 2020-01-27 RX ADMIN — BENZTROPINE MESYLATE 1 MG: 1 TABLET ORAL at 17:25

## 2020-01-27 RX ADMIN — CHLORPROMAZINE HYDROCHLORIDE 50 MG: 25 TABLET, SUGAR COATED ORAL at 21:26

## 2020-01-27 RX ADMIN — ACETAMINOPHEN 975 MG: 325 TABLET ORAL at 16:39

## 2020-01-27 RX ADMIN — INSULIN LISPRO 5 UNITS: 100 INJECTION, SOLUTION INTRAVENOUS; SUBCUTANEOUS at 08:51

## 2020-01-27 NOTE — PLAN OF CARE
Problem: ANXIETY  Goal: Will report anxiety at manageable levels  Description  INTERVENTIONS:  - Administer medication as ordered  - Teach and encourage coping skills  - Provide emotional support  - Assess patient/family for anxiety and ability to cope  Outcome: Progressing

## 2020-01-27 NOTE — PROGRESS NOTES
01/27/20 1000   Activity/Group Checklist   Group   (recovery group)   Attendance Attended   Attendance Duration (min) 46-60   Interactions Interacted appropriately   Affect/Mood Appropriate   Goals Achieved Identified feelings; Discussed self-esteem issues; Able to listen to others; Able to engage in interactions; Able to self-disclose

## 2020-01-27 NOTE — NURSING NOTE
Patient allowed staff to draw blood work this am  Chandrika Vicente slept poorly and woke up early  Patient depressed and poorly motivated  Cooperative with staff and social with peers when awake during the night    Foot dressing dry and intact

## 2020-01-27 NOTE — PROGRESS NOTES
01/24/20 1546   Team Meeting   Meeting Type Tx Team Meeting   Initial Conference Date 01/24/20   Team Members Present   Team Members Present Physician;Nurse;   Physician Team Member Dr Jazlyn Zabala Team Member [facility] Management Team Member [place]   Patient/Family Present   Patient Present Yes   Patient's Family Present No   Pt seen for tx team meeting  Pt educated on medication management, case management and group therapy  Pt in agreement with current medication being provided  Pt reports he can ambulate appropriately without assistance  Tx plan signed and reviewed       Strengths: voluntary admission, has insurance, has income  Barriers: medical comorbidities, homeless, chronic mental health

## 2020-01-27 NOTE — PROGRESS NOTES
Pt visible on unite, Social with peers  Pt  Reported some  anxiety, depression   Denies SI,HI    Will continue to monitor

## 2020-01-27 NOTE — ASSESSMENT & PLAN NOTE
Patient with a small nonhealing ulcer right plantar midfoot  Previous TMA  MRI shows cellulitis but no osteomyelitis  Patient was previously following with Podiatry  Reviewed Dr Jocelyn Peguero note  Continue with current dressing changes    Continue vantin 400mg PO BID through 1/27/2020 per ID

## 2020-01-27 NOTE — PROGRESS NOTES
Progress Note - Behavioral Health   Haley March 38 y o  male MRN: [id number]  Unit/Bed#: Te [facility] 383-02 Encounter: [id number]    Assessment/Plan   Principal Problem:    Bipolar 2 disorder ([facility] [address] )  Active Problems:    Tobacco use disorder    Diabetic ulcer of right midfoot ([facility] )    Type 2 diabetes mellitus with diabetic polyneuropathy, with long-term current use of insulin (HCC)    Essential hypertension    Cellulitis of right lower extremity    Acute kidney injury (NAIF) with acute tubular necrosis (ATN) ([facility])    Medical clearance for psychiatric admission      Subjective:Patient was seen today for continuation of care, records reviewed and  patient was discussed with the morning case review team  Bravo Aponte was pleasant, cooperative and had some agitation regarding not receiving the same medications as he was taking at home  This writer reviewed Mirza's medications with him and discussed medication changes related to his liver enzymes being elevated and hyponatremia  Bravo Aponte agreed with medication changes and requested double portion meals  He reported sleeping on and off, increased appetite, social with peers and attend groups  Bravo Aponte had good eye contact, flat affect and states that he was feeling much better than when he was first admitted  He rates his depression and anxiety at 4/10 with 10 being the worse  Bravo Aponte denies endorsing any suicidal or homicidal ideation  Denies auditory and visual hallucinations and does not seem to be experiencing any manic or psychotic symptoms during our encounter  He remains medication compliance and denies any side effects from medications  Tapering Depakote to 500 mg/daily related to elevated liver enzymes and jyponatremia, Increased Vraylar from 1 5 mg to 3 mg/daily and Increased Neurontin from 400 mg to 600 mg/TID  CPM and Depakote level on 1/30        Vitals:  Vitals:    01/27/20 0700   BP: 136/82   Pulse: 63   Resp: 16   Temp: 98 4 °F (36 9 °C)   SpO2:        Laboratory results:     I have personally reviewed all pertinent laboratory/tests results  Most Recent Labs:   Lab Results   Component Value Date    WBC 8 70 01/27/2020    RBC 5 05 01/27/2020    HGB 13 7 01/27/2020    HCT 40 8 (L) 01/27/2020     01/27/2020    RDW 14 0 01/27/2020    NEUTROABS 4 87 01/27/2020    SODIUM 134 (L) 01/27/2020    K 4 5 01/27/2020     01/27/2020    CO2 24 01/27/2020    BUN 23 01/27/2020    CREATININE 1 26 01/27/2020    GLUC 254 (H) 01/27/2020    GLUF 133 (H) 08/15/2019    CALCIUM 8 7 01/27/2020    AST 80 (H) 01/27/2020    ALT 96 (H) 01/27/2020    ALKPHOS 76 01/27/2020    TP 7 1 01/27/2020    ALB 3 5 01/27/2020    TBILI 0 30 01/27/2020    CHOLESTEROL 159 08/15/2019    HDL 38 (L) 08/15/2019    TRIG 105 08/15/2019    LDLCALC 100 08/15/2019    NONHDLC 121 08/15/2019    VALPROICTOT 46 6 (L) 01/27/2020    LITHIUM <0 2 (L) 02/26/2018    [id number] 0 916 01/19/2020    RPR Non-Reactive 07/10/2019    HGBA1C 8 4 (H) 07/10/2019     07/10/2019     Depakote:   Lab Results   Component Value Date    VALPROICTOT 46 6 (L) 01/27/2020       Psychiatric Review of Systems:    Behavior over the last 24 hours:  unchanged  Sleep: normal  Appetite: normal  Medication side effects: No  ROS: no complaints, denies any shortness of breath or chest pain and all other systems are negative      Mental Status Evaluation:    Appearance:  casually dressed, dressed appropriately, adequate grooming   Behavior:  pleasant, cooperative, some agitation   Speech:  normal rate and volume   Mood:  anxious, labile   Affect:  labile   Thought Process:  coherent, goal directed   Associations: intact associations   Thought Content:  no overt delusions   Perceptual Disturbances: denies auditory or visual hallucinations when asked   Risk Potential: Suicidal ideation - None at present  Homicidal ideation - None  Potential for aggression - No   Sensorium:  oriented to person, place, time/date and situation   Memory:  recent and remote memory grossly intact   Consciousness:  alert and awake   Attention: attention span and concentration are age appropriate   Insight:  improving and partial   Judgment: improving and partial   Gait/Station: uses cane   Motor Activity: no abnormal movements       Progress Toward Goals:     Slowly progressing, still has anxiety and depression, denies hallucinations, denies suicidal ideation  Assessment/Plan   Principal Problem:    Bipolar 2 disorder (HCC)  Active Problems:    Tobacco use disorder    Diabetic ulcer of right midfoot ([facility])    Type 2 diabetes mellitus with diabetic polyneuropathy, with long-term current use of insulin ([facility])    Essential hypertension    Cellulitis of right lower extremity    Acute kidney injury (NAIF) with acute tubular necrosis (ATN) ([facility] [address] )    Medical clearance for psychiatric admission      Recommended Treatment: Treatment plan and medication changes discussed and per the attending physician the plan is: 1  Continue with group therapy, milieu therapy and occupational therapy  2  Behavioral Health checks every 7 minutes  3  Continue with current medication regimen  Increased Vraylar to 3mg/daily, Increased Neurontin to 600 mg/TID and taper Depakote  4  Will review labs in the a m  CPM and Depakote level on 1/30  5  Disposition Planning:    Behavioral Health Medications: all current active meds have been reviewed and continue current psychiatric medications        Current Facility-Administered Medications:  acetaminophen 650 mg Oral Q6H PRN Jay Strickland MD   acetaminophen 650 mg Oral Q4H PRN Jay Strickland MD   acetaminophen 975 mg Oral Q6H PRN Jay Strickland MD   aluminum-magnesium hydroxide-simethicone 30 mL Oral Q4H PRN Jay Strickland MD   aspirin 81 mg Oral Daily Danay Tenorio MD   atorvastatin 40 mg Oral Daily With Dinner Danay Tenorio MD   benztropine 1 mg Intramuscular Q6H PRN Jay Strickland MD   benztropine 1 mg Oral Q6H PRN Jay Strickland MD   benztropine 1 mg Oral BID Judie Leone MD   busPIRone 10 mg Oral BID Judie Leone MD   [START ON 1/28/2020] cariprazine 3 mg Oral Daily Jam Huang MD   cefpodoxime 400 mg Oral BID With Meals Bere DANNA TAYLOR Roberts   chlorproMAZINE 50 mg Oral TID Jam Huang MD   collagenase  Topical Daily Jerson Loyda, DPM   divalproex sodium 500 mg Oral HS Jam Huang MD   gabapentin 600 mg Oral TID Jam Huang MD   haloperidol 5 mg Oral Q8H PRN Jam Huang MD   haloperidol lactate 5 mg Intramuscular Q6H PRN Jam Huang MD   hydrOXYzine HCL 25 mg Oral Q6H PRN Jam Huang MD   insulin glargine 35 Units Subcutaneous Dwayne Sexton MD   insulin lispro 2-12 Units Subcutaneous HS Judie Leone MD   insulin lispro 2-12 Units Subcutaneous TID AC Judie Leone MD   insulin lispro 5 Units Subcutaneous TID With Laurent Trujillo MD   lisinopril 2 5 mg Oral Daily Bere TAYLOR Antony   LORazepam 1 mg Intramuscular Q6H PRN Jam Huang MD   LORazepam 0 5 mg Oral Q8H PRN Judie Leone MD   magnesium hydroxide 30 mL Oral Daily PRN Jam Huang MD   metoprolol tartrate 50 mg Oral Q12H [address] Judie Leone MD   nicotine polacrilex 2 mg Oral Q2H PRN Jam Huang MD   OLANZapine 10 mg Intramuscular Q3H PRAUGUSTIN Huang MD   OLANZapine 10 mg Oral Q3H PRAUGUSTIN Huang MD   risperiDONE 1 mg Oral Q3H PRN Jam Huang MD   saccharomyces boulardii 250 mg Oral BID Judie Leone MD   sodium bicarbonate 650 mg Oral Daily Judie Leone MD   traZODone 50 mg Oral HS PRN Jam Huang MD   traZODone 50 mg Oral HS Judie Leone MD       Risks / Benefits of Treatment:     Risks, benefits, and possible side effects of medications explained to patient and patient verbalizes understanding and agreement for treatment  Counseling / Coordination of Care:     Patient's progress reviewed with nursing staff  Medications, treatment progress and treatment plan reviewed with patient    Supportive counseling provided to the patient            TAYLOR Lala

## 2020-01-27 NOTE — PROGRESS NOTES
01/27/20 0829   Team Meeting   Meeting Type Daily Rounds   Initial Conference Date 01/27/20   Team Members Present   Team Members Present Physician;Nurse;   Physician Team Member Dr Thai Ramirez Team Member [facility] Management Team Member Glenn Favre   Patient/Family Present   Patient Present No   Patient's Family Present No   Pt started on Vraylar

## 2020-01-27 NOTE — PROGRESS NOTES
Progress Note - Infectious Disease   Jennifer Jang 38 y o  male MRN: [id number]  Unit/Bed#: Yolanda Hanley 383-02 Encounter: [id number]      Impression/Plan:     81-year-old male patient with history of diabetes mellitus and peripheral vascular disease, requiring bilateral TMA, is currently being treated for right lower extremity cellulitis secondary to right foot wound infection     1- Sepsis, POA:  Sepsis is likely secondary to right foot cellulitis and chronic right foot wound infection  Patient had debridement done by Podiatry, his deep wound cultures are positive for group C strep and E coli  He remains hemodynamically stable  He has been afebrile over the past week  He is currently on cefpodoxime p o  Which he is tolerating well  - continue antibiotics as below  - close clinical monitoring, trend fever curve          2- Right lower extremity cellulitis:  Likely secondary to an infected right foot wound ulcer  CT scan showed no evidence of necrotizing fasciitis, MRI showing mild edema and plantar and medial aspect of medial cuneiform   Lower extremity venous duplex negative for DVT  Wound culture preliminarly positive for group C strep and E coli  Patient improved on cefazolin IV and Flagyl, with resolution of fever and leukocytosis; he was transitioned to cefpodoxime p o  And moved back to [facility] floor  MRI reviewed and case discussed at length with podiatrist Dr Davis Nixon evidence of osteomyelitis at this time, will continue treatment for skin and soft tissue infection  - stop p o  Antibiotic after today's dose are given as patient would have finished 10 days course    If symptoms relapse, might need to repeat imaging and reconsider diagnosis of osteomyelitis and possible need for debridement  - continue local wound care  - podiatry follow up     3- diabetes mellitus:  Last A1c 8 4%  - management as per primary service     4- peripheral vascular disease  Patient has history of bilateral TMA in 2015 and in      5- transaminitis:  Mild elevation of AST and ALT noted  Patient denies abdominal pain nausea or vomiting  Elevated LFT could be secondary to valproate use  - trend LFTs and monitor clinically  - management as per primary service    Plan mentioned above discussed in details with patient   Sign off, call back ID service for questions or in case of change in clinical status    Antibiotics:  Antibiotics day 10  Cefpodoxime day 4    Subjective:  Patient has no fever, chills, sweats; no nausea, vomiting, diarrhea; no cough, shortness of breath; no pain  No new symptoms  Objective:  Vitals:  Temp:  [97 3 °F (36 3 °C)-98 4 °F (36 9 °C)] 97 9 °F (36 6 °C)  HR:  [59-67] 63  Resp:  [16] 16  BP: ()/(67-84) 138/84  Temp (24hrs), Av 9 °F (36 6 °C), Min:97 3 °F (36 3 °C), Max:98 4 °F (36 9 °C)  Current: Temperature: 97 9 °F (36 6 °C)    Physical Exam:   General Appearance:  Alert, interactive, nontoxic, no acute distress  Throat: Oropharynx moist without lesions  Lungs:   Clear to auscultation bilaterally; no wheezes, rhonchi or rales; respirations unlabored   Heart:  RRR; no murmur, rub or gallop   Abdomen:   Soft, non-tender, non-distended, positive bowel sounds  Extremities: No clubbing, cyanosis  Right lower extremity Erythema has resolved  Patient is still having some swelling over right lower extremity   Skin: No new rashes or lesions    Dressing over right foot is dry and clean, no breakthrough bleeding or drainage       Labs, Imaging, & Other studies:   All pertinent labs and imaging studies were personally reviewed  Results from last 7 days   Lab Units 20  0640 20  0623 20  0856   WBC Thousand/uL 8 70  --  7 20   HEMOGLOBIN g/dL 13 7  --  12 0*   PLATELETS Thousands/uL 402 376 229     Results from last 7 days   Lab Units 20  0640 20  0630  20  0446   SODIUM mmol/L 134* 139  --  133*   POTASSIUM mmol/L 4 5 3 9  --  4 1   CHLORIDE mmol/L 100 103  --  104 CO2 mmol/L 24 28  --  21*   BUN mg/dL 23 12  --  15   CREATININE mg/dL 1 26 0 87  --  1 03   EGFR ml/min/1 73sq m 69 106  --  89   CALCIUM mg/dL 8 7 8 9  --  8 0*   AST U/L 80* 80*  --   --    ALT U/L 96* 54*   < >  --    ALK PHOS U/L 76 74   < >  --     < > = values in this interval not displayed

## 2020-01-27 NOTE — ASSESSMENT & PLAN NOTE
Vitals:    01/27/20 0700   BP: 136/82   Pulse: 63   Resp: 16   Temp: 98 4 °F (36 9 °C)   SpO2:         BP is elevated  Continue metoprolol 50 mg twice a day  Now that his renal function has returned to normal will restart lisinopril but will start low-dose more for renal protective this at 2 5 mg daily

## 2020-01-27 NOTE — ASSESSMENT & PLAN NOTE
Lab Results   Component Value Date    HGBA1C 8 4 (H) 07/10/2019       Recent Labs     01/26/20  1831 01/26/20 2027 01/27/20  0728 01/27/20  1206   POCGLU 238* 207* 220* 236*       Blood Sugar Average: Last 72 hrs:  (P) 266 [id number]      uncontrolled diabetic  Continue current regimen of Lantus 35 units daily and sliding scale insulin coverage  Discussed with patient the importance of controlling his diabetes or he will end up with a further amputation  Will restart Metformin  Re-start low dose lisinopril 2 5mg daily

## 2020-01-27 NOTE — PROGRESS NOTES
Progress Note - Bertrand Becerra 1975, 38 y o  male MRN: [id number]    Unit/Bed#: Diony Javier 383-02 Encounter: [id number]    Primary Care Provider: No primary care provider on file  Date and time admitted to hospital: 1/23/2020  7:54 PM        Acute kidney injury (NAIF) with acute tubular necrosis (ATN) ([facility] [address] )  Assessment & Plan  Results from last 7 days   Lab Units 01/27/20  0640 01/25/20  0630 01/21/20  0446   CREATININE mg/dL 1 26 0 87 1 03       Renal function improved and back to baseline   1-1 3  Avoid nephrotoxic medications  Cellulitis of right lower extremity  Assessment & Plan  R leg cellulitis also with nonhealing wound on R plantar midfoot  MRI showed cellulitis, but no osteomyelitis  antibiotic changed to vantin 400mg PO BID through 1/27/2020  Venous Doppler negative for DVT but does show a large inguinal lymph nodes which may be infectious versus malignancy will need outpatient follow-up  Essential hypertension  Assessment & Plan  Vitals:    01/27/20 0700   BP: 136/82   Pulse: 63   Resp: 16   Temp: 98 4 °F (36 9 °C)   SpO2:         BP is elevated  Continue metoprolol 50 mg twice a day  Now that his renal function has returned to normal will restart lisinopril but will start low-dose more for renal protective this at 2 5 mg daily  Type 2 diabetes mellitus with diabetic polyneuropathy, with long-term current use of insulin [facility])  Assessment & Plan  Lab Results   Component Value Date    HGBA1C 8 4 (H) 07/10/2019       Recent Labs     01/26/20  1831 01/26/20 2027 01/27/20  0728 01/27/20  1206   POCGLU 238* 207* 220* 236*       Blood Sugar Average: Last 72 hrs:  (P) 474 [id number]      uncontrolled diabetic  Continue current regimen of Lantus 35 units daily and sliding scale insulin coverage  Discussed with patient the importance of controlling his diabetes or he will end up with a further amputation  Will restart Metformin  Re-start low dose lisinopril 2 5mg daily  Diabetic ulcer of right midfoot [facility])  Assessment & Plan  Patient with a small nonhealing ulcer right plantar midfoot  Previous TMA  MRI shows cellulitis but no osteomyelitis  Patient was previously following with Podiatry  Reviewed Dr Isa Mohs note  Continue with current dressing changes  Continue vantin 400mg PO BID through 2020 per ID        Tobacco use disorder  Assessment & Plan   Provided encouragement  Continue Nicorette gum    * Bipolar 2 disorder [facility])  Assessment & Plan   Per psychiatry      VTE Pharmacologic Prophylaxis:   Pharmacologic:  Encouraged ambulation, patient ambulatory    Patient Centered Rounds: I have performed bedside rounds with nursing staff today  Current Length of Stay: 4 day(s)    Current Patient Status: Inpatient Psych   Certification Statement: The patient will continue to require additional inpatient hospital stay due to behavioral health      Discharge Plan: As per treatment team     Code Status: Level 1 - Full Code    Subjective: Following up with patient for his diabetes and wound to right foot  Discussed with patient, patient continues to mention that he is not on his medications from home, I discussed with him that sometimes medications need to be adjusted from home meds min due to such things as acute kidney injury  Patient does report noncompliance with medications at home  Objective:     Vitals:   Temp (24hrs), Av 9 °F (36 6 °C), Min:97 3 °F (36 3 °C), Max:98 4 °F (36 9 °C)    Temp:  [97 3 °F (36 3 °C)-98 4 °F (36 9 °C)] 98 4 °F (36 9 °C)  HR:  [59-70] 63  Resp:  [16] 16  BP: ()/(67-82) 136/82  Body mass index is 36 62 kg/m²  Review of Systems   Constitutional: Negative for activity change, appetite change, chills, diaphoresis, fatigue and fever  Respiratory: Negative for cough, chest tightness, shortness of breath and wheezing  Cardiovascular: Negative for chest pain, palpitations and leg swelling     Gastrointestinal: Negative for abdominal pain, constipation, diarrhea, nausea and vomiting  Genitourinary: Negative for dysuria and frequency  Musculoskeletal: Positive for arthralgias and gait problem  Skin: Positive for wound  Neurological: Positive for numbness  Negative for dizziness, light-headedness and headaches  Psychiatric/Behavioral: Positive for dysphoric mood and sleep disturbance  The patient is nervous/anxious  Input and Output Summary (last 24 hours):     No intake or output data in the 24 hours ending 01/27/20 1520    Physical Exam:     Physical Exam   Constitutional: He is oriented to person, place, and time  He appears well-developed and well-nourished  No distress  obese   HENT:   Head: Normocephalic and atraumatic  Right Ear: External ear normal    Left Ear: External ear normal    Nose: Nose normal    Mouth/Throat: Oropharynx is clear and moist  No oropharyngeal exudate  Eyes: Pupils are equal, round, and reactive to light  Conjunctivae and EOM are normal  Right eye exhibits no discharge  Left eye exhibits no discharge  Neck: Normal range of motion  Neck supple  No thyromegaly present  Cardiovascular: Normal rate, regular rhythm, normal heart sounds and intact distal pulses  Exam reveals no gallop and no friction rub  No murmur heard  Pulmonary/Chest: Effort normal and breath sounds normal  No stridor  No respiratory distress  He has no wheezes  He has no rales  Abdominal: Soft  Bowel sounds are normal  He exhibits no distension  There is no tenderness  Musculoskeletal:   Mild erythema and swelling to RLE  No significant warmth  Plantar surface with small open wound 1 5 cm x 1 5cm  No drainage noted  Dressing was clean and dress  B/L TMA   Lymphadenopathy:     He has no cervical adenopathy  Neurological: He is alert and oriented to person, place, and time  Skin: Skin is warm and dry  He is not diaphoretic         Additional Data:     Labs:    Results from last 7 days   Lab Units 01/27/20  0640  01/22/20  0856   WBC Thousand/uL 8 70  --  7 20   HEMOGLOBIN g/dL 13 7  --  12 0*   HEMATOCRIT % 40 8*  --  36 3*   PLATELETS Thousands/uL 402   < > 229   BANDS PCT % 2  --   --    NEUTROS PCT %  --   --  70*   LYMPHS PCT %  --   --  21*   LYMPHO PCT % 33  --   --    MONOS PCT %  --   --  9   MONO PCT % 7  --   --    EOS PCT % 1  --  0    < > = values in this interval not displayed  Results from last 7 days   Lab Units 01/27/20  0640   SODIUM mmol/L 134*   POTASSIUM mmol/L 4 5   CHLORIDE mmol/L 100   CO2 mmol/L 24   BUN mg/dL 23   CREATININE mg/dL 1 26   ANION GAP mmol/L 10   CALCIUM mg/dL 8 7   ALBUMIN g/dL 3 5   TOTAL BILIRUBIN mg/dL 0 30   ALK PHOS U/L 76   ALT U/L 96*   AST U/L 80*   GLUCOSE RANDOM mg/dL 254*         Results from last 7 days   Lab Units 01/27/20  1206 01/27/20  0728 01/26/20  2027 01/26/20  1831 01/26/20  1703 01/26/20  1600 01/26/20  1159 01/26/20  0757 01/25/20  2038 01/25/20  1638 01/25/20  1210 01/25/20  0805   POC GLUCOSE mg/dl 236* 220* 207* 238* 123 116 238* 142* 214* 240* 118 134                   * I Have Reviewed All Lab Data Listed Above  * Additional Pertinent Lab Tests Reviewed:  Sherly [facility] Admission Reviewed        Recent Cultures (last 7 days):           Last 24 Hours Medication List:     Current Facility-Administered Medications:  acetaminophen 650 mg Oral Q6H PRN Agustina Welsh MD   acetaminophen 650 mg Oral Q4H PRN Agustina Welsh MD   acetaminophen 975 mg Oral Q6H PRN Agustina Welsh MD   aluminum-magnesium hydroxide-simethicone 30 mL Oral Q4H PRN Agustina Welsh MD   aspirin 81 mg Oral Daily Annabel Parnell MD   atorvastatin 40 mg Oral Daily With Dinner Annabel Parnell MD   benztropine 1 mg Intramuscular Q6H PRN Agustina Welsh MD   benztropine 1 mg Oral Q6H PRN Agustina Welsh MD   benztropine 1 mg Oral BID Annabel Parnell MD   busPIRone 10 mg Oral BID Annabel Parnell MD   [START ON 1/28/2020] cariprazine 3 mg Oral Daily Carlos Perez MD   cefpodoxime 400 mg Oral BID With Meals TAYLOR Shah   chlorproMAZINE 50 mg Oral TID Carlos Perez MD   collagenase  Topical Daily [place] Hanley, DPM   divalproex sodium 500 mg Oral HS Carlos Perez MD   gabapentin 600 mg Oral TID Carlos Perez MD   haloperidol 5 mg Oral Q8H PRN Carlos Perez MD   haloperidol lactate 5 mg Intramuscular Q6H PRN Carlos Perez MD   hydrOXYzine HCL 25 mg Oral Q6H PRN Carlos Perez MD   insulin glargine 35 Units Subcutaneous QAM Milton Nixon MD   insulin lispro 2-12 Units Subcutaneous HS Mara Mcdonough MD   insulin lispro 2-12 Units Subcutaneous TID AC Mara Mcdonough MD   lisinopril 2 5 mg Oral Daily TAYLOR Shah   LORazepam 1 mg Intramuscular Q6H PRN Carlos Perez MD   LORazepam 0 5 mg Oral Q8H PRN Mara Mcdonough MD   magnesium hydroxide 30 mL Oral Daily PRN Carlos Perez MD   metFORMIN 1,000 mg Oral BID With Meals TAYLOR Santos   metoprolol tartrate 50 mg Oral Q12H [address] Mara Mcdonough MD   nicotine polacrilex 2 mg Oral Q2H PRN Carlos Perez MD   OLANZapine 10 mg Intramuscular Q3H PRN Carlos ePrez MD   OLANZapine 10 mg Oral Q3H PRN Carlos Perez MD   risperiDONE 1 mg Oral Q3H PRN Carlos Perez MD   saccharomyces boulardii 250 mg Oral BID Mara Mcdonough MD   sodium bicarbonate 650 mg Oral Daily Mara Mcdonough MD   traZODone 50 mg Oral HS PRN Carlos Perez MD   traZODone 50 mg Oral HS Mara Mcdonough MD        Today, Patient Was Seen By: TAYLOR Dougherty    M*Modal software was used to dictate this note  It may contain errors with dictating incorrect words or incorrect spelling  Please contact the provider directly with any questions

## 2020-01-27 NOTE — PROGRESS NOTES
[facility] -([facility] ) saw pt today because his ALT,AST, and managing his diabetes       01/27/20 1400   Service   Service   ([facility] )   Multi-disciplinary Rounds   Diagnosis  Reviewed   Code Status Reviewed   Vital Signs Reviewed   Nutrition  Reviewed   Skin/Wounds Reviewed   Elimination/Bowel Regimen Reviewed   Oxygen Needs Reviewed   Patient Education Diabetes; New/changes in Medication Regimen   Invasive Devices  Not Applicable   Level of care Current level of care is appropriate

## 2020-01-27 NOTE — PLAN OF CARE
Problem: DEPRESSION  Goal: Will be euthymic at discharge  Description  INTERVENTIONS:  - Administer medication as ordered  - Provide emotional support via 1:1 interaction with staff  - Encourage involvement in milieu/groups/activities  - Monitor for social isolation  Outcome: Progressing

## 2020-01-27 NOTE — PROGRESS NOTES
KAY GROUP NOTE  Arrived Late     01/27/20 1400   Activity/Group Checklist   Group Other (Comment)  (Early Warning Signs)   Attendance Attended   Attendance Duration (min) 0-15   Interactions Other (Comment)  (Sat away from peers, verbally scant  )   Affect/Mood Appropriate;Blunted/flat

## 2020-01-27 NOTE — ASSESSMENT & PLAN NOTE
Results from last 7 days   Lab Units 01/27/20  0640 01/25/20  0630 01/21/20  0446   CREATININE mg/dL 1 26 0 87 1 03       Renal function improved and back to baseline   1-1 3  Avoid nephrotoxic medications

## 2020-01-28 LAB
ANION GAP SERPL CALCULATED.3IONS-SCNC: 8 MMOL/L (ref 5–14)
BUN SERPL-MCNC: 23 MG/DL (ref 5–25)
CALCIUM SERPL-MCNC: 8.8 MG/DL (ref 8.4–10.2)
CHLORIDE SERPL-SCNC: 101 MMOL/L (ref 97–108)
CO2 SERPL-SCNC: 26 MMOL/L (ref 22–30)
CREAT SERPL-MCNC: 0.99 MG/DL (ref 0.7–1.5)
GFR SERPL CREATININE-BSD FRML MDRD: 93 ML/MIN/1.73SQ M
GLUCOSE P FAST SERPL-MCNC: 241 MG/DL (ref 70–99)
GLUCOSE SERPL-MCNC: 215 MG/DL (ref 65–140)
GLUCOSE SERPL-MCNC: 229 MG/DL (ref 65–140)
GLUCOSE SERPL-MCNC: 238 MG/DL (ref 65–140)
GLUCOSE SERPL-MCNC: 241 MG/DL (ref 70–99)
GLUCOSE SERPL-MCNC: 251 MG/DL (ref 65–140)
POTASSIUM SERPL-SCNC: 4.9 MMOL/L (ref 3.6–5)
SODIUM SERPL-SCNC: 135 MMOL/L (ref 137–147)

## 2020-01-28 PROCEDURE — 80048 BASIC METABOLIC PNL TOTAL CA: CPT | Performed by: INTERNAL MEDICINE

## 2020-01-28 PROCEDURE — 82948 REAGENT STRIP/BLOOD GLUCOSE: CPT

## 2020-01-28 PROCEDURE — 99232 SBSQ HOSP IP/OBS MODERATE 35: CPT | Performed by: NURSE PRACTITIONER

## 2020-01-28 RX ORDER — LISINOPRIL 5 MG/1
5 TABLET ORAL DAILY
Status: DISCONTINUED | OUTPATIENT
Start: 2020-01-29 | End: 2020-01-29

## 2020-01-28 RX ORDER — INSULIN GLARGINE 100 [IU]/ML
40 INJECTION, SOLUTION SUBCUTANEOUS EVERY MORNING
Status: DISCONTINUED | OUTPATIENT
Start: 2020-01-29 | End: 2020-01-30

## 2020-01-28 RX ADMIN — INSULIN GLARGINE 35 UNITS: 100 INJECTION, SOLUTION SUBCUTANEOUS at 08:17

## 2020-01-28 RX ADMIN — ASPIRIN 81 MG: 81 TABLET ORAL at 08:15

## 2020-01-28 RX ADMIN — TRAZODONE HYDROCHLORIDE 50 MG: 50 TABLET ORAL at 21:34

## 2020-01-28 RX ADMIN — INSULIN LISPRO 4 UNITS: 100 INJECTION, SOLUTION INTRAVENOUS; SUBCUTANEOUS at 21:38

## 2020-01-28 RX ADMIN — BUSPIRONE HYDROCHLORIDE 10 MG: 10 TABLET ORAL at 08:16

## 2020-01-28 RX ADMIN — INSULIN LISPRO 6 UNITS: 100 INJECTION, SOLUTION INTRAVENOUS; SUBCUTANEOUS at 12:12

## 2020-01-28 RX ADMIN — Medication 250 MG: at 17:15

## 2020-01-28 RX ADMIN — METFORMIN HYDROCHLORIDE 1000 MG: 500 TABLET ORAL at 15:45

## 2020-01-28 RX ADMIN — CHLORPROMAZINE HYDROCHLORIDE 50 MG: 25 TABLET, SUGAR COATED ORAL at 08:15

## 2020-01-28 RX ADMIN — COLLAGENASE SANTYL 1 APPLICATION: 250 OINTMENT TOPICAL at 12:44

## 2020-01-28 RX ADMIN — BENZTROPINE MESYLATE 1 MG: 1 TABLET ORAL at 17:17

## 2020-01-28 RX ADMIN — INSULIN LISPRO 4 UNITS: 100 INJECTION, SOLUTION INTRAVENOUS; SUBCUTANEOUS at 08:00

## 2020-01-28 RX ADMIN — GABAPENTIN 600 MG: 300 CAPSULE ORAL at 21:33

## 2020-01-28 RX ADMIN — BENZTROPINE MESYLATE 1 MG: 1 TABLET ORAL at 08:16

## 2020-01-28 RX ADMIN — GABAPENTIN 600 MG: 300 CAPSULE ORAL at 15:45

## 2020-01-28 RX ADMIN — METFORMIN HYDROCHLORIDE 1000 MG: 500 TABLET ORAL at 08:16

## 2020-01-28 RX ADMIN — METOPROLOL TARTRATE 50 MG: 50 TABLET, FILM COATED ORAL at 21:34

## 2020-01-28 RX ADMIN — CHLORPROMAZINE HYDROCHLORIDE 50 MG: 25 TABLET, SUGAR COATED ORAL at 15:45

## 2020-01-28 RX ADMIN — LORAZEPAM 0.5 MG: 0.5 TABLET ORAL at 15:45

## 2020-01-28 RX ADMIN — CHLORPROMAZINE HYDROCHLORIDE 50 MG: 25 TABLET, SUGAR COATED ORAL at 21:34

## 2020-01-28 RX ADMIN — ATORVASTATIN CALCIUM 40 MG: 40 TABLET, FILM COATED ORAL at 15:45

## 2020-01-28 RX ADMIN — INSULIN LISPRO 4 UNITS: 100 INJECTION, SOLUTION INTRAVENOUS; SUBCUTANEOUS at 17:16

## 2020-01-28 RX ADMIN — LISINOPRIL 2.5 MG: 5 TABLET ORAL at 08:24

## 2020-01-28 RX ADMIN — Medication 250 MG: at 08:16

## 2020-01-28 RX ADMIN — CARIPRAZINE 3 MG: 3 CAPSULE, GELATIN COATED ORAL at 08:16

## 2020-01-28 RX ADMIN — SODIUM BICARBONATE 650 MG: 650 TABLET ORAL at 08:16

## 2020-01-28 RX ADMIN — GABAPENTIN 600 MG: 300 CAPSULE ORAL at 08:15

## 2020-01-28 RX ADMIN — METOPROLOL TARTRATE 50 MG: 50 TABLET, FILM COATED ORAL at 08:22

## 2020-01-28 RX ADMIN — ACETAMINOPHEN 975 MG: 325 TABLET ORAL at 11:46

## 2020-01-28 RX ADMIN — BUSPIRONE HYDROCHLORIDE 10 MG: 10 TABLET ORAL at 17:15

## 2020-01-28 NOTE — PROGRESS NOTES
Progress Note - Behavioral Health   Dexter Siemens 38 y o  male MRN: [id number]  Unit/Bed#: [facility] 383-02 Encounter: [id number]    Assessment/Plan   Principal Problem:    Bipolar 2 disorder ([facility] [address] )  Active Problems:    Tobacco use disorder    Diabetic ulcer of right midfoot ([facility] [address] )    Type 2 diabetes mellitus with diabetic polyneuropathy, with long-term current use of insulin (HCC)    Essential hypertension    Cellulitis of right lower extremity    Acute kidney injury (NAIF) with acute tubular necrosis (ATN) ([facility])    Medical clearance for psychiatric admission      Subjective:Patient was seen today for continuation of care, records reviewed and  patient was discussed with the morning case review team  Merritt Jovi was seen in his room, in bed resting  He reported not feeling well today related to right leg pain which was already addressed by nursing  Merritt Espana states he was agitated related the lack of housing options and he is refusing to go to a group home   already met with Merritt Espana to review his housing situation and advised him on how to go about finding an apartment through [employer] and that Merritt Espana has to be present to make appointment - no phone calls are accepted  Dionneescobar Espana reported sleeping on and off last night, has a good appetite and reported that his anxiety and depression today is 6/10 related to discharge disposition  Dionneescobar Espana denies endorsing any suicidal or homicidal ideation  Does not seem to be experiencing any manic or psychotic symptoms during our encounter  He remains medication compliance and denies any side effects from medications  Will continue on current medication regimen  Vitals:  Vitals:    01/28/20 0822   BP: 142/93   Pulse: 73   Resp:    Temp:    SpO2:        Laboratory results:    I have personally reviewed all pertinent laboratory/tests results    Most Recent Labs:   Lab Results   Component Value Date    WBC 8 70 01/27/2020    RBC 5 05 01/27/2020    HGB 13 7 01/27/2020    HCT 40 8 (L) 01/27/2020     01/27/2020    RDW 14 0 01/27/2020    NEUTROABS 4 87 01/27/2020    SODIUM 135 (L) 01/28/2020    K 4 9 01/28/2020     01/28/2020    CO2 26 01/28/2020    BUN 23 01/28/2020    CREATININE 0 99 01/28/2020    GLUC 241 (H) 01/28/2020    GLUF 241 (H) 01/28/2020    CALCIUM 8 8 01/28/2020    AST 80 (H) 01/27/2020    ALT 96 (H) 01/27/2020    ALKPHOS 76 01/27/2020    TP 7 1 01/27/2020    ALB 3 5 01/27/2020    TBILI 0 30 01/27/2020    CHOLESTEROL 159 08/15/2019    HDL 38 (L) 08/15/2019    TRIG 105 08/15/2019    LDLCALC 100 08/15/2019    NONHDLC 121 08/15/2019    VALPROICTOT 46 6 (L) 01/27/2020    LITHIUM <0 2 (L) 02/26/2018    GCD1XQCRSNTE 0 916 01/19/2020    RPR Non-Reactive 07/10/2019    HGBA1C 8 4 (H) 07/10/2019     07/10/2019       Psychiatric Review of Systems:    Behavior over the last 24 hours:  Slowly progressing  Sleep: normal  Appetite: normal  Medication side effects: No  ROS: rt foot pain, denies any shortness of breath or chest pain and all other systems are negative      Mental Status Evaluation:    Appearance:  casually dressed, dressed appropriately, adequate grooming   Behavior:  pleasant, cooperative, mildly agitated   Speech:  normal rate and volume   Mood:  labile, still anxious, still somewhat depressed   Affect:  labile   Thought Process:  coherent, goal directed   Associations: intact associations   Thought Content:  no overt delusions   Perceptual Disturbances: no auditory hallucinations, no visual hallucinations   Risk Potential: Suicidal ideation - None at present  Homicidal ideation - None  Potential for aggression - No   Sensorium:  oriented to person, place and time/date   Memory:  recent and remote memory grossly intact   Consciousness:  alert and awake   Attention: attention span and concentration are age appropriate   Insight:  fair   Judgment: fair   Gait/Station: slow gait, uses cane   Motor Activity: no abnormal movements       Progress Toward Goals: Slowly progressing, less anxious, still has depression, denies current suicidal ideation  Assessment/Plan   Principal Problem:    Bipolar 2 disorder (HCC)  Active Problems:    Tobacco use disorder    Diabetic ulcer of right midfoot (HCC)    Type 2 diabetes mellitus with diabetic polyneuropathy, with long-term current use of insulin (HCC)    Essential hypertension    Cellulitis of right lower extremity    Acute kidney injury (NAIF) with acute tubular necrosis (ATN) ([facility] [address] )    Medical clearance for psychiatric admission      Recommended Treatment: Treatment plan and medication changes discussed and per the attending physician the plan is: 1  Continue with group therapy, milieu therapy and occupational therapy  2  Behavioral Health checks every 7 minutes  3  Continue with current medication regimen  4  Will review labs in the a m   5  Disposition Planning: Discharge pending    Behavioral Health Medications: all current active meds have been reviewed and continue current psychiatric medications        Current Facility-Administered Medications:  acetaminophen 650 mg Oral Q6H PRN Lupillo Warren MD   acetaminophen 650 mg Oral Q4H PRN Lupillo Warren MD   acetaminophen 975 mg Oral Q6H PRN Lupillo Warren MD   aluminum-magnesium hydroxide-simethicone 30 mL Oral Q4H PRN Lupillo Warren MD   aspirin 81 mg Oral Daily Anuja Nelson MD   atorvastatin 40 mg Oral Daily With Dinner Anuja Nelson MD   benztropine 1 mg Intramuscular Q6H PRN Lupillo Warren MD   benztropine 1 mg Oral Q6H PRN Lupillo Warren MD   benztropine 1 mg Oral BID Anuaj Nelson MD   busPIRone 10 mg Oral BID Anuja Nelson MD   cariprazine 3 mg Oral Daily Lupillo Warren MD   chlorproMAZINE 50 mg Oral TID Lupillo Warren MD   collagenase  Topical Daily Sandra Anthony, DPM   gabapentin 600 mg Oral TID Lupillo Warren MD   haloperidol 5 mg Oral Q8H PRN Lupillo Warren MD   haloperidol lactate 5 mg Intramuscular Q6H PRN Carvin Lipoma MD Maribel   hydrOXYzine HCL 25 mg Oral Q6H PRN Ervin Murray MD   [START ON 1/29/2020] insulin glargine 40 Units Subcutaneous QAM TAYLOR Mcclendon   insulin lispro 2-12 Units Subcutaneous HS Michell Lynn MD   insulin lispro 2-12 Units Subcutaneous TID AC Michell Lynn MD   [START ON 1/29/2020] lisinopril 5 mg Oral Daily TAYLOR Villar   LORazepam 1 mg Intramuscular Q6H PRN Ervin Murray MD   LORazepam 0 5 mg Oral Q8H PRN Michell Lynn MD   magnesium hydroxide 30 mL Oral Daily PRN Ervin Murray MD   metFORMIN 1,000 mg Oral BID With Meals TAYLOR Mcclendon   metoprolol tartrate 50 mg Oral Q12H [address] Michell Lynn MD   nicotine polacrilex 2 mg Oral Q2H PRN Ervin Murray MD   OLANZapine 10 mg Intramuscular Q3H PRN Ervin Murray MD   OLANZapine 10 mg Oral Q3H PRN Ervin Murray MD   risperiDONE 1 mg Oral Q3H PRN Ervin Murray MD   saccharomyces boulardii 250 mg Oral BID Michell Lynn MD   sodium bicarbonate 650 mg Oral Daily Michell Lynn MD   traZODone 50 mg Oral HS PRN Ervin Murray MD   traZODone 50 mg Oral HS Michell Lynn MD       Risks / Benefits of Treatment:     Risks, benefits, and possible side effects of medications explained to patient and patient verbalizes understanding and agreement for treatment  Counseling / Coordination of Care:     Patient's progress reviewed with nursing staff  Medications, treatment progress and treatment plan reviewed with patient  Supportive counseling provided to the patient            TAYLOR Santos

## 2020-01-28 NOTE — PROGRESS NOTES
01/28/20 1000   Activity/Group Checklist   Group Other (Comment)  (Art Therapy Group/Open Choice, Process Discussion)   Attendance Attended   Attendance Duration (min) Greater than 60   Interactions Interacted appropriately   Affect/Mood Appropriate   Goals Achieved Able to listen to others; Able to engage in interactions; Able to recieve feedback; Able to give feedback to another  (Able to engage materials; full participation)     Patient process was focused and appeared involved; Artwork reflected coherent forms and overall theme relaxation, however, possible internalized mild agitation, with sense of self portrayed as anxious and mildly guarded  No obvious indicators for SI, HI or psychosis at this time  Patient able to share image and fully participate in group discussion  Patient appears motivated to challenge self creatively, however, willingness to gain insight is uncertain at this time

## 2020-01-28 NOTE — NURSING NOTE
Patient visible for breakfast this morning  Irritable  Ate well  AM medication compliant  Denied any unmet needs  4 UNITS Humalog given in abd tissue  Retreated to room for sleep after  Will monitor

## 2020-01-28 NOTE — SOCIAL WORK
Pt approached worker asking for "housing options"  Pt reports he was staying at the [facility] prior to admission  Pt has income  Worker explained there are no immediate housing options worker can provide for pt  Worker explained if pt was interested in a mental health group home, a referral could possibly be made for him but there is a waitlist  Pt refusing a group home  Pt reports he has income so he wants his own room to rent or apartment  Pt reports he would really prefer something more than an apartment or room for rent because he has family  Worker explained from an inpatient setting, worker cannot arrange personal housing for pt  Pt asked about [employer] [place]  Worker explained [employer] [place] requires a walk-in appointment to apply for financial assistance and then pt will have 10 days to hand in all necessary documents  Pt asking worker to schedule him an appointment  Worker educated pt that he would have to physically go to the office himself  Pt irritable asking worker "so what do you do for me"  Worker explained she will arrange him outpatient mental health services, but she cannot provide him housing  Pt walking away stating "okay, I probably won't talk to you again until I have to sign my discharge papers"

## 2020-01-28 NOTE — PROGRESS NOTES
01/28/20 0823   Team Meeting   Meeting Type Daily Rounds   Initial Conference Date 01/28/20   Team Members Present   Team Members Present Physician;Nurse;   Physician Team Member Dr Jazlyn Zabala Team Member [facility]  Management Team Member Eduardo Maldonado   Patient/Family Present   Patient Present No   Patient's Family Present No   Neurontin and C[place] was increased  Signed 72 hour notice, rescinded it last evening  Discharge to be determined

## 2020-01-28 NOTE — NURSING NOTE
Right foot dressing changed  Appears to be healing without issue  No signs or symptoms of infection noted  Tolerated well  Will monitor

## 2020-01-28 NOTE — PROGRESS NOTES
Patient talked about his depression and being homeless  He is concerned about the  Cellulitis in his right leg  He is vincent his special shoes  Earlier today he was upset that he was not on the med's he took prior to admission  Suggested he discuss same with his doctor and said he felt much better after he did that  He cancelled his 72 hour notice  He was pleasant and cooperative the rest of the evening

## 2020-01-28 NOTE — NURSING NOTE
Patient came out of group activities c/o right foot pain  Tylenol 975mg requested and received  Will monitor

## 2020-01-28 NOTE — PROGRESS NOTES
KAY GROUP NOTE  Sat among peers but did not interact with them  Small verbal contribution at end of group      01/28/20 1400   Activity/Group Checklist   Group Personal control   Attendance Attended   Attendance Duration (min) 31-45   Interactions Interacted appropriately   Affect/Mood Appropriate   Goals Achieved Able to listen to others; Able to engage in interactions  (verbally scant)

## 2020-01-29 PROBLEM — E87.5 HYPERKALEMIA: Status: ACTIVE | Noted: 2020-01-29

## 2020-01-29 LAB
ALBUMIN SERPL BCP-MCNC: 3.8 G/DL (ref 3–5.2)
ALP SERPL-CCNC: 91 U/L (ref 43–122)
ALT SERPL W P-5'-P-CCNC: 76 U/L (ref 9–52)
ANION GAP SERPL CALCULATED.3IONS-SCNC: 11 MMOL/L (ref 5–14)
AST SERPL W P-5'-P-CCNC: 37 U/L (ref 17–59)
BILIRUB SERPL-MCNC: 0.3 MG/DL
BUN SERPL-MCNC: 25 MG/DL (ref 5–25)
CALCIUM SERPL-MCNC: 9.6 MG/DL (ref 8.4–10.2)
CHLORIDE SERPL-SCNC: 98 MMOL/L (ref 97–108)
CO2 SERPL-SCNC: 28 MMOL/L (ref 22–30)
CREAT SERPL-MCNC: 1.17 MG/DL (ref 0.7–1.5)
GFR SERPL CREATININE-BSD FRML MDRD: 76 ML/MIN/1.73SQ M
GLUCOSE P FAST SERPL-MCNC: 206 MG/DL (ref 70–99)
GLUCOSE SERPL-MCNC: 191 MG/DL (ref 65–140)
GLUCOSE SERPL-MCNC: 200 MG/DL (ref 65–140)
GLUCOSE SERPL-MCNC: 206 MG/DL (ref 70–99)
GLUCOSE SERPL-MCNC: 213 MG/DL (ref 65–140)
GLUCOSE SERPL-MCNC: 267 MG/DL (ref 65–140)
POTASSIUM SERPL-SCNC: 5.2 MMOL/L (ref 3.6–5)
PROT SERPL-MCNC: 7.5 G/DL (ref 5.9–8.4)
SODIUM SERPL-SCNC: 137 MMOL/L (ref 137–147)

## 2020-01-29 PROCEDURE — 99231 SBSQ HOSP IP/OBS SF/LOW 25: CPT | Performed by: INTERNAL MEDICINE

## 2020-01-29 PROCEDURE — 99232 SBSQ HOSP IP/OBS MODERATE 35: CPT | Performed by: PSYCHIATRY & NEUROLOGY

## 2020-01-29 PROCEDURE — 82948 REAGENT STRIP/BLOOD GLUCOSE: CPT

## 2020-01-29 PROCEDURE — 80053 COMPREHEN METABOLIC PANEL: CPT | Performed by: NURSE PRACTITIONER

## 2020-01-29 RX ORDER — GLIMEPIRIDE 1 MG/1
1 TABLET ORAL
Status: DISCONTINUED | OUTPATIENT
Start: 2020-01-30 | End: 2020-01-29 | Stop reason: SDUPTHER

## 2020-01-29 RX ORDER — GLIMEPIRIDE 2 MG/1
1 TABLET ORAL
Status: DISCONTINUED | OUTPATIENT
Start: 2020-01-30 | End: 2020-02-03 | Stop reason: HOSPADM

## 2020-01-29 RX ORDER — LISINOPRIL 10 MG/1
10 TABLET ORAL DAILY
Status: DISCONTINUED | OUTPATIENT
Start: 2020-01-30 | End: 2020-01-30

## 2020-01-29 RX ADMIN — CHLORPROMAZINE HYDROCHLORIDE 50 MG: 25 TABLET, SUGAR COATED ORAL at 21:24

## 2020-01-29 RX ADMIN — ACETAMINOPHEN 975 MG: 325 TABLET ORAL at 08:01

## 2020-01-29 RX ADMIN — BUSPIRONE HYDROCHLORIDE 10 MG: 10 TABLET ORAL at 08:02

## 2020-01-29 RX ADMIN — ATORVASTATIN CALCIUM 40 MG: 40 TABLET, FILM COATED ORAL at 17:46

## 2020-01-29 RX ADMIN — GABAPENTIN 600 MG: 300 CAPSULE ORAL at 17:46

## 2020-01-29 RX ADMIN — Medication 250 MG: at 17:46

## 2020-01-29 RX ADMIN — BENZTROPINE MESYLATE 1 MG: 1 TABLET ORAL at 17:45

## 2020-01-29 RX ADMIN — ASPIRIN 81 MG: 81 TABLET ORAL at 08:02

## 2020-01-29 RX ADMIN — SODIUM BICARBONATE 650 MG: 650 TABLET ORAL at 08:00

## 2020-01-29 RX ADMIN — GABAPENTIN 600 MG: 300 CAPSULE ORAL at 08:01

## 2020-01-29 RX ADMIN — LISINOPRIL 5 MG: 5 TABLET ORAL at 08:00

## 2020-01-29 RX ADMIN — METOPROLOL TARTRATE 50 MG: 50 TABLET, FILM COATED ORAL at 21:23

## 2020-01-29 RX ADMIN — CHLORPROMAZINE HYDROCHLORIDE 50 MG: 25 TABLET, SUGAR COATED ORAL at 08:02

## 2020-01-29 RX ADMIN — ACETAMINOPHEN 975 MG: 325 TABLET ORAL at 21:23

## 2020-01-29 RX ADMIN — INSULIN LISPRO 4 UNITS: 100 INJECTION, SOLUTION INTRAVENOUS; SUBCUTANEOUS at 21:22

## 2020-01-29 RX ADMIN — CHLORPROMAZINE HYDROCHLORIDE 50 MG: 25 TABLET, SUGAR COATED ORAL at 17:46

## 2020-01-29 RX ADMIN — GABAPENTIN 600 MG: 300 CAPSULE ORAL at 21:24

## 2020-01-29 RX ADMIN — INSULIN GLARGINE 40 UNITS: 100 INJECTION, SOLUTION SUBCUTANEOUS at 08:10

## 2020-01-29 RX ADMIN — BENZTROPINE MESYLATE 1 MG: 1 TABLET ORAL at 08:02

## 2020-01-29 RX ADMIN — INSULIN LISPRO 2 UNITS: 100 INJECTION, SOLUTION INTRAVENOUS; SUBCUTANEOUS at 08:14

## 2020-01-29 RX ADMIN — COLLAGENASE SANTYL: 250 OINTMENT TOPICAL at 08:10

## 2020-01-29 RX ADMIN — METOPROLOL TARTRATE 50 MG: 50 TABLET, FILM COATED ORAL at 08:02

## 2020-01-29 RX ADMIN — METFORMIN HYDROCHLORIDE 1000 MG: 500 TABLET ORAL at 17:46

## 2020-01-29 RX ADMIN — METFORMIN HYDROCHLORIDE 1000 MG: 500 TABLET ORAL at 08:01

## 2020-01-29 RX ADMIN — TRAZODONE HYDROCHLORIDE 50 MG: 50 TABLET ORAL at 21:24

## 2020-01-29 RX ADMIN — CARIPRAZINE 3 MG: 3 CAPSULE, GELATIN COATED ORAL at 08:01

## 2020-01-29 RX ADMIN — INSULIN LISPRO 6 UNITS: 100 INJECTION, SOLUTION INTRAVENOUS; SUBCUTANEOUS at 17:46

## 2020-01-29 RX ADMIN — Medication 250 MG: at 08:02

## 2020-01-29 RX ADMIN — INSULIN LISPRO 4 UNITS: 100 INJECTION, SOLUTION INTRAVENOUS; SUBCUTANEOUS at 13:01

## 2020-01-29 RX ADMIN — LORAZEPAM 0.5 MG: 0.5 TABLET ORAL at 02:32

## 2020-01-29 RX ADMIN — BUSPIRONE HYDROCHLORIDE 10 MG: 10 TABLET ORAL at 17:45

## 2020-01-29 NOTE — ASSESSMENT & PLAN NOTE
Patient with a small nonhealing ulcer right plantar midfoot  Previous TMA  MRI shows cellulitis but no osteomyelitis  Patient was previously following with Podiatry  Reviewed Dr Delia Guadarrama note  Continue with current dressing changes    Continue vantin 400mg PO BID through 1/27/2020 per ID

## 2020-01-29 NOTE — PROGRESS NOTES
Patient flat and soft spoken upon approach  Patient reports "high anxiety" and requests ativan  Patient made aware he had it this morning and it was too early for same  Patient verbalized understanding  Med and meal compliant  Doctor aware pt potassium is 5 2  Patient reported he "is interested in ECT" Patient also reports "poor sleep" and per report he was up all night with a male peer  Patient given tylenol for leg pain as requested and did report some relief   Will monitor

## 2020-01-29 NOTE — PROGRESS NOTES
01/29/20 1000   Activity/Group Checklist   Group Other (Comment)  (Art Therapy/Create a Tree, Process Discussion)   Attendance Attended   Attendance Duration (min) Greater than 60   Interactions Interacted appropriately   Affect/Mood Appropriate   Goals Achieved Able to listen to others; Able to engage in interactions; Able to recieve feedback; Able to give feedback to another

## 2020-01-29 NOTE — ASSESSMENT & PLAN NOTE
Lab Results   Component Value Date    HGBA1C 8 4 (H) 07/10/2019       Recent Labs     01/28/20  1659 01/28/20  2036 01/29/20  0813 01/29/20  1223   POCGLU 215* 238* 191* 200*       Blood Sugar Average: Last 72 hrs:  (P) 207 875      uncontrolled diabetic  Continue current regimen of Lantus 40 units daily and sliding scale insulin coverage  Discussed with patient the importance of controlling his diabetes or he will end up with a further amputation  Will restart Metformin  Re-start low dose lisinopril 2 5mg daily

## 2020-01-29 NOTE — PROGRESS NOTES
Pt has been pleasant and cooperative, visible/social  Pt is med/meal compliant  Pt denies any anxiety, depression, SI, HI, AH, or VH  Pt's sugar prior to dinner was 215, which warranted 4 units of Humalog  Pt's sugar prior to snack was 238, which warranted 4 units of Humalog  Will continue to monitor

## 2020-01-29 NOTE — PROGRESS NOTES
Patient approached nurse's station at 0230 to report increased anxiety, and to request PRN Ativan  Patient was scored utilizing the [address], and he scored a 19, indicating moderate anxiety  As per doctor's orders, Ativan 0 5 mg was provided at 0232  Upon reassessment an hour later, patient scored a 7, indicating mild anxiety, and he reported this as well as he is still sitting up in conference room talking with another patient  Will continue to monitor and offer therapeutic support

## 2020-01-29 NOTE — PLAN OF CARE
Problem: DEPRESSION  Goal: Will be euthymic at discharge  Description  INTERVENTIONS:  - Administer medication as ordered  - Provide emotional support via 1:1 interaction with staff  - Encourage involvement in milieu/groups/activities  - Monitor for social isolation  Outcome: Progressing     Problem: ANXIETY  Goal: Will report anxiety at manageable levels  Description  INTERVENTIONS:  - Administer medication as ordered  - Teach and encourage coping skills  - Provide emotional support  - Assess patient/family for anxiety and ability to cope  Outcome: Progressing  Goal: By discharge: Patient will verbalize 2 strategies to deal with anxiety  Description  Interventions:  - Identify any obvious source/trigger to anxiety  - Staff will assist patient in applying identified coping technique/skills  - Encourage attendance of scheduled groups and activities  Outcome: Progressing

## 2020-01-29 NOTE — PROGRESS NOTES
01/29/20 0854   Team Meeting   Meeting Type Daily Rounds   Initial Conference Date 01/29/20   Team Members Present   Team Members Present Physician;Nurse;   Physician Team Member Dr Artem Langford Team Member [facility] Management Team Member [place]   Patient/Family Present   Patient Present No   Patient's Family Present No   Depakote to be decreased due to low sodium level  Meds to be monitored

## 2020-01-29 NOTE — PROGRESS NOTES
Patient has remained awake for entire shift sitting in conference room socializing with another peer  Patient had blood work ordered for this morning, and T staff attempted to draw, however, were unsuccessful after 5 tries as patient encouraged staff to make extra multiple attempts  Will let next shift know that these attempts were made, and ask next shift to make another attempt

## 2020-01-29 NOTE — PROGRESS NOTES
01/29/20 1300   Activity/Group Checklist   Group   (recovery group)   Attendance Attended   Attendance Duration (min) 46-60   Interactions Interacted appropriately   Affect/Mood Appropriate   Goals Achieved Identified triggers; Identified feelings; Able to listen to others; Able to engage in interactions; Able to self-disclose

## 2020-01-29 NOTE — PROGRESS NOTES
Progress Note - Vj Tim 1975, 38 y o  male MRN: [id number]    Unit/Bed#: [facility] 383-02 Encounter: [id number]    Primary Care Provider: No primary care provider on file  Date and time admitted to hospital: 1/23/2020  7:54 PM        Hyperkalemia  Assessment & Plan  Will continue to monitor  Essential hypertension  Assessment & Plan  Vitals:    01/29/20 0700   BP: 141/93   Pulse: 64   Resp: 16   Temp: 97 9 °F (36 6 °C)   SpO2:         BP is elevated  Continue metoprolol 50 mg twice a day  Now that his renal function has returned to normal will restart lisinopril but will increase lisinopril to 10mg tablet  Type 2 diabetes mellitus with diabetic polyneuropathy, with long-term current use of insulin [facility])  Assessment & Plan  Lab Results   Component Value Date    HGBA1C 8 4 (H) 07/10/2019       Recent Labs     01/28/20  1659 01/28/20  2036 01/29/20  0813 01/29/20  1223   POCGLU 215* 238* 191* 200*       Blood Sugar Average: Last 72 hrs:  (P) 207 875      uncontrolled diabetic  Continue current regimen of Lantus 40 units daily and sliding scale insulin coverage  Discussed with patient the importance of controlling his diabetes or he will end up with a further amputation  Will restart Metformin  Re-start low dose lisinopril 2 5mg daily  Diabetic ulcer of right midfoot [facility])  Assessment & Plan  Patient with a small nonhealing ulcer right plantar midfoot  Previous TMA  MRI shows cellulitis but no osteomyelitis  Patient was previously following with Podiatry  Reviewed Dr Padmaja Doan note  Continue with current dressing changes  Continue vantin 400mg PO BID through 1/27/2020 per ID        * Bipolar 2 disorder [facility])  Assessment & Plan   Per psychiatry        VTE Pharmacologic Prophylaxis:       Patient Centered Rounds: I have performed bedside rounds with nursing staff today  Current Length of Stay: 6 day(s)    Current Patient Status: Inpatient Psych   Certification Statement:  The patient will continue to require additional inpatient hospital stay due to behavioral health     Discharge Plan: As per treatment team     Code Status: Level 1 - Full Code    Subjective: Follow-up on, hypertension, diabetes and hyperkalemia  Patient continues to report that he is not on the medications that he was on at home, nursing has reported to me that patient's pharmacy was verified and medications were verified multiple times  Patient has no new concerns    Objective:     Vitals:   Temp (24hrs), Av °F (36 7 °C), Min:97 4 °F (36 3 °C), Max:98 6 °F (37 °C)    Temp:  [97 4 °F (36 3 °C)-98 6 °F (37 °C)] 97 9 °F (36 6 °C)  HR:  [64-72] 64  Resp:  [16] 16  BP: (126-146)/(80-93) 141/93  SpO2:  [98 %-99 %] 99 %  Body mass index is 36 62 kg/m²  Review of Systems   Constitutional: Negative for activity change, appetite change, chills, diaphoresis, fatigue and fever  Respiratory: Negative for cough, chest tightness, shortness of breath and wheezing  Cardiovascular: Negative for chest pain, palpitations and leg swelling  Gastrointestinal: Negative for abdominal pain, constipation, diarrhea, nausea and vomiting  Genitourinary: Negative for dysuria and frequency  Musculoskeletal: Positive for arthralgias and gait problem  Skin: Positive for wound  Neurological: Positive for numbness  Negative for dizziness, light-headedness and headaches  Psychiatric/Behavioral: Positive for dysphoric mood and sleep disturbance  The patient is nervous/anxious  Input and Output Summary (last 24 hours):     No intake or output data in the 24 hours ending 20 1504    Physical Exam:     Physical Exam   Constitutional: He is oriented to person, place, and time  He appears well-developed and well-nourished  No distress  obese   HENT:   Head: Normocephalic and atraumatic     Right Ear: External ear normal    Left Ear: External ear normal    Nose: Nose normal    Mouth/Throat: Oropharynx is clear and moist  No oropharyngeal exudate  Eyes: Pupils are equal, round, and reactive to light  Conjunctivae and EOM are normal  Right eye exhibits no discharge  Left eye exhibits no discharge  Neck: Normal range of motion  Neck supple  No thyromegaly present  Cardiovascular: Normal rate, regular rhythm, normal heart sounds and intact distal pulses  Exam reveals no gallop and no friction rub  No murmur heard  Pulmonary/Chest: Effort normal and breath sounds normal  No stridor  No respiratory distress  He has no wheezes  He has no rales  Abdominal: Soft  Bowel sounds are normal  He exhibits no distension  There is no tenderness  Musculoskeletal:   Mild erythema and swelling to RLE  No significant warmth  Plantar surface with small open wound 1 5 cm x 1 5cm  No drainage noted  Dressing was clean and dress  B/L TMA   Lymphadenopathy:     He has no cervical adenopathy  Neurological: He is alert and oriented to person, place, and time  Skin: Skin is warm and dry  He is not diaphoretic         Additional Data:     Labs:    Results from last 7 days   Lab Units 01/27/20  0640   WBC Thousand/uL 8 70   HEMOGLOBIN g/dL 13 7   HEMATOCRIT % 40 8*   PLATELETS Thousands/uL 402   BANDS PCT % 2   LYMPHO PCT % 33   MONO PCT % 7   EOS PCT % 1     Results from last 7 days   Lab Units 01/29/20  0713   SODIUM mmol/L 137   POTASSIUM mmol/L 5 2*   CHLORIDE mmol/L 98   CO2 mmol/L 28   BUN mg/dL 25   CREATININE mg/dL 1 17   ANION GAP mmol/L 11   CALCIUM mg/dL 9 6   ALBUMIN g/dL 3 8   TOTAL BILIRUBIN mg/dL 0 30   ALK PHOS U/L 91   ALT U/L 76*   AST U/L 37   GLUCOSE RANDOM mg/dL 206*         Results from last 7 days   Lab Units 01/29/20  1223 01/29/20  0813 01/28/20  2036 01/28/20  1659 01/28/20  1141 01/28/20  0734 01/27/20  2117 01/27/20  1716 01/27/20  1206 01/27/20  0728 01/26/20  2027 01/26/20  1831   POC GLUCOSE mg/dl 200* 191* 238* 215* 251* 229* 258* 224* 236* 220* 207* 238*                   * I Have Reviewed All Lab Data Listed Above  * Additional Pertinent Lab Tests Reviewed: All Labs Within Last 24 Hours Reviewed        Recent Cultures (last 7 days):           Last 24 Hours Medication List:     Current Facility-Administered Medications:  acetaminophen 650 mg Oral Q6H PRN Carlos Perez MD   acetaminophen 650 mg Oral Q4H PRN Carlos Perez MD   acetaminophen 975 mg Oral Q6H PRN Carlos Perez MD   aluminum-magnesium hydroxide-simethicone 30 mL Oral Q4H PRN Carlos Perez MD   aspirin 81 mg Oral Daily Mara Mcdonough MD   atorvastatin 40 mg Oral Daily With Dinner Mara Mcdonough MD   benztropine 1 mg Intramuscular Q6H PRN Carlos Perez MD   benztropine 1 mg Oral Q6H PRN Carlos Perez MD   benztropine 1 mg Oral BID Mara Mcdonough MD   busPIRone 10 mg Oral BID Mara Mcdonough MD   cariprazine 3 mg Oral Daily Carlos Perez MD   chlorproMAZINE 50 mg Oral TID Carlos Perez MD   collagenase  Topical Daily Clendeninashley Hanley DPM   gabapentin 600 mg Oral TID Carlos Perez MD   [START ON 1/30/2020] glimepiride 1 mg Oral Daily With Breakfast TAYLOR Villar   haloperidol 5 mg Oral Q8H PRN Carlos Perez MD   haloperidol lactate 5 mg Intramuscular Q6H PRN Carlos Perez MD   hydrOXYzine HCL 25 mg Oral Q6H PRN Carlos Perez MD   insulin glargine 40 Units Subcutaneous QAM TAYLOR Santos   insulin lispro 2-12 Units Subcutaneous HS Mara Mcdonough MD   insulin lispro 2-12 Units Subcutaneous TID AC Mara Mcdonough MD   [START ON 1/30/2020] lisinopril 10 mg Oral Daily TAYLOR Villar   LORazepam 1 mg Intramuscular Q6H PRN Carlos Perez MD   LORazepam 0 5 mg Oral Q8H PRN Mara Mcdonough MD   magnesium hydroxide 30 mL Oral Daily PRN Carlos Perez MD   metFORMIN 1,000 mg Oral BID With Meals TAYLOR Santos   metoprolol tartrate 50 mg Oral Q12H [address] Mara Mcdonough MD   nicotine polacrilex 2 mg Oral Q2H PRN Carlos Perez MD   OLANZapine 10 mg Intramuscular Q3H PRN Hollie Leo MD   OLANZapine 10 mg Oral Q3H PRN Hollie Leo MD   risperiDONE 1 mg Oral Q3H PRN Hollie Leo MD   saccharomyces boulardii 250 mg Oral BID Lulú Gu MD   sodium bicarbonate 650 mg Oral Daily Lulú Gu MD   traZODone 50 mg Oral HS PRN Hollie Leo MD   traZODone 50 mg Oral HS Lulú Gu MD        Today, Patient Was Seen By: TAYLOR Montoya    M*Modal software was used to dictate this note  It may contain errors with dictating incorrect words or incorrect spelling  Please contact the provider directly with any questions

## 2020-01-30 PROBLEM — Z98.890 HISTORY OF ELECTROCONVULSIVE THERAPY: Status: ACTIVE | Noted: 2020-01-30

## 2020-01-30 LAB
ALBUMIN SERPL BCP-MCNC: 3.1 G/DL (ref 3–5.2)
ALP SERPL-CCNC: 88 U/L (ref 43–122)
ALT SERPL W P-5'-P-CCNC: 57 U/L (ref 9–52)
ANION GAP SERPL CALCULATED.3IONS-SCNC: 8 MMOL/L (ref 5–14)
AST SERPL W P-5'-P-CCNC: 29 U/L (ref 17–59)
BILIRUB SERPL-MCNC: 0.2 MG/DL
BUN SERPL-MCNC: 21 MG/DL (ref 5–25)
CALCIUM SERPL-MCNC: 9.1 MG/DL (ref 8.4–10.2)
CHLORIDE SERPL-SCNC: 102 MMOL/L (ref 97–108)
CO2 SERPL-SCNC: 28 MMOL/L (ref 22–30)
CREAT SERPL-MCNC: 0.93 MG/DL (ref 0.7–1.5)
GFR SERPL CREATININE-BSD FRML MDRD: 100 ML/MIN/1.73SQ M
GLUCOSE P FAST SERPL-MCNC: 218 MG/DL (ref 70–99)
GLUCOSE SERPL-MCNC: 128 MG/DL (ref 65–140)
GLUCOSE SERPL-MCNC: 206 MG/DL (ref 65–140)
GLUCOSE SERPL-MCNC: 218 MG/DL (ref 70–99)
GLUCOSE SERPL-MCNC: 253 MG/DL (ref 65–140)
GLUCOSE SERPL-MCNC: 278 MG/DL (ref 65–140)
POTASSIUM SERPL-SCNC: 4.9 MMOL/L (ref 3.6–5)
PROT SERPL-MCNC: 6.3 G/DL (ref 5.9–8.4)
SODIUM SERPL-SCNC: 138 MMOL/L (ref 137–147)
VALPROATE SERPL-MCNC: <10 UG/ML (ref 50–120)

## 2020-01-30 PROCEDURE — 80053 COMPREHEN METABOLIC PANEL: CPT | Performed by: NURSE PRACTITIONER

## 2020-01-30 PROCEDURE — 82948 REAGENT STRIP/BLOOD GLUCOSE: CPT

## 2020-01-30 PROCEDURE — 80164 ASSAY DIPROPYLACETIC ACD TOT: CPT | Performed by: NURSE PRACTITIONER

## 2020-01-30 PROCEDURE — 99232 SBSQ HOSP IP/OBS MODERATE 35: CPT | Performed by: NURSE PRACTITIONER

## 2020-01-30 PROCEDURE — 99232 SBSQ HOSP IP/OBS MODERATE 35: CPT | Performed by: INTERNAL MEDICINE

## 2020-01-30 RX ORDER — GABAPENTIN 300 MG/1
300 CAPSULE ORAL 3 TIMES DAILY
Status: DISCONTINUED | OUTPATIENT
Start: 2020-01-30 | End: 2020-02-03 | Stop reason: HOSPADM

## 2020-01-30 RX ORDER — INSULIN GLARGINE 100 [IU]/ML
42 INJECTION, SOLUTION SUBCUTANEOUS EVERY MORNING
Status: DISCONTINUED | OUTPATIENT
Start: 2020-01-31 | End: 2020-02-03 | Stop reason: HOSPADM

## 2020-01-30 RX ADMIN — LORAZEPAM 0.5 MG: 0.5 TABLET ORAL at 15:54

## 2020-01-30 RX ADMIN — CARIPRAZINE 3 MG: 3 CAPSULE, GELATIN COATED ORAL at 08:04

## 2020-01-30 RX ADMIN — SODIUM BICARBONATE 650 MG: 650 TABLET ORAL at 08:05

## 2020-01-30 RX ADMIN — INSULIN LISPRO 6 UNITS: 100 INJECTION, SOLUTION INTRAVENOUS; SUBCUTANEOUS at 21:18

## 2020-01-30 RX ADMIN — LISINOPRIL 10 MG: 10 TABLET ORAL at 08:05

## 2020-01-30 RX ADMIN — Medication 250 MG: at 08:06

## 2020-01-30 RX ADMIN — ASPIRIN 81 MG: 81 TABLET ORAL at 08:03

## 2020-01-30 RX ADMIN — GABAPENTIN 300 MG: 300 CAPSULE ORAL at 21:18

## 2020-01-30 RX ADMIN — ATORVASTATIN CALCIUM 40 MG: 40 TABLET, FILM COATED ORAL at 17:42

## 2020-01-30 RX ADMIN — METFORMIN HYDROCHLORIDE 1000 MG: 500 TABLET ORAL at 17:42

## 2020-01-30 RX ADMIN — ACETAMINOPHEN 975 MG: 325 TABLET ORAL at 15:54

## 2020-01-30 RX ADMIN — METOPROLOL TARTRATE 50 MG: 50 TABLET, FILM COATED ORAL at 21:18

## 2020-01-30 RX ADMIN — BUSPIRONE HYDROCHLORIDE 10 MG: 10 TABLET ORAL at 17:42

## 2020-01-30 RX ADMIN — CHLORPROMAZINE HYDROCHLORIDE 50 MG: 25 TABLET, SUGAR COATED ORAL at 21:18

## 2020-01-30 RX ADMIN — TRAZODONE HYDROCHLORIDE 50 MG: 50 TABLET ORAL at 21:18

## 2020-01-30 RX ADMIN — METOPROLOL TARTRATE 50 MG: 50 TABLET, FILM COATED ORAL at 08:04

## 2020-01-30 RX ADMIN — GABAPENTIN 600 MG: 300 CAPSULE ORAL at 08:03

## 2020-01-30 RX ADMIN — INSULIN LISPRO 6 UNITS: 100 INJECTION, SOLUTION INTRAVENOUS; SUBCUTANEOUS at 17:43

## 2020-01-30 RX ADMIN — GLIMEPIRIDE 1 MG: 2 TABLET ORAL at 08:03

## 2020-01-30 RX ADMIN — Medication 250 MG: at 17:42

## 2020-01-30 RX ADMIN — BENZTROPINE MESYLATE 1 MG: 1 TABLET ORAL at 17:42

## 2020-01-30 RX ADMIN — CHLORPROMAZINE HYDROCHLORIDE 50 MG: 25 TABLET, SUGAR COATED ORAL at 17:41

## 2020-01-30 RX ADMIN — CHLORPROMAZINE HYDROCHLORIDE 50 MG: 25 TABLET, SUGAR COATED ORAL at 08:05

## 2020-01-30 RX ADMIN — GABAPENTIN 300 MG: 300 CAPSULE ORAL at 17:42

## 2020-01-30 RX ADMIN — INSULIN LISPRO 4 UNITS: 100 INJECTION, SOLUTION INTRAVENOUS; SUBCUTANEOUS at 08:09

## 2020-01-30 RX ADMIN — METFORMIN HYDROCHLORIDE 1000 MG: 500 TABLET ORAL at 08:06

## 2020-01-30 RX ADMIN — BUSPIRONE HYDROCHLORIDE 10 MG: 10 TABLET ORAL at 08:05

## 2020-01-30 RX ADMIN — INSULIN GLARGINE 40 UNITS: 100 INJECTION, SOLUTION SUBCUTANEOUS at 08:09

## 2020-01-30 RX ADMIN — BENZTROPINE MESYLATE 1 MG: 1 TABLET ORAL at 08:07

## 2020-01-30 RX ADMIN — COLLAGENASE SANTYL: 250 OINTMENT TOPICAL at 08:08

## 2020-01-30 NOTE — PROGRESS NOTES
01/30/20 0854   Team Meeting   Meeting Type Daily Rounds   Initial Conference Date 01/30/20   Team Members Present   Team Members Present Physician;Nurse;   Physician Team Member Dr Burke Goldstein Team Member [facility] Management Team Member Chloe Ang    Patient/Family Present   Patient Present No   Patient's Family Present No   Asking about ECT treatments, to be reviewed and discussed  Meds to be monitored  Discharge to be determined

## 2020-01-30 NOTE — ASSESSMENT & PLAN NOTE
Patient with a small nonhealing ulcer right plantar midfoot  Previous TMA  MRI shows cellulitis but no osteomyelitis  Patient was previously following with Podiatry  Reviewed Dr Tyesha Kim note  Continue with current dressing changes    Continue vantin 400mg PO BID through 1/27/2020 per ID

## 2020-01-30 NOTE — PROGRESS NOTES
Pt denies SI/HI and AVH  He stated he feels depressed and is hopeful for ECT treatments  He is mostly seclusive in his room but was out for breakfast   He showered and his right foot dressing done  There is no sign of infection noted on wound

## 2020-01-30 NOTE — ASSESSMENT & PLAN NOTE
Lab Results   Component Value Date    HGBA1C 8 4 (H) 07/10/2019       Recent Labs     01/29/20  1719 01/29/20  2042 01/30/20  0745 01/30/20  1139   POCGLU 267* 213* 206* 128       Blood Sugar Average: Last 72 hrs:  (P) 219 [id number]      uncontrolled diabetic  Will include use Lantus to 42 units daily and sliding scale insulin coverage  Patient restarted on metformin and Amaryl  Discussed with patient the importance of controlling his diabetes or he will end up with a further amputation  Will restart Metformin  Re-start low dose lisinopril 2 5mg daily

## 2020-01-30 NOTE — PROGRESS NOTES
Pt has been pleasant and cooperative, visible/ social at times  Pt is med/meal compliant  Pt reports a 4/4 anxiety and 6/10 depression but denies any SI, HI, AH, or VH  Potassium to be drawn in the morning  Pt was given Tylenol 975 mg PRN at 2123 for 7/10 headache, foot pain, and neck pain  Will continue to monitor

## 2020-01-30 NOTE — ASSESSMENT & PLAN NOTE
Patient with a small nonhealing ulcer right plantar midfoot  Previous TMA  MRI shows cellulitis but no osteomyelitis  Patient was previously following with Podiatry  Reviewed Dr Lange Duty note  Continue with current dressing changes    Continue vantin 400mg PO BID through 1/27/2020 per ID

## 2020-01-30 NOTE — PROGRESS NOTES
Sharonda Eagle  was seen for continuing care and reviewed with staff  Progress Note - Behavioral Health   Sharonda Eagle 38 y o  male MRN: @MRN   Unit/Bed#: [facility] 115-85 Encounter: [id number]       Report from staff regarding this patient received and discussed, and records reviewed prior to seeing this patient   Despite some slight improvement of the patient depression that today his stated as 6 or 7, the patient continued to feel significantly depressed, has lack of energy, inability to motivate himself to participate in  Activities, was found laying in his bed, and stating that he does not feel any significant improvement  The patient ask if we can discuss ECT as a treatment of his a treatment resistant depression  The patient stated he had ECT before and ECT the treatment lead to long lasting improvement of patient's depression so he could function as outpatient for more than a year  We discussed that ECT may lead to some side effects such as memory loss or headaches or muscle pain and other side effects that may be also associated with anesthesia, the patient stated he did not have any significant memory loss or any other side effects of ECT after completing 8 sessions  His recent lab reviewed, his potassium was found to be elevated 5 2  The writer will follow his potassium tomorrow and will request Medicine consult to provide medical clearance       Sleep: improved  Appetite: normal    Medication side effects:no complaints    Mental Status Evaluation:    Appearance:  casually dressed   Behavior:  cooperative, calm   Mood:  depressed, anxious   Affect: constricted    Speech:  decreased rate, slow   Thought Process:  concrete   Thought Content:  negative thinking   Perceptual Disturbances: no auditory hallucinations, no visual hallucinations, denies auditory hallucinations when asked, does not appear responding to internal stimuli   Risk Potential: Suicidal ideation - None at present, contracts for safety on the unit  Homicidal ideation - None  Potential for aggression - No   Sensorium:  oriented to person, place and time   Memory:  recent and remote memory grossly intact   Consciousness:  alert and awake   Insight:  limited   Judgment: limited   Motor Activity: no abnormal movements         Laboratory results:  I have personally reviewed all pertinent laboratory results      BMP:   Recent Labs     01/28/20  0718 01/29/20  0713   K 4 9 5 2*    98   CO2 26 28   BUN 23 25     Vitals:    01/29/20 2123   BP: 121/62   Pulse: 82   Resp:    Temp:    SpO2:         Medication Administration - last 24 hours from 01/28/2020 2126 to 01/29/2020 2126       Date/Time Order Dose Route Action Action by     01/29/2020 2123 acetaminophen (TYLENOL) tablet 975 mg 975 mg Oral Given Yessica Welsh RN     01/29/2020 0801 acetaminophen (TYLENOL) tablet 975 mg 975 mg Oral Given Miriam Moe RN     01/29/2020 2122 insulin lispro (HumaLOG) 100 units/mL subcutaneous injection 2-12 Units 4 Units Subcutaneous Given Yessica Welsh RN     01/28/2020 2138 insulin lispro (HumaLOG) 100 units/mL subcutaneous injection 2-12 Units 4 Units Subcutaneous Given Yessica Welsh RN     01/29/2020 1746 insulin lispro (HumaLOG) 100 units/mL subcutaneous injection 2-12 Units 6 Units Subcutaneous Given Yessica Welsh RN     01/29/2020 1301 insulin lispro (HumaLOG) 100 units/mL subcutaneous injection 2-12 Units 4 Units Subcutaneous Given Miriam Moe RN     01/29/2020 2856 insulin lispro (HumaLOG) 100 units/mL subcutaneous injection 2-12 Units 2 Units Subcutaneous Given Miriam Moe RN     01/29/2020 0802 aspirin (ECOTRIN LOW STRENGTH) EC tablet 81 mg 81 mg Oral Given Miriam Moe RN     01/29/2020 1746 atorvastatin (LIPITOR) tablet 40 mg 40 mg Oral Given Yessica Welsh RN     01/29/2020 1745 benztropine (COGENTIN) tablet 1 mg 1 mg Oral Given Yessica Welsh RN     01/29/2020 0802 benztropine (COGENTIN) tablet 1 mg 1 mg Oral Given Miriam Moe RN     01/29/2020 1745 busPIRone (BUSPAR) tablet 10 mg 10 mg Oral Given Kaushik Choudhury, RN     01/29/2020 0802 busPIRone (BUSPAR) tablet 10 mg 10 mg Oral Given Suzan Medina, RN     01/29/2020 0232 LORazepam (ATIVAN) tablet 0 5 mg 0 5 mg Oral Given Erin Morton, RN     01/29/2020 2123 metoprolol tartrate (LOPRESSOR) tablet 50 mg 50 mg Oral Given Kaushik Choudhury, RN     01/29/2020 0802 metoprolol tartrate (LOPRESSOR) tablet 50 mg 50 mg Oral Given Suzan Carol, RN     01/28/2020 2134 metoprolol tartrate (LOPRESSOR) tablet 50 mg 50 mg Oral Given Kaushik Choudhury, RN     01/29/2020 1746 saccharomyces boulardii (FLORASTOR) capsule 250 mg 250 mg Oral Given Kaushik Choudhury, RN     01/29/2020 0802 saccharomyces boulardii (FLORASTOR) capsule 250 mg 250 mg Oral Given Suzan Carol, RN     01/29/2020 0800 sodium bicarbonate tablet 650 mg 650 mg Oral Given Suzan Medina, RN     01/29/2020 2124 traZODone (DESYREL) tablet 50 mg 50 mg Oral Given Kaushik Choudhury, RN     01/28/2020 2134 traZODone (DESYREL) tablet 50 mg 50 mg Oral Given Kaushik Choudhury, RN     01/29/2020 2124 chlorproMAZINE (THORAZINE) tablet 50 mg 50 mg Oral Given Kaushik Choudhury, RN     01/29/2020 1746 chlorproMAZINE (THORAZINE) tablet 50 mg 50 mg Oral Given Kaushik Choudhury, RN     01/29/2020 0802 chlorproMAZINE (THORAZINE) tablet 50 mg 50 mg Oral Given Suzan Carol, RN     01/28/2020 2134 chlorproMAZINE (THORAZINE) tablet 50 mg 50 mg Oral Given Kaushik Choudhury, RN     01/29/2020 0810 collagenase (SANTYL) ointment   Topical Given Suzan Carol, RN     01/29/2020 0801 cariprazine (VRAYLAR) capsule 3 mg 3 mg Oral Given Suzan Carol, RN     01/29/2020 2124 gabapentin (NEURONTIN) capsule 600 mg 600 mg Oral Given Kaushik Choudhury, RN     01/29/2020 1746 gabapentin (NEURONTIN) capsule 600 mg 600 mg Oral Given Kaushik Choudhury RN     01/29/2020 0801 gabapentin (NEURONTIN) capsule 600 mg 600 mg Oral Given Suzan Medina RN     01/28/2020 2133 gabapentin (NEURONTIN) capsule 600 mg 600 mg Oral Given Kaushik Choudhury RN     01/29/2020 1746 metFORMIN (GLUCOPHAGE) tablet 1,000 mg 1,000 mg Oral Given Kaushik Choudhury RN 01/29/2020 0801 metFORMIN (GLUCOPHAGE) tablet 1,000 mg 1,000 mg Oral Given Lucila Mccracken RN     01/29/2020 0800 lisinopril (ZESTRIL) tablet 5 mg 5 mg Oral Given Lucila Mccracken RN     01/29/2020 1704 insulin glargine (LANTUS) subcutaneous injection 40 Units 0 4 mL 40 Units Subcutaneous Given Lucila Mccracken RN              Assessment/Plan   Principal Problem:    Bipolar 2 disorder ([facility] )  Active Problems:    Tobacco use disorder    Diabetic ulcer of right midfoot ([facility] )    Type 2 diabetes mellitus with diabetic polyneuropathy, with long-term current use of insulin ([facility])    Essential hypertension    Cellulitis of right lower extremity    Acute kidney injury (NAIF) with acute tubular necrosis (ATN) ([facility])    Medical clearance for psychiatric admission    Hyperkalemia          Current Facility-Administered Medications:     acetaminophen (TYLENOL) tablet 650 mg, 650 mg, Oral, Q6H PRN, Jam Huang MD    acetaminophen (TYLENOL) tablet 650 mg, 650 mg, Oral, Q4H PRN, Jam Huang MD, 650 mg at 01/25/20 2127    acetaminophen (TYLENOL) tablet 975 mg, 975 mg, Oral, Q6H PRN, Jam Huang MD, 975 mg at 01/29/20 2123    aluminum-magnesium hydroxide-simethicone (MYLANTA) 200-200-20 mg/5 mL oral suspension 30 mL, 30 mL, Oral, Q4H PRN, Jam Huang MD    aspirin (ECOTRIN LOW STRENGTH) EC tablet 81 mg, 81 mg, Oral, Daily, Judie Leone MD, 81 mg at 01/29/20 0802    atorvastatin (LIPITOR) tablet 40 mg, 40 mg, Oral, Daily With Dinner, Judie Leone MD, 40 mg at 01/29/20 1746    benztropine (COGENTIN) injection 1 mg, 1 mg, Intramuscular, Q6H PRN, Jam Huang MD    benztropine (COGENTIN) tablet 1 mg, 1 mg, Oral, Q6H PRN, Jam Huang MD    benztropine (COGENTIN) tablet 1 mg, 1 mg, Oral, BID, Judie Leone MD, 1 mg at 01/29/20 1745    busPIRone (BUSPAR) tablet 10 mg, 10 mg, Oral, BID, Judie Leone MD, 10 mg at 01/29/20 1745    cariprazine (VRAYLAR) capsule 3 mg, 3 mg, Oral, Daily, Jam Hill, MD, 3 mg at 01/29/20 0801    chlorproMAZINE (THORAZINE) tablet 50 mg, 50 mg, Oral, TID, Carlos Perez MD, 50 mg at 01/29/20 2124    collagenase (SANTYL) ointment, , Topical, Daily, Lucia Hanley, DPM    gabapentin (NEURONTIN) capsule 600 mg, 600 mg, Oral, TID, Carlos Perez MD, 600 mg at 01/29/20 2124    [START ON 1/30/2020] glimepiride (AMARYL) tablet 1 mg, 1 mg, Oral, Daily With Breakfast, TAYLOR Santos    haloperidol (HALDOL) tablet 5 mg, 5 mg, Oral, Q8H PRN, Carlos Perze MD    haloperidol lactate (HALDOL) injection 5 mg, 5 mg, Intramuscular, Q6H PRN, Carlos Perez MD    hydrOXYzine HCL (ATARAX) tablet 25 mg, 25 mg, Oral, Q6H PRN, Carlos Perez MD    insulin glargine (LANTUS) subcutaneous injection 40 Units 0 4 mL, 40 Units, Subcutaneous, QAM, TAYLOR Santos, 40 Units at 01/29/20 0810    insulin lispro (HumaLOG) 100 units/mL subcutaneous injection 2-12 Units, 2-12 Units, Subcutaneous, HS, Mara Mcdonough MD, 4 Units at 01/29/20 2122    insulin lispro (HumaLOG) 100 units/mL subcutaneous injection 2-12 Units, 2-12 Units, Subcutaneous, TID AC, 6 Units at 01/29/20 1746 **AND** Fingerstick Glucose (POCT), , , TID AC, Mara Mcdonough MD    [START ON 1/30/2020] lisinopril (ZESTRIL) tablet 10 mg, 10 mg, Oral, Daily, TAYLOR Villar    LORazepam (ATIVAN) 2 mg/mL injection 1 mg, 1 mg, Intramuscular, Q6H PRN, Carlos Perez MD    LORazepam (ATIVAN) tablet 0 5 mg, 0 5 mg, Oral, Q8H PRN, Mara Mcdonough MD, 0 5 mg at 01/29/20 0232    magnesium hydroxide (MILK OF MAGNESIA) 400 mg/5 mL oral suspension 30 mL, 30 mL, Oral, Daily PRN, Carlos Perez MD    metFORMIN (GLUCOPHAGE) tablet 1,000 mg, 1,000 mg, Oral, BID With Meals, TAYLOR Santos, 1,000 mg at 01/29/20 1746    metoprolol tartrate (LOPRESSOR) tablet 50 mg, 50 mg, Oral, Q12H [facility], Mara Mcdonough MD, 50 mg at 01/29/20 2123    nicotine polacrilex (NICORETTE) gum 2 mg, 2 mg, Oral, Q2H PRN, Patricio Craig MD    OLANZapine (ZyPREXA) IM injection 10 mg, 10 mg, Intramuscular, Q3H PRN, Patricio Craig MD    OLANZapine (ZyPREXA) tablet 10 mg, 10 mg, Oral, Q3H PRN, Patricio Craig MD    risperiDONE (RisperDAL M-TABS) dispersible tablet 1 mg, 1 mg, Oral, Q3H PRN, Patricio Craig MD    saccharomyces boulardii (FLORASTOR) capsule 250 mg, 250 mg, Oral, BID, Fabiola Rodriguez MD, 250 mg at 01/29/20 1746    sodium bicarbonate tablet 650 mg, 650 mg, Oral, Daily, Fabiola Rodriguez MD, 650 mg at 01/29/20 0800    traZODone (DESYREL) tablet 50 mg, 50 mg, Oral, HS PRN, Patricio Craig MD    traZODone (DESYREL) tablet 50 mg, 50 mg, Oral, HS, Fabiola Rodriguez MD, 50 mg at 01/29/20 2124    Recommended Treatment:   The patient cariprazine was recently increased and will not change his dose today  Will follow potassium level and consider to start ECT  Planned medication and treatment changes: All current active medications have been reviewed  Continue treatment with group therapy, milieu therapy, occupational therapy and medication management  Risks / Benefits of Treatment:    Risks, benefits, and possible side effects of medications explained to patient and patient verbalizes understanding and agreement for treatment  Planned medication and treatment changes: All current active medications have been reviewed  Continue treatment with group therapy, milieu therapy, occupational therapy and medication management  Counseling / Coordination of Care:    Patient's progress discussed with staff in treatment team meeting  ** Please Note: This note has been constructed using a voice recognition system   **      ----------------------------------------------------------------------------------------------------------------

## 2020-01-30 NOTE — ASSESSMENT & PLAN NOTE
Vitals:    01/30/20 1500   BP: 139/87   Pulse:    Resp: 16   Temp: 97 9 °F (36 6 °C)   SpO2:         BP is elevated  Continue metoprolol 50 mg twice a day  Now that his renal function has returned to normal will restart lisinopril but will increase lisinopril to 15mg tablet

## 2020-01-30 NOTE — PROGRESS NOTES
Progress Note - Behavioral Health   Mcintosh Walter 38 y o  male MRN: [id number]  Unit/Bed#: [facility] Car 383-02 Encounter: [id number]    Assessment/Plan   Principal Problem:    Bipolar 2 disorder ([facility] )  Active Problems:    Tobacco use disorder    Diabetic ulcer of right midfoot ([facility] )    Type 2 diabetes mellitus with diabetic polyneuropathy, with long-term current use of insulin (HCC)    Essential hypertension    Cellulitis of right lower extremity    Acute kidney injury (NAIF) with acute tubular necrosis (ATN) ([facility])    Medical clearance for psychiatric admission    Hyperkalemia      Subjective:Patient was seen today for continuation of care, records reviewed and  patient was discussed with the morning case review team  Cuca Singer seen in his room, in bed resting  He reported feeling depressed and anxious this morning, and states he was hopeful that ECT treatment will help his depression because he had success with prior ECT  Cuca Singer has been seclusive to himself, social with selective peers and attend some groups  He reported sleeping well and good appetite  Cuca Singer denies endorsing any suicidal or homicidal ideation  Does not seem to be experiencing any manic or psychotic symptoms during our encounter  He remains medication compliance and denies any side effects from medications  Cuca Singre will start ECT in the morning (6 sessions)- provided his potassium level is WNL, reduced Neurontin to 300 mg/TID and continue on other medications  Vitals:  Vitals:    01/30/20 0734   BP: 141/91   Pulse: 56   Resp: 16   Temp: (!) 97 °F (36 1 °C)   SpO2:        Laboratory results:    I have personally reviewed all pertinent laboratory/tests results    Most Recent Labs:   Lab Results   Component Value Date    WBC 8 70 01/27/2020    RBC 5 05 01/27/2020    HGB 13 7 01/27/2020    HCT 40 8 (L) 01/27/2020     01/27/2020    RDW 14 0 01/27/2020    NEUTROABS 4 87 01/27/2020    SODIUM 138 01/30/2020    K 4 9 01/30/2020     01/30/2020    CO2 28 01/30/2020    BUN 21 01/30/2020    CREATININE 0 93 01/30/2020    GLUC 218 (H) 01/30/2020    GLUF 218 (H) 01/30/2020    CALCIUM 9 1 01/30/2020    AST 29 01/30/2020    ALT 57 (H) 01/30/2020    ALKPHOS 88 01/30/2020    TP 6 3 01/30/2020    ALB 3 1 01/30/2020    TBILI 0 20 01/30/2020    CHOLESTEROL 159 08/15/2019    HDL 38 (L) 08/15/2019    TRIG 105 08/15/2019    LDLCALC 100 08/15/2019    [place] 121 08/15/2019    VALPROICTOT <10 0 (L) 01/30/2020    LITHIUM <0 2 (L) 02/26/2018    [id number] 0 916 01/19/2020    RPR Non-Reactive 07/10/2019    HGBA1C 8 4 (H) 07/10/2019     07/10/2019       Psychiatric Review of Systems:    Behavior over the last 24 hours:  unchanged  Sleep: normal  Appetite: normal  Medication side effects: No  ROS: no complaints, denies any shortness of breath or chest pain and all other systems are negative      Mental Status Evaluation:    Appearance:  casually dressed, adequate grooming   Behavior:  pleasant, cooperative, calm   Speech:  normal rate and volume   Mood:  depressed, anxious   Affect:  flat   Thought Process:  coherent, goal directed   Associations: intact associations   Thought Content:  no overt delusions   Perceptual Disturbances: no auditory hallucinations, no visual hallucinations, denies when asked   Risk Potential: Suicidal ideation - None at present  Homicidal ideation - None  Potential for aggression - No   Sensorium:  oriented to person, place, time/date and situation   Memory:  recent and remote memory grossly intact   Consciousness:  alert and awake   Attention: attention span and concentration are age appropriate   Insight:  limited   Judgment: limited   Gait/Station: normal gait/station, normal balance   Motor Activity: no abnormal movements       Progress Toward Goals:     No significant improvement, still has depression and anxiety, denies suicidal thoughts    Assessment/Plan   Principal Problem:    Bipolar 2 disorder ([facility] )  Active Problems:    Tobacco use disorder    Diabetic ulcer of right midfoot (HCC)    Type 2 diabetes mellitus with diabetic polyneuropathy, with long-term current use of insulin (HCC)    Essential hypertension    Cellulitis of right lower extremity    Acute kidney injury (NAIF) with acute tubular necrosis (ATN) ([facility] )    Medical clearance for psychiatric admission    Hyperkalemia      Recommended Treatment: Treatment plan and medication changes discussed and per the attending physician the plan is: 1  Continue with group therapy, milieu therapy and occupational therapy  2  Behavioral Health checks every 7 minutes  3  Continue with current medication regimen  4  Will review labs in the a m   5  Disposition Planning: To start ECT= 6 session  Discharge pending    Behavioral Health Medications: all current active meds have been reviewed and continue current psychiatric medications  Current Facility-Administered Medications:  acetaminophen 650 mg Oral Q6H PRN Erwin Blue MD   acetaminophen 650 mg Oral Q4H PRN Erwin Blue MD   acetaminophen 975 mg Oral Q6H PRN Erwin Blue MD   aluminum-magnesium hydroxide-simethicone 30 mL Oral Q4H PRN Erwin Bule MD   aspirin 81 mg Oral Daily Tamika Germain MD   atorvastatin 40 mg Oral Daily With Dinner Tamika Germain MD   benztropine 1 mg Intramuscular Q6H PRN Erwin Blue MD   benztropine 1 mg Oral Q6H PRN Erwin Blue MD   benztropine 1 mg Oral BID Tamika Germain MD   busPIRone 10 mg Oral BID Tamika Germain MD   cariprazine 3 mg Oral Daily Erwin Blue MD   chlorproMAZINE 50 mg Oral TID Erwin Blue MD   collagenase  Topical Daily Rylie Ray DPM   gabapentin 300 mg Oral TID Erwin Blue MD   glimepiride 1 mg Oral Daily With Breakfast TAYLOR Villar   haloperidol 5 mg Oral Q8H PRN Erwin Blue MD   haloperidol lactate 5 mg Intramuscular Q6H PRN Erwin Blue MD   hydrOXYzine HCL 25 mg Oral Q6H PRN MD Collin Ramírez ON 1/31/2020] insulin glargine 42 Units Subcutaneous QAM TAYLOR Villar   insulin lispro 2-12 Units Subcutaneous HS Mike Sanz MD   insulin lispro 2-12 Units Subcutaneous TID AC Mike Sanz MD   [START ON 1/31/2020] lisinopril 15 mg Oral Daily TAYLOR Villar   LORazepam 1 mg Intramuscular Q6H PRN Tiara Wall MD   LORazepam 0 5 mg Oral Q8H PRN Mike Sanz MD   magnesium hydroxide 30 mL Oral Daily PRN Tiara Wall MD   metFORMIN 1,000 mg Oral BID With Meals TAYLOR Poon   metoprolol tartrate 50 mg Oral Q12H [facility] Mike Sanz MD   nicotine polacrilex 2 mg Oral Q2H PRN Tiara Wall MD   OLANZapine 10 mg Intramuscular Q3H PRN Tiara Wall MD   OLANZapine 10 mg Oral Q3H PRN Tiara Wall MD   risperiDONE 1 mg Oral Q3H PRN Tiara Wall MD   saccharomyces boulardii 250 mg Oral BID Mike Sanz MD   sodium bicarbonate 650 mg Oral Daily Mike Sanz MD   traZODone 50 mg Oral HS PRN Tiara Wall MD   traZODone 50 mg Oral HS Mike Sanz MD       Risks / Benefits of Treatment:     Risks, benefits, and possible side effects of medications explained to patient and patient verbalizes understanding and agreement for treatment  Counseling / Coordination of Care:     Patient's progress reviewed with nursing staff  Medications, treatment progress and treatment plan reviewed with patient  Supportive counseling provided to the patient            TAYLOR Arriaga

## 2020-01-30 NOTE — PLAN OF CARE
Problem: DEPRESSION  Goal: Will be euthymic at discharge  Description  INTERVENTIONS:  - Administer medication as ordered  - Provide emotional support via 1:1 interaction with staff  - Encourage involvement in milieu/groups/activities  - Monitor for social isolation  Outcome: Progressing     Problem: ANXIETY  Goal: Will report anxiety at manageable levels  Description  INTERVENTIONS:  - Administer medication as ordered  - Teach and encourage coping skills  - Provide emotional support  - Assess patient/family for anxiety and ability to cope  Outcome: Progressing  Goal: By discharge: Patient will verbalize 2 strategies to deal with anxiety  Description  Interventions:  - Identify any obvious source/trigger to anxiety  - Staff will assist patient in applying identified coping technique/skills  - Encourage attendance of scheduled groups and activities  Outcome: Progressing     Problem: DISCHARGE PLANNING  Goal: Discharge to home or other facility with appropriate resources  Description  INTERVENTIONS:  - Identify barriers to discharge w/patient and caregiver  - Arrange for needed discharge resources and transportation as appropriate  - Identify discharge learning needs (meds, wound care, etc )  - Arrange for interpretive services to assist at discharge as needed  - Refer to Case Management Department for coordinating discharge planning if the patient needs post-hospital services based on physician/advanced practitioner order or complex needs related to functional status, cognitive ability, or social support system  Outcome: Progressing     Problem: Ineffective Coping  Goal: Participates in unit activities  Description  Interventions:  - Provide therapeutic environment   - Provide required programming   - Redirect inappropriate behaviors   Outcome: Progressing

## 2020-01-30 NOTE — ASSESSMENT & PLAN NOTE
Patient cleared for ECT treatment, patient seen by myself and Dr Nai Rivera,  Patient denies history of seizures, and palpitations  Patient reports past medical history of ECT treatment with no complications

## 2020-01-30 NOTE — PROGRESS NOTES
Progress Note - Dexter Siemens 1975, 38 y o  male MRN: [id number]    Unit/Bed#: [facility]ron [facility] 383-02 Encounter: [id number]    Primary Care Provider: No primary care provider on file  Date and time admitted to hospital: 1/23/2020  7:54 PM        History of electroconvulsive therapy  Assessment & Plan   Patient cleared for ECT treatment, patient seen by myself and Dr Leopoldo Bodo,  Patient denies history of seizures, and palpitations  Patient reports past medical history of ECT treatment with no complications  Hyperkalemia  Assessment & Plan   Potassium went from 5 2 to 4 9  Acute kidney injury (NAIF) with acute tubular necrosis (ATN) (HCC)  Assessment & Plan  Results from last 7 days   Lab Units 01/30/20  0600 01/29/20  0713 01/28/20  0718   CREATININE mg/dL 0 93 1 17 0 99       Renal function improved and back to baseline   1-1 3  Avoid nephrotoxic medications  Cellulitis of right lower extremity  Assessment & Plan  R leg cellulitis also with nonhealing wound on R plantar midfoot  MRI showed cellulitis, but no osteomyelitis  antibiotic changed to vantin 400mg PO BID through 1/27/2020  Venous Doppler negative for DVT but does show a large inguinal lymph nodes which may be infectious versus malignancy will need outpatient follow-up  Essential hypertension  Assessment & Plan  Vitals:    01/30/20 1500   BP: 139/87   Pulse:    Resp: 16   Temp: 97 9 °F (36 6 °C)   SpO2:         BP is elevated  Continue metoprolol 50 mg twice a day  Now that his renal function has returned to normal will restart lisinopril but will increase lisinopril to 15mg tablet      Type 2 diabetes mellitus with diabetic polyneuropathy, with long-term current use of insulin [facility])  Assessment & Plan  Lab Results   Component Value Date    HGBA1C 8 4 (H) 07/10/2019       Recent Labs     01/29/20  2042 01/30/20  0745 01/30/20  1139 01/30/20  1642   POCGLU 213* 206* 128 278*       Blood Sugar Average: Last 72 hrs:  (P) 223 6 uncontrolled diabetic  Will include use Lantus to 42 units daily and sliding scale insulin coverage  Patient restarted on metformin and Amaryl  Discussed with patient the importance of controlling his diabetes or he will end up with a further amputation  Will restart Metformin  Re-start low dose lisinopril 2 5mg daily  Diabetic ulcer of right midfoot [facility])  Assessment & Plan  Patient with a small nonhealing ulcer right plantar midfoot  Previous TMA  MRI shows cellulitis but no osteomyelitis  Patient was previously following with Podiatry  Reviewed Dr Kendall Snider note  Continue with current dressing changes  Continue vantin 400mg PO BID through 2020 per ID        Tobacco use disorder  Assessment & Plan   Provided encouragement  Continue Nicorette gum    * Bipolar 2 disorder [facility])  Assessment & Plan   Per psychiatry        VTE Pharmacologic Prophylaxis:       Patient Centered Rounds: I have performed bedside rounds with nursing staff today  Current Length of Stay: 7 day(s)    Current Patient Status: Inpatient Psych   Certification Statement: The patient will continue to require additional inpatient hospital stay due to behavioral health     Discharge Plan: As per treatment team     Code Status: Level 1 - Full Code    Subjective: Follow-up on, hypertension, diabetes, hyperkalemia and increased liver enzymes  Nursing also requesting clearance for ECT treatment  Patient reports he has had ECT treatment in the past with no complications  Patient denies history of seizures, no recent headaches, no recent changes in vision, denies abdominal bloating, nausea and vomiting  Patient denies palpitations  Objective:     Vitals:   Temp (24hrs), Av 5 °F (36 4 °C), Min:97 °F (36 1 °C), Max:97 9 °F (36 6 °C)    Temp:  [97 °F (36 1 °C)-97 9 °F (36 6 °C)] 97 9 °F (36 6 °C)  HR:  [56-82] 56  Resp:  [16] 16  BP: (121-141)/(62-91) 139/87  Body mass index is 36 62 kg/m²       Review of Systems   Constitutional: Negative for activity change, appetite change, chills, diaphoresis and fever  Eyes: Negative for pain, discharge, itching and visual disturbance  Respiratory: Negative for cough, chest tightness, shortness of breath and wheezing  Cardiovascular: Negative for chest pain, palpitations and leg swelling  Gastrointestinal: Negative for abdominal pain, constipation, diarrhea, nausea and vomiting  Endocrine: Negative for polydipsia, polyphagia and polyuria  Genitourinary: Negative for difficulty urinating, dysuria and urgency  Musculoskeletal: Positive for arthralgias and gait problem  Negative for back pain and neck pain  Skin: Negative for rash and wound  Neurological: Negative for dizziness, weakness, numbness and headaches  Input and Output Summary (last 24 hours):     No intake or output data in the 24 hours ending 01/30/20 1650    Physical Exam:     Physical Exam   Constitutional: He is oriented to person, place, and time  He appears well-developed and well-nourished  No distress  HENT:   Head: Normocephalic and atraumatic  Right Ear: External ear normal    Left Ear: External ear normal    Nose: Nose normal    Mouth/Throat: Oropharynx is clear and moist  No oropharyngeal exudate  Eyes: Pupils are equal, round, and reactive to light  Conjunctivae and EOM are normal  Right eye exhibits no discharge  Left eye exhibits no discharge  Neck: Normal range of motion  Neck supple  No thyromegaly present  Cardiovascular: Normal rate, regular rhythm, normal heart sounds and intact distal pulses  Exam reveals no gallop and no friction rub  No murmur heard  Pulmonary/Chest: Effort normal and breath sounds normal  No stridor  No respiratory distress  He has no wheezes  He has no rales  Abdominal: Soft  Bowel sounds are normal  He exhibits no distension  There is no tenderness  Musculoskeletal:   Mild erythema and swelling to RLE  No significant warmth    Plantar surface with small open wound 1 5 cm x 1 5cm  No drainage noted  Dressing was clean and dress  B/L TMA   Lymphadenopathy:     He has no cervical adenopathy  Neurological: He is alert and oriented to person, place, and time  Skin: Skin is warm and dry  He is not diaphoretic  Additional Data:     Labs:    Results from last 7 days   Lab Units 01/27/20  0640   WBC Thousand/uL 8 70   HEMOGLOBIN g/dL 13 7   HEMATOCRIT % 40 8*   PLATELETS Thousands/uL 402   BANDS PCT % 2   LYMPHO PCT % 33   MONO PCT % 7   EOS PCT % 1     Results from last 7 days   Lab Units 01/30/20  0600   SODIUM mmol/L 138   POTASSIUM mmol/L 4 9   CHLORIDE mmol/L 102   CO2 mmol/L 28   BUN mg/dL 21   CREATININE mg/dL 0 93   ANION GAP mmol/L 8   CALCIUM mg/dL 9 1   ALBUMIN g/dL 3 1   TOTAL BILIRUBIN mg/dL 0 20   ALK PHOS U/L 88   ALT U/L 57*   AST U/L 29   GLUCOSE RANDOM mg/dL 218*         Results from last 7 days   Lab Units 01/30/20  1642 01/30/20  1139 01/30/20  0745 01/29/20  2042 01/29/20  1719 01/29/20  1223 01/29/20  0813 01/28/20  2036 01/28/20  1659 01/28/20  1141 01/28/20  0734 01/27/20  2117   POC GLUCOSE mg/dl 278* 128 206* 213* 267* 200* 191* 238* 215* 251* 229* 258*                   * I Have Reviewed All Lab Data Listed Above  * Additional Pertinent Lab Tests Reviewed:  All Labs Within Last 24 Hours Reviewed        Recent Cultures (last 7 days):           Last 24 Hours Medication List:     Current Facility-Administered Medications:  acetaminophen 650 mg Oral Q6H PRN Yeny Vasquez MD   acetaminophen 650 mg Oral Q4H PRN Yeny Vasquez MD   acetaminophen 975 mg Oral Q6H PRN Yeny Vasquez MD   aluminum-magnesium hydroxide-simethicone 30 mL Oral Q4H PRN Yeny Vasquez MD   aspirin 81 mg Oral Daily Feroz Bhatti MD   atorvastatin 40 mg Oral Daily With Dinner Feroz Bhatti MD   benztropine 1 mg Intramuscular Q6H PRN Yeny Vasquez MD   benztropine 1 mg Oral Q6H PRN Yeny Vasquez MD   benztropine 1 mg Oral BID Annabel Parnell MD   busPIRone 10 mg Oral BID Annabel Parnell MD   cariprazine 3 mg Oral Daily Agustina Welsh MD   chlorproMAZINE 50 mg Oral TID Agustina Welsh MD   collagenase  Topical Daily Tomás Feliz, DPM   gabapentin 300 mg Oral TID Agustina Welsh MD   glimepiride 1 mg Oral Daily With Breakfast TAYLOR Villar   haloperidol 5 mg Oral Q8H PRN Agustina Welsh MD   haloperidol lactate 5 mg Intramuscular Q6H PRN Agustina Welsh MD   hydrOXYzine HCL 25 mg Oral Q6H PRN Agustina Welsh MD   [START ON 1/31/2020] insulin glargine 42 Units Subcutaneous QAM TAYLOR Galicia   insulin lispro 2-12 Units Subcutaneous HS Annabel Parnell MD   insulin lispro 2-12 Units Subcutaneous TID AC Annabel Parnell MD   [START ON 1/31/2020] lisinopril 15 mg Oral Daily TAYLOR Villar   LORazepam 1 mg Intramuscular Q6H PRN Agustina Welsh MD   LORazepam 0 5 mg Oral Q8H PRN Annabel Parnell MD   magnesium hydroxide 30 mL Oral Daily PRN Agustina Welsh MD   metFORMIN 1,000 mg Oral BID With Meals TAYLOR Galicia   metoprolol tartrate 50 mg Oral Q12H [address] Annabel Parnell MD   nicotine polacrilex 2 mg Oral Q2H PRN Agustina Welsh MD   OLANZapine 10 mg Intramuscular Q3H PRN Agustina Welsh MD   OLANZapine 10 mg Oral Q3H PRN Agustina Welsh MD   risperiDONE 1 mg Oral Q3H PRN Agustina Welsh MD   saccharomyces boulardii 250 mg Oral BID Annabel Parnell MD   sodium bicarbonate 650 mg Oral Daily Annabel Parnell MD   traZODone 50 mg Oral HS PRN MD kaila FordZODone 50 mg Oral HS Annabel Parnell MD        Today, Patient Was Seen By: TAYLOR Richardson    M*Modal software was used to dictate this note  It may contain errors with dictating incorrect words or incorrect spelling  Please contact the provider directly with any questions

## 2020-01-30 NOTE — ASSESSMENT & PLAN NOTE
Results from last 7 days   Lab Units 01/30/20  0600 01/29/20  0713 01/28/20  0718   CREATININE mg/dL 0 93 1 17 0 99       Renal function improved and back to baseline   1-1 3  Avoid nephrotoxic medications

## 2020-01-31 LAB
GLUCOSE SERPL-MCNC: 143 MG/DL (ref 65–140)
GLUCOSE SERPL-MCNC: 157 MG/DL (ref 65–140)
GLUCOSE SERPL-MCNC: 172 MG/DL (ref 65–140)
GLUCOSE SERPL-MCNC: 271 MG/DL (ref 65–140)

## 2020-01-31 PROCEDURE — 99232 SBSQ HOSP IP/OBS MODERATE 35: CPT | Performed by: PSYCHIATRY & NEUROLOGY

## 2020-01-31 PROCEDURE — 82948 REAGENT STRIP/BLOOD GLUCOSE: CPT

## 2020-01-31 RX ADMIN — TRAZODONE HYDROCHLORIDE 50 MG: 50 TABLET ORAL at 21:13

## 2020-01-31 RX ADMIN — GABAPENTIN 300 MG: 300 CAPSULE ORAL at 08:19

## 2020-01-31 RX ADMIN — CHLORPROMAZINE HYDROCHLORIDE 50 MG: 25 TABLET, SUGAR COATED ORAL at 08:19

## 2020-01-31 RX ADMIN — CHLORPROMAZINE HYDROCHLORIDE 50 MG: 25 TABLET, SUGAR COATED ORAL at 17:14

## 2020-01-31 RX ADMIN — LORAZEPAM 0.5 MG: 0.5 TABLET ORAL at 13:00

## 2020-01-31 RX ADMIN — METFORMIN HYDROCHLORIDE 1000 MG: 500 TABLET ORAL at 17:14

## 2020-01-31 RX ADMIN — CHLORPROMAZINE HYDROCHLORIDE 50 MG: 25 TABLET, SUGAR COATED ORAL at 21:13

## 2020-01-31 RX ADMIN — ATORVASTATIN CALCIUM 40 MG: 40 TABLET, FILM COATED ORAL at 17:13

## 2020-01-31 RX ADMIN — CARIPRAZINE 3 MG: 3 CAPSULE, GELATIN COATED ORAL at 08:19

## 2020-01-31 RX ADMIN — SODIUM BICARBONATE 650 MG: 650 TABLET ORAL at 08:19

## 2020-01-31 RX ADMIN — Medication 250 MG: at 08:20

## 2020-01-31 RX ADMIN — GLIMEPIRIDE 1 MG: 2 TABLET ORAL at 08:23

## 2020-01-31 RX ADMIN — BENZTROPINE MESYLATE 1 MG: 1 TABLET ORAL at 08:20

## 2020-01-31 RX ADMIN — Medication 250 MG: at 17:13

## 2020-01-31 RX ADMIN — BENZTROPINE MESYLATE 1 MG: 1 TABLET ORAL at 17:14

## 2020-01-31 RX ADMIN — GABAPENTIN 300 MG: 300 CAPSULE ORAL at 17:13

## 2020-01-31 RX ADMIN — GABAPENTIN 300 MG: 300 CAPSULE ORAL at 21:13

## 2020-01-31 RX ADMIN — INSULIN LISPRO 2 UNITS: 100 INJECTION, SOLUTION INTRAVENOUS; SUBCUTANEOUS at 12:04

## 2020-01-31 RX ADMIN — INSULIN LISPRO 6 UNITS: 100 INJECTION, SOLUTION INTRAVENOUS; SUBCUTANEOUS at 21:13

## 2020-01-31 RX ADMIN — ASPIRIN 81 MG: 81 TABLET ORAL at 08:19

## 2020-01-31 RX ADMIN — COLLAGENASE SANTYL: 250 OINTMENT TOPICAL at 11:09

## 2020-01-31 RX ADMIN — METOPROLOL TARTRATE 50 MG: 50 TABLET, FILM COATED ORAL at 21:13

## 2020-01-31 RX ADMIN — METOPROLOL TARTRATE 50 MG: 50 TABLET, FILM COATED ORAL at 08:19

## 2020-01-31 RX ADMIN — BUSPIRONE HYDROCHLORIDE 10 MG: 10 TABLET ORAL at 17:14

## 2020-01-31 RX ADMIN — LISINOPRIL 15 MG: 5 TABLET ORAL at 08:19

## 2020-01-31 RX ADMIN — METFORMIN HYDROCHLORIDE 1000 MG: 500 TABLET ORAL at 08:19

## 2020-01-31 RX ADMIN — INSULIN LISPRO 2 UNITS: 100 INJECTION, SOLUTION INTRAVENOUS; SUBCUTANEOUS at 08:20

## 2020-01-31 RX ADMIN — BUSPIRONE HYDROCHLORIDE 10 MG: 10 TABLET ORAL at 08:20

## 2020-01-31 RX ADMIN — INSULIN GLARGINE 42 UNITS: 100 INJECTION, SOLUTION SUBCUTANEOUS at 08:20

## 2020-01-31 NOTE — PROGRESS NOTES
Pt reports prn medication for anxiety/racing thoughts was effective in afternoon  Less anxious at this time  No SI  Slight irritability at times  Pt reports he rescinded/resigned 67 hour notice with plan to discharge Monday

## 2020-01-31 NOTE — PROGRESS NOTES
01/31/20 1828   Team Meeting   Meeting Type Daily Rounds   Initial Conference Date 01/31/20   Team Members Present   Team Members Present Physician;Nurse;   Physician Team Member Dr Jacquie Abel Team Member [facility] Management Team Member Ramses Coello   Patient/Family Present   Patient Present No   Patient's Family Present No   pt signed 72 hour notice  Pt now refusing ECT

## 2020-01-31 NOTE — PROGRESS NOTES
Pt visible in milieu throughout morning  Slightly irritable at times  Pt showered and dressing changed to R foot  Pt reports feeling ready for discharge and is hopeful to leave Monday  Pt said he now has a place to stay  Pt decided against ECT because he does not want to remain in the hospital longer  Pt said "if I want I could always do that outpatient " Pt denies SI and reports improved depression  No questions/concerns at this time

## 2020-01-31 NOTE — PROGRESS NOTES
Pt has been a little irritable throughout the day  Pt is ready to be discharged  Pt is med/meal compliant  Pt's blood sugar prior to dinner was 278, which warranted 6 units of Humalog  Pt's blood sugar prior to snack was 253, which warranted 6 units of Humalog  Pt reports a 3/4 anxiety and 9/10 depression but denies any SI, HI, AH, or VH  Will continue to monitor

## 2020-01-31 NOTE — PROGRESS NOTES
[facility] GROUP NOTE     01/31/20 0900   Activity/Group Checklist   Group Community meeting   Attendance Attended   Attendance Duration (min) 16-30   Interactions Interacted appropriately   Affect/Mood Appropriate   Goals Achieved Able to listen to others; Able to engage in interactions

## 2020-02-01 LAB
GLUCOSE SERPL-MCNC: 131 MG/DL (ref 65–140)
GLUCOSE SERPL-MCNC: 161 MG/DL (ref 65–140)
GLUCOSE SERPL-MCNC: 166 MG/DL (ref 65–140)
GLUCOSE SERPL-MCNC: 183 MG/DL (ref 65–140)
GLUCOSE SERPL-MCNC: 255 MG/DL (ref 65–140)

## 2020-02-01 PROCEDURE — 82948 REAGENT STRIP/BLOOD GLUCOSE: CPT

## 2020-02-01 PROCEDURE — 99232 SBSQ HOSP IP/OBS MODERATE 35: CPT | Performed by: NURSE PRACTITIONER

## 2020-02-01 RX ADMIN — METOPROLOL TARTRATE 50 MG: 50 TABLET, FILM COATED ORAL at 08:07

## 2020-02-01 RX ADMIN — BENZTROPINE MESYLATE 1 MG: 1 TABLET ORAL at 08:08

## 2020-02-01 RX ADMIN — Medication 250 MG: at 08:06

## 2020-02-01 RX ADMIN — INSULIN LISPRO 2 UNITS: 100 INJECTION, SOLUTION INTRAVENOUS; SUBCUTANEOUS at 12:06

## 2020-02-01 RX ADMIN — CARIPRAZINE 3 MG: 3 CAPSULE, GELATIN COATED ORAL at 08:08

## 2020-02-01 RX ADMIN — SODIUM BICARBONATE 650 MG: 650 TABLET ORAL at 08:08

## 2020-02-01 RX ADMIN — BENZTROPINE MESYLATE 1 MG: 1 TABLET ORAL at 17:09

## 2020-02-01 RX ADMIN — TRAZODONE HYDROCHLORIDE 50 MG: 50 TABLET ORAL at 21:13

## 2020-02-01 RX ADMIN — GABAPENTIN 300 MG: 300 CAPSULE ORAL at 08:08

## 2020-02-01 RX ADMIN — INSULIN GLARGINE 42 UNITS: 100 INJECTION, SOLUTION SUBCUTANEOUS at 08:09

## 2020-02-01 RX ADMIN — Medication 250 MG: at 17:09

## 2020-02-01 RX ADMIN — GABAPENTIN 300 MG: 300 CAPSULE ORAL at 21:13

## 2020-02-01 RX ADMIN — INSULIN LISPRO 2 UNITS: 100 INJECTION, SOLUTION INTRAVENOUS; SUBCUTANEOUS at 17:10

## 2020-02-01 RX ADMIN — COLLAGENASE SANTYL: 250 OINTMENT TOPICAL at 08:30

## 2020-02-01 RX ADMIN — BUSPIRONE HYDROCHLORIDE 10 MG: 10 TABLET ORAL at 08:07

## 2020-02-01 RX ADMIN — METFORMIN HYDROCHLORIDE 1000 MG: 500 TABLET ORAL at 08:06

## 2020-02-01 RX ADMIN — CHLORPROMAZINE HYDROCHLORIDE 50 MG: 25 TABLET, SUGAR COATED ORAL at 17:08

## 2020-02-01 RX ADMIN — CHLORPROMAZINE HYDROCHLORIDE 50 MG: 25 TABLET, SUGAR COATED ORAL at 21:13

## 2020-02-01 RX ADMIN — LISINOPRIL 15 MG: 5 TABLET ORAL at 08:09

## 2020-02-01 RX ADMIN — METOPROLOL TARTRATE 50 MG: 50 TABLET, FILM COATED ORAL at 21:24

## 2020-02-01 RX ADMIN — ACETAMINOPHEN 975 MG: 325 TABLET ORAL at 17:14

## 2020-02-01 RX ADMIN — INSULIN LISPRO 2 UNITS: 100 INJECTION, SOLUTION INTRAVENOUS; SUBCUTANEOUS at 08:09

## 2020-02-01 RX ADMIN — INSULIN LISPRO 6 UNITS: 100 INJECTION, SOLUTION INTRAVENOUS; SUBCUTANEOUS at 21:14

## 2020-02-01 RX ADMIN — ASPIRIN 81 MG: 81 TABLET ORAL at 08:06

## 2020-02-01 RX ADMIN — BUSPIRONE HYDROCHLORIDE 10 MG: 10 TABLET ORAL at 17:09

## 2020-02-01 RX ADMIN — METFORMIN HYDROCHLORIDE 1000 MG: 500 TABLET ORAL at 17:09

## 2020-02-01 RX ADMIN — ATORVASTATIN CALCIUM 40 MG: 40 TABLET, FILM COATED ORAL at 17:09

## 2020-02-01 RX ADMIN — GABAPENTIN 300 MG: 300 CAPSULE ORAL at 17:09

## 2020-02-01 RX ADMIN — CHLORPROMAZINE HYDROCHLORIDE 50 MG: 25 TABLET, SUGAR COATED ORAL at 08:07

## 2020-02-01 NOTE — PROGRESS NOTES
Progress Note - Behavioral Health   Marcelle Gonzalez 38 y o  male MRN: [id number]  Unit/Bed#: [facility] 383-02 Encounter: [id number]    Assessment/Plan   Principal Problem:    Bipolar 2 disorder ([facility] [address] )  Active Problems:    Tobacco use disorder    Diabetic ulcer of right midfoot ([facility] [address] )    Type 2 diabetes mellitus with diabetic polyneuropathy, with long-term current use of insulin (HCC)    Essential hypertension    Cellulitis of right lower extremity    Acute kidney injury (NAIF) with acute tubular necrosis (ATN) ([facility])    Medical clearance for psychiatric admission    Hyperkalemia    History of electroconvulsive therapy      Subjective:Patient was seen today for continuation of care, records reviewed and  patient was discussed with the morning case review team  Micaela Macedo was calm and pleasant on approach, reported sleeping well, good appetite and attend groups  Micaela Macedo reported less anxiety, less depression, and states that he was feeling much better now since he found an apartment and he no longer wants ECT because he is afraid house having memory loss  He signed a 72 hours notice to withdraw from treatment and states he wants to be discharged by Monday  Micaela Macedo denies endorsing any suicidal or homicidal ideation  Does not seem to be experiencing any manic or psychotic symptoms during our encounter  He remains medication compliance and denies any side effects from medications  Will continue on current medication regimen  Vitals:  Vitals:    02/01/20 0731   BP: 143/86   Pulse: 70   Resp: 16   Temp: 97 9 °F (36 6 °C)   SpO2:        Laboratory results:    I have personally reviewed all pertinent laboratory/tests results    Most Recent Labs:   Lab Results   Component Value Date    WBC 8 70 01/27/2020    RBC 5 05 01/27/2020    HGB 13 7 01/27/2020    HCT 40 8 (L) 01/27/2020     01/27/2020    RDW 14 0 01/27/2020    NEUTROABS 4 87 01/27/2020    SODIUM 138 01/30/2020    K 4 9 01/30/2020     01/30/2020    CO2 28 01/30/2020 BUN 21 01/30/2020    CREATININE 0 93 01/30/2020    GLUC 218 (H) 01/30/2020    GLUF 218 (H) 01/30/2020    CALCIUM 9 1 01/30/2020    AST 29 01/30/2020    ALT 57 (H) 01/30/2020    ALKPHOS 88 01/30/2020    TP 6 3 01/30/2020    ALB 3 1 01/30/2020    TBILI 0 20 01/30/2020    CHOLESTEROL 159 08/15/2019    HDL 38 (L) 08/15/2019    TRIG 105 08/15/2019    LDLCALC 100 08/15/2019    [place] 121 08/15/2019    VALPROICTOT <10 0 (L) 01/30/2020    LITHIUM <0 2 (L) 02/26/2018    DIR8XFOIKACH 0 916 01/19/2020    RPR Non-Reactive 07/10/2019    HGBA1C 8 4 (H) 07/10/2019     07/10/2019       Psychiatric Review of Systems:    Behavior over the last 24 hours:  unchanged  Sleep: normal  Appetite: normal  Medication side effects: No  ROS: no complaints, denies any shortness of breath or chest pain and all other systems are negative      Mental Status Evaluation:    Appearance:  casually dressed, dressed appropriately, adequate grooming   Behavior:  pleasant, cooperative, calm   Speech:  normal rate and volume   Mood:  depressed, anxious   Affect:  flat   Thought Process:  coherent, goal directed   Associations: intact associations   Thought Content:  no overt delusions   Perceptual Disturbances: no auditory hallucinations, no visual hallucinations, denies when asked   Risk Potential: Suicidal ideation - None at present  Homicidal ideation - None  Potential for aggression - No   Sensorium:  oriented to person, place, time/date and situation   Memory:  recent and remote memory grossly intact   Consciousness:  alert and awake   Attention: attention span and concentration are age appropriate   Insight:  fair   Judgment: fair   Gait/Station: uses cane   Motor Activity: no abnormal movements       Progress Toward Goals:     Slowly progressing    Assessment/Plan   Principal Problem:    Bipolar 2 disorder (HCC)  Active Problems:    Tobacco use disorder    Diabetic ulcer of right midfoot (HCC)    Type 2 diabetes mellitus with diabetic polyneuropathy, with long-term current use of insulin (HCC)    Essential hypertension    Cellulitis of right lower extremity    Acute kidney injury (NAIF) with acute tubular necrosis (ATN) (HCC)    Medical clearance for psychiatric admission    Hyperkalemia    History of electroconvulsive therapy      Recommended Treatment: Treatment plan and medication changes discussed and per the attending physician the plan is: 1  Continue with group therapy, milieu therapy and occupational therapy  2  Behavioral Health checks every 7 minutes  3  Continue with current medication regimen  4  Will review labs in the a m   5  Disposition Planning:    Behavioral Health Medications: all current active meds have been reviewed and continue current psychiatric medications  Current Facility-Administered Medications:  acetaminophen 650 mg Oral Q6H PRN Carlos Perez MD   acetaminophen 650 mg Oral Q4H PRN Carlos Perez MD   acetaminophen 975 mg Oral Q6H PRN Carlos Perez MD   aluminum-magnesium hydroxide-simethicone 30 mL Oral Q4H PRN Carlos Perez MD   aspirin 81 mg Oral Daily Mara Mcdonough MD   atorvastatin 40 mg Oral Daily With Dinner Mara Mcdonough MD   benztropine 1 mg Intramuscular Q6H PRN Carlos Perez MD   benztropine 1 mg Oral Q6H PRN Carlos Perez MD   benztropine 1 mg Oral BID Mara Mcdonough MD   busPIRone 10 mg Oral BID Mara Mcdonough MD   cariprazine 3 mg Oral Daily Carlos Perez MD   chlorproMAZINE 50 mg Oral TID Carlos Perez MD   collagenase  Topical Daily Lucia Hanley DPM   gabapentin 300 mg Oral TID Carlos Perez MD   glimepiride 1 mg Oral Daily With Breakfast TAYLOR Villar   haloperidol 5 mg Oral Q8H PRN Carlos Perez MD   haloperidol lactate 5 mg Intramuscular Q6H PRN Carlos Perez MD   hydrOXYzine HCL 25 mg Oral Q6H PRN Carlos Perez MD   insulin glargine 42 Units Subcutaneous QAM TAYLOR Santos   insulin lispro 2-12 Units Subcutaneous HS Markel Vazquez MD   insulin lispro 2-12 Units Subcutaneous TID AC Markel Vazquez MD   lisinopril 15 mg Oral Daily TAYLOR Villar   LORazepam 1 mg Intramuscular Q6H PRN Shreya Williamson MD   LORazepam 0 5 mg Oral Q8H PRN Markel Vazquez MD   magnesium hydroxide 30 mL Oral Daily PRN Shreya Williamson MD   metFORMIN 1,000 mg Oral BID With Meals TAYLOR Martinez   metoprolol tartrate 50 mg Oral Q12H [address] Markel Vazquez MD   nicotine polacrilex 2 mg Oral Q2H PRN Shreya Williamson MD   OLANZapine 10 mg Intramuscular Q3H PRN Shreya Williamson MD   OLANZapine 10 mg Oral Q3H PRN Shreya Williamson MD   risperiDONE 1 mg Oral Q3H PRN Shreya Williamson MD   saccharomyces boulardii 250 mg Oral BID Markel Vazquez MD   sodium bicarbonate 650 mg Oral Daily Markel Vazquez MD   traZODone 50 mg Oral HS PRN Shreya Williamson MD   traZODone 50 mg Oral HS Markel Vazquez MD       Risks / Benefits of Treatment:     Risks, benefits, and possible side effects of medications explained to patient and patient verbalizes understanding and agreement for treatment  Counseling / Coordination of Care:     Patient's progress reviewed with nursing staff  Medications, treatment progress and treatment plan reviewed with patient  Supportive counseling provided to the patient            TAYLOR Lala

## 2020-02-01 NOTE — PROGRESS NOTES
Bertrand Becerra  was seen for continuing care and reviewed with staff  Progress Note - Behavioral Health   Bertrand Becerra 38 y o  male MRN: @MRN   Unit/Bed#: [facility] 045-17 Encounter: [id number]       Report from staff regarding this patient received and discussed, and records reviewed prior to seeing this patient   The patient stated this morning that he reconsider ECT treatment because he was "hard stick quote, and suffer from multiple attempts to get his IV medications as a part of anesthesia, and this led to negative memories that came when the patient started to think about ECT  At the same time he felt that his depression is improving  He is working on solving some of his psychosocial stressors, including housing, with more activity and directedness  Sleep: improved  Appetite: normal    Medication side effects:no complaints    Mental Status Evaluation:    Appearance:  dressed appropriately, casually dressed   Behavior:  cooperative, calm   Mood:  improved, depressed, anxious   Affect: constricted    Speech:  normal rate and volume, normal pitch   Thought Process:  concrete   Thought Content:  normal   Perceptual Disturbances: no auditory hallucinations, no visual hallucinations, denies auditory hallucinations when asked, does not appear responding to internal stimuli   Risk Potential: Suicidal ideation - None, contracts for safety on the unit  Homicidal ideation - None  Potential for aggression - No   Sensorium:  oriented to person, place and time   Memory:  recent and remote memory grossly intact   Consciousness:  alert and awake   Insight:  improving   Judgment: improving   Motor Activity: no abnormal movements         Laboratory results:  I have personally reviewed all pertinent laboratory results      BMP:   Recent Labs     01/29/20  0713 01/30/20  0600   K 5 2* 4 9   CL 98 102   CO2 28 28   BUN 25 21     Vitals:    01/31/20 2113   BP: 112/61   Pulse: 82   Resp:    Temp:    SpO2:         Medication Administration - last 24 hours from 01/31/2020 0038 to 02/01/2020 0038       Date/Time Order Dose Route Action Action by     01/31/2020 2113 insulin lispro (HumaLOG) 100 units/mL subcutaneous injection 2-12 Units 6 Units Subcutaneous Given Vania Mcduffie, BLAKE     01/31/2020 1655 insulin lispro (HumaLOG) 100 units/mL subcutaneous injection 2-12 Units 2 Units Subcutaneous Not Given Shaylee Markham RN     01/31/2020 1204 insulin lispro (HumaLOG) 100 units/mL subcutaneous injection 2-12 Units 2 Units Subcutaneous Given Shaylee Markham RN     01/31/2020 0820 insulin lispro (HumaLOG) 100 units/mL subcutaneous injection 2-12 Units 2 Units Subcutaneous Given Shaylee Markham RN     01/31/2020 3975 aspirin (ECOTRIN LOW STRENGTH) EC tablet 81 mg 81 mg Oral Given Shaylee Markham RN     01/31/2020 1713 atorvastatin (LIPITOR) tablet 40 mg 40 mg Oral Given Shaylee Markham RN     01/31/2020 1714 benztropine (COGENTIN) tablet 1 mg 1 mg Oral Given Shaylee Markham RN     01/31/2020 0820 benztropine (COGENTIN) tablet 1 mg 1 mg Oral Given Shaylee Markham RN     01/31/2020 1714 busPIRone (BUSPAR) tablet 10 mg 10 mg Oral Given Shaylee Markham RN     01/31/2020 0820 busPIRone (BUSPAR) tablet 10 mg 10 mg Oral Given Shaylee Markham RN     01/31/2020 1300 LORazepam (ATIVAN) tablet 0 5 mg 0 5 mg Oral Given Shaylee Markham RN     01/31/2020 2113 metoprolol tartrate (LOPRESSOR) tablet 50 mg 50 mg Oral Given Vania Mcduffie, RN     01/31/2020 6657 metoprolol tartrate (LOPRESSOR) tablet 50 mg 50 mg Oral Given Shaylee Markham RN     01/31/2020 1713 saccharomyces boulardii (FLORASTOR) capsule 250 mg 250 mg Oral Given Shaylee Markham RN     01/31/2020 0820 saccharomyces boulardii (FLORASTOR) capsule 250 mg 250 mg Oral Given Shaylee Markham RN     01/31/2020 5062 sodium bicarbonate tablet 650 mg 650 mg Oral Given Shaylee Markham RN     01/31/2020 2113 traZODone (DESYREL) tablet 50 mg 50 mg Oral Given Vania Mcduffie, RN     01/31/2020 2113 chlorproMAZINE (THORAZINE) tablet 50 mg 50 mg Oral Given Vania Mcduffie, RN     01/31/2020 1714 chlorproMAZINE (THORAZINE) tablet 50 mg 50 mg Oral Given Sahylee Markham RN     01/31/2020 5866 chlorproMAZINE (THORAZINE) tablet 50 mg 50 mg Oral Given Shaylee Markham RN     01/31/2020 1109 collagenase (SANTYL) ointment   Topical Given Shaylee Markham RN     01/31/2020 5397 cariprazine (VRAYLAR) capsule 3 mg 3 mg Oral Given Shaylee Markham RN     01/31/2020 1714 metFORMIN (GLUCOPHAGE) tablet 1,000 mg 1,000 mg Oral Given Shaylee Markham RN     01/31/2020 6362 metFORMIN (GLUCOPHAGE) tablet 1,000 mg 1,000 mg Oral Given Shaylee Markham RN     01/31/2020 7120 glimepiride (AMARYL) tablet 1 mg 1 mg Oral Given Shaylee Markham RN     01/31/2020 2113 gabapentin (NEURONTIN) capsule 300 mg 300 mg Oral Given Vania Mcduffie RN     01/31/2020 1713 gabapentin (NEURONTIN) capsule 300 mg 300 mg Oral Given Shaylee Markham RN     01/31/2020 4765 gabapentin (NEURONTIN) capsule 300 mg 300 mg Oral Given Shaylee Markham RN     01/31/2020 4147 lisinopril (ZESTRIL) tablet 15 mg 15 mg Oral Given Shaylee Markham RN     01/31/2020 0820 insulin glargine (LANTUS) subcutaneous injection 42 Units 0 42 mL 42 Units Subcutaneous Given Coral Mckeon RN              Assessment/Plan   Principal Problem:    Bipolar 2 disorder ([facility] [address] )  Active Problems:    Tobacco use disorder    Diabetic ulcer of right midfoot ([facility] [address] )    Type 2 diabetes mellitus with diabetic polyneuropathy, with long-term current use of insulin ([facility] [address] )    Essential hypertension    Cellulitis of right lower extremity    Acute kidney injury (NAIF) with acute tubular necrosis (ATN) ([facility])    Medical clearance for psychiatric admission    Hyperkalemia    History of electroconvulsive therapy          Current Facility-Administered Medications:     acetaminophen (TYLENOL) tablet 650 mg, 650 mg, Oral, Q6H PRN, Moody Brownlee MD    acetaminophen (TYLENOL) tablet 650 mg, 650 mg, Oral, Q4H PRN, Moody Brownlee MD, 650 mg at 01/25/20 2127    acetaminophen (TYLENOL) tablet 975 mg, 975 mg, Oral, Q6H PRN, Moody Brownlee MD, 975 mg at 01/30/20 1554    aluminum-magnesium hydroxide-simethicone (MYLANTA) 200-200-20 mg/5 mL oral suspension 30 mL, 30 mL, Oral, Q4H PRN, Moody Brownlee MD    aspirin (ECOTRIN LOW STRENGTH) EC tablet 81 mg, 81 mg, Oral, Daily, Lily Vidal MD, 81 mg at 01/31/20 8039    atorvastatin (LIPITOR) tablet 40 mg, 40 mg, Oral, Daily With Sabrina Sandoval MD, 40 mg at 01/31/20 1713    benztropine (COGENTIN) injection 1 mg, 1 mg, Intramuscular, Q6H PRN, Moody Brownlee MD    benztropine (COGENTIN) tablet 1 mg, 1 mg, Oral, Q6H PRN, Moody Brownlee MD    benztropine (COGENTIN) tablet 1 mg, 1 mg, Oral, BID, Lily Vidal MD, 1 mg at 01/31/20 1714    busPIRone (BUSPAR) tablet 10 mg, 10 mg, Oral, BID, Lily Vidal MD, 10 mg at 01/31/20 1714    cariprazine (VRAYLAR) capsule 3 mg, 3 mg, Oral, Daily, Moody Brownlee MD, 3 mg at 01/31/20 6737    chlorproMAZINE (THORAZINE) tablet 50 mg, 50 mg, Oral, TID, Moody Brownlee MD, 50 mg at 01/31/20 2113    collagenase (SANTYL) ointment, , Topical, Daily, Elisa Parra DPM    gabapentin (NEURONTIN) capsule 300 mg, 300 mg, Oral, TID, Moody Brownlee MD, 300 mg at 01/31/20 2113    glimepiride (AMARYL) tablet 1 mg, 1 mg, Oral, Daily With Breakfast, TAYLOR Burt, 1 mg at 01/31/20 7334    haloperidol (HALDOL) tablet 5 mg, 5 mg, Oral, Q8H PRN, Moody Brownlee MD    haloperidol lactate (HALDOL) injection 5 mg, 5 mg, Intramuscular, Q6H PRN, Moody Brownlee MD    hydrOXYzine HCL (ATARAX) tablet 25 mg, 25 mg, Oral, Q6H PRN, Moody Brownlee MD    insulin glargine (LANTUS) subcutaneous injection 42 Units 0 42 mL, 42 Units, Subcutaneous, [facility], TAYLOR Villar, 42 Units at 01/31/20 0820    insulin lispro (HumaLOG) 100 units/mL subcutaneous injection 2-12 Units, 2-12 Units, Subcutaneous, HS, Tommie Whelan MD, 6 Units at 01/31/20 2113    insulin lispro (HumaLOG) 100 units/mL subcutaneous injection 2-12 Units, 2-12 Units, Subcutaneous, TID AC, 2 Units at 01/31/20 1204 **AND** Fingerstick Glucose (POCT), , , TID AC, Tommie Whelan MD    lisinopril (ZESTRIL) tablet 15 mg, 15 mg, Oral, Daily, TAYLOR Villar, 15 mg at 01/31/20 0819    LORazepam (ATIVAN) 2 mg/mL injection 1 mg, 1 mg, Intramuscular, Q6H PRN, Pamela Escamilla MD    LORazepam (ATIVAN) tablet 0 5 mg, 0 5 mg, Oral, Q8H PRN, Tommie Whelan MD, 0 5 mg at 01/31/20 1300    magnesium hydroxide (MILK OF MAGNESIA) 400 mg/5 mL oral suspension 30 mL, 30 mL, Oral, Daily PRN, Pamela Escamilla MD    metFORMIN (GLUCOPHAGE) tablet 1,000 mg, 1,000 mg, Oral, BID With Meals, TAYLOR Chen, 1,000 mg at 01/31/20 1714    metoprolol tartrate (LOPRESSOR) tablet 50 mg, 50 mg, Oral, Q12H [facility], Tommie Whelan MD, 50 mg at 01/31/20 2113    nicotine polacrilex (NICORETTE) gum 2 mg, 2 mg, Oral, Q2H PRN, Pamela Escamilla MD    OLANZapine (ZyPREXA) IM injection 10 mg, 10 mg, Intramuscular, Q3H PRN, Pamela Escamilla MD    OLANZapine (ZyPREXA) tablet 10 mg, 10 mg, Oral, Q3H PRN, Pamela Escamilla MD    risperiDONE (RisperDAL M-TABS) dispersible tablet 1 mg, 1 mg, Oral, Q3H PRN, Pamela Escamilla MD    saccharomyces boulardii (FLORASTOR) capsule 250 mg, 250 mg, Oral, BID, Tommie Whelan MD, 250 mg at 01/31/20 1713    sodium bicarbonate tablet 650 mg, 650 mg, Oral, Daily, Tommie Whelan MD, 650 mg at 01/31/20 0819    traZODone (DESYREL) tablet 50 mg, 50 mg, Oral, HS PRN, Pamela Escamilla MD    traZODone (DESYREL) tablet 50 mg, 50 mg, Oral, HS, Tommie Whelan MD, 50 mg at 01/31/20 6241    Recommended Treatment:   Will not make medication changes today because the patient condition started to improve    Supportive therapy was provided in 1 on 1 session with the patient, he was receptive  Planned medication and treatment changes: All current active medications have been reviewed  Continue treatment with group therapy, milieu therapy, occupational therapy and medication management  Risks / Benefits of Treatment:    Risks, benefits, and possible side effects of medications explained to patient and patient verbalizes understanding and agreement for treatment  Planned medication and treatment changes: All current active medications have been reviewed  Continue treatment with group therapy, milieu therapy, occupational therapy and medication management  Counseling / Coordination of Care:    Patient's progress discussed with staff in treatment team meeting  ** Please Note: This note has been constructed using a voice recognition system   **      ----------------------------------------------------------------------------------------------------------------

## 2020-02-01 NOTE — NURSING NOTE
Patient visible on unit at start of shift  Cooperative with unit routine  Denied any unmet needs  HS medication compliant  No PRNs needed  Retreated to his room without issue  Will monitor

## 2020-02-01 NOTE — PROGRESS NOTES
Daily dressing change done to healing right plantar foot wound  There is no sign of infection noted

## 2020-02-02 LAB
GLUCOSE SERPL-MCNC: 143 MG/DL (ref 65–140)
GLUCOSE SERPL-MCNC: 151 MG/DL (ref 65–140)
GLUCOSE SERPL-MCNC: 193 MG/DL (ref 65–140)
GLUCOSE SERPL-MCNC: 280 MG/DL (ref 65–140)

## 2020-02-02 PROCEDURE — 82948 REAGENT STRIP/BLOOD GLUCOSE: CPT

## 2020-02-02 PROCEDURE — 99232 SBSQ HOSP IP/OBS MODERATE 35: CPT | Performed by: NURSE PRACTITIONER

## 2020-02-02 RX ADMIN — METFORMIN HYDROCHLORIDE 1000 MG: 500 TABLET ORAL at 08:42

## 2020-02-02 RX ADMIN — GABAPENTIN 300 MG: 300 CAPSULE ORAL at 21:02

## 2020-02-02 RX ADMIN — ATORVASTATIN CALCIUM 40 MG: 40 TABLET, FILM COATED ORAL at 17:12

## 2020-02-02 RX ADMIN — BUSPIRONE HYDROCHLORIDE 10 MG: 10 TABLET ORAL at 08:43

## 2020-02-02 RX ADMIN — Medication 250 MG: at 08:43

## 2020-02-02 RX ADMIN — CHLORPROMAZINE HYDROCHLORIDE 50 MG: 25 TABLET, SUGAR COATED ORAL at 21:02

## 2020-02-02 RX ADMIN — BUSPIRONE HYDROCHLORIDE 10 MG: 10 TABLET ORAL at 17:12

## 2020-02-02 RX ADMIN — METFORMIN HYDROCHLORIDE 1000 MG: 500 TABLET ORAL at 17:12

## 2020-02-02 RX ADMIN — INSULIN LISPRO 2 UNITS: 100 INJECTION, SOLUTION INTRAVENOUS; SUBCUTANEOUS at 17:13

## 2020-02-02 RX ADMIN — CHLORPROMAZINE HYDROCHLORIDE 50 MG: 25 TABLET, SUGAR COATED ORAL at 17:12

## 2020-02-02 RX ADMIN — INSULIN LISPRO 6 UNITS: 100 INJECTION, SOLUTION INTRAVENOUS; SUBCUTANEOUS at 21:02

## 2020-02-02 RX ADMIN — CARIPRAZINE 3 MG: 3 CAPSULE, GELATIN COATED ORAL at 08:44

## 2020-02-02 RX ADMIN — CHLORPROMAZINE HYDROCHLORIDE 50 MG: 25 TABLET, SUGAR COATED ORAL at 08:40

## 2020-02-02 RX ADMIN — Medication 250 MG: at 17:12

## 2020-02-02 RX ADMIN — GABAPENTIN 300 MG: 300 CAPSULE ORAL at 08:43

## 2020-02-02 RX ADMIN — GABAPENTIN 300 MG: 300 CAPSULE ORAL at 17:12

## 2020-02-02 RX ADMIN — METOPROLOL TARTRATE 50 MG: 50 TABLET, FILM COATED ORAL at 21:02

## 2020-02-02 RX ADMIN — INSULIN GLARGINE 42 UNITS: 100 INJECTION, SOLUTION SUBCUTANEOUS at 08:45

## 2020-02-02 RX ADMIN — BENZTROPINE MESYLATE 1 MG: 1 TABLET ORAL at 08:43

## 2020-02-02 RX ADMIN — SODIUM BICARBONATE 650 MG: 650 TABLET ORAL at 08:42

## 2020-02-02 RX ADMIN — LORAZEPAM 0.5 MG: 0.5 TABLET ORAL at 17:19

## 2020-02-02 RX ADMIN — METOPROLOL TARTRATE 50 MG: 50 TABLET, FILM COATED ORAL at 08:43

## 2020-02-02 RX ADMIN — BENZTROPINE MESYLATE 1 MG: 1 TABLET ORAL at 17:12

## 2020-02-02 RX ADMIN — TRAZODONE HYDROCHLORIDE 50 MG: 50 TABLET ORAL at 21:02

## 2020-02-02 RX ADMIN — GLIMEPIRIDE 1 MG: 2 TABLET ORAL at 08:50

## 2020-02-02 RX ADMIN — COLLAGENASE SANTYL 1 APPLICATION: 250 OINTMENT TOPICAL at 09:07

## 2020-02-02 RX ADMIN — LISINOPRIL 15 MG: 5 TABLET ORAL at 08:43

## 2020-02-02 RX ADMIN — INSULIN LISPRO 2 UNITS: 100 INJECTION, SOLUTION INTRAVENOUS; SUBCUTANEOUS at 12:30

## 2020-02-02 RX ADMIN — ASPIRIN 81 MG: 81 TABLET ORAL at 08:40

## 2020-02-02 NOTE — PROGRESS NOTES
Progress Note - Behavioral Health   Alpesh Todd 38 y o  male MRN: [id number]  Unit/Bed#: Kevin Huff 383-02 Encounter: [id number]    Assessment/Plan   Principal Problem:    Bipolar 2 disorder ([facility] )  Active Problems:    Tobacco use disorder    Diabetic ulcer of right midfoot ([facility] )    Type 2 diabetes mellitus with diabetic polyneuropathy, with long-term current use of insulin (HCC)    Essential hypertension    Cellulitis of right lower extremity    Acute kidney injury (NAIF) with acute tubular necrosis (ATN) ([facility])    Medical clearance for psychiatric admission    Hyperkalemia    History of electroconvulsive therapy      Subjective:Patient was seen today for continuation of care, records reviewed and  patient was discussed with the morning case review team  Evans Maya reported feeling much better this morning, states he has less anxiety and less depression, and feels hopeful -with him finding an apartment -that he would be able to get his life back on track  Evans Maya reported sleeping well, good appetite, social with selective peers and attend some groups  Evans Maya denies endorsing any suicidal or homicidal ideation  Does not seem to be experiencing any manic or psychotic symptoms during our encounter  He remains medication compliance and denies any side effects from medications  Will continue on current medication regimen  Vitals:  Vitals:    02/02/20 0700   BP: 138/86   Pulse: 64   Resp: 16   Temp: 97 6 °F (36 4 °C)   SpO2:        Laboratory results:    I have personally reviewed all pertinent laboratory/tests results    Most Recent Labs:   Lab Results   Component Value Date    WBC 8 70 01/27/2020    RBC 5 05 01/27/2020    HGB 13 7 01/27/2020    HCT 40 8 (L) 01/27/2020     01/27/2020    RDW 14 0 01/27/2020    NEUTROABS 4 87 01/27/2020    SODIUM 138 01/30/2020    K 4 9 01/30/2020     01/30/2020    CO2 28 01/30/2020    BUN 21 01/30/2020    CREATININE 0 93 01/30/2020    GLUC 218 (H) 01/30/2020    GLUF 218 (H) 01/30/2020 CALCIUM 9 1 01/30/2020    AST 29 01/30/2020    ALT 57 (H) 01/30/2020    ALKPHOS 88 01/30/2020    TP 6 3 01/30/2020    ALB 3 1 01/30/2020    TBILI 0 20 01/30/2020    CHOLESTEROL 159 08/15/2019    HDL 38 (L) 08/15/2019    TRIG 105 08/15/2019    LDLCALC 100 08/15/2019    [place] 121 08/15/2019    VALPROICTOT <10 0 (L) 01/30/2020    LITHIUM <0 2 (L) 02/26/2018    WYF3JNMMIADI 0 916 01/19/2020    RPR Non-Reactive 07/10/2019    HGBA1C 8 4 (H) 07/10/2019     07/10/2019       Psychiatric Review of Systems:    Behavior over the last 24 hours:  Slowly progressing  Sleep: normal  Appetite: normal  Medication side effects: No  ROS: no complaints, denies any shortness of breath or chest pain and all other systems are negative      Mental Status Evaluation:    Appearance:  casually dressed, dressed appropriately, adequate grooming   Behavior:  pleasant, cooperative, calm   Speech:  normal rate and volume   Mood:  less anxious, less depressed, Improving   Affect:  slightly brighter   Thought Process:  coherent, goal directed   Associations: intact associations   Thought Content:  no overt delusions   Perceptual Disturbances: no auditory hallucinations, no visual hallucinations, denies when asked   Risk Potential: Suicidal ideation - None at present  Homicidal ideation - None  Potential for aggression - No   Sensorium:  oriented to person, place, time/date and situation   Memory:  recent and remote memory grossly intact   Consciousness:  alert and awake   Attention: attention span and concentration are age appropriate   Insight:  improving and moderate   Judgment: improving and moderate   Gait/Station: uses cane   Motor Activity: no abnormal movements       Progress Toward Goals:     Slowly progressing, less anxiety, less depression and denies suicidal ideation    Assessment/Plan   Principal Problem:    Bipolar 2 disorder (HCC)  Active Problems:    Tobacco use disorder    Diabetic ulcer of right midfoot (HCC)    Type 2 diabetes mellitus with diabetic polyneuropathy, with long-term current use of insulin (HCC)    Essential hypertension    Cellulitis of right lower extremity    Acute kidney injury (NAIF) with acute tubular necrosis (ATN) ([facility])    Medical clearance for psychiatric admission    Hyperkalemia    History of electroconvulsive therapy      Recommended Treatment: Treatment plan and medication changes discussed and per the attending physician the plan is: 1  Continue with group therapy, milieu therapy and occupational therapy  2  Behavioral Health checks every 7 minutes  3  Continue with current medication regimen  4  Will review labs in the a    5  Disposition Planning: discharge on 2/3    Behavioral Health Medications: all current active meds have been reviewed and continue current psychiatric medications  Current Facility-Administered Medications:  acetaminophen 650 mg Oral Q6H PRN Rocío Nelson MD   acetaminophen 650 mg Oral Q4H PRN Rocío Nelson MD   acetaminophen 975 mg Oral Q6H PRN Rocío Nelson MD   aluminum-magnesium hydroxide-simethicone 30 mL Oral Q4H PRN Rocío Nelson MD   aspirin 81 mg Oral Daily Marti Cordon MD   atorvastatin 40 mg Oral Daily With Dinner Marti Cordon MD   benztropine 1 mg Intramuscular Q6H PRN Rocío Nelson MD   benztropine 1 mg Oral Q6H PRN Rocío Nelson MD   benztropine 1 mg Oral BID Marti Cordon MD   busPIRone 10 mg Oral BID Marti Cordon MD   cariprazine 3 mg Oral Daily Rocío Nelson MD   chlorproMAZINE 50 mg Oral TID Rocío Nelson MD   collagenase  Topical Daily Salima Hoffman DPM   gabapentin 300 mg Oral TID Rocío Nelson MD   glimepiride 1 mg Oral Daily With Breakfast TAYLOR Villar   haloperidol 5 mg Oral Q8H PRN Rocío Nelson MD   haloperidol lactate 5 mg Intramuscular Q6H PRN Rocío Nelson MD   hydrOXYzine HCL 25 mg Oral Q6H PRN Rocío Nelson MD   insulin glargine 42 Units Subcutaneous QADANNA Telles  TAYLOR Suárez   insulin lispro 2-12 Units Subcutaneous HS Danay Tenorio MD   insulin lispro 2-12 Units Subcutaneous TID AC Danay Tenorio MD   lisinopril 15 mg Oral Daily TAYLOR Villar   LORazepam 1 mg Intramuscular Q6H PRN Jay Strickland MD   LORazepam 0 5 mg Oral Q8H PRN Danay Tenorio MD   magnesium hydroxide 30 mL Oral Daily PRN Jay Strickland MD   metFORMIN 1,000 mg Oral BID With Meals Cosimo Carry  TAYLOR Suárez   metoprolol tartrate 50 mg Oral Q12H [address] Danay Tenorio MD   nicotine polacrilex 2 mg Oral Q2H PRN Jay Strickland MD   OLANZapine 10 mg Intramuscular Q3H PRN Jay Strickland MD   OLANZapine 10 mg Oral Q3H PRN Jay Strickland MD   risperiDONE 1 mg Oral Q3H PRN Jay Strickland MD   saccharomyces boulardii 250 mg Oral BID Danay Tenorio MD   sodium bicarbonate 650 mg Oral Daily Danay Tenorio MD   traZODone 50 mg Oral HS PRN Jay Strickland MD   traZODone 50 mg Oral HS Danay Tenorio MD       Risks / Benefits of Treatment:     Risks, benefits, and possible side effects of medications explained to patient and patient verbalizes understanding and agreement for treatment  Counseling / Coordination of Care:     Patient's progress reviewed with nursing staff  Medications, treatment progress and treatment plan reviewed with patient  Supportive counseling provided to the patient            TAYLOR Love1

## 2020-02-02 NOTE — PROGRESS NOTES
Pt is compliant with medications, quiet at this time, seclusive to his room    He had no complaints or symptoms, states he is feeling better, eating and sleeping well  will continue to monitor

## 2020-02-02 NOTE — PROGRESS NOTES
Pt has been pleasant and cooperative, visible but not too social  Pt is med/meal compliant  Pt reports a 2/4 anxiety due to his housing situation after discharge  Pt denies any depression, SI, HI, AH, or VH  Pt was given Tylenol 975 mg PRN at 1714 for 7/10 right leg pain  Pt's blood sugar prior to dinner was 166, which warranted 2 units of Humalog  Pt's blood sugar prior to snack was 255, which warranted 6 units of Humalog  Will continue to monitor

## 2020-02-03 VITALS
WEIGHT: 295 LBS | HEIGHT: 75 IN | OXYGEN SATURATION: 99 % | BODY MASS INDEX: 36.68 KG/M2 | RESPIRATION RATE: 18 BRPM | TEMPERATURE: 98.1 F | HEART RATE: 73 BPM | DIASTOLIC BLOOD PRESSURE: 82 MMHG | SYSTOLIC BLOOD PRESSURE: 140 MMHG

## 2020-02-03 PROBLEM — Z00.8 MEDICAL CLEARANCE FOR PSYCHIATRIC ADMISSION: Status: RESOLVED | Noted: 2020-01-24 | Resolved: 2020-02-03

## 2020-02-03 PROBLEM — F17.200 TOBACCO USE DISORDER: Status: RESOLVED | Noted: 2018-06-24 | Resolved: 2020-02-03

## 2020-02-03 PROBLEM — Z98.890 HISTORY OF ELECTROCONVULSIVE THERAPY: Status: RESOLVED | Noted: 2020-01-30 | Resolved: 2020-02-03

## 2020-02-03 LAB
GLUCOSE SERPL-MCNC: 137 MG/DL (ref 65–140)
GLUCOSE SERPL-MCNC: 150 MG/DL (ref 65–140)

## 2020-02-03 PROCEDURE — 99238 HOSP IP/OBS DSCHRG MGMT 30/<: CPT | Performed by: NURSE PRACTITIONER

## 2020-02-03 PROCEDURE — 82948 REAGENT STRIP/BLOOD GLUCOSE: CPT

## 2020-02-03 RX ORDER — ATORVASTATIN CALCIUM 40 MG/1
40 TABLET, FILM COATED ORAL
Qty: 30 TABLET | Refills: 1 | Status: SHIPPED | OUTPATIENT
Start: 2020-02-03 | End: 2020-03-31

## 2020-02-03 RX ORDER — BENZTROPINE MESYLATE 1 MG/1
1 TABLET ORAL 2 TIMES DAILY
Qty: 60 TABLET | Refills: 1 | Status: SHIPPED | OUTPATIENT
Start: 2020-02-03 | End: 2020-03-04 | Stop reason: ALTCHOICE

## 2020-02-03 RX ORDER — GLIMEPIRIDE 1 MG/1
1 TABLET ORAL
Qty: 30 TABLET | Refills: 1 | Status: SHIPPED | OUTPATIENT
Start: 2020-02-03 | End: 2020-03-04 | Stop reason: ALTCHOICE

## 2020-02-03 RX ORDER — SACCHAROMYCES BOULARDII 250 MG
250 CAPSULE ORAL 2 TIMES DAILY
Qty: 60 CAPSULE | Refills: 1 | Status: SHIPPED | OUTPATIENT
Start: 2020-02-03 | End: 2020-03-04 | Stop reason: ALTCHOICE

## 2020-02-03 RX ORDER — LISINOPRIL 5 MG/1
15 TABLET ORAL DAILY
Qty: 30 TABLET | Refills: 1 | Status: SHIPPED | OUTPATIENT
Start: 2020-02-04 | End: 2020-03-04 | Stop reason: ALTCHOICE

## 2020-02-03 RX ORDER — GABAPENTIN 300 MG/1
300 CAPSULE ORAL 3 TIMES DAILY
Qty: 90 CAPSULE | Refills: 1 | Status: SHIPPED | OUTPATIENT
Start: 2020-02-03 | End: 2020-03-04 | Stop reason: ALTCHOICE

## 2020-02-03 RX ORDER — SODIUM BICARBONATE 650 MG/1
650 TABLET ORAL DAILY
Qty: 30 TABLET | Refills: 1 | Status: SHIPPED | OUTPATIENT
Start: 2020-02-04 | End: 2020-03-04 | Stop reason: ALTCHOICE

## 2020-02-03 RX ORDER — INSULIN GLARGINE 100 [IU]/ML
42 INJECTION, SOLUTION SUBCUTANEOUS EVERY MORNING
Qty: 1260 UNITS | Refills: 0 | Status: SHIPPED | OUTPATIENT
Start: 2020-02-04 | End: 2020-02-03 | Stop reason: HOSPADM

## 2020-02-03 RX ORDER — BUSPIRONE HYDROCHLORIDE 10 MG/1
10 TABLET ORAL 2 TIMES DAILY
Qty: 60 TABLET | Refills: 1 | Status: SHIPPED | OUTPATIENT
Start: 2020-02-03 | End: 2020-04-13 | Stop reason: ALTCHOICE

## 2020-02-03 RX ORDER — ASPIRIN 81 MG/1
81 TABLET ORAL DAILY
Qty: 30 TABLET | Refills: 1 | Status: SHIPPED | OUTPATIENT
Start: 2020-02-04 | End: 2020-03-04 | Stop reason: ALTCHOICE

## 2020-02-03 RX ORDER — CHLORPROMAZINE HYDROCHLORIDE 50 MG/1
50 TABLET, FILM COATED ORAL 3 TIMES DAILY
Qty: 90 TABLET | Refills: 1 | Status: SHIPPED | OUTPATIENT
Start: 2020-02-03 | End: 2020-04-13 | Stop reason: SDUPTHER

## 2020-02-03 RX ORDER — METOPROLOL TARTRATE 50 MG/1
50 TABLET, FILM COATED ORAL EVERY 12 HOURS SCHEDULED
Qty: 60 TABLET | Refills: 1 | Status: SHIPPED | OUTPATIENT
Start: 2020-02-03 | End: 2020-03-04 | Stop reason: ALTCHOICE

## 2020-02-03 RX ORDER — TRAZODONE HYDROCHLORIDE 50 MG/1
50 TABLET ORAL
Qty: 30 TABLET | Refills: 1 | Status: SHIPPED | OUTPATIENT
Start: 2020-02-03 | End: 2020-04-13 | Stop reason: SDUPTHER

## 2020-02-03 RX ADMIN — INSULIN GLARGINE 42 UNITS: 100 INJECTION, SOLUTION SUBCUTANEOUS at 08:42

## 2020-02-03 RX ADMIN — INSULIN LISPRO 2 UNITS: 100 INJECTION, SOLUTION INTRAVENOUS; SUBCUTANEOUS at 09:07

## 2020-02-03 RX ADMIN — METFORMIN HYDROCHLORIDE 1000 MG: 500 TABLET ORAL at 08:22

## 2020-02-03 RX ADMIN — LISINOPRIL 15 MG: 5 TABLET ORAL at 08:21

## 2020-02-03 RX ADMIN — BUSPIRONE HYDROCHLORIDE 10 MG: 10 TABLET ORAL at 08:23

## 2020-02-03 RX ADMIN — GLIMEPIRIDE 1 MG: 2 TABLET ORAL at 08:43

## 2020-02-03 RX ADMIN — GABAPENTIN 300 MG: 300 CAPSULE ORAL at 08:23

## 2020-02-03 RX ADMIN — METOPROLOL TARTRATE 50 MG: 50 TABLET, FILM COATED ORAL at 08:21

## 2020-02-03 RX ADMIN — COLLAGENASE SANTYL: 250 OINTMENT TOPICAL at 09:08

## 2020-02-03 RX ADMIN — ASPIRIN 81 MG: 81 TABLET ORAL at 08:21

## 2020-02-03 RX ADMIN — CARIPRAZINE 3 MG: 3 CAPSULE, GELATIN COATED ORAL at 08:23

## 2020-02-03 RX ADMIN — SODIUM BICARBONATE 650 MG: 650 TABLET ORAL at 08:22

## 2020-02-03 RX ADMIN — CHLORPROMAZINE HYDROCHLORIDE 50 MG: 25 TABLET, SUGAR COATED ORAL at 08:22

## 2020-02-03 RX ADMIN — Medication 250 MG: at 08:22

## 2020-02-03 RX ADMIN — BENZTROPINE MESYLATE 1 MG: 1 TABLET ORAL at 08:22

## 2020-02-03 NOTE — NURSING NOTE
Patient visible on unit throughout the evening  Cooperative with unit routine  Upon approach, good eye contact made, speech soft  Thinking clear and organized  Patient discussed his discharge plans and support given  HS medication compliant and no PRNs needed  Patient retreated to his room for sleep without issue and appeared to be resting in bed throughout the night  Will monitor

## 2020-02-03 NOTE — PROGRESS NOTES
02/03/20 0925   Team Meeting   Meeting Type Daily Rounds   Initial Conference Date 02/03/20   Team Members Present   Team Members Present Physician;Nurse;   Physician Team Member Dr Clayton Ag Team Member TIM SIMON [facility] Management Team Member Eduardo Maldonado    Patient/Family Present   Patient Present No   Patient's Family Present No   Meds to be monitored  Discharge pending for today as 72 hour notice expires

## 2020-02-03 NOTE — NURSING NOTE
RN Navigator approached pt to discuss his option for discharge calls to assist him in his transition home and into the community  While the pt would make conversation with Navigator, he absolutely and adamantly declined any discharge calls or follow up measures by Navigator  Pt states he is a private person and does not want to be monitored, mothered, or assisted in any way  Pt states that he knows how to take his medications correctly, how to go to outpt appts, how to use the bus and how to obtain the resources he needs whether it is psychiatric or medical care  Pt states he is waiting to be discharge and has a mildly irritable edge

## 2020-02-03 NOTE — PLAN OF CARE
Worker scheduled new pt appointment for PCP at [facility]  On 2/12 at [address] [place] [address] Street

## 2020-02-03 NOTE — DISCHARGE INSTR - OTHER ORDERS
If you are experiencing a mental health emergency, you may call the [id number] [place] [address] 24 hours a day, 7 days per week at [phone]  In [facility], call [phone]  The [facility] Family-to-Family Education Program is a free 12-week (2 1/2 hours/week) course for families of individuals with severe brain disorders (mental illnesses)  The classes are taught by trained family members  All course materials are furnished at no cost to you  Below are some details  To register, e-mail [email or web address] or call [phone]  The curriculum focuses on schizophrenia, bipolar disorder (manic depression), clinical depression, panic disorders and obsessive-compulsive disorder (OCD)  The course discusses the clinical treatment of these illnesses and teaches the knowledge and skills that family members need to cope more effectively  Topics Include:  Learning about feelings, learning about facts   Schizophrenia, major depression and zoran: diagnosis and dealing with critical periods   Subtypes of depression and bipolar disorder, panic disorder and OCD; diagnosis and causes; sharing our stories   The biology of the brain/new research   Problem solving workshops   Medication review   Empathy workshop  what its like to have a brain disorder   Communication skills workshop   Self-care and relative groups   Rehabilitation, services available   Advocacy: fighting stigma   Review and certification ceremony    Kpoc-pa-Hpsy Education Course  The [employer] Education class is a ten week  two hours per week  experiential education course on the topic of recovery for any person with serious mental illness who is interested in establishing and maintaining wellness  The course uses a combination of lecture, interactive exercises, and structural group processes  The diversity of experience among participants affords for a lively dynamic that moves the course along    [facility] Clkb-rm-Zkpf Education class is offered free of charge to people who experience mental illness  You do not need to be a member of [employer] to take the course  Courses are taught by teams of trained mentors/peer-teachers who are themselves experienced at living well with mental illness  Below are some details  To register, call [phone] or e-mail [email or web address]  Sign up today! [facility] group is for family members, caregivers, and loved ones of individuals living with the everyday challenges of mental illness  The leaders are family members in the same situation  Sessions take place in an intimate, confidential setting to allow families to share openly with each other  These support groups allow participants to learn from the experiences of other group members, share coping strategies, and offer each other encouragement and understanding  Sandra Balderas know that you are not alone  Drop inno registration is necessary  Here are the times and locations  NATE  Monthly: 3rd Monday, 7:00-8:30 pm  [place]  [address]  Monthly: 4th Tuesday, 7:00-8:30 pm  [facility]  Monthly: 1st Monday, 7:00-8:30 pm  [address]         Monthly Support for Persons with Mental Illness  The Peer Support Group is a monthly meeting for individuals facing the challenges of recovering from severe and persistent mental illness  Depression, manic depression, schizophrenia, and general anxiety disorder are only a few of the diagnoses of individuals who have found a supportive place at our meetings  Our [place]  We are a fellowship of individuals who share a common goal of recovery and the ability to maintain mental and emotional stability  We help others and ourselves through sharing our experiences, strength and hope with each other   No matter how traumatic our past or how despairing our present may be, there is hope for a new day  Sessions take place in an intimate, confidential setting to allow individuals to share openly with each other  Emmett Neal know that you are not alone  Drop inno registration is necessary  Here are the times and locations  ARUN  Monthly: 1st Monday, 7:00-8:30 pm  [facility]  [address] [place], [place]   [id number]  Monthly: 3rd Monday, 7:00-8:30 pm  [address]         When you need someone to listen, the Lucie Wray is available for 16 hours a day, 7 days a week, from the time of 7-10am and 2pm-2am   It is not available from the hours of 2am-6am and 10am-2pm  A representative can be reached at [phone]

## 2020-02-03 NOTE — NURSING NOTE
Pt discharged to home, taking taxi for transport home  Discharge instructions, prescriptions and all personal belongings given to pt  Pt verbalized understanding of discharge instructions and out pt follow up appointments  Nicole Gutierrez denies suicidal thoughts  Pt escorted by mental health tech to public transportation for

## 2020-02-03 NOTE — PROGRESS NOTES
Pt anxious and ready for discharge  Pt requesting information on [facility] shelter  Santyl ointments and dressing changed done to right foot wound  Foot wrapped with gauze wrap  Pt meal and medication compliant, sits in dayroom but not engaged with peers  Tennille Harvey denies suicidal thoughts

## 2020-02-03 NOTE — PLAN OF CARE
Pt to be discharged today  Pt denies SI/HI, AH/VH  Pt oriented x3  Pt's 72 hour notice expires today  Pt to begin renting a room and will order his own taxi to get to his apartment  Pt to follow up with Zahraa Gaytan on 2/7 at 1:45PM  Pt to follow up with new pt PCP appointment on 2/12 at 9AM  Pt sent with scripts      D/C address: [address] [place] [address] [place], [address] [place] [address]

## 2020-02-03 NOTE — SOCIAL WORK
Worker spoke with pt about discharge planning  Pt signed JENNIFER for outpatient mental health follow up and new PCP referral  Pt reports he does not have to wait until 4 to get his belongings at [facility] because he was in the [facility] which is open all day  Pt reports he will order his own taxi  Worker provided pt with Reduced Fare application for [employer]  Worker explained pt just needs to take the application, medicare card, and ID to the [place] office and they will provide him with a reduced fare card  Pt verbalized understanding  Worker left voicemail for Zahraa Gaytan to schedule intake

## 2020-02-10 RX ORDER — GABAPENTIN 600 MG/1
TABLET ORAL
COMMUNITY
Start: 2020-02-03 | End: 2020-04-13 | Stop reason: SDUPTHER

## 2020-02-10 RX ORDER — DIVALPROEX SODIUM 500 MG/1
1000 TABLET, EXTENDED RELEASE ORAL
COMMUNITY
Start: 2020-02-03 | End: 2020-04-13 | Stop reason: SDUPTHER

## 2020-02-10 RX ORDER — LOSARTAN POTASSIUM 50 MG/1
TABLET ORAL
COMMUNITY
Start: 2020-02-03 | End: 2020-03-31

## 2020-02-10 RX ORDER — BUPROPION HYDROCHLORIDE 150 MG/1
150 TABLET, EXTENDED RELEASE ORAL EVERY MORNING
COMMUNITY
Start: 2020-02-03 | End: 2020-04-13 | Stop reason: SDUPTHER

## 2020-03-04 ENCOUNTER — OFFICE VISIT (OUTPATIENT)
Dept: FAMILY MEDICINE CLINIC | Facility: CLINIC | Age: 45
End: 2020-03-04
Payer: MEDICARE

## 2020-03-04 DIAGNOSIS — I10 ESSENTIAL HYPERTENSION: ICD-10-CM

## 2020-03-04 DIAGNOSIS — F31.81 BIPOLAR 2 DISORDER (HCC): Chronic | ICD-10-CM

## 2020-03-04 DIAGNOSIS — L97.411 DIABETIC ULCER OF RIGHT MIDFOOT ASSOCIATED WITH TYPE 2 DIABETES MELLITUS, LIMITED TO BREAKDOWN OF SKIN (HCC): ICD-10-CM

## 2020-03-04 DIAGNOSIS — Z11.4 ENCOUNTER FOR SCREENING FOR HIV: ICD-10-CM

## 2020-03-04 DIAGNOSIS — E87.5 HYPERKALEMIA: ICD-10-CM

## 2020-03-04 DIAGNOSIS — Z79.4 TYPE 2 DIABETES MELLITUS WITH DIABETIC POLYNEUROPATHY, WITH LONG-TERM CURRENT USE OF INSULIN (HCC): Primary | ICD-10-CM

## 2020-03-04 DIAGNOSIS — Z13.220 SCREENING FOR CHOLESTEROL LEVEL: ICD-10-CM

## 2020-03-04 DIAGNOSIS — M86.9 OSTEOMYELITIS, UNSPECIFIED SITE, UNSPECIFIED TYPE (HCC): ICD-10-CM

## 2020-03-04 DIAGNOSIS — E11.42 TYPE 2 DIABETES MELLITUS WITH DIABETIC POLYNEUROPATHY, WITH LONG-TERM CURRENT USE OF INSULIN (HCC): Primary | ICD-10-CM

## 2020-03-04 DIAGNOSIS — E11.621 DIABETIC ULCER OF RIGHT MIDFOOT ASSOCIATED WITH TYPE 2 DIABETES MELLITUS, LIMITED TO BREAKDOWN OF SKIN (HCC): ICD-10-CM

## 2020-03-04 PROCEDURE — 99214 OFFICE O/P EST MOD 30 MIN: CPT | Performed by: NURSE PRACTITIONER

## 2020-03-04 PROCEDURE — 3008F BODY MASS INDEX DOCD: CPT | Performed by: NURSE PRACTITIONER

## 2020-03-04 PROCEDURE — 4004F PT TOBACCO SCREEN RCVD TLK: CPT | Performed by: NURSE PRACTITIONER

## 2020-03-04 PROCEDURE — 2022F DILAT RTA XM EVC RTNOPTHY: CPT | Performed by: NURSE PRACTITIONER

## 2020-03-04 PROCEDURE — 1111F DSCHRG MED/CURRENT MED MERGE: CPT | Performed by: NURSE PRACTITIONER

## 2020-03-04 RX ORDER — LANCETS 28 GAUGE
EACH MISCELLANEOUS
Qty: 100 EACH | Refills: 5 | Status: SHIPPED | OUTPATIENT
Start: 2020-03-04 | End: 2020-03-05

## 2020-03-04 RX ORDER — IBUPROFEN 600 MG/1
600 TABLET ORAL EVERY 6 HOURS PRN
Qty: 30 TABLET | Refills: 0 | Status: SHIPPED | OUTPATIENT
Start: 2020-03-04 | End: 2020-04-13 | Stop reason: ALTCHOICE

## 2020-03-04 RX ORDER — BUPROPION HYDROCHLORIDE 100 MG/1
100 TABLET ORAL
COMMUNITY
End: 2020-04-13 | Stop reason: ALTCHOICE

## 2020-03-04 RX ORDER — BLOOD-GLUCOSE METER
KIT MISCELLANEOUS 3 TIMES DAILY
Qty: 1 EACH | Refills: 0 | Status: SHIPPED | OUTPATIENT
Start: 2020-03-04 | End: 2020-03-05

## 2020-03-04 NOTE — PROGRESS NOTES
BMI Counseling: Body mass index is 36 67 kg/m²  The BMI is above normal  Nutrition recommendations include encouraging healthy choices of fruits and vegetables, consuming healthier snacks and reducing intake of saturated and trans fat  Exercise recommendations include moderate physical activity 150 minutes/week  Assessment/Plan:    Type 2 diabetes mellitus with diabetic polyneuropathy, with long-term current use of insulin ([facility])    Lab Results   Component Value Date    HGBA1C 8 4 (H) 07/10/2019     Currently only using metformin  Was supposed to use insulin but never picked it up  Today we are reordering insulin for patient  He has bilateral mid foot amputations  Not seeing podiartry  We are referring to podiatrist      Discussed with patient eating healthy meals to help control blood glucose and overall health  Also recommended weight loss to help the body use insulin better  Doing physical activity can also make the body more sensitive to insulin and help reach and maintain a healthy weight  It will also lower the levels of fat in the blood as well as reduce the risk of heart disease  Explained the importance of checking blood glucose levels and keeping a log to bring in with each visit  Keeping HbA1c in the goal range can help lower the chance of complications  A HbA1c < 7% is generally recommended  Explained the importance of the prevention of retinopathy, nephropathy, neuropathy and diabetic foot ulcers  Encouraged coming every 3 months for diabetes checks and having  yearly eye exams as well as practicing good foot care  Essential hypertension  Reviewed with patient blood pressure target goal   Continue to maintain healthy balanced diet with focus on low-salt intake and limit alcohol intake  To follow DASH diet ( 3 servings of fruit/vegetables) daily  Weight loss to BMI less than 30 along with increasing physical activity to 3- 5 times/week for 30 minutes a day or as tolerated       -BP with optimal control  Advised to continue on current dose of losartan 50mg daily and at his follow up we will continue to monitor  Bipolar 2 disorder [facility])  -Patient seeing [facility]  Currently on Thorazine, Buspar, Wellbutrin, and trazodone  Per patient well controlled  No suicidal/homicidal thoughts were reported  To continue under care of psych and [facility]  Diabetic ulcer of right midfoot ([place] [id number] )    Lab Results   Component Value Date    HGBA1C 8 4 (H) 07/10/2019     Right heel area noted with nonhealing ulcer  Per previous noted no cellulitis and no osteomyelitis on MRI were seen  Patient is to follow with Podiatry for further evaluation and wound are  Diagnoses and all orders for this visit:    Type 2 diabetes mellitus with diabetic polyneuropathy, with long-term current use of insulin ([place] [address] )  -     HEMOGLOBIN A1C W/ EAG ESTIMATION; Future  -     Ambulatory referral to Podiatry; Future  -     Insulin Pen Needle 29G X 12 7MM MISC; by Does not apply route daily  -     insulin glargine (Lantus SoloStar) 100 units/mL injection pen; Inject 42 Units under the skin every morning  -     ibuprofen (MOTRIN) 600 mg tablet; Take 1 tablet (600 mg total) by mouth every 6 (six) hours as needed for mild pain or moderate pain  -     Blood Glucose Monitoring Suppl (FREESTYLE LITE) KATIE; by Does not apply route 3 (three) times a day  -     Lancets (FREESTYLE) lancets; Use to check blood sugar three times a day    Essential hypertension    Encounter for screening for HIV    Hyperkalemia  -     Comprehensive metabolic panel; Future    Screening for cholesterol level  -     Lipid panel;  Future    Osteomyelitis, unspecified site, unspecified type ([place] [address] )    Bipolar 2 disorder ([place] [address] )    Diabetic ulcer of right midfoot associated with type 2 diabetes mellitus, limited to breakdown of skin ([place] [address] )    Other orders  -     Cancel: PNEUMOCOCCAL POLYSACCHARIDE VACCINE 23-VALENT =>3YO SQ IM  -     Cancel: influenza vaccine, [id number], quadrivalent, 0 5 mL, preservative-free, for adult and pediatric patients 6 mos+ (AFLURIA, FLUARIX, FLULAVAL, FLUZONE)  -     Cancel: HIV 1/2 AG-AB combo; Future  -     buPROPion (WELLBUTRIN) 100 mg tablet; Take 100 mg by mouth daily  -     Discontinue: Blood Glucose Monitoring Suppl (FREESTYLE LITE) KATIE; by Does not apply route 3 (three) times a day  -     Discontinue: Lancets (FREESTYLE) lancets; Use to check blood sugar three times a day          Subjective:      Patient ID: Esteban Wesley is a 38 y o  male  38year old male patient here to establish care  Was seeing Dr Jose Pat with [facility]  PMHx: Diabetes, HTN, Bipolar  States he sees [facility] for [facility], has an appt tomorrow and on March 30th  Currently get medications from his psych doctor  Has midfoot bilateral amputation with diabetic foot ulcers  BMI 36 67 obese patient  Hypertension   This is a chronic problem  The current episode started more than 1 year ago  The problem is uncontrolled  Pertinent negatives include no chest pain, headaches, palpitations, peripheral edema, shortness of breath or sweats  There are no associated agents to hypertension  Risk factors for coronary artery disease include male gender, obesity, smoking/tobacco exposure, sedentary lifestyle, diabetes mellitus and family history  Past treatments include angiotensin blockers  The current treatment provides mild improvement  Compliance problems include exercise and diet  There is no history of angina, CAD/MI, CVA or left ventricular hypertrophy  There is no history of chronic renal disease, sleep apnea or a thyroid problem  Diabetes   He presents for his follow-up diabetic visit  He has type 2 diabetes mellitus  No MedicAlert identification noted  His disease course has been stable  There are no hypoglycemic associated symptoms   Pertinent negatives for hypoglycemia include no confusion, dizziness, headaches, hunger, nervousness/anxiousness, sweats or tremors  Associated symptoms include foot paresthesias and polyuria  Pertinent negatives for diabetes include no chest pain, no fatigue, no polydipsia, no polyphagia and no weakness  There are no hypoglycemic complications  Symptoms are stable  Diabetic complications include peripheral neuropathy  Pertinent negatives for diabetic complications include no autonomic neuropathy, CVA or heart disease  Risk factors for coronary artery disease include diabetes mellitus, sedentary lifestyle, tobacco exposure, obesity, male sex, hypertension and family history  Current diabetic treatment includes oral agent (monotherapy)  His weight is stable  He is following a low fat/cholesterol and generally healthy diet  When asked about meal planning, he reported none  He has not had a previous visit with a dietitian  He participates in exercise weekly  An ACE inhibitor/angiotensin II receptor blocker is being taken  He sees a podiatrist Eye exam is current  The following portions of the patient's history were reviewed and updated as appropriate: allergies, current medications, past family history, past medical history, past social history, past surgical history and problem list     Review of Systems   Constitutional: Positive for appetite change and unexpected weight change  Negative for fatigue  HENT: Negative  Negative for congestion  Eyes: Negative  Respiratory: Negative  Negative for cough, chest tightness, shortness of breath and wheezing  Cardiovascular: Negative  Negative for chest pain and palpitations  Gastrointestinal: Negative  Negative for abdominal distention and anal bleeding  Endocrine: Positive for polyuria  Negative for polydipsia and polyphagia  Genitourinary: Negative  Negative for difficulty urinating and enuresis  Musculoskeletal: Positive for gait problem  Negative for arthralgias  Skin: Positive for wound (midfoot amputations)  Allergic/Immunologic: Negative  Neurological: Negative for dizziness, tremors, weakness and headaches  Hematological: Negative  Negative for adenopathy  Psychiatric/Behavioral: Negative  Negative for agitation, confusion and suicidal ideas  The patient is not nervous/anxious  Objective:      /88 (BP Location: Right arm, Patient Position: Sitting, Cuff Size: Adult)   Pulse 91   Temp 98 7 °F (37 1 °C) (Oral)   Ht 6' 3" (1 905 m)   Wt 133 kg (293 lb 6 4 oz)   SpO2 97%   BMI 36 67 kg/m²          Physical Exam   Constitutional: He is oriented to person, place, and time  He appears well-developed and well-nourished  HENT:   Head: Normocephalic and atraumatic  Right Ear: External ear normal    Left Ear: External ear normal    Eyes: Pupils are equal, round, and reactive to light  EOM are normal    Neck: Normal range of motion  Neck supple  Cardiovascular: Normal rate, regular rhythm, normal heart sounds and intact distal pulses  Exam reveals no gallop and no friction rub  No murmur heard  Pulmonary/Chest: Effort normal and breath sounds normal  No respiratory distress  He has no wheezes  Abdominal: Soft  Bowel sounds are normal    Musculoskeletal:        Feet:    Lymphadenopathy:     He has no cervical adenopathy  Neurological: He is alert and oriented to person, place, and time  Skin: Skin is warm and dry  Capillary refill takes less than 2 seconds  He is not diaphoretic  Psychiatric: He has a normal mood and affect  His behavior is normal  Thought content normal    Nursing note and vitals reviewed

## 2020-03-05 VITALS
HEIGHT: 75 IN | BODY MASS INDEX: 36.48 KG/M2 | SYSTOLIC BLOOD PRESSURE: 146 MMHG | OXYGEN SATURATION: 97 % | TEMPERATURE: 98.7 F | WEIGHT: 293.4 LBS | DIASTOLIC BLOOD PRESSURE: 88 MMHG | HEART RATE: 91 BPM

## 2020-03-05 PROBLEM — Z13.220 SCREENING FOR CHOLESTEROL LEVEL: Status: ACTIVE | Noted: 2020-03-05

## 2020-03-05 PROBLEM — Z11.4 ENCOUNTER FOR SCREENING FOR HIV: Status: ACTIVE | Noted: 2020-03-05

## 2020-03-05 RX ORDER — LANCETS 28 GAUGE
EACH MISCELLANEOUS
Qty: 100 EACH | Refills: 5 | Status: SHIPPED | OUTPATIENT
Start: 2020-03-05

## 2020-03-05 RX ORDER — BLOOD-GLUCOSE METER
KIT MISCELLANEOUS 3 TIMES DAILY
Qty: 1 EACH | Refills: 0 | Status: SHIPPED | OUTPATIENT
Start: 2020-03-05

## 2020-03-05 NOTE — ASSESSMENT & PLAN NOTE
-Patient seeing [facility] and psych  Currently on Thorazine, Buspar, Wellbutrin, and trazodone  Per patient well controlled  No suicidal/homicidal thoughts were reported  To continue under care of psych and [facility]

## 2020-03-05 NOTE — ASSESSMENT & PLAN NOTE
Lab Results   Component Value Date    HGBA1C 8 4 (H) 07/10/2019     Currently only using metformin  Was supposed to use insulin but never picked it up  Today we are reordering insulin for patient  He has bilateral mid foot amputations  Not seeing podiartry  We are referring to podiatrist      Discussed with patient eating healthy meals to help control blood glucose and overall health  Also recommended weight loss to help the body use insulin better  Doing physical activity can also make the body more sensitive to insulin and help reach and maintain a healthy weight  It will also lower the levels of fat in the blood as well as reduce the risk of heart disease  Explained the importance of checking blood glucose levels and keeping a log to bring in with each visit  Keeping HbA1c in the goal range can help lower the chance of complications  A HbA1c < 7% is generally recommended  Explained the importance of the prevention of retinopathy, nephropathy, neuropathy and diabetic foot ulcers  Encouraged coming every 3 months for diabetes checks and having  yearly eye exams as well as practicing good foot care

## 2020-03-05 NOTE — ASSESSMENT & PLAN NOTE
Lab Results   Component Value Date    HGBA1C 8 4 (H) 07/10/2019     Right heel area noted with nonhealing ulcer  Per previous noted no cellulitis and no osteomyelitis on MRI were seen  Patient is to follow with Podiatry for further evaluation and wound are

## 2020-03-05 NOTE — ASSESSMENT & PLAN NOTE
Reviewed with patient blood pressure target goal   Continue to maintain healthy balanced diet with focus on low-salt intake and limit alcohol intake  To follow DASH diet ( 3 servings of fruit/vegetables) daily  Weight loss to BMI less than 30 along with increasing physical activity to 3- 5 times/week for 30 minutes a day or as tolerated  -BP with optimal control  Advised to continue on current dose of losartan 50mg daily and at his follow up we will continue to monitor

## 2020-03-26 ENCOUNTER — TRANSCRIBE ORDERS (OUTPATIENT)
Dept: ADMINISTRATIVE | Facility: HOSPITAL | Age: 45
End: 2020-03-26

## 2020-03-26 ENCOUNTER — APPOINTMENT (OUTPATIENT)
Dept: LAB | Facility: HOSPITAL | Age: 45
End: 2020-03-26
Payer: MEDICARE

## 2020-03-26 DIAGNOSIS — Z79.4 TYPE 2 DIABETES MELLITUS WITH DIABETIC POLYNEUROPATHY, WITH LONG-TERM CURRENT USE OF INSULIN (HCC): ICD-10-CM

## 2020-03-26 DIAGNOSIS — E87.5 HYPERKALEMIA: ICD-10-CM

## 2020-03-26 DIAGNOSIS — E11.42 TYPE 2 DIABETES MELLITUS WITH DIABETIC POLYNEUROPATHY, WITH LONG-TERM CURRENT USE OF INSULIN (HCC): ICD-10-CM

## 2020-03-26 DIAGNOSIS — E11.00 TYPE 2 DIABETES MELLITUS WITH HYPEROSMOLARITY WITHOUT COMA, WITHOUT LONG-TERM CURRENT USE OF INSULIN (HCC): ICD-10-CM

## 2020-03-26 DIAGNOSIS — Z13.220 SCREENING FOR CHOLESTEROL LEVEL: ICD-10-CM

## 2020-03-26 LAB
ALBUMIN SERPL BCP-MCNC: 4.5 G/DL (ref 3–5.2)
ALP SERPL-CCNC: 128 U/L (ref 43–122)
ALT SERPL W P-5'-P-CCNC: 32 U/L (ref 9–52)
ANION GAP SERPL CALCULATED.3IONS-SCNC: 15 MMOL/L (ref 5–14)
AST SERPL W P-5'-P-CCNC: 43 U/L (ref 17–59)
BILIRUB SERPL-MCNC: 0.8 MG/DL
BUN SERPL-MCNC: 16 MG/DL (ref 5–25)
CALCIUM SERPL-MCNC: 9.3 MG/DL (ref 8.4–10.2)
CHLORIDE SERPL-SCNC: 95 MMOL/L (ref 97–108)
CHOLEST SERPL-MCNC: 177 MG/DL
CO2 SERPL-SCNC: 20 MMOL/L (ref 22–30)
CREAT SERPL-MCNC: 1.31 MG/DL (ref 0.7–1.5)
EST. AVERAGE GLUCOSE BLD GHB EST-MCNC: 252 MG/DL
GFR SERPL CREATININE-BSD FRML MDRD: 66 ML/MIN/1.73SQ M
GLUCOSE P FAST SERPL-MCNC: 448 MG/DL (ref 70–99)
HBA1C MFR BLD: 10.4 %
HDLC SERPL-MCNC: 37 MG/DL
LDLC SERPL CALC-MCNC: 62 MG/DL
NONHDLC SERPL-MCNC: 140 MG/DL
POTASSIUM SERPL-SCNC: 4.7 MMOL/L (ref 3.6–5)
PROT SERPL-MCNC: 8.5 G/DL (ref 5.9–8.4)
SODIUM SERPL-SCNC: 130 MMOL/L (ref 137–147)
TRIGL SERPL-MCNC: 389 MG/DL

## 2020-03-26 PROCEDURE — 80061 LIPID PANEL: CPT

## 2020-03-26 PROCEDURE — 80053 COMPREHEN METABOLIC PANEL: CPT

## 2020-03-26 PROCEDURE — 36415 COLL VENOUS BLD VENIPUNCTURE: CPT

## 2020-03-26 PROCEDURE — 83036 HEMOGLOBIN GLYCOSYLATED A1C: CPT

## 2020-03-31 ENCOUNTER — HOSPITAL ENCOUNTER (INPATIENT)
Facility: HOSPITAL | Age: 45
LOS: 4 days | Discharge: HOME | DRG: 854 | End: 2020-04-04
Attending: EMERGENCY MEDICINE | Admitting: PODIATRIST
Payer: MEDICARE

## 2020-03-31 DIAGNOSIS — I10 ESSENTIAL HYPERTENSION: ICD-10-CM

## 2020-03-31 DIAGNOSIS — L03.116 CELLULITIS OF LEFT LEG: Primary | ICD-10-CM

## 2020-03-31 DIAGNOSIS — F31.81 BIPOLAR 2 DISORDER (HCC): Chronic | ICD-10-CM

## 2020-03-31 DIAGNOSIS — Z79.4 TYPE 2 DIABETES MELLITUS WITH DIABETIC POLYNEUROPATHY, WITH LONG-TERM CURRENT USE OF INSULIN (HCC): ICD-10-CM

## 2020-03-31 DIAGNOSIS — L97.411 DIABETIC ULCER OF RIGHT MIDFOOT ASSOCIATED WITH TYPE 2 DIABETES MELLITUS, LIMITED TO BREAKDOWN OF SKIN (HCC): ICD-10-CM

## 2020-03-31 DIAGNOSIS — E11.42 TYPE 2 DIABETES MELLITUS WITH DIABETIC POLYNEUROPATHY, WITH LONG-TERM CURRENT USE OF INSULIN (HCC): ICD-10-CM

## 2020-03-31 DIAGNOSIS — E78.5 HYPERLIPEMIA: ICD-10-CM

## 2020-03-31 DIAGNOSIS — E11.621 DIABETIC ULCER OF RIGHT MIDFOOT ASSOCIATED WITH TYPE 2 DIABETES MELLITUS, LIMITED TO BREAKDOWN OF SKIN (HCC): ICD-10-CM

## 2020-03-31 LAB
ANION GAP SERPL CALCULATED.3IONS-SCNC: 8 MMOL/L (ref 4–13)
BASOPHILS # BLD AUTO: 0.04 THOUSANDS/ΜL (ref 0–0.1)
BASOPHILS NFR BLD AUTO: 0 % (ref 0–1)
BUN SERPL-MCNC: 17 MG/DL (ref 5–25)
CALCIUM SERPL-MCNC: 8.6 MG/DL (ref 8.3–10.1)
CHLORIDE SERPL-SCNC: 95 MMOL/L (ref 100–108)
CO2 SERPL-SCNC: 26 MMOL/L (ref 21–32)
CREAT SERPL-MCNC: 1.45 MG/DL (ref 0.6–1.3)
EOSINOPHIL # BLD AUTO: 0 THOUSAND/ΜL (ref 0–0.61)
EOSINOPHIL NFR BLD AUTO: 0 % (ref 0–6)
ERYTHROCYTE [DISTWIDTH] IN BLOOD BY AUTOMATED COUNT: 13.2 % (ref 11.6–15.1)
GFR SERPL CREATININE-BSD FRML MDRD: 59 ML/MIN/1.73SQ M
GLUCOSE SERPL-MCNC: 309 MG/DL (ref 65–140)
GLUCOSE SERPL-MCNC: 343 MG/DL (ref 65–140)
HCT VFR BLD AUTO: 40.7 % (ref 36.5–49.3)
HGB BLD-MCNC: 13.6 G/DL (ref 12–17)
IMM GRANULOCYTES # BLD AUTO: 0.06 THOUSAND/UL (ref 0–0.2)
IMM GRANULOCYTES NFR BLD AUTO: 1 % (ref 0–2)
LYMPHOCYTES # BLD AUTO: 1.18 THOUSANDS/ΜL (ref 0.6–4.47)
LYMPHOCYTES NFR BLD AUTO: 10 % (ref 14–44)
MCH RBC QN AUTO: 26.5 PG (ref 26.8–34.3)
MCHC RBC AUTO-ENTMCNC: 33.4 G/DL (ref 31.4–37.4)
MCV RBC AUTO: 79 FL (ref 82–98)
MONOCYTES # BLD AUTO: 0.97 THOUSAND/ΜL (ref 0.17–1.22)
MONOCYTES NFR BLD AUTO: 8 % (ref 4–12)
NEUTROPHILS # BLD AUTO: 9.49 THOUSANDS/ΜL (ref 1.85–7.62)
NEUTS SEG NFR BLD AUTO: 81 % (ref 43–75)
NRBC BLD AUTO-RTO: 0 /100 WBCS
PLATELET # BLD AUTO: 230 THOUSANDS/UL (ref 149–390)
PMV BLD AUTO: 9.7 FL (ref 8.9–12.7)
POTASSIUM SERPL-SCNC: 4.3 MMOL/L (ref 3.5–5.3)
RBC # BLD AUTO: 5.13 MILLION/UL (ref 3.88–5.62)
SODIUM SERPL-SCNC: 129 MMOL/L (ref 136–145)
WBC # BLD AUTO: 11.74 THOUSAND/UL (ref 4.31–10.16)

## 2020-03-31 PROCEDURE — 82948 REAGENT STRIP/BLOOD GLUCOSE: CPT

## 2020-03-31 PROCEDURE — 96365 THER/PROPH/DIAG IV INF INIT: CPT

## 2020-03-31 PROCEDURE — 99284 EMERGENCY DEPT VISIT MOD MDM: CPT

## 2020-03-31 PROCEDURE — 85025 COMPLETE CBC W/AUTO DIFF WBC: CPT | Performed by: EMERGENCY MEDICINE

## 2020-03-31 PROCEDURE — 80048 BASIC METABOLIC PNL TOTAL CA: CPT | Performed by: EMERGENCY MEDICINE

## 2020-03-31 PROCEDURE — 36415 COLL VENOUS BLD VENIPUNCTURE: CPT | Performed by: EMERGENCY MEDICINE

## 2020-03-31 PROCEDURE — 99285 EMERGENCY DEPT VISIT HI MDM: CPT | Performed by: EMERGENCY MEDICINE

## 2020-03-31 RX ORDER — GABAPENTIN 300 MG/1
300 CAPSULE ORAL
Status: DISCONTINUED | OUTPATIENT
Start: 2020-03-31 | End: 2020-04-04 | Stop reason: HOSPADM

## 2020-03-31 RX ORDER — ATORVASTATIN CALCIUM 40 MG/1
40 TABLET, FILM COATED ORAL
Status: DISCONTINUED | OUTPATIENT
Start: 2020-03-31 | End: 2020-04-04 | Stop reason: HOSPADM

## 2020-03-31 RX ORDER — SODIUM CHLORIDE 9 MG/ML
75 INJECTION, SOLUTION INTRAVENOUS CONTINUOUS
Status: DISCONTINUED | OUTPATIENT
Start: 2020-03-31 | End: 2020-04-01

## 2020-03-31 RX ORDER — BUPROPION HYDROCHLORIDE 150 MG/1
150 TABLET, EXTENDED RELEASE ORAL EVERY MORNING
Status: DISCONTINUED | OUTPATIENT
Start: 2020-04-01 | End: 2020-04-04 | Stop reason: HOSPADM

## 2020-03-31 RX ORDER — TRAZODONE HYDROCHLORIDE 50 MG/1
50 TABLET ORAL
Status: DISCONTINUED | OUTPATIENT
Start: 2020-03-31 | End: 2020-04-04 | Stop reason: HOSPADM

## 2020-03-31 RX ORDER — BUPROPION HYDROCHLORIDE 100 MG/1
100 TABLET ORAL DAILY
Status: DISCONTINUED | OUTPATIENT
Start: 2020-04-01 | End: 2020-04-04 | Stop reason: HOSPADM

## 2020-03-31 RX ORDER — MORPHINE SULFATE 4 MG/ML
4 INJECTION, SOLUTION INTRAMUSCULAR; INTRAVENOUS ONCE
Status: COMPLETED | OUTPATIENT
Start: 2020-03-31 | End: 2020-03-31

## 2020-03-31 RX ORDER — ACETAMINOPHEN 325 MG/1
650 TABLET ORAL EVERY 6 HOURS PRN
Status: DISCONTINUED | OUTPATIENT
Start: 2020-03-31 | End: 2020-04-04 | Stop reason: HOSPADM

## 2020-03-31 RX ORDER — INSULIN GLARGINE 100 [IU]/ML
42 INJECTION, SOLUTION SUBCUTANEOUS EVERY MORNING
Status: DISCONTINUED | OUTPATIENT
Start: 2020-04-01 | End: 2020-04-02

## 2020-03-31 RX ORDER — HEPARIN SODIUM 5000 [USP'U]/ML
5000 INJECTION, SOLUTION INTRAVENOUS; SUBCUTANEOUS EVERY 8 HOURS SCHEDULED
Status: DISCONTINUED | OUTPATIENT
Start: 2020-03-31 | End: 2020-04-04 | Stop reason: HOSPADM

## 2020-03-31 RX ORDER — LOSARTAN POTASSIUM 25 MG/1
25 TABLET ORAL DAILY
Status: DISCONTINUED | OUTPATIENT
Start: 2020-04-01 | End: 2020-04-04 | Stop reason: HOSPADM

## 2020-03-31 RX ORDER — CEFAZOLIN SODIUM 2 G/50ML
2000 SOLUTION INTRAVENOUS ONCE
Status: COMPLETED | OUTPATIENT
Start: 2020-03-31 | End: 2020-03-31

## 2020-03-31 RX ORDER — CEFAZOLIN SODIUM 2 G/50ML
2000 SOLUTION INTRAVENOUS EVERY 8 HOURS
Status: DISCONTINUED | OUTPATIENT
Start: 2020-03-31 | End: 2020-04-04 | Stop reason: HOSPADM

## 2020-03-31 RX ORDER — ACETAMINOPHEN 325 MG/1
650 TABLET ORAL ONCE
Status: COMPLETED | OUTPATIENT
Start: 2020-03-31 | End: 2020-03-31

## 2020-03-31 RX ORDER — CHLORPROMAZINE HYDROCHLORIDE 25 MG/1
50 TABLET, FILM COATED ORAL 3 TIMES DAILY
Status: DISCONTINUED | OUTPATIENT
Start: 2020-03-31 | End: 2020-04-04 | Stop reason: HOSPADM

## 2020-03-31 RX ORDER — DIVALPROEX SODIUM 500 MG/1
1000 TABLET, EXTENDED RELEASE ORAL DAILY
Status: DISCONTINUED | OUTPATIENT
Start: 2020-04-01 | End: 2020-04-04 | Stop reason: HOSPADM

## 2020-03-31 RX ADMIN — CEFAZOLIN SODIUM 2000 MG: 2 SOLUTION INTRAVENOUS at 15:00

## 2020-03-31 RX ADMIN — GABAPENTIN 300 MG: 300 CAPSULE ORAL at 21:26

## 2020-03-31 RX ADMIN — HEPARIN SODIUM 5000 UNITS: 5000 INJECTION INTRAVENOUS; SUBCUTANEOUS at 21:26

## 2020-03-31 RX ADMIN — SODIUM CHLORIDE 1000 ML: 0.9 INJECTION, SOLUTION INTRAVENOUS at 16:47

## 2020-03-31 RX ADMIN — INSULIN LISPRO 4 UNITS: 100 INJECTION, SOLUTION INTRAVENOUS; SUBCUTANEOUS at 21:28

## 2020-03-31 RX ADMIN — INSULIN LISPRO 3 UNITS: 100 INJECTION, SOLUTION INTRAVENOUS; SUBCUTANEOUS at 18:15

## 2020-03-31 RX ADMIN — ACETAMINOPHEN 650 MG: 325 TABLET ORAL at 21:25

## 2020-03-31 RX ADMIN — TRAZODONE HYDROCHLORIDE 50 MG: 50 TABLET ORAL at 21:26

## 2020-03-31 RX ADMIN — MORPHINE SULFATE 4 MG: 4 INJECTION INTRAVENOUS at 16:46

## 2020-03-31 RX ADMIN — ATORVASTATIN CALCIUM 40 MG: 40 TABLET, FILM COATED ORAL at 18:14

## 2020-03-31 RX ADMIN — SODIUM CHLORIDE 75 ML/HR: 0.9 INJECTION, SOLUTION INTRAVENOUS at 18:18

## 2020-03-31 RX ADMIN — ACETAMINOPHEN 650 MG: 325 TABLET ORAL at 15:00

## 2020-03-31 RX ADMIN — BUSPIRONE HYDROCHLORIDE 15 MG: 10 TABLET ORAL at 18:14

## 2020-03-31 RX ADMIN — CEFAZOLIN SODIUM 2000 MG: 2 SOLUTION INTRAVENOUS at 23:29

## 2020-03-31 RX ADMIN — CHLORPROMAZINE HYDROCHLORIDE 50 MG: 25 TABLET, SUGAR COATED ORAL at 20:07

## 2020-04-01 ENCOUNTER — APPOINTMENT (INPATIENT)
Dept: NON INVASIVE DIAGNOSTICS | Facility: HOSPITAL | Age: 45
DRG: 854 | End: 2020-04-01
Payer: MEDICARE

## 2020-04-01 ENCOUNTER — APPOINTMENT (INPATIENT)
Dept: RADIOLOGY | Facility: HOSPITAL | Age: 45
DRG: 854 | End: 2020-04-01
Payer: MEDICARE

## 2020-04-01 PROBLEM — L03.116 CELLULITIS OF LEFT LEG: Status: ACTIVE | Noted: 2020-04-01

## 2020-04-01 LAB
ALBUMIN SERPL BCP-MCNC: 2.2 G/DL (ref 3.5–5)
ALP SERPL-CCNC: 77 U/L (ref 46–116)
ALT SERPL W P-5'-P-CCNC: 13 U/L (ref 12–78)
ANION GAP SERPL CALCULATED.3IONS-SCNC: 7 MMOL/L (ref 4–13)
AST SERPL W P-5'-P-CCNC: 10 U/L (ref 5–45)
BILIRUB SERPL-MCNC: 0.51 MG/DL (ref 0.2–1)
BUN SERPL-MCNC: 19 MG/DL (ref 5–25)
CALCIUM SERPL-MCNC: 8.3 MG/DL (ref 8.3–10.1)
CHLORIDE SERPL-SCNC: 99 MMOL/L (ref 100–108)
CO2 SERPL-SCNC: 24 MMOL/L (ref 21–32)
CREAT SERPL-MCNC: 1.26 MG/DL (ref 0.6–1.3)
ERYTHROCYTE [DISTWIDTH] IN BLOOD BY AUTOMATED COUNT: 13.4 % (ref 11.6–15.1)
GFR SERPL CREATININE-BSD FRML MDRD: 69 ML/MIN/1.73SQ M
GLUCOSE SERPL-MCNC: 281 MG/DL (ref 65–140)
GLUCOSE SERPL-MCNC: 299 MG/DL (ref 65–140)
GLUCOSE SERPL-MCNC: 334 MG/DL (ref 65–140)
GLUCOSE SERPL-MCNC: 336 MG/DL (ref 65–140)
GLUCOSE SERPL-MCNC: 374 MG/DL (ref 65–140)
HCT VFR BLD AUTO: 36.7 % (ref 36.5–49.3)
HGB BLD-MCNC: 11.9 G/DL (ref 12–17)
MCH RBC QN AUTO: 26.2 PG (ref 26.8–34.3)
MCHC RBC AUTO-ENTMCNC: 32.4 G/DL (ref 31.4–37.4)
MCV RBC AUTO: 81 FL (ref 82–98)
PLATELET # BLD AUTO: 222 THOUSANDS/UL (ref 149–390)
PMV BLD AUTO: 9.8 FL (ref 8.9–12.7)
POTASSIUM SERPL-SCNC: 4 MMOL/L (ref 3.5–5.3)
PROT SERPL-MCNC: 6.8 G/DL (ref 6.4–8.2)
RBC # BLD AUTO: 4.54 MILLION/UL (ref 3.88–5.62)
SODIUM 24H UR-SCNC: 40 MOL/L
SODIUM SERPL-SCNC: 130 MMOL/L (ref 136–145)
WBC # BLD AUTO: 9.7 THOUSAND/UL (ref 4.31–10.16)

## 2020-04-01 PROCEDURE — 93971 EXTREMITY STUDY: CPT | Performed by: SURGERY

## 2020-04-01 PROCEDURE — 87070 CULTURE OTHR SPECIMN AEROBIC: CPT | Performed by: STUDENT IN AN ORGANIZED HEALTH CARE EDUCATION/TRAINING PROGRAM

## 2020-04-01 PROCEDURE — 87185 SC STD ENZYME DETCJ PER NZM: CPT | Performed by: STUDENT IN AN ORGANIZED HEALTH CARE EDUCATION/TRAINING PROGRAM

## 2020-04-01 PROCEDURE — 87040 BLOOD CULTURE FOR BACTERIA: CPT | Performed by: STUDENT IN AN ORGANIZED HEALTH CARE EDUCATION/TRAINING PROGRAM

## 2020-04-01 PROCEDURE — 73630 X-RAY EXAM OF FOOT: CPT

## 2020-04-01 PROCEDURE — 87075 CULTR BACTERIA EXCEPT BLOOD: CPT | Performed by: STUDENT IN AN ORGANIZED HEALTH CARE EDUCATION/TRAINING PROGRAM

## 2020-04-01 PROCEDURE — 0JBQ0ZZ EXCISION OF RIGHT FOOT SUBCUTANEOUS TISSUE AND FASCIA, OPEN APPROACH: ICD-10-PCS | Performed by: PODIATRIST

## 2020-04-01 PROCEDURE — 85027 COMPLETE CBC AUTOMATED: CPT | Performed by: STUDENT IN AN ORGANIZED HEALTH CARE EDUCATION/TRAINING PROGRAM

## 2020-04-01 PROCEDURE — 84300 ASSAY OF URINE SODIUM: CPT | Performed by: INTERNAL MEDICINE

## 2020-04-01 PROCEDURE — 82948 REAGENT STRIP/BLOOD GLUCOSE: CPT

## 2020-04-01 PROCEDURE — 87147 CULTURE TYPE IMMUNOLOGIC: CPT | Performed by: STUDENT IN AN ORGANIZED HEALTH CARE EDUCATION/TRAINING PROGRAM

## 2020-04-01 PROCEDURE — 87205 SMEAR GRAM STAIN: CPT | Performed by: STUDENT IN AN ORGANIZED HEALTH CARE EDUCATION/TRAINING PROGRAM

## 2020-04-01 PROCEDURE — 99223 1ST HOSP IP/OBS HIGH 75: CPT | Performed by: INTERNAL MEDICINE

## 2020-04-01 PROCEDURE — 0JBR0ZZ EXCISION OF LEFT FOOT SUBCUTANEOUS TISSUE AND FASCIA, OPEN APPROACH: ICD-10-PCS | Performed by: PODIATRIST

## 2020-04-01 PROCEDURE — 93971 EXTREMITY STUDY: CPT

## 2020-04-01 PROCEDURE — 80053 COMPREHEN METABOLIC PANEL: CPT | Performed by: STUDENT IN AN ORGANIZED HEALTH CARE EDUCATION/TRAINING PROGRAM

## 2020-04-01 PROCEDURE — 87186 SC STD MICRODIL/AGAR DIL: CPT | Performed by: STUDENT IN AN ORGANIZED HEALTH CARE EDUCATION/TRAINING PROGRAM

## 2020-04-01 PROCEDURE — 87077 CULTURE AEROBIC IDENTIFY: CPT | Performed by: STUDENT IN AN ORGANIZED HEALTH CARE EDUCATION/TRAINING PROGRAM

## 2020-04-01 RX ORDER — OXYCODONE HYDROCHLORIDE AND ACETAMINOPHEN 5; 325 MG/1; MG/1
1 TABLET ORAL EVERY 4 HOURS PRN
Status: DISCONTINUED | OUTPATIENT
Start: 2020-04-01 | End: 2020-04-04 | Stop reason: HOSPADM

## 2020-04-01 RX ADMIN — BUSPIRONE HYDROCHLORIDE 15 MG: 10 TABLET ORAL at 17:56

## 2020-04-01 RX ADMIN — CHLORPROMAZINE HYDROCHLORIDE 50 MG: 25 TABLET, SUGAR COATED ORAL at 10:25

## 2020-04-01 RX ADMIN — CHLORPROMAZINE HYDROCHLORIDE 50 MG: 25 TABLET, SUGAR COATED ORAL at 21:50

## 2020-04-01 RX ADMIN — NICOTINE 1 PATCH: 7 PATCH TRANSDERMAL at 10:24

## 2020-04-01 RX ADMIN — CEFAZOLIN SODIUM 2000 MG: 2 SOLUTION INTRAVENOUS at 14:47

## 2020-04-01 RX ADMIN — INSULIN LISPRO 4 UNITS: 100 INJECTION, SOLUTION INTRAVENOUS; SUBCUTANEOUS at 14:04

## 2020-04-01 RX ADMIN — CEFAZOLIN SODIUM 2000 MG: 2 SOLUTION INTRAVENOUS at 06:32

## 2020-04-01 RX ADMIN — HEPARIN SODIUM 5000 UNITS: 5000 INJECTION INTRAVENOUS; SUBCUTANEOUS at 05:16

## 2020-04-01 RX ADMIN — BUPROPION HYDROCHLORIDE 150 MG: 150 TABLET, EXTENDED RELEASE ORAL at 10:25

## 2020-04-01 RX ADMIN — ATORVASTATIN CALCIUM 40 MG: 40 TABLET, FILM COATED ORAL at 17:56

## 2020-04-01 RX ADMIN — CEFAZOLIN SODIUM 2000 MG: 2 SOLUTION INTRAVENOUS at 22:13

## 2020-04-01 RX ADMIN — SODIUM CHLORIDE 75 ML/HR: 0.9 INJECTION, SOLUTION INTRAVENOUS at 05:09

## 2020-04-01 RX ADMIN — INSULIN LISPRO 4 UNITS: 100 INJECTION, SOLUTION INTRAVENOUS; SUBCUTANEOUS at 17:56

## 2020-04-01 RX ADMIN — TRAZODONE HYDROCHLORIDE 50 MG: 50 TABLET ORAL at 21:50

## 2020-04-01 RX ADMIN — BUPROPION HYDROCHLORIDE 100 MG: 100 TABLET, FILM COATED ORAL at 10:24

## 2020-04-01 RX ADMIN — OXYCODONE HYDROCHLORIDE AND ACETAMINOPHEN 1 TABLET: 5; 325 TABLET ORAL at 19:46

## 2020-04-01 RX ADMIN — HEPARIN SODIUM 5000 UNITS: 5000 INJECTION INTRAVENOUS; SUBCUTANEOUS at 14:47

## 2020-04-01 RX ADMIN — INSULIN LISPRO 4 UNITS: 100 INJECTION, SOLUTION INTRAVENOUS; SUBCUTANEOUS at 21:50

## 2020-04-01 RX ADMIN — BUSPIRONE HYDROCHLORIDE 15 MG: 10 TABLET ORAL at 10:24

## 2020-04-01 RX ADMIN — ACETAMINOPHEN 650 MG: 325 TABLET ORAL at 05:15

## 2020-04-01 RX ADMIN — DIVALPROEX SODIUM 1000 MG: 500 TABLET, FILM COATED, EXTENDED RELEASE ORAL at 10:25

## 2020-04-01 RX ADMIN — INSULIN LISPRO 2 UNITS: 100 INJECTION, SOLUTION INTRAVENOUS; SUBCUTANEOUS at 10:22

## 2020-04-01 RX ADMIN — OXYCODONE HYDROCHLORIDE AND ACETAMINOPHEN 1 TABLET: 5; 325 TABLET ORAL at 14:43

## 2020-04-01 RX ADMIN — CHLORPROMAZINE HYDROCHLORIDE 50 MG: 25 TABLET, SUGAR COATED ORAL at 17:57

## 2020-04-01 RX ADMIN — LOSARTAN POTASSIUM 25 MG: 25 TABLET, FILM COATED ORAL at 10:25

## 2020-04-01 RX ADMIN — OXYCODONE HYDROCHLORIDE AND ACETAMINOPHEN 1 TABLET: 5; 325 TABLET ORAL at 10:36

## 2020-04-01 RX ADMIN — GABAPENTIN 300 MG: 300 CAPSULE ORAL at 21:50

## 2020-04-01 RX ADMIN — HEPARIN SODIUM 5000 UNITS: 5000 INJECTION INTRAVENOUS; SUBCUTANEOUS at 21:50

## 2020-04-01 RX ADMIN — INSULIN GLARGINE 42 UNITS: 100 INJECTION, SOLUTION SUBCUTANEOUS at 10:21

## 2020-04-01 RX ADMIN — METFORMIN HYDROCHLORIDE 1000 MG: 500 TABLET ORAL at 18:25

## 2020-04-02 LAB
ANION GAP SERPL CALCULATED.3IONS-SCNC: 8 MMOL/L (ref 4–13)
BUN SERPL-MCNC: 21 MG/DL (ref 5–25)
CALCIUM SERPL-MCNC: 8.4 MG/DL (ref 8.3–10.1)
CHLORIDE SERPL-SCNC: 101 MMOL/L (ref 100–108)
CO2 SERPL-SCNC: 24 MMOL/L (ref 21–32)
CREAT SERPL-MCNC: 1.14 MG/DL (ref 0.6–1.3)
ERYTHROCYTE [DISTWIDTH] IN BLOOD BY AUTOMATED COUNT: 13.7 % (ref 11.6–15.1)
GFR SERPL CREATININE-BSD FRML MDRD: 78 ML/MIN/1.73SQ M
GLUCOSE SERPL-MCNC: 223 MG/DL (ref 65–140)
GLUCOSE SERPL-MCNC: 248 MG/DL (ref 65–140)
GLUCOSE SERPL-MCNC: 279 MG/DL (ref 65–140)
GLUCOSE SERPL-MCNC: 280 MG/DL (ref 65–140)
GLUCOSE SERPL-MCNC: 320 MG/DL (ref 65–140)
HCT VFR BLD AUTO: 34.5 % (ref 36.5–49.3)
HGB BLD-MCNC: 11.3 G/DL (ref 12–17)
MCH RBC QN AUTO: 26.7 PG (ref 26.8–34.3)
MCHC RBC AUTO-ENTMCNC: 32.8 G/DL (ref 31.4–37.4)
MCV RBC AUTO: 82 FL (ref 82–98)
PLATELET # BLD AUTO: 223 THOUSANDS/UL (ref 149–390)
PMV BLD AUTO: 9.4 FL (ref 8.9–12.7)
POTASSIUM SERPL-SCNC: 3.8 MMOL/L (ref 3.5–5.3)
RBC # BLD AUTO: 4.23 MILLION/UL (ref 3.88–5.62)
SODIUM SERPL-SCNC: 133 MMOL/L (ref 136–145)
WBC # BLD AUTO: 9.29 THOUSAND/UL (ref 4.31–10.16)

## 2020-04-02 PROCEDURE — 99232 SBSQ HOSP IP/OBS MODERATE 35: CPT | Performed by: INTERNAL MEDICINE

## 2020-04-02 PROCEDURE — 82948 REAGENT STRIP/BLOOD GLUCOSE: CPT

## 2020-04-02 PROCEDURE — 85027 COMPLETE CBC AUTOMATED: CPT | Performed by: STUDENT IN AN ORGANIZED HEALTH CARE EDUCATION/TRAINING PROGRAM

## 2020-04-02 PROCEDURE — 80048 BASIC METABOLIC PNL TOTAL CA: CPT | Performed by: STUDENT IN AN ORGANIZED HEALTH CARE EDUCATION/TRAINING PROGRAM

## 2020-04-02 RX ORDER — INSULIN GLARGINE 100 [IU]/ML
50 INJECTION, SOLUTION SUBCUTANEOUS EVERY MORNING
Status: DISCONTINUED | OUTPATIENT
Start: 2020-04-02 | End: 2020-04-04

## 2020-04-02 RX ADMIN — BUSPIRONE HYDROCHLORIDE 15 MG: 10 TABLET ORAL at 10:38

## 2020-04-02 RX ADMIN — OXYCODONE HYDROCHLORIDE AND ACETAMINOPHEN 1 TABLET: 5; 325 TABLET ORAL at 14:49

## 2020-04-02 RX ADMIN — BUPROPION HYDROCHLORIDE 100 MG: 100 TABLET, FILM COATED ORAL at 10:37

## 2020-04-02 RX ADMIN — BUSPIRONE HYDROCHLORIDE 15 MG: 10 TABLET ORAL at 17:10

## 2020-04-02 RX ADMIN — CHLORPROMAZINE HYDROCHLORIDE 50 MG: 25 TABLET, SUGAR COATED ORAL at 20:32

## 2020-04-02 RX ADMIN — INSULIN GLARGINE 50 UNITS: 100 INJECTION, SOLUTION SUBCUTANEOUS at 10:40

## 2020-04-02 RX ADMIN — BUPROPION HYDROCHLORIDE 150 MG: 150 TABLET, EXTENDED RELEASE ORAL at 10:38

## 2020-04-02 RX ADMIN — INSULIN LISPRO 2 UNITS: 100 INJECTION, SOLUTION INTRAVENOUS; SUBCUTANEOUS at 12:47

## 2020-04-02 RX ADMIN — ATORVASTATIN CALCIUM 40 MG: 40 TABLET, FILM COATED ORAL at 17:10

## 2020-04-02 RX ADMIN — METFORMIN HYDROCHLORIDE 1000 MG: 500 TABLET ORAL at 17:10

## 2020-04-02 RX ADMIN — INSULIN LISPRO 2 UNITS: 100 INJECTION, SOLUTION INTRAVENOUS; SUBCUTANEOUS at 07:47

## 2020-04-02 RX ADMIN — GABAPENTIN 300 MG: 300 CAPSULE ORAL at 21:45

## 2020-04-02 RX ADMIN — LOSARTAN POTASSIUM 25 MG: 25 TABLET, FILM COATED ORAL at 10:39

## 2020-04-02 RX ADMIN — METFORMIN HYDROCHLORIDE 1000 MG: 500 TABLET ORAL at 07:49

## 2020-04-02 RX ADMIN — CHLORPROMAZINE HYDROCHLORIDE 50 MG: 25 TABLET, SUGAR COATED ORAL at 17:10

## 2020-04-02 RX ADMIN — INSULIN LISPRO 3 UNITS: 100 INJECTION, SOLUTION INTRAVENOUS; SUBCUTANEOUS at 17:10

## 2020-04-02 RX ADMIN — CHLORPROMAZINE HYDROCHLORIDE 50 MG: 25 TABLET, SUGAR COATED ORAL at 10:39

## 2020-04-02 RX ADMIN — OXYCODONE HYDROCHLORIDE AND ACETAMINOPHEN 1 TABLET: 5; 325 TABLET ORAL at 06:04

## 2020-04-02 RX ADMIN — HEPARIN SODIUM 5000 UNITS: 5000 INJECTION INTRAVENOUS; SUBCUTANEOUS at 21:45

## 2020-04-02 RX ADMIN — HEPARIN SODIUM 5000 UNITS: 5000 INJECTION INTRAVENOUS; SUBCUTANEOUS at 06:02

## 2020-04-02 RX ADMIN — DIVALPROEX SODIUM 1000 MG: 500 TABLET, FILM COATED, EXTENDED RELEASE ORAL at 10:38

## 2020-04-02 RX ADMIN — CEFAZOLIN SODIUM 2000 MG: 2 SOLUTION INTRAVENOUS at 14:41

## 2020-04-02 RX ADMIN — CEFAZOLIN SODIUM 2000 MG: 2 SOLUTION INTRAVENOUS at 22:33

## 2020-04-02 RX ADMIN — CEFAZOLIN SODIUM 2000 MG: 2 SOLUTION INTRAVENOUS at 06:02

## 2020-04-02 RX ADMIN — TRAZODONE HYDROCHLORIDE 50 MG: 50 TABLET ORAL at 21:45

## 2020-04-02 RX ADMIN — OXYCODONE HYDROCHLORIDE AND ACETAMINOPHEN 1 TABLET: 5; 325 TABLET ORAL at 20:32

## 2020-04-02 RX ADMIN — HEPARIN SODIUM 5000 UNITS: 5000 INJECTION INTRAVENOUS; SUBCUTANEOUS at 14:41

## 2020-04-02 RX ADMIN — INSULIN LISPRO 3 UNITS: 100 INJECTION, SOLUTION INTRAVENOUS; SUBCUTANEOUS at 21:46

## 2020-04-02 RX ADMIN — NICOTINE 1 PATCH: 7 PATCH TRANSDERMAL at 10:41

## 2020-04-03 LAB
ANION GAP SERPL CALCULATED.3IONS-SCNC: 8 MMOL/L (ref 4–13)
BACTERIA WND AEROBE CULT: ABNORMAL
BUN SERPL-MCNC: 16 MG/DL (ref 5–25)
CALCIUM SERPL-MCNC: 8.6 MG/DL (ref 8.3–10.1)
CHLORIDE SERPL-SCNC: 100 MMOL/L (ref 100–108)
CO2 SERPL-SCNC: 27 MMOL/L (ref 21–32)
CREAT SERPL-MCNC: 1.16 MG/DL (ref 0.6–1.3)
ERYTHROCYTE [DISTWIDTH] IN BLOOD BY AUTOMATED COUNT: 13.8 % (ref 11.6–15.1)
GFR SERPL CREATININE-BSD FRML MDRD: 77 ML/MIN/1.73SQ M
GLUCOSE SERPL-MCNC: 156 MG/DL (ref 65–140)
GLUCOSE SERPL-MCNC: 168 MG/DL (ref 65–140)
GLUCOSE SERPL-MCNC: 243 MG/DL (ref 65–140)
GLUCOSE SERPL-MCNC: 249 MG/DL (ref 65–140)
GLUCOSE SERPL-MCNC: 258 MG/DL (ref 65–140)
GRAM STN SPEC: ABNORMAL
GRAM STN SPEC: ABNORMAL
HCT VFR BLD AUTO: 34.7 % (ref 36.5–49.3)
HGB BLD-MCNC: 11.4 G/DL (ref 12–17)
MCH RBC QN AUTO: 26.8 PG (ref 26.8–34.3)
MCHC RBC AUTO-ENTMCNC: 32.9 G/DL (ref 31.4–37.4)
MCV RBC AUTO: 82 FL (ref 82–98)
PLATELET # BLD AUTO: 262 THOUSANDS/UL (ref 149–390)
PMV BLD AUTO: 9.5 FL (ref 8.9–12.7)
POTASSIUM SERPL-SCNC: 4.2 MMOL/L (ref 3.5–5.3)
RBC # BLD AUTO: 4.26 MILLION/UL (ref 3.88–5.62)
SODIUM SERPL-SCNC: 135 MMOL/L (ref 136–145)
WBC # BLD AUTO: 7.74 THOUSAND/UL (ref 4.31–10.16)

## 2020-04-03 PROCEDURE — 80048 BASIC METABOLIC PNL TOTAL CA: CPT | Performed by: STUDENT IN AN ORGANIZED HEALTH CARE EDUCATION/TRAINING PROGRAM

## 2020-04-03 PROCEDURE — 99232 SBSQ HOSP IP/OBS MODERATE 35: CPT | Performed by: INTERNAL MEDICINE

## 2020-04-03 PROCEDURE — 82948 REAGENT STRIP/BLOOD GLUCOSE: CPT

## 2020-04-03 PROCEDURE — 85027 COMPLETE CBC AUTOMATED: CPT | Performed by: STUDENT IN AN ORGANIZED HEALTH CARE EDUCATION/TRAINING PROGRAM

## 2020-04-03 RX ADMIN — CEFAZOLIN SODIUM 2000 MG: 2 SOLUTION INTRAVENOUS at 06:16

## 2020-04-03 RX ADMIN — INSULIN GLARGINE 50 UNITS: 100 INJECTION, SOLUTION SUBCUTANEOUS at 09:23

## 2020-04-03 RX ADMIN — OXYCODONE HYDROCHLORIDE AND ACETAMINOPHEN 1 TABLET: 5; 325 TABLET ORAL at 15:04

## 2020-04-03 RX ADMIN — LOSARTAN POTASSIUM 25 MG: 25 TABLET, FILM COATED ORAL at 09:24

## 2020-04-03 RX ADMIN — HEPARIN SODIUM 5000 UNITS: 5000 INJECTION INTRAVENOUS; SUBCUTANEOUS at 21:13

## 2020-04-03 RX ADMIN — METFORMIN HYDROCHLORIDE 1000 MG: 500 TABLET ORAL at 16:25

## 2020-04-03 RX ADMIN — OXYCODONE HYDROCHLORIDE AND ACETAMINOPHEN 1 TABLET: 5; 325 TABLET ORAL at 07:43

## 2020-04-03 RX ADMIN — BUSPIRONE HYDROCHLORIDE 15 MG: 10 TABLET ORAL at 18:43

## 2020-04-03 RX ADMIN — CHLORPROMAZINE HYDROCHLORIDE 50 MG: 25 TABLET, SUGAR COATED ORAL at 21:12

## 2020-04-03 RX ADMIN — CHLORPROMAZINE HYDROCHLORIDE 50 MG: 25 TABLET, SUGAR COATED ORAL at 16:26

## 2020-04-03 RX ADMIN — INSULIN LISPRO 2 UNITS: 100 INJECTION, SOLUTION INTRAVENOUS; SUBCUTANEOUS at 12:35

## 2020-04-03 RX ADMIN — ATORVASTATIN CALCIUM 40 MG: 40 TABLET, FILM COATED ORAL at 16:25

## 2020-04-03 RX ADMIN — BUPROPION HYDROCHLORIDE 150 MG: 150 TABLET, EXTENDED RELEASE ORAL at 09:23

## 2020-04-03 RX ADMIN — HEPARIN SODIUM 5000 UNITS: 5000 INJECTION INTRAVENOUS; SUBCUTANEOUS at 06:11

## 2020-04-03 RX ADMIN — BUSPIRONE HYDROCHLORIDE 15 MG: 10 TABLET ORAL at 09:23

## 2020-04-03 RX ADMIN — DIVALPROEX SODIUM 1000 MG: 500 TABLET, FILM COATED, EXTENDED RELEASE ORAL at 09:24

## 2020-04-03 RX ADMIN — CEFAZOLIN SODIUM 2000 MG: 2 SOLUTION INTRAVENOUS at 14:51

## 2020-04-03 RX ADMIN — CHLORPROMAZINE HYDROCHLORIDE 50 MG: 25 TABLET, SUGAR COATED ORAL at 09:24

## 2020-04-03 RX ADMIN — INSULIN LISPRO 1 UNITS: 100 INJECTION, SOLUTION INTRAVENOUS; SUBCUTANEOUS at 09:23

## 2020-04-03 RX ADMIN — NICOTINE 1 PATCH: 7 PATCH TRANSDERMAL at 09:25

## 2020-04-03 RX ADMIN — HEPARIN SODIUM 5000 UNITS: 5000 INJECTION INTRAVENOUS; SUBCUTANEOUS at 14:51

## 2020-04-03 RX ADMIN — BUPROPION HYDROCHLORIDE 100 MG: 100 TABLET, FILM COATED ORAL at 09:23

## 2020-04-03 RX ADMIN — GABAPENTIN 300 MG: 300 CAPSULE ORAL at 21:12

## 2020-04-03 RX ADMIN — CEFAZOLIN SODIUM 2000 MG: 2 SOLUTION INTRAVENOUS at 23:55

## 2020-04-03 RX ADMIN — METFORMIN HYDROCHLORIDE 1000 MG: 500 TABLET ORAL at 07:43

## 2020-04-03 RX ADMIN — INSULIN LISPRO 2 UNITS: 100 INJECTION, SOLUTION INTRAVENOUS; SUBCUTANEOUS at 21:13

## 2020-04-03 RX ADMIN — INSULIN LISPRO 1 UNITS: 100 INJECTION, SOLUTION INTRAVENOUS; SUBCUTANEOUS at 16:26

## 2020-04-03 RX ADMIN — TRAZODONE HYDROCHLORIDE 50 MG: 50 TABLET ORAL at 21:12

## 2020-04-04 VITALS
WEIGHT: 297.62 LBS | RESPIRATION RATE: 20 BRPM | BODY MASS INDEX: 37.2 KG/M2 | DIASTOLIC BLOOD PRESSURE: 100 MMHG | TEMPERATURE: 97.7 F | OXYGEN SATURATION: 95 % | SYSTOLIC BLOOD PRESSURE: 170 MMHG | HEART RATE: 75 BPM

## 2020-04-04 PROBLEM — L03.116 CELLULITIS OF LEFT LEG: Status: RESOLVED | Noted: 2020-04-01 | Resolved: 2020-04-04

## 2020-04-04 LAB
ANION GAP SERPL CALCULATED.3IONS-SCNC: 10 MMOL/L (ref 4–13)
BACTERIA SPEC ANAEROBE CULT: ABNORMAL
BUN SERPL-MCNC: 12 MG/DL (ref 5–25)
CALCIUM SERPL-MCNC: 8.9 MG/DL (ref 8.3–10.1)
CHLORIDE SERPL-SCNC: 103 MMOL/L (ref 100–108)
CO2 SERPL-SCNC: 25 MMOL/L (ref 21–32)
CREAT SERPL-MCNC: 1.1 MG/DL (ref 0.6–1.3)
ERYTHROCYTE [DISTWIDTH] IN BLOOD BY AUTOMATED COUNT: 13.5 % (ref 11.6–15.1)
GFR SERPL CREATININE-BSD FRML MDRD: 82 ML/MIN/1.73SQ M
GLUCOSE SERPL-MCNC: 178 MG/DL (ref 65–140)
GLUCOSE SERPL-MCNC: 188 MG/DL (ref 65–140)
GLUCOSE SERPL-MCNC: 221 MG/DL (ref 65–140)
HCT VFR BLD AUTO: 36.5 % (ref 36.5–49.3)
HGB BLD-MCNC: 12.1 G/DL (ref 12–17)
MCH RBC QN AUTO: 26.7 PG (ref 26.8–34.3)
MCHC RBC AUTO-ENTMCNC: 33.2 G/DL (ref 31.4–37.4)
MCV RBC AUTO: 81 FL (ref 82–98)
PLATELET # BLD AUTO: 284 THOUSANDS/UL (ref 149–390)
PMV BLD AUTO: 9 FL (ref 8.9–12.7)
POTASSIUM SERPL-SCNC: 3.8 MMOL/L (ref 3.5–5.3)
RBC # BLD AUTO: 4.53 MILLION/UL (ref 3.88–5.62)
SODIUM SERPL-SCNC: 138 MMOL/L (ref 136–145)
WBC # BLD AUTO: 7.15 THOUSAND/UL (ref 4.31–10.16)

## 2020-04-04 PROCEDURE — 80048 BASIC METABOLIC PNL TOTAL CA: CPT | Performed by: STUDENT IN AN ORGANIZED HEALTH CARE EDUCATION/TRAINING PROGRAM

## 2020-04-04 PROCEDURE — 85027 COMPLETE CBC AUTOMATED: CPT | Performed by: STUDENT IN AN ORGANIZED HEALTH CARE EDUCATION/TRAINING PROGRAM

## 2020-04-04 PROCEDURE — 99232 SBSQ HOSP IP/OBS MODERATE 35: CPT | Performed by: INTERNAL MEDICINE

## 2020-04-04 PROCEDURE — 82948 REAGENT STRIP/BLOOD GLUCOSE: CPT

## 2020-04-04 RX ORDER — ATORVASTATIN CALCIUM 40 MG/1
40 TABLET, FILM COATED ORAL
Qty: 30 TABLET | Refills: 0 | Status: SHIPPED | OUTPATIENT
Start: 2020-04-04 | End: 2020-04-13 | Stop reason: SDUPTHER

## 2020-04-04 RX ORDER — INSULIN GLARGINE 100 [IU]/ML
60 INJECTION, SOLUTION SUBCUTANEOUS EVERY MORNING
Status: DISCONTINUED | OUTPATIENT
Start: 2020-04-05 | End: 2020-04-04 | Stop reason: HOSPADM

## 2020-04-04 RX ORDER — INSULIN GLARGINE 100 [IU]/ML
55 INJECTION, SOLUTION SUBCUTANEOUS EVERY MORNING
Status: DISCONTINUED | OUTPATIENT
Start: 2020-04-04 | End: 2020-04-04

## 2020-04-04 RX ORDER — ACETAMINOPHEN 325 MG/1
650 TABLET ORAL EVERY 6 HOURS PRN
Qty: 30 TABLET | Refills: 0 | Status: SHIPPED | OUTPATIENT
Start: 2020-04-04 | End: 2020-04-13 | Stop reason: ALTCHOICE

## 2020-04-04 RX ORDER — CEPHALEXIN 500 MG/1
500 CAPSULE ORAL EVERY 6 HOURS SCHEDULED
Qty: 40 CAPSULE | Refills: 0 | Status: SHIPPED | OUTPATIENT
Start: 2020-04-04 | End: 2020-04-14

## 2020-04-04 RX ADMIN — BUSPIRONE HYDROCHLORIDE 15 MG: 10 TABLET ORAL at 09:05

## 2020-04-04 RX ADMIN — INSULIN LISPRO 2 UNITS: 100 INJECTION, SOLUTION INTRAVENOUS; SUBCUTANEOUS at 12:45

## 2020-04-04 RX ADMIN — INSULIN LISPRO 1 UNITS: 100 INJECTION, SOLUTION INTRAVENOUS; SUBCUTANEOUS at 09:08

## 2020-04-04 RX ADMIN — CEFAZOLIN SODIUM 2000 MG: 2 SOLUTION INTRAVENOUS at 06:40

## 2020-04-04 RX ADMIN — HEPARIN SODIUM 5000 UNITS: 5000 INJECTION INTRAVENOUS; SUBCUTANEOUS at 06:40

## 2020-04-04 RX ADMIN — METFORMIN HYDROCHLORIDE 1000 MG: 500 TABLET ORAL at 09:05

## 2020-04-04 RX ADMIN — DIVALPROEX SODIUM 1000 MG: 500 TABLET, FILM COATED, EXTENDED RELEASE ORAL at 09:06

## 2020-04-04 RX ADMIN — OXYCODONE HYDROCHLORIDE AND ACETAMINOPHEN 1 TABLET: 5; 325 TABLET ORAL at 09:11

## 2020-04-04 RX ADMIN — BUPROPION HYDROCHLORIDE 150 MG: 150 TABLET, EXTENDED RELEASE ORAL at 09:06

## 2020-04-04 RX ADMIN — INSULIN GLARGINE 55 UNITS: 100 INJECTION, SOLUTION SUBCUTANEOUS at 09:07

## 2020-04-04 RX ADMIN — BUPROPION HYDROCHLORIDE 100 MG: 100 TABLET, FILM COATED ORAL at 12:45

## 2020-04-04 RX ADMIN — LOSARTAN POTASSIUM 25 MG: 25 TABLET, FILM COATED ORAL at 09:11

## 2020-04-04 RX ADMIN — CHLORPROMAZINE HYDROCHLORIDE 50 MG: 25 TABLET, SUGAR COATED ORAL at 09:05

## 2020-04-06 ENCOUNTER — TRANSITIONAL CARE MANAGEMENT (OUTPATIENT)
Dept: FAMILY MEDICINE CLINIC | Facility: CLINIC | Age: 45
End: 2020-04-06

## 2020-04-06 DIAGNOSIS — Z79.4 TYPE 2 DIABETES MELLITUS WITH DIABETIC POLYNEUROPATHY, WITH LONG-TERM CURRENT USE OF INSULIN (HCC): Primary | ICD-10-CM

## 2020-04-06 DIAGNOSIS — E11.42 TYPE 2 DIABETES MELLITUS WITH DIABETIC POLYNEUROPATHY, WITH LONG-TERM CURRENT USE OF INSULIN (HCC): Primary | ICD-10-CM

## 2020-04-06 LAB
BACTERIA BLD CULT: NORMAL
BACTERIA BLD CULT: NORMAL

## 2020-04-07 ENCOUNTER — PATIENT OUTREACH (OUTPATIENT)
Dept: FAMILY MEDICINE CLINIC | Facility: CLINIC | Age: 45
End: 2020-04-07

## 2020-04-07 ENCOUNTER — TELEPHONE (OUTPATIENT)
Dept: FAMILY MEDICINE CLINIC | Facility: CLINIC | Age: 45
End: 2020-04-07

## 2020-04-08 ENCOUNTER — EPISODE CHANGES (OUTPATIENT)
Dept: CASE MANAGEMENT | Facility: OTHER | Age: 45
End: 2020-04-08

## 2020-04-13 ENCOUNTER — OFFICE VISIT (OUTPATIENT)
Dept: FAMILY MEDICINE CLINIC | Facility: CLINIC | Age: 45
End: 2020-04-13
Payer: MEDICARE

## 2020-04-13 VITALS
DIASTOLIC BLOOD PRESSURE: 90 MMHG | HEART RATE: 110 BPM | BODY MASS INDEX: 37.3 KG/M2 | SYSTOLIC BLOOD PRESSURE: 160 MMHG | OXYGEN SATURATION: 96 % | WEIGHT: 300 LBS | TEMPERATURE: 97.9 F | HEIGHT: 75 IN | RESPIRATION RATE: 16 BRPM

## 2020-04-13 DIAGNOSIS — E11.9 DM (DIABETES MELLITUS), TYPE 2 (HCC): ICD-10-CM

## 2020-04-13 DIAGNOSIS — Z79.4 TYPE 2 DIABETES MELLITUS WITH DIABETIC POLYNEUROPATHY, WITH LONG-TERM CURRENT USE OF INSULIN (HCC): ICD-10-CM

## 2020-04-13 DIAGNOSIS — Z11.4 SCREENING FOR HUMAN IMMUNODEFICIENCY VIRUS: ICD-10-CM

## 2020-04-13 DIAGNOSIS — I10 ESSENTIAL HYPERTENSION: ICD-10-CM

## 2020-04-13 DIAGNOSIS — N52.9 IMPOTENCE: ICD-10-CM

## 2020-04-13 DIAGNOSIS — F31.81 BIPOLAR 2 DISORDER (HCC): Primary | ICD-10-CM

## 2020-04-13 DIAGNOSIS — F33.41 RECURRENT MAJOR DEPRESSIVE DISORDER, IN PARTIAL REMISSION (HCC): ICD-10-CM

## 2020-04-13 DIAGNOSIS — E11.42 TYPE 2 DIABETES MELLITUS WITH DIABETIC POLYNEUROPATHY, WITH LONG-TERM CURRENT USE OF INSULIN (HCC): ICD-10-CM

## 2020-04-13 DIAGNOSIS — G47.00 INSOMNIA: ICD-10-CM

## 2020-04-13 DIAGNOSIS — E78.5 HYPERLIPEMIA: ICD-10-CM

## 2020-04-13 PROCEDURE — 2022F DILAT RTA XM EVC RTNOPTHY: CPT | Performed by: FAMILY MEDICINE

## 2020-04-13 PROCEDURE — 1111F DSCHRG MED/CURRENT MED MERGE: CPT | Performed by: FAMILY MEDICINE

## 2020-04-13 PROCEDURE — 99496 TRANSJ CARE MGMT HIGH F2F 7D: CPT | Performed by: FAMILY MEDICINE

## 2020-04-13 PROCEDURE — 4004F PT TOBACCO SCREEN RCVD TLK: CPT | Performed by: FAMILY MEDICINE

## 2020-04-13 RX ORDER — ATORVASTATIN CALCIUM 40 MG/1
40 TABLET, FILM COATED ORAL
Qty: 30 TABLET | Refills: 0 | Status: SHIPPED | OUTPATIENT
Start: 2020-04-13 | End: 2020-04-14 | Stop reason: SDUPTHER

## 2020-04-13 RX ORDER — BUSPIRONE HYDROCHLORIDE 15 MG/1
15 TABLET ORAL 2 TIMES DAILY
Qty: 60 TABLET | Refills: 2 | Status: SHIPPED | OUTPATIENT
Start: 2020-04-13

## 2020-04-13 RX ORDER — TRAZODONE HYDROCHLORIDE 50 MG/1
50 TABLET ORAL
Qty: 30 TABLET | Refills: 1 | Status: SHIPPED | OUTPATIENT
Start: 2020-04-13 | End: 2020-04-14 | Stop reason: SDUPTHER

## 2020-04-13 RX ORDER — DIVALPROEX SODIUM 500 MG/1
1000 TABLET, EXTENDED RELEASE ORAL DAILY
Qty: 60 TABLET | Refills: 2 | Status: SHIPPED | OUTPATIENT
Start: 2020-04-13 | End: 2020-04-20 | Stop reason: SDUPTHER

## 2020-04-13 RX ORDER — BUPROPION HYDROCHLORIDE 150 MG/1
150 TABLET, EXTENDED RELEASE ORAL EVERY MORNING
Qty: 60 TABLET | Refills: 2 | Status: SHIPPED | OUTPATIENT
Start: 2020-04-13

## 2020-04-13 RX ORDER — LOSARTAN POTASSIUM 50 MG/1
50 TABLET ORAL DAILY
Qty: 30 TABLET | Refills: 2 | Status: SHIPPED | OUTPATIENT
Start: 2020-04-13

## 2020-04-13 RX ORDER — GABAPENTIN 600 MG/1
600 TABLET ORAL 2 TIMES DAILY
Qty: 60 TABLET | Refills: 2 | Status: SHIPPED | OUTPATIENT
Start: 2020-04-13

## 2020-04-13 RX ORDER — CHLORPROMAZINE HYDROCHLORIDE 50 MG/1
50 TABLET, FILM COATED ORAL 3 TIMES DAILY
Qty: 90 TABLET | Refills: 1 | Status: SHIPPED | OUTPATIENT
Start: 2020-04-13 | End: 2020-06-12

## 2020-04-14 RX ORDER — ATORVASTATIN CALCIUM 40 MG/1
40 TABLET, FILM COATED ORAL
Qty: 30 TABLET | Refills: 0 | Status: SHIPPED | OUTPATIENT
Start: 2020-04-14

## 2020-04-14 RX ORDER — TRAZODONE HYDROCHLORIDE 50 MG/1
50 TABLET ORAL
Qty: 30 TABLET | Refills: 1 | Status: SHIPPED | OUTPATIENT
Start: 2020-04-14

## 2020-04-15 ENCOUNTER — APPOINTMENT (OUTPATIENT)
Dept: LAB | Facility: HOSPITAL | Age: 45
End: 2020-04-15
Payer: MEDICARE

## 2020-04-15 DIAGNOSIS — N52.9 IMPOTENCE: ICD-10-CM

## 2020-04-15 DIAGNOSIS — I10 ESSENTIAL HYPERTENSION: ICD-10-CM

## 2020-04-15 DIAGNOSIS — Z11.4 SCREENING FOR HUMAN IMMUNODEFICIENCY VIRUS: ICD-10-CM

## 2020-04-15 DIAGNOSIS — F31.81 BIPOLAR 2 DISORDER (HCC): ICD-10-CM

## 2020-04-15 LAB
FSH SERPL-ACNC: 7.5 MIU/ML (ref 0.7–10.8)
LH SERPL-ACNC: 11.2 MIU/ML (ref 1.2–10.6)
PROLACTIN SERPL-MCNC: 18.5 NG/ML (ref 2.5–17.4)
TSH SERPL DL<=0.05 MIU/L-ACNC: 0.93 UIU/ML (ref 0.47–4.68)

## 2020-04-15 PROCEDURE — 80165 DIPROPYLACETIC ACID FREE: CPT

## 2020-04-15 PROCEDURE — 84403 ASSAY OF TOTAL TESTOSTERONE: CPT

## 2020-04-15 PROCEDURE — 83001 ASSAY OF GONADOTROPIN (FSH): CPT

## 2020-04-15 PROCEDURE — 83002 ASSAY OF GONADOTROPIN (LH): CPT

## 2020-04-15 PROCEDURE — 84146 ASSAY OF PROLACTIN: CPT

## 2020-04-15 PROCEDURE — 36415 COLL VENOUS BLD VENIPUNCTURE: CPT

## 2020-04-15 PROCEDURE — 82955 ASSAY OF G6PD ENZYME: CPT

## 2020-04-15 PROCEDURE — 82626 DEHYDROEPIANDROSTERONE: CPT

## 2020-04-15 PROCEDURE — 87389 HIV-1 AG W/HIV-1&-2 AB AG IA: CPT

## 2020-04-15 PROCEDURE — 84443 ASSAY THYROID STIM HORMONE: CPT

## 2020-04-15 PROCEDURE — 84402 ASSAY OF FREE TESTOSTERONE: CPT

## 2020-04-16 ENCOUNTER — PATIENT OUTREACH (OUTPATIENT)
Dept: FAMILY MEDICINE CLINIC | Facility: CLINIC | Age: 45
End: 2020-04-16

## 2020-04-16 LAB
HIV 1+2 AB+HIV1 P24 AG SERPL QL IA: NORMAL
TESTOST FREE SERPL-MCNC: 12.1 PG/ML (ref 6.8–21.5)
TESTOST SERPL-MCNC: 240 NG/DL (ref 264–916)

## 2020-04-17 LAB
DHEA SERPL-MCNC: 274 NG/DL (ref 31–701)
G6PD BLD QN: 275 U/10E12 RBC (ref 146–376)
RBC # BLD AUTO: 4.47 X10E6/UL (ref 4.14–5.8)
VALPROATE FREE SERPL-MCNC: 5.2 UG/ML (ref 6–22)

## 2020-04-20 ENCOUNTER — OFFICE VISIT (OUTPATIENT)
Dept: FAMILY MEDICINE CLINIC | Facility: CLINIC | Age: 45
End: 2020-04-20
Payer: MEDICARE

## 2020-04-20 VITALS
DIASTOLIC BLOOD PRESSURE: 80 MMHG | TEMPERATURE: 97.8 F | OXYGEN SATURATION: 97 % | WEIGHT: 297 LBS | HEIGHT: 75 IN | BODY MASS INDEX: 36.93 KG/M2 | SYSTOLIC BLOOD PRESSURE: 120 MMHG | RESPIRATION RATE: 16 BRPM | HEART RATE: 108 BPM

## 2020-04-20 DIAGNOSIS — Z00.01 ENCOUNTER FOR GENERAL ADULT MEDICAL EXAMINATION WITH ABNORMAL FINDINGS: ICD-10-CM

## 2020-04-20 DIAGNOSIS — IMO0002 DM (DIABETES MELLITUS), SECONDARY UNCONTROLLED: ICD-10-CM

## 2020-04-20 DIAGNOSIS — F31.81 BIPOLAR 2 DISORDER (HCC): ICD-10-CM

## 2020-04-20 DIAGNOSIS — K06.9 DISORDER OF GUMS: Primary | ICD-10-CM

## 2020-04-20 DIAGNOSIS — N52.9 ERECTILE DYSFUNCTION, UNSPECIFIED ERECTILE DYSFUNCTION TYPE: ICD-10-CM

## 2020-04-20 PROCEDURE — 4004F PT TOBACCO SCREEN RCVD TLK: CPT | Performed by: FAMILY MEDICINE

## 2020-04-20 PROCEDURE — 2022F DILAT RTA XM EVC RTNOPTHY: CPT | Performed by: FAMILY MEDICINE

## 2020-04-20 PROCEDURE — 1111F DSCHRG MED/CURRENT MED MERGE: CPT | Performed by: FAMILY MEDICINE

## 2020-04-20 PROCEDURE — 99215 OFFICE O/P EST HI 40 MIN: CPT | Performed by: FAMILY MEDICINE

## 2020-04-20 PROCEDURE — G0438 PPPS, INITIAL VISIT: HCPCS | Performed by: FAMILY MEDICINE

## 2020-04-20 RX ORDER — CLINDAMYCIN HYDROCHLORIDE 300 MG/1
600 CAPSULE ORAL EVERY 24 HOURS
Qty: 20 CAPSULE | Refills: 0 | Status: SHIPPED | OUTPATIENT
Start: 2020-04-20 | End: 2020-04-30

## 2020-04-20 RX ORDER — DIVALPROEX SODIUM 500 MG/1
1500 TABLET, EXTENDED RELEASE ORAL DAILY
Qty: 90 TABLET | Refills: 3 | Status: SHIPPED | OUTPATIENT
Start: 2020-04-20 | End: 2020-05-20

## 2020-04-21 PROBLEM — N52.9 ERECTILE DYSFUNCTION: Status: ACTIVE | Noted: 2020-04-21

## 2020-04-21 PROBLEM — K06.9 DISORDER OF GUMS: Status: ACTIVE | Noted: 2020-04-21

## 2020-04-30 DIAGNOSIS — Z79.4 TYPE 2 DIABETES MELLITUS WITH DIABETIC POLYNEUROPATHY, WITH LONG-TERM CURRENT USE OF INSULIN (HCC): ICD-10-CM

## 2020-04-30 DIAGNOSIS — E11.42 TYPE 2 DIABETES MELLITUS WITH DIABETIC POLYNEUROPATHY, WITH LONG-TERM CURRENT USE OF INSULIN (HCC): ICD-10-CM

## 2020-05-14 DIAGNOSIS — K04.7 DENTAL ABSCESS: Primary | ICD-10-CM

## 2020-05-15 RX ORDER — CLINDAMYCIN HYDROCHLORIDE 300 MG/1
300 CAPSULE ORAL 4 TIMES DAILY
Qty: 40 CAPSULE | Refills: 0 | Status: SHIPPED | OUTPATIENT
Start: 2020-05-15 | End: 2020-05-25

## 2020-05-20 ENCOUNTER — OFFICE VISIT (OUTPATIENT)
Dept: FAMILY MEDICINE CLINIC | Facility: CLINIC | Age: 45
End: 2020-05-20
Payer: MEDICARE

## 2020-05-20 VITALS
HEART RATE: 104 BPM | RESPIRATION RATE: 16 BRPM | HEIGHT: 75 IN | DIASTOLIC BLOOD PRESSURE: 80 MMHG | WEIGHT: 296 LBS | OXYGEN SATURATION: 97 % | TEMPERATURE: 97.8 F | SYSTOLIC BLOOD PRESSURE: 156 MMHG | BODY MASS INDEX: 36.8 KG/M2

## 2020-05-20 DIAGNOSIS — Z79.4 TYPE 2 DIABETES MELLITUS WITH DIABETIC POLYNEUROPATHY, WITH LONG-TERM CURRENT USE OF INSULIN (HCC): ICD-10-CM

## 2020-05-20 DIAGNOSIS — I10 ESSENTIAL HYPERTENSION: Primary | ICD-10-CM

## 2020-05-20 DIAGNOSIS — E11.42 TYPE 2 DIABETES MELLITUS WITH DIABETIC POLYNEUROPATHY, WITH LONG-TERM CURRENT USE OF INSULIN (HCC): ICD-10-CM

## 2020-05-20 DIAGNOSIS — F31.4 SEVERE BIPOLAR I DISORDER, MOST RECENT EPISODE DEPRESSED WITHOUT PSYCHOTIC FEATURES (HCC): ICD-10-CM

## 2020-05-20 PROCEDURE — 99214 OFFICE O/P EST MOD 30 MIN: CPT | Performed by: FAMILY MEDICINE

## 2020-05-20 PROCEDURE — 1111F DSCHRG MED/CURRENT MED MERGE: CPT | Performed by: FAMILY MEDICINE

## 2020-05-20 PROCEDURE — 4004F PT TOBACCO SCREEN RCVD TLK: CPT | Performed by: FAMILY MEDICINE

## 2020-05-20 PROCEDURE — 2022F DILAT RTA XM EVC RTNOPTHY: CPT | Performed by: FAMILY MEDICINE

## 2020-05-20 RX ORDER — AMLODIPINE BESYLATE 5 MG/1
5 TABLET ORAL DAILY
Qty: 90 TABLET | Refills: 3 | Status: SHIPPED | OUTPATIENT
Start: 2020-05-20

## 2020-06-08 DIAGNOSIS — E11.42 TYPE 2 DIABETES MELLITUS WITH DIABETIC POLYNEUROPATHY, WITH LONG-TERM CURRENT USE OF INSULIN (HCC): ICD-10-CM

## 2020-06-08 DIAGNOSIS — Z79.4 TYPE 2 DIABETES MELLITUS WITH DIABETIC POLYNEUROPATHY, WITH LONG-TERM CURRENT USE OF INSULIN (HCC): ICD-10-CM

## 2020-06-25 ENCOUNTER — TELEPHONE (OUTPATIENT)
Dept: FAMILY MEDICINE CLINIC | Facility: CLINIC | Age: 45
End: 2020-06-25

## 2020-06-26 DIAGNOSIS — F31.81 BIPOLAR 2 DISORDER (HCC): Primary | ICD-10-CM

## 2020-07-09 ENCOUNTER — TELEPHONE (OUTPATIENT)
Dept: FAMILY MEDICINE CLINIC | Facility: CLINIC | Age: 45
End: 2020-07-09

## 2020-07-09 NOTE — TELEPHONE ENCOUNTER
Briefly spoke with patient who stated that "I don't have time for any of this education, I'm not interested "  MARTIN

## 2020-07-20 ENCOUNTER — TELEPHONE (OUTPATIENT)
Dept: FAMILY MEDICINE CLINIC | Facility: CLINIC | Age: 45
End: 2020-07-20

## 2020-07-20 NOTE — TELEPHONE ENCOUNTER
Patient has transferred care to TAYLOR Baer    Nurse Practitioner    NPI: [id number]    [place] [id number]         Phone: [phone]    Fax: [phone]

## 2020-09-29 NOTE — TELEPHONE ENCOUNTER
09/29/20 7:44 AM     Thank you for your request  Your request has been received, reviewed, and the patient chart updated  The PCP has successfully been removed with a patient attribution note  This message will now be completed      Thank you  Iliana Cardenas

## 2020-10-02 ENCOUNTER — TELEPHONE (OUTPATIENT)
Dept: FAMILY MEDICINE CLINIC | Facility: CLINIC | Age: 45
End: 2020-10-02

## 2021-01-20 ENCOUNTER — VBI (OUTPATIENT)
Dept: ADMINISTRATIVE | Facility: OTHER | Age: 46
End: 2021-01-20

## 2021-07-06 NOTE — ASSESSMENT & PLAN NOTE
Vitals:    01/29/20 0700   BP: 141/93   Pulse: 64   Resp: 16   Temp: 97 9 °F (36 6 °C)   SpO2:         BP is elevated  Continue metoprolol 50 mg twice a day  Now that his renal function has returned to normal will restart lisinopril but will increase lisinopril to 10mg tablet  left rail up/right rail down

## 2022-04-18 NOTE — ASSESSMENT & PLAN NOTE
Patient has sepsis present on admission with fever of 102  along with mild tachycardia noted and leukocytosis noted on 1/18  Source of infection is the right leg cellulitis    Placed on IV antibiotics and IV fluid hydration    Sepsis has now resolved Quality 130: Documentation Of Current Medications In The Medical Record: Current Medications Documented Quality 431: Preventive Care And Screening: Unhealthy Alcohol Use - Screening: Patient not identified as an unhealthy alcohol user when screened for unhealthy alcohol use using a systematic screening method Quality 110: Preventive Care And Screening: Influenza Immunization: Influenza Immunization Administered during Influenza season Detail Level: Detailed Quality 226: Preventive Care And Screening: Tobacco Use: Screening And Cessation Intervention: Patient screened for tobacco use and is an ex/non-smoker

## 2022-05-31 NOTE — H&P
[facility] 1975, 38 y [facility]  male MRN: [id number]    Unit/Bed#: 7T [facility] 717-01 Encounter: [id number]    Primary Care Provider: No primary care provider on file  Date and time admitted to hospital: 1/18/2020 11:02 AM        * Cellulitis of right lower extremity  Assessment & Plan  Patient has right leg cellulitis  Denies any injury however he has not been taking his medications for the last 1 and half week and is a known diabetic with uncontrolled blood sugars  He also has nonhealing wound on his right midfoot  Cellulitis includes redness and swelling extending up to almost the right knee  Patient had a fever of 102 this morning and had leukocytosis noted  Transferred to the medical floor  Placed on IV ceftriaxone b i d  Labs done stat reviewed    Sepsis [facility])  Assessment & Plan  Patient has sepsis present on admission with fever of 102 this morning along with mild tachycardia noted and leukocytosis noted  Source of infection is the right leg cellulitis  Placed on IV antibiotics and IV fluid hydration    PVD (peripheral vascular disease) [facility])  Assessment & Plan  Patient has known history of peripheral vascular disease has bilateral TMA  Place on aspirin 81 mg daily    Essential hypertension  Assessment & Plan  Patient had accelerated blood pressure yesterday however his blood pressure is well controlled today  Continue lisinopril and metoprolol    Type 2 diabetes mellitus with diabetic polyneuropathy, with long-term current use of insulin [facility])  Assessment & Plan  Lab Results   Component Value Date    HGBA1C 8 4 (H) 07/10/2019       Recent Labs     01/17/20  1958 01/18/20  0012 01/18/20  0928 01/18/20  1128   POCGLU 253* 183* 207* 219*       Blood Sugar Average: Last 72 hrs:  (P) 219   Uncontrolled diabetic  Patient states that he was prescribed insulin but never picked it upper used it  His blood sugars have been over 200 since admission to behavioral health    Will currently place him on insulin sliding scale and a diabetic diet and also initiate him on 15 u of Lantus daily at bedtime  If his seems agreeable to using Lantus long-term will continue would otherwise will try to come up with an oral medication regimen which at least he will comply with  Continue Neurontin for neuropathic pain    Diabetic ulcer of right midfoot [facility])  Assessment & Plan  Lab Results   Component Value Date    HGBA1C 8 4 (H) 07/10/2019       Recent Labs     01/17/20  1958 01/18/20  0012 01/18/20  0928 01/18/20  1128   POCGLU 253* 183* 207* 219*       Blood Sugar Average: Last 72 hrs:  (P) 219     Patient noticed to have a small nonhealing ulcer on the base of the right midfoot and also some bleeding noted at the site of his previous TMA  Will ask Podiatry for evaluation to and x-ray of the right foot to rule out any underlying foreign body or gas    Tobacco use disorder  Assessment & Plan  Counseled on smoking cessation and placed on nicotine replacement therapy    Bipolar 2 disorder [facility])  Assessment & Plan  Patient has uncontrolled bipolar disorder  He did not take his meds for the last 1 and half week  Complains of suicidal ideation  Was admitted to [address] [place] [address] but had to be transferred to the medical floor for treatment for cellulitis  Will ask Psychiatry for evaluation  Resume all of his home psychiatric medications including Thorazine, BuSpar, Depakote, sertraline and Invega  Does not require 1 is to 1 observation at this time  Place on Q 15 minutes behavioral health checks      VTE Prophylaxis: Enoxaparin (Lovenox)  / reason for no mechanical VTE prophylaxis Cellulitis right leg   Code Status:  Full code  POLST: There is no POLST form on file for this patient (pre-hospital)  Discussion with family:  None    Anticipated Length of Stay:  Patient will be admitted on an Inpatient basis with an anticipated length of stay of  more than 2 midnights     Justification for Hospital Stay:  Right leg cellulitis    Total Time for Visit, including Counseling / Coordination of Care: 1 hour  Greater than 50% of this total time spent on direct patient counseling and coordination of care  Chief Complaint:   Fever and right leg pain    History of Present Illness:    Marcelle Gonzalez is a 38 y o  male who presents with fever and right leg pain  Patient was initially admitted through the emergency room to the behavioral health unit because of depression and noncompliance with psychiatric medications for the last 1 and half week  On my evaluation there he was noted to have a fever of 102 and was having redness and pain of his right leg and hence was transferred to the medical floor  Patient complains of 6 x 10 pain of his right leg  Denies any trauma or injuries however he does have bilateral TMA and has some nonhealing areas on the right foot and does not appear to be a compliant patient  Review of Systems:    Review of Systems   Constitutional: Positive for chills and fatigue  Negative for appetite change and fever  HENT: Negative for hearing loss, sore throat and trouble swallowing  Eyes: Negative for photophobia, discharge and visual disturbance  Respiratory: Negative for chest tightness and shortness of breath  Cardiovascular: Negative for chest pain and palpitations  Gastrointestinal: Negative for abdominal pain, blood in stool and vomiting  Endocrine: Negative for polydipsia and polyuria  Genitourinary: Negative for difficulty urinating, dysuria, flank pain and hematuria  Musculoskeletal: Positive for arthralgias and gait problem  Negative for back pain  Skin: Negative for rash  Allergic/Immunologic: Negative for environmental allergies and food allergies  Neurological: Negative for dizziness, seizures, syncope and headaches  Hematological: Does not bruise/bleed easily  Psychiatric/Behavioral: Positive for behavioral problems  The patient is nervous/anxious      All other systems reviewed and are negative  Past Medical and Surgical History:     Past Medical History:   Diagnosis Date    Anxiety     Bipolar 1 disorder ([place] [address] )     Chronic pain disorder     Depression     Diabetes mellitus ([place] [address] )     Hypertension     Psychiatric illness     PTSD (post-traumatic stress disorder)     Sleep difficulties     Substance abuse ([place] [address] )     Suicide attempt ([place] [address] )     Type 2 diabetes mellitus with diabetic polyneuropathy, with long-term current use of insulin ([place] [address] )        Past Surgical History:   Procedure Laterality Date    APPENDECTOMY      COLON SURGERY      TOE AMPUTATION      TONSILLECTOMY         Meds/Allergies:    Prior to Admission medications    Medication Sig Start Date End Date Taking?  Authorizing Provider   benztropine (COGENTIN) 1 mg tablet Take 1 tablet (1 mg total) by mouth 2 (two) times a day for 30 days 8/14/19 9/13/19  Emiliano Jeffries MD   Blood Glucose Monitoring Suppl (FREESTYLE LITE) KATIE by Does not apply route 3 (three) times a day 8/13/19   Maria Del Rosario Landers DO   busPIRone (BUSPAR) 10 mg tablet Take 1 tablet (10 mg total) by mouth 2 (two) times a day for 30 days 8/14/19 9/13/19  Emiliano Jeffries MD   chlorproMAZINE (THORAZINE) 50 mg tablet Take 1 tablet (50 mg total) by mouth 3 (three) times a day for 90 days 9/5/19 12/4/19  Maria Del Rosario Landers DO   diclofenac sodium (VOLTAREN) 1 % Apply 2 g topically 4 (four) times a day 8/13/19   Maria Del Rosario Landers DO   divalproex sodium (DEPAKOTE ER) 500 mg 24 hr tablet Take 4 tablets (2,000 mg total) by mouth every evening for 30 days 8/14/19 9/13/19  Emiliano Jeffries MD   gabapentin (NEURONTIN) 300 mg capsule Take 2 capsules (600 mg total) by mouth 3 (three) times a day for 30 days 8/14/19 9/13/19  Emiliano Jeffries MD   glimepiride (AMARYL) 1 mg tablet Take 1 tablet (1 mg total) by mouth daily with breakfast 9/5/19   Maria Del Rosario Landers DO   glucose blood (FREESTYLE LITE) test strip Use to check sugar three times a day 9/5/19   Maria Del Rosario Landers DO   insulin aspart (NovoLOG) 100 Units/mL injection pen As directed on discharge instructions 7/1/19   Historical Provider, MD   insulin glargine (LANTUS SOLOSTAR) 100 units/mL injection pen Inject 30 Units under the skin 7/1/19   Historical Provider, MD   Insulin Pen Needle (B-D ULTRAFINE III SHORT PEN) 31G X 8 MM MISC As directed 7/1/19   Historical Provider, MD   Lancets (FREESTYLE) lancets Use to check blood sugar three times a day 9/5/19   Mandy Jordan, DO   lisinopril (ZESTRIL) 10 mg tablet Take 1 tablet (10 mg total) by mouth daily 8/14/19   Mandy Jordan, DO   metFORMIN (GLUCOPHAGE) 1000 MG tablet Take 1 tablet (1,000 mg total) by mouth 2 (two) times a day with meals 9/5/19   Mandy Jordan, DO   metoprolol tartrate (LOPRESSOR) 25 mg tablet Take 1 tablet (25 mg total) by mouth every 12 (twelve) hours 9/5/19   Mandy Jordan, DO   nicotine (NICODERM CQ) 21 mg/24 hr TD 24 hr patch Place 1 patch on the skin every 24 hours 8/13/19   Mandy Jordan, DO   ONE TOUCH LANCETS MISC As directed 7/1/19   Historical Provider, MD   paliperidone (INVEGA) 3 mg 24 hr tablet TAKE 1 TABLET(3 MG) BY MOUTH DAILY 9/6/19   Mandy Jordan, DO   rosuvastatin (CRESTOR) 10 MG tablet Take 1 tablet (10 mg total) by mouth daily 9/5/19   Mandy Jordan, DO   sertraline (ZOLOFT) 25 mg tablet Take 1 tablet (25 mg total) by mouth daily 8/14/19   Vladislav Robb MD   traZODone (DESYREL) 50 mg tablet Take 1 tablet (50 mg total) by mouth daily at bedtime for 30 days 8/14/19 9/13/19  Vladislav Robb MD     I have reviewed home medications with patient personally  Allergies:    Allergies   Allergen Reactions    Penicillins Rash and Other (See Comments)     rash (Tolerating Ancef-per RN)  Last noted when patient was a child       Social History:     Marital Status: Single   Social History     Substance and Sexual Activity   Alcohol Use Yes    Frequency: Monthly or less    Drinks per session: 1 or 2    Binge frequency: Less than monthly     Social History     Tobacco Use   Smoking Status Current Every Day Smoker    Packs/day: 1 50    Years: 22 00    Pack years: 33 00    Types: Cigarettes   Smokeless Tobacco Current User    Types: Chew     Social History     Substance and Sexual Activity   Drug Use Not Currently    Types: Marijuana    Comment: monthly       Family History:    Family History   Problem Relation Age of Onset    Hypertension Mother     Diabetes Mother     Depression Mother     Leukemia Mother     No Known Problems Father     No Known Problems Sister     No Known Problems Brother     No Known Problems Maternal Aunt     No Known Problems Paternal Aunt     No Known Problems Maternal Uncle     No Known Problems Paternal Uncle     No Known Problems Maternal Grandfather     No Known Problems Maternal Grandmother     No Known Problems Paternal Grandfather     No Known Problems Paternal Grandmother     No Known Problems Cousin     ADD / ADHD Neg Hx     Alcohol abuse Neg Hx     Anxiety disorder Neg Hx     Bipolar disorder Neg Hx     Completed Suicide  Neg Hx     Dementia Neg Hx     Drug abuse Neg Hx     OCD Neg Hx     Psychiatric Illness Neg Hx     Psychosis Neg Hx     Schizoaffective Disorder  Neg Hx     Schizophrenia Neg Hx     Self-Injury Neg Hx     Suicide Attempts Neg Hx        Physical Exam:     Vitals:   Blood Pressure: 126/83 (01/18/20 1100)  Pulse: 95 (01/18/20 1100)  Temperature: 99 7 °F (37 6 °C) (01/18/20 1100)  Temp Source: Temporal (01/18/20 1100)  Respirations: 20 (01/18/20 1100)  SpO2: 98 % (01/18/20 1100)    Physical Exam   Constitutional: He is oriented to person, place, and time  He appears well-developed and well-nourished  HENT:   Head: Normocephalic and atraumatic  Right Ear: External ear normal    Left Ear: External ear normal    Mouth/Throat: Oropharynx is clear and moist    Eyes: Pupils are equal, round, and reactive to light  Conjunctivae and EOM are normal    Neck: Normal range of motion  Neck supple     Cardiovascular: Normal rate, regular rhythm and normal heart sounds  Pulmonary/Chest: Effort normal and breath sounds normal    Abdominal: Soft  Bowel sounds are normal  He exhibits no mass  There is no tenderness  There is no rebound and no guarding  Genitourinary:   Genitourinary Comments: deferred   Musculoskeletal: Normal range of motion  Bilateral feet TMA  Nonhealing wound noted on the right foot at the site of surgery and also on the bottom of the mid foot which is 2 x 2 cm with mild serosanguineous drainage noted    Redness warmth and tenderness of the right leg extending from the right foot to the right upper shin  Neurological: He is alert and oriented to person, place, and time  He has normal reflexes  Cranial nerves 2-12 are normal   Normal neurological exam   Skin: Skin is warm  No rash noted  He is diaphoretic  Psychiatric:   Flat affect   Nursing note and vitals reviewed  Additional Data:     Lab Results: I have personally reviewed pertinent reports  Results from last 7 days   Lab Units 01/18/20  1031 01/17/20  1520   WBC Thousand/uL 11 30* 13 20*   HEMOGLOBIN g/dL 14 5 15 3   HEMATOCRIT % 42 6 45 4   PLATELETS Thousands/uL 183 216   BANDS PCT %  --  3   NEUTROS PCT % 86*  --    LYMPHS PCT % 6*  --    LYMPHO PCT %  --  7*   MONOS PCT % 8  --    MONO PCT %  --  4   EOS PCT % 0  --      Results from last 7 days   Lab Units 01/18/20  1031   SODIUM mmol/L 131*   POTASSIUM mmol/L 4 5   CHLORIDE mmol/L 97   CO2 mmol/L 26   BUN mg/dL 14   CREATININE mg/dL 1 33   ANION GAP mmol/L 8   CALCIUM mg/dL 8 6   ALBUMIN g/dL 3 7   TOTAL BILIRUBIN mg/dL 0 70   ALK PHOS U/L 72   ALT U/L 18   AST U/L 17   GLUCOSE RANDOM mg/dL 233*         Results from last 7 days   Lab Units 01/18/20  1128 01/18/20  0928 01/18/20  0012 01/17/20  1958   POC GLUCOSE mg/dl 219* 207* 183* 253*         Results from last 7 days   Lab Units 01/18/20  1031   LACTIC ACID mmol/L 1 3       Imaging: I have personally reviewed pertinent reports        XR foot 3+ vw right    (Results Pending)       EKG, Pathology, and Other Studies Reviewed on Admission:   · EKG:  Ordered    Allscripts / Epic Records Reviewed: Yes     ** Please Note: This note has been constructed using a voice recognition system   ** Number Of Stages: 1

## 2022-06-14 NOTE — ED NOTES
Patient Belongings  Pants  Long Sleeve shirt  Socks  Shoes  Sweater  Jacket  Gold hat  Messenger bag  - wallet  - phone  - phone   - power bank  - mail  - tobacco  - tobacco rolls     Aba Maguire  12/16/18 205 No

## 2022-10-11 NOTE — ASSESSMENT & PLAN NOTE
Patient has right leg cellulitis  Denies any injury however he has not been taking his medications for the last 1 and half week and is a known diabetic with uncontrolled blood sugars  He also has nonhealing wound on his right midfoot  Cellulitis includes redness and swelling extending up to almost the right knee  Patient had a fever of 102 on 01/18/2020 and had leukocytosis noted  Transferred to the medical floor  Placed on IV ceftriaxone     Worsening leukocytosis, swelling redness and pain noted of the right leg 1/19/2020  Continue renally dosed vancomycin and cefepime  Also do a venous Doppler to rule out underlying DVT  CT leg did not show any evidence of gas or necrotizing fasciitis however it is clinically a little bit better today     Appreciate input by podiatry and also discussed with Infectious Disease Yes

## 2023-07-01 NOTE — PROGRESS NOTES
Pt is refusing ECT due to doctor postponing his first treatment to Monday for his elevated potassium level  Pt then signs a 72 hour notice at 1811  Pt reports feeling stable and that he has a place to stay that is not a shelter  Will continue to monitor  ,DirectAddress_Unknown

## 2023-08-04 NOTE — PLAN OF CARE
Problem: Risk for Self Injury/Neglect  Goal: Treatment Goal: Remain safe during length of stay, learn and adopt new coping skills, and be free of self-injurious ideation, impulses and acts at the time of discharge  Outcome: Completed Date Met: 12/27/18      Problem: Depression  Goal: Treatment Goal: Demonstrate behavioral control of depressive symptoms, verbalize feelings of improved mood/affect, and adopt new coping skills prior to discharge  Outcome: Completed Date Met: 12/27/18      Problem: Anxiety  Goal: Anxiety is at manageable level  Interventions:  - Assess and monitor patient's anxiety level  - Monitor for signs and symptoms of anxiety both physical and emotional (heart palpitations, chest pain, shortness of breath, headaches, nausea, feeling jumpy, restlessness, irritable, apprehensive)  - Collaborate with interdisciplinary team and initiate plan and interventions as ordered    - [place] patient to unit/surroundings  - Explain treatment plan  - Encourage participation in care  - Encourage verbalization of concerns/fears  - Identify coping mechanisms  - Assist in developing anxiety-reducing skills  - Administer/offer alternative therapies  - Limit or eliminate stimulants   Outcome: Completed Date Met: 12/27/18      Problem: DISCHARGE PLANNING  Goal: Discharge to home or other facility with appropriate resources  INTERVENTIONS:  - Identify barriers to discharge w/patient and caregiver  - Arrange for needed discharge resources and transportation as appropriate  - Identify discharge learning needs (meds, wound care, etc )  - Arrange for interpretive services to assist at discharge as needed  - Refer to Case Management Department for coordinating discharge planning if the patient needs post-hospital services based on physician/advanced practitioner order or complex needs related to functional status, cognitive ability, or social support system   Outcome: Completed Date Met: 12/27/18      Problem: Ineffective Coping  Goal: Participates in unit activities  Interventions:  - Provide therapeutic environment   - Provide required programming   - Redirect inappropriate behaviors    Outcome: Completed Date Met: 12/27/18 None

## 2025-04-14 NOTE — PROGRESS NOTES
Could one of the RNs check in again with this patient to see how he is doing in terms of pain there is room to potentially increase the hydromorphone but I would only want to do so patient is still in considerable pain or if he is having any other difficulties with the change in his antibiotic.   Progress Note - Behavioral Health   Skip Corado 44 y o  male MRN: [id number]  Unit/Bed#: -01 Encounter: [id number]              Progress Note - [address] [facility] 44 y o  male MRN: [id number]  Unit/Bed#: -01 Encounter: [id number]    The patient was seen for continuing care and reviewed with treatment team  Patient reports feeling better, with better motivation and more out of room  Patient reports good sleep and good appetite  Patient is less irritable, reports better energy level  Patient denies side effects of medications  Mental Status Evaluation:  Appearance:  Adequate hygiene and grooming   Behavior:  cooperative   Mood:  euthymic, Affect: brighter   Thought Content:  Does not verbalize delusional material   Perceptual Disturbances: Denies hallucinations and does not appear to be responding to internal stimuli   Risk Potential: Denies suicidal or homicidal ideation at this time   Orientation:   Oriented to place and place     Vitals:    12/25/18 1120   BP: 131/60   Pulse: 76   Resp: 18   Temp:        Assessment/Plan    Principal Problem:    Severe bipolar I disorder, most recent episode depressed without psychotic features ([facility] [address] )  Active Problems:    Tobacco use disorder    Diabetic ulcer of right midfoot ([facility] [address] )    DM (diabetes mellitus), type 2 ([facility] [address] )    Essential hypertension      Recommended Treatment: Continue present medications  Check VPA level in 2 days  Continue with pharmacotherapy, group therapy, milieu therapy and occupational therapy    The patient will be maintained on the following medications:    Current Facility-Administered Medications:  acetaminophen 650 mg Oral Q6H PRN Anjali Sandoval PA-C   acetaminophen 975 mg Oral Q6H PRN Smitha Marr PA-C   aluminum-magnesium hydroxide-simethicone 30 mL Oral Q4H PRN Anjali aSndoval PA-C   benztropine 1 mg Intramuscular Q6H PRN Anjali Sandoval PA-C   benztropine 1 mg Oral Q6H PRN Anjali Sandoval PA-C   busPIRone 10 mg Oral BID Anderson Garcia MD   chlorproMAZINE 50 mg Oral BID Anderson Garcia MD   collagenase  Topical Daily Jaxson Ket, DPM   divalproex sodium 2,000 mg Oral HS CyndiTAYLOR Mclean   DULoxetine 90 mg Oral Daily Anderson Garcia MD   haloperidol 5 mg Oral Q6H PRN [facility], PA-C   haloperidol lactate 5 mg Intramuscular Q6H PRN Rhiannon Schlesly Gyangela, PA-C   hydrOXYzine HCL 25 mg Oral Q6H PRN [facility], PA-C   ibuprofen 600 mg Oral Q8H PRN Rhiannon Schlesly Marr, PA-C   insulin glargine 30 Units Subcutaneous HS Rylie TAYLOR Barrientos   insulin lispro 1-6 Units Subcutaneous TID AC Patrizia Marr, PA-C   insulin lispro 1-6 Units Subcutaneous HS Gloris Schlatter Gyarmaty, PA-C   insulin lispro 4 Units Subcutaneous TID With Meals [facility] TAYLOR Barrientos   lisinopril 5 mg Oral Daily Gloris Schlatter Gyarmaty, PA-C   LORazepam 1 mg Intramuscular Q6H PRN Gloris Schlatter Gyarmaty, PA-C   LORazepam 1 mg Oral Q6H PRN Rhiannon Schlagaryer Tanjaarmatess, PA-C   magnesium hydroxide 30 mL Oral Daily PRN [facility], PA-C   metFORMIN 850 mg Oral BID With Meals [facility] TAYLOR Barrientos   metoprolol tartrate 25 mg Oral Q12H [facility], PA-C   nicotine 1 patch Transdermal Daily Gloris Schlatter [place], PA-C   nicotine polacrilex 4 mg Oral Q2H PRN Mariela Dorman MD   risperiDONE 1 mg Oral Q3H PRN [facility], PA-C   traZODone 50 mg Oral HS TAYLOR Crawford   ziprasidone 20 mg Intramuscular Q4H PRN [facility], PALeesaC